# Patient Record
Sex: FEMALE | Race: BLACK OR AFRICAN AMERICAN | NOT HISPANIC OR LATINO | Employment: FULL TIME | URBAN - METROPOLITAN AREA
[De-identification: names, ages, dates, MRNs, and addresses within clinical notes are randomized per-mention and may not be internally consistent; named-entity substitution may affect disease eponyms.]

---

## 2017-01-06 ENCOUNTER — OFFICE VISIT - HEALTHEAST (OUTPATIENT)
Dept: EDUCATION SERVICES | Facility: CLINIC | Age: 32
End: 2017-01-06

## 2017-01-06 DIAGNOSIS — E11.9 TYPE 2 DIABETES MELLITUS WITHOUT COMPLICATION, WITHOUT LONG-TERM CURRENT USE OF INSULIN (H): ICD-10-CM

## 2017-01-20 ENCOUNTER — COMMUNICATION - HEALTHEAST (OUTPATIENT)
Dept: SCHEDULING | Facility: CLINIC | Age: 32
End: 2017-01-20

## 2017-01-23 ENCOUNTER — COMMUNICATION - HEALTHEAST (OUTPATIENT)
Dept: FAMILY MEDICINE | Facility: CLINIC | Age: 32
End: 2017-01-23

## 2017-01-25 ENCOUNTER — OFFICE VISIT - HEALTHEAST (OUTPATIENT)
Dept: FAMILY MEDICINE | Facility: CLINIC | Age: 32
End: 2017-01-25

## 2017-01-25 DIAGNOSIS — Z09 HOSPITAL DISCHARGE FOLLOW-UP: ICD-10-CM

## 2017-01-25 DIAGNOSIS — E11.9 TYPE 2 DIABETES MELLITUS WITHOUT COMPLICATION, WITHOUT LONG-TERM CURRENT USE OF INSULIN (H): ICD-10-CM

## 2017-01-25 DIAGNOSIS — E78.5 HYPERLIPIDEMIA: ICD-10-CM

## 2017-01-25 LAB
HBA1C MFR BLD: 6.4 % (ref 3.5–6)
LDLC SERPL CALC-MCNC: 143 MG/DL

## 2017-01-26 ENCOUNTER — COMMUNICATION - HEALTHEAST (OUTPATIENT)
Dept: UROLOGY | Facility: CLINIC | Age: 32
End: 2017-01-26

## 2017-02-09 ENCOUNTER — COMMUNICATION - HEALTHEAST (OUTPATIENT)
Dept: FAMILY MEDICINE | Facility: CLINIC | Age: 32
End: 2017-02-09

## 2017-04-14 ENCOUNTER — RECORDS - HEALTHEAST (OUTPATIENT)
Dept: ADMINISTRATIVE | Facility: OTHER | Age: 32
End: 2017-04-14

## 2017-05-25 ENCOUNTER — COMMUNICATION - HEALTHEAST (OUTPATIENT)
Dept: FAMILY MEDICINE | Facility: CLINIC | Age: 32
End: 2017-05-25

## 2017-05-25 ENCOUNTER — AMBULATORY - HEALTHEAST (OUTPATIENT)
Dept: FAMILY MEDICINE | Facility: CLINIC | Age: 32
End: 2017-05-25

## 2017-06-27 ENCOUNTER — RECORDS - HEALTHEAST (OUTPATIENT)
Dept: ADMINISTRATIVE | Facility: OTHER | Age: 32
End: 2017-06-27

## 2017-07-10 ENCOUNTER — RECORDS - HEALTHEAST (OUTPATIENT)
Dept: ADMINISTRATIVE | Facility: OTHER | Age: 32
End: 2017-07-10

## 2017-08-12 ENCOUNTER — HEALTH MAINTENANCE LETTER (OUTPATIENT)
Age: 32
End: 2017-08-12

## 2017-08-18 ENCOUNTER — COMMUNICATION - HEALTHEAST (OUTPATIENT)
Dept: FAMILY MEDICINE | Facility: CLINIC | Age: 32
End: 2017-08-18

## 2017-08-22 ENCOUNTER — COMMUNICATION - HEALTHEAST (OUTPATIENT)
Dept: FAMILY MEDICINE | Facility: CLINIC | Age: 32
End: 2017-08-22

## 2017-08-24 ENCOUNTER — OFFICE VISIT - HEALTHEAST (OUTPATIENT)
Dept: FAMILY MEDICINE | Facility: CLINIC | Age: 32
End: 2017-08-24

## 2017-08-24 DIAGNOSIS — E78.5 HYPERLIPIDEMIA: ICD-10-CM

## 2017-08-24 DIAGNOSIS — T14.8XXA ABRASION: ICD-10-CM

## 2017-08-24 DIAGNOSIS — G62.9 NEUROPATHY: ICD-10-CM

## 2017-08-24 DIAGNOSIS — E11.9 TYPE 2 DIABETES MELLITUS WITHOUT COMPLICATION, WITHOUT LONG-TERM CURRENT USE OF INSULIN (H): ICD-10-CM

## 2017-08-24 LAB
HBA1C MFR BLD: 7.8 % (ref 3.5–6)
LDLC SERPL CALC-MCNC: 203 MG/DL

## 2017-08-24 ASSESSMENT — MIFFLIN-ST. JEOR: SCORE: 2185.94

## 2017-12-18 ENCOUNTER — COMMUNICATION - HEALTHEAST (OUTPATIENT)
Dept: FAMILY MEDICINE | Facility: CLINIC | Age: 32
End: 2017-12-18

## 2018-01-02 ENCOUNTER — COMMUNICATION - HEALTHEAST (OUTPATIENT)
Dept: FAMILY MEDICINE | Facility: CLINIC | Age: 33
End: 2018-01-02

## 2018-01-04 ENCOUNTER — COMMUNICATION - HEALTHEAST (OUTPATIENT)
Dept: FAMILY MEDICINE | Facility: CLINIC | Age: 33
End: 2018-01-04

## 2018-02-14 ENCOUNTER — RECORDS - HEALTHEAST (OUTPATIENT)
Dept: ADMINISTRATIVE | Facility: OTHER | Age: 33
End: 2018-02-14

## 2018-02-14 ENCOUNTER — OFFICE VISIT - HEALTHEAST (OUTPATIENT)
Dept: FAMILY MEDICINE | Facility: CLINIC | Age: 33
End: 2018-02-14

## 2018-02-14 DIAGNOSIS — J06.9 URI (UPPER RESPIRATORY INFECTION): ICD-10-CM

## 2018-02-14 DIAGNOSIS — E11.40 DIABETES MELLITUS WITH NEUROPATHY (H): ICD-10-CM

## 2018-02-14 DIAGNOSIS — E66.9 OBESITY: ICD-10-CM

## 2018-02-14 DIAGNOSIS — E78.5 HYPERLIPIDEMIA: ICD-10-CM

## 2018-02-14 DIAGNOSIS — R80.9 MICROALBUMINURIA: ICD-10-CM

## 2018-02-14 LAB
ALBUMIN SERPL-MCNC: 3.1 G/DL (ref 3.5–5)
ALP SERPL-CCNC: 72 U/L (ref 45–120)
ALT SERPL W P-5'-P-CCNC: 16 U/L (ref 0–45)
ANION GAP SERPL CALCULATED.3IONS-SCNC: 11 MMOL/L (ref 5–18)
AST SERPL W P-5'-P-CCNC: 16 U/L (ref 0–40)
BILIRUB SERPL-MCNC: 0.3 MG/DL (ref 0–1)
BUN SERPL-MCNC: 6 MG/DL (ref 8–22)
CALCIUM SERPL-MCNC: 8.9 MG/DL (ref 8.5–10.5)
CHLORIDE BLD-SCNC: 105 MMOL/L (ref 98–107)
CHOLEST SERPL-MCNC: 239 MG/DL
CO2 SERPL-SCNC: 23 MMOL/L (ref 22–31)
CREAT SERPL-MCNC: 0.61 MG/DL (ref 0.6–1.1)
CREAT UR-MCNC: 261 MG/DL
FASTING STATUS PATIENT QL REPORTED: YES
GFR SERPL CREATININE-BSD FRML MDRD: >60 ML/MIN/1.73M2
GLUCOSE BLD-MCNC: 197 MG/DL (ref 70–125)
HBA1C MFR BLD: 8.5 % (ref 3.5–6)
HDLC SERPL-MCNC: 53 MG/DL
LDLC SERPL CALC-MCNC: 164 MG/DL
MICROALBUMIN UR-MCNC: 5.9 MG/DL (ref 0–1.99)
MICROALBUMIN/CREAT UR: 22.6 MG/G
POTASSIUM BLD-SCNC: 4.2 MMOL/L (ref 3.5–5)
PROT SERPL-MCNC: 6.8 G/DL (ref 6–8)
SODIUM SERPL-SCNC: 139 MMOL/L (ref 136–145)
TRIGL SERPL-MCNC: 108 MG/DL

## 2018-02-15 ENCOUNTER — COMMUNICATION - HEALTHEAST (OUTPATIENT)
Dept: FAMILY MEDICINE | Facility: CLINIC | Age: 33
End: 2018-02-15

## 2018-02-15 DIAGNOSIS — E11.9 DIABETES (H): ICD-10-CM

## 2018-02-16 ENCOUNTER — COMMUNICATION - HEALTHEAST (OUTPATIENT)
Dept: FAMILY MEDICINE | Facility: CLINIC | Age: 33
End: 2018-02-16

## 2018-02-19 ENCOUNTER — COMMUNICATION - HEALTHEAST (OUTPATIENT)
Dept: FAMILY MEDICINE | Facility: CLINIC | Age: 33
End: 2018-02-19

## 2018-02-22 ENCOUNTER — COMMUNICATION - HEALTHEAST (OUTPATIENT)
Dept: FAMILY MEDICINE | Facility: CLINIC | Age: 33
End: 2018-02-22

## 2018-02-28 ENCOUNTER — OFFICE VISIT - HEALTHEAST (OUTPATIENT)
Dept: FAMILY MEDICINE | Facility: CLINIC | Age: 33
End: 2018-02-28

## 2018-02-28 DIAGNOSIS — E11.40 DIABETES MELLITUS WITH NEUROPATHY (H): ICD-10-CM

## 2018-02-28 DIAGNOSIS — Z01.818 PRE-OP EXAM: ICD-10-CM

## 2018-02-28 DIAGNOSIS — E66.9 OBESITY: ICD-10-CM

## 2018-02-28 DIAGNOSIS — J01.90 ACUTE SINUSITIS: ICD-10-CM

## 2018-02-28 DIAGNOSIS — R80.9 MICROALBUMINURIA: ICD-10-CM

## 2018-02-28 DIAGNOSIS — E78.5 HYPERLIPIDEMIA: ICD-10-CM

## 2018-02-28 LAB
ANION GAP SERPL CALCULATED.3IONS-SCNC: 8 MMOL/L (ref 5–18)
ATRIAL RATE - MUSE: 89 BPM
BUN SERPL-MCNC: 6 MG/DL (ref 8–22)
CALCIUM SERPL-MCNC: 8.8 MG/DL (ref 8.5–10.5)
CHLORIDE BLD-SCNC: 103 MMOL/L (ref 98–107)
CO2 SERPL-SCNC: 25 MMOL/L (ref 22–31)
CREAT SERPL-MCNC: 0.65 MG/DL (ref 0.6–1.1)
DIASTOLIC BLOOD PRESSURE - MUSE: NORMAL MMHG
ERYTHROCYTE [DISTWIDTH] IN BLOOD BY AUTOMATED COUNT: 12.1 % (ref 11–14.5)
GFR SERPL CREATININE-BSD FRML MDRD: >60 ML/MIN/1.73M2
GLUCOSE BLD-MCNC: 146 MG/DL (ref 70–125)
HCG UR QL: NEGATIVE
HCT VFR BLD AUTO: 34.3 % (ref 35–47)
HGB BLD-MCNC: 11.4 G/DL (ref 12–16)
INTERPRETATION ECG - MUSE: NORMAL
MCH RBC QN AUTO: 27.6 PG (ref 27–34)
MCHC RBC AUTO-ENTMCNC: 33.3 G/DL (ref 32–36)
MCV RBC AUTO: 83 FL (ref 80–100)
P AXIS - MUSE: 38 DEGREES
PLATELET # BLD AUTO: 270 THOU/UL (ref 140–440)
PMV BLD AUTO: 8.3 FL (ref 7–10)
POTASSIUM BLD-SCNC: 4.3 MMOL/L (ref 3.5–5)
PR INTERVAL - MUSE: 162 MS
QRS DURATION - MUSE: 90 MS
QT - MUSE: 360 MS
QTC - MUSE: 438 MS
R AXIS - MUSE: 15 DEGREES
RBC # BLD AUTO: 4.13 MILL/UL (ref 3.8–5.4)
SODIUM SERPL-SCNC: 136 MMOL/L (ref 136–145)
SP GR UR STRIP: >=1.03 (ref 1–1.03)
SYSTOLIC BLOOD PRESSURE - MUSE: NORMAL MMHG
T AXIS - MUSE: -2 DEGREES
VENTRICULAR RATE- MUSE: 89 BPM
WBC: 6.5 THOU/UL (ref 4–11)

## 2018-02-28 ASSESSMENT — MIFFLIN-ST. JEOR: SCORE: 2167.23

## 2018-03-01 ENCOUNTER — ANESTHESIA - HEALTHEAST (OUTPATIENT)
Dept: SURGERY | Facility: HOSPITAL | Age: 33
End: 2018-03-01

## 2018-03-01 ASSESSMENT — MIFFLIN-ST. JEOR: SCORE: 2167.23

## 2018-03-02 ENCOUNTER — SURGERY - HEALTHEAST (OUTPATIENT)
Dept: SURGERY | Facility: HOSPITAL | Age: 33
End: 2018-03-02

## 2018-03-07 ENCOUNTER — RECORDS - HEALTHEAST (OUTPATIENT)
Dept: ADMINISTRATIVE | Facility: OTHER | Age: 33
End: 2018-03-07

## 2018-03-20 ENCOUNTER — COMMUNICATION - HEALTHEAST (OUTPATIENT)
Dept: EDUCATION SERVICES | Facility: CLINIC | Age: 33
End: 2018-03-20

## 2018-03-20 DIAGNOSIS — E11.9 TYPE 2 DIABETES MELLITUS WITHOUT COMPLICATION, WITHOUT LONG-TERM CURRENT USE OF INSULIN (H): ICD-10-CM

## 2018-03-28 ENCOUNTER — COMMUNICATION - HEALTHEAST (OUTPATIENT)
Dept: FAMILY MEDICINE | Facility: CLINIC | Age: 33
End: 2018-03-28

## 2018-04-27 ENCOUNTER — COMMUNICATION - HEALTHEAST (OUTPATIENT)
Dept: FAMILY MEDICINE | Facility: CLINIC | Age: 33
End: 2018-04-27

## 2018-11-14 ENCOUNTER — OFFICE VISIT - HEALTHEAST (OUTPATIENT)
Dept: FAMILY MEDICINE | Facility: CLINIC | Age: 33
End: 2018-11-14

## 2018-11-14 DIAGNOSIS — J02.0 STREP THROAT: ICD-10-CM

## 2018-11-14 LAB
DEPRECATED S PYO AG THROAT QL EIA: ABNORMAL
FLUAV AG SPEC QL IA: NORMAL
FLUBV AG SPEC QL IA: NORMAL

## 2018-12-05 ENCOUNTER — COMMUNICATION - HEALTHEAST (OUTPATIENT)
Dept: FAMILY MEDICINE | Facility: CLINIC | Age: 33
End: 2018-12-05

## 2018-12-18 ENCOUNTER — RECORDS - HEALTHEAST (OUTPATIENT)
Dept: LAB | Facility: HOSPITAL | Age: 33
End: 2018-12-18

## 2018-12-19 LAB
GAMMA INTERFERON BACKGROUND BLD IA-ACNC: 0.02 IU/ML
M TB IFN-G BLD-IMP: NEGATIVE
MITOGEN IGNF BCKGRD COR BLD-ACNC: 0 IU/ML
MITOGEN IGNF BCKGRD COR BLD-ACNC: 0.02 IU/ML
QTF INTERPRETATION: NORMAL
QTF MITOGEN - NIL: 7.7 IU/ML

## 2018-12-26 ENCOUNTER — COMMUNICATION - HEALTHEAST (OUTPATIENT)
Dept: FAMILY MEDICINE | Facility: CLINIC | Age: 33
End: 2018-12-26

## 2019-01-02 ENCOUNTER — COMMUNICATION - HEALTHEAST (OUTPATIENT)
Dept: FAMILY MEDICINE | Facility: CLINIC | Age: 34
End: 2019-01-02

## 2019-01-04 ENCOUNTER — COMMUNICATION - HEALTHEAST (OUTPATIENT)
Dept: FAMILY MEDICINE | Facility: CLINIC | Age: 34
End: 2019-01-04

## 2019-01-07 ENCOUNTER — AMBULATORY - HEALTHEAST (OUTPATIENT)
Dept: NURSING | Facility: CLINIC | Age: 34
End: 2019-01-07

## 2019-01-07 ENCOUNTER — COMMUNICATION - HEALTHEAST (OUTPATIENT)
Dept: FAMILY MEDICINE | Facility: CLINIC | Age: 34
End: 2019-01-07

## 2019-01-07 DIAGNOSIS — J02.0 STREPTOCOCCAL SORE THROAT: ICD-10-CM

## 2019-01-07 LAB — DEPRECATED S PYO AG THROAT QL EIA: NORMAL

## 2019-01-08 LAB — GROUP A STREP BY PCR: NORMAL

## 2019-01-22 ENCOUNTER — RECORDS - HEALTHEAST (OUTPATIENT)
Dept: ADMINISTRATIVE | Facility: OTHER | Age: 34
End: 2019-01-22

## 2019-01-22 LAB
HPV_EXT - HISTORICAL: NORMAL
PAP SMEAR - HIM PATIENT REPORTED: NORMAL

## 2019-02-05 ENCOUNTER — RECORDS - HEALTHEAST (OUTPATIENT)
Dept: ADMINISTRATIVE | Facility: OTHER | Age: 34
End: 2019-02-05

## 2019-02-20 ENCOUNTER — COMMUNICATION - HEALTHEAST (OUTPATIENT)
Dept: FAMILY MEDICINE | Facility: CLINIC | Age: 34
End: 2019-02-20

## 2019-02-20 ENCOUNTER — AMBULATORY - HEALTHEAST (OUTPATIENT)
Dept: FAMILY MEDICINE | Facility: CLINIC | Age: 34
End: 2019-02-20

## 2019-02-20 ENCOUNTER — OFFICE VISIT - HEALTHEAST (OUTPATIENT)
Dept: FAMILY MEDICINE | Facility: CLINIC | Age: 34
End: 2019-02-20

## 2019-02-20 DIAGNOSIS — E11.9 TYPE 2 DIABETES MELLITUS WITHOUT COMPLICATION, WITHOUT LONG-TERM CURRENT USE OF INSULIN (H): ICD-10-CM

## 2019-02-20 DIAGNOSIS — E78.00 PURE HYPERCHOLESTEROLEMIA: ICD-10-CM

## 2019-02-20 DIAGNOSIS — Z01.818 PRE-OP EXAM: ICD-10-CM

## 2019-02-20 DIAGNOSIS — E66.813 CLASS 3 SEVERE OBESITY WITHOUT SERIOUS COMORBIDITY WITH BODY MASS INDEX (BMI) OF 50.0 TO 59.9 IN ADULT, UNSPECIFIED OBESITY TYPE (H): ICD-10-CM

## 2019-02-20 DIAGNOSIS — E66.01 CLASS 3 SEVERE OBESITY WITHOUT SERIOUS COMORBIDITY WITH BODY MASS INDEX (BMI) OF 50.0 TO 59.9 IN ADULT, UNSPECIFIED OBESITY TYPE (H): ICD-10-CM

## 2019-02-20 DIAGNOSIS — E11.40 TYPE 2 DIABETES MELLITUS WITH DIABETIC NEUROPATHY, WITHOUT LONG-TERM CURRENT USE OF INSULIN (H): ICD-10-CM

## 2019-02-20 DIAGNOSIS — N92.4 EXCESSIVE BLEEDING IN PREMENOPAUSAL PERIOD: ICD-10-CM

## 2019-02-20 DIAGNOSIS — R80.9 MICROALBUMINURIA: ICD-10-CM

## 2019-02-20 LAB
ALBUMIN SERPL-MCNC: 3.7 G/DL (ref 3.5–5)
ALP SERPL-CCNC: 77 U/L (ref 45–120)
ALT SERPL W P-5'-P-CCNC: 13 U/L (ref 0–45)
ANION GAP SERPL CALCULATED.3IONS-SCNC: 10 MMOL/L (ref 5–18)
AST SERPL W P-5'-P-CCNC: 11 U/L (ref 0–40)
BILIRUB SERPL-MCNC: 0.3 MG/DL (ref 0–1)
BUN SERPL-MCNC: 9 MG/DL (ref 8–22)
CALCIUM SERPL-MCNC: 9.6 MG/DL (ref 8.5–10.5)
CHLORIDE BLD-SCNC: 103 MMOL/L (ref 98–107)
CO2 SERPL-SCNC: 28 MMOL/L (ref 22–31)
CREAT SERPL-MCNC: 0.77 MG/DL (ref 0.6–1.1)
CREAT UR-MCNC: 245.4 MG/DL
ERYTHROCYTE [DISTWIDTH] IN BLOOD BY AUTOMATED COUNT: 12.5 % (ref 11–14.5)
GFR SERPL CREATININE-BSD FRML MDRD: >60 ML/MIN/1.73M2
GLUCOSE BLD-MCNC: 107 MG/DL (ref 70–125)
HBA1C MFR BLD: 7.5 % (ref 3.5–6)
HCG UR QL: NEGATIVE
HCT VFR BLD AUTO: 37 % (ref 35–47)
HGB BLD-MCNC: 12.2 G/DL (ref 12–16)
MCH RBC QN AUTO: 27.7 PG (ref 27–34)
MCHC RBC AUTO-ENTMCNC: 33 G/DL (ref 32–36)
MCV RBC AUTO: 84 FL (ref 80–100)
MICROALBUMIN UR-MCNC: 1.79 MG/DL (ref 0–1.99)
MICROALBUMIN/CREAT UR: 7.3 MG/G
PLATELET # BLD AUTO: 288 THOU/UL (ref 140–440)
PMV BLD AUTO: 8.2 FL (ref 7–10)
POTASSIUM BLD-SCNC: 3.9 MMOL/L (ref 3.5–5)
PROT SERPL-MCNC: 7.4 G/DL (ref 6–8)
RBC # BLD AUTO: 4.4 MILL/UL (ref 3.8–5.4)
SODIUM SERPL-SCNC: 141 MMOL/L (ref 136–145)
WBC: 10.1 THOU/UL (ref 4–11)

## 2019-02-20 ASSESSMENT — MIFFLIN-ST. JEOR: SCORE: 2172.33

## 2019-02-21 LAB
ATRIAL RATE - MUSE: 97 BPM
DIASTOLIC BLOOD PRESSURE - MUSE: NORMAL MMHG
INTERPRETATION ECG - MUSE: NORMAL
LDLC SERPL CALC-MCNC: 192 MG/DL
P AXIS - MUSE: 31 DEGREES
PR INTERVAL - MUSE: 158 MS
QRS DURATION - MUSE: 92 MS
QT - MUSE: 366 MS
QTC - MUSE: 464 MS
R AXIS - MUSE: 8 DEGREES
SYSTOLIC BLOOD PRESSURE - MUSE: NORMAL MMHG
T AXIS - MUSE: -1 DEGREES
VENTRICULAR RATE- MUSE: 97 BPM

## 2019-02-26 ENCOUNTER — COMMUNICATION - HEALTHEAST (OUTPATIENT)
Dept: FAMILY MEDICINE | Facility: CLINIC | Age: 34
End: 2019-02-26

## 2019-03-01 ENCOUNTER — COMMUNICATION - HEALTHEAST (OUTPATIENT)
Dept: FAMILY MEDICINE | Facility: CLINIC | Age: 34
End: 2019-03-01

## 2019-03-01 DIAGNOSIS — E11.40 DIABETES MELLITUS WITH NEUROPATHY (H): ICD-10-CM

## 2019-03-01 ASSESSMENT — MIFFLIN-ST. JEOR: SCORE: 2172.33

## 2019-03-04 ENCOUNTER — ANESTHESIA - HEALTHEAST (OUTPATIENT)
Dept: SURGERY | Facility: HOSPITAL | Age: 34
End: 2019-03-04

## 2019-03-04 ENCOUNTER — SURGERY - HEALTHEAST (OUTPATIENT)
Dept: SURGERY | Facility: HOSPITAL | Age: 34
End: 2019-03-04

## 2019-10-14 ENCOUNTER — COMMUNICATION - HEALTHEAST (OUTPATIENT)
Dept: FAMILY MEDICINE | Facility: CLINIC | Age: 34
End: 2019-10-14

## 2019-10-16 ENCOUNTER — OFFICE VISIT - HEALTHEAST (OUTPATIENT)
Dept: FAMILY MEDICINE | Facility: CLINIC | Age: 34
End: 2019-10-16

## 2019-10-16 DIAGNOSIS — M72.2 PLANTAR FASCIITIS: ICD-10-CM

## 2019-10-16 DIAGNOSIS — E11.40 TYPE 2 DIABETES MELLITUS WITH DIABETIC NEUROPATHY, WITHOUT LONG-TERM CURRENT USE OF INSULIN (H): ICD-10-CM

## 2019-10-16 DIAGNOSIS — E66.01 MORBID OBESITY (H): ICD-10-CM

## 2019-10-16 DIAGNOSIS — Z23 IMMUNIZATION DUE: ICD-10-CM

## 2019-10-16 LAB
ANION GAP SERPL CALCULATED.3IONS-SCNC: 9 MMOL/L (ref 5–18)
BUN SERPL-MCNC: 6 MG/DL (ref 8–22)
CALCIUM SERPL-MCNC: 9.3 MG/DL (ref 8.5–10.5)
CHLORIDE BLD-SCNC: 101 MMOL/L (ref 98–107)
CHOLEST SERPL-MCNC: 254 MG/DL
CO2 SERPL-SCNC: 27 MMOL/L (ref 22–31)
CREAT SERPL-MCNC: 0.63 MG/DL (ref 0.6–1.1)
FASTING STATUS PATIENT QL REPORTED: YES
GFR SERPL CREATININE-BSD FRML MDRD: >60 ML/MIN/1.73M2
GLUCOSE BLD-MCNC: 240 MG/DL (ref 70–125)
HBA1C MFR BLD: 8.9 % (ref 3.5–6)
HDLC SERPL-MCNC: 60 MG/DL
LDLC SERPL CALC-MCNC: 173 MG/DL
POTASSIUM BLD-SCNC: 4.5 MMOL/L (ref 3.5–5)
SODIUM SERPL-SCNC: 137 MMOL/L (ref 136–145)
TRIGL SERPL-MCNC: 107 MG/DL

## 2019-10-16 ASSESSMENT — MIFFLIN-ST. JEOR: SCORE: 2185.49

## 2019-10-17 ENCOUNTER — COMMUNICATION - HEALTHEAST (OUTPATIENT)
Dept: FAMILY MEDICINE | Facility: CLINIC | Age: 34
End: 2019-10-17

## 2019-10-21 ENCOUNTER — AMBULATORY - HEALTHEAST (OUTPATIENT)
Dept: FAMILY MEDICINE | Facility: CLINIC | Age: 34
End: 2019-10-21

## 2019-10-21 DIAGNOSIS — E78.00 PURE HYPERCHOLESTEROLEMIA: ICD-10-CM

## 2019-10-21 DIAGNOSIS — E11.40 TYPE 2 DIABETES MELLITUS WITH DIABETIC NEUROPATHY, WITHOUT LONG-TERM CURRENT USE OF INSULIN (H): ICD-10-CM

## 2019-10-21 DIAGNOSIS — M72.2 PLANTAR FASCIITIS: ICD-10-CM

## 2019-10-22 ENCOUNTER — COMMUNICATION - HEALTHEAST (OUTPATIENT)
Dept: FAMILY MEDICINE | Facility: CLINIC | Age: 34
End: 2019-10-22

## 2019-10-31 ENCOUNTER — RECORDS - HEALTHEAST (OUTPATIENT)
Dept: HEALTH INFORMATION MANAGEMENT | Facility: CLINIC | Age: 34
End: 2019-10-31

## 2020-02-04 ENCOUNTER — COMMUNICATION - HEALTHEAST (OUTPATIENT)
Dept: SCHEDULING | Facility: CLINIC | Age: 35
End: 2020-02-04

## 2020-02-05 ENCOUNTER — COMMUNICATION - HEALTHEAST (OUTPATIENT)
Dept: PHARMACY | Facility: CLINIC | Age: 35
End: 2020-02-05

## 2020-02-05 DIAGNOSIS — E11.40 TYPE 2 DIABETES MELLITUS WITH DIABETIC NEUROPATHY, WITHOUT LONG-TERM CURRENT USE OF INSULIN (H): ICD-10-CM

## 2020-02-19 ENCOUNTER — COMMUNICATION - HEALTHEAST (OUTPATIENT)
Dept: PHARMACY | Facility: CLINIC | Age: 35
End: 2020-02-19

## 2020-02-19 DIAGNOSIS — E11.40 DIABETES MELLITUS WITH NEUROPATHY (H): ICD-10-CM

## 2020-02-20 ENCOUNTER — COMMUNICATION - HEALTHEAST (OUTPATIENT)
Dept: FAMILY MEDICINE | Facility: CLINIC | Age: 35
End: 2020-02-20

## 2020-02-20 ENCOUNTER — COMMUNICATION - HEALTHEAST (OUTPATIENT)
Dept: SCHEDULING | Facility: CLINIC | Age: 35
End: 2020-02-20

## 2020-02-21 ENCOUNTER — OFFICE VISIT - HEALTHEAST (OUTPATIENT)
Dept: FAMILY MEDICINE | Facility: CLINIC | Age: 35
End: 2020-02-21

## 2020-02-21 DIAGNOSIS — E66.01 MORBID OBESITY (H): ICD-10-CM

## 2020-02-21 DIAGNOSIS — E11.42 DIABETIC POLYNEUROPATHY ASSOCIATED WITH TYPE 2 DIABETES MELLITUS (H): ICD-10-CM

## 2020-02-21 DIAGNOSIS — E78.00 PURE HYPERCHOLESTEROLEMIA: ICD-10-CM

## 2020-02-21 DIAGNOSIS — E11.65 UNCONTROLLED TYPE 2 DIABETES MELLITUS WITH HYPERGLYCEMIA (H): ICD-10-CM

## 2020-02-21 LAB
ALBUMIN SERPL-MCNC: 3.6 G/DL (ref 3.5–5)
ALP SERPL-CCNC: 86 U/L (ref 45–120)
ALT SERPL W P-5'-P-CCNC: 19 U/L (ref 0–45)
ANION GAP SERPL CALCULATED.3IONS-SCNC: 10 MMOL/L (ref 5–18)
AST SERPL W P-5'-P-CCNC: 13 U/L (ref 0–40)
BILIRUB SERPL-MCNC: 0.6 MG/DL (ref 0–1)
BUN SERPL-MCNC: 5 MG/DL (ref 8–22)
CALCIUM SERPL-MCNC: 9 MG/DL (ref 8.5–10.5)
CHLORIDE BLD-SCNC: 100 MMOL/L (ref 98–107)
CHOLEST SERPL-MCNC: 267 MG/DL
CO2 SERPL-SCNC: 28 MMOL/L (ref 22–31)
CREAT SERPL-MCNC: 0.66 MG/DL (ref 0.6–1.1)
CREAT UR-MCNC: 161.3 MG/DL
FASTING STATUS PATIENT QL REPORTED: YES
GFR SERPL CREATININE-BSD FRML MDRD: >60 ML/MIN/1.73M2
GLUCOSE BLD-MCNC: 315 MG/DL (ref 70–125)
HBA1C MFR BLD: 10.6 % (ref 3.5–6)
HDLC SERPL-MCNC: 53 MG/DL
LDLC SERPL CALC-MCNC: 193 MG/DL
MICROALBUMIN UR-MCNC: 4.05 MG/DL (ref 0–1.99)
MICROALBUMIN/CREAT UR: 25.1 MG/G
POTASSIUM BLD-SCNC: 4.2 MMOL/L (ref 3.5–5)
PROT SERPL-MCNC: 6.8 G/DL (ref 6–8)
SODIUM SERPL-SCNC: 138 MMOL/L (ref 136–145)
TRIGL SERPL-MCNC: 103 MG/DL

## 2020-02-25 ENCOUNTER — COMMUNICATION - HEALTHEAST (OUTPATIENT)
Dept: FAMILY MEDICINE | Facility: CLINIC | Age: 35
End: 2020-02-25

## 2020-02-26 ENCOUNTER — COMMUNICATION - HEALTHEAST (OUTPATIENT)
Dept: FAMILY MEDICINE | Facility: CLINIC | Age: 35
End: 2020-02-26

## 2020-02-26 DIAGNOSIS — E11.40 TYPE 2 DIABETES MELLITUS WITH DIABETIC NEUROPATHY, WITHOUT LONG-TERM CURRENT USE OF INSULIN (H): ICD-10-CM

## 2020-03-02 ENCOUNTER — COMMUNICATION - HEALTHEAST (OUTPATIENT)
Dept: FAMILY MEDICINE | Facility: CLINIC | Age: 35
End: 2020-03-02

## 2020-03-23 ENCOUNTER — COMMUNICATION - HEALTHEAST (OUTPATIENT)
Dept: PHARMACY | Facility: CLINIC | Age: 35
End: 2020-03-23

## 2020-03-23 DIAGNOSIS — E11.65 UNCONTROLLED TYPE 2 DIABETES MELLITUS WITH HYPERGLYCEMIA (H): ICD-10-CM

## 2020-04-04 ENCOUNTER — COMMUNICATION - HEALTHEAST (OUTPATIENT)
Dept: FAMILY MEDICINE | Facility: CLINIC | Age: 35
End: 2020-04-04

## 2020-04-14 ENCOUNTER — COMMUNICATION - HEALTHEAST (OUTPATIENT)
Dept: FAMILY MEDICINE | Facility: CLINIC | Age: 35
End: 2020-04-14

## 2020-04-17 ENCOUNTER — OFFICE VISIT - HEALTHEAST (OUTPATIENT)
Dept: FAMILY MEDICINE | Facility: CLINIC | Age: 35
End: 2020-04-17

## 2020-04-17 DIAGNOSIS — E11.40 TYPE 2 DIABETES MELLITUS WITH DIABETIC NEUROPATHY, WITHOUT LONG-TERM CURRENT USE OF INSULIN (H): ICD-10-CM

## 2020-04-21 ENCOUNTER — OFFICE VISIT - HEALTHEAST (OUTPATIENT)
Dept: FAMILY MEDICINE | Facility: CLINIC | Age: 35
End: 2020-04-21

## 2020-04-21 ENCOUNTER — COMMUNICATION - HEALTHEAST (OUTPATIENT)
Dept: TELEHEALTH | Facility: CLINIC | Age: 35
End: 2020-04-21

## 2020-04-21 DIAGNOSIS — E78.00 PURE HYPERCHOLESTEROLEMIA: ICD-10-CM

## 2020-04-21 DIAGNOSIS — R11.0 NAUSEA: ICD-10-CM

## 2020-04-21 DIAGNOSIS — E66.01 MORBID OBESITY (H): ICD-10-CM

## 2020-04-21 DIAGNOSIS — R80.9 MICROALBUMINURIA: ICD-10-CM

## 2020-04-21 LAB
ALBUMIN SERPL-MCNC: 3.4 G/DL (ref 3.5–5)
ALP SERPL-CCNC: 84 U/L (ref 45–120)
ALT SERPL W P-5'-P-CCNC: 16 U/L (ref 0–45)
ANION GAP SERPL CALCULATED.3IONS-SCNC: 10 MMOL/L (ref 5–18)
AST SERPL W P-5'-P-CCNC: 11 U/L (ref 0–40)
BASOPHILS # BLD AUTO: 0.1 THOU/UL (ref 0–0.2)
BASOPHILS NFR BLD AUTO: 1 % (ref 0–2)
BILIRUB SERPL-MCNC: 0.4 MG/DL (ref 0–1)
BUN SERPL-MCNC: 7 MG/DL (ref 8–22)
CALCIUM SERPL-MCNC: 9.1 MG/DL (ref 8.5–10.5)
CHLORIDE BLD-SCNC: 101 MMOL/L (ref 98–107)
CO2 SERPL-SCNC: 27 MMOL/L (ref 22–31)
CREAT SERPL-MCNC: 0.57 MG/DL (ref 0.6–1.1)
EOSINOPHIL # BLD AUTO: 0.2 THOU/UL (ref 0–0.4)
EOSINOPHIL NFR BLD AUTO: 3 % (ref 0–6)
ERYTHROCYTE [DISTWIDTH] IN BLOOD BY AUTOMATED COUNT: 13.2 % (ref 11–14.5)
GFR SERPL CREATININE-BSD FRML MDRD: >60 ML/MIN/1.73M2
GLUCOSE BLD-MCNC: 157 MG/DL (ref 70–125)
HBA1C MFR BLD: 10.5 % (ref 3.5–6)
HCT VFR BLD AUTO: 37.1 % (ref 35–47)
HGB BLD-MCNC: 12.3 G/DL (ref 12–16)
LYMPHOCYTES # BLD AUTO: 3.1 THOU/UL (ref 0.8–4.4)
LYMPHOCYTES NFR BLD AUTO: 36 % (ref 20–40)
MCH RBC QN AUTO: 28.3 PG (ref 27–34)
MCHC RBC AUTO-ENTMCNC: 33.2 G/DL (ref 32–36)
MCV RBC AUTO: 85 FL (ref 80–100)
MONOCYTES # BLD AUTO: 0.5 THOU/UL (ref 0–0.9)
MONOCYTES NFR BLD AUTO: 5 % (ref 2–10)
NEUTROPHILS # BLD AUTO: 4.7 THOU/UL (ref 2–7.7)
NEUTROPHILS NFR BLD AUTO: 55 % (ref 50–70)
PLATELET # BLD AUTO: 252 THOU/UL (ref 140–440)
PMV BLD AUTO: 8.7 FL (ref 7–10)
POTASSIUM BLD-SCNC: 4.2 MMOL/L (ref 3.5–5)
PROT SERPL-MCNC: 6.9 G/DL (ref 6–8)
RBC # BLD AUTO: 4.36 MILL/UL (ref 3.8–5.4)
SODIUM SERPL-SCNC: 138 MMOL/L (ref 136–145)
TSH SERPL DL<=0.005 MIU/L-ACNC: 0.87 UIU/ML (ref 0.3–5)
WBC: 8.5 THOU/UL (ref 4–11)

## 2020-04-22 ENCOUNTER — COMMUNICATION - HEALTHEAST (OUTPATIENT)
Dept: FAMILY MEDICINE | Facility: CLINIC | Age: 35
End: 2020-04-22

## 2020-04-22 DIAGNOSIS — R11.0 NAUSEA: ICD-10-CM

## 2020-04-23 LAB — 25(OH)D3 SERPL-MCNC: 11.2 NG/ML (ref 30–80)

## 2020-04-28 ENCOUNTER — OFFICE VISIT - HEALTHEAST (OUTPATIENT)
Dept: FAMILY MEDICINE | Facility: CLINIC | Age: 35
End: 2020-04-28

## 2020-04-28 DIAGNOSIS — E78.00 PURE HYPERCHOLESTEROLEMIA: ICD-10-CM

## 2020-04-28 DIAGNOSIS — E55.9 VITAMIN D DEFICIENCY: ICD-10-CM

## 2020-04-28 DIAGNOSIS — E66.01 MORBID OBESITY (H): ICD-10-CM

## 2020-04-28 DIAGNOSIS — R80.9 MICROALBUMINURIA: ICD-10-CM

## 2020-04-29 ENCOUNTER — OFFICE VISIT - HEALTHEAST (OUTPATIENT)
Dept: EDUCATION SERVICES | Facility: CLINIC | Age: 35
End: 2020-04-29

## 2020-04-29 DIAGNOSIS — E11.40 TYPE 2 DIABETES MELLITUS WITH DIABETIC NEUROPATHY, WITHOUT LONG-TERM CURRENT USE OF INSULIN (H): ICD-10-CM

## 2020-04-30 ENCOUNTER — COMMUNICATION - HEALTHEAST (OUTPATIENT)
Dept: FAMILY MEDICINE | Facility: CLINIC | Age: 35
End: 2020-04-30

## 2020-05-11 ENCOUNTER — COMMUNICATION - HEALTHEAST (OUTPATIENT)
Dept: FAMILY MEDICINE | Facility: CLINIC | Age: 35
End: 2020-05-11

## 2020-05-13 ENCOUNTER — OFFICE VISIT - HEALTHEAST (OUTPATIENT)
Dept: EDUCATION SERVICES | Facility: CLINIC | Age: 35
End: 2020-05-13

## 2020-05-13 DIAGNOSIS — E11.40 TYPE 2 DIABETES MELLITUS WITH DIABETIC NEUROPATHY, WITHOUT LONG-TERM CURRENT USE OF INSULIN (H): ICD-10-CM

## 2020-05-14 ENCOUNTER — COMMUNICATION - HEALTHEAST (OUTPATIENT)
Dept: FAMILY MEDICINE | Facility: CLINIC | Age: 35
End: 2020-05-14

## 2020-05-14 ENCOUNTER — OFFICE VISIT - HEALTHEAST (OUTPATIENT)
Dept: FAMILY MEDICINE | Facility: CLINIC | Age: 35
End: 2020-05-14

## 2020-05-14 DIAGNOSIS — E78.00 PURE HYPERCHOLESTEROLEMIA: ICD-10-CM

## 2020-05-14 DIAGNOSIS — R80.9 MICROALBUMINURIA: ICD-10-CM

## 2020-05-14 DIAGNOSIS — E66.813 CLASS 3 SEVERE OBESITY WITHOUT SERIOUS COMORBIDITY WITH BODY MASS INDEX (BMI) OF 50.0 TO 59.9 IN ADULT, UNSPECIFIED OBESITY TYPE (H): ICD-10-CM

## 2020-05-14 DIAGNOSIS — E66.01 CLASS 3 SEVERE OBESITY WITHOUT SERIOUS COMORBIDITY WITH BODY MASS INDEX (BMI) OF 50.0 TO 59.9 IN ADULT, UNSPECIFIED OBESITY TYPE (H): ICD-10-CM

## 2020-05-14 DIAGNOSIS — G44.229 CHRONIC TENSION-TYPE HEADACHE, NOT INTRACTABLE: ICD-10-CM

## 2020-05-15 ENCOUNTER — COMMUNICATION - HEALTHEAST (OUTPATIENT)
Dept: FAMILY MEDICINE | Facility: CLINIC | Age: 35
End: 2020-05-15

## 2020-05-15 ENCOUNTER — COMMUNICATION - HEALTHEAST (OUTPATIENT)
Dept: PHARMACY | Facility: CLINIC | Age: 35
End: 2020-05-15

## 2020-05-15 DIAGNOSIS — E11.40 DIABETES MELLITUS WITH NEUROPATHY (H): ICD-10-CM

## 2020-05-20 ENCOUNTER — COMMUNICATION - HEALTHEAST (OUTPATIENT)
Dept: PHARMACY | Facility: CLINIC | Age: 35
End: 2020-05-20

## 2020-05-20 ENCOUNTER — HOSPITAL ENCOUNTER (OUTPATIENT)
Dept: CT IMAGING | Facility: CLINIC | Age: 35
Discharge: HOME | End: 2020-05-20
Attending: FAMILY MEDICINE

## 2020-05-20 ENCOUNTER — AMBULATORY - HEALTHEAST (OUTPATIENT)
Dept: PHARMACY | Facility: CLINIC | Age: 35
End: 2020-05-20

## 2020-05-20 DIAGNOSIS — E78.00 PURE HYPERCHOLESTEROLEMIA: ICD-10-CM

## 2020-05-20 DIAGNOSIS — R80.9 MICROALBUMINURIA: ICD-10-CM

## 2020-05-20 DIAGNOSIS — R11.2 NAUSEA AND VOMITING, INTRACTABILITY OF VOMITING NOT SPECIFIED, UNSPECIFIED VOMITING TYPE: ICD-10-CM

## 2020-05-20 DIAGNOSIS — G43.009 NONINTRACTABLE MIGRAINE, UNSPECIFIED MIGRAINE TYPE: ICD-10-CM

## 2020-05-20 DIAGNOSIS — G44.229 CHRONIC TENSION-TYPE HEADACHE, NOT INTRACTABLE: ICD-10-CM

## 2020-05-27 ENCOUNTER — OFFICE VISIT - HEALTHEAST (OUTPATIENT)
Dept: EDUCATION SERVICES | Facility: CLINIC | Age: 35
End: 2020-05-27

## 2020-05-27 DIAGNOSIS — E11.40 TYPE 2 DIABETES MELLITUS WITH DIABETIC NEUROPATHY, WITHOUT LONG-TERM CURRENT USE OF INSULIN (H): ICD-10-CM

## 2020-06-02 ENCOUNTER — AMBULATORY - HEALTHEAST (OUTPATIENT)
Dept: PHARMACY | Facility: CLINIC | Age: 35
End: 2020-06-02

## 2020-06-04 ENCOUNTER — COMMUNICATION - HEALTHEAST (OUTPATIENT)
Dept: FAMILY MEDICINE | Facility: CLINIC | Age: 35
End: 2020-06-04

## 2020-06-15 ENCOUNTER — COMMUNICATION - HEALTHEAST (OUTPATIENT)
Dept: PHARMACY | Facility: CLINIC | Age: 35
End: 2020-06-15

## 2020-06-15 DIAGNOSIS — E11.40 TYPE 2 DIABETES MELLITUS WITH DIABETIC NEUROPATHY, WITHOUT LONG-TERM CURRENT USE OF INSULIN (H): ICD-10-CM

## 2020-06-17 ENCOUNTER — COMMUNICATION - HEALTHEAST (OUTPATIENT)
Dept: FAMILY MEDICINE | Facility: CLINIC | Age: 35
End: 2020-06-17

## 2020-06-23 ENCOUNTER — OFFICE VISIT - HEALTHEAST (OUTPATIENT)
Dept: PHARMACY | Facility: CLINIC | Age: 35
End: 2020-06-23

## 2020-06-23 DIAGNOSIS — E78.00 PURE HYPERCHOLESTEROLEMIA: ICD-10-CM

## 2020-06-23 DIAGNOSIS — G43.009 NONINTRACTABLE MIGRAINE, UNSPECIFIED MIGRAINE TYPE: ICD-10-CM

## 2020-06-23 DIAGNOSIS — R11.2 NAUSEA AND VOMITING, INTRACTABILITY OF VOMITING NOT SPECIFIED, UNSPECIFIED VOMITING TYPE: ICD-10-CM

## 2020-06-23 DIAGNOSIS — R80.9 MICROALBUMINURIA: ICD-10-CM

## 2020-06-23 PROCEDURE — 99606 MTMS BY PHARM EST 15 MIN: CPT | Performed by: PHARMACIST

## 2020-06-23 PROCEDURE — 99607 MTMS BY PHARM ADDL 15 MIN: CPT | Performed by: PHARMACIST

## 2020-06-29 ENCOUNTER — COMMUNICATION - HEALTHEAST (OUTPATIENT)
Dept: FAMILY MEDICINE | Facility: CLINIC | Age: 35
End: 2020-06-29

## 2020-06-29 DIAGNOSIS — Z20.822 EXPOSURE TO 2019 NOVEL CORONAVIRUS: ICD-10-CM

## 2020-07-01 ENCOUNTER — AMBULATORY - HEALTHEAST (OUTPATIENT)
Dept: FAMILY MEDICINE | Facility: CLINIC | Age: 35
End: 2020-07-01

## 2020-07-01 DIAGNOSIS — Z20.822 EXPOSURE TO 2019 NOVEL CORONAVIRUS: ICD-10-CM

## 2020-07-02 ENCOUNTER — COMMUNICATION - HEALTHEAST (OUTPATIENT)
Dept: FAMILY MEDICINE | Facility: CLINIC | Age: 35
End: 2020-07-02

## 2020-07-16 ENCOUNTER — COMMUNICATION - HEALTHEAST (OUTPATIENT)
Dept: FAMILY MEDICINE | Facility: CLINIC | Age: 35
End: 2020-07-16

## 2020-07-21 ENCOUNTER — AMBULATORY - HEALTHEAST (OUTPATIENT)
Dept: PHARMACY | Facility: CLINIC | Age: 35
End: 2020-07-21

## 2020-07-21 DIAGNOSIS — R80.9 MICROALBUMINURIA: ICD-10-CM

## 2020-07-21 DIAGNOSIS — E78.00 PURE HYPERCHOLESTEROLEMIA: ICD-10-CM

## 2020-07-21 DIAGNOSIS — G43.009 NONINTRACTABLE MIGRAINE, UNSPECIFIED MIGRAINE TYPE: ICD-10-CM

## 2020-07-21 DIAGNOSIS — R11.2 NAUSEA AND VOMITING, INTRACTABILITY OF VOMITING NOT SPECIFIED, UNSPECIFIED VOMITING TYPE: ICD-10-CM

## 2020-07-21 DIAGNOSIS — F41.9 ANXIETY: ICD-10-CM

## 2020-07-21 PROCEDURE — 99607 MTMS BY PHARM ADDL 15 MIN: CPT | Performed by: PHARMACIST

## 2020-07-21 PROCEDURE — 99606 MTMS BY PHARM EST 15 MIN: CPT | Performed by: PHARMACIST

## 2020-07-23 ENCOUNTER — COMMUNICATION - HEALTHEAST (OUTPATIENT)
Dept: FAMILY MEDICINE | Facility: CLINIC | Age: 35
End: 2020-07-23

## 2020-07-23 ENCOUNTER — OFFICE VISIT - HEALTHEAST (OUTPATIENT)
Dept: FAMILY MEDICINE | Facility: CLINIC | Age: 35
End: 2020-07-23

## 2020-07-23 DIAGNOSIS — E66.01 MORBID OBESITY (H): ICD-10-CM

## 2020-07-23 DIAGNOSIS — E78.00 PURE HYPERCHOLESTEROLEMIA: ICD-10-CM

## 2020-07-23 DIAGNOSIS — U07.1 INFECTION DUE TO 2019 NOVEL CORONAVIRUS: ICD-10-CM

## 2020-07-23 DIAGNOSIS — R80.9 MICROALBUMINURIA: ICD-10-CM

## 2020-07-23 DIAGNOSIS — F39 MOOD DISORDER (H): ICD-10-CM

## 2020-07-27 ENCOUNTER — AMBULATORY - HEALTHEAST (OUTPATIENT)
Dept: LAB | Facility: CLINIC | Age: 35
End: 2020-07-27

## 2020-07-27 DIAGNOSIS — E78.00 PURE HYPERCHOLESTEROLEMIA: ICD-10-CM

## 2020-07-27 DIAGNOSIS — E66.01 MORBID OBESITY (H): ICD-10-CM

## 2020-07-27 DIAGNOSIS — R80.9 MICROALBUMINURIA: ICD-10-CM

## 2020-07-27 LAB
ALBUMIN SERPL-MCNC: 3.5 G/DL (ref 3.5–5)
ALP SERPL-CCNC: 80 U/L (ref 45–120)
ALT SERPL W P-5'-P-CCNC: 15 U/L (ref 0–45)
ANION GAP SERPL CALCULATED.3IONS-SCNC: 10 MMOL/L (ref 5–18)
AST SERPL W P-5'-P-CCNC: 11 U/L (ref 0–40)
BILIRUB SERPL-MCNC: 0.3 MG/DL (ref 0–1)
BUN SERPL-MCNC: 7 MG/DL (ref 8–22)
CALCIUM SERPL-MCNC: 8.9 MG/DL (ref 8.5–10.5)
CHLORIDE BLD-SCNC: 104 MMOL/L (ref 98–107)
CO2 SERPL-SCNC: 26 MMOL/L (ref 22–31)
CREAT SERPL-MCNC: 0.66 MG/DL (ref 0.6–1.1)
GFR SERPL CREATININE-BSD FRML MDRD: >60 ML/MIN/1.73M2
GLUCOSE BLD-MCNC: 144 MG/DL (ref 70–125)
HBA1C MFR BLD: 7.9 % (ref 3.5–6)
POTASSIUM BLD-SCNC: 4.5 MMOL/L (ref 3.5–5)
PROT SERPL-MCNC: 6.7 G/DL (ref 6–8)
SODIUM SERPL-SCNC: 140 MMOL/L (ref 136–145)

## 2020-08-17 ENCOUNTER — COMMUNICATION - HEALTHEAST (OUTPATIENT)
Dept: PHARMACY | Facility: CLINIC | Age: 35
End: 2020-08-17

## 2020-08-17 DIAGNOSIS — G43.009 NONINTRACTABLE MIGRAINE, UNSPECIFIED MIGRAINE TYPE: ICD-10-CM

## 2020-09-01 ENCOUNTER — OFFICE VISIT - HEALTHEAST (OUTPATIENT)
Dept: FAMILY MEDICINE | Facility: CLINIC | Age: 35
End: 2020-09-01

## 2020-09-01 DIAGNOSIS — E78.00 PURE HYPERCHOLESTEROLEMIA: ICD-10-CM

## 2020-09-01 DIAGNOSIS — F41.9 ANXIETY: ICD-10-CM

## 2020-09-01 DIAGNOSIS — R80.9 MICROALBUMINURIA: ICD-10-CM

## 2020-09-01 DIAGNOSIS — R11.2 NAUSEA AND VOMITING, INTRACTABILITY OF VOMITING NOT SPECIFIED, UNSPECIFIED VOMITING TYPE: ICD-10-CM

## 2020-09-01 DIAGNOSIS — F43.10 PTSD (POST-TRAUMATIC STRESS DISORDER): ICD-10-CM

## 2020-09-01 ASSESSMENT — PATIENT HEALTH QUESTIONNAIRE - PHQ9: SUM OF ALL RESPONSES TO PHQ QUESTIONS 1-9: 4

## 2020-09-03 ENCOUNTER — COMMUNICATION - HEALTHEAST (OUTPATIENT)
Dept: FAMILY MEDICINE | Facility: CLINIC | Age: 35
End: 2020-09-03

## 2020-09-03 DIAGNOSIS — N89.8 VAGINAL ITCHING: ICD-10-CM

## 2020-09-15 ENCOUNTER — COMMUNICATION - HEALTHEAST (OUTPATIENT)
Dept: FAMILY MEDICINE | Facility: CLINIC | Age: 35
End: 2020-09-15

## 2020-09-15 ENCOUNTER — OFFICE VISIT - HEALTHEAST (OUTPATIENT)
Dept: FAMILY MEDICINE | Facility: CLINIC | Age: 35
End: 2020-09-15

## 2020-09-15 DIAGNOSIS — G47.00 INSOMNIA, UNSPECIFIED TYPE: ICD-10-CM

## 2020-09-15 DIAGNOSIS — F39 MOOD DISORDER (H): ICD-10-CM

## 2020-09-15 DIAGNOSIS — E11.40 TYPE 2 DIABETES MELLITUS WITH DIABETIC NEUROPATHY, WITHOUT LONG-TERM CURRENT USE OF INSULIN (H): ICD-10-CM

## 2020-09-15 DIAGNOSIS — F43.10 PTSD (POST-TRAUMATIC STRESS DISORDER): ICD-10-CM

## 2020-09-15 DIAGNOSIS — F41.9 ANXIETY: ICD-10-CM

## 2020-09-15 DIAGNOSIS — E78.00 PURE HYPERCHOLESTEROLEMIA: ICD-10-CM

## 2020-09-15 DIAGNOSIS — G44.229 CHRONIC TENSION-TYPE HEADACHE, NOT INTRACTABLE: ICD-10-CM

## 2020-09-28 ENCOUNTER — COMMUNICATION - HEALTHEAST (OUTPATIENT)
Dept: FAMILY MEDICINE | Facility: CLINIC | Age: 35
End: 2020-09-28

## 2020-09-28 ENCOUNTER — COMMUNICATION - HEALTHEAST (OUTPATIENT)
Dept: PHARMACY | Facility: CLINIC | Age: 35
End: 2020-09-28

## 2020-09-28 DIAGNOSIS — E11.40 TYPE 2 DIABETES MELLITUS WITH DIABETIC NEUROPATHY, WITHOUT LONG-TERM CURRENT USE OF INSULIN (H): ICD-10-CM

## 2020-09-30 ENCOUNTER — COMMUNICATION - HEALTHEAST (OUTPATIENT)
Dept: FAMILY MEDICINE | Facility: CLINIC | Age: 35
End: 2020-09-30

## 2020-09-30 DIAGNOSIS — N89.8 VAGINAL ITCHING: ICD-10-CM

## 2020-10-07 ENCOUNTER — RECORDS - HEALTHEAST (OUTPATIENT)
Dept: ADMINISTRATIVE | Facility: OTHER | Age: 35
End: 2020-10-07

## 2020-10-07 LAB — RETINOPATHY: NEGATIVE

## 2020-10-09 ENCOUNTER — VIRTUAL VISIT (OUTPATIENT)
Dept: PSYCHOLOGY | Facility: CLINIC | Age: 35
End: 2020-10-09
Payer: COMMERCIAL

## 2020-10-09 DIAGNOSIS — F32.A DEPRESSION: Primary | ICD-10-CM

## 2020-10-09 PROCEDURE — 99207 PR NO BILLABLE SERVICE THIS VISIT: CPT | Performed by: PSYCHOLOGIST

## 2020-10-16 ENCOUNTER — VIRTUAL VISIT (OUTPATIENT)
Dept: PSYCHOLOGY | Facility: CLINIC | Age: 35
End: 2020-10-16
Payer: COMMERCIAL

## 2020-10-16 DIAGNOSIS — F32.A DEPRESSION: Primary | ICD-10-CM

## 2020-10-16 PROCEDURE — 99207 PR NO BILLABLE SERVICE THIS VISIT: CPT | Mod: 95 | Performed by: PSYCHOLOGIST
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
## 2020-10-20 ENCOUNTER — VIRTUAL VISIT (OUTPATIENT)
Dept: PSYCHOLOGY | Facility: CLINIC | Age: 35
End: 2020-10-20
Payer: COMMERCIAL

## 2020-10-20 DIAGNOSIS — F60.3 BORDERLINE PERSONALITY DISORDER (H): Primary | ICD-10-CM

## 2020-10-20 DIAGNOSIS — F43.10 POSTTRAUMATIC STRESS DISORDER: ICD-10-CM

## 2020-10-20 DIAGNOSIS — F32.9 MAJOR DEPRESSIVE DISORDER: ICD-10-CM

## 2020-10-20 PROCEDURE — 90791 PSYCH DIAGNOSTIC EVALUATION: CPT | Mod: 95 | Performed by: PSYCHOLOGIST
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
## 2020-10-21 ENCOUNTER — COMMUNICATION - HEALTHEAST (OUTPATIENT)
Dept: FAMILY MEDICINE | Facility: CLINIC | Age: 35
End: 2020-10-21

## 2020-10-21 DIAGNOSIS — E66.01 MORBID OBESITY (H): ICD-10-CM

## 2020-10-21 DIAGNOSIS — E78.00 PURE HYPERCHOLESTEROLEMIA: ICD-10-CM

## 2020-10-26 ENCOUNTER — COMMUNICATION - HEALTHEAST (OUTPATIENT)
Dept: FAMILY MEDICINE | Facility: CLINIC | Age: 35
End: 2020-10-26

## 2020-10-27 ENCOUNTER — COMMUNICATION - HEALTHEAST (OUTPATIENT)
Dept: CARE COORDINATION | Facility: CLINIC | Age: 35
End: 2020-10-27

## 2020-10-27 ENCOUNTER — AMBULATORY - HEALTHEAST (OUTPATIENT)
Dept: CARE COORDINATION | Facility: CLINIC | Age: 35
End: 2020-10-27

## 2020-10-28 ENCOUNTER — VIRTUAL VISIT (OUTPATIENT)
Dept: PSYCHOLOGY | Facility: CLINIC | Age: 35
End: 2020-10-28
Payer: COMMERCIAL

## 2020-10-28 ENCOUNTER — RECORDS - HEALTHEAST (OUTPATIENT)
Dept: HEALTH INFORMATION MANAGEMENT | Facility: CLINIC | Age: 35
End: 2020-10-28

## 2020-10-28 DIAGNOSIS — F60.3 BORDERLINE PERSONALITY DISORDER (H): Primary | ICD-10-CM

## 2020-10-28 PROCEDURE — 90832 PSYTX W PT 30 MINUTES: CPT | Performed by: PSYCHOLOGIST

## 2020-10-28 NOTE — PROGRESS NOTES
Progress Note    Patient Name: Ruth Vanegas  Date: 10/28/2020         Service Type: Individual      Session Start Time: 0700  Session End Time: 0730     Session Length: 30    Session #: 1    Attendees: Client    Service Modality:  Video Visit:    Telemedicine Visit: The patient's condition can be safely assessed and treated via synchronous audio and visual telemedicine encounter.      Reason for Telemedicine Visit: Services only offered telehealth    Originating Site (Patient Location): Patient's home    Distant Site (Provider Location): Provider Remote Setting    Consent:  The patient/guardian has verbally consented to: the potential risks and benefits of telemedicine (video visit) versus in person care; bill my insurance or make self-payment for services provided; and responsibility for payment of non-covered services.     Patient would like the video invitation sent by: Send to e-mail at: [email or web address]    Mode of Communication:  Video Conference via [employer]    As the provider I attest to compliance with applicable laws and regulations related to telemedicine.     PHQ-9 / PREETHI-7 : 10/06/2020    DATA  Interactive Complexity: No  Crisis: No       Progress Since Last Session (Related to Symptoms / Goals / Homework):   Symptoms: No change chronic    Homework: Did not complete      Episode of Care Goals: Satisfactory progress - PREPARATION (Decided to change - considering how); Intervened by negotiating a change plan and determining options / strategies for behavior change, identifying triggers, exploring social supports, and working towards setting a date to begin behavior change     Current / Ongoing Stressors and Concerns:   Ms. Vanegas stated her week was okay. She expressed some distress regarding work and family issues. She questioned if her diagnoses were accurate which was validated and discussed.     Treatment Objective(s) Addressed in This  Session:   Follow-up on progress towards initiating services for mental health following diagnostic assessment.     Intervention:   Validation and Problem Solving      ASSESSMENT: Current Emotional / Mental Status (status of significant symptoms):   Risk status (Self / Other harm or suicidal ideation)   Patient denies current fears or concerns for personal safety.   Patient reports the following current or recent suicidal ideation or behaviors: passive SI.   Patient denies current or recent homicidal ideation or behaviors.   Patient denies current or recent self injurious behavior or ideation.   Patient denies other safety concerns.   Patient reports there has been no change in risk factors since their last session.     Patient reports there has been no change in protective factors since their last session.     Recommended that patient call 911 or go to the local ED should there be a change in any of these risk factors.     Appearance:   Underdressed   Eye Contact:   Fair    Psychomotor Behavior: Normal  Restless    Attitude:   Cooperative    Orientation:   All   Speech    Rate / Production: Normal     Volume:  Normal    Mood:    Depressed    Affect:    Restricted    Thought Content:  Clear    Thought Form:  Coherent  Logical    Insight:    Good  and Fair      Medication Review:   No changes to current psychiatric medication(s)     Medication Compliance:   Yes-Remeron and medical medications     Changes in Health Issues:   Yes: Diabetes, No Psychological Distress     Chemical Use Review:   Substance Use: Chemical use reviewed, no active concerns identified      Tobacco Use: No current tobacco use.      Diagnosis:  No diagnosis found.    Collateral Reports Completed:   Routed note to Care Team Member(s)    PLAN: (Patient Tasks / Therapist Tasks / Other)  -Follow-up on therapist list provided  -This writer will give additional resources if requested  -Schedule additional session with provider if needed.      Lashae  JOAQUIN Briones, PhD LP  October 28, 2020

## 2020-11-04 ENCOUNTER — OFFICE VISIT - HEALTHEAST (OUTPATIENT)
Dept: FAMILY MEDICINE | Facility: CLINIC | Age: 35
End: 2020-11-04

## 2020-11-04 ENCOUNTER — COMMUNICATION - HEALTHEAST (OUTPATIENT)
Dept: PHARMACY | Facility: CLINIC | Age: 35
End: 2020-11-04

## 2020-11-04 DIAGNOSIS — R80.9 MICROALBUMINURIA: ICD-10-CM

## 2020-11-04 DIAGNOSIS — E66.01 MORBID OBESITY (H): ICD-10-CM

## 2020-11-04 DIAGNOSIS — E78.00 PURE HYPERCHOLESTEROLEMIA: ICD-10-CM

## 2020-11-04 DIAGNOSIS — Z00.00 HEALTHCARE MAINTENANCE: ICD-10-CM

## 2020-11-04 DIAGNOSIS — E11.40 TYPE 2 DIABETES MELLITUS WITH DIABETIC NEUROPATHY, WITHOUT LONG-TERM CURRENT USE OF INSULIN (H): ICD-10-CM

## 2020-11-04 LAB
ALBUMIN SERPL-MCNC: 3.8 G/DL (ref 3.5–5)
ALP SERPL-CCNC: 91 U/L (ref 45–120)
ALT SERPL W P-5'-P-CCNC: 19 U/L (ref 0–45)
ANION GAP SERPL CALCULATED.3IONS-SCNC: 13 MMOL/L (ref 5–18)
AST SERPL W P-5'-P-CCNC: 19 U/L (ref 0–40)
BILIRUB SERPL-MCNC: 0.3 MG/DL (ref 0–1)
BUN SERPL-MCNC: 10 MG/DL (ref 8–22)
CALCIUM SERPL-MCNC: 9.2 MG/DL (ref 8.5–10.5)
CHLORIDE BLD-SCNC: 103 MMOL/L (ref 98–107)
CHOLEST SERPL-MCNC: 221 MG/DL
CO2 SERPL-SCNC: 22 MMOL/L (ref 22–31)
CREAT SERPL-MCNC: 0.7 MG/DL (ref 0.6–1.1)
CREAT UR-MCNC: 48.6 MG/DL
FASTING STATUS PATIENT QL REPORTED: YES
GFR SERPL CREATININE-BSD FRML MDRD: >60 ML/MIN/1.73M2
GLUCOSE BLD-MCNC: 244 MG/DL (ref 70–125)
HBA1C MFR BLD: 9.2 %
HCV AB SERPL QL IA: NEGATIVE
HDLC SERPL-MCNC: 58 MG/DL
HIV 1+2 AB+HIV1 P24 AG SERPL QL IA: NEGATIVE
LDLC SERPL CALC-MCNC: 142 MG/DL
MICROALBUMIN UR-MCNC: <0.5 MG/DL (ref 0–1.99)
MICROALBUMIN/CREAT UR: NORMAL MG/G{CREAT}
POTASSIUM BLD-SCNC: 4.7 MMOL/L (ref 3.5–5)
PROT SERPL-MCNC: 7.3 G/DL (ref 6–8)
SODIUM SERPL-SCNC: 138 MMOL/L (ref 136–145)
TRIGL SERPL-MCNC: 104 MG/DL

## 2020-11-09 ENCOUNTER — COMMUNICATION - HEALTHEAST (OUTPATIENT)
Dept: FAMILY MEDICINE | Facility: CLINIC | Age: 35
End: 2020-11-09

## 2020-11-10 ENCOUNTER — AMBULATORY - HEALTHEAST (OUTPATIENT)
Dept: CARE COORDINATION | Facility: CLINIC | Age: 35
End: 2020-11-10

## 2020-11-10 ENCOUNTER — COMMUNICATION - HEALTHEAST (OUTPATIENT)
Dept: NURSING | Facility: CLINIC | Age: 35
End: 2020-11-10

## 2020-11-10 DIAGNOSIS — Z78.9 HEALTH CARE HOME, ACTIVE CARE COORDINATION: ICD-10-CM

## 2020-11-12 ENCOUNTER — COMMUNICATION - HEALTHEAST (OUTPATIENT)
Dept: NURSING | Facility: CLINIC | Age: 35
End: 2020-11-12

## 2020-11-12 ENCOUNTER — COMMUNICATION - HEALTHEAST (OUTPATIENT)
Dept: CARE COORDINATION | Facility: CLINIC | Age: 35
End: 2020-11-12

## 2020-11-12 ASSESSMENT — ACTIVITIES OF DAILY LIVING (ADL): DEPENDENT_IADLS:: INDEPENDENT

## 2020-11-13 ENCOUNTER — COMMUNICATION - HEALTHEAST (OUTPATIENT)
Dept: NURSING | Facility: CLINIC | Age: 35
End: 2020-11-13

## 2020-11-18 ENCOUNTER — COMMUNICATION - HEALTHEAST (OUTPATIENT)
Dept: FAMILY MEDICINE | Facility: CLINIC | Age: 35
End: 2020-11-18

## 2020-11-22 ENCOUNTER — HEALTH MAINTENANCE LETTER (OUTPATIENT)
Age: 35
End: 2020-11-22

## 2020-11-24 ENCOUNTER — COMMUNICATION - HEALTHEAST (OUTPATIENT)
Dept: NURSING | Facility: CLINIC | Age: 35
End: 2020-11-24

## 2020-12-08 ENCOUNTER — COMMUNICATION - HEALTHEAST (OUTPATIENT)
Dept: PHARMACY | Facility: CLINIC | Age: 35
End: 2020-12-08

## 2020-12-15 ENCOUNTER — COMMUNICATION - HEALTHEAST (OUTPATIENT)
Dept: NURSING | Facility: CLINIC | Age: 35
End: 2020-12-15

## 2020-12-24 ENCOUNTER — COMMUNICATION - HEALTHEAST (OUTPATIENT)
Dept: NURSING | Facility: CLINIC | Age: 35
End: 2020-12-24

## 2020-12-30 ENCOUNTER — RECORDS - HEALTHEAST (OUTPATIENT)
Dept: ADMINISTRATIVE | Facility: OTHER | Age: 35
End: 2020-12-30

## 2021-01-10 ENCOUNTER — COMMUNICATION - HEALTHEAST (OUTPATIENT)
Dept: PHARMACY | Facility: CLINIC | Age: 36
End: 2021-01-10

## 2021-01-10 DIAGNOSIS — F41.9 ANXIETY: ICD-10-CM

## 2021-01-10 DIAGNOSIS — G47.00 INSOMNIA, UNSPECIFIED TYPE: ICD-10-CM

## 2021-01-10 DIAGNOSIS — F43.10 PTSD (POST-TRAUMATIC STRESS DISORDER): ICD-10-CM

## 2021-01-10 DIAGNOSIS — R11.0 NAUSEA: ICD-10-CM

## 2021-01-10 DIAGNOSIS — F39 MOOD DISORDER (H): ICD-10-CM

## 2021-01-11 ENCOUNTER — COMMUNICATION - HEALTHEAST (OUTPATIENT)
Dept: FAMILY MEDICINE | Facility: CLINIC | Age: 36
End: 2021-01-11

## 2021-01-14 ENCOUNTER — COMMUNICATION - HEALTHEAST (OUTPATIENT)
Dept: FAMILY MEDICINE | Facility: CLINIC | Age: 36
End: 2021-01-14

## 2021-01-14 ENCOUNTER — OFFICE VISIT - HEALTHEAST (OUTPATIENT)
Dept: FAMILY MEDICINE | Facility: CLINIC | Age: 36
End: 2021-01-14

## 2021-01-14 DIAGNOSIS — F41.9 ANXIETY: ICD-10-CM

## 2021-01-14 DIAGNOSIS — E11.42 DIABETIC POLYNEUROPATHY ASSOCIATED WITH TYPE 2 DIABETES MELLITUS (H): ICD-10-CM

## 2021-01-20 ENCOUNTER — RECORDS - HEALTHEAST (OUTPATIENT)
Dept: LAB | Facility: HOSPITAL | Age: 36
End: 2021-01-20

## 2021-01-21 ENCOUNTER — OFFICE VISIT - HEALTHEAST (OUTPATIENT)
Dept: FAMILY MEDICINE | Facility: CLINIC | Age: 36
End: 2021-01-21

## 2021-01-21 DIAGNOSIS — E78.00 PURE HYPERCHOLESTEROLEMIA: ICD-10-CM

## 2021-01-21 DIAGNOSIS — R11.2 NAUSEA AND VOMITING, INTRACTABILITY OF VOMITING NOT SPECIFIED, UNSPECIFIED VOMITING TYPE: ICD-10-CM

## 2021-01-22 LAB
GAMMA INTERFERON BACKGROUND BLD IA-ACNC: 0.11 IU/ML
M TB IFN-G BLD-IMP: NEGATIVE
MITOGEN IGNF BCKGRD COR BLD-ACNC: -0.01 IU/ML
MITOGEN IGNF BCKGRD COR BLD-ACNC: 0.02 IU/ML
QTF INTERPRETATION: NORMAL
QTF MITOGEN - NIL: >10 IU/ML

## 2021-01-27 ENCOUNTER — COMMUNICATION - HEALTHEAST (OUTPATIENT)
Dept: NURSING | Facility: CLINIC | Age: 36
End: 2021-01-27

## 2021-01-28 ENCOUNTER — COMMUNICATION - HEALTHEAST (OUTPATIENT)
Dept: CARE COORDINATION | Facility: CLINIC | Age: 36
End: 2021-01-28

## 2021-01-28 ENCOUNTER — OFFICE VISIT - HEALTHEAST (OUTPATIENT)
Dept: FAMILY MEDICINE | Facility: CLINIC | Age: 36
End: 2021-01-28

## 2021-01-28 DIAGNOSIS — E11.42 DIABETIC POLYNEUROPATHY ASSOCIATED WITH TYPE 2 DIABETES MELLITUS (H): ICD-10-CM

## 2021-01-28 DIAGNOSIS — E78.00 PURE HYPERCHOLESTEROLEMIA: ICD-10-CM

## 2021-01-28 DIAGNOSIS — R11.2 NAUSEA AND VOMITING, INTRACTABILITY OF VOMITING NOT SPECIFIED, UNSPECIFIED VOMITING TYPE: ICD-10-CM

## 2021-02-06 ENCOUNTER — COMMUNICATION - HEALTHEAST (OUTPATIENT)
Dept: FAMILY MEDICINE | Facility: CLINIC | Age: 36
End: 2021-02-06

## 2021-02-08 ENCOUNTER — COMMUNICATION - HEALTHEAST (OUTPATIENT)
Dept: FAMILY MEDICINE | Facility: CLINIC | Age: 36
End: 2021-02-08

## 2021-02-09 ENCOUNTER — COMMUNICATION - HEALTHEAST (OUTPATIENT)
Dept: NURSING | Facility: CLINIC | Age: 36
End: 2021-02-09

## 2021-02-16 ENCOUNTER — COMMUNICATION - HEALTHEAST (OUTPATIENT)
Dept: NURSING | Facility: CLINIC | Age: 36
End: 2021-02-16

## 2021-02-17 ENCOUNTER — OFFICE VISIT - HEALTHEAST (OUTPATIENT)
Dept: FAMILY MEDICINE | Facility: CLINIC | Age: 36
End: 2021-02-17
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
## 2021-02-17 DIAGNOSIS — K21.00 GASTROESOPHAGEAL REFLUX DISEASE WITH ESOPHAGITIS WITHOUT HEMORRHAGE: ICD-10-CM

## 2021-02-17 DIAGNOSIS — M25.572 CHRONIC PAIN OF LEFT ANKLE: ICD-10-CM

## 2021-02-17 DIAGNOSIS — E66.01 MORBID OBESITY (H): ICD-10-CM

## 2021-02-17 DIAGNOSIS — F39 MOOD DISORDER (H): ICD-10-CM

## 2021-02-17 DIAGNOSIS — E78.00 PURE HYPERCHOLESTEROLEMIA: ICD-10-CM

## 2021-02-17 DIAGNOSIS — G89.29 CHRONIC PAIN OF LEFT ANKLE: ICD-10-CM

## 2021-02-17 LAB
ANION GAP SERPL CALCULATED.3IONS-SCNC: 11 MMOL/L (ref 5–18)
BUN SERPL-MCNC: 8 MG/DL (ref 8–22)
CALCIUM SERPL-MCNC: 8.5 MG/DL (ref 8.5–10.5)
CHLORIDE BLD-SCNC: 106 MMOL/L (ref 98–107)
CHOLEST SERPL-MCNC: 200 MG/DL
CO2 SERPL-SCNC: 22 MMOL/L (ref 22–31)
CREAT SERPL-MCNC: 0.6 MG/DL (ref 0.6–1.1)
FASTING STATUS PATIENT QL REPORTED: YES
GFR SERPL CREATININE-BSD FRML MDRD: >60 ML/MIN/1.73M2
GLUCOSE BLD-MCNC: 165 MG/DL (ref 70–125)
HBA1C MFR BLD: 8.8 %
HDLC SERPL-MCNC: 50 MG/DL
LDLC SERPL CALC-MCNC: 128 MG/DL
POTASSIUM BLD-SCNC: 4 MMOL/L (ref 3.5–5)
SODIUM SERPL-SCNC: 139 MMOL/L (ref 136–145)
TRIGL SERPL-MCNC: 109 MG/DL

## 2021-02-22 ENCOUNTER — COMMUNICATION - HEALTHEAST (OUTPATIENT)
Dept: NURSING | Facility: CLINIC | Age: 36
End: 2021-02-22

## 2021-02-26 ENCOUNTER — HOSPITAL ENCOUNTER (OUTPATIENT)
Dept: MRI IMAGING | Facility: HOSPITAL | Age: 36
Discharge: HOME | End: 2021-02-26
Attending: FAMILY MEDICINE

## 2021-02-26 ENCOUNTER — COMMUNICATION - HEALTHEAST (OUTPATIENT)
Dept: FAMILY MEDICINE | Facility: CLINIC | Age: 36
End: 2021-02-26

## 2021-02-26 ENCOUNTER — OFFICE VISIT - HEALTHEAST (OUTPATIENT)
Dept: ENDOCRINOLOGY | Facility: CLINIC | Age: 36
End: 2021-02-26

## 2021-02-26 DIAGNOSIS — R11.0 NAUSEA: ICD-10-CM

## 2021-02-26 DIAGNOSIS — H81.10 BENIGN PAROXYSMAL POSITIONAL VERTIGO, UNSPECIFIED LATERALITY: ICD-10-CM
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
## 2021-02-26 DIAGNOSIS — G89.29 CHRONIC PAIN OF LEFT ANKLE: ICD-10-CM

## 2021-02-26 DIAGNOSIS — M25.572 CHRONIC PAIN OF LEFT ANKLE: ICD-10-CM

## 2021-03-08 ENCOUNTER — COMMUNICATION - HEALTHEAST (OUTPATIENT)
Dept: NURSING | Facility: CLINIC | Age: 36
End: 2021-03-08

## 2021-03-10 ENCOUNTER — COMMUNICATION - HEALTHEAST (OUTPATIENT)
Dept: PHARMACY | Facility: CLINIC | Age: 36
End: 2021-03-10

## 2021-03-10 DIAGNOSIS — R11.0 NAUSEA: ICD-10-CM

## 2021-03-11 ENCOUNTER — COMMUNICATION - HEALTHEAST (OUTPATIENT)
Dept: FAMILY MEDICINE | Facility: CLINIC | Age: 36
End: 2021-03-11

## 2021-03-11 DIAGNOSIS — N89.8 VAGINAL ITCHING: ICD-10-CM

## 2021-03-16 ENCOUNTER — OFFICE VISIT - HEALTHEAST (OUTPATIENT)
Dept: OTOLARYNGOLOGY | Facility: CLINIC | Age: 36
End: 2021-03-16

## 2021-03-16 ENCOUNTER — AMBULATORY - HEALTHEAST (OUTPATIENT)
Dept: OTOLARYNGOLOGY | Facility: CLINIC | Age: 36
End: 2021-03-16

## 2021-03-16 ENCOUNTER — OFFICE VISIT - HEALTHEAST (OUTPATIENT)
Dept: AUDIOLOGY | Facility: CLINIC | Age: 36
End: 2021-03-16

## 2021-03-16 ENCOUNTER — COMMUNICATION - HEALTHEAST (OUTPATIENT)
Dept: NURSING | Facility: CLINIC | Age: 36
End: 2021-03-16

## 2021-03-16 DIAGNOSIS — H90.3 SENSORINEURAL HEARING LOSS, BILATERAL: ICD-10-CM

## 2021-03-16 DIAGNOSIS — H90.3 SENSORINEURAL HEARING LOSS (SNHL) OF BOTH EARS: ICD-10-CM

## 2021-03-16 DIAGNOSIS — H81.10 BENIGN PAROXYSMAL POSITIONAL VERTIGO, UNSPECIFIED LATERALITY: ICD-10-CM

## 2021-03-16 DIAGNOSIS — R11.2 NAUSEA WITH VOMITING: ICD-10-CM

## 2021-03-16 DIAGNOSIS — H93.13 TINNITUS, BILATERAL: ICD-10-CM

## 2021-03-16 DIAGNOSIS — R42 DIZZINESS: ICD-10-CM

## 2021-03-17 ENCOUNTER — COMMUNICATION - HEALTHEAST (OUTPATIENT)
Dept: CARE COORDINATION | Facility: CLINIC | Age: 36
End: 2021-03-17

## 2021-03-23 ENCOUNTER — HOSPITAL ENCOUNTER (OUTPATIENT)
Dept: RADIOLOGY | Facility: HOSPITAL | Age: 36
Discharge: HOME | End: 2021-03-23
Attending: OTOLARYNGOLOGY

## 2021-03-23 DIAGNOSIS — R11.2 NAUSEA WITH VOMITING: ICD-10-CM

## 2021-03-29 ENCOUNTER — COMMUNICATION - HEALTHEAST (OUTPATIENT)
Dept: NURSING | Facility: CLINIC | Age: 36
End: 2021-03-29

## 2021-03-31 ENCOUNTER — OFFICE VISIT - HEALTHEAST (OUTPATIENT)
Dept: OCCUPATIONAL THERAPY | Facility: REHABILITATION | Age: 36
End: 2021-03-31

## 2021-03-31 DIAGNOSIS — Z78.9 DECREASED ACTIVITIES OF DAILY LIVING (ADL): ICD-10-CM

## 2021-03-31 DIAGNOSIS — R26.81 UNSTEADINESS ON FEET: ICD-10-CM

## 2021-03-31 DIAGNOSIS — R42 DIZZINESS: ICD-10-CM

## 2021-03-31 DIAGNOSIS — R11.0 NAUSEA: ICD-10-CM

## 2021-04-11 ENCOUNTER — COMMUNICATION - HEALTHEAST (OUTPATIENT)
Dept: PHARMACY | Facility: CLINIC | Age: 36
End: 2021-04-11

## 2021-04-11 DIAGNOSIS — F41.9 ANXIETY: ICD-10-CM

## 2021-04-12 ENCOUNTER — COMMUNICATION - HEALTHEAST (OUTPATIENT)
Dept: FAMILY MEDICINE | Facility: CLINIC | Age: 36
End: 2021-04-12

## 2021-04-12 DIAGNOSIS — B37.31 YEAST INFECTION OF THE VAGINA: ICD-10-CM

## 2021-04-26 ENCOUNTER — COMMUNICATION - HEALTHEAST (OUTPATIENT)
Dept: PHARMACY | Facility: CLINIC | Age: 36
End: 2021-04-26

## 2021-04-26 ENCOUNTER — COMMUNICATION - HEALTHEAST (OUTPATIENT)
Dept: NURSING | Facility: CLINIC | Age: 36
End: 2021-04-26

## 2021-04-26 DIAGNOSIS — F41.9 ANXIETY: ICD-10-CM

## 2021-04-26 DIAGNOSIS — G47.00 INSOMNIA, UNSPECIFIED TYPE: ICD-10-CM

## 2021-04-26 DIAGNOSIS — F39 MOOD DISORDER (H): ICD-10-CM

## 2021-04-26 DIAGNOSIS — F43.10 PTSD (POST-TRAUMATIC STRESS DISORDER): ICD-10-CM

## 2021-04-28 ENCOUNTER — COMMUNICATION - HEALTHEAST (OUTPATIENT)
Dept: CARE COORDINATION | Facility: CLINIC | Age: 36
End: 2021-04-28

## 2021-05-04 ENCOUNTER — COMMUNICATION - HEALTHEAST (OUTPATIENT)
Dept: ENDOCRINOLOGY | Facility: CLINIC | Age: 36
End: 2021-05-04

## 2021-05-05 ENCOUNTER — AMBULATORY - HEALTHEAST (OUTPATIENT)
Dept: LAB | Facility: CLINIC | Age: 36
End: 2021-05-05

## 2021-05-05 LAB
ANION GAP SERPL CALCULATED.3IONS-SCNC: 12 MMOL/L (ref 5–18)
BUN SERPL-MCNC: 8 MG/DL (ref 8–22)
CALCIUM SERPL-MCNC: 8.9 MG/DL (ref 8.5–10.5)
CHLORIDE BLD-SCNC: 103 MMOL/L (ref 98–107)
CO2 SERPL-SCNC: 27 MMOL/L (ref 22–31)
CREAT SERPL-MCNC: 0.66 MG/DL (ref 0.6–1.1)
GFR SERPL CREATININE-BSD FRML MDRD: >60 ML/MIN/1.73M2
GLUCOSE BLD-MCNC: 207 MG/DL (ref 70–125)
HBA1C MFR BLD: 8.5 %
POTASSIUM BLD-SCNC: 4.4 MMOL/L (ref 3.5–5)
SODIUM SERPL-SCNC: 142 MMOL/L (ref 136–145)

## 2021-05-11 ENCOUNTER — OFFICE VISIT - HEALTHEAST (OUTPATIENT)
Dept: ENDOCRINOLOGY | Facility: CLINIC | Age: 36
End: 2021-05-11

## 2021-05-11 DIAGNOSIS — G44.229 CHRONIC TENSION-TYPE HEADACHE, NOT INTRACTABLE: ICD-10-CM

## 2021-05-14 ENCOUNTER — RECORDS - HEALTHEAST (OUTPATIENT)
Dept: FAMILY MEDICINE | Facility: CLINIC | Age: 36
End: 2021-05-14

## 2021-05-18 ENCOUNTER — COMMUNICATION - HEALTHEAST (OUTPATIENT)
Dept: CARE COORDINATION | Facility: CLINIC | Age: 36
End: 2021-05-18

## 2021-05-18 ENCOUNTER — OFFICE VISIT - HEALTHEAST (OUTPATIENT)
Dept: FAMILY MEDICINE | Facility: CLINIC | Age: 36
End: 2021-05-18

## 2021-05-18 ENCOUNTER — COMMUNICATION - HEALTHEAST (OUTPATIENT)
Dept: PHARMACY | Facility: CLINIC | Age: 36
End: 2021-05-18

## 2021-05-18 DIAGNOSIS — R11.2 NAUSEA AND VOMITING, INTRACTABILITY OF VOMITING NOT SPECIFIED, UNSPECIFIED VOMITING TYPE: ICD-10-CM

## 2021-05-18 DIAGNOSIS — F41.9 ANXIETY: ICD-10-CM

## 2021-05-18 DIAGNOSIS — G44.229 CHRONIC TENSION-TYPE HEADACHE, NOT INTRACTABLE: ICD-10-CM

## 2021-05-18 DIAGNOSIS — E11.42 DIABETIC POLYNEUROPATHY ASSOCIATED WITH TYPE 2 DIABETES MELLITUS (H): ICD-10-CM

## 2021-05-18 DIAGNOSIS — F43.10 PTSD (POST-TRAUMATIC STRESS DISORDER): ICD-10-CM

## 2021-05-19 ENCOUNTER — RECORDS - HEALTHEAST (OUTPATIENT)
Dept: FAMILY MEDICINE | Facility: CLINIC | Age: 36
End: 2021-05-19

## 2021-05-19 ENCOUNTER — COMMUNICATION - HEALTHEAST (OUTPATIENT)
Dept: SCHEDULING | Facility: CLINIC | Age: 36
End: 2021-05-19

## 2021-05-20 ENCOUNTER — COMMUNICATION - HEALTHEAST (OUTPATIENT)
Dept: FAMILY MEDICINE | Facility: CLINIC | Age: 36
End: 2021-05-20

## 2021-05-24 ENCOUNTER — RECORDS - HEALTHEAST (OUTPATIENT)
Dept: FAMILY MEDICINE | Facility: CLINIC | Age: 36
End: 2021-05-24

## 2021-05-24 ENCOUNTER — COMMUNICATION - HEALTHEAST (OUTPATIENT)
Dept: ADMINISTRATIVE | Facility: CLINIC | Age: 36
End: 2021-05-24

## 2021-05-24 DIAGNOSIS — E11.9 DIABETES MELLITUS, TYPE 2 (H): ICD-10-CM

## 2021-05-25 ENCOUNTER — COMMUNICATION - HEALTHEAST (OUTPATIENT)
Dept: NURSING | Facility: CLINIC | Age: 36
End: 2021-05-25

## 2021-05-27 VITALS
BODY MASS INDEX: 51.86 KG/M2 | DIASTOLIC BLOOD PRESSURE: 80 MMHG | WEIGHT: 293 LBS | SYSTOLIC BLOOD PRESSURE: 122 MMHG | HEART RATE: 88 BPM

## 2021-05-27 ASSESSMENT — PATIENT HEALTH QUESTIONNAIRE - PHQ9: SUM OF ALL RESPONSES TO PHQ QUESTIONS 1-9: 4

## 2021-05-29 ENCOUNTER — RECORDS - HEALTHEAST (OUTPATIENT)
Dept: ADMINISTRATIVE | Facility: CLINIC | Age: 36
End: 2021-05-29

## 2021-05-30 ENCOUNTER — RECORDS - HEALTHEAST (OUTPATIENT)
Dept: ADMINISTRATIVE | Facility: CLINIC | Age: 36
End: 2021-05-30

## 2021-05-30 ENCOUNTER — HEALTH MAINTENANCE LETTER (OUTPATIENT)
Age: 36
End: 2021-05-30

## 2021-05-30 VITALS — WEIGHT: 293 LBS | BODY MASS INDEX: 52.78 KG/M2

## 2021-05-30 VITALS — WEIGHT: 293 LBS | BODY MASS INDEX: 52.29 KG/M2

## 2021-05-31 VITALS — HEIGHT: 67 IN | WEIGHT: 293 LBS | BODY MASS INDEX: 45.99 KG/M2

## 2021-06-01 ENCOUNTER — RECORDS - HEALTHEAST (OUTPATIENT)
Dept: ADMINISTRATIVE | Facility: CLINIC | Age: 36
End: 2021-06-01

## 2021-06-01 VITALS — BODY MASS INDEX: 47.09 KG/M2 | HEIGHT: 66 IN | WEIGHT: 293 LBS

## 2021-06-01 VITALS — WEIGHT: 293 LBS | BODY MASS INDEX: 51.67 KG/M2

## 2021-06-01 VITALS — WEIGHT: 293 LBS | HEIGHT: 66 IN | BODY MASS INDEX: 47.09 KG/M2

## 2021-06-02 ENCOUNTER — RECORDS - HEALTHEAST (OUTPATIENT)
Dept: ADMINISTRATIVE | Facility: CLINIC | Age: 36
End: 2021-06-02

## 2021-06-02 VITALS — BODY MASS INDEX: 47.09 KG/M2 | WEIGHT: 293 LBS | HEIGHT: 66 IN

## 2021-06-02 VITALS — BODY MASS INDEX: 52.22 KG/M2 | WEIGHT: 293 LBS

## 2021-06-02 VITALS — WEIGHT: 293 LBS | BODY MASS INDEX: 47.09 KG/M2 | HEIGHT: 66 IN

## 2021-06-02 NOTE — TELEPHONE ENCOUNTER
Called [facility] to inform them that a 90 day supply is ok, Pharmacy stated that patient transferred her medications to [facility].

## 2021-06-02 NOTE — TELEPHONE ENCOUNTER
This [facility] patient is scheduled to see me for diabetes check tomorrow, but that's not usually something we cover between clinics.  I recommend that she schedule with a doctor at [facility] instead.  If she is having trouble getting in and needs refills or something, she could probably leave a message with one of their doctors.

## 2021-06-02 NOTE — PROGRESS NOTES
[facility] CLINIC EXAM NOTE      Chief Complaint   Patient presents with     Diabetes     wants meds reviewed      Pain     bilateral foot pain, for past 2 weeks, dificulty walking         ASSESSMENT & PLAN    Ruth was seen today for diabetes and pain.    Diagnoses and all orders for this visit:    Type 2 diabetes mellitus with diabetic neuropathy, without long-term current use of insulin (H):  Discussed her foot pain could be multifactorial with increased neuropathy but also has several components consistent with plantar fascitis. Will increase her gabapentin to 300mg three times a day (may increase from there). Due for diabetes check with labs as below. A1c is increasing as expected to 8.6. Will increase metformin to two times a day. F/u in 3 months with PCP.   -     Glycosylated Hemoglobin A1c  -     Lipid Cascade FASTING  -     Basic Metabolic Panel  -     gabapentin (NEURONTIN) 300 MG capsule; Take 2 capsules (600 mg total) by mouth 3 (three) times a day.    Plantar fasciitis:exam and hx has components consistent with this. TTP at the calcaneal tenderpoint consistent with it as well.  reviewed all options of treatment and expectant management/length of symptoms. She would like to start with home exercise plan and ibuprofen 2-3 times a day for next two weeks. F/u if no improvement or she was to proceed with PT or steroid injection.   -     ibuprofen (ADVIL,MOTRIN) 600 MG tablet; Take 1 tablet (600 mg total) by mouth 3 (three) times a day.    Immunization due  -     Td, Preservative Free (green label)  -     Pneumococcal conjugate vaccine 13-valent 6wks-17yrs; >50yrs    Morbid obesity (H):  Counseled on diet and exercise changes.         HISTORY    Ruth Vanegas is a 33 y.o.  female who presents for the following issues:    1.  Foot pain: Can barely walk or stand.  Pain on the bottom of her feet.  Sharp shooting pain.  Started about 2 weeks ago.  Occurs all day.  Not being on them makes them better.  She  "has had neuropathy pain in the past and her ankles would swell and she is not seeing any swelling at this time.  Also no tingling like she has had in the past.  Just pain.  She also previously had loss of sensation and she is not feeling that either.  She states the pain is constant on the left.  On the right it just comes and goes.    2.  Diabetes check.  She states she been trying to get in for a while had a couple appointments canceled on her.  She is concerned because she feels like when she focuses on her diet really hard eating salad her blood sugars will still be in the 200s.  Or as if she makes no changes she feels like her blood sugars are fine.        MEDICATIONS    Current Outpatient Medications on File Prior to Visit   Medication Sig Dispense Refill     aspirin-acetaminophen-caffeine (EXCEDRIN MIGRAINE) 250-250-65 mg per tablet Take 1 tablet by mouth every 6 (six) hours as needed for pain.       blood glucose test (ACCU-CHEK TARIQ PLUS TEST STRP) strips Use 1 each As Directed as needed. Dispense brand per patient's insurance at pharmacy discretion. 100 strip 5     liraglutide (VICTOZA) 0.6 mg/0.1 mL (18 mg/3 mL) PnIj injection Inject 1.2 mg under the skin daily.       metFORMIN (GLUCOPHAGE-XR) 500 MG 24 hr tablet Take 1,000 mg by mouth daily with supper.       blood-glucose meter (ACCU-CHEK TARIQ CONNECT METER) Misc Use to check glucose 3 times per day 1 each 0     docusate sodium (COLACE) 100 MG capsule Take 1 capsule (100 mg total) by mouth 2 (two) times a day.  0     generic lancets (ACCU-CHEK MULTICLIX LANCET) Use 1 each As Directed as needed. Dispense brand per patient's insurance at pharmacy discretion. 100 each 5     oxyCODONE (ROXICODONE) 5 MG immediate release tablet Take 1-2 tablets (5-10 mg total) by mouth every 4 (four) hours as needed. 13 tablet 0     pen needle, diabetic (BD ULTRA-FINE RAMON PEN NEEDLE) 32 gauge x 5/32\" Ndle USE WITH VICTOZA DAILY 100 each 3     No current " "facility-administered medications on file prior to visit.        Pertinent past medical, surgical, social and family history reviewed and updated in Epic.    Social History     Socioeconomic History     Marital status: Single     Spouse name: Not on file     Number of children: Not on file     Years of education: Not on file     Highest education level: Not on file   Occupational History     Occupation: [employer] [place]     Employer: [employer]   Social Needs     Financial resource strain: Not on file     Food insecurity:     Worry: Not on file     Inability: Not on file     Transportation needs:     Medical: Not on file     Non-medical: Not on file   Tobacco Use     Smoking status: Never Smoker     Smokeless tobacco: Never Used   Substance and Sexual Activity     Alcohol use: No     Drug use: No     Sexual activity: Not on file   Lifestyle     Physical activity:     Days per week: Not on file     Minutes per session: Not on file     Stress: Not on file   Relationships     Social connections:     Talks on phone: Not on file     Gets together: Not on file     Attends Moravian service: Not on file     Active member of club or organization: Not on file     Attends meetings of clubs or organizations: Not on file     Relationship status: Not on file     Intimate partner violence:     Fear of current or ex partner: Not on file     Emotionally abused: Not on file     Physically abused: Not on file     Forced sexual activity: Not on file   Other Topics Concern     Not on file   Social History Narrative     Not on file         REVIEW OF SYSTEMS    ROS: 10 pt review of systems preformed and all negative except noted in HPI.     PHYSICAL EXAM    /84 (Patient Site: Left Arm, Patient Position: Sitting, Cuff Size: Adult Large)   Pulse 78   Ht 5' 6\" (1.676 m)   Wt (!) 324 lb 14.4 oz (147.4 kg)   BMI 52.44 kg/m    GEN:  33 y.o. female sitting comfortably in no apparent distress. Morbidly obese  HEENT: EOMI, no scleral " icterus, buccal mucosa moist  Neck: Supple without lymphadenopathy   CHEST/LUNG: No respiratory distress, good air flow to bases, CTAB   CV: RRR, S1, S2 normal; no murmurs, rubs or gallops. No edema.   SKIN: warm, dry, no rashes or lesions  NEURO: Gait normal, coordination intact  PSYCH:  Mood and affect appropriate  Diabetic foot exam:   Left: Skin intact, no lesions    Proprioception normal    Pulses intact,  warm, well perfused   Filament test absent in a few spots   TTP greatest at base of the heel  Right: Skin intact, no lesions   Proprioception normal   Pulses intact, warm, well perfused   Filament test absent in a few spots   Greatest TTP at the base of the heel      At the end of the encounter, I discussed results, diagnosis, medications. Discussed red flags for immediate return to clinic/ER, as well as indications for follow up if no improvement. Patient and/or caregiver understood and agreed to plan. Patient was stable for discharge.    Magdalena Mendoza

## 2021-06-02 NOTE — TELEPHONE ENCOUNTER
Medication Question or Clarification  Who is calling: Pharmacy: [facility] - Fax received.  What medication are you calling about? (include dose and sig)    Disp Refills Start End    atorvastatin (LIPITOR) 10 MG tablet 30 tablet 11 10/21/2019     Sig - Route: Take 1 tablet (10 mg total) by mouth daily. - Oral    Sent to pharmacy as: atorvastatin 10 mg tablet (LIPITOR)    E-Prescribing Status: Receipt confirmed by pharmacy (10/21/2019  2:42 PM CDT)      Who prescribed the medication?: Magdalena Mendoza  What is your question/concern?: Pharmacy Comments:  Requesting approval to fill for a 90 day supply.  Please send new scripts or contact the pharmacy.  [facility]  Okay to leave a detailed message?: No  Site CMT - Please call the pharmacy to obtain any additional needed information.

## 2021-06-03 ENCOUNTER — RECORDS - HEALTHEAST (OUTPATIENT)
Dept: ADMINISTRATIVE | Facility: CLINIC | Age: 36
End: 2021-06-03

## 2021-06-03 VITALS
HEART RATE: 78 BPM | SYSTOLIC BLOOD PRESSURE: 124 MMHG | BODY MASS INDEX: 47.09 KG/M2 | WEIGHT: 293 LBS | HEIGHT: 66 IN | DIASTOLIC BLOOD PRESSURE: 84 MMHG

## 2021-06-04 VITALS
BODY MASS INDEX: 51.65 KG/M2 | TEMPERATURE: 97.5 F | DIASTOLIC BLOOD PRESSURE: 80 MMHG | RESPIRATION RATE: 18 BRPM | WEIGHT: 293 LBS | HEART RATE: 109 BPM | SYSTOLIC BLOOD PRESSURE: 119 MMHG

## 2021-06-04 VITALS
DIASTOLIC BLOOD PRESSURE: 78 MMHG | HEART RATE: 89 BPM | WEIGHT: 293 LBS | RESPIRATION RATE: 16 BRPM | BODY MASS INDEX: 50.2 KG/M2 | SYSTOLIC BLOOD PRESSURE: 110 MMHG | OXYGEN SATURATION: 96 %

## 2021-06-05 VITALS
BODY MASS INDEX: 50.8 KG/M2 | WEIGHT: 293 LBS | SYSTOLIC BLOOD PRESSURE: 132 MMHG | HEART RATE: 112 BPM | DIASTOLIC BLOOD PRESSURE: 84 MMHG

## 2021-06-05 VITALS
HEART RATE: 90 BPM | WEIGHT: 293 LBS | SYSTOLIC BLOOD PRESSURE: 115 MMHG | BODY MASS INDEX: 51.17 KG/M2 | DIASTOLIC BLOOD PRESSURE: 80 MMHG

## 2021-06-05 NOTE — TELEPHONE ENCOUNTER
"She thinks she may have the flu. She is a diabetic and has only had one small meal 2/3.  She has not checked her blood glucose.  Her mouth is dry.  She feels short of breath but doesn't sound in any great distress at this time.  I did not hear her cough.  She feels her heart is beating faster.  She had one half liter of water yesterday,  She passed some urine about two hours ago.  Yesterday morning she took Theraflu and yesterday evening she tool Nyquil. I really wanted her in the ED so she could be throughway assessed    She did not put up a \"fight\" I think because she feels so bad.  I asked her to cover her mouth with something if she goes via Lyft.  Then when she gets to the ED she should ask for a mask immediately.  Agreeable with plan  Doretha Ray RN, BAN, Nurse Advisor, [place] 11p-7a      Reason for Disposition    [1] Difficulty breathing AND [2] not severe AND [3] not from stuffy nose (e.g., not relieved by cleaning out the nose)    Answer Assessment - Initial Assessment Questions  1. WORST SYMPTOM: \"What is your worst symptom?\" (e.g., cough, runny nose, muscle aches, headache, sore throat, fever)       Cough and chills, chest is painful  2. ONSET: \"When did your flu symptoms start?\"       Saturday 2/1  3. COUGH: \"How bad is the cough?\"        She does have spells where the cough goes on and on  4. RESPIRATORY DISTRESS: \"Describe your breathing.\"       She feels SOB  5. FEVER: \"Do you have a fever?\" If so, ask: \"What is your temperature, how was it measured, and when did it start?\"      Was 103.0 and now is 100.5  6. EXPOSURE: \"Were you exposed to someone with influenza?\"        She does not think so  7. FLU VACCINE: \"Did you get a flu shot this year?\"      Yes  8. HIGH RISK DISEASE: \"Do you any chronic medical problems?\" (e.g., heart or lung disease, asthma, weak immune system, or other HIGH RISK conditions)      Yes diabetes  9. PREGNANCY: \"Is there any chance you are pregnant?\" \"When was your last " "menstrual period?\"      Denies  10. OTHER SYMPTOMS: \"Do you have any other symptoms?\"  (e.g., runny nose, muscle aches, headache, sore throat)        Cough is nonproductive, throat is scratchy, headache, dry mouth, increased heart rate,    Protocols used: INFLUENZA - SEASONAL-A-AH      "

## 2021-06-06 NOTE — TELEPHONE ENCOUNTER
Looks like the patient is on metformin 1000 mg twice a day make sure she is taking that    Also takes Victoza 1.2 mg daily (0.6 mg per 0.1 mL)    So presently she takes 0.2 mL, increase this up to 0.3 mL daily which would be 1.8 mg a day.    Have her contact Dr. Mendoza tomorrow with where the sugars are at and see if Dr. Mendoza wants to see her

## 2021-06-06 NOTE — TELEPHONE ENCOUNTER
Pt sent a atVenu message to her PCP, and scheduled an appointment for tomorrow due to high BG, and now her BG is 368, and she is symptomatic, so is calling in to see if she can get an appointment for today. Pt is experiencing weakness, headache, blurred vision and is feeling very tired, and has not eaten due to poor appetite.   Per protocol pt should go to the ER. Pt would prefer to see if she can get into the clinic. Pt was transferred to scheduling. Pt was advised to go to ER if unable to get into the clinic today, and to call back if symptoms worsen before she is able to be seen in the clinic.    Please call Ruth at [phone] and let her know if she can be squeezed in, or if she should go to the ER.    Provider please advise.     Nehemias Jung RN [facility] Triage/Medication Refill    Reason for Disposition    Patient sounds very sick or weak to the triager    Protocols used: DIABETES - HIGH BLOOD SUGAR-A-OH

## 2021-06-06 NOTE — PROGRESS NOTES
Assessment/Plan:     1. Uncontrolled type 2 diabetes mellitus with hyperglycemia (H)  - Comprehensive Metabolic Panel  - Hemoglobin A1c  - Lipid panel  - Microalbumin, Random Urine  - Diabetes foot exam  - Ambulatory referral to Diabetic Education Program  - Ambulatory referral to Medication Management  - exenatide microspheres (BYDUREON) 2 mg/0.65 mL PnIj extended release injection; Inject 0.65 mL (2 mg total) under the skin every 7 days.  Dispense: 2.6 mL; Refill: 0  2. Diabetic polyneuropathy associated with type 2 diabetes mellitus (H)  3. Pure hypercholesterolemia  4. Morbid obesity (H)  Uncontrolled and worsening HbA1C, lipid panel.    No signs of acidosis.   Needs medication adjustment. Some exacerbation in hyperglycemia recently likely due to illness.   Nonetheless, stop victoza. Start Bydureon. Continue metformin 1000 mg two times a day. Establish care again with diabetes education. Establish care with medication management to review the above. Reviewed timing of glucose monitoring at home.   Follow up in one month for review of home glucose measurements.       Subjective:       Ruth Vanegas is an 34 y.o. female who presents for follow up of diabetes. Current symptoms include: hyperglycemia, paresthesia of the feet and polydipsia. Patient denies foot ulcerations, visual disturbances and vomiting. Evaluation to date has included: fasting blood sugar, fasting lipid panel, hemoglobin A1C and microalbuminuria. Home sugars: BGs have been labile ranging between 250s and 350s all day. Does not check fasting. Current treatments: no recent interventions. Last dilated eye exam none.    Notes elevated blood sugar for several weeks now. Says she doesn't understand why since she has been trying hard to lose weight, is more careful with her diet. Yesterday she felt very  Unwell, lethargic, thirsty. Was advised to present to ED but she did not go.      Ruth Vanegas is here for follow up of elevated  cholesterol. Compliance with treatment has been fair. The patient exercises infrequently. Patient denies muscle pain associated with her medications.    The following portions of the patient's history were reviewed and updated as appropriate: allergies, current medications, past family history, past medical history, past social history, past surgical history and problem list.    Review of Systems  A 12 point comprehensive review of systems was negative except as noted.         Past Medical History:   Diagnosis Date     Anemia     told to take iron pills when pregnant     Depression      Diabetes mellitus (H)      Dysthymic disorder      Endometriosis      Herpes genitalia      Hyperlipidemia      Kidney stone      Menorrhagia      Migraines      PCOS (polycystic ovarian syndrome)      Post traumatic stress disorder (PTSD)      Patient Active Problem List   Diagnosis     Calculus of ureter     Diabetes mellitus with neuropathy (H)     Hyperlipidemia     Microalbuminuria     Obesity     Menorrhagia     S/P laparoscopic hysterectomy     Morbid obesity (H)     Past Surgical History:   Procedure Laterality Date     BLADDER REPAIR W/  SECTION      X2     COMBINED HYSTEROSCOPY DIAGNOSTIC / D&C N/A 2015    Procedure: Dilation and Curettage with Hysteroscopy;  Surgeon: Zia Farmer MD;  Location: [facility];  Service:      COMBINED HYSTEROSCOPY DIAGNOSTIC / D&C N/A 2016    Procedure: DILATION AND CURETTAGE WITH HYSTEROSCOPY INSERT MIRENA;  Surgeon: Suzanne Major MD;  Location: [facility] OR;  Service:      DIAGNOSTIC LAPAROSCOPY N/A 10/19/2015    Procedure:  LAPAROSCOPY,LYSIS OF ADHESIONS;  Surgeon: Zia Farmer MD;  Location: [facility];  Service:      DILATION AND CURETTAGE OF UTERUS  2015     LAPAROSCOPIC TOTAL HYSTERECTOMY N/A 3/4/2019    Procedure: ROBOTIC TOTAL LAPAROSCOPIC HYSTERECTOMY, BILATERAL SALPINGECTOMY, LEFT OVARIAN CYSTOTOMY. CYSTOSCOPY;  Surgeon: Marleny  Zia LUGO MD;  Location: [facility] OR;  Service: Gynecology     DE HYSTEROSCOPY,W/ENDOMETRIAL ABLATION N/A 3/2/2018    Procedure: HYSTEROSCOPY, DILATION AND CURETTAGE, ENDOMETRIAL ABLATION;  Surgeon: Kristy Israel DO;  Location: [facility] OR;  Service: Gynecology     DE LAP,TUBAL CAUTERY Bilateral 3/2/2018    Procedure: LAPAROSCOPIC BILATERAL TUBAL LIGATION;  Surgeon: Kristy Israel DO;  Location: [facility] OR;  Service: Gynecology     TONSILLECTOMY       Family History   Problem Relation Age of Onset     Diabetes Paternal Grandmother      Cancer Father         prostate     Alcohol abuse Sister      Alcohol abuse Brother      Alcohol abuse Daughter      Urolithiasis Neg Hx      Clotting disorder Neg Hx      Gout Neg Hx      Heart disease Neg Hx      Social History     Socioeconomic History     Marital status: Single     Spouse name: Not on file     Number of children: Not on file     Years of education: Not on file     Highest education level: Not on file   Occupational History     Occupation: [employer] [place]     Employer: [employer]   Social Needs     Financial resource strain: Not on file     Food insecurity:     Worry: Not on file     Inability: Not on file     Transportation needs:     Medical: Not on file     Non-medical: Not on file   Tobacco Use     Smoking status: Never Smoker     Smokeless tobacco: Never Used   Substance and Sexual Activity     Alcohol use: No     Drug use: No     Sexual activity: Not on file   Lifestyle     Physical activity:     Days per week: Not on file     Minutes per session: Not on file     Stress: Not on file   Relationships     Social connections:     Talks on phone: Not on file     Gets together: Not on file     Attends Moravian service: Not on file     Active member of club or organization: Not on file     Attends meetings of clubs or organizations: Not on file     Relationship status: Not on file     Intimate partner violence:     Fear of current or ex  "partner: Not on file     Emotionally abused: Not on file     Physically abused: Not on file     Forced sexual activity: Not on file   Other Topics Concern     Not on file   Social History Narrative     Not on file     Current Outpatient Medications on File Prior to Visit   Medication Sig Dispense Refill     aspirin-acetaminophen-caffeine (EXCEDRIN MIGRAINE) 250-250-65 mg per tablet Take 1 tablet by mouth every 6 (six) hours as needed for pain.       atorvastatin (LIPITOR) 10 MG tablet Take 1 tablet (10 mg total) by mouth daily. 30 tablet 11     gabapentin (NEURONTIN) 300 MG capsule Take 2 capsules (600 mg total) by mouth 3 (three) times a day. 180 capsule 1     liraglutide (VICTOZA) 0.6 mg/0.1 mL (18 mg/3 mL) PnIj injection Inject 1.2 mg under the skin daily. 9 mL 2     pen needle, diabetic (BD ULTRA-FINE RAMON PEN NEEDLE) 32 gauge x 5/32\" Ndle USE WITH VICTOZA DAILY 100 each 3     [DISCONTINUED] blood glucose test (ACCU-CHEK TARIQ PLUS TEST STRP) strips Use 1 each As Directed as needed. Dispense brand per patient's insurance at pharmacy discretion. 100 strip 5     [DISCONTINUED] blood-glucose meter (ACCU-CHEK TARIQ CONNECT METER) Misc Use to check glucose 3 times per day 1 each 0     [DISCONTINUED] generic lancets (ACCU-CHEK MULTICLIX LANCET) Use 1 each As Directed as needed. Dispense brand per patient's insurance at pharmacy discretion. 100 each 5     [DISCONTINUED] ibuprofen (ADVIL,MOTRIN) 600 MG tablet Take 1 tablet (600 mg total) by mouth 3 (three) times a day. 60 tablet 2     metFORMIN (GLUCOPHAGE-XR) 500 MG 24 hr tablet Take 2 tablets (1,000 mg total) by mouth 2 (two) times a day. 360 tablet 2     No current facility-administered medications on file prior to visit.        Objective:     Vitals:    02/21/20 0857   BP: 110/78   Pulse: 89   Resp: 16   SpO2: 96%       General Appearance:    Alert, cooperative, no distress, appears stated age   Head:    Normocephalic, without obvious abnormality, atraumatic   Eyes: "    PERRL, conjunctiva/corneas clear, EOM's intact   Nose:   Mucosa moist, normal    Throat:   Lips, mucosa, and tongue normal; teeth and gums normal   Neck:   Supple, symmetrical, trachea midline, no adenopathy;     thyroid:  no enlargement/tenderness/nodules; no carotid    bruit or JVD   Lungs:     Clear to auscultation bilaterally, respirations unlabored    Heart:    Regular rate and rhythm, S1 and S2 normal, no murmur, rub   or gallop   Abdomen:     Soft, non-tender, bowel sounds active all four quadrants,     no masses, no organomegaly   Extremities:   Extremities normal, atraumatic, no cyanosis or edema   Pulses:   2+ and symmetric all extremities   Skin:   Skin color, texture, turgor normal, no rashes or lesions   Neurologic:   No focal deficits     Laboratory:  Lab Results   Component Value Date    HGBA1C 10.6 (H) 02/21/2020     Results for orders placed or performed in visit on 02/21/20   Comprehensive Metabolic Panel   Result Value Ref Range    Sodium 138 136 - 145 mmol/L    Potassium 4.2 3.5 - 5.0 mmol/L    Chloride 100 98 - 107 mmol/L    CO2 28 22 - 31 mmol/L    Anion Gap, Calculation 10 5 - 18 mmol/L    Glucose 315 (H) 70 - 125 mg/dL    BUN 5 (L) 8 - 22 mg/dL    Creatinine 0.66 0.60 - 1.10 mg/dL    GFR MDRD Af Amer >60 >60 mL/min/1.73m2    GFR MDRD Non Af Amer >60 >60 mL/min/1.73m2    Bilirubin, Total 0.6 0.0 - 1.0 mg/dL    Calcium 9.0 8.5 - 10.5 mg/dL    Protein, Total 6.8 6.0 - 8.0 g/dL    Albumin 3.6 3.5 - 5.0 g/dL    Alkaline Phosphatase 86 45 - 120 U/L    AST 13 0 - 40 U/L    ALT 19 0 - 45 U/L     Lab Results   Component Value Date    CHOL 267 (H) 02/21/2020    CHOL 254 (H) 10/16/2019    CHOL 239 (H) 02/14/2018     Lab Results   Component Value Date    HDL 53 02/21/2020    HDL 60 10/16/2019    HDL 53 02/14/2018     Lab Results   Component Value Date    LDLCALC 193 (H) 02/21/2020    LDLCALC 173 (H) 10/16/2019    LDLCALC 164 (H) 02/14/2018     Lab Results   Component Value Date    TRIG 103 02/21/2020     TRIG 107 10/16/2019    TRIG 108 02/14/2018     No components found for: KATHARINE Banks MD

## 2021-06-06 NOTE — TELEPHONE ENCOUNTER
Received MTM referral from Primary Care Provider     Patient was not reachable after several attempts, will route to MTM Pharmacist/Provider as an FYI. Left MTM scheduling information on patients voicemail.    Thank you for the referral,  See [facility] Pharmacy Coordinator

## 2021-06-06 NOTE — TELEPHONE ENCOUNTER
This was already addressed as the outreach nurse instant messaged me with the same concerns. Advised to be seen in ED since I am no longer available today.

## 2021-06-07 NOTE — PROGRESS NOTES
Assessment/Plan:     1. Type 2 diabetes, uncontrolled, with neuropathy (H)  - Glycosylated Hemoglobin A1c  - Comprehensive Metabolic Panel  - Thyroid Stimulating Hormone (TSH)  - Vitamin D, Total (25-Hydroxy)  - HM1(CBC and Differential)  - HM1 (CBC with Diff)  - Ambulatory referral to Diabetes Education Program (CDE)  2. Morbid obesity (H)  3. Pure hypercholesterolemia  4. Microalbuminuria  5. Nausea  Uncontrolled.   Poor medication tolerance to bydurean, which has already been replaced by lantus.   Increase lantus to 12 units.   Continue metformin.   Strongly urged diabetic diet, eating regularly, good oral hydration, continued monitoring of glucose.   Unclear etiology of her nausea, anorexia, medication use, hypoglycemia, improving glucose control that patient is not used to, viral?  We will check blood work as above.   Again referral to diabetes education.   Follow up in one week by telephone visit, will convert to office if needed.   Referral to ophthalmology.   Foot check done.   Urine microalbumin up to date, elevated.   Nonsmoker.   On statin.   On ASA.   Continue gapentin at current dose.       Subjective:       Ruth Vanegas is an 34 y.o. female who presents for follow up of diabetes. Current symptoms include: nausea and poor appetite, headache, worsening with medication use though she is not sure if this worsened with new medication. . Patient denies hypoglycemia , paresthesia of the feet, polydipsia, polyuria, visual disturbances and weight loss. Evaluation to date has included: fasting blood sugar, fasting lipid panel, hemoglobin A1C and microalbuminuria. Home sugars: is consistently high, 200s in the morning, almost 300 post pranidial. Did not check today because she did not eat yet. Is still nauseated. . Current treatments: change to lantus, bydurean stopped by Dr. Braun due to possible side affect. had taken two months. . Last dilated eye exam none.     Patient here for follow-up of elevated  blood pressure.  She is not exercising and is adherent to a low-salt diet.  Blood pressure is well controlled at home. Cardiac symptoms: none. Patient denies: chest pressure/discomfort, exertional chest pressure/discomfort, irregular heart beat, lower extremity edema, orthopnea and paroxysmal nocturnal dyspnea. Cardiovascular risk factors: diabetes mellitus, microalbuminuria and sedentary lifestyle. Use of agents associated with hypertension: none. History of target organ damage: chronic kidney disease.     Ruth Vanegas is here for follow up of elevated cholesterol. Compliance with treatment has been excellent. The patient exercises infrequently. Patient denies muscle pain associated with her medications.    Was seen for telephone visit last week. Concerning for no change in her blood glucose plus worsening nausea, anorexia, since or before starting newly prescribed Bydurean. She thought it was improving her glucose at first, went from 300s to 200s. She has ongoing low appetite but is gaining weight, she does not understand why. No fever. No cough. No shortness of breath. No upper respiratory symptoms. Has not seen diabetes education lately. Has not see an eye doctor  education lately. Does not think it will be done due to current pandemic of covid 19.     The following portions of the patient's history were reviewed and updated as appropriate: allergies, current medications, past family history, past medical history, past social history, past surgical history and problem list.    Review of Systems  A 12 point comprehensive review of systems was negative except as noted.       Objective:     Vitals:    04/21/20 1345   BP: 119/80   Pulse: (!) 109   Resp: 18   Temp: 97.5  F (36.4  C)     Wt Readings from Last 3 Encounters:   04/21/20 (!) 320 lb (145.2 kg)   02/21/20 (!) 311 lb 0.4 oz (141.1 kg)   02/04/20 (!) 314 lb (142.4 kg)       General Appearance:    Alert, cooperative, no distress, appears stated age    Head:    Normocephalic, without obvious abnormality, atraumatic   Eyes:    PERRL, conjunctiva/corneas clear, EOM's intact   Nose:   Mucosa moist, normal    Throat:   Lips, mucosa, and tongue normal; teeth and gums normal   Neck:   Supple, symmetrical, trachea midline, no adenopathy;     thyroid:  no enlargement/tenderness/nodules; no carotid    bruit or JVD   Lungs:     Clear to auscultation bilaterally, respirations unlabored    Heart:    Regular rate and rhythm, S1 and S2 normal, no murmur, rub   or gallop   Abdomen:     Soft, non-tender, bowel sounds active all four quadrants,     no masses, no organomegaly   Extremities:   Extremities normal, atraumatic, no cyanosis or edema   Pulses:   2+ and symmetric all extremities   Skin:   Skin color, texture, turgor normal, no rashes or lesions   Neurologic:   No focal deficits     Laboratory:  Recent Results (from the past 240 hour(s))   Glycosylated Hemoglobin A1c   Result Value Ref Range    Hemoglobin A1c 10.5 (H) 3.5 - 6.0 %   Comprehensive Metabolic Panel   Result Value Ref Range    Sodium 138 136 - 145 mmol/L    Potassium 4.2 3.5 - 5.0 mmol/L    Chloride 101 98 - 107 mmol/L    CO2 27 22 - 31 mmol/L    Anion Gap, Calculation 10 5 - 18 mmol/L    Glucose 157 (H) 70 - 125 mg/dL    BUN 7 (L) 8 - 22 mg/dL    Creatinine 0.57 (L) 0.60 - 1.10 mg/dL    GFR MDRD Af Amer >60 >60 mL/min/1.73m2    GFR MDRD Non Af Amer >60 >60 mL/min/1.73m2    Bilirubin, Total 0.4 0.0 - 1.0 mg/dL    Calcium 9.1 8.5 - 10.5 mg/dL    Protein, Total 6.9 6.0 - 8.0 g/dL    Albumin 3.4 (L) 3.5 - 5.0 g/dL    Alkaline Phosphatase 84 45 - 120 U/L    AST 11 0 - 40 U/L    ALT 16 0 - 45 U/L   Thyroid Stimulating Hormone (TSH)   Result Value Ref Range    TSH 0.87 0.30 - 5.00 uIU/mL   HM1 (CBC with Diff)   Result Value Ref Range    WBC 8.5 4.0 - 11.0 thou/uL    RBC 4.36 3.80 - 5.40 mill/uL    Hemoglobin 12.3 12.0 - 16.0 g/dL    Hematocrit 37.1 35.0 - 47.0 %    MCV 85 80 - 100 fL    MCH 28.3 27.0 - 34.0 pg     MCHC 33.2 32.0 - 36.0 g/dL    RDW 13.2 11.0 - 14.5 %    Platelets 252 140 - 440 thou/uL    MPV 8.7 7.0 - 10.0 fL    Neutrophils % 55 50 - 70 %    Lymphocytes % 36 20 - 40 %    Monocytes % 5 2 - 10 %    Eosinophils % 3 0 - 6 %    Basophils % 1 0 - 2 %    Neutrophils Absolute 4.7 2.0 - 7.7 thou/uL    Lymphocytes Absolute 3.1 0.8 - 4.4 thou/uL    Monocytes Absolute 0.5 0.0 - 0.9 thou/uL    Eosinophils Absolute 0.2 0.0 - 0.4 thou/uL    Basophils Absolute 0.1 0.0 - 0.2 thou/uL          Milton Banks MD

## 2021-06-07 NOTE — PROGRESS NOTES
"Ruth Vanegas is a 34 y.o. female who is being evaluated via a billable telephone visit.      The patient has been notified of following:     \"This telephone visit will be conducted via a call between you and your physician/provider. We have found that certain health care needs can be provided without the need for a physical exam.  This service lets us provide the care you need with a short phone conversation.  If a prescription is necessary we can send it directly to your pharmacy.  If lab work is needed we can place an order for that and you can then stop by our lab to have the test done at a later time.    Telephone visits are billed at different rates depending on your insurance coverage. During this emergency period, for some insurers they may be billed the same as an in-person visit.  Please reach out to your insurance provider with any questions.    If during the course of the call the physician/provider feels a telephone visit is not appropriate, you will not be charged for this service.\"    Patient has given verbal consent to a Telephone visit? Yes    Patient would like to receive their AVS by     Additional provider notes:     Assessment/Plan:     1. Type 2 diabetes, uncontrolled, with neuropathy (H)  2. Morbid obesity (H)  3. Microalbuminurias  4. Pure hypercholesterolemia  5. Vitamin D deficiency  EHR reviewed.   Past medical history, problem list, past surgical history, family history, social history, medications reviewed, updated, reconciled.   Improving nausea and symptoms.   Somewhat improving glucose though still highly uncontrolled diabetes, insulin dependent.   Labs from last visit reviewed with patient.   Continue lantus at 12 units qHS, metformin two times a day. Encouraged regular activity, continued attention to diabetic diet.   Has follow up planned with diabetes education, will follow recommendations.   Discussed likely need to increase dosing of insulin but will approach this slowly. " Needs annual eye exam though will have to await pandemic.   Will follow up on this in three to four weeks. Continue to monitor fasting and post prandial glucose. Consider medication adjustments at that time unless needed before then.       Subjective:       Ruth Vanegas is an 34 y.o. female who presents for follow up of diabetes. Current symptoms include: hyperglycemia and nausea. Patient denies foot ulcerations, hypoglycemia , polydipsia, polyuria, visual disturbances, vomiting and weight loss. Evaluation to date has included: fasting lipid panel, hemoglobin A1C and microalbuminuria. Home sugars: fasting is 170s, 180s, sometimes 150s, after dinner can be 220s. Is much improved since last visit, no longer in 300s continuously. . Current treatments: bydurean was discontinued, started on lantus, she was increased to 12 units at the last visit.. Last dilated eye exam none.   Was seen and examined at the last visit due to worsening, nausea, lack of appetite, low energy. She was placed on lantus by Dr. Braun after what seemed to be intolerance to bydurean. She feels better now. Her nausea has improved. Is eating better. Feels less fatigued and sick for the last week. Is taking zofran as prescribed. It is helping.      Patient here for follow-up of elevated blood pressure.  She is not exercising and is adherent to a low-salt diet.  Blood pressure is well controlled at home. Cardiac symptoms: none. Patient denies: chest pressure/discomfort, exertional chest pressure/discomfort, irregular heart beat, lower extremity edema, orthopnea and paroxysmal nocturnal dyspnea. Cardiovascular risk factors: diabetes mellitus, dyslipidemia, microalbuminuria and obesity (BMI >= 30 kg/m2). Use of agents associated with hypertension: none. History of target organ damage: chronic kidney disease.     Ruth Vanegas is here for follow up of elevated cholesterol. Compliance with treatment has been good. The patient exercises  infrequently. Patient denies muscle pain associated with her medications.    The following portions of the patient's history were reviewed and updated as appropriate: allergies, current medications, past family history, past medical history, past social history, past surgical history and problem list.    Review of Systems  A 12 point comprehensive review of systems was negative except as noted.       Past Medical History:   Diagnosis Date     Anemia     told to take iron pills when pregnant     Depression      Diabetes mellitus (H)      Dysthymic disorder      Endometriosis      Herpes genitalia      Hyperlipidemia      Kidney stone      Menorrhagia      Migraines      Other abnormal Papanicolaou smear of cervix and cervical HPV(795.09) 2013     PCOS (polycystic ovarian syndrome)      Post traumatic stress disorder (PTSD)      Patient Active Problem List   Diagnosis     Calculus of ureter     Type 2 diabetes, uncontrolled, with neuropathy (H)     Hyperlipidemia     Microalbuminuria     S/P laparoscopic hysterectomy     Morbid obesity (H)     Past Surgical History:   Procedure Laterality Date     BLADDER REPAIR W/  SECTION      X2     COMBINED HYSTEROSCOPY DIAGNOSTIC / D&C N/A 2015    Procedure: Dilation and Curettage with Hysteroscopy;  Surgeon: Zia Farmer MD;  Location: [facility];  Service:      COMBINED HYSTEROSCOPY DIAGNOSTIC / D&C N/A 2016    Procedure: DILATION AND CURETTAGE WITH HYSTEROSCOPY INSERT MIRENA;  Surgeon: Suzanne Major MD;  Location: [facility] OR;  Service:      DIAGNOSTIC LAPAROSCOPY N/A 10/19/2015    Procedure:  LAPAROSCOPY,LYSIS OF ADHESIONS;  Surgeon: Zia Farmer MD;  Location: [facility] OR;  Service:      DILATION AND CURETTAGE OF UTERUS  2015     LAPAROSCOPIC TOTAL HYSTERECTOMY N/A 3/4/2019    Procedure: ROBOTIC TOTAL LAPAROSCOPIC HYSTERECTOMY, BILATERAL SALPINGECTOMY, LEFT OVARIAN CYSTOTOMY. CYSTOSCOPY;  Surgeon: Zia Farmer MD;   Location: [facility] Main OR;  Service: Gynecology     [place] HYSTEROSCOPY,W/ENDOMETRIAL ABLATION N/A 3/2/2018    Procedure: HYSTEROSCOPY, DILATION AND CURETTAGE, ENDOMETRIAL ABLATION;  Surgeon: Kristy Israel DO;  Location: [facility] Main OR;  Service: Gynecology     [place] LAP,TUBAL CAUTERY Bilateral 3/2/2018    Procedure: LAPAROSCOPIC BILATERAL TUBAL LIGATION;  Surgeon: Kristy Israel DO;  Location: [facility] OR;  Service: Gynecology     TONSILLECTOMY       Family History   Problem Relation Age of Onset     Diabetes Paternal Grandmother      Cancer Father         prostate     Alcohol abuse Sister      Alcohol abuse Brother      Alcohol abuse Daughter      Urolithiasis Neg Hx      Clotting disorder Neg Hx      Gout Neg Hx      Heart disease Neg Hx      Social History     Socioeconomic History     Marital status: Single     Spouse name: Not on file     Number of children: Not on file     Years of education: Not on file     Highest education level: Not on file   Occupational History     Occupation: [employer]     Employer: [employer]   Social Needs     Financial resource strain: Not on file     Food insecurity     Worry: Not on file     Inability: Not on file     Transportation needs     Medical: Not on file     Non-medical: Not on file   Tobacco Use     Smoking status: Never Smoker     Smokeless tobacco: Never Used   Substance and Sexual Activity     Alcohol use: No     Drug use: No     Sexual activity: Yes     Birth control/protection: Surgical   Lifestyle     Physical activity     Days per week: Not on file     Minutes per session: Not on file     Stress: Not on file   Relationships     Social connections     Talks on phone: Not on file     Gets together: Not on file     Attends [employer] service: Not on file     Active member of club or organization: Not on file     Attends meetings of clubs or organizations: Not on file     Relationship status: Not on file     Intimate partner violence     Fear of  "current or ex partner: Not on file     Emotionally abused: Not on file     Physically abused: Not on file     Forced sexual activity: Not on file   Other Topics Concern     Not on file   Social History Narrative     Not on file     Current Outpatient Medications on File Prior to Visit   Medication Sig Dispense Refill     aspirin-acetaminophen-caffeine (EXCEDRIN MIGRAINE) 250-250-65 mg per tablet Take 1 tablet by mouth every 6 (six) hours as needed for pain.       atorvastatin (LIPITOR) 10 MG tablet Take 1 tablet (10 mg total) by mouth daily. 30 tablet 11     blood glucose test (ACCU-CHEK TARIQ PLUS TEST STRP) strips Check blood sugar three times a day 100 strip 5     blood-glucose meter (ACCU-CHEK TARIQ CONNECT METER) Misc Check blood sugar three times a day 1 each 0     gabapentin (NEURONTIN) 300 MG capsule Take 2 capsules (600 mg total) by mouth 3 (three) times a day. 180 capsule 1     generic lancets (ACCU-CHEK MULTICLIX LANCET) Check blood sugar three times a day 100 each 5     ondansetron (ZOFRAN) 4 MG tablet Take 1 tablet (4 mg total) by mouth every 8 (eight) hours as needed for nausea. 30 tablet 1     pen needle, diabetic (BD ULTRA-FINE RAMON PEN NEEDLE) 32 gauge x 5/32\" Ndle USE WITH VICTOZA DAILY 100 each 3     [DISCONTINUED] insulin glargine (LANTUS SOLOSTAR U-100 INSULIN) 100 unit/mL (3 mL) pen Inject 10 Units under the skin daily. Do not mix Lantus with any other insulin 5 adj dose pen 1     metFORMIN (GLUCOPHAGE-XR) 500 MG 24 hr tablet Take 2 tablets (1,000 mg total) by mouth 2 (two) times a day. 360 tablet 2     No current facility-administered medications on file prior to visit.        Objective:     There were no vitals filed for this visit.    Laboratory:  Recent Results (from the past 240 hour(s))   Glycosylated Hemoglobin A1c   Result Value Ref Range    Hemoglobin A1c 10.5 (H) 3.5 - 6.0 %   Comprehensive Metabolic Panel   Result Value Ref Range    Sodium 138 136 - 145 mmol/L    Potassium 4.2 3.5 - " 5.0 mmol/L    Chloride 101 98 - 107 mmol/L    CO2 27 22 - 31 mmol/L    Anion Gap, Calculation 10 5 - 18 mmol/L    Glucose 157 (H) 70 - 125 mg/dL    BUN 7 (L) 8 - 22 mg/dL    Creatinine 0.57 (L) 0.60 - 1.10 mg/dL    GFR MDRD Af Amer >60 >60 mL/min/1.73m2    GFR MDRD Non Af Amer >60 >60 mL/min/1.73m2    Bilirubin, Total 0.4 0.0 - 1.0 mg/dL    Calcium 9.1 8.5 - 10.5 mg/dL    Protein, Total 6.9 6.0 - 8.0 g/dL    Albumin 3.4 (L) 3.5 - 5.0 g/dL    Alkaline Phosphatase 84 45 - 120 U/L    AST 11 0 - 40 U/L    ALT 16 0 - 45 U/L   Thyroid Stimulating Hormone (TSH)   Result Value Ref Range    TSH 0.87 0.30 - 5.00 uIU/mL   Vitamin D, Total (25-Hydroxy)   Result Value Ref Range    Vitamin D, Total (25-Hydroxy) 11.2 (L) 30.0 - 80.0 ng/mL   HM1 (CBC with Diff)   Result Value Ref Range    WBC 8.5 4.0 - 11.0 thou/uL    RBC 4.36 3.80 - 5.40 mill/uL    Hemoglobin 12.3 12.0 - 16.0 g/dL    Hematocrit 37.1 35.0 - 47.0 %    MCV 85 80 - 100 fL    MCH 28.3 27.0 - 34.0 pg    MCHC 33.2 32.0 - 36.0 g/dL    RDW 13.2 11.0 - 14.5 %    Platelets 252 140 - 440 thou/uL    MPV 8.7 7.0 - 10.0 fL    Neutrophils % 55 50 - 70 %    Lymphocytes % 36 20 - 40 %    Monocytes % 5 2 - 10 %    Eosinophils % 3 0 - 6 %    Basophils % 1 0 - 2 %    Neutrophils Absolute 4.7 2.0 - 7.7 thou/uL    Lymphocytes Absolute 3.1 0.8 - 4.4 thou/uL    Monocytes Absolute 0.5 0.0 - 0.9 thou/uL    Eosinophils Absolute 0.2 0.0 - 0.4 thou/uL    Basophils Absolute 0.1 0.0 - 0.2 thou/uL          Shumona Jocelyn, MD          Phone call duration:  16 minutes

## 2021-06-07 NOTE — PATIENT INSTRUCTIONS - HE
1. Check blood sugar twice per day (before breakfast and bedtime).  2. Increase Lantus to 14 units tonight.  3. Continue to increase Lantus by 2 units every 3 days until the morning blood sugar (fasting) is 100-130 consistently. Do not exceed 30 units Lantus per day.  4. Try adding protein to the bedtime snack. Portion out snacks.  5. Aim for 45-60 grams of carbohydrate at meals and 15-30 gm carbohydrate per snack.   6. When reading labels aim for 3 grams of fiber or more per serving and 20 grams or less added sugar per day.  7. Stay active every day. Walking 20-30 minutes 5 days per week or more.  8. Buy glucose tablets at any pharmacy. Carry a form of glucose in your purse and in your car to treat lows (less 70).  9. Continue to drink a lot of water to lower blood sugar and protect your kidneys.  10. Next telephone visit 5/13/20 at 12:30 pm.    Blood sugar goals:  Before meals   1-2 hours after meals: less 180  Bedtime:     A1c: less than 7.0% (estimated average blood sugar of 154 mg/dl)

## 2021-06-07 NOTE — TELEPHONE ENCOUNTER
SUBJECTIVE:   Robert Richard is a 81 year old male who presents to clinic today for the following health issues:    Hypertension Follow-up      Outpatient blood pressures are being checked at home.  Results are 130s-160s/80s-90s.    Low Salt Diet: no added salt      Amount of exercise or physical activity: golf    Problems taking medications regularly: No    Medication side effects: none    Diet: regular (no restrictions)                        Chief Complaint           The patient is a pleasant and well-known 81-year-old gentleman who presents today with a series of concerns.  Notably, he notes that while recently playing golf, he bent over to  the golf ball off the ground and upon standing up was temporarily lightheaded.  This happened a couple times.  He notes he did not heavy syncope but had brief symptoms.  Techno chest pain with this.  Does have a history of hypertension and is on multiple medications including doxazosin.  We discussed standing up more slowly.  We've also discussed his abdominal contents putting pressure on the inferior vena cava causing a bagel type response.  He will work on this.  With respect to his lower extremity edema, he has one plus edema which is present in the evening and results by morning.  He is currently Taking spironolactone and tolerating this well.  He denies any muscle cramps her symptoms of hypokalemia.I recommend that he continues her current medication.  Does have a history factor five light mutation and is On chronic anticoagulation.  I suspect that part of the demand his lower legs is a result the venous insufficiency secondary to previous DVTs.  No acute symptoms are related.Finally, he notes that he does have ongoing restless leg syndrome has discontinued all of his medication in favor of acupuncture therapy.  He states that he has gotten more benefit from the Acupuncture sessions than he has had from the medicine in the past.                       PAST,  Yes can be a telephone visit.    FAMILY,SOCIAL HISTORY:     Medical  History:   has a past medical history of Basal cell carcinoma; Cancer (H); DVT (deep venous thrombosis) (H) (11/2003); DVT (deep venous thrombosis) (H) (12/08); DVT (deep venous thrombosis) (H) (10/2010); Gout; Other and unspecified hyperlipidemia; Restless leg syndrome; and Unspecified essential hypertension.     Surgical History:   has a past surgical history that includes Laminectomy lumbar one level (12/2008); joint replacement (4/2011); and REVISE TOTAL HIP REPLACEMENT (1/18/13).     Social History:   reports that he has never smoked. He has never used smokeless tobacco. He reports that he drinks alcohol. He reports that he does not use illicit drugs.     Family History:  family history includes Prostate Cancer in his paternal grandfather.            MEDICATIONS  Current Outpatient Prescriptions   Medication Sig Dispense Refill     atenolol (TENORMIN) 50 MG tablet Take 1 tablet (50 mg) by mouth 2 times daily 120 tablet 2     doxazosin (CARDURA) 8 MG tablet TAKE ONE TABLET BY MOUTH ONCE DAILY 90 tablet 1     losartan (COZAAR) 50 MG tablet TAKE ONE TABLET BY MOUTH ONCE DAILY 90 tablet 1     pramipexole (MIRAPEX) 1 MG tablet Take 1 tablet by mouth       spironolactone (ALDACTONE) 25 MG tablet TAKE ONE TABLET BY MOUTH ONCE DAILY 90 tablet 1     warfarin (COUMADIN) 5 MG tablet TAKE 1/2 TABLET BY MOUTH ON FRIDAY AND 1 TABLET ON ALL OTHER DAYS, OR AS DIRECTED BY THE COUMADIN CLINIC 80 tablet 1     aspirin 81 MG tablet Take 81 mg by mouth daily       ORDER FOR DME Wear mask at night 1 Units 0         --------------------------------------------------------------------------------------------------------------------                  Review of Systems     LUNGS: Pt denies: cough,excess sputum, hemoptysis, or shortness of breath.   HEART: Pt denies: chest pain, arrythmia, syncope, tachy or bradyarrhythmia. Mild lower extremity edema is noted.   GI: Pt denies: nausea, vomitting,  "diarrhea, constipation, melena, or hematochezia.   NEURO: Pt denies: seizures, strokes, diplopia, weakness, paraesthesias, or paralysis.   SKIN: Pt denies: itching, rashes, discoloration, or specific lesions of concern. Denies recent hair loss.   PSYCH: The patient denies significant depression, anxiety, mood imbalance. Specifically denies any suicidal ideation.                        Examination      BP (!) 162/92  Pulse 65  Temp 97.4  F (36.3  C) (Temporal)  Resp 20  Ht 5' 10\" (1.778 m)  Wt 251 lb 1.6 oz (113.9 kg)  SpO2 98%  BMI 36.03 kg/m2   Constitutional: The patient appears to be in no acute distress. The patient appears to be adequately hydrated. No acute respiratory or hemodynamic distress is noted at this time.   LUNGS: clear bilaterally, airflow is brisk, no intercostal retraction or stridor is noted. No coughing is noted during visit.   HEART:  regular without rubs, clicks, gallops, or murmurs. PMI is nondisplaced. Upstrokes are brisk. S1,S2 are heard.   GI: Abdomen is soft, without rebound, guarding or tenderness. Bowel sounds are appropriate. No renal bruits are heard.  Abdomen is obese. A small easily reducible umbilical hernia is noted.  It is nontender.   NEURO: Pt is alert and appropriate. No neurologic lateralization is noted. Cranial nerves 2-12 are intact. Peripheral sensory and motor function are grossly normal.    ENT: Pharynx is non-erythemous, minimal PND, no significant nasal obstruction, TM's not red or retracted, hearing intact bilaterally. No carotid bruits are heard. No JVD seen. Thyroid is not nodular or enlarged.                       Decision-Making          1. Orthostatic hypotension  Stand up more slowly.  Stay adequately hydrated    2. Personal history of venous thrombosis and embolism  Continue anticoagulation    3. Essential hypertension with goal blood pressure less than 140/90  Currently slightly elevated.  Check renal function  - Basic metabolic panel    4. Localized " edema  Secondary to venous insufficiency from prior DVT  Recommend elevate legs with possible    5. Restless leg syndrome  Continue acupuncture    6. Factor V Leiden mutation (H)  Follow INR  - Hepatic panel    7. Umbilical hernia without obstruction and without gangrene  Observation.  It becomes difficult to reduce, surgical consult requested    8. Elevated glucose  Previous elevated blood glucose with mildly elevated A1c.  Check A1c to determine progression two diabetes  - Hemoglobin A1c    9. Personal history of prostate cancer  Check PSA tumor marker  - PSA, tumor marker                           FOLLOW UP   I have asked the patient to make an appointment for followup with me  In four months        I have carefully explained the diagnosis and treatment options with the patient. The patient has displayed an understanding of the above, and all subsequent questions were answered.               DO ANANDA Guadarrama    Portions of this note were produced using [employer]  Although every attempt at real-time proof reading has been made, occasional grammar/syntax errors may have been missed.

## 2021-06-07 NOTE — PROGRESS NOTES
Ruth Vanegas is a 34 y.o. female who is being evaluated via a billable video visit.      [facility] Video Office Visit    Chief Complaint:  Chief Complaint   Patient presents with     Diabetes     Medication Reaction     bydureon-nausea, headaches, dizziness, no appetite       Assessment/Plan:  1. Type 2 diabetes mellitus with diabetic neuropathy, without long-term current use of insulin (H)  un Controlled.  Addressed smoking status and aspirin therapy.  Recommended annual eye exam and dental cares. Reviewed foot cares and foot exam.  Blood pressure and lipid management reviewed today.  Vaccines reviewed and updated.  Plan for glucose management includes ongoing focus on healthy diabetic diet and increased activity, significant side effects from Bydureon- okay to stop.  Switch to lantus 10u daily.  Continue metformin xr 1000mg bid  Labs ordered as below including:     Lab Results   Component Value Date    HGBA1C 10.6 (H) 02/21/2020   , No results found for: LDL,   Lab Results   Component Value Date    CREATININE 0.66 02/21/2020         Return in about 1 week (around 4/24/2020) for Recheck.  The following high BMI interventions were performed this visit: encouragement to exercise and lifestyle education regarding diet    Patient Education/AVS:  There are no Patient Instructions on file for this visit.    HPI: Video Start Time: 1:49 PM  Ruth Vanegas is a 34 y.o. female c/o not feeling well and ongoing high sugars with diabetes.  Pt wondering if the bydureon may be causing problems?  More dizzy, nauseated, no appetite, tired.  Gagging and almost vomiting in her sleep.  Started to get bad headaches 1-2 weeks ago- light sensitive and hurts to move.  Sugars are really high 290-300s.  Also getting a lot of yeast infections- work with [facility] and treating with diflucan frequently. Giving her injections in her belly and getting lumps in this area.  Feet and ankles are swollen but this stable and  good days and bad days.  Eating once a day.      Checking fasting sugars and again either before lunch or after lunch and bedtime and continue to be high since December 2019 300-400.     Fasting 290s  postmeal 330-340  premeal 280  Bedtime 310-360    Taking lipitor daily  Metformin 2 pills two times a day causes mild diarrhea but no nausea  Gabapentin three times a day without problems.         ROS:  Constitutional, ENT, CV, Resp, GI, , MSK, skin, neuro, psych all negative except as outlined in the HPI above and patient denies any other symptoms.      History summarized from1-2:2/21/20 seen by pcp uncontrolled DM- started on bydureon weekly. Advised to have diabetes education and mtm but this hasn't happened yet.  Hyperglycemia related to acute viral influenza.  Stopped victoza and switched to bydureon.  Metformin 1000mg two times a day.  F/u in 1 month.    Old Records-1: Outside allergies, meds, problems and immunizations were reconciled as needed  Lab tests reviewed-1: 2/2020    Health Maintenance reviewed and ordered as appropriate as part of shared decision making with patient.     Social History     Tobacco Use   Smoking Status Never Smoker   Smokeless Tobacco Never Used     Social History     Substance and Sexual Activity   Sexual Activity Yes     Birth control/protection: Surgical     Social History     Social History Narrative     Not on file       Physical Exam:  Vitals from last visit reviewed.   Per pt report at home:  There were no vitals taken for this visit. No LMP recorded. Patient has had an ablation.  Wt Readings from Last 3 Encounters:   02/21/20 (!) 311 lb 0.4 oz (141.1 kg)   02/04/20 (!) 314 lb (142.4 kg)   10/16/19 (!) 324 lb 14.4 oz (147.4 kg)     No data recorded  No data recorded  PHQ-2 Total Score: 0 (10/16/2019  9:02 AM)    No data recorded    All normal as below except abnormalities include: pt sitting in her bathroom for privacy with kids occasionally interrupting  visit.  Appears well  but tired.  Able to participate in history and planning without difficulty.    General is a  34 y.o. female sitting comfortably in no apparent distress.   HEENT:  Eye, nasal, oral exams within normal   Neuro/MSK: Able to ambulate around the exam room with equal movement, strength and normal coordination of the upper and lower extremeties symmetrically    Results for orders placed or performed in visit on 02/21/20   Comprehensive Metabolic Panel   Result Value Ref Range    Sodium 138 136 - 145 mmol/L    Potassium 4.2 3.5 - 5.0 mmol/L    Chloride 100 98 - 107 mmol/L    CO2 28 22 - 31 mmol/L    Anion Gap, Calculation 10 5 - 18 mmol/L    Glucose 315 (H) 70 - 125 mg/dL    BUN 5 (L) 8 - 22 mg/dL    Creatinine 0.66 0.60 - 1.10 mg/dL    GFR MDRD Af Amer >60 >60 mL/min/1.73m2    GFR MDRD Non Af Amer >60 >60 mL/min/1.73m2    Bilirubin, Total 0.6 0.0 - 1.0 mg/dL    Calcium 9.0 8.5 - 10.5 mg/dL    Protein, Total 6.8 6.0 - 8.0 g/dL    Albumin 3.6 3.5 - 5.0 g/dL    Alkaline Phosphatase 86 45 - 120 U/L    AST 13 0 - 40 U/L    ALT 19 0 - 45 U/L   Hemoglobin A1c   Result Value Ref Range    Hemoglobin A1c 10.6 (H) 3.5 - 6.0 %   Lipid panel   Result Value Ref Range    Triglycerides 103 <=149 mg/dL    Cholesterol 267 (H) <=199 mg/dL    LDL Calculated 193 (H) <=129 mg/dL    HDL Cholesterol 53 >=50 mg/dL    Patient Fasting > 8hrs? Yes    Microalbumin, Random Urine   Result Value Ref Range    Microalbumin, Random Urine 4.05 (H) 0.00 - 1.99 mg/dL    Creatinine, Urine 161.3 mg/dL    Microalbumin/Creatinine Ratio Random Urine 25.1 (H) <=19.9 mg/g       Med list and active problem list reviewed and updated as part of this encounter    Current Outpatient Medications on File Prior to Visit   Medication Sig Dispense Refill     aspirin-acetaminophen-caffeine (EXCEDRIN MIGRAINE) 250-250-65 mg per tablet Take 1 tablet by mouth every 6 (six) hours as needed for pain.       atorvastatin (LIPITOR) 10 MG tablet Take 1 tablet (10 mg total) by mouth  "daily. 30 tablet 11     blood glucose test (ACCU-CHEK TARIQ PLUS TEST STRP) strips Check blood sugar three times a day 100 strip 5     blood-glucose meter (ACCU-CHEK TARIQ CONNECT METER) Misc Check blood sugar three times a day 1 each 0     gabapentin (NEURONTIN) 300 MG capsule Take 2 capsules (600 mg total) by mouth 3 (three) times a day. 180 capsule 1     generic lancets (ACCU-CHEK MULTICLIX LANCET) Check blood sugar three times a day 100 each 5     pen needle, diabetic (BD ULTRA-FINE RAMON PEN NEEDLE) 32 gauge x 5/32\" Ndle USE WITH VICTOZA DAILY 100 each 3     [DISCONTINUED] exenatide microspheres (BYDUREON) 2 mg/0.65 mL PnIj extended release injection Inject 0.65 mL (2 mg total) under the skin every 7 days. 2.6 mL 3     [DISCONTINUED] liraglutide (VICTOZA) 0.6 mg/0.1 mL (18 mg/3 mL) PnIj injection Inject 1.2 mg under the skin daily. 9 mL 2     metFORMIN (GLUCOPHAGE-XR) 500 MG 24 hr tablet Take 2 tablets (1,000 mg total) by mouth 2 (two) times a day. 360 tablet 2     No current facility-administered medications on file prior to visit.        Video-Visit Details    Type of service:  Video Visit    Video End Time (time video stopped): 14:09    Originating Location (pt. Location): Home    Distant Location (provider location):  [facility]     Mode of Communication:  Video Conference via Doximity    Silvia Braun MD      The patient has been notified of following:     \"This video visit will be conducted via a call between you and your physician/provider. We have found that certain health care needs can be provided without the need for an in-person physical exam.  This service lets us provide the care you need with a video conversation.  If a prescription is necessary we can send it directly to your pharmacy.  If lab work is needed we can place an order for that and you can then stop by our lab to have the test done at a later time.    Video visits are billed at different rates depending on " "your insurance coverage. Please reach out to your insurance provider with any questions.    If during the course of the call the physician/provider feels a video visit is not appropriate, you will not be charged for this service.\"    Patient has given verbal consent to a Video visit? Yes    Patient would like to receive their AVS by AVS Preference: Shelly.    Patient would like the video invitation sent by: Text to cell phone: [phone]    "

## 2021-06-08 PROCEDURE — 99607 MTMS BY PHARM ADDL 15 MIN: CPT | Performed by: PHARMACIST

## 2021-06-08 PROCEDURE — 99606 MTMS BY PHARM EST 15 MIN: CPT | Performed by: PHARMACIST

## 2021-06-08 NOTE — PROGRESS NOTES
"Ruth Vanegas is a 34 y.o. female who is being evaluated via a billable video visit.      The patient has been notified of following:     \"This video visit will be conducted via a call between you and your physician/provider. We have found that certain health care needs can be provided without the need for an in-person physical exam.  This service lets us provide the care you need with a video conversation.  If a prescription is necessary we can send it directly to your pharmacy.  If lab work is needed we can place an order for that and you can then stop by our lab to have the test done at a later time.    Video visits are billed at different rates depending on your insurance coverage. Please reach out to your insurance provider with any questions.    If during the course of the call the physician/provider feels a video visit is not appropriate, you will not be charged for this service.\"    Patient has given verbal consent to a Video visit? Yes    Patient would like to receive their AVS by AVS Preference: Mail a copy.    Patient would like the video invitation sent by: Text to cell phone: [phone]    Will anyone else be joining your video visit? No        Video Start Time: 11:55 AM    Additional provider notes:  Subjective  [age] old female with history of uncontrolled insulin dependent diabetes, hyperlipidemia, morbid obesity, menorrhagia.   She calls with concerns of feeling unwell for several weeks now and inability to work. She is working two jobs. She has been calling in sick a lot. Sometimes at work she is not doing well and told to leave. She has been at both jobs less than a year and will not qualify for [place], but she does not feel well enough to work either.   She is very nervous about everything, especially the inability to control her diabetes. Was well controlled until about one year ago. Has been on metformin, victoza, now on insulin after trying bydurean and stopping due to side " affects. She feels like there are few medications that make her feel well. Was evaluated a few weeks ago for ongoing nausea, headache, inability to eat, which seem to be medication related, but even after changes it is the same.   Is working with diabetes education. Has been increasing her lantus every couple nights as directed. She still notes glucose in the 200s and 300s though she reports not eating well. Her headache continues and was better for a while but now worsening sometimes. Sometimes the headache is waking her out of her sleep. She has never had headaches like this before.       ROS: 12 systems reviewed, all negative except for what is mentioned in HPI.     Past Medical History:   Diagnosis Date     Anemia     told to take iron pills when pregnant     Depression      Diabetes mellitus (H)      Dysthymic disorder      Endometriosis      Herpes genitalia      Hyperlipidemia      Kidney stone      Menorrhagia      Migraines      Other abnormal Papanicolaou smear of cervix and cervical HPV(795.09) 2013     PCOS (polycystic ovarian syndrome)      Post traumatic stress disorder (PTSD)      Patient Active Problem List   Diagnosis     Calculus of ureter     Type 2 diabetes, uncontrolled, with neuropathy (H)     Hyperlipidemia     Microalbuminuria     S/P laparoscopic hysterectomy     Morbid obesity (H)     Past Surgical History:   Procedure Laterality Date     BLADDER REPAIR W/  SECTION      X2     COMBINED HYSTEROSCOPY DIAGNOSTIC / D&C N/A 2015    Procedure: Dilation and Curettage with Hysteroscopy;  Surgeon: Zia Farmer MD;  Location: [facility];  Service:      COMBINED HYSTEROSCOPY DIAGNOSTIC / D&C N/A 2016    Procedure: DILATION AND CURETTAGE WITH HYSTEROSCOPY INSERT MIRENA;  Surgeon: Suzanne Major MD;  Location: [facility];  Service:      DIAGNOSTIC LAPAROSCOPY N/A 10/19/2015    Procedure:  LAPAROSCOPY,LYSIS OF ADHESIONS;  Surgeon: Zia Farmer MD;  Location:  [facility] Main OR;  Service:      DILATION AND CURETTAGE OF UTERUS  05/06/2015     LAPAROSCOPIC TOTAL HYSTERECTOMY N/A 3/4/2019    Procedure: ROBOTIC TOTAL LAPAROSCOPIC HYSTERECTOMY, BILATERAL SALPINGECTOMY, LEFT OVARIAN CYSTOTOMY. CYSTOSCOPY;  Surgeon: Zia Farmer MD;  Location: [facility] OR;  Service: Gynecology     NE HYSTEROSCOPY,W/ENDOMETRIAL ABLATION N/A 3/2/2018    Procedure: HYSTEROSCOPY, DILATION AND CURETTAGE, ENDOMETRIAL ABLATION;  Surgeon: Kristy Israel DO;  Location: [facility] OR;  Service: Gynecology     NE LAP,TUBAL CAUTERY Bilateral 3/2/2018    Procedure: LAPAROSCOPIC BILATERAL TUBAL LIGATION;  Surgeon: Kristy Israel DO;  Location: [facility] OR;  Service: Gynecology     TONSILLECTOMY       Family History   Problem Relation Age of Onset     Diabetes Paternal Grandmother      Cancer Father         prostate     Alcohol abuse Sister      Alcohol abuse Brother      Alcohol abuse Daughter      Urolithiasis Neg Hx      Clotting disorder Neg Hx      Gout Neg Hx      Heart disease Neg Hx      Social History     Socioeconomic History     Marital status: Single     Spouse name: Not on file     Number of children: Not on file     Years of education: Not on file     Highest education level: Not on file   Occupational History     [place]up[place] [employer] [place]     Employer: [employer]   Social Needs     Financial resource strain: Not on file     Food insecurity     Worry: Not on file     Inability: Not on file     Transportation needs     Medical: Not on file     Non-medical: Not on file   Tobacco Use     Smoking status: Never Smoker     Smokeless tobacco: Never Used   Substance and Sexual Activity     Alcohol use: No     Drug use: No     Sexual activity: Yes     Birth control/protection: Surgical   Lifestyle     Physical activity     Days per week: Not on file     Minutes per session: Not on file     Stress: Not on file   Relationships     Social connections     Talks on phone: Not on  "file     Gets together: Not on file     Attends [employer] service: Not on file     Active member of club or organization: Not on file     Attends meetings of clubs or organizations: Not on file     Relationship status: Not on file     Intimate partner violence     Fear of current or ex partner: Not on file     Emotionally abused: Not on file     Physically abused: Not on file     Forced sexual activity: Not on file   Other Topics Concern     Not on file   Social History Narrative     Not on file     Current Outpatient Medications on File Prior to Visit   Medication Sig Dispense Refill     aspirin-acetaminophen-caffeine (EXCEDRIN MIGRAINE) 250-250-65 mg per tablet Take 1 tablet by mouth every 6 (six) hours as needed for pain.       atorvastatin (LIPITOR) 10 MG tablet Take 1 tablet (10 mg total) by mouth daily. 30 tablet 11     blood glucose test (ACCU-CHEK TARIQ PLUS TEST STRP) strips Check blood sugar three times a day 100 strip 5     blood-glucose meter (ACCU-CHEK TARIQ CONNECT METER) Misc Check blood sugar three times a day 1 each 0     gabapentin (NEURONTIN) 300 MG capsule Take 2 capsules (600 mg total) by mouth 3 (three) times a day. 180 capsule 1     generic lancets (ACCU-CHEK MULTICLIX LANCET) Check blood sugar three times a day 100 each 5     insulin glargine (LANTUS SOLOSTAR U-100 INSULIN) 100 unit/mL (3 mL) pen Take 16 units daily + use 2 unit prime with each dose for a total of 16 units/day. Will be gradually increasing. 5 adj dose pen 1     ondansetron (ZOFRAN) 4 MG tablet Take 1 tablet (4 mg total) by mouth every 8 (eight) hours as needed for nausea. 30 tablet 1     pen needle, diabetic (BD ULTRA-FINE RAMON PEN NEEDLE) 32 gauge x 5/32\" Ndle USE WITH VICTOZA DAILY 100 each 3     metFORMIN (GLUCOPHAGE-XR) 500 MG 24 hr tablet Take 2 tablets (1,000 mg total) by mouth 2 (two) times a day. 360 tablet 2     No current facility-administered medications on file prior to visit.      Objective  There were no vitals " filed for this visit.    General Appearance:  Alert, cooperative, no distress, appears stated age   Head:  Normocephalic, without obvious abnormality, atraumatic   Eyes:  PERRL, conjunctiva/corneas clear, EOM's intact   Throat: Lips, mucosa, and tongue normal   Neck: Supple   Lungs:   Respirations unlabored   Extremities: Extremities normal, atraumatic, no cyanosis or edema   Skin: Skin color, texture, turgor normal, no rashes or lesions   Neurologic: No neurological deficits       Assessment/plan  1. Type 2 diabetes, uncontrolled, with neuropathy (H)  2. Pure hypercholesterolemia  3. Microalbuminuria  4. Class 3 severe obesity without serious comorbidity with body mass index (BMI) of 50.0 to 59.9 in adult, unspecified obesity type (H)  - Ambulatory referral to Medication Management  - glipiZIDE (GLUCOTROL) 5 MG tablet; Take 1 tablet (5 mg total) by mouth 2 (two) times a day before meals. 1/2 Hour BEFORE meals  Dispense: 60 tablet; Refill: 0  5. Chronic tension-type headache, not intractable  - CT Head Without Contrast; Future  - Ambulatory referral to Medication Management  EHR reviewed.   Past medical history, problem list, past surgical history, family history, social history, medications reviewed, updated, reconciled.   Diabetes remains uncontrolled.   Encouraged to continue diabetic diet, regular activity as tolerated.   Start glipizide. Cautioned over possible adverse affects, will take with meals.   Continue to monitor glucose closely. Currently at lantus 16 units.  Follow up with diabetes education as directed.   Regarding work, current pandemic of covid 19, patient is at high risk of illness. 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 Letter written see scanned document. Will re-evaluate in four weeks.   Additionally, some concern with headache waking her from sleep, continue symptoms. Scheduled imaging.  Will follow results.   Patient agrees with the above plan. Follow up in three to four weeks.           Video-Visit Details    Type of service:  Video Visit    Video End Time (time video stopped): 12:20  Originating Location (pt. Location): Home    Distant Location (provider location):  [facility]/OB     Platform used for Video Visit: Vivienne Banks MD

## 2021-06-08 NOTE — PATIENT INSTRUCTIONS - HE
1. Continue to check blood sugar twice per day until the FreeStyle sensor is placed.  2. Continue to eat smaller amounts more often.  3. Continue to stay active every day.  4. Continue to drink a lot of water.  5. Future visits as needed.     Blood sugar goals:  Before meals   1-2 hours after meals: less 180  Bedtime:     A1c: less than 7.0% (estimated average blood sugar of 154 mg/dl)

## 2021-06-08 NOTE — PROGRESS NOTES
MTM Initial Encounter  Assessment & Plan                                                       Medication Adherence/Access: No concerns noted with adherence. Pt does have a lot of concerns about med side effects. I don't believe she's having current med related side effects (see below).     Diabetes:  Needs improvement -A1C above goal <8%. Fasting sugars above goal . Post prandial sugars above goal <180, despite addition of glipizide. Given that pt has discontinued GLP1 nearly a month ago, nausea/vomiting likely not related to that. As pt is overweight and working on weight loss, would prefer to minimize medications that can cause weight gain (e.g. higher doses of ARNOLD, more insulin). As BG are elevated today, would likely benefit from addition of SGLT2. Will start low dose as pt is worried about symptomatic lows since she has been running high for quite some time now. May be able to get CGM covered - will send Rx.     Last BP at goal <140/90. Indicated for ACEi for microalbuminuria. Will defer. May get some benefit from addition of SGLT2 for renal protection. Appropriately not on aspirin given age. LDL above goal <100. Likely needs higher intensity statin, but as pt is worried about side effects, will defer this so as not to make too many changes at once.   -Start Invokana 100 mg daily  -Sent CGM reader and sensors   -Long-term goal: Minimize insulin   -Future consideration: Increase atorvastatin to 40 mg daily, ACEi       Neuropathy/Migraines: Needs improvement - Having daily migraines. Was taking Excedrin migraine daily for a long period of time. Concerned that this may have caused medication overuse headaches. Encouraged pt to avoid abortive therapies. Washout periods can take up to several months. In the mean time, may benefit from starting a preventative therapy. Discussed two options - given history of elevated HR, could start beta-blocker which is typically first line. Second option would be agent like  topiramate which could help with migraine prevention and weight loss. Will start with Beta-blocker and consider topiramate as an add on if needed. Beta-blockers can mask symptoms of hypoglyecmia, but I don't think pt is having true hypoglycemia at this time, and she'll be getting a CGM device to monitor BG.   -Pt to avoid abortive migraine medicines. Discontinue Excedrin.   -Start Propranolol ER 60 mg daily  -Future consideration: Topiramate       Nausea: Unimproved - as nausea has persisted nearly a month after stopping Bydureon, do not think this is the cause. Wonder if daily headaches are worsening nausea. Will focus on headache prevention and re-assess nausea.   -Continue current therapy       Follow Up  Return in about 2 weeks (around 6/3/2020) for Medication Management Pharmacist, Via Phone.      Subjective & Objective                                                     Ruth Vanegas is a 34 y.o. female called for an initial visit for Medication Therapy Management. She was referred to me from Milton Banks MD    Patient consented to a telehealth visit: Yes    Chief Complaint: Medication Management - I'm feeling really ill and I wonder if this is because of the medications I take.     Medication Adherence/Access: Medications reconciled.     Diabetes:  Pt currently taking Glipizide 5 mg two times a day, metformin 500 mg ER 2 tabs two times a day, Lantus 16 units daily. patient is not currently experiencing side effects. Has tried Bydureon in the past - lots of nausea and injection site bumps. Victoza was ineffective.      Just started Glipizide on Friday.   Just increased Lantus to 18 units last night.     SMBG: twice daily    Ranges (patient reported) :   Fastin this morning, 198 yesterday. Typically 140-210.   2 hours after dinner: 200-300  Wondering if a CGM device would be helpful.     Patient feeling jittery and heart racing. Lightheaded. Started after the glipizide. Hasn't had meter with her  "during these times.    Recent symptoms of high blood sugar? Polyuria, polydipsia, blurry vision. \"all the time. CONSTANTLY\"   ACEi/ARB:  Not currently prescribed   Statin: Atorvastatin 10 mg daily   Aspirin: Not taking due to age    Diet: engaged with diabetes ed.     Lab Results   Component Value Date    HGBA1C 10.5 (H) 04/21/2020    HGBA1C 10.6 (H) 02/21/2020    HGBA1C 8.9 (H) 10/16/2019     Lab Results   Component Value Date    MICROALBUR 4.05 (H) 02/21/2020    LDLCALC 193 (H) 02/21/2020    CREATININE 0.57 (L) 04/21/2020        Results for orders placed or performed in visit on 04/21/20   Comprehensive Metabolic Panel   Result Value Ref Range    Sodium 138 136 - 145 mmol/L    Potassium 4.2 3.5 - 5.0 mmol/L    Chloride 101 98 - 107 mmol/L    CO2 27 22 - 31 mmol/L    Anion Gap, Calculation 10 5 - 18 mmol/L    Glucose 157 (H) 70 - 125 mg/dL    BUN 7 (L) 8 - 22 mg/dL    Creatinine 0.57 (L) 0.60 - 1.10 mg/dL    GFR MDRD Af Amer >60 >60 mL/min/1.73m2    GFR MDRD Non Af Amer >60 >60 mL/min/1.73m2    Bilirubin, Total 0.4 0.0 - 1.0 mg/dL    Calcium 9.1 8.5 - 10.5 mg/dL    Protein, Total 6.9 6.0 - 8.0 g/dL    Albumin 3.4 (L) 3.5 - 5.0 g/dL    Alkaline Phosphatase 84 45 - 120 U/L    AST 11 0 - 40 U/L    ALT 16 0 - 45 U/L      Lab Results   Component Value Date    ALT 16 04/21/2020    AST 11 04/21/2020    ALKPHOS 84 04/21/2020    BILITOT 0.4 04/21/2020     Lab Results   Component Value Date    CHOL 267 (H) 02/21/2020    CHOL 254 (H) 10/16/2019    CHOL 239 (H) 02/14/2018     Lab Results   Component Value Date    HDL 53 02/21/2020    HDL 60 10/16/2019    HDL 53 02/14/2018     Lab Results   Component Value Date    LDLCALC 193 (H) 02/21/2020    LDLCALC 173 (H) 10/16/2019    LDLCALC 164 (H) 02/14/2018     Lab Results   Component Value Date    TRIG 103 02/21/2020    TRIG 107 10/16/2019    TRIG 108 02/14/2018     No components found for: CHOLHDL       Neuropathy/Migraines: Prescribed aspirin-Acetaminophen-caffeine 250-250-65 mg Q6H " "as needed, gabapentin 300 mg 2 caps three times a day. Having daily \"pounding headaches\" waking up from sleep with headaches.     Stopped using Excedrin on a daily basis because headaches are so constant because it's not really helping. Had been using for years almost daily. Stopped using a few weeks. Used to be on a prescription medication.     Pt using Gabapentin 3 caps three times a day - states PCP increased in February.     Just had a CT scan done earlier today.     Had a hysterectomy last year. No menstrual cycle.     Pulse Readings from Last 3 Encounters:   04/21/20 (!) 109   02/21/20 89   02/04/20 (!) 106     Has a history of depression/anxiety. Not currently using treatment. Currently feels like she's doing okay. Anxiety a lot less now. Depression comes and goes - tends to be more seasonal.       Nausea: Prescribed Ondansetron 4 mg Q8H as needed. Unclear if nausea is from the Bydureon or if something else going on. Stopped Bydureon in A[place], but still having the nausea.     Nausea is pretty constant. Really bad in the morning. Really bad after eating. Will eat something, have a couple bites, then stop eating. Wakes up in the middle of the night feeling like she will throw up.     The other night was up all night throwing up like 5 times. Also having diarrhea. Gets a lot of sour taste in her mouth from reflux throughout the day.     These symptoms started with the Bydureon. Started having pain in the lower stomach last week, but this is very mild.       PMH: reviewed in EPIC   Allergies/ADRs: reviewed in EPIC   Alcohol:   Social History     Substance and Sexual Activity   Alcohol Use No        Tobacco:   Social History     Tobacco Use   Smoking Status Never Smoker   Smokeless Tobacco Never Used     Today's Vitals: There were no vitals filed for this visit.  ----------------    The patient was given a summary of these recommendations via [employer]    I spent 60 minutes with this patient today.   All changes were " "made via collaborative practice agreement with Milton Banks MD. A copy of the visit note was provided to the patient's provider.     Randall Allison PharmD  Medication Therapy Management (MTM) Pharmacist  [facility]      Telemedicine Visit Details    Type of service:  Telephone     Start Time: 3:00 PM   End Time: 3:57 PM    Originating Location (pt. Location): Home    Distant Location (provider location):  [facility] MEDICATION THERAPY MANAGEMENT [facility]     Mode of Communication: Telephone    Current Outpatient Medications   Medication Sig Dispense Refill     atorvastatin (LIPITOR) 10 MG tablet Take 1 tablet (10 mg total) by mouth daily. 30 tablet 11     blood glucose test (ACCU-CHEK TARIQ PLUS TEST STRP) strips Check blood sugar three times a day 100 strip 5     blood-glucose meter (ACCU-CHEK TARIQ CONNECT METER) Misc Check blood sugar three times a day 1 each 0     glipiZIDE (GLUCOTROL) 5 MG tablet Take 1 tablet (5 mg total) by mouth 2 (two) times a day before meals. 1/2 Hour BEFORE meals 60 tablet 0     insulin glargine (LANTUS SOLOSTAR U-100 INSULIN) 100 unit/mL (3 mL) pen Take 16 units daily + use 2 unit prime with each dose for a total of 16 units/day. Will be gradually increasing. 5 adj dose pen 1     ondansetron (ZOFRAN) 4 MG tablet Take 1 tablet (4 mg total) by mouth every 8 (eight) hours as needed for nausea. 30 tablet 1     pen needle, diabetic (BD ULTRA-FINE RAMON PEN NEEDLE) 32 gauge x 5/32\" Ndle USE WITH VICTOZA DAILY 100 each 3     pen needle, diabetic (INSUPEN) 32 gauge x 1/4\" Ndle USE TO INJECT LANTUS DAILY. 100 each 3     gabapentin (NEURONTIN) 300 MG capsule Take 2 capsules (600 mg total) by mouth 3 (three) times a day. 180 capsule 1     generic lancets (ACCU-CHEK MULTICLIX LANCET) Check blood sugar three times a day 100 each 5     metFORMIN (GLUCOPHAGE-XR) 500 MG 24 hr tablet Take 2 tablets (1,000 mg total) by mouth 2 (two) times a day. 360 tablet 2     No current " facility-administered medications for this visit.

## 2021-06-08 NOTE — PROGRESS NOTES
Assessment/plan  1. Hospital discharge follow-up  - Culture, Urine  - Glycosylated Hemoglobin A1c  - LDL Cholesterol, Direct  - Basic Metabolic Panel  2. Type 2 diabetes mellitus without complication, without long-term current use of insulin  3. Hyperlipidemia  Reviewed hospital blood work, evaluation, treatment on 1/20/17.   Suspect patient felt some abnormal symptoms due to lower than usual blood sugar not hypoglycemia.   Reviewed latest HbA1C and LDL.   Advised on continue diabetic diet, regular exercise, well balanced diet and oral hydration.   Will follow urine culture.   Will continue to hold victoza.   Will continue oral medications metformin.   Will continue to monitor her fasting and meal time glucose.   After one to two weeks, asked to follow up with me or diabetes educator.   Will continue to adjust medication as appropriate, but due some adverse affect, would consider restarting victoza at 0.6 if needed.         Subjective  [age] old female here for follow up.   Was seen in ED on 1/20/17.  Recent onset and treatment of diabetes.   Has history of hyperlipidemia.   Had some adverse affects with metformin.   Is a little better with XR metformin though blood glucose was not improved.   Was started victoza after trulicity was not covered.   She started with victoza 1.2 mg. She noticed she wasn't feeling well a few days before. On the day of admission to ED, she felt progressively worse and more dizzy.   Several blood glucose noted, lowest was 77. At home she notes it was 72 also. On presentation at first, was 92.   She was not given food yet. Had not eaten yet that day and was not fed at the ED either. She thinks IV fluids with glucose was started right away. She says it did not help her symptoms.   Is wondering what to do about her medications now. Is taking metformin as directed.   Has noted significant weight loss, congratulated on that.   She notes feeling well otherwise though her menstrual  cycle is still irregular.   Urinalysis was reviewed, culture sent today since that was not done earlier. She denies dysuria, frequency, urgency, though just generally unwell that day.   Is working with home care at [place].   Is working hard on her diabetic diet, doing well with that. Does not crave pop or sweets as much.       Past Medical History   Diagnosis Date     Anemia      told to take iron pills when pregnant     Depression      Diabetes mellitus      Dysthymic disorder      Endometriosis      Herpes genitalia      Kidney stone      Menorrhagia      Migraines      Obesity      PCOS (polycystic ovarian syndrome)      Post traumatic stress disorder (PTSD)      Patient Active Problem List   Diagnosis     Calculus of ureter     Past Surgical History   Procedure Laterality Date     Tonsillectomy       Dilation and curettage of uterus  2015     Combined hysteroscopy diagnostic / d&c N/A 2015     Procedure: Dilation and Curettage with Hysteroscopy;  Surgeon: Zia Farmer MD;  Location: [facility];  Service:      Diagnostic laparoscopy N/A 10/19/2015     Procedure:  LAPAROSCOPY,LYSIS OF ADHESIONS;  Surgeon: Zia Farmer MD;  Location: [facility];  Service:      Bladder repair w/  section       X2     Combined hysteroscopy diagnostic / d&c N/A 2016     Procedure: DILATION AND CURETTAGE WITH HYSTEROSCOPY INSERT MIRENA;  Surgeon: Suzanne Major MD;  Location: [facility];  Service:      Family History   Problem Relation Age of Onset     Diabetes Paternal Grandmother      Cancer Father      prostate     Alcohol abuse Sister      Alcohol abuse Brother      Alcohol abuse Daughter      Urolithiasis Neg Hx      Clotting disorder Neg Hx      Gout Neg Hx      Heart disease Neg Hx      Social History     Social History     Marital status: Single     Spouse name: N/A     Number of children: N/A     Years of education: N/A     Occupational History     Formerly [employer]     Social History Main Topics     Smoking status: Never Smoker     Smokeless tobacco: Not on file     Alcohol use No     Drug use: No     Sexual activity: Not on file     Other Topics Concern     Not on file     Social History Narrative     Current Outpatient Prescriptions on File Prior to Visit   Medication Sig Dispense Refill     aspirin-acetaminophen-caffeine (EXCEDRIN MIGRAINE) 250-250-65 mg per tablet Take 1 tablet by mouth every 8 (eight) hours as needed for pain.        blood glucose test (CONTOUR NEXT STRIPS) strips Use 1 each As Directed as needed. Dispense brand per patient's insurance at pharmacy discretion. 100 each 11     blood-glucose meter (CONTOUR NEXT METER) Misc Check blood sugar one time daily. Dispense glucometer brand per patient's insurance at pharmacy discretion. 1 each 0     generic lancets (MICROLET LANCET) Dispense brand per patient's insurance at pharmacy discretion. 100 each 11     liraglutide (VICTOZA) 0.6 mg/0.1 mL (18 mg/3 mL) PnIj injection Take 1.2mg once daily. 15 mL 3     metFORMIN (GLUCOPHAGE XR) 500 MG 24 hr tablet Take two 500 mg tablets with breakfast and dinner. 120 tablet 6     pen needle, diabetic (BD ULTRA-FINE RAMON PEN NEEDLES) 32 gauge x 5/32\" Ndle Use to inject Vicotza once daily. 100 each 3     dulaglutide (TRULICITY) 0.75 mg/0.5 mL PnIj Inject 0.75 mg under the skin every 7 days. 4 Syringe 6     No current facility-administered medications on file prior to visit.      Objective  Vitals:    01/25/17 1551   BP: 112/72   Pulse:    Resp:        General Appearance:  Alert, cooperative, no distress, appears stated age   Head:  Normocephalic, without obvious abnormality, atraumatic   Eyes:  PERRL, conjunctiva/corneas clear, EOM's intact   Throat: Lips, mucosa, and tongue normal   Neck: Supple, symmetrical, trachea midline, no adenopathy;  thyroid: not enlarged, symmetric, no tenderness/mass/nodules; no carotid bruit or JVD   Lungs:   Clear to auscultation bilaterally, " respirations unlabored   Heart:  Regular rate and rhythm, S1 and S2 normal, no murmur   Extremities: Extremities normal, atraumatic, no cyanosis or edema   Skin: Skin color, texture, turgor normal, no rashes or lesions   Neurologic: Normal, CN 2-12 intact

## 2021-06-08 NOTE — PATIENT INSTRUCTIONS - HE
1. Check blood sugar twice per day (before breakfast and bedtime).  2. Increase Lantus to 16 units tonight.  3. Continue to increase Lantus by 2 units every 3 days until the morning blood sugar (fasting) is 100-130 consistently. Do not exceed 30 units Lantus per day.  4. When blood sugars are above 200 drink a lot of water.  5. Next diabetes education telephone visit in 2 weeks on 5/27/2020 at 12:30 pm.    Blood sugar goals:  Before meals   1-2 hours after meals: less 180  Bedtime:     A1c: less than 7.0% (estimated average blood sugar of 154 mg/dl)

## 2021-06-08 NOTE — PROGRESS NOTES
Assessment: Follow up with Ruth today, forgot meter at home, bg are self reported.  Bg much better with the start of trulicity.    Fasting range:  110-120 mg/dl  HS range:  145-170 mg/dl    Higher HS reading generally due to high fat food or pasta.  PA was denied for Trulicity, needs to trial victoza first.  Has one more trulcity pen left, will take that next Friday 1.13.17 and start Victoza once daily injection the following Friday 1.20.17, will start at 1.2 vs 0.6 as she has been on a GLP-1 for the last month. Instructed Ruth on victoza pen, priming pen with first injection, drawing dose and storage.  Will call with any nausea or intolerance.     Wt down another 5# in last month, please see wt loss below:  11.1   335#  11.11 332#  12.9   329#  trulicity started   1.6     324#    Ruth has done great job with label reading for cho, fat and serving size, watches portions and making more whole food choices vs chips, sweets and processed food.  Applauded Ruth on her accomplishments and success, encouraged to continue with above behaviors.      Walking with cousin as weather permits, interested in joining the [facility] [employer], would like to get her two daughters more active, especially her 13 year old who is carrying a little extra weight.  Increase in activity will assist in more wt loss.    To see MD 2.1.17 where A1c and wt will be checked, Ruth will follow up with CDE via my chart/phone with bg and wt updates. Started new job, will make follow up appt after training to determine best appt time.    Plan: As above    Subjective and Objective:      Ruth GYPSY Vanegas is referred by  for Diabetes Education.     Lab Results   Component Value Date    HGBA1C 7.0 (H) 11/01/2016         Current diabetes medications:    Victoza 1.2 daily ( one last dose of trulcity, starting victoza 1.21.17)  Metformin XR two 500 mg tablets with breakfast and dinner    Goals       monitoring (pt-stated)            1.check fasting bg  and/or 2 PC.  11.2016 MET        monitoring            1. 2-4# wt loss by next visit. 12.2016  MET        nutrition            1. Read label for serving size, cho and fat content. 11.2016  MET            Follow up:   CDE (certified diabetic educator)      Education:     Monitoring   Meter (per above goals): Competent  Monitoring: Discussed and Competent  BG goals: Discussed and Competent    Nutrition Management  Nutrition Management: Assessed and Discussed  Weight: Assessed and Discussed  Portions/Balance: Assessed, Discussed and Competent  Carb ID/Count: Discussed and Competent  Label Reading: Discussed and Competent  Heart Healthy Fats: Discussed  Menu Planning: Assessed and Discussed  Dining Out: Discussed  Physical Activity: Assessed and Discussed  Medications: Assessed, Discussed and Competent  Injected Medications: Discussed and Competent   Storage/Exp:Competent   Site Rotation: Competent     Diabetes Disease Process: Competent    Acute Complications: Prevent, Detect, Treat:  Hypoglycemia: Assessed and Discussed  Hyperglycemia: Assessed and Discussed    Chronic Complications  ABC: Assessed and Discussed  Goal Setting and Problem Solving: Discussed  Barriers: Assessed  Psychosocial Adjustments: Assessed      Time spent with the patient: 60 minutes for diabetes education and counseling.   Previous Education: yes  Visit Type:MNT  Hours Remaining: DSMT 805 and MNT 2      Haley Scott  1/6/2017

## 2021-06-08 NOTE — TELEPHONE ENCOUNTER
Received a refill request for the Novofine 32G X 6 MM MISC. Dr. Mendoza, Medication refill requested. Please authorize medication if appropriate. Thank you.

## 2021-06-08 NOTE — PROGRESS NOTES
MTM Consult Encounter    Ruth Vanegas is a 34 y.o. female referred for a clinical pharmacist consult for CGM device teaching.       Discussion: Reviewed CGM sensor placement and activation. Pt not have her reader with her at the time, so was unable to activate sensor, but instructions on how to do so were provided.      Sugar this morning was 147. But notes that sometimes she's waking up in a jittery, cold sweat. Has been happening for about 1-2 weeks. Did just recently start Invokana and Propranolol ER. Wasn't waking up to check BG.     Still having constant headaches. Eating a little more, but not much.       Plan:  1. Pt to place and activate sensor. Check BG four times a day (at a minimum Q8H).   2. Pt to check BG when feeling jittery, cold, sweaty. Educated pt on how to treat low BG. If pt having frequent lows, pt to call or mychart PharmD or PCP.       Follow up:   3 weeks with PharmD     Randall Allison, SariahD  Medication Therapy Management (MTM) Pharmacist  [facility]          Current Outpatient Medications   Medication Sig Dispense Refill     atorvastatin (LIPITOR) 10 MG tablet Take 1 tablet (10 mg total) by mouth daily. 30 tablet 11     blood glucose test (ACCU-CHEK TARIQ PLUS TEST STRP) strips Check blood sugar three times a day 100 strip 5     blood-glucose meter (ACCU-CHEK TARIQ CONNECT METER) Misc Check blood sugar three times a day 1 each 0     canagliflozin (INVOKANA) 100 mg Tab Take 1 tablet (100 mg total) by mouth daily. 30 tablet 3     flash glucose scanning reader (FREESTYLE CINDY 14 DAY READER) Misc Use 1 application As Directed every 8 (eight) hours. 1 each 0     flash glucose sensor (FREESTYLE CINDY 14 DAY SENSOR) Kit Use 1 application As Directed every 14 (fourteen) days. 2 kit 11     gabapentin (NEURONTIN) 300 MG capsule Take 2 capsules (600 mg total) by mouth 3 (three) times a day. 180 capsule 1     generic lancets (ACCU-CHEK MULTICLIX LANCET) Check blood sugar  "three times a day 100 each 5     glipiZIDE (GLUCOTROL) 5 MG tablet Take 1 tablet (5 mg total) by mouth 2 (two) times a day before meals. 1/2 Hour BEFORE meals 60 tablet 0     insulin glargine (LANTUS SOLOSTAR U-100 INSULIN) 100 unit/mL (3 mL) pen Take 16 units daily + use 2 unit prime with each dose for a total of 16 units/day. Will be gradually increasing. 5 adj dose pen 1     metFORMIN (GLUCOPHAGE-XR) 500 MG 24 hr tablet Take 2 tablets (1,000 mg total) by mouth 2 (two) times a day. 360 tablet 2     ondansetron (ZOFRAN) 4 MG tablet Take 1 tablet (4 mg total) by mouth every 8 (eight) hours as needed for nausea. 30 tablet 1     pen needle, diabetic (BD ULTRA-FINE RAMON PEN NEEDLE) 32 gauge x 5/32\" Ndle USE WITH VICTOZA DAILY 100 each 3     pen needle, diabetic (INSUPEN) 32 gauge x 1/4\" Ndle USE TO INJECT LANTUS DAILY. 100 each 3     propranoloL (INNOPRAN XL) 80 MG 24 hr capsule Take 1 capsule (80 mg total) by mouth at bedtime. 60 capsule 3     No current facility-administered medications for this visit.                         "

## 2021-06-08 NOTE — PROGRESS NOTES
Diabetes Educator has received verbal consent for a telephone visit for the patient.    DIABETES EDUCATION CARE PLAN    Assessment/Plan:     Follow-up visit for diabetes education and insulin adjustment. Since our last telephone visit Ruth started working with Randall Allison PharmD. Diabetes medications changed with goal to minimize dose of insulin for weight management.  Ruth Vanegas is checking blood sugar 2 times per day with the TARIQ meter. She will be learning how to apply a FreeStyle Flash 14 day sensor on 6/2/2020 with Traci Pereira. Ruth continues to have headaches, nausea and a poor appetite. She continues to eat small amounts. If she cannot eat a meal she drinks a protein shake. For activity she is walking 30-40 minutes per day.     Instructed on sick day management and tests and exams to prevent diabetes complications. Education topics completed.    Current diabetes medications:   Metformin ER [id number]  Invokana 100-0-0-0  Glipizide 5-0-5-0  Lantus Solostar 0-0-0-18    Plan:  1. Continue to check blood sugar twice per day until the FreeStyle sensor is placed.  2. Continue to eat smaller amounts more often.  3. Continue to stay active every day.  4. Continue to drink a lot of water.  5. Future visits as needed.     Subjective/Objective:      Ruth Vanegas is a 34 y.o. female referred by Milton Banks MD. On MELISSA due to medical issues (headache, nausea, poor appetite).    Just bought a scale on [employer]. Waiting for the delivery.  Wt Readings from Last 3 Encounters:   04/21/20 (!) 320 lb (145.2 kg)   02/21/20 (!) 311 lb 0.4 oz (141.1 kg)   02/04/20 (!) 314 lb (142.4 kg)       Lab Results   Component Value Date    HGBA1C 10.5 (H) 04/21/2020       Diet/Eating Habits: 2-3 meals per day and occasional snacks. Drinking a lot of water and some juice  Breakfast: eggs, camacho, 1 slice toast  Lunch: skips or drinks a protein shake  Dinner: 6 inch Laurita cheese steak (1/2) and a little french  fries.  Snacks: cheese or fruit snack    Physical Activity: walking for 30-40 minutes most days    SMBG pattern/BG ranges: Uses Accu-Chek TARIQ meter   FBG  HS  192 237  147     Hypoglycemia: none    EDUCATION RECORD     Monitoring   Meter (per above goals): Competent  Monitoring: Competent  BG goals: Assessed, Discussed and Competent    Nutrition Management  Nutrition Management: Discussed  Weight: Discussed  Portions/Balance: Assessed, Discussed and Competent  Carb ID/Count: Assessed, Discussed and Competent  Label Reading: Competent     Physical Activity: Assessed, Discussed and Competent    Medication  Orals: Assessed, Discussed and Competent  Injected Medications: Assessed, Discussed and Competent   Storage/Exp:Competent   Site Rotation: Competent   Disposal of Sharps: Competent    Diabetes Disease Process Competent    Acute Complications: Prevent, Detect, Treat:  Hypoglycemia: Assessed, Discussed and Competent  Hyperglycemia: Assessed, Discussed and Competent  Sick Days: Discussed and Literature provided    Chronic Complications  Foot Care:Assessed, Discussed and Competent  Skin Care: Assessed, Discussed and Competent  Eye: Assessed, Discussed, Competent and due for an eye exam  ABC: Competent  Teeth:Assessed, Discussed, Competent and goes to the dentist twice per year for cleaning.  Goal Setting and Problem Solving: Assessed and Discussed  Barriers: Assessed  Psychosocial Adjustments: Assessed      Time spent with the patient: 30 minutes   Previous Education: yes  Visit Type:Diabetes Self-Management Training ()    Diagnosis per referral:Type 2 diabetes uncontrolled with neuropathy, with long-term use of insulin (E11.40, E11.65, Z79.4)      Rosmery Aleman RD, LD, [place]  Certified Diabetes Care and   5/27/2020    Any diabetes medication dose changes were made via the CDE Protocol and Collaborative Practice Agreement with the patient's provider. A copy of this encounter was shared with  the provider for co-signing.

## 2021-06-08 NOTE — TELEPHONE ENCOUNTER
Not sure I understand what she needs. Can we call to clarify or can she do a visit with me so I can write how she likes.

## 2021-06-09 NOTE — TELEPHONE ENCOUNTER
Patient called to discuss positive result of daughter.  Verified name and . Patient inquired about her test. Informed she tested positive.  Patient states this is her second positive test.  Pt instructed to talk with MD who ordered test for follow up.  No letter sent

## 2021-06-09 NOTE — PROGRESS NOTES
Coronavirus (COVID-19) Notification     Patient has tested Positive test for COVID-19 virus    See telephone encounter    Pt states this is her second positive test-referred to MD who ordered test. No letter sent      [facility]  Covid19 Results Team RN  Ph# [phone]

## 2021-06-09 NOTE — TELEPHONE ENCOUNTER
Please advise.     Patient has an   Next Appt  With Medication Therapy Management  07/21/2020 at 12:30 PM

## 2021-06-09 NOTE — PROGRESS NOTES
"Ruth Vanegas is a 34 y.o. female who is being evaluated via a billable video visit.      The patient has been notified of following:     \"This video visit will be conducted via a call between you and your physician/provider. We have found that certain health care needs can be provided without the need for an in-person physical exam.  This service lets us provide the care you need with a video conversation.  If a prescription is necessary we can send it directly to your pharmacy.  If lab work is needed we can place an order for that and you can then stop by our lab to have the test done at a later time.    Video visits are billed at different rates depending on your insurance coverage. Please reach out to your insurance provider with any questions.    If during the course of the call the physician/provider feels a video visit is not appropriate, you will not be charged for this service.\"    Patient has given verbal consent to a Video visit? Yes  How would you like to obtain your AVS? AVS Preference: MyChart.  If dropped by the video visit, the video invitation should be sent to: Text to cell phone: [phone]  Will anyone else be joining your video visit? No        Telephone Start Time: 3:55    Additional provider notes:   Subjective  [age] year old female calls for follow up. Declines video visit at this time.   She has history of uncontrolled diabetes, morbid obesity, microalbuminuria, PTSD, depression and anxiety. Telephone visit completed due to current pandemic of covid 19.   She recently had covid 19 infection. She thinks it was contracted from her boyfriend. She thinks he contracted this from the gym. She was found to be positive three weeks ago. In that time has traveled to [place] for a . Her daughters have been home with her, one found to be positive, the other is well. She had fever, body aches, cough, sore throat, loss of taste and smell. These symptoms have improved. Her boyfriend and " daughter are better. They are monitoring the daughter who was negative.   She notes ongoing anxiety, concern, worry, about everything. She notes long history of depression and anxiety. She is worried and depressed about everything. She finds herself crying. Is trying to cope with the changes in the pandemic and her health. Of note, she has a new job, working in [employer]. She is walking every day thirty minutes at her break. She does like the idea of therapy or counseling to help her through this. She still notes ongoing nausea, headache, feeling unwell. After evaluation by me and medication management, it does not seem to be medication side affects. She has not taken the medication she was prescribed for gastritis. Recent evaluation notes much improvement in her glucose. She is taking her medications as prescribed, reviewed recent evaluation by medication management.     ROS: 12 systems reviewed, all negative except for what is mentioned in HPI.     Past Medical History:   Diagnosis Date     Anemia     told to take iron pills when pregnant     Depression      Diabetes mellitus (H)      Dysthymic disorder      Endometriosis      Herpes genitalia      Hyperlipidemia      Kidney stone      Menorrhagia      Migraines      Other abnormal Papanicolaou smear of cervix and cervical HPV(795.09) 2013     PCOS (polycystic ovarian syndrome)      Post traumatic stress disorder (PTSD)      Patient Active Problem List   Diagnosis     Calculus of ureter     Type 2 diabetes, uncontrolled, with neuropathy (H)     Hyperlipidemia     Microalbuminuria     S/P laparoscopic hysterectomy     Morbid obesity (H)     Past Surgical History:   Procedure Laterality Date     BLADDER REPAIR W/  SECTION      X2     COMBINED HYSTEROSCOPY DIAGNOSTIC / D&C N/A 2015    Procedure: Dilation and Curettage with Hysteroscopy;  Surgeon: Zia Farmer MD;  Location: [facility];  Service:      COMBINED HYSTEROSCOPY  DIAGNOSTIC / D&C N/A 9/29/2016    Procedure: DILATION AND CURETTAGE WITH HYSTEROSCOPY INSERT MIRENA;  Surgeon: Suzanne Major MD;  Location: [facility];  Service:      DIAGNOSTIC LAPAROSCOPY N/A 10/19/2015    Procedure:  LAPAROSCOPY,LYSIS OF ADHESIONS;  Surgeon: Zia Farmer MD;  Location: [facility];  Service:      DILATION AND CURETTAGE OF UTERUS  05/06/2015     LAPAROSCOPIC TOTAL HYSTERECTOMY N/A 3/4/2019    Procedure: ROBOTIC TOTAL LAPAROSCOPIC HYSTERECTOMY, BILATERAL SALPINGECTOMY, LEFT OVARIAN CYSTOTOMY. CYSTOSCOPY;  Surgeon: Zia Farmer MD;  Location: [facility] OR;  Service: Gynecology     NJ HYSTEROSCOPY,W/ENDOMETRIAL ABLATION N/A 3/2/2018    Procedure: HYSTEROSCOPY, DILATION AND CURETTAGE, ENDOMETRIAL ABLATION;  Surgeon: Kristy Israel DO;  Location: [facility];  Service: Gynecology     NJ LAP,TUBAL CAUTERY Bilateral 3/2/2018    Procedure: LAPAROSCOPIC BILATERAL TUBAL LIGATION;  Surgeon: Kristy Israel DO;  Location: [facility];  Service: Gynecology     TONSILLECTOMY       Family History   Problem Relation Age of Onset     Diabetes Paternal Grandmother      Cancer Father         prostate     Alcohol abuse Sister      Alcohol abuse Brother      Alcohol abuse Daughter      Urolithiasis Neg Hx      Clotting disorder Neg Hx      Gout Neg Hx      Heart disease Neg Hx      Social History     Socioeconomic History     Marital status: Single     Spouse name: Not on file     Number of children: Not on file     Years of education: Not on file     Highest education level: Not on file   Occupational History     Occupation: [employer]     Employer: [employer]   Social Needs     Financial resource strain: Not on file     Food insecurity     Worry: Not on file     Inability: Not on file     Transportation needs     Medical: Not on file     Non-medical: Not on file   Tobacco Use     Smoking status: Never Smoker     Smokeless tobacco: Never Used   Substance and  Sexual Activity     Alcohol use: No     Drug use: No     Sexual activity: Yes     Birth control/protection: Surgical   Lifestyle     Physical activity     Days per week: Not on file     Minutes per session: Not on file     Stress: Not on file   Relationships     Social connections     Talks on phone: Not on file     Gets together: Not on file     Attends Zoroastrianism service: Not on file     Active member of club or organization: Not on file     Attends meetings of clubs or organizations: Not on file     Relationship status: Not on file     Intimate partner violence     Fear of current or ex partner: Not on file     Emotionally abused: Not on file     Physically abused: Not on file     Forced sexual activity: Not on file   Other Topics Concern     Not on file   Social History Narrative     Not on file     Current Outpatient Medications on File Prior to Visit   Medication Sig Dispense Refill     atorvastatin (LIPITOR) 10 MG tablet Take 1 tablet (10 mg total) by mouth daily. 30 tablet 11     blood glucose test (ACCU-CHEK TARIQ PLUS TEST STRP) strips Check blood sugar three times a day 100 strip 5     canagliflozin (INVOKANA) 300 mg Tab Take 1 tablet (300 mg total) by mouth daily before breakfast. 90 tablet 3     flash glucose scanning reader (FREESTYLE CINDY 14 DAY READER) Misc Use 1 application As Directed every 8 (eight) hours. 1 each 0     flash glucose sensor (FREESTYLE CINDY 14 DAY SENSOR) Kit Use 1 application As Directed every 14 (fourteen) days. 2 kit 11     gabapentin (NEURONTIN) 300 MG capsule Take 2 capsules (600 mg total) by mouth 3 (three) times a day. 180 capsule 1     generic lancets (ACCU-CHEK MULTICLIX LANCET) Check blood sugar three times a day 100 each 5     glipiZIDE (GLUCOTROL) 5 MG tablet Take 1 tablet (5 mg total) by mouth 2 (two) times a day before meals. 60 tablet 3     nortriptyline (PAMELOR) 25 MG capsule Take 1 capsule (25 mg total) by mouth at bedtime. 30 capsule 3     omeprazole (PRILOSEC)  "20 MG capsule Take 1 capsule (20 mg total) by mouth daily before breakfast. 30 capsule 1     ondansetron (ZOFRAN) 4 MG tablet Take 1 tablet (4 mg total) by mouth every 8 (eight) hours as needed for nausea. 30 tablet 1     pen needle, diabetic (INSUPEN) 32 gauge x 1/4\" Ndle USE TO INJECT LANTUS DAILY. 100 each 3     propranoloL (INDERAL LA) 120 mg 24 hr capsule        propranoloL (INNOPRAN XL) 80 MG 24 hr capsule Take 1 capsule (80 mg total) by mouth at bedtime. X 2 weeks. Then every other day x 2 weeks, then stop. 30 capsule 0     metFORMIN (GLUCOPHAGE-XR) 500 MG 24 hr tablet Take 2 tablets (1,000 mg total) by mouth 2 (two) times a day. 360 tablet 2     No current facility-administered medications on file prior to visit.      Assessment/plan  1. Infection due to 2019 novel coronavirus  2. Type 2 diabetes, uncontrolled, with neuropathy (H)  - Glycosylated Hemoglobin A1c; Future  - Comprehensive Metabolic Panel; Future  - Microalbumin, Random Urine; Future  3. Microalbuminuria  - Glycosylated Hemoglobin A1c; Future  - Comprehensive Metabolic Panel; Future  - Microalbumin, Random Urine; Future  4. Morbid obesity (H)  - Glycosylated Hemoglobin A1c; Future  - Comprehensive Metabolic Panel; Future  - Microalbumin, Random Urine; Future  5. Pure hypercholesterolemia  - Glycosylated Hemoglobin A1c; Future  - Comprehensive Metabolic Panel; Future  - Microalbumin, Random Urine; Future  6. Mood disorder (H)  - AMB REFERRAL TO MENTAL HEALTH AND ADDICTION  - Adult (18+); Assessment and Testing; General Psychological Testing; [facility]- Personality Testing ONLY [phone]; We will contact you to schedule the appointment o...  Covid 19 improved. All questions concerned.   Needs further evaluation and treatment of her mood disorder. Referral placed for cognitive therapy, will follow up as directed.   Needs diabetes check. Orders placed for blood work and urine testing, to schedule lab only testing. " Will follow up after that. Continue current medications.   Advised to start PPI. She was instructed on use. Decrease acidic foods. Follow up on this in four to six weeks.       Video-Visit Details    Type of service:  Video Visit    Video End Time (time video stopped): 4:20  Originating Location (pt. Location): Home    Distant Location (provider location):  [facility]     Platform used for Video Visit: Vivienne Banks MD

## 2021-06-09 NOTE — TELEPHONE ENCOUNTER
Please let patient know I can do a follow up visit for covid 19 this week while im in clinic, it can be video visit this week any day, if needs to be DB try the am hours first.

## 2021-06-09 NOTE — TELEPHONE ENCOUNTER
Patient Returning Call  Reason for call:  Patient stated someone called her for her virtual visit today and she couldn't hear anything. Patient stated she would like to be called again because she does not want her appointment canceled.  Information relayed to patient:  n/a  Patient has additional questions:  No  If YES, what are your questions/concerns:  n/a  Okay to leave a detailed message?: No

## 2021-06-09 NOTE — TELEPHONE ENCOUNTER
Discussed symptoms.  She had her children's results.  Discussed when to seek further care.  She has mild symptoms now.

## 2021-06-09 NOTE — PATIENT INSTRUCTIONS - HE
Recommendations from today's MTM visit:                                                    MTM (medication therapy management) is a service provided by a clinical pharmacist designed to help you get the most of out of your medicines. and Today we reviewed what your medicines are for, how to know if they are working, that your medicines are safe and how to make your medicine regimen as easy as possible.     1. Restart Glipizide 5 mg two times a day.     2. Okay to try SkinTac before you place your Armida sensor. Otherwise, you can try placing the sensor on your abdomen. This is not approved by the  but from experience it's gone okay for others. If you try this, I would do some spot checks with finger tests to make sure the readings are close. They won't be the same because the sensor is testing interstitial fluid while the finger pokes test your blood.     3. Increase Propranolol ER to 120 mg daily at bedtime for migraine prevention.     4. Start Omeprazole 20 mg daily before breakfast for nausea/vomiting.       It was great to speak with you today.  I value your experience and would be very thankful for your time with providing feedback on our clinic survey. You may receive a survey via email or text message in the next few days.     Next MTM visit: July 21st at 12:30 PM     To schedule another MTM appointment, please call the clinic directly or you may call the MTM scheduling line at [phone] or toll-free at [phone].     My Clinical Pharmacist's contact information:                                                      It was a pleasure seeing you today!  Please feel free to contact me with any questions or concerns you have.      Randall Allison, PharmD  Medication Therapy Management (MTM) Pharmacist  [facility]

## 2021-06-09 NOTE — PATIENT INSTRUCTIONS - HE
Recommendations from today's MTM visit:                                                    MTM (medication therapy management) is a service provided by a clinical pharmacist designed to help you get the most of out of your medicines. and Today we reviewed what your medicines are for, how to know if they are working, that your medicines are safe and how to make your medicine regimen as easy as possible.     1. Please double check to make sure you are using Omeprazole 20 mg daily in the morning before breakfast.     2. Okay to take your other meds after breakfast due to morning nausea.     3. Reduce Propranolol to 80 mg ER. Take 1 capsule by mouth at bedtime for 2 weeks, then decrease to 1 cap every other day for 2 weeks, then stop.     4. Start Nortriptyline 25 mg - Take 1 capsule by mouth at bedtime.       It was great to speak with you today.  I value your experience and would be very thankful for your time with providing feedback on our clinic survey. You may receive a survey via email or text message in the next few days.     Next MTM visit: August, 18 at 12:30 PM     To schedule another MTM appointment, please call the clinic directly or you may call the MTM scheduling line at [phone] or toll-free at [phone].     My Clinical Pharmacist's contact information:                                                      It was a pleasure seeing you today!  Please feel free to contact me with any questions or concerns you have.      Randall Allison, PharmD  Medication Therapy Management (MTM) Pharmacist  [facility]

## 2021-06-11 ENCOUNTER — COMMUNICATION - HEALTHEAST (OUTPATIENT)
Dept: CARE COORDINATION | Facility: CLINIC | Age: 36
End: 2021-06-11

## 2021-06-11 ENCOUNTER — COMMUNICATION - HEALTHEAST (OUTPATIENT)
Dept: NURSING | Facility: CLINIC | Age: 36
End: 2021-06-11

## 2021-06-11 NOTE — PROGRESS NOTES
"Ruth Vanegas is a 34 y.o. female who is being evaluated via a billable video visit.      The patient has been notified of following:     \"This video visit will be conducted via a call between you and your physician/provider. We have found that certain health care needs can be provided without the need for an in-person physical exam.  This service lets us provide the care you need with a video conversation.  If a prescription is necessary we can send it directly to your pharmacy.  If lab work is needed we can place an order for that and you can then stop by our lab to have the test done at a later time.    Video visits are billed at different rates depending on your insurance coverage. Please reach out to your insurance provider with any questions.    If during the course of the call the physician/provider feels a video visit is not appropriate, you will not be charged for this service.\"    Patient has given verbal consent to a Video visit? Yes  How would you like to obtain your AVS? AVS Preference: Mail a copy.  If dropped by the video visit, the video invitation should be sent to: Text to cell phone: [phone]  Will anyone else be joining your video visit? No        Video Start Time: 12:09    Additional provider notes:      Subjective  [age] year old female with history of diabetes, hyperlipidemia, anxiety, PTSD, insomnia seen for follow up.   Video visit completed due to current pandemic of covid 19.   She was seen two weeks ago. Nortriptyline was increased to 50 mg. She has been taking this dose as directed.   She notes continued issues falling asleep. She notes she has taking some sort of sleep aid for many years, whether over the counter or prescription. She has not been treated with medications for anxiety or depression in several years. EHR was reviewed again, noting evaluation and treatment last in 2013 by Dr. Cuellar from psychiatry. This was reviewed. She has history of major depression, PTSD, " has tried fluoxetine, prazosin, zolpidem with little affect, mirtazapine also.   Patient notes her anxiety, ongoing stress is only worsening. She can not identify the cause. She thinks it is affecting her daily life, the care of her girls. Is still working two jobs. Her diet is as usual, thinks it is normal, though says she does not eat at night. She eats breakfast and lunch only. Feels like she doesn't want to back on so many medications, but agrees her mood is getting severely abnormal. Denies suicidal ideations. Denies homicidal ideation.       ROS: 12 systems reviewed, all negative except for what is mentioned in HPI.     Past Medical History:   Diagnosis Date     Anemia     told to take iron pills when pregnant     Depression      Diabetes mellitus (H)      Dysthymic disorder      Endometriosis      Herpes genitalia      Hyperlipidemia      Kidney stone      Menorrhagia      Migraines      Other abnormal Papanicolaou smear of cervix and cervical HPV(795.09) 2013     PCOS (polycystic ovarian syndrome)      Post traumatic stress disorder (PTSD)      Patient Active Problem List   Diagnosis     Calculus of ureter     Type 2 diabetes, uncontrolled, with neuropathy (H)     Hyperlipidemia     Microalbuminuria     S/P laparoscopic hysterectomy     Morbid obesity (H)     Past Surgical History:   Procedure Laterality Date     BLADDER REPAIR W/  SECTION      X2     COMBINED HYSTEROSCOPY DIAGNOSTIC / D&C N/A 2015    Procedure: Dilation and Curettage with Hysteroscopy;  Surgeon: Zia Farmer MD;  Location: [facility];  Service:      COMBINED HYSTEROSCOPY DIAGNOSTIC / D&C N/A 2016    Procedure: DILATION AND CURETTAGE WITH HYSTEROSCOPY INSERT MIRENA;  Surgeon: Suzanne Major MD;  Location: [facility];  Service:      DIAGNOSTIC LAPAROSCOPY N/A 10/19/2015    Procedure:  LAPAROSCOPY,LYSIS OF ADHESIONS;  Surgeon: Zia Farmer MD;  Location: [facility];  Service:      DILATION  AND CURETTAGE OF UTERUS  05/06/2015     LAPAROSCOPIC TOTAL HYSTERECTOMY N/A 3/4/2019    Procedure: ROBOTIC TOTAL LAPAROSCOPIC HYSTERECTOMY, BILATERAL SALPINGECTOMY, LEFT OVARIAN CYSTOTOMY. CYSTOSCOPY;  Surgeon: Zia Farmer MD;  Location: [facility];  Service: Gynecology     FL HYSTEROSCOPY,W/ENDOMETRIAL ABLATION N/A 3/2/2018    Procedure: HYSTEROSCOPY, DILATION AND CURETTAGE, ENDOMETRIAL ABLATION;  Surgeon: Kristy Israel DO;  Location: [facility] OR;  Service: Gynecology     FL LAP,TUBAL CAUTERY Bilateral 3/2/2018    Procedure: LAPAROSCOPIC BILATERAL TUBAL LIGATION;  Surgeon: Kristy Israel DO;  Location: [facility];  Service: Gynecology     TONSILLECTOMY       Family History   Problem Relation Age of Onset     Diabetes Paternal Grandmother      Cancer Father         prostate     Alcohol abuse Sister      Alcohol abuse Brother      Alcohol abuse Daughter      Urolithiasis Neg Hx      Clotting disorder Neg Hx      Gout Neg Hx      Heart disease Neg Hx      Social History     Socioeconomic History     Marital status: Single     Spouse name: Not on file     Number of children: Not on file     Years of education: Not on file     Highest education level: Not on file   Occupational History     Occupation: [employer]     Employer: [employer]   Social Needs     Financial resource strain: Not on file     Food insecurity     Worry: Not on file     Inability: Not on file     Transportation needs     Medical: Not on file     Non-medical: Not on file   Tobacco Use     Smoking status: Never Smoker     Smokeless tobacco: Never Used   Substance and Sexual Activity     Alcohol use: No     Drug use: No     Sexual activity: Yes     Birth control/protection: Surgical   Lifestyle     Physical activity     Days per week: Not on file     Minutes per session: Not on file     Stress: Not on file   Relationships     Social connections     Talks on phone: Not on file     Gets together: Not on file      Attends Caodaism service: Not on file     Active member of club or organization: Not on file     Attends meetings of clubs or organizations: Not on file     Relationship status: Not on file     Intimate partner violence     Fear of current or ex partner: Not on file     Emotionally abused: Not on file     Physically abused: Not on file     Forced sexual activity: Not on file   Other Topics Concern     Not on file   Social History Narrative     Not on file     Current Outpatient Medications on File Prior to Visit   Medication Sig Dispense Refill     canagliflozin (INVOKANA) 300 mg Tab Take 1 tablet (300 mg total) by mouth daily before breakfast. 90 tablet 3     flash glucose scanning reader (MobiplexSTYLE CINDY 14 DAY READER) Misc Use 1 application As Directed every 8 (eight) hours. 1 each 0     flash glucose sensor (FREESTYLE CINDY 14 DAY SENSOR) Kit Use 1 application As Directed every 14 (fourteen) days. 2 kit 11     gabapentin (NEURONTIN) 300 MG capsule Take 2 capsules (600 mg total) by mouth 3 (three) times a day. 180 capsule 1     glipiZIDE (GLUCOTROL) 5 MG tablet Take 1 tablet (5 mg total) by mouth 2 (two) times a day before meals. 60 tablet 3     nortriptyline (PAMELOR) 50 MG capsule Take 1 capsule (50 mg total) by mouth at bedtime. 30 capsule 2     omeprazole (PRILOSEC) 20 MG capsule Take 1 capsule (20 mg total) by mouth daily before breakfast. 30 capsule 2     ondansetron (ZOFRAN) 4 MG tablet Take 1 tablet (4 mg total) by mouth every 8 (eight) hours as needed for nausea. 30 tablet 1     metFORMIN (GLUCOPHAGE-XR) 500 MG 24 hr tablet Take 2 tablets (1,000 mg total) by mouth 2 (two) times a day. 360 tablet 2     No current facility-administered medications on file prior to visit.      Objective  GENERAL: Healthy, alert and no distress  EYES: Eyes grossly normal to inspection. No discharge or erythema, or obvious scleral/conjunctival abnormalities.  RESP: No audible wheeze, cough, or visible cyanosis.  No visible  retractions or increased work of breathing.    NEURO: Cranial nerves grossly intact. Mentation and speech appropriate for age.  PSYCH: Mentation appears normal, affect is flat, judgement and insight intact, normal speech and appearance well-groomed    Assessment/plan  1. PTSD (post-traumatic stress disorder)  2. Anxiety  3. Mood disorder (H)  4. Insomnia, unspecified type  - mirtazapine (REMERON) 15 MG tablet; Take 1 tablet (15 mg total) by mouth at bedtime.  Dispense: 30 tablet; Refill: 2  5. Chronic tension-type headache, not intractable  6. Pure hypercholesterolemia  - atorvastatin (LIPITOR) 10 MG tablet; Take 1 tablet (10 mg total) by mouth daily.  Dispense: 90 tablet; Refill: 3  7. Type 2 diabetes mellitus with diabetic neuropathy, without long-term current use of insulin (H)  We discussed the advantages and disadvantages of medication therapy.   It seems patient's mood and anxiety, PTSD and insomnia are affecting her daily activities,  duties, overall well being. Again we tried to move her upcoming mental health appointment with no success.   Will continue nortriptyline at the current dose. Case was discussed with medication management. Old records reviewed.   Discussed options with patient. Start mirtazapine. We discussed possible adverse affects. Will monitor her diet. Will be compliant with her exercise program. Follow up on this at the next visit which will be for diabetes check as well.           Video-Visit Details    Type of service:  Video Visit    Video End Time (time video stopped): 12:24 PM  Originating Location (pt. Location): Home    Distant Location (provider location):  [facility] FAMILY MEDICINE/OB     Platform used for Video Visit: April Banks MD

## 2021-06-11 NOTE — TELEPHONE ENCOUNTER
Dick Banks, please advise if you would like this patient to get an OFV with you or another provider. Thank you

## 2021-06-11 NOTE — TELEPHONE ENCOUNTER
Refill Approved    Rx renewed per Medication Renewal Policy. Medication was last renewed on 6/16/20.    Carolina Britton, [facility] Triage/Med Refill 9/29/2020     Requested Prescriptions   Pending Prescriptions Disp Refills     gabapentin (NEURONTIN) 300 MG capsule 180 capsule 1     Sig: Take 2 capsules (600 mg total) by mouth 3 (three) times a day.       Gabapentin/Levetiracetam/Tiagabine Refill Protocol  Passed - 9/28/2020  9:33 AM        Passed - PCP or prescribing provider visit in past 12 months or next 3 months     Last office visit with prescriber/PCP: 4/21/2020 Milton Banks MD OR same dept: 4/21/2020 Milton Banks MD OR same specialty: 4/21/2020 Milton Banks MD  Last physical: 2/20/2019 Last MTM visit: Visit date not found   Next visit within 3 mo: Visit date not found  Next physical within 3 mo: Visit date not found  Prescriber OR PCP: Milton Banks MD  Last diagnosis associated with med order: 1. Type 2 diabetes mellitus with diabetic neuropathy, without long-term current use of insulin (H)  - gabapentin (NEURONTIN) 300 MG capsule; Take 2 capsules (600 mg total) by mouth 3 (three) times a day.  Dispense: 180 capsule; Refill: 1    If protocol passes may refill for 12 months if within 3 months of last provider visit (or a total of 15 months).

## 2021-06-11 NOTE — TELEPHONE ENCOUNTER
This patient is not having much improvement in her known anxiety, depression, PTSD symptoms. Has significant insomnia. Had been taken OTC Nyquil, Advil PM which I think has contributed to her chronic tension headaches, chronic nausea though the underlying cause is her mood disorder.   Is taking nortriptyline already. I was hoping to add something until her mental health appt in October. I see a bupropion/nortriptyline combo has a risk category of C...is there a better plan you could think of? Is this too complicated and would you like to see the patient (Randall is on furlough)

## 2021-06-11 NOTE — TELEPHONE ENCOUNTER
Called and spoke with patient. She wants yeast medication to go to the pharmacy we have on file. She thinks her bleeding is not to much or painful she will wait until Tuesday. Patient understood if pain and bleeding gets worst she will need to be evaluate at the ER over the weekend.     Appointment was schedule for 1:00pm 9/8/2020 with Dr. Banks as a DB.

## 2021-06-11 NOTE — TELEPHONE ENCOUNTER
I would avoid bupropion at this time as it can commonly cause insomnia since it is a more activating anti-depressant and can also cause headaches. Perhaps one option to try now would be changing nortriptyline to amitriptyline as amitriptyline tends to be more sedating and should help more with sleep.     I don't see that she has a follow-up with Randall scheduled yet - last seen in July, so would also recommend a follow up MTM visit in the near future as well and he can provide any additional recommendations.       Kate Mcgee, PharmD, BCACP  Medication Management (MTM) Pharmacist  [facility]
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
## 2021-06-12 NOTE — PROGRESS NOTES
"TCM DISCHARGE FOLLOW UP CALL    Discharge Date:  10/26/2020  Reason for hospital stay (discharge diagnosis)::  Hyperglycemia  Are you feeling better, the same or worse since your discharge?:  Patient is feeling the same  Do you feel like you have a plan in the event of a health emergency?: Yes    As part of your discharge plan, were  home care services ordered for you?: No    Did you receive any new medications, or was there a change to your medications?: No    Do you have any follow up visits scheduled with your PCP or Specialist?:  Yes, with PCP  (RN) Is PCP appt scheduled soon enough (within 14 days of discharge date)?: Yes    RN NOTES::  Spoke with patient to f/u on ED visit from 10/26.  She stated she is still experiencing sypmtoms of \"extreme thirst\".  Patient stating she ate a no carb breakfast of eggs and camacho.  AM blood sugar was 273.  Patient checked blood sugar from [facility] while on the phone for a reading of 339.       Instructed patient to increase H20 intake.  Patient stated she also has Gatorade Zero with zero carbs.  Okay for her to drink this.  Denies nausea and vomiting.  Instructed for her to continue to monitor her BS every 3-5 hours with [facility].  Notify Clinic if sugars remain in the 300.  Decrease carb intake if able to today.  Writer will notify PCP regarding above.       Patient has a f/u PCP appointment Friday 10/30 @ 1:25.         Reviewed medications.  Patient has all medications and is taking them appropriately.    "

## 2021-06-12 NOTE — TELEPHONE ENCOUNTER
Prior Authorization Request  Who s requesting:  Pharmacy  Pharmacy Name and Location: [facility]   Medication Name: semaglutide (OZEMPIC) 0.25 mg or 0.5 mg(2 mg/1.5 mL) PnIj  Insurance Plan: [employer] [place]   Insurance Member ID Number:  [id number]  CoverMyMeds Key: N/A  Informed patient that prior authorizations can take up to 10 business days for response:   NA  Okay to leave a detailed message: No

## 2021-06-12 NOTE — TELEPHONE ENCOUNTER
"Spoke with patient to f/u on ED visit from 10/26.  She stated she is still experiencing sypmtoms of \"extreme thirst\".  Patient stating she ate a no carb breakfast of eggs and camacho.  AM blood sugar was 273.  Patient checked blood sugar from [facility] while on the phone for a reading of 339.      Instructed patient to increase H20 intake.  Patient stated she also has Gatorade Zero with zero carbs.  Okay for her to drink this.  Denies nausea and vomiting.  Instructed for her to continue to monitor her BS every 3-5 hours with [facility].  Notify Clinic if sugars remain in the 300.  Decrease carb intake if able to today.  Writer will notify PCP regarding above.      Patient has a f/u PCP appointment Friday 10/30 @ 1:25.    Patient asking for a call back from PCP's team for additional guidance today if possible.  "

## 2021-06-12 NOTE — PROGRESS NOTES
Assessment/Plan:     1. Type 2 diabetes, uncontrolled, with neuropathy (H)  - Glycosylated Hemoglobin A1c  - Comprehensive Metabolic Panel  - HIV Antigen/Antibody Screening Cascade  - Lipid Cascade  - Microalbumin, Random Urine  - semaglutide (OZEMPIC) 0.25 mg or 0.5 mg(2 mg/1.5 mL) PnIj; Inject 0.25 mg under the skin every 7 days.  Dispense: 4 Syringe; Refill: 3  2. Morbid obesity (H)  3. Pure hypercholesterolemia  4. Microalbuminuria  5. Healthcare maintenance  - Hepatitis C Antibody (Anti-HCV)  EHR reviewed.   Past medical history, problem list, past surgical history, family history, social history, medications reviewed, updated, reconciled.   Diabetes is uncontrolled again. Patient is frustrated by lack of control, but we discussed need for compliance, diabetic diet, regular exercise. Her continuous monitoring is in place. Report reviewed and medication refill history reviewed as per medication management report.  We discussed options of further control. Additional medications in past have led to some side affects per patient, usually nausea, headache. She has tried Victoza, bydurean with adverse side affects. Trial of ezempic at this point. Continue metformin, glipizide, invokana. Call in two weeks with glucose monitoring results.   Lipids are not at goal. Reminded about annual eye exam.     Total time > 25 minutes, more than half spent coordination and counseling regarding the above.     Subjective:       Ruth Vanegas is an 34 y.o. female who presents for follow up of diabetes. Current symptoms include: hyperglycemia, nausea and polydipsia. Patient denies hypoglycemia , increased appetite, vomiting and weight loss. Evaluation to date has included: fasting lipid panel, hemoglobin A1C and microalbuminuria. Home sugars: BGs are running  consistent with Hgb A1C. Last dilated eye exam 10/2020.    She is frustrated today. She realizes her anxiety and depression is not helping. She has elevated glucose again.  She says they are never less than 200 anymore. Sometimes reaching 350s. She is very thirsty all the time. She has headaches again, is nauseated all the time, is not able to eat well. She has had a yeast vaginal infection lately. The numbness and tingling in her hands and feet seem worse lately.  She felt so poorly last week she was seen in the ED. Was evaluated for DKA. Hyperglycemia was noted and was discharged home. She does not know why her glucose is elevated again, this was well controlled three months ago. She says she is taking her medications as directed. No recent changes were made because the lats check was well controlled.     The following portions of the patient's history were reviewed and updated as appropriate: allergies, current medications, past family history, past medical history, past social history, past surgical history and problem list.    Review of Systems  A 12 point comprehensive review of systems was negative except as noted.       Objective:     Vitals:    11/04/20 0810   BP: 132/84   Pulse: (!) 112       General Appearance:    Alert, cooperative, no distress, appears stated age   Head:    Normocephalic, without obvious abnormality, atraumatic   Eyes:    PERRL, conjunctiva/corneas clear, EOM's intact   Nose:   Mucosa moist, normal    Throat:   Lips, mucosa, and tongue normal; teeth and gums normal   Neck:   Supple, symmetrical, trachea midline, no adenopathy;     thyroid:  no enlargement/tenderness/nodules; no carotid    bruit or JVD   Lungs:     Clear to auscultation bilaterally, respirations unlabored    Heart:    Regular rate and rhythm, S1 and S2 normal, no murmur, rub   or gallop   Abdomen:     Soft, non-tender, bowel sounds active all four quadrants,     no masses, no organomegaly   Extremities:   Extremities normal, atraumatic, no cyanosis or edema   Pulses:   2+ and symmetric all extremities   Skin:   Skin color, texture, turgor normal, no rashes or lesions   Neurologic:   No  focal deficits     Laboratory:  Recent Results (from the past 240 hour(s))   Hepatitis C Antibody (Anti-HCV)   Result Value Ref Range    Hepatitis C Ab Negative Negative   Glycosylated Hemoglobin A1c   Result Value Ref Range    Hemoglobin A1c 9.2 (H) <=5.6 %   Comprehensive Metabolic Panel   Result Value Ref Range    Sodium 138 136 - 145 mmol/L    Potassium 4.7 3.5 - 5.0 mmol/L    Chloride 103 98 - 107 mmol/L    CO2 22 22 - 31 mmol/L    Anion Gap, Calculation 13 5 - 18 mmol/L    Glucose 244 (H) 70 - 125 mg/dL    BUN 10 8 - 22 mg/dL    Creatinine 0.70 0.60 - 1.10 mg/dL    GFR MDRD Af Amer >60 >60 mL/min/1.73m2    GFR MDRD Non Af Amer >60 >60 mL/min/1.73m2    Bilirubin, Total 0.3 0.0 - 1.0 mg/dL    Calcium 9.2 8.5 - 10.5 mg/dL    Protein, Total 7.3 6.0 - 8.0 g/dL    Albumin 3.8 3.5 - 5.0 g/dL    Alkaline Phosphatase 91 45 - 120 U/L    AST 19 0 - 40 U/L    ALT 19 0 - 45 U/L   HIV Antigen/Antibody Screening Cascade   Result Value Ref Range    HIV Antigen / Antibody Negative Negative   Lipid Cascade   Result Value Ref Range    Cholesterol 221 (H) <=199 mg/dL    Triglycerides 104 <=149 mg/dL    HDL Cholesterol 58 >=50 mg/dL    LDL Calculated 142 (H) <=129 mg/dL    Patient Fasting > 8hrs? Yes    Microalbumin, Random Urine   Result Value Ref Range    Microalbumin, Random Urine <0.50 0.00 - 1.99 mg/dL    Creatinine, Urine 48.6 mg/dL    Microalbumin/Creatinine Ratio Random Urine           Milton Banks MD

## 2021-06-12 NOTE — PROGRESS NOTES
Assessment/Plan:     Assessment/plan  1. Type 2 diabetes mellitus without complication, without long-term current use of insulin  - Glycosylated Hemoglobin A1c  - LDL Cholesterol, Direct  - Comprehensive Metabolic Panel  2. Hyperlipidemia  3. Neuropathy  4. Abrasion  Diabetes with worsening control.   Hyperlipidemia with worsening control.   Discussed likely etiology of lower extremity symptoms.   Discussed wound care of open lesion. Routine skin care. Apply silvadene twice daily. Monitor the area closely.  Consider bud hose stockings or similar for lower extremity edema.   No signs of venous thrombosis.  Congratulated on some weight loss since last November. Strongly encouraged continue low fat, low glycemic index foods, regular exercise, compliance with medication.   We discussed possible further evaluation and treatment of neuropathy. She agrees to begin gabapentin.   Started on daily dose at bedtime, increase to twice daily and then three times per day weekly.   Will monitor her symptoms. Advised to follow up on three months.     Subjective:       Ruth Vanegas is an 31 y.o. female who presents for follow up of diabetes. Current symptoms include: foot ulcerations and paresthesia of the feet. Patient denies hypoglycemia , nausea, polydipsia, polyuria, visual disturbances and vomiting. Evaluation to date has included: fasting lipid panel, hemoglobin A1C and microalbuminuria. Home sugars: BGs consistently in an acceptable range. Current treatments: no recent interventions. Last dilated eye exam none.     Patient here for follow-up of elevated blood pressure.  She is not exercising and is adherent to a low-salt diet.  Blood pressure is well controlled at home. Cardiac symptoms: none. Patient denies: chest pain, exertional chest pressure/discomfort, lower extremity edema, orthopnea and paroxysmal nocturnal dyspnea. Cardiovascular risk factors: diabetes mellitus, dyslipidemia and obesity (BMI >= 30 kg/m2). Use  of agents associated with hypertension: none. History of target organ damage: none.    Her main concern is lower extremity pain, tingling, edema to her shin. This started several months ago. Is worsening. The bottom of her feet having burning pain from the time she wakes up. There is swelling by the end of the day, her socks leave a elizabeth. Sometimes it is hard to walk at the end of the day. Pain located over the whole foot. She had a bug bit on her left foot, top. It was itchy after scratching it opened up. It is not healing and seems to be ulcerated.     The following portions of the patient's history were reviewed and updated as appropriate: allergies, current medications, past family history, past medical history, past social history, past surgical history and problem list.    Review of Systems  A 12 point comprehensive review of systems was negative except as noted.      Objective:     Vitals:    08/24/17 1548   BP: 112/78   Pulse: (!) 108   Resp: 20   Temp: 98  F (36.7  C)   SpO2: 93%       General Appearance:    Alert, cooperative, no distress, appears stated age   Head:    Normocephalic, without obvious abnormality, atraumatic   Eyes:    PERRL, conjunctiva/corneas clear, EOM's intact   Nose:   Mucosa moist, normal    Throat:   Lips, mucosa, and tongue normal; teeth and gums normal   Neck:   Supple, symmetrical, trachea midline, no adenopathy;     thyroid:  no enlargement/tenderness/nodules; no carotid    bruit or JVD   Lungs:     Clear to auscultation bilaterally, respirations unlabored    Heart:    Regular rate and rhythm, S1 and S2 normal, no murmur, rub   or gallop   Abdomen:     Soft, non-tender, bowel sounds active all four quadrants,     no masses, no organomegaly   Extremities:   Extremities normal, atraumatic, no cyanosis or edema   Pulses:   2+ and symmetric all extremities   Skin:  left top foot, 0.5 cm open abrasion, no drainage, non tender   Neurologic:   No focal deficits     Laboratory:  Recent  Results (from the past 240 hour(s))   Glycosylated Hemoglobin A1c   Result Value Ref Range    Hemoglobin A1c 7.8 (H) 3.5 - 6.0 %   LDL Cholesterol, Direct   Result Value Ref Range    Direct  (H) <=129 mg/dl   Comprehensive Metabolic Panel   Result Value Ref Range    Sodium 141 136 - 145 mmol/L    Potassium 3.9 3.5 - 5.0 mmol/L    Chloride 107 98 - 107 mmol/L    CO2 22 22 - 31 mmol/L    Anion Gap, Calculation 12 5 - 18 mmol/L    Glucose 138 (H) 70 - 125 mg/dL    BUN 8 8 - 22 mg/dL    Creatinine 0.68 0.60 - 1.10 mg/dL    GFR MDRD Af Amer >60 >60 mL/min/1.73m2    GFR MDRD Non Af Amer >60 >60 mL/min/1.73m2    Bilirubin, Total 0.3 0.0 - 1.0 mg/dL    Calcium 9.4 8.5 - 10.5 mg/dL    Protein, Total 7.2 6.0 - 8.0 g/dL    Albumin 3.5 3.5 - 5.0 g/dL    Alkaline Phosphatase 82 45 - 120 U/L    AST 15 0 - 40 U/L    ALT 20 0 - 45 U/L         Milton Banks MD

## 2021-06-12 NOTE — TELEPHONE ENCOUNTER
Prior Authorization Request  Who s requesting:  Pharmacy  Ph[facility] Name and Location: [facility]  Medication Name: omeprazole (PRILOSEC) 20 MG capsule  Insurance Plan: [employer] [place]   Insurance Member ID Number:  [id number]  CoverMyMeds Key: N/A  Informed patient that prior authorizations can take up to 10 business days for response:   NA  Okay to leave a detailed message: No

## 2021-06-13 ENCOUNTER — COMMUNICATION - HEALTHEAST (OUTPATIENT)
Dept: SCHEDULING | Facility: CLINIC | Age: 36
End: 2021-06-13

## 2021-06-13 NOTE — PROGRESS NOTES
12/15/2020  Clinic Care Coordination Contact  [facility]/Voicemail       Clinical Data: Care Coordinator Outreach  Outreach attempted x 1.  Left message on patient's voicemail with call back information and requested return call.  Plan: Care Coordinator will try to reach patient again in 10 business days.      CHW Follow up:: 12-24-20  CHW plan: Follow up on goal

## 2021-06-13 NOTE — TELEPHONE ENCOUNTER
Central PA team  [phone]  Pool: HE PA MED ([id number])          PA has been initiated.       PA form completed and faxed insurance via Cover My Meds     Key:  HORTENSIA ARNOLD (Key: [id number])     Medication:  Ozempic (0.25 or 0.5 MG/DOSE) 2MG/1.5ML pen-injectors    Insurance:  [facility]        Response will be received via fax and may take up to 5-10 business days depending on plan

## 2021-06-13 NOTE — TELEPHONE ENCOUNTER
Central PA team  [phone]  Pool: HE PA MED ([id number])          PA has been initiated.       PA form completed and faxed insurance via Cover My Meds     Key:  [id number]     Medication:  Omeprazole 20MG dr capsules    Insurance:  [employer]        Response will be received via fax and may take up to 5-10 business days depending on plan

## 2021-06-13 NOTE — PROGRESS NOTES
Clinic Care Coordination Contact  Program:   County:  [facility] Case #:  [facility] Worker:   Erik #:   PMI #:   Date Applied:   Renewal Month:       Outreach:   11/13/20: FRW spoke with patient regarding referral. She stated that she is in between jobs but hasn't started her new one yet. Patient stated that she doesn't need help reapplying as she already has a case open. FRW explained that she may need to reapply if she hasn't received benefits since earlier this year. She  has her case workers information and is going to reach out to them first. FRW is going to e-mail her the link [facility] just in case. She has FRW contact information if any questions come up and FRW will check in with pt in 2 weeks.         Referral/Screening:   Patient is needing assistance to review eligibility for SNAP, CASH, and EA. She has Medical Assistance and reported she had SNAP last in April. Doesn't understand why she isn't getting it now. She had unemployment, but his has stopped. No income currently and has two minor children.   Rent is $1,200.     Goals Addressed:

## 2021-06-13 NOTE — PROGRESS NOTES
Clinic Care Coordination Contact  Program:   County:  [facility] Case #:  [facility] Worker:   [place] #:   PMI #:   Date Applied:   Renewal Month:       Outreach:   11/24/20: Message sent to patient to check in. Patient was starting a new position and may be over income for benefits.  11/13/20: FRW spoke with patient regarding referral. She stated that she is in between jobs but hasn't started her new one yet. Patient stated that she doesn't need help reapplying as she already has a case open. FRW explained that she may need to reapply if she hasn't received benefits since earlier this year. She  has her case workers information and is going to reach out to them first. FRW is going to e-mail her the link to apply [facility] benefits just in case. She has FRW contact information if any questions come up and FRW will check in with pt in 2 weeks.         Referral/Screening:   Patient is needing assistance to review eligibility for SNAP, CASH, and EA. She has Medical Assistance and reported she had SNAP last in April. Doesn't understand why she isn't getting it now. She had unemployment, but his has stopped. No income currently and has two minor children.   Rent is $1,200.     Goals Addressed:

## 2021-06-13 NOTE — PROGRESS NOTES
11/10/2020  Clinic Care Coordination Contact  Community Health Worker Initial Outreach    CHW Initial Information Gathering:  Referral Source: PCP  Preferred Hospital: [facility]  [phone]  Preferred Urgent Care: [facility], [phone]  Current living arrangement:: Other(live with her 2 children)  Type of residence:: Apartment  Community Resources: None  Supplies Currently Used at Home: None  Equipment Currently Used at Home: none  Informal Support system:: Children  No PCP office visit in Past Year: No  Transportation means:: Regular car  CHW Additional Questions  If ED/Hospital discharge, follow-up appointment scheduled as recommended?: N/A  MyChart active?: Yes  Patient sent Social Determinants of Health questionnaire?: No    Patient accepts CC: Yes. Patient scheduled for assessment with CCC MI[facility]LE  on 11-12-20 at 1pm. Patient noted desire to discuss income, cash, snap, EA and resources and support.     CHW Follow up: 11-12-20 review assessment, goals and consult with CCC SW/RN if needed.

## 2021-06-13 NOTE — PROGRESS NOTES
11/13/2020  Patient enrolled in [facility] as of 11-12-20  Reviewed assessment, goals, and any delegations from [facility] RN/CCC SW    CHW follow up: 12-15-20  CHW Plan:

## 2021-06-14 NOTE — PROGRESS NOTES
12/24/2020  Called and spoke to patient and follow up on goal.  Patient reported:  -she was denied for SNAP and cash due to overincome   -she tarted a new job she is financially okay for now.  If income or job changes she will let CCC Team know.    CHW Follow up: 1-27-21  CHW Plan: consult with CC SW transition to maintenance next month
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
## 2021-06-14 NOTE — TELEPHONE ENCOUNTER
Patient has a future Telephone Visit appointment on 01/21/2021 with Dr. Mendoza. Completing task.

## 2021-06-14 NOTE — TELEPHONE ENCOUNTER
Requested Prescriptions     Pending Prescriptions Disp Refills     ondansetron (ZOFRAN) 4 MG tablet 30 tablet 1     Sig: Take 1 tablet (4 mg total) by mouth every 8 (eight) hours as needed for nausea.

## 2021-06-14 NOTE — TELEPHONE ENCOUNTER
----- Message from Milton Banks MD sent at 1/14/2021  1:03 PM CST -----  Please schedule follow up diabetes next week, virtual ok on Thursday.

## 2021-06-14 NOTE — PROGRESS NOTES
Ruth Vanegas is a 35 y.o. female who is being evaluated via a billable video visit.      How would you like to obtain your AVS? MyChart.  If dropped from the video visit, the video invitation should be resent by: Text to cell phone: [phone]   Will anyone else be joining your video visit? No      Video Start Time: 12:17 PM  Assessment & Plan   Type 2 diabetes, uncontrolled, with neuropathy (H)  Nausea and vomiting, intractability of vomiting not specified, unspecified vomiting type  Pure hypercholesterolemia  Diabetic polyneuropathy associated with type 2 diabetes mellitus (H)  Still uncontrolled with hyperglycemia as per meter.   We reviewed recent glucose pattern even after initiation of Lantus.  High fasting and post prandial glucose noted.   Increase Lantus to 18 units. New prescription sent.   Continue metformin, glipizide, invokana.   Encouraged diabetic diet, regular exercise, compliance with medication.   Follow up in two weeks, is due for blood work for her diabetes check.   Will call to determine how omeprazole prescription can be filled.           Milton Banks MD  [facility]     Ruth Vanegas is 35 y.o. female with history of diabetes.   Seen for follow up.   Seen last week. lantus was started two weeks ago. Has stopped ozempic.   Restarted on her PPI, it was thought to improve her symptoms before.   Is monitoring her glucose well.   Fasting glucose has been 120s -238.  Postprandial 217 to 240s. Yesterday at bed time was 128.   She did not get her omeprazole. There was an issue at the pharmacy. She has similar abdominal pain, nausea.     Current Outpatient Medications on File Prior to Visit   Medication Sig Dispense Refill     atorvastatin (LIPITOR) 10 MG tablet Take 1 tablet (10 mg total) by mouth daily. 90 tablet 3     canagliflozin (INVOKANA) 300 mg Tab Take 1 tablet (300 mg total) by mouth daily before breakfast. 90 tablet 3     flash glucose  scanning reader (FREESTYLE CINDY 14 DAY READER) Misc Use 1 application As Directed every 8 (eight) hours. 1 each 0     flash glucose sensor (FREESTYLE CINDY 14 DAY SENSOR) Kit Use 1 application As Directed every 14 (fourteen) days. 2 kit 11     gabapentin (NEURONTIN) 300 MG capsule Take 2 capsules (600 mg total) by mouth 3 (three) times a day. 540 capsule 3     glipiZIDE (GLUCOTROL) 5 MG tablet Take 1 tablet (5 mg total) by mouth 2 (two) times a day before meals. 60 tablet 3     metFORMIN (GLUCOPHAGE-XR) 500 MG 24 hr tablet Take 2 tablets (1,000 mg total) by mouth 2 (two) times a day. 360 tablet 2     mirtazapine (REMERON) 15 MG tablet Take 1 tablet (15 mg total) by mouth at bedtime. 30 tablet 2     nortriptyline (PAMELOR) 50 MG capsule Take 1 capsule (50 mg total) by mouth at bedtime. 30 capsule 2     omeprazole (PRILOSEC) 20 MG capsule Take 1 capsule (20 mg total) by mouth daily before breakfast. 30 capsule 2     ondansetron (ZOFRAN) 4 MG tablet Take 1 tablet (4 mg total) by mouth every 8 (eight) hours as needed for nausea. 30 tablet 1     semaglutide (OZEMPIC) 0.25 mg or 0.5 mg(2 mg/1.5 mL) PnIj Inject 0.25 mg under the skin every 7 days. 4 Syringe 3     [DISCONTINUED] insulin glargine (LANTUS SOLOSTAR U-100 INSULIN) 100 unit/mL (3 mL) pen Inject 15 Units under the skin daily. Do not mix Lantus with any other insulin 15 mL 3     No current facility-administered medications on file prior to visit.                  Objective       Vitals:  No vitals were obtained today due to virtual visit.    Physical Exam  GENERAL: Healthy, alert and no distress  EYES: Eyes grossly normal to inspection. No discharge or erythema, or obvious scleral/conjunctival abnormalities.  RESP: No audible wheeze, cough, or visible cyanosis.  No visible retractions or increased work of breathing.    NEURO: Cranial nerves grossly intact. Mentation and speech appropriate for age.  PSYCH: Mentation appears normal, affect normal/bright, judgement  and insight intact, normal speech and appearance well-groomed          Video-Visit Details    Type of service:  Video Visit    Video End Time (time video stopped): 12:24 PM  Originating Location (pt. Location): Home    Distant Location (provider location):  [facility]     Platform used for Video Visit: Vivienne

## 2021-06-14 NOTE — PROGRESS NOTES
Clinic Care Coordination Contact    Situation: Patient chart reviewed by care coordinator.    Background: SW completed chart review    Assessment: CHW was unable to reach yesterday, will be scheduling an updated assessment     Plan/Recommendations: Standard Outreach

## 2021-06-14 NOTE — TELEPHONE ENCOUNTER
Called pt to inform endocrine will be calling her to schedule appt I offered her the number to call and she declined

## 2021-06-14 NOTE — PROGRESS NOTES
Ruth Vanegas is a 35 y.o. female who is being evaluated via a billable video visit.      How would you like to obtain your AVS? MyChart.  If dropped from the video visit, the video invitation should be resent by:   Will anyone else be joining your video visit? No      Video Start Time: 11:15  Assessment & Plan     Type 2 diabetes, uncontrolled, with neuropathy (H)  Nausea and vomiting, intractability of vomiting not specified, unspecified vomiting type  - omeprazole (PRILOSEC) 20 MG capsule  Dispense: 30 capsule; Refill: 2  Pure hypercholesterolemia  Uncontrolled diabetes with hyperglycemia.   Some improvement from 300s to 400s now in 200s.   Continue Lantus at 15 units for now.   Encouraged diabetic diet.   Encouraged regular exercise.   Continue metformin, glipizide, invokana.   Restart PPI. She is not taking omeprazole. New prescription was sent.   Follow up in one week to review glucose meter readings, how she is feeling with new medication.     Return in about 1 week (around 1/28/2021) for Recheck.    Milton Banks MD  [facility]     Ruth Vanegas is 35 y.o.  Seen for follow up.   Video visit completed due to the current pandemic of covid 19.   Was started on Lantus, 15 units at the last visit.   Glucometer readings reviewed with patient.   She feels well though her abdomen is sometimes still sore, some epigastric burning, especially at night time. She notes she is not taking omeprazole since several months when it ran out.              Objective    Vitals:  No vitals were obtained today due to virtual visit.    Physical Exam  GENERAL: Healthy, alert and no distress  EYES: Eyes grossly normal to inspection. No discharge or erythema, or obvious scleral/conjunctival abnormalities.  RESP: No audible wheeze, cough, or visible cyanosis.  No visible retractions or increased work of breathing.    NEURO: Cranial nerves grossly intact. Mentation and speech appropriate for  age.  PSYCH: Mentation appears normal, affect normal/bright, judgement and insight intact, normal speech and appearance well-groomed      Video-Visit Details    Type of service:  Video Visit    Video End Time (time video stopped): 11:20 AM  Originating Location (pt. Location): Home    Distant Location (provider location):  [facility]     Platform used for Video Visit: Vivienne

## 2021-06-15 ENCOUNTER — COMMUNICATION - HEALTHEAST (OUTPATIENT)
Dept: NURSING | Facility: CLINIC | Age: 36
End: 2021-06-15

## 2021-06-15 NOTE — PROGRESS NOTES
2/16/2021  Called and spoke to patient and to reschedule appt with [facility] RN for re assessment and support to get speciality appts.  Stated they have not call to schedule.  PCP's noted to follow up with endocrinologist and mental health providers.     Patient stated best day to call is on Monday. Don't work on Monday.  She is off from work at 3pm.     Scheduled appt with [facility] RN on 2-22-21 at 2pm for re assessment and follow up with speciality appts      CHW Follow up: Monthly    CHW Plan: Follow up on goals    CHW Next Follow Up: 3-22-21
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
## 2021-06-15 NOTE — PROGRESS NOTES
"Ruth Vanegas is a 35 y.o. female who is being evaluated via a billable telephone visit.      What phone number would you like to be contacted at? [phone]  How would you like to obtain your AVS? AVS Preference: Shelly.         Reason for visit      1. Type 2 diabetes, uncontrolled, with neuropathy (H)    2. Nausea    3. Benign paroxysmal positional vertigo, unspecified laterality        HPI     Ruth Vanegas is a very pleasant 35 y.o. old female who presents for follow up.  SUMMARY:    Ruth is contacted today to establish care for DM 2. She reports that she has been a diabetic for 2 years. Her current A1c is 8.8 and down from her last at 9.2. She has provided BG today and she is experiencing a lot of variability. She has very blunted lows and sometimes does not feel them until she is in the 50s. She is sensitive to highs however, and states that in the upper 200s and low 300s she will feel dizzy and lethargic. She is currently taking Lantus 18 units daily, Glipizide, Invokana and Metformin.  She does have a hx of using Victoza, but noted that \"it had stopped working\" so she stopped taking it. She reports that her BG were running in the 300s at that time, which lead her to stop taking it. She is currently using a Armida, but we do not have a download today. She also reports that she has been experiencing episodes of nausea and Vertigo for some time and hasn't been able to figure out what the cause is. She was recently given Famotidine for this and states that it has just given her headaches and made her feel worse.       Blood glucose data:  Labs 21  A1C: 8.8  LDL: 128  CREATININE: 0.60  Microalbumin: 20    Eye Exam: [facility]: 10/07/20     Blood Glucose Lo/15  F 268  After meals  B 151  D 272     216  F 228  After meals  L 96  D 150    2/17  F 123  After meals  L 92  D 201    2/18  F 152  After meals  L 71  D 66    2/19  F 94  After meals  L 109  D 73    2/20  F 103  After " meals  L 168  D 122    2/21  F 111  After meals  L 226  D 110    2/22  F 41  After meals   L 172  Armida Sensor - faulty - ran out.  Will be getting another sensor soon.    Past Medical History     Patient Active Problem List   Diagnosis     Calculus of ureter     Type 2 diabetes, uncontrolled, with neuropathy (H)     Hyperlipidemia     Microalbuminuria     S/P laparoscopic hysterectomy     Morbid obesity (H)     Mood disorder (H)     Dysfunctional uterine bleeding     Atypical glandular cells on cervical Pap smear     Chlamydial infection of lower genitourinary tract     Benign paroxysmal positional vertigo, unspecified laterality     Nausea        Family History       family history includes Alcohol abuse in her brother, daughter, and sister; Cancer in her father; Diabetes in her paternal grandmother.    Social History      reports that she has never smoked. She has never used smokeless tobacco. She reports that she does not drink alcohol or use drugs.      Review of Systems     Patient has no polyuria or polydipsia, no chest pain, dyspnea or TIA's, no numbness, tingling or pain in extremities  Remainder negative except as noted in HPI.    Vital Signs     LMP 12/15/2017   Wt Readings from Last 3 Encounters:   02/17/21 (!) 317 lb (143.8 kg)   11/04/20 (!) 314 lb 12 oz (142.8 kg)   10/26/20 (!) 315 lb (142.9 kg)       Physical Exam           Assessment     1. Type 2 diabetes, uncontrolled, with neuropathy (H)    2. Nausea    3. Benign paroxysmal positional vertigo, unspecified laterality        Plan     Pt has been instructed to have a protein snack at HS consistently. We will consider splitting her Lantus dose, but will wait and see how her numbers look in 3 months. Goal for her will be 7.5-8.0 for an A1c.  She will stop the Famotidine. I am referring to ENT to see if her nausea and Vertigo could possibly be BPPV.         Rosangela BEARDEN Endocrinology  3/2/2021  5:51 AM           Lab Results     Hemoglobin A1c    Date Value Ref Range Status   02/17/2021 8.8 (H) <=5.6 % Final   11/04/2020 9.2 (H) <=5.6 % Final     Comment:     Normal <5.7% Prediabete 5.7-6.4% Diabletes 6.5% or higher - adopted from ADA consensus guidelines   07/27/2020 7.9 (H) 3.5 - 6.0 % Final   04/21/2020 10.5 (H) 3.5 - 6.0 % Final   02/21/2020 10.6 (H) 3.5 - 6.0 % Final     Creatinine   Date Value Ref Range Status   02/17/2021 0.60 0.60 - 1.10 mg/dL Final   11/04/2020 0.70 0.60 - 1.10 mg/dL Final   10/26/2020 0.66 0.60 - 1.10 mg/dL Final     Microalbumin, Random Urine   Date Value Ref Range Status   11/04/2020 <0.50 0.00 - 1.99 mg/dL Final       Cholesterol   Date Value Ref Range Status   02/17/2021 200 (H) <=199 mg/dL Final     HDL Cholesterol   Date Value Ref Range Status   02/17/2021 50 >=50 mg/dL Final     LDL Calculated   Date Value Ref Range Status   02/17/2021 128 <=129 mg/dL Final     Triglycerides   Date Value Ref Range Status   02/17/2021 109 <=149 mg/dL Final       Lab Results   Component Value Date    ALT 19 11/04/2020    AST 19 11/04/2020    ALKPHOS 91 11/04/2020    BILITOT 0.3 11/04/2020         Current Medications     Outpatient Medications Prior to Visit   Medication Sig Dispense Refill     atorvastatin (LIPITOR) 10 MG tablet Take 1 tablet (10 mg total) by mouth daily. 90 tablet 3     canagliflozin (INVOKANA) 300 mg Tab Take 1 tablet (300 mg total) by mouth daily before breakfast. 90 tablet 3     famotidine (PEPCID) 40 MG tablet Take 1 tablet (40 mg total) by mouth every evening. 30 tablet 2     flash glucose scanning reader (FREESTYLE CINDY 14 DAY READER) Misc Use 1 application As Directed every 8 (eight) hours. 1 each 0     flash glucose sensor (FREESTYLE CINDY 14 DAY SENSOR) Kit Use 1 application As Directed every 14 (fourteen) days. 2 kit 11     gabapentin (NEURONTIN) 300 MG capsule Take 2 capsules (600 mg total) by mouth 3 (three) times a day. 540 capsule 3     glipiZIDE (GLUCOTROL) 5 MG tablet Take 1 tablet (5 mg total) by mouth 2  "(two) times a day before meals. 60 tablet 3     ibuprofen (ADVIL,MOTRIN) 600 MG tablet        insulin glargine (LANTUS SOLOSTAR U-100 INSULIN) 100 unit/mL (3 mL) pen Inject 18 Units under the skin daily. Do not mix Lantus with any other insulin 15 mL 3     mirtazapine (REMERON) 15 MG tablet Take 1 tablet (15 mg total) by mouth at bedtime. 30 tablet 2     nortriptyline (PAMELOR) 50 MG capsule Take 1 capsule (50 mg total) by mouth at bedtime. 30 capsule 2     ondansetron (ZOFRAN) 4 MG tablet Take 1 tablet (4 mg total) by mouth every 8 (eight) hours as needed for nausea. 30 tablet 1     pen needle, diabetic (INSUPEN) 32 gauge x 1/4\" Ndle Novofine 32 32 gauge x 1/4\" needle       tolterodine (DETROL LA) 4 MG ER capsule        flash glucose scanning reader (FREESTYLE CINDY 14 DAY READER MISC) FreeStyle Cindy 14 Day [place]       flash glucose sensor (FREESTYLE CINDY 14 DAY SENSOR) Kit FreeStyle Cindy 14 Day Sensor kit       metFORMIN (GLUCOPHAGE-XR) 500 MG 24 hr tablet Take 2 tablets (1,000 mg total) by mouth 2 (two) times a day. 360 tablet 2     No facility-administered medications prior to visit.            Phone call duration: 30 minutes              "

## 2021-06-15 NOTE — PROGRESS NOTES
Clinic Care Coordination Contact    Follow Up Progress Note      Assessment: RN CC outreach and spoke with patient   Patient notes her frustration from lack of engagement from [facility] team to support establishing a therapist    RN CC offered to outreach on behalf of patient, patient verblized appreciation    RN CC reviewed patient  Epic chart and outreach to [facility] team - who stated they will outreach to patient and follow up     RN CC will outreach to review status and offer support if needed    Goals addressed this encounter:   Goals Addressed                 This Visit's Progress       Patient Stated      Medical (pt-stated)        Goal Statement: I will have  plan of care with endocrine team within 1-2 months   Date Goal set: 2.22.21  Barriers: access  Strengths: pt engagement  Date to Achieve By: April   Patient expressed understanding of goal: yes  Action steps to achieve this goal:  1. I will meet with endocrine and review recommendations and verbalized understanding to RN CC   2. I will report to my Community Health Worker if any additional resources or support needed.             Mental Health Management (pt-stated)        Goal Statement: I will establish care with a P within 1-2 months   Date Goal set: 02/22/21   Barriers: access   Strengths: pt engagement   Date to Achieve By: April   Patient expressed understanding of goal: yes  Action steps to achieve this goal:  1. RN CC outreach to P at  scheduling to support set up and outreach to assist patient with establishing care with provider   2. I will report to my Community Health Worker if any additional resources or support needed.                      Plan:   Patient will attend visit and establish care with [facility]   Community Health Worker to outreach per standard work and updated on goal progression      RN CC will outreach in 4-6 weeks to support ongoing recommendations and plan of care will be available sooner if needed.

## 2021-06-15 NOTE — PATIENT INSTRUCTIONS - HE
Please start having a high protein snack at bedtime consistently    We will leave all of your current medications for DM the same    Referral to ENT for possible BPPV    Stop the Famotidine

## 2021-06-15 NOTE — PROGRESS NOTES
Clinic Care Coordination Contact  [facility]/Voicemail       Clinical Data: Care Coordinator Outreach  Outreach attempted x 1.  Left message on patient's voicemail with call back information and requested return call.  Plan: Community Health Worker to outreach per standard work and updated on goal progression    Delegation: No Show for RN Appointment. Please Follow unsuccessful outreach, no show standard work.

## 2021-06-15 NOTE — PROGRESS NOTES
3/16/2021   Clinic Care Coordination Contact    Community Health Worker Follow Up    Goals:   Goals Addressed                 This Visit's Progress       Patient Stated      Medical (pt-stated)   70%     Goal Statement: I will have  plan of care with endocrine team within 1-2 months   Date Goal set: 2.22.21  Barriers: access  Strengths: pt engagement  Date to Achieve By: April   Patient expressed understanding of goal: yes  Action steps to achieve this goal:  1. I will attend appt today 3-16-21 to get tested for vertigo.  2. I will update CCC RN on 3-29-21 at 10am of the plan of care and if any additional resources or support needed.     Updated: 3-16-21         Mental Health Management (pt-stated)   50%     Goal Statement: I will establish care with a BHP within 1-2 months   Date Goal set: 02/22/21   Barriers: access   Strengths: pt engagement   Date to Achieve By: April   Patient expressed understanding of goal: yes  Action steps to achieve this goal:  1. I will attend appt on 3-29-21 for intake at [facility]  2. I will report to my Community Health Worker if any additional resources or support needed.     Updated: 3-16-21 AL          Called and spoke to patient and follow up on goals.  Patient reported:  - she called the phone number that was given to her  to schedule for therapy appt but the person was very rude to her and that he can't get her in until May.  She decided not to schedule with Irma and that her sister works at [facility] and was able to get an intake for 3-29-21 vs waiting until May.  Apologized that she had bad experience with scheduling.  CHW will send message to customer advocacy of her experience.  -has appt today at 3:20pm to go test for vertigo     Best day to call is Monday     Schedule appt with CCC RN on 3-29-21 at 10am.to follow up on plan of care and support       CHW Follow up: Monthly    CHW Plan: Follow up on goals    CHW Next Follow Up: 4-26-21

## 2021-06-15 NOTE — PROGRESS NOTES
2/9/2021   Called and spoke to patient. Stated she is not available to talk.She is in training at work.  She will call CHW back when available.  Unable to reschedule appt with [facility] for re assessment and discuss new goals.    Wait for patient to migdalia back this week     CHW Follow up: 2-16-21 reschedule with [facility] for re assessment

## 2021-06-16 NOTE — PROGRESS NOTES
Clinic Care Coordination Contact    Situation: Patient chart reviewed by care coordinator.    Background: SW completed chart review    Assessment: Patient has been in contact with CHW and CCC RN. Has an appointment with CCC RN on 3/29. Patient has also been attending appointments with care team. Was able to go to endocrinology. Was able to get appointment scheduled with [facility] for therapy.     Plan/Recommendations: Standard Outreach

## 2021-06-16 NOTE — ANESTHESIA PREPROCEDURE EVALUATION
Anesthesia Evaluation      Patient summary reviewed   No history of anesthetic complications     Airway   Mallampati: I  Neck ROM: full   Pulmonary - normal exam                          Cardiovascular - normal exam  Exercise tolerance: > or = 4 METS  (-) murmur  Rhythm: regular  Rate: normal,    no murmur      Neuro/Psych      Endo/Other    (+) diabetes mellitus type 2, obesity,      GI/Hepatic/Renal       Other findings: PTSD  Peripheral neuropathy      Dental - normal exam                        Anesthesia Plan  Planned anesthetic: general endotracheal    ASA 3   Induction: intravenous   Anesthetic plan and risks discussed with: patient  Anesthesia plan special considerations: antiemetics,   Post-op plan: routine recovery        Anesthesia Evaluation        Airway   Mallampati: II  Neck ROM: full   Pulmonary                           Cardiovascular    Neuro/Psych      Endo/Other    (+) diabetes mellitus, obesity,      GI/Hepatic/Renal       Other findings: Morbid obesity  PTSD      Dental                         Anesthesia Plan  Planned anesthetic: MAC  General endo prn  ASA 3     Anesthetic plan and risks discussed with: patient    Post-op plan: routine recovery

## 2021-06-16 NOTE — TELEPHONE ENCOUNTER
Telephone Encounter by Elise Beltran at 1/7/2019  9:07 AM     Author: Elise Beltran Service: -- Author Type: --    Filed: 1/7/2019  9:10 AM Encounter Date: 1/4/2019 Status: Signed    : Elise Beltran       No PA needed, this was too soon to fill when pharmacy.    Pharmacy was able to get a paid claim today.

## 2021-06-16 NOTE — PROGRESS NOTES
Preoperative Exam    Scheduled Procedure: Laparoscopic bilateral tubal Ligation, Hysteroscopy dilation and curettage, Endometrial ablation  Surgery Date:  2018  Surgery Location: [facility], fax [phone]    Surgeon:  Dr. Kristy Israel     Assessment/Plan:     1. Pre-op exam  2. Diabetes mellitus with neuropathy  3. Hyperlipidemia  4. Microalbuminuria  5. Obesity  6. Acute sinusitis  - HM2(CBC w/o Differential)  - Basic Metabolic Panel  - Electrocardiogram Perform and Read  Diabetes uncontrolled. Working with diabetes educator. Restarted medications.   Blood pressure controlled. LDL at goal.   Blood work pending today. EKG reviewed and report reviewed.   Due to ongoing symptoms, treated with amoxicillin. Complete entire course. Appears well hydrated, afebrile.   Cleared for surgery with appropriate anesthesia.   Surgical Procedure Risk: Intermediate (reported cardiac risk generally 1-5%)  Have you had prior anesthesia?: Yes, two , 4 DNC liposopic, and tonsil remove   Have you or any family members had a previous anesthesia reaction:  No  Do you or any family members have a history of a clotting or bleeding disorder?: No  Cardiac Risk Assessment: no increased risk for major cardiac complications    Patient approved for surgery with general or local anesthesia.  Functional Status: Independent  Patient plans to recover at home with family.     Subjective:      Ruth Vanegas is a 32 y.o. female who presents for a preoperative consultation.  Scheduled for D and C, endometrial ablation, tubal ligation.   History of irregular menses, PCOS, diabetes, diabetic neuropathy, obesity, microalbuminuria.   No recent hospitalizations.   Multiple previous surgeries. No prior complications.   She has ongoing nasal congestion, sinus pressure, headache. This started two weeks ago. She took sudafed on  and Monday.  No fever. No cough. No shortness of breath. Is concerned her sinus infection is not  going away.     All other systems reviewed and are negative, other than those listed in the HPI.    Pertinent History  Do you have difficulty breathing or chest pain after walking up a flight of stairs: No  History of obstructive sleep apnea: No  Steroid use in the last 6 months: No  Frequent Aspirin/NSAID use: No  Prior Blood Transfusion: No  Prior Blood Transfusion Reaction: No  If for some reason prior to, during or after the procedure, if it is medically indicated, would you be willing to have a blood transfusion?:  There is no transfusion refusal.    Current Outpatient Prescriptions   Medication Sig Dispense Refill     aspirin-acetaminophen-caffeine (EXCEDRIN MIGRAINE) 250-250-65 mg per tablet Take 1 tablet by mouth every 8 (eight) hours as needed for pain.        blood glucose test (ACCU-CHEK TARIQ PLUS TEST STRP) strips Use 1 each As Directed as needed. Dispense brand per patient's insurance at pharmacy discretion. 100 strip 5     blood glucose test (CONTOUR NEXT STRIPS) strips Use 1 each As Directed as needed. Dispense brand per patient's insurance at pharmacy discretion. 100 strip 0     blood-glucose meter (ACCU-CHEK TARIQ CONNECT METER) Misc Use to check glucose 3 times per day 1 each 0     blood-glucose meter (CONTOUR NEXT EZ METER) Misc Check blood sugar one time daily. Dispense glucometer brand per patient's insurance at pharmacy discretion. 1 each 0     gabapentin (NEURONTIN) 300 MG capsule Take 1 capsule (300 mg total) by mouth 3 (three) times a day. 90 capsule 11     generic lancets (ACCU-CHEK MULTICLIX LANCET) Use 1 each As Directed as needed. Dispense brand per patient's insurance at pharmacy discretion. 100 each 5     generic lancets (MICROLET LANCET) Dispense brand per patient's insurance at pharmacy discretion. 100 each 11     liraglutide (VICTOZA) 0.6 mg/0.1 mL (18 mg/3 mL) PnIj injection Take 1.2mg once daily. 15 mL 3     metFORMIN (GLUCOPHAGE XR) 500 MG 24 hr tablet Take two 500 mg tablets  "with breakfast and dinner. 120 tablet 6     metFORMIN (GLUCOPHAGE-XR) 500 MG 24 hr tablet TAKE 2 TABLETS BY MOUTH DAILY WITH DINNER. 60 tablet 11     pen needle, diabetic (BD ULTRA-FINE RAMON PEN NEEDLES) 32 gauge x \" Ndle Use to inject Vicotza once daily. 100 each 3     amoxicillin (AMOXIL) 875 MG tablet Take 1 tablet (875 mg total) by mouth 2 (two) times a day for 10 days. 20 tablet 0     silver sulfADIAZINE (SILVADENE, SSD) 1 % cream Apply to affected area twice daily 50 g 1     No current facility-administered medications for this visit.         Allergies   Allergen Reactions     Hydrocodone Other (See Comments)     Hallucinates     Reglan [Metoclopramide Hcl] Other (See Comments)     Agitation/anxiety; hives     Tegaderm Ag Mesh [Silver] Itching and Rash       Patient Active Problem List   Diagnosis     Calculus of ureter     Diabetes mellitus with neuropathy     Hyperlipidemia     Microalbuminuria     Obesity       Past Medical History:   Diagnosis Date     Anemia     told to take iron pills when pregnant     Depression      Diabetes mellitus      Dysthymic disorder      Endometriosis      Herpes genitalia      Kidney stone      Menorrhagia      Migraines      Obesity      PCOS (polycystic ovarian syndrome)      Post traumatic stress disorder (PTSD)        Past Surgical History:   Procedure Laterality Date     BLADDER REPAIR W/  SECTION      X2     COMBINED HYSTEROSCOPY DIAGNOSTIC / D&C N/A 2015    Procedure: Dilation and Curettage with Hysteroscopy;  Surgeon: Zia Farmer MD;  Location: [facility];  Service:      COMBINED HYSTEROSCOPY DIAGNOSTIC / D&C N/A 2016    Procedure: DILATION AND CURETTAGE WITH HYSTEROSCOPY INSERT MIRENA;  Surgeon: Suzanne Major MD;  Location: [facility] OR;  Service:      DIAGNOSTIC LAPAROSCOPY N/A 10/19/2015    Procedure:  LAPAROSCOPY,LYSIS OF ADHESIONS;  Surgeon: Zia Farmer MD;  Location: [facility];  Service:      DILATION AND " CURETTAGE OF UTERUS  05/06/2015     TONSILLECTOMY       Family History   Problem Relation Age of Onset     Diabetes Paternal Grandmother      Cancer Father      prostate     Alcohol abuse Sister      Alcohol abuse Brother      Alcohol abuse Daughter      Urolithiasis Neg Hx      Clotting disorder Neg Hx      Gout Neg Hx      Heart disease Neg Hx        Social History     Social History     Marital status: Single     Spouse name: N/A     Number of children: N/A     Years of education: N/A     Occupational History     [employer]     Social History Main Topics     Smoking status: Never Smoker     Smokeless tobacco: Never Used     Alcohol use No     Drug use: No     Sexual activity: Not on file     Other Topics Concern     Not on file     Social History Narrative         Objective:       Physical Exam:  Vitals:    02/28/18 0909   BP: 112/84   Pulse: 98   Resp: 20   Temp: 97  F (36.1  C)       General Appearance:  Alert, cooperative, no distress, appears stated age   Head:  Normocephalic, without obvious abnormality, atraumatic   Eyes:  PERRL, conjunctiva/corneas clear, EOM's intact   Ears:  Normal TM's and external ear canals, both ears   Nose: Nares normal, septum midline,mucosa normal, no drainage    Throat: Lips, mucosa, and tongue normal, posterior pharynx normal, frontal and maxillary sinus tenderness   Neck: Supple, symmetrical, trachea midline, no adenopathy;  thyroid: not enlarged, symmetric, no tenderness/mass/nodules; no carotid bruit or JVD   Lungs:   Clear to auscultation bilaterally, respirations unlabored       Heart:  Regular rate and rhythm, S1 and S2 normal, no murmur, rub, or gallop   Abdomen:   Soft, non-tender, bowel sounds active all four quadrants   Extremities: Extremities normal, atraumatic, no cyanosis or edema   Skin: Skin color, texture, turgor normal, no rashes or lesions   Lymph nodes: Cervical, supraclavicular nodes normal   Neurologic: Normal     EKG:  Normal sinus rhythm      Labs:  Labs pending at this time.  Results will be reviewed when available.      Electronically signed by Milton Banks MD 02/28/18 9:05 AM

## 2021-06-16 NOTE — ANESTHESIA POSTPROCEDURE EVALUATION
Patient: Ruth Vanegas  LAPAROSCOPIC BILATERAL TUBAL LIGATION, HYSTEROSCOPY, DILATION AND CURETTAGE, ENDOMETRIAL ABLATION  Anesthesia type: general    Patient location: PACU  Last vitals:   Vitals:    03/02/18 1010   BP:    Pulse: (!) 101   Resp: 18   Temp:    SpO2: 96%     Post vital signs: stable  Level of consciousness: awake and responds to simple questions  Post-anesthesia pain: pain controlled  Post-anesthesia nausea and vomiting: no  Pulmonary: unassisted, return to baseline  Cardiovascular: stable and blood pressure at baseline  Hydration: adequate  Anesthetic events: no    QCDR Measures:  ASA# 11 - Arianne-op Cardiac Arrest: ASA11B - Patient did NOT experience unanticipated cardiac arrest  ASA# 12 - Arianne-op Mortality Rate: ASA12B - Patient did NOT die  ASA# 13 - PACU Re-Intubation Rate: ASA13B - Patient did NOT require a new airway mgmt  ASA# 10 - Composite Anes Safety: ASA10A - No serious adverse event    Additional Notes:

## 2021-06-16 NOTE — PATIENT INSTRUCTIONS - HE
Referral placed for balance therapy  Referral for hearing aid consultation   Swallow study  Return 6 weeks for recheck

## 2021-06-16 NOTE — PROGRESS NOTES
CHIEF COMPLAINT:  Dizziness        HISTORY OF PRESENT ILLNESS    Ruth Vanegas   was seen at the behest of [facility], Rosangela KELLEY NP  for dizziness.  Patient is suffered from nausea with occasional vomiting for the past year or so.  Originally she thought it was due to some her diabetes medications but these have been altered or limited and she continues to have the problem.  She also feels like she is internally dizzy or sort of woozy headed.  She also has problems with difficulty with her vision in traffic and with bright lights.  She has she has had longstanding tinnitus and has a little bit of difficulty hearing in background noise which she was not to aware of until people started wearing masks due to the Covid pandemic.  She denies true vertigo but she feels like she is internally dizzy or like her head is going in somewhat of cervical work out of the car which she describes a sort of woozy feeling.  She does not denies any fluctuation in hearing.  She does denies a history of otologic disease or otologic surgery.  She denies dysphagia or odynophagia.  She is been treated for reflux extensively in the past without an benefit.  She is not had any a start of the swallow study or GI evaluation.             REVIEW OF SYSTEMS    Review of Systems: a 10-system review is reviewed at this encounter.  See scanned document.         PHYSICAL EXAM:        HEAD: Normal appearance and symmetry:  No cutaneous lesions.      EARS:    Normal TM's bilaterally. Normal auditory canals and external ears. Non-tender.         NOSE:    Dorsum:   straight  Septum: normal  Mucosa:  moist  Inferior turbinates:  normal       ORAL CAVITY/OROPHARYNX:    Lips:  Normal.  Tongue: normal, midline  Mucosa:   no lesions  Tonsils:  1+      NECK:  Trachea:  midline.   Thyroid:  normal   Adenopathy:  none       NEURO:   Alert and Oriented       RESPIRATORY:   Symmetry and Respiratory effort    PSYCH:   normal mood and affect    SKIN:  warm and dry      Audiogram:  Mild SNHL with preserved WR    Romberg:  No sway    Gait/Station:  lilliam Robledo to left    IMPRESSION:    Encounter Diagnoses   Name Primary?     Benign paroxysmal positional vertigo, unspecified laterality Yes     Nausea with vomiting      Dizziness      Sensorineural hearing loss (SNHL) of both ears      Tinnitus, bilateral             RECOMMENDATIONS:    Referral placed for balance therapy  Referral for hearing aid consultation   Swallow study  Return 6 weeks for recheck    Patient with persistent nausea of unclear etiology.  There may be a migrainous etiology to her dizziness.  This also could represent some vestibular ocular mismatch.  I work recommend see Clemencia Grider for case exercises.  Also would recommend a swallow study to see if there is any evidence of hiatal hernia or other GI abnormalities.  She may benefit from a formal GI consultation.  She return to see me in 6 weeks for recheck.  I also would recommend a hearing aid consultation given her mild hearing loss noted on audiometry today.

## 2021-06-16 NOTE — PROGRESS NOTES
"  Assessment/Plan:     1. Diabetes mellitus with neuropathy  - Glycosylated Hemoglobin A1C  - Comprehensive Metabolic Panel  - Lipid Cascade  - Microalbumin, Random Urine  - blood glucose test (CONTOUR NEXT STRIPS) strips; Use 1 each As Directed as needed. Dispense brand per patient's insurance at pharmacy discretion.  Dispense: 100 strip; Refill: 0  - generic lancets (MICROLET LANCET); Dispense brand per patient's insurance at pharmacy discretion.  Dispense: 100 each; Refill: 11  - liraglutide (VICTOZA) 0.6 mg/0.1 mL (18 mg/3 mL) PnIj injection; Take 1.2mg once daily.  Dispense: 15 mL; Refill: 3  - pen needle, diabetic (BD ULTRA-FINE RAMON PEN NEEDLES) 32 gauge x 5/32\" Ndle; Use to inject Vicotza once daily.  Dispense: 100 each; Refill: 3  2. Hyperlipidemia  3. Microalbuminuria  4. Obesity  Worsening uncontrolled diabetes.  Lipids not at goal.   Blood pressure at goal.   Elevated microalbumin.   No weight loss.   Has not been compliant with medication.   Refills provided for metformin and victoza.   Encouraged diabetic diet, regular exercise.   Needs to follow up in three months. Advised diabetes education follow up.     5. URI (upper respiratory infection)  Afebrile. Lung exam normal. Discussed this is likely viral.   Supportive care. Follow up as needed.       Subjective:       Ruth Vanegas is an 32 y.o. female who presents for follow up of diabetes. Current symptoms include: paresthesia of the feet. Patient denies polydipsia, polyuria and visual disturbances. Evaluation to date has included: hemoglobin A1C. Home sugars: not checking as often. . Current treatments: no recent interventions. Last dilated eye exam none.     Patient here for follow-up of elevated blood pressure.  She is not exercising and is adherent to a low-salt diet.  Blood pressure is well controlled at home. Cardiac symptoms: none. Patient denies: chest pressure/discomfort and exertional chest pressure/discomfort. Cardiovascular risk " factors: diabetes mellitus, dyslipidemia, obesity (BMI >= 30 kg/m2) and sedentary lifestyle. Use of agents associated with hypertension: none. History of target organ damage: none.     Ruth Vanegas is here for follow up of elevated cholesterol. Compliance with treatment has been poor. The patient exercises infrequently.     Last seen in August. Last seen by Diabetes education before that. Unclear compliance with medication as refills do not support continuous use of metformin and victoza.   Patient admits to poor control of her diet and exercise, is working all the time. Also preoccupied with her other concerns of menorrhagia. Sprintec two times per day has not helped over the month. Is contemplating ablation or hysterectomy, has an appointment today.     She also has nasal congestion, cough, runny nose, for the last week. Is somewhat better. No fever. No trouble breathing. Tolerating diet. Normal urine and stool.     The following portions of the patient's history were reviewed and updated as appropriate: allergies, current medications, past family history, past medical history, past social history, past surgical history and problem list.    Review of Systems  A 12 point comprehensive review of systems was negative except as noted.      Objective:     Vitals:    02/14/18 0836   BP: 120/72   Pulse: 78   Resp: 18   Temp: 97.8  F (36.6  C)       General Appearance:    Alert, cooperative, no distress, appears stated age   Head:    Normocephalic, without obvious abnormality, atraumatic   Eyes:    PERRL, conjunctiva/corneas clear, EOM's intact   Nose:   Mucosa moist, normal    Throat:   Lips, mucosa, and tongue normal; teeth and gums normal   Neck:   Supple, symmetrical, trachea midline, no adenopathy;     thyroid:  no enlargement/tenderness/nodules; no carotid    bruit or JVD   Lungs:     Clear to auscultation bilaterally, respirations unlabored    Heart:    Regular rate and rhythm, S1 and S2 normal, no murmur, rub    or gallop   Abdomen:     Soft, non-tender, bowel sounds active all four quadrants,     no masses, no organomegaly   Extremities:   Extremities normal, atraumatic, no cyanosis or edema   Pulses:   2+ and symmetric all extremities   Skin:   Skin color, texture, turgor normal, no rashes or lesions   Neurologic:   No focal deficits     Laboratory:  Recent Results (from the past 240 hour(s))   Glycosylated Hemoglobin A1C   Result Value Ref Range    Hemoglobin A1c 8.5 (H) 3.5 - 6.0 %   Comprehensive Metabolic Panel   Result Value Ref Range    Sodium 139 136 - 145 mmol/L    Potassium 4.2 3.5 - 5.0 mmol/L    Chloride 105 98 - 107 mmol/L    CO2 23 22 - 31 mmol/L    Anion Gap, Calculation 11 5 - 18 mmol/L    Glucose 197 (H) 70 - 125 mg/dL    BUN 6 (L) 8 - 22 mg/dL    Creatinine 0.61 0.60 - 1.10 mg/dL    GFR MDRD Af Amer >60 >60 mL/min/1.73m2    GFR MDRD Non Af Amer >60 >60 mL/min/1.73m2    Bilirubin, Total 0.3 0.0 - 1.0 mg/dL    Calcium 8.9 8.5 - 10.5 mg/dL    Protein, Total 6.8 6.0 - 8.0 g/dL    Albumin 3.1 (L) 3.5 - 5.0 g/dL    Alkaline Phosphatase 72 45 - 120 U/L    AST 16 0 - 40 U/L    ALT 16 0 - 45 U/L   Lipid Cascade   Result Value Ref Range    Cholesterol 239 (H) <=199 mg/dL    Triglycerides 108 <=149 mg/dL    HDL Cholesterol 53 >=50 mg/dL    LDL Calculated 164 (H) <=129 mg/dL    Patient Fasting > 8hrs? Yes    Microalbumin, Random Urine   Result Value Ref Range    Microalbumin, Random Urine 5.90 (H) 0.00 - 1.99 mg/dL    Creatinine, Urine 261.0 mg/dL    Microalbumin/Creatinine Ratio Random Urine 22.6 (H) <=19.9 mg/g         Milton Banks MD

## 2021-06-16 NOTE — PROGRESS NOTES
Clinic Care Coordination Contact    Follow Up Progress Note      Assessment: RN CC outreach and spoke with Ruth  Patient states that she attended swallow study and will need to follow up with OT for plan of care - schedule for this week  Patient has intake with [facility] today  RN CC will outreach next week to review assessment findings and support any additional resource needs      Goals addressed this encounter:   Goals Addressed                 This Visit's Progress       Patient Stated      Medical (pt-stated)   80%     Goal Statement: I will have  plan of care with endocrine team within 1-2 months   Date Goal set: 2.22.21 update 03/31/21    Barriers: access  Strengths: pt engagement  Date to Achieve By: April   Patient expressed understanding of goal: yes  Action steps to achieve this goal:  1. I will attend appt t  3-16-21 to get tested for vertigo.  I will attend OT assessment   2. I will update CCC RN at next outreach  he plan of care and if any additional resources or support needed.              Mental Health Management (pt-stated)   60%     Goal Statement: I will establish care with a [facility] within 1-2 months   Date Goal set: 02/22/21   Barriers: access   Strengths: pt engagement   Date to Achieve By: April   Patient expressed understanding of goal: yes  Action steps to achieve this goal:  1. I will attend appt on 3-29-21 for intake at [facility]  2. I will report to my Community Health Worker if any additional resources or support needed.                  Plan: Patient will attend assessment visit with OT and [facility] and review recommendations with RN at next outreach to support review and goal progression

## 2021-06-16 NOTE — ANESTHESIA CARE TRANSFER NOTE
Last vitals:   Vitals:    03/02/18 0908   BP: 129/69   Pulse: 100   Resp: (!) 36   Temp: 36.2  C (97.2  F)   SpO2: 98%     Patient's level of consciousness is drowsy  Spontaneous respirations: yes  Maintains airway independently: yes  Dentition unchanged: yes  Oropharynx: oropharynx clear of all foreign objects    QCDR Measures:  ASA# 20 - Surgical Safety Checklist: WHO surgical safety checklist completed prior to induction  PQRS# 430 - Adult PONV Prevention: 4558F - Pt received => 2 anti-emetic agents (different classes) preop & intraop  ASA# 8 - Peds PONV Prevention: NA - Not pediatric patient, not GA or 2 or more risk factors NOT present  PQRS# 424 - Arianne-op Temp Management: 4559F - At least one body temp DOCUMENTED => 35.5C or 95.9F within required timeframe  PQRS# 426 - PACU Transfer Protocol: - Transfer of care checklist used  ASA# 14 - Acute Post-op Pain: ASA14B - Patient did NOT experience pain >= 7 out of 10

## 2021-06-17 NOTE — TELEPHONE ENCOUNTER
Patient called. BG readings over 400 and can't get it to come down. Please advise on what to do.     Ruth @ [phone]

## 2021-06-17 NOTE — TELEPHONE ENCOUNTER
Pt states BG started getting higher last week.   Wednesday she left work early to go to ED because she was high and dehydrated. She states they got her BG down but they went right back up when she left.   Pt reports yesterday 400's.   Today 200-300's. Headaches.     Today woke up at 6:30 a.m. BG at 290 fasting   Pt denies recent lows, reports the last low BG was 4/14/21 and was during the day.     She is taking lantus 18 units and glipizide 5mg two times a day.     Advised pt to increase lantus to 22 units/day and glipize to 7.5 mg two times a day (to take 1.5 tabs).     Will give this a couple days. If BG and/or symptoms not improving pt will call back. Otherwise I will review martine report later this week to see how it is going and will call pt to discuss.

## 2021-06-17 NOTE — PROGRESS NOTES
Ruth Vanegas is a 35 y.o. female who is being evaluated via a billable telephone visit.      How would you like to obtain your AVS? MyChart.  If dropped from the video visit, the video invitation should be resent by: Text to cell phone: [phone]   Will anyone else be joining your video visit? No          Assessment & Plan   Type 2 diabetes, uncontrolled, with neuropathy (H)  - Ambulatory referral to Medication Management  Diabetic polyneuropathy associated with type 2 diabetes mellitus (H)  - Ambulatory referral to Medication Management  Nausea and vomiting, intractability of vomiting not specified, unspecified vomiting type  - Ambulatory referral to Medication Management  Chronic tension-type headache, not intractable  - topiramate (TOPAMAX) 25 MG tablet  Dispense: 30 tablet; Refill: 2  - Ambulatory referral to Medication Management  Anxiety  - Ambulatory referral to Medication Management  PTSD (post-traumatic stress disorder)  - Ambulatory referral to Medication Management  EHR reviewed.   Past medical history, problem list, past surgical history, family history, social history, medications reviewed, updated, reconciled.   Reviewed her recent evaluation and treatment by endocrinology. Advised to change dosing time of glipizide. Needs follow up in three months. Congratulated on improvement in HbA1C. Will seek alternate provider, she will try the [facility], will call if she needs a new referral.   Due to ongoing symptoms, encouraged to keep appointment with neurology. Brain imaging completed last year, was normal. We discussed stopping medication that are not helping. Will stop mirtazapine, nortriptyline, start topamax. Keep appointment in June for further evaluation and treatment of headaches and worsening neuropathy. Has follow up planned with mental health provider at the end of the month, encouraged to keep this appointment. Will schedule with medication management to stop the above and start new  medications.   Follow up as needed.       Milton Banks MD  [facility]    Subjective   Ruth Vanegas is 35 y.o. female with history of diabetes, neuropathy, chronic headaches, PTSD, anxiety, insomnia, who is seen for follow up. Video visit was started but unable to proceed. Telephone visit completed.   She was just seen by endocrinology for uncontrolled diabetes. She notes some improvement in her control, though no changes were made to her medications. She has planned follow up in three months, but does not feel like her relationship with the provider was therapeutic. She is still having frequent yeast infection, recently needing fluconazole. She feels like the tingling in her hands are worsening, her fingers are sometimes numb.   She still has ongoing nausea and headaches. The provider referred her to neurology, this is upcoming in June. She says the nausea is waking her now, sometimes throws up, mostly can keep her food down. She goes to sleep with a headache, wakes up with the headache. Is taking mirtazapine and nortriptyline daily. She doesn't feel like they are helping anymore.   Has established care with a new therapist, is seeing a provider at [facility] next visit at the end of the month.            Objective       Vitals:  No vitals were obtained today due to virtual visit.        Telephone visit duration : 29 minutes

## 2021-06-17 NOTE — TELEPHONE ENCOUNTER
Second level triage needed within 30 minutes.     Patient is expecting a call from the clinic.     My protocol recommends ER    Has diabetes type 2  Dry mouth  Dizzy   Lght headed  Blurred vision.  Excessive thirst  Frequent use of bathroom.  Blood glucose 183 fasting before breakfast and meds.  Now 318. Meds taken before 6 a.m.  Sent home from work.  New medication started yesterday. First dose was last night.    COVID 19 Nurse Triage Plan/Patient Instructions    Please be aware that novel coronavirus (COVID-19) may be circulating in the community. If you develop symptoms such as fever, cough, or SOB or if you have concerns about the presence of another infection including coronavirus (COVID-19), please contact your health care provider or visit  [email or web address].    Disposition/Instructions    ED Visit recommended. Follow protocol based instructions.      Bring Your Own Device:  Please also bring your smart device(s) (smart phones, tablets, laptops) and their charging cables for your personal use and to communicate with your care team during your visit.      Thank you for taking steps to prevent the spread of this virus.  o Limit your contact with others.  o Wear a simple mask to cover your cough.  o Wash your hands well and often.    Resources    [facility]: About COVID-19: [email or web address]    CDC: What to Do If You're Sick: www.cdc.gov/coronavirus/2019-ncov/about/steps-when-sick.html    CDC: Ending Home Isolation: www.cdc.gov/coronavirus/2019-ncov/hcp/disposition-in-home-patients.html     CDC: Caring for Someone: www.cdc.gov/coronavirus/2019-ncov/if-you-are-sick/care-for-someone.html     [facility]: Interim Guidance for Hospital Discharge to Home: www.health.[facility].[place]/diseases/coronavirus/hcp/hospdischarge.pdf    [facility] clinical trials (COVID-19 research studies): [email or web address].[email or web address]/umn-clinical-trials     Below are the COVID-19 hotlines at the [place]  Department of Health ([facility]). Interpreters are available.   o For health questions: Call 855-372-5805 or 1-247.248.3353 (7 a.m. to 7 p.m.)  o For questions about schools and childcare: Call 029-436-8558 or 1-486.494.2229 (7 a.m. to 7 p.m.)     Reason for Disposition    Patient sounds very sick or weak to the triager    Additional Information    Negative: Unconscious or difficult to awaken    Negative: Acting confused (e.g., disoriented, slurred speech)    Negative: Very weak (can't stand)    Negative: Sounds like a life-threatening emergency to the triager    Negative: Vomiting and signs of dehydration (e.g., very dry mouth, lightheaded, dark urine)    Negative: Blood glucose > 240 mg/dL (13.3 mmol/L) and rapid breathing    Negative: Blood glucose > 500 mg/dL (27.8 mmol/L)    Negative: Blood glucose > 240 mg/dL (13.3 mmol/L) AND urine ketones moderate-large (or more than 1+)    Negative: Blood glucose > 240 mg/dL (13.3 mmol/L) and blood ketones > 1.4 mmol/L    Negative: Blood glucose > 240 mg/dL (13.3 mmol/L) AND vomiting AND unable to check for ketones (in blood or urine)    Negative: Vomiting lasting > 4 hours    Protocols used: DIABETES - HIGH BLOOD SUGAR-A-OH    Mely BLACKBURN RN FNA

## 2021-06-17 NOTE — PROGRESS NOTES
Clinic Care Coordination Contact    Situation: Patient chart reviewed by care coordinator.    Background: RN CC review for status update    Assessment: Patient has been engaged with care team and update CHW with outreach     Plan/Recommendations: Community Health Worker to outreach per standard work and updated on goal progression    If needed please set up RN CC follow up visit to support medical goal progression

## 2021-06-17 NOTE — TELEPHONE ENCOUNTER
Received MTM referral from [facility]     Patient was not reachable after several attempts, will route to MTM Pharmacist/Provider as an FYI. Left MTM scheduling information on patients voicemail.    Thank you for the referral,    Terence Samayoa, MTM coordinator

## 2021-06-17 NOTE — TELEPHONE ENCOUNTER
Telephone Encounter by Randall Allison PharmD at 6/4/2020  3:02 PM     Author: Randall Allison PharmD Service: -- Author Type: Pharmacist    Filed: 6/4/2020  3:17 PM Encounter Date: 5/20/2020 Status: Signed    : Randall Allison PharmD (Pharmacist)       Called pt in regards to my chart message.     Pt has been running in the 80s and 90s.   This was the first episode of reading below 80.   Notes she has been feeling hypoglycemic even in the 90s.     Currently Invokana 100 mg, Metformin  mg 2 tabs two times a day, Lantus 18 units. And Glipizide 5 mg two times a day (before breakfast and dinner, not before lunch).     Just started CGM 2 days ago.          BG have dropped significantly since initial MTM visit, likely with addition of Invokana. During that visit, goal was set to reduce insulin requirements.     As pt feeling unwell with lower bG today, will have her reduce to 16 units. [facility]     Over the next several months will work towards continued reduction of insulin, increase invokana dose. Next step to continue insulin reductions could be consideration of GLP1 (hesitant as pt has had persistent nausea)        Randall Allison, Traci  Medication Therapy Management (MTM) Pharmacist  [facility]

## 2021-06-17 NOTE — PROGRESS NOTES
Clinic Care Coordination Contact    Situation: Patient chart reviewed by care coordinator.    Background: SW completed chart review    Assessment: Patient has been in contact with CCC RN and CHW. Progressing on goals     Plan/Recommendations: Standard Outreach

## 2021-06-17 NOTE — TELEPHONE ENCOUNTER
Date: 5/5/2021 Status: [facility]   Time: 7:20 AM Length: 20   Visit Type: LAB [[id number]] Copay: $0.00   Provider: [facility]

## 2021-06-17 NOTE — TELEPHONE ENCOUNTER
Patient has upcoming  appointment with Brianne Haynes NP in Endocrinology on Tuesday, May 11th.  The patient has not completed ordered labs.      Please call patient to schedule a lab appointment at least 48 hours prior to clinic appointment or have patient reschedule clinic appointment with a lab appointment preferably 1 week prior.  Thank you.

## 2021-06-17 NOTE — TELEPHONE ENCOUNTER
Telephone Encounter by Gary Gu at 11/12/2020  1:07 PM     Author: Gary Gu Service: -- Author Type: Patient Access    Filed: 11/12/2020  1:08 PM Encounter Date: 11/9/2020 Status: Signed    : Gary Gu (Patient Access)       [place] APPROVED:    Approval start date: 08/11/2020  Approval end date:  11/11/2021    Pharmacy has been notified of approval and will contact patient when medication is ready for pickup.

## 2021-06-17 NOTE — TELEPHONE ENCOUNTER
Telephone Encounter by Hanane Edgar at 11/11/2020 10:09 AM     Author: Hanane Edgar Service: -- Author Type: --    Filed: 11/11/2020 10:10 AM Encounter Date: 11/9/2020 Status: Signed    : Hanane Edgar       PRIOR AUTHORIZATION DENIED    Denial Rational: Patient is able to get up to 1 capsule per day for a maximum of 120 days within 365 days.  This is the duration set by patient's plan for PPIs.  This limit is shared across all PPIs.               Appeal Information: If provider would like to appeal please provide a letter of medical necessity and route back to the PA team.

## 2021-06-17 NOTE — PROGRESS NOTES
4/26/2021  Clinic Care Coordination Contact    Community Health Worker Follow Up    Goals:   Goals Addressed                 This Visit's Progress       Patient Stated      COMPLETED: Mental Health Management (pt-stated)   100%     Goal Statement: I have established care with a BHP at [facility]  Date Goal set: 02/22/21   Barriers: access   Strengths: pt engagement   Date to Achieve By: April 26-21  Patient expressed understanding of goal: yes  Personal Plan:  I will continue to attend follow up appt with therapist at [facility] as scheduled.               other (pt-stated)   50%     Goal Statement: I will have  plan of care with endocrine team within 1-2 months   Date Goal set: 2.22.21 update 03/31/21    Barriers: access  Strengths: pt engagement  Date to Achieve By: April 30-21   Patient expressed understanding of goal: yes  Action steps to achieve this goal:  1. I will attend lab appt on 5-3-21 at 11am at [facility]  2. I will attend appt on 5-13-21 at 7:40am with Rosangela Steward NP endocrinologist at [facility].  3 I will follow up with Dr Banks on 5-13-21 at 8:40am   4. I will  update CCC team  at next outreach  he plan of care and if any additional resources or support needed.      Updated: 4-26-21 AL           Called and spoke to patient and follow up on goals.  Patient reported:  -had appt with therapist today  -attended OT./PT for the vertigo  -not happy how the  kept on insisting to schedule telephone/virtual appt with endocrinologist.  She preferred in person visit vs telephone or virtual.  Offered to talk to customer advocacy regarding her bad experience.  Patient stated she has the number.    Reminded of up coming appts  5-3-21 at 11am for lab at [facility]  5-11-21 at 7:40am with Endocrinologist   5-13-21 aT 8:40 am with Dr Banks for follow up.  Noted in chart patient preferred in office visit only   no telephone or virtual appt    CHW  Follow up: Monthly    CHW Plan: Follow up on goal-21     CHW Next Follow Up: 5-24-21

## 2021-06-18 ENCOUNTER — COMMUNICATION - HEALTHEAST (OUTPATIENT)
Dept: PHARMACY | Facility: CLINIC | Age: 36
End: 2021-06-18

## 2021-06-20 NOTE — LETTER
Letter by Juan Carlos Olivas MD at      Author: Juan Carlos Olivas MD Service: -- Author Type: --    Filed:  Encounter Date: 6/29/2020 Status: (Other)       7/3/2020        Ruth Vanegas  [address] [place] [address]    This letter provides a written record that you were tested for COVID-19 on 7/1/2020.     Your result was positive. This means you have COVID-19 (coronavirus).    How can I protect others?If you have symptoms, stay home and away from others (self-isolate) until:Youve had no fever--and no medicine that reduces fever--for 3 full days (72 hours). And?     Your other symptoms have gotten better. For example, your cough or breathing has improved. And?  At least 10 days have passed since your symptoms started.    If you dont have symptoms: Stay home and away from others (self-isolate) until at least 10 days have passed since your first positive COVID-19 test.    During this time:    Stay in your own room, including for meals. Use your own bathroom if you can.    Stay away from others in your home. No hugging, kissing or shaking hands. No visitors.     Dont go to work, school or anywhere else.     Clean high touch surfaces often (doorknobs, counters, handles, etc.). Use a household cleaning spray or wipes. Youll find a full list on the EPA website at www.epa.gov/pesticide-registration/list-n-disinfectants-use-against-sars-cov-2.     Cover your mouth and nose with a mask, tissue or washcloth to avoid spreading germs.    Wash your hands and face often with soap and water.    Caregivers in these groups are at risk for severe illness due to COVID-19:  o People 65 years and older  o People who live in a nursing home or long-term care facility  o People with chronic disease (lung, heart, cancer, diabetes, kidney, liver, immunologic)  o People who have a weakened immune system, including those who:    Are in cancer treatment    Take medicine that weakens the immune system, such as  corticosteroids    Had a bone marrow or organ transplant    Have an immune deficiency    Have poorly controlled HIV or AIDS    Are obese (body mass index of 40 or higher)    Smoke regularly    Caregivers should wear gloves while washing dishes, handling laundry and cleaning bedrooms and bathrooms.    Wash and dry laundry with special caution. Dont shake dirty laundry, and use the warmest water setting you can.    If you have a weakened immune system, ask your doctor about other actions you should take.    For more tips, go to www.cdc.gov/coronavirus/2019-ncov/downloads/10Things.pdf.    You should not go back to work until you meet the guidelines above for ending your home isolation. You should meet these along with any other guidelines that your employer has.    Employers: This document serves as formal notice of your employees medical guidelines for going back to work. They must meet the above guidelines before going back to work in person.    How can I take care of myself?    1. Get lots of rest. Drink extra fluids (unless a doctor has told you not to).    2. Take Tylenol (acetaminophen) for fever or pain. If you have liver or kidney problems, ask your family doctor if its okay to take Tylenol.     Take either:     650 mg (two 325 mg pills) every 4 to 6 hours, or?    1,000 mg (two 500 mg pills) every 8 hours as needed.     Note: Dont take more than 3,000 mg in one day. Acetaminophen is found in many medicines (both prescribed and over-the-counter medicines). Read all labels to be sure you dont take too much.    For children, check the Tylenol bottle for the right dose (based on their age or weight).    3. If you have other health problems (like cancer, heart failure, an organ transplant or severe kidney disease): Call your specialty clinic if you dont feel better in the next 2 days.    4. Know when to call 911: Emergency warning signs include:    Trouble breathing or shortness of breath    Pain or pressure in the  chest that doesnt go away    Feeling confused like you havent felt before, or not being able to wake up    Bluish-colored lips or face    5. Sign up for [facility]. We know its scary to hear that you have COVID-19. We want to track your symptoms to make sure youre okay over the next 2 weeks. Please look for an email from [employer]--this is a free, online program that well use to keep in touch. To sign up, follow the link in the email. Learn more at [email or web address].    Where can I get more information?    [facility]: [email or web address]    Coronavirus Basics: www.health.[facility].[place].us/diseases/coronavirus/basics.html    What to Do If Youre Sick: www.cdc.gov/coronavirus/2019-ncov/about/steps-when-sick.html    Ending Home Isolation: www.cdc.gov/coronavirus/2019-ncov/hcp/disposition-in-home-patients.html     Caring for Someone with COVID-19: www.cdc.gov/coronavirus/2019-ncov/if-you-are-sick/care-for-someone.html     [facility] clinical trials (COVID-19 research studies): clinicalaffairs.[facility]-clinical-trials       Juan Carlos Olivas MD   7/3/2020, 8:38 AM

## 2021-06-20 NOTE — LETTER
Letter by Milton Banks MD at      Author: Milton Banks MD Service: -- Author Type: --    Filed:  Encounter Date: 5/14/2020 Status: (Other)       [facility]  [address] [address]  [phone]    May 14, 2020      Ruth Vanegas  [address]  [place] [address]      Dear Ruth,    As your health care provider, I am concerned that your age and/or underlying medical condition puts you at particularly high risk for serious complications should you contract a COVID-19 infection.     Specifically, you have the following conditions/diagnoses, included as high risk conditions per the Centers for Disease Control and Prevention at CDC.gov.     Patient Active Problem List   Diagnosis   ? Type 2 diabetes, uncontrolled, with neuropathy (H)   ? Hyperlipidemia   ? Microalbuminuria   ? Morbid obesity (H)         COVID-19 is a new disease and there is limited information regarding risk factors for severe disease. Based on currently available information and clinical expertise, older adults and people of any age who have serious underlying medical conditions might be at higher risk for severe illness from COVID-19.     It is my medical opinion that you should avoid close contact with others and work from home if possible during this COVID-19 pandemic starting 5/15/20 to 6/13/20. At the end of this leave we will re-evaluate and determine if you should return to work or continue your leave.     Milton Banks MD      [facility]

## 2021-06-21 NOTE — PROGRESS NOTES
Assessment/plan  1. Strep throat  Treated for strep throat.   Prefers IM treatment.   Discussed supportive care.   Continue increased oral hydration. Rest as needed. Consider acetaminophen or ibuprofen.   Follow up if no change or worsening.   - Rapid Strep A Screen- Throat Swab  - Influenza A/B Rapid Test- Nasal Swab  - penicillin G benzathine injection 1.2 Million Units (BICILLIN-LA); Inject 2 mL (1.2 Million Units total) into the shoulder, thigh, or buttocks once.              Subjective  [age] year old female complains of sore throat, fatigue and fever. This started three days ago. Tmax of 101. She feels very ill. Had a headache but this is better. Her children have the same symptoms but they improved. She has been drinking a lot of fluids but her appetite is poor. She has normal urine and stool. Has taken over the counter medications without any relief. Feels like her symptoms are worsening.         ROS: 12 systems reviewed, all negative except for what is mentioned in HPI.     Past Medical History:   Diagnosis Date     Anemia     told to take iron pills when pregnant     Depression      Diabetes mellitus (H)      Dysthymic disorder      Endometriosis      Herpes genitalia      Hyperlipidemia      Kidney stone      Menorrhagia      Migraines      PCOS (polycystic ovarian syndrome)      Post traumatic stress disorder (PTSD)      Patient Active Problem List   Diagnosis     Calculus of ureter     Diabetes mellitus with neuropathy (H)     Hyperlipidemia     Microalbuminuria     Obesity     Menorrhagia     Past Surgical History:   Procedure Laterality Date     BLADDER REPAIR W/  SECTION      X2     COMBINED HYSTEROSCOPY DIAGNOSTIC / D&C N/A 2015    Procedure: Dilation and Curettage with Hysteroscopy;  Surgeon: Zia Farmer MD;  [facility];  Service:      COMBINED HYSTEROSCOPY DIAGNOSTIC / D&C N/A 2016    Procedure: DILATION AND CURETTAGE WITH HYSTEROSCOPY INSERT MIRENA;   Surgeon: Suzanne Major MD;  Location: [facility];  Service:      DIAGNOSTIC LAPAROSCOPY N/A 10/19/2015    Procedure:  LAPAROSCOPY,LYSIS OF ADHESIONS;  Surgeon: Zia Farmer MD;  Location: [facility] OR;  Service:      DILATION AND CURETTAGE OF UTERUS  05/06/2015     NJ HYSTEROSCOPY,W/ENDOMETRIAL ABLATION N/A 3/2/2018    Procedure: HYSTEROSCOPY, DILATION AND CURETTAGE, ENDOMETRIAL ABLATION;  Surgeon: Kristy Israel DO;  Location: [facility] OR;  Service: Gynecology     NJ LAP,TUBAL CAUTERY Bilateral 3/2/2018    Procedure: LAPAROSCOPIC BILATERAL TUBAL LIGATION;  Surgeon: Kristy Israel DO;  Location: [facility];  Service: Gynecology     TONSILLECTOMY       Family History   Problem Relation Age of Onset     Diabetes Paternal Grandmother      Cancer Father         prostate     Alcohol abuse Sister      Alcohol abuse Brother      Alcohol abuse Daughter      Urolithiasis Neg Hx      Clotting disorder Neg Hx      Gout Neg Hx      Heart disease Neg Hx      Social History     Socioeconomic History     Marital status: Single     Spouse name: Not on file     Number of children: Not on file     Years of education: Not on file     Highest education level: Not on file   Social Needs     Financial resource strain: Not on file     Food insecurity - worry: Not on file     Food insecurity - inability: Not on file     Transportation needs - medical: Not on file     Transportation needs - non-medical: Not on file   Occupational History     Occupation: [employer]     Employer: [employer]   Tobacco Use     Smoking status: Never Smoker     Smokeless tobacco: Never Used   Substance and Sexual Activity     Alcohol use: No     Drug use: No     Sexual activity: Not on file   Other Topics Concern     Not on file   Social History Narrative     Not on file     Current Outpatient Medications on File Prior to Visit   Medication Sig Dispense Refill     aspirin-acetaminophen-caffeine (EXCEDRIN MIGRAINE)  "250-250-65 mg per tablet Take 1 tablet by mouth every 8 (eight) hours as needed for pain.        blood glucose test (ACCU-CHEK TARIQ PLUS TEST STRP) strips Use 1 each As Directed as needed. Dispense brand per patient's insurance at pharmacy discretion. 100 strip 5     blood-glucose meter (ACCU-CHEK TARIQ CONNECT METER) Misc Use to check glucose 3 times per day 1 each 0     generic lancets (ACCU-CHEK MULTICLIX LANCET) Use 1 each As Directed as needed. Dispense brand per patient's insurance at pharmacy discretion. 100 each 5     ibuprofen (ADVIL,MOTRIN) 600 MG tablet Take 1 tablet (600 mg total) by mouth every 6 (six) hours as needed for pain. 30 tablet 0     metFORMIN (GLUCOPHAGE-XR) 500 MG 24 hr tablet TAKE 2 TABLETS BY MOUTH DAILY WITH DINNER. (Patient taking differently: Take 1,000 mg by mouth daily with supper. TAKE 2 TABLETS BY MOUTH DAILY WITH DINNER.) 60 tablet 11     pen needle, diabetic (BD ULTRA-FINE RAMON PEN NEEDLES) 32 gauge x 5/32\" Ndle Use to inject Vicotza once daily. 100 each 3     VICTOZA 3-SARAH 0.6 mg/0.1 mL (18 mg/3 mL) PnIj injection ADMINISTER 1.2 MG UNDER THE SKIN EVERY DAY 18 mL 3     ISIBLOOM 0.15-0.03 mg per tablet Take 1 tablet by mouth daily.       liraglutide (VICTOZA) 0.6 mg/0.1 mL (18 mg/3 mL) PnIj injection Take 1.2mg once daily. (Patient taking differently: Inject 1.2 mg under the skin daily. Take 1.2mg once daily.) 15 mL 3     oxyCODONE-acetaminophen (PERCOCET) 5-325 mg per tablet Take 1 tablet by mouth every 6 (six) hours as needed for pain. 15 tablet 0     No current facility-administered medications on file prior to visit.      Objective  Vitals:    11/14/18 1426   BP: 132/86   Pulse: (!) 113   Temp: 98.9  F (37.2  C)   SpO2: 98%       General Appearance:  Alert, cooperative, no distress, appears stated age   Head:  Normocephalic, without obvious abnormality, atraumatic   Eyes:  PERRL, conjunctiva/corneas clear, EOM's intact   Ears:  Normal TM's and external ear canals, both ears "   Nose: Nares normal, septum midline,mucosa normal, no drainage    Throat: Lips, mucosa, and tongue normal; posterior pharynx erythematous, white exudates, uvula midline   Neck: Supple, symmetrical, trachea midline, no adenopathy;  thyroid: not enlarged, symmetric, no tenderness/mass/nodules; no carotid bruit or JVD   Lungs:   Clear to auscultation bilaterally, respirations unlabored   Heart:  Regular rate and rhythm, S1 and S2 normal, no murmur, rub, or gallop   Extremities: Extremities normal, atraumatic, no cyanosis or edema   Skin: Skin color, texture, turgor normal, no rashes or lesions   Lymph nodes: Cervical, supraclavicular nodes normal     Recent Results (from the past 240 hour(s))   Rapid Strep A Screen- Throat Swab   Result Value Ref Range    Rapid Strep A Antigen Group A Strep detected (!) No Group A Strep detected, presumptive negative   Influenza A/B Rapid Test- Nasal Swab   Result Value Ref Range    Influenza  A, Rapid Antigen No Influenza A antigen detected No Influenza A antigen detected    Influenza B, Rapid Antigen No Influenza B antigen detected No Influenza B antigen detected

## 2021-06-21 NOTE — LETTER
Letter by Milton Banks MD at      Author: Milton Banks MD Service: -- Author Type: --    Filed:  Encounter Date: 2/17/2021 Status: (Other)         February 17, 2021     Patient: Ruth Vanegas   YOB: 1985   Date of Visit: 2/17/2021       To Whom It May Concern:     Ruth Vanegas was seen in the clinic today.     If you have any questions or concerns, please don't hesitate to call.    Sincerely,        Electronically signed by Milton Banks MD

## 2021-06-21 NOTE — LETTER
Letter by Brenden Gibson LGSW at      Author: Brenden Gibson LGSW Service: -- Author Type: --    Filed:  Encounter Date: 11/12/2020 Status: (Other)       Care Plan  About Me:    Patient Name:  Ruth Vanegas    YOB: 1985  Age:         34 y.o.   [facility] MRN:    [id number] Telephone Information:  Home Phone [phone]   Mobile [phone]       Address:  [address]  [place] [address] Email address:  [email or web address]      Emergency Contact(s)  Extended Emergency Contact Information      Name: Abimbola Shore  Address:       [address]      [place] [address]  Relation: Sibling      Name[place] [address]  Relation: Sibling          Primary language:  English     needed? No   [employer]:  [phone] op. 1  Other communication barriers:    Preferred Method of Communication:  Shelly  Current living arrangement: Other(live with her 2 children)  Mobility Status/ Medical Equipment: Independent    Health Maintenance  Health Maintenance Reviewed: Not assessed    My Access Plan  Medical Emergency 911   Primary Clinic Line Milton Banks MD - [phone]   24 Hour Appointment Line [phone] or  [phone]) (toll-free)   24 Hour Nurse Line [phone] (toll-free)   Preferred Urgent Care [facility], [phone]   Preferred Hospital [facility]  [phone]   Preferred Pharmacy [facility] - [place] - [address]     Behavioral Health Crisis Line The National Suicide Prevention Lifeline at 1-435.530.1125 or 911             My Care Team Members  Patient Care Team       Relationship Specialty Notifications Start End    Milton Banks MD PCP - General Family Medicine  11/1/16     Phone: [phone] Fax: [phone]         [address] [place] [address]    Milton Banks MD Assigned PCP   3/2/20     Phone:  [phone] Fax: [phone]         [address] [place] [address]    Randall Allison, PharmD Pharmacist Pharmacist  5/20/20     Phone: [phone] Fax: [phone]         [address] [address]    Brenden Gibson LGSW Lead Care Coordinator Primary Care - CC Admissions 11/12/20     Fax: [phone]         Jeremiah Loyola, CHW Community Health Worker Primary Care - CC Admissions 11/12/20     Phone: [phone] Fax: [phone]                My Care Plans  Self Management and Treatment Plan  Goals and (Comments)  Goals        General    Being Active: Stay active every day.     Financial Wellbeing (pt-stated)     Notes - Note created  11/12/2020  1:28 PM by Brenden Gibson LGSW    Goal Statement: I want to be aware of and connected to financial supports for housing and food within 1-2 months  Date Goal set: 11/12/20  Barriers:   Strengths:   Date to Achieve By: 12/30/2020  Patient expressed understanding of goal: Yes  Action steps to achieve this goal:  1. I will work with FRW to review eligibility for SNAP, EA, and CASH  2. I will review resources sent by   3. I will update CCC team at outreaches         Monitoring: Check blood sugar twice per day.     Problem Solving: always have a form of glucose with you to treat low blood sugars.              Advance Care Plans/Directives Type:        My Medical and Care Information  Problem List   Patient Active Problem List   Diagnosis   ? Calculus of ureter   ? Type 2 diabetes, uncontrolled, with neuropathy (H)   ? Hyperlipidemia   ? Microalbuminuria   ? S/P laparoscopic hysterectomy   ? Morbid obesity (H)      Current Medications and Allergies:  See printed Medication Report.    Care Coordination Start Date: 11/12/2020   Frequency of Care Coordination:     Form Last Updated: 11/12/2020

## 2021-06-21 NOTE — LETTER
Letter by Brenden Gibson LGSW at      Author: Brenden Gibson LGSW Service: -- Author Type: --    Filed:  Encounter Date: 11/12/2020 Status: (Other)       CARE COORDINATION    November 12, 2020    Ruth Vanegas  [address] [place] [address]      Dear Ruth,    I am a clinic care coordinator who works with iMlton Banks MD at [facility]. I wanted to thank you for spending the time to talk with me.  Below is a description of clinic care coordination and how I can further assist you.      The clinic care coordination team is made up of a registered nurse,  and community health worker who understand the health care system. The goal of clinic care coordination is to help you manage your health and improve access to the health care system in the most efficient manner. The team can assist you in meeting your health care goals by providing education, coordinating services, strengthening the communication among your providers and supporting you with any resource needs.    Please feel free to contact the Community Health Worker at [phone] with any questions or concerns. We are focused on providing you with the highest-quality healthcare experience possible and that all starts with you.     Sincerely,     Brenden Gibson    Enclosed: I have enclosed a copy of the Care Plan. This has helpful information and goals that we have talked about. Please keep this in an easy to access place to use as needed.

## 2021-06-22 NOTE — PROGRESS NOTES
Patient here for a strep screen. Recent positive strep screen Patient tolerated procedure well.     Hanane Hartman, CMA

## 2021-06-22 NOTE — TELEPHONE ENCOUNTER
Fax received from [facility], they have started the Prior Authorization Process via Cover My Meds    CoverMyMeds Key: [id number]    Medication Name: Victoza    Insurance Plan: ZaneRTIMMY  PBM:   Patient ID: not provided on fax    Please complete the PA process

## 2021-06-22 NOTE — TELEPHONE ENCOUNTER
RN cannot approve Refill Request    RN can NOT refill this medication medication not on med list. Last office visit: 11/14/2018 Milton Banks MD Last Physical: 2/28/2018 Last MTM visit: Visit date not found Last visit same specialty: 11/14/2018 Milton Banks MD.  Next visit within 3 mo: Visit date not found  Next physical within 3 mo: Visit date not found      Kristy Solorio, Care Connection Triage/Med Refill 1/3/2019    Requested Prescriptions   Pending Prescriptions Disp Refills     gabapentin (NEURONTIN) 300 MG capsule [Pharmacy Med Name: GABAPENTIN 300MG CAPSULES] 90 capsule 0     Sig: TAKE 1 CAPSULE(300 MG) BY MOUTH THREE TIMES DAILY    Gabapentin/Levetiracetam/Tiagabine Refill Protocol  Passed - 1/2/2019  6:09 PM       Passed - PCP or prescribing provider visit in past 12 months or next 3 months    Last office visit with prescriber/PCP: 11/14/2018 Milton Banks MD OR same dept: 11/14/2018 Milton Banks MD OR same specialty: 11/14/2018 Milton Banks MD  Last physical: 2/28/2018 Last MTM visit: Visit date not found   Next visit within 3 mo: Visit date not found  Next physical within 3 mo: Visit date not found  Prescriber OR PCP: Milton Banks MD  Last diagnosis associated with med order: There are no diagnoses linked to this encounter.  If protocol passes may refill for 12 months if within 3 months of last provider visit (or a total of 15 months).

## 2021-06-24 NOTE — ANESTHESIA CARE TRANSFER NOTE
Last vitals:   Vitals:    03/04/19 1306   BP: (!) 156/93   Pulse: 89   Resp: 20   Temp: 36.9  C (98.4  F)   SpO2: 100%     Patient's level of consciousness is drowsy  Spontaneous respirations: yes  Maintains airway independently: yes  Dentition unchanged: yes  Oropharynx: oropharynx clear of all foreign objects    QCDR Measures:  ASA# 20 - Surgical Safety Checklist: WHO surgical safety checklist completed prior to induction    PQRS# 430 - Adult PONV Prevention: 4558F - Pt received => 2 anti-emetic agents (different classes) preop & intraop  ASA# 8 - Peds PONV Prevention: NA - Not pediatric patient, not GA or 2 or more risk factors NOT present  PQRS# 424 - Arianne-op Temp Management: 4559F - At least one body temp DOCUMENTED => 35.5C or 95.9F within required timeframe  PQRS# 426 - PACU Transfer Protocol: - Transfer of care checklist used  ASA# 14 - Acute Post-op Pain: ASA14B - Patient did NOT experience pain >= 7 out of 10

## 2021-06-24 NOTE — TELEPHONE ENCOUNTER
Called and spoke with pharmacy, patient has already picked up her meter and insurance paid for it.  Request was sent in error.

## 2021-06-24 NOTE — ANESTHESIA PREPROCEDURE EVALUATION
Anesthesia Evaluation      Patient summary reviewed     Airway   Mallampati: III  Neck ROM: full   Pulmonary - negative ROS and normal exam   Sleep apnea: denies.                         Cardiovascular - negative ROS and normal exam  Exercise tolerance: > or = 4 METS  (-) angina   Neuro/Psych    (+) depression, anxiety/panic attacks,     Comments: Migraines  PTSD    Endo/Other    (+) diabetes mellitus, obesity (BMI 52),      GI/Hepatic/Renal    Chronic renal disease: stones.     Other findings:     NPO > 8 hrs         Results for HORTENSIA ARNOLD (MRN [id number]) as of 3/4/2019    2/20/2019 16:42  Sodium: 141  Potassium: 3.9  Chloride: 103  CO2: 28  Anion Gap, Calculation: 10  BUN: 9  Creatinine: 0.77  GFR MDRD Af Amer: >60  GFR MDRD Non Af Amer: >60  Calcium: 9.6  AST: 11  ALT: 13  ALBUMIN: 3.7  Protein, Total: 7.4  Alkaline Phosphatase: 77  Bilirubin, Total: 0.3  Direct LDL: 192 (H)  Glucose: 107  Hemoglobin A1c: 7.5 (H)  WBC: 10.1  RBC: 4.40  Hemoglobin: 12.2  Hematocrit: 37.0  MCV: 84  MCH: 27.7  MCHC: 33.0  RDW: 12.5  Platelets: 288  MPV: 8.2    3/4/2019 09:02  Pregnancy Test, Urine: Negative    Results for HORTENSIA ARNOLD (MRN [id number]) as of 3/4/2019    3/4/2019 09:44  Hemoglobin: 11.3 (L)  ABORh: A POS  Antibody Screen: Negative          Dental - normal exam                        Anesthesia Plan  Planned anesthetic: general endotracheal    Glidescope  Scop patch  Propofol gtt  Zofran, decadron 4 mg  Ketamine    ASA 3   Induction: intravenous   Anesthetic plan and risks discussed with: patient  Anesthesia plan special considerations: video-assisted, antiemetics,   Post-op plan: routine recovery

## 2021-06-24 NOTE — TELEPHONE ENCOUNTER
[facility]    Medication Name: Accu-Chek Bertha Plus Meter    Insurance Plan: [employer]   PBM:    Patient ID Number: [id number]    Please start PA process

## 2021-06-24 NOTE — ANESTHESIA PROCEDURE NOTES
Peripheral Block    Patient location during procedure: OR  Start time: 3/4/2019 12:55 PM  End time: 3/4/2019 12:57 PM  post-op analgesia per surgeon order as noted in medical record  Staffing:  Performing  Anesthesiologist: Jessica Winn MD  Preanesthetic Checklist  Completed: patient identified, site marked, risks, benefits, and alternatives discussed, timeout performed, consent obtained, airway assessed, oxygen available, suction available, emergency drugs available and hand hygiene performed  Peripheral Block  Block type: other, TAP (Subcostal TAP)  Prep: ChloraPrep  Patient position: supine  Patient monitoring: cardiac monitor, continuous pulse oximetry, heart rate and blood pressure  Anesthesia laterality: B/L.  Injection technique: ultrasound guided    Ultrasound used to visualize needle placement in proximity to nerve being blocked: yes   Permanent ultrasound image captured for medical record  Sterile gel and probe cover used for ultrasound.    Needle  Needle type: echogenic   Needle gauge: 20G  Needle length: 6 in  no peripheral nerve catheter placed  Assessment  Injection assessment: no difficulty with injection, negative aspiration for heme, no paresthesia on injection and incremental injection

## 2021-06-24 NOTE — ANESTHESIA POSTPROCEDURE EVALUATION
Patient: Ruth Vanegas  ROBOTIC TOTAL LAPAROSCOPIC HYSTERECTOMY, BILATERAL SALPINGECTOMY, LEFT OVARIAN CYSTOTOMY. CYSTOSCOPY  Anesthesia type: general    Patient location: PACU  Last vitals:   Vitals:    03/04/19 1330   BP: 159/90   Pulse: 84   Resp: 12   Temp:    SpO2: 95%     Post vital signs: stable  Level of consciousness: awake and responds to simple questions  Post-anesthesia pain: pain controlled  Post-anesthesia nausea and vomiting: no  Pulmonary: unassisted, return to baseline  Cardiovascular: stable and blood pressure at baseline  Hydration: adequate  Anesthetic events: no    QCDR Measures:  ASA# 11 - Arianne-op Cardiac Arrest: ASA11B - Patient did NOT experience unanticipated cardiac arrest  ASA# 12 - Arianne-op Mortality Rate: ASA12B - Patient did NOT die  ASA# 13 - PACU Re-Intubation Rate: ASA13B - Patient did NOT require a new airway mgmt  ASA# 10 - Composite Anes Safety: ASA10A - No serious adverse event    Additional Notes:

## 2021-06-24 NOTE — PROGRESS NOTES
Preoperative Exam    Scheduled Procedure: ROBOTIC TOTAL LAPAROSCOPIC HYSTERECTOMY, BILATERAL SALPINGECTOMY  Surgery Date:  03/04/2019  Surgery Location: [facility], fax [phone]    Surgeon:  Zia Farmer MD    Assessment/Plan:     1. Pre-op exam  2. Excessive bleeding in premenopausal period  3. Type 2 diabetes mellitus with diabetic neuropathy, without long-term current use of insulin (H)  4. Pure hypercholesterolemiat  5. Microalbuminuria  - HM2(CBC w/o Differential)  - Glycosylated Hemoglobin A1c  - Comprehensive Metabolic Panel  - Electrocardiogram Perform and Read  - Microalbumin, Random Urine  - LDL Cholesterol, Direct  6. Class 3 severe obesity without serious comorbidity with body mass index (BMI) of 50.0 to 59.9 in adult, unspecified obesity type (H)  7. Type 2 diabetes mellitus without complication, without long-term current use of insulin (H)  - liraglutide (VICTOZA 3-SARAH) 0.6 mg/0.1 mL (18 mg/3 mL) PnIj injection; ADMINISTER 1.2 MG UNDER THE SKIN EVERY DAY  Dispense: 18 mL; Refill: 3  Past medical history, problem list, past surgical history, family history, social history, medications reviewed, reconciled, updated.   Diabetes controlled. LDL not at goal. Blood pressure normal. Encouraged compliance with medication, refilled as requested. Needs to follow up for diabetes check in three months.   EKG collected and reviewed  Medication instructions discussed.   Hemogram normal.  Declines influenza vaccine.   Cleared for surgery with appropriate anesthesia.   Surgical Procedure Risk: Intermediate (reported cardiac risk generally 1-5%)  Have you had prior anesthesia?: Yes  Have you or any family members had a previous anesthesia reaction:  No  Do you or any family members have a history of a clotting or bleeding disorder?: No  Cardiac Risk Assessment: no increased risk for major cardiac complications  Patient approved for surgery with general or local anesthesia.  Functional Status:  Independent  Patient plans to recover at home with family.     Subjective:      Ruth Vanegas is a 33 y.o. female who presents for a preoperative consultation.  Patient reports she has cervical cancer. Awaiting records. Last records noting menorrhagia that is severe .was working with [facility]. She feels well today. Is scheduled for total hysterectomy. She denies recent illness or hospitalization.   Lost to follow up with diabetes. Reports she is taking her medication as directed. Is not sure how her glucose is running. Has not seen an eye doctor lately.     All other systems reviewed and are negative, other than those listed in the HPI.    Pertinent History  Do you have difficulty breathing or chest pain after walking up a flight of stairs: No  History of obstructive sleep apnea: No  Steroid use in the last 6 months: No  Frequent Aspirin/NSAID use: Yes: as needed.   Prior Blood Transfusion: No  Prior Blood Transfusion Reaction: No  If for some reason prior to, during or after the procedure, if it is medically indicated, would you be willing to have a blood transfusion?:  There is no transfusion refusal.    Current Outpatient Medications   Medication Sig Dispense Refill     aspirin-acetaminophen-caffeine (EXCEDRIN MIGRAINE) 250-250-65 mg per tablet Take 1 tablet by mouth every 8 (eight) hours as needed for pain.        blood glucose test (ACCU-CHEK TARIQ PLUS TEST STRP) strips Use 1 each As Directed as needed. Dispense brand per patient's insurance at pharmacy discretion. 100 strip 5     blood-glucose meter (ACCU-CHEK TARIQ CONNECT METER) Misc Use to check glucose 3 times per day 1 each 0     gabapentin (NEURONTIN) 300 MG capsule TAKE 1 CAPSULE(300 MG) BY MOUTH THREE TIMES DAILY 90 capsule 0     generic lancets (ACCU-CHEK MULTICLIX LANCET) Use 1 each As Directed as needed. Dispense brand per patient's insurance at pharmacy discretion. 100 each 5     ibuprofen (ADVIL,MOTRIN) 600 MG tablet Take 1 tablet (600 mg  "total) by mouth every 6 (six) hours as needed for pain. 30 tablet 0     ISIBLOOM 0.15-0.03 mg per tablet Take 1 tablet by mouth daily.       liraglutide (VICTOZA) 0.6 mg/0.1 mL (18 mg/3 mL) PnIj injection Take 1.2mg once daily. (Patient taking differently: Inject 1.2 mg under the skin daily. Take 1.2mg once daily.) 15 mL 3     metFORMIN (GLUCOPHAGE-XR) 500 MG 24 hr tablet TAKE 2 TABLETS BY MOUTH DAILY WITH DINNER. (Patient taking differently: Take 1,000 mg by mouth daily with supper. TAKE 2 TABLETS BY MOUTH DAILY WITH DINNER.) 60 tablet 11     miconazole (MICATIN) 2 % cream Apply to affected area 2 times daily. 15 g 1     oxyCODONE-acetaminophen (PERCOCET) 5-325 mg per tablet Take 1 tablet by mouth every 6 (six) hours as needed for pain. 15 tablet 0     pen needle, diabetic (BD ULTRA-FINE RAMON PEN NEEDLES) 32 gauge x 5/32\" Ndle Use to inject Vicotza once daily. 100 each 3     VICTOZA 3-SARAH 0.6 mg/0.1 mL (18 mg/3 mL) PnIj injection ADMINISTER 1.2 MG UNDER THE SKIN EVERY DAY 18 mL 3     No current facility-administered medications for this visit.         Allergies   Allergen Reactions     Hydrocodone Other (See Comments)     Headache per pt.     Reglan [Metoclopramide Hcl] Other (See Comments)     Agitation/anxiety; hives     Tegaderm Ag Mesh [Silver] Itching and Rash     Only issues if in for a long time       Patient Active Problem List   Diagnosis     Calculus of ureter     Diabetes mellitus with neuropathy (H)     Hyperlipidemia     Microalbuminuria     Obesity     Menorrhagia       Past Medical History:   Diagnosis Date     Anemia     told to take iron pills when pregnant     Depression      Diabetes mellitus (H)      Dysthymic disorder      Endometriosis      Herpes genitalia      Hyperlipidemia      Kidney stone      Menorrhagia      Migraines      PCOS (polycystic ovarian syndrome)      Post traumatic stress disorder (PTSD)        Past Surgical History:   Procedure Laterality Date     BLADDER REPAIR W/ "  SECTION      X2     COMBINED HYSTEROSCOPY DIAGNOSTIC / D&C N/A 2015    Procedure: Dilation and Curettage with Hysteroscopy;  Surgeon: Zia Farmer MD;  Location: [facility] Main OR;  Service:      COMBINED HYSTEROSCOPY DIAGNOSTIC / D&C N/A 2016    Procedure: DILATION AND CURETTAGE WITH HYSTEROSCOPY INSERT MIRENA;  Surgeon: Suzanne Major MD;  Location: [facility] OR;  Service:      DIAGNOSTIC LAPAROSCOPY N/A 10/19/2015    Procedure:  LAPAROSCOPY,LYSIS OF ADHESIONS;  Surgeon: Zia Farmer MD;  Location: [facility] OR;  Service:      DILATION AND CURETTAGE OF UTERUS  2015     WI HYSTEROSCOPY,W/ENDOMETRIAL ABLATION N/A 3/2/2018    Procedure: HYSTEROSCOPY, DILATION AND CURETTAGE, ENDOMETRIAL ABLATION;  Surgeon: Kristy Israel DO;  Location: [facility] OR;  Service: Gynecology     WI LAP,TUBAL CAUTERY Bilateral 3/2/2018    Procedure: LAPAROSCOPIC BILATERAL TUBAL LIGATION;  Surgeon: Kristy Israel DO;  Location: [facility] OR;  Service: Gynecology     TONSILLECTOMY       Family History   Problem Relation Age of Onset     Diabetes Paternal Grandmother      Cancer Father         prostate     Alcohol abuse Sister      Alcohol abuse Brother      Alcohol abuse Daughter      Urolithiasis Neg Hx      Clotting disorder Neg Hx      Gout Neg Hx      Heart disease Neg Hx        Social History     Socioeconomic History     Marital status: Single     Spouse name: Not on file     Number of children: Not on file     Years of education: Not on file     Highest education level: Not on file   Occupational History     Occupation: [employer]     Employer: [employer]   Social Needs     Financial resource strain: Not on file     Food insecurity:     Worry: Not on file     Inability: Not on file     Transportation needs:     Medical: Not on file     Non-medical: Not on file   Tobacco Use     Smoking status: Never Smoker     Smokeless tobacco: Never Used   Substance and Sexual  Activity     Alcohol use: No     Drug use: No     Sexual activity: Not on file   Lifestyle     Physical activity:     Days per week: Not on file     Minutes per session: Not on file     Stress: Not on file   Relationships     Social connections:     Talks on phone: Not on file     Gets together: Not on file     Attends Restorationist service: Not on file     Active member of club or organization: Not on file     Attends meetings of clubs or organizations: Not on file     Relationship status: Not on file     Intimate partner violence:     Fear of current or ex partner: Not on file     Emotionally abused: Not on file     Physically abused: Not on file     Forced sexual activity: Not on file   Other Topics Concern     Not on file   Social History Narrative     Not on file       Objective:       Physical Exam:  Vitals:    02/20/19 1559   BP: 120/88   Pulse: (!) 102   Resp: 20   Temp: 97.3  F (36.3  C)   SpO2: 95%       General Appearance:  Alert, cooperative, no distress, appears stated age   Head:  Normocephalic, without obvious abnormality, atraumatic   Eyes:  PERRL, conjunctiva/corneas clear, EOM's intact   Ears:  Normal TM's and external ear canals, both ears   Nose: Nares normal, septum midline,mucosa normal, no drainage    Throat: Lips, mucosa, and tongue normal; teeth and gums normal   Neck: Supple, symmetrical, trachea midline, no adenopathy;  thyroid: not enlarged, symmetric, no tenderness/mass/nodules; no carotid bruit or JVD   Lungs:   Clear to auscultation bilaterally, respirations unlabored   Heart:  Regular rate and rhythm, S1 and S2 normal, no murmur, rub, or gallop   Abdomen:   Soft, non-tender, bowel sounds active all four quadrants,  no masses, no organomegaly   Extremities: Extremities normal, atraumatic, no cyanosis or edema   Skin: Skin color, texture, turgor normal, no rashes or lesions   Neurologic: Normal, CN 2-12 intact       Recent Results (from the past 240 hour(s))   Pregnancy, Urine    Collection  Time: 02/20/19  3:50 PM   Result Value Ref Range    Pregnancy Test, Urine Negative Negative   Microalbumin, Random Urine    Collection Time: 02/20/19  4:24 PM   Result Value Ref Range    Microalbumin, Random Urine 1.79 0.00 - 1.99 mg/dL    Creatinine, Urine 245.4 mg/dL    Microalbumin/Creatinine Ratio Random Urine 7.3 <=19.9 mg/g   Electrocardiogram Perform and Read    Collection Time: 02/20/19  4:37 PM   Result Value Ref Range    SYSTOLIC BLOOD PRESSURE  mmHg    DIASTOLIC BLOOD PRESSURE  mmHg    VENTRICULAR RATE 97 BPM    ATRIAL RATE 97 BPM    P-R INTERVAL 158 ms    QRS DURATION 92 ms    Q-T INTERVAL 366 ms    QTC CALCULATION (BEZET) 464 ms    P Axis 31 degrees    R AXIS 8 degrees    T AXIS -1 degrees    MUSE DIAGNOSIS       Normal sinus rhythm  Moderate voltage criteria for LVH, may be normal variant  Borderline ECG  When compared with ECG of 28-FEB-2018 09:33,  No significant change was found  Confirmed by CARLOS STEWART MD LOC: ([id number]) on 2/21/2019 8:27:50 AM     HM2(CBC w/o Differential)    Collection Time: 02/20/19  4:42 PM   Result Value Ref Range    WBC 10.1 4.0 - 11.0 thou/uL    RBC 4.40 3.80 - 5.40 mill/uL    Hemoglobin 12.2 12.0 - 16.0 g/dL    Hematocrit 37.0 35.0 - 47.0 %    MCV 84 80 - 100 fL    MCH 27.7 27.0 - 34.0 pg    MCHC 33.0 32.0 - 36.0 g/dL    RDW 12.5 11.0 - 14.5 %    Platelets 288 140 - 440 thou/uL    MPV 8.2 7.0 - 10.0 fL   Glycosylated Hemoglobin A1c    Collection Time: 02/20/19  4:42 PM   Result Value Ref Range    Hemoglobin A1c 7.5 (H) 3.5 - 6.0 %   Comprehensive Metabolic Panel    Collection Time: 02/20/19  4:42 PM   Result Value Ref Range    Sodium 141 136 - 145 mmol/L    Potassium 3.9 3.5 - 5.0 mmol/L    Chloride 103 98 - 107 mmol/L    CO2 28 22 - 31 mmol/L    Anion Gap, Calculation 10 5 - 18 mmol/L    Glucose 107 70 - 125 mg/dL    BUN 9 8 - 22 mg/dL    Creatinine 0.77 0.60 - 1.10 mg/dL    GFR MDRD Af Amer >60 >60 mL/min/1.73m2    GFR MDRD Non Af Amer >60 >60 mL/min/1.73m2     Bilirubin, Total 0.3 0.0 - 1.0 mg/dL    Calcium 9.6 8.5 - 10.5 mg/dL    Protein, Total 7.4 6.0 - 8.0 g/dL    Albumin 3.7 3.5 - 5.0 g/dL    Alkaline Phosphatase 77 45 - 120 U/L    AST 11 0 - 40 U/L    ALT 13 0 - 45 U/L   LDL Cholesterol, Direct    Collection Time: 02/20/19  4:42 PM   Result Value Ref Range    Direct  (H) <=129 mg/dl         Immunization History   Administered Date(s) Administered     DTP 02/14/1990, 07/16/1990, 08/27/1991, 06/02/1998     HPV Quadrivalent 07/27/2009, 01/12/2010, 07/19/2011     Hep B, Adult 02/06/2006, 03/16/2006     Hep B, historic 06/02/1998, 02/06/2006, 07/19/2011     IPV 02/14/1990, 07/16/1990, 08/27/1991     Influenza, inj, historic,unspecified 01/12/2010     Influenza,seasonal, Inj IIV3 10/21/2003, 11/02/2005, 01/12/2010, 11/01/2011, 09/24/2015     MMR 07/16/1990, 06/02/1998     POLIO, Unspecified 02/14/1990, 07/16/1990, 08/27/1991     Td, Adult, Absorbed 06/17/2004, 02/06/2006     Td, adult adsorbed, PF 06/02/1998     Tdap 07/27/2009     Varicella 05/04/2016, 06/06/2016           Electronically signed by Milton Banks MD 02/20/19 3:48 PM

## 2021-06-25 NOTE — TELEPHONE ENCOUNTER
Triage call:    Caller: Patient     She is in [place] [facility] right now and is asking to have Zofran, as she didn't bring it with.  Patient is on a new injection med for her diabetes.  It is making her very nauseated.  She is struggling to keep food down, she is able to keep most fluids down.  Last time she was able to keep food down was on Thursday.   Glucose is 99 when on phone.       Last visit virtual with PCP on 5/18/21.    Patient has an active prn prescription for Zofran and refill remaining.  Her pharmacy is closed on Sunday's.       [facility] in [facility] is where she would like to have it sent to.   [address] [place] [address].  Called and that location no longer has a pharmacy.  Called patient back and she gave location of  [place] [id number] [facility].  Called and gave telephone order for Zofran 4mg tablets to [facility] pharmacist.    Called patient back and notified her of prescription called in, no further questions.     Pt was advised of protocol recommendation/disposition of homecare    Kristy Lindsey RN 06/13/21 2:54 PM   [facility] Nurse Advisor      COVID 19 Nurse Triage Plan/Patient Instructions    Please be aware that novel coronavirus (COVID-19) may be circulating in the community. If you develop symptoms such as fever, cough, or SOB or if you have concerns about the presence of another infection including coronavirus (COVID-19), please contact your health care provider or visit [email or web address].     Disposition/Instructions    Home care recommended. Follow home care protocol based instructions.    Thank you for taking steps to prevent the spread of this virus.  o Limit your contact with others.  o Wear a simple mask to cover your cough.  o Wash your hands well and often.    Resources    [facility]: About COVID-19: [email or web address]    CDC: What to Do If You're Sick: www.cdc.gov/coronavirus/2019-ncov/about/steps-when-sick.html    CDC: Ending Home Isolation:  www.cdc.gov/coronavirus/2019-ncov/hcp/disposition-in-home-patients.html     CDC: Caring for Someone: www.cdc.gov/coronavirus/2019-ncov/if-you-are-sick/care-for-someone.html     [facility]: Interim Guidance for Hospital Discharge to Home: www.health.[facility].[place]/diseases/coronavirus/hcp/hospdischarge.pdf    [facility] clinical trials (COVID-19 research studies): clinicalaffairs.[facility]/umn-clinical-trials     Below are the COVID-19 hotlines at the [place] Department of [facility] ([facility]). Interpreters are available.   o For health questions: Call 614-961-0571 or 1-704.903.5159 (7 a.m. to 7 p.m.)  o For questions about schools and childcare: Call 026-041-7074 or 1-889.113.1071 (7 a.m. to 7 p.m.)     Additional Information    Negative: Drug overdose and triager unable to answer question    Negative: Caller requesting information unrelated to medicine    Negative: Caller requesting a prescription for Strep throat and has a positive culture result    Negative: Rash while taking a medication or within 3 days of stopping it    Negative: Immunization reaction suspected    Negative: [1] Asthma and [2] having symptoms of asthma (cough, wheezing, etc.)    Negative: [1] Influenza symptoms AND [2] anti-viral med prescription request, such as Tamiflu    Negative: [1] Symptom of illness (e.g., headache, abdominal pain, earache, vomiting) AND [2] more than mild    Protocols used: MEDICATION QUESTION CALL-A-

## 2021-06-25 NOTE — PROGRESS NOTES
5/25/2021   Clinic Care Coordination Contact    Community Health Worker Follow Up    Goals:   Goals Addressed                 This Visit's Progress       Patient Stated      other (pt-stated)   50%     Goal Statement: I will have plan of care with endocrine team within 1-2 months   Date Goal set: 2.22.21 update 03/31/21    Barriers: access  Strengths: pt engagement  Date to Achieve By: April 30-21   Patient expressed understanding of goal: yes  Action steps to achieve this goal:  1. I will wait for the [facility] to call and schedule to see another endocrinologist.   4. I will update CCC RN on 6-11-21 at 3pm for support and plan of care and if any additional resources or support needed.      Updated: 5-25-21 AL         Called and spoke to patient and follow up on goal.  Patient reported:  -attended endocrinology appt but did not feel heard and she did not address her concerns. She did not have good experience.  Stated she was not the right fit for her and  Dr Banks refer her to see different endocrinologist at the [facility].    Offered to discuss with customer advocacy if needed.  She is okay Dr Banks refer her to see different endocrinologist.    Scheduled follow up appt with CCC RN 6-11-21 at 3pm for support and plan of care.    CC RN 6-11-21 at 3pm    CHW Follow up: Monthly    CHW Plan: Follow up on goals    CHW Next Follow Up: 7-12-21

## 2021-06-25 NOTE — TELEPHONE ENCOUNTER
"Spoke w/ pt. She reports she was at 63 this morning, was low yesterday morning as well. Her glipizide is not XR and she is taking it about 5-6pm for the evening dose. She has been taking 10mg two times a day as she was not able to cut it in half. Lantus is at 22 units.   Pt explains she is frustrated because she is chasing highs and lows now. She feels her DM has been uncontrolled for awhile and nothing is really helping. Discussed sending in 2.5 mg tabs of glipizide so she could take 7.5 mg. However, this will not help much with her fluctuations as she still gets high. Discussed GLP-1 medications. She has tried bydureon in the past and it made her sick. She was on victoza for awhile but it \"stopped working,\" that's when she started the insulin she believes. She reports trulicity was not covered. Discussed trying Ozempic to see if it is covered and if she can tolerate it. Pt agrees to this.     Rx for Ozempic sent. Pt to discontinue glipizide when starting ozempic. She will call if she has any issues picking it up or any concerns.   Otherwise, will upload her martine again in about 2 weeks to look at readings.   "

## 2021-06-26 NOTE — PROGRESS NOTES
Clinic Care Coordination Contact    Follow Up Progress Note      Assessment: RN JULIANO outreach and spoke with pt  Pt states that there have been several previous providers involved with care and unclear about who is lead    Discussed CCC and medical home with PCP    Pt has been engaged with MTM and endocrine team for review of medications due to side effects      Pt had previously been working with a BHP at [facility], however, they are no longer with clinic and she is expecting a call from clinic to support establishing with new provider    RN CC offered to help support facilitating appointment  if she has not heard by next outreach, pt agree          Goals addressed this encounter:   Goals Addressed                 This Visit's Progress       Patient Stated      Mental Health Management (pt-stated)        Goal Statement: I need new therapist within 2 months  Date Goal set: 6.11.21  Barriers: previous provider no longer at [facility]  Strengths: pt engagement with care team  Date to Achieve By: August  Patient expressed understanding of goal: yes  Action steps to achieve this goal:  1. I will schedule visit with new provider   2. I will report to my Community Health Worker if any additional resources or support needed.                    Plan:     Pt will continue to follow recommendations for medication management and reach out to prescribing provider or MTM if concern for side effect increases  Pt to attend scheduled visit with specialty provider and PCP as recommended     Care Coordinator will follow up in 2-3 weeks

## 2021-06-26 NOTE — PROGRESS NOTES
Clinic Care Coordination Contact  [facility]/Voicemail     Chart review notes pt was able to schedule MTM visit   Unclear about BHP new provider goal    Clinical Data: Care Coordinator Outreach  Outreach attempted x 1.  Left message on patient's voicemail with call back information and requested return call.  Plan: Community Health Worker to outreach per standard work and updated on goal progression      RN CC will review in 4-6 weeks to support ongoing recommendations and plan of care will be available sooner if needed.

## 2021-06-26 NOTE — PROGRESS NOTES
Discharge Summary  Patient Name: Ruth Vanegas  Date: 6/10/2021  Referral Diagnosis: Dizziness  Referring provider: Norman Quinones MD  Visit Diagnosis:   1. Dizziness     2. Unsteadiness on feet     3. Decreased activities of daily living (ADL)     4. Nausea         Goal status: Unable to assess as patient did not return.    Patient was seen for 1 visit. The patient discontinued therapy, did not return.    The patient will need a new referral to resume.    Thank you for your referral.  Clemencia Grider  6/10/2021  9:12 PM

## 2021-06-26 NOTE — PROGRESS NOTES
Clinic Care Coordination Contact    Situation: Patient chart reviewed by care coordinator.    Background: SW completed chart review    Assessment: Patient continues to work with providers, CHW, and CCC RN    Plan/Recommendations: Standard Outreach

## 2021-06-29 NOTE — PROGRESS NOTES
Progress Notes by Rosmery Aleman RD, CDE at 4/29/2020  1:00 PM     Author: Rosmery Aleman RD, CDE Service: -- Author Type: Diabetes Ed    Filed: 4/29/2020  6:23 PM Encounter Date: 4/29/2020 Status: Attested    : Rosmery Aleman RD, CDE (Registered Dietitian) Cosigner: Milton Banks MD at 4/29/2020 11:15 PM    Attestation signed by Milton Banks MD at 4/29/2020 11:15 PM    Agree with above                Diabetes Educator has received verbal consent for a telephone visit for the patient.    DIABETES EDUCATION CARE PLAN    Assessment/Plan:     Initial visit for diabetes education and insulin adjustment. Patient continues to have frequent headaches, light sensitivity, dizziness, blurry vision and is nauseated. She had to leave work early today. Patient is working hard to manage her weight and diabetes. Discussed food habits and diet guidelines. She stopped drinking sugar soda and juice, decreased her portions and is drinking more water. Ruth has also increased her activity. Ruth is checking her blood sugar 2-3 times per day. Most blood sugars are above target. The 30 day blood sugar average is 194. Discussed factors that increase blood sugars including pain. Reviewed how she is taking the Lantus as she taught herself. Reviewed symptoms and treatment of high and low blood sugars. Per medication protocol increased Lantus by 2 units with instructions on how to titrate.    Current diabetes medications:   Metformin Extended Release 3458-6-3818-0  Lantus Solostar 0-0-0-12    Plan:  1. Check blood sugar twice per day (before breakfast and bedtime).  2. Increase Lantus to 14 units tonight.  3. Continue to increase Lantus by 2 units every 3 days until the morning blood sugar (fasting) is 100-130 consistently. Do not exceed 30 units Lantus per day.  4. Try adding protein to the bedtime snack. Portion out snacks.  5. Buy glucose tablets at any pharmacy. Carry a form of glucose in your purse and in your car to  "treat lows (less 70).  6. Continue to drink a lot of water to lower blood sugar and protect your kidneys.  7. Next telephone visit 20 at 12:30 pm.    Subjective and Objective:     Ruth Vanegas is a 34 y.o. female referred by Milton Banks MD.    Works full-time     Wt Readings from Last 3 Encounters:   20 (!) 320 lb (145.2 kg)   20 (!) 311 lb 0.4 oz (141.1 kg)   20 (!) 314 lb (142.4 kg)       Lab Results   Component Value Date    HGBA1C 10.5 (H) 2020     Physical Activity: Walks outside 4-5 times per week for 45-60 minutes, moving more  Likes to walk around [place] (1.6 miles)    Diet/Eating Habits: Eats 3 meals per day and occasional snacks.  Breakfast: breakfast sandwich. Drinking water with all meals  Lunch: brings from home or picks up [employer] or salad  Dinner: Meat, veggies and starch (small portions)  HS snacks: fruit snacks, string cheese with meat    SMBG pattern/BG ranges: Uses Accu-Chek TARIQ meter  Tests 2 times per day   FB, 174, 190  After dinner: 190-220 range     Hypoglycemia: none    EDUCATION RECORD  Sent information to email. Pedro Understanding Diabetes, American Diabetes Association \"Factors that raise blood sugar\", FV Healthy Snack List, FV How to use a Solostar pen and insulin injection site rotation    Monitoring   Meter: Assessed and Discussed and Competent  Monitoring: Discussed and Literature provided  BG goals: Discussed and Literature provided    Nutrition Management  Nutrition Management: Discussed  Weight: Assessed and Discussed  Portions/Balance: Assessed and Discussed  Carb ID/Count: Discussed and Literature provided  Label Reading: Discussed and Literature provided  Heart Healthy Fats: Discussed and Literature provided  Physical Activity: Assessed and Discussed    Medication  Oral diabetes medications: Assessed  Injected Medications: Discussed and was not doing the 2 unit air shot  Storage/Exp:Assessed, Discussed, Literature provided "   Site Rotation: Discussed and Literature provided   Disposal of Sharps:Discussed and Literature provided    Diabetes Disease Process: Discussed and Literature provided    Acute Complications: Prevent, Detect, Treat:  Hypoglycemia: Assessed and Discussed and Literature provided  Hyperglycemia: Assessed and Discussed and Literature provided  Sick Days: Not addressed    Chronic Complications  Foot Care:Not addressed  Skin Care: Not addressed  Eye: Not addressed  ABC: Discussed and Competent  Teeth:Not addressed  Goal Setting and Problem Solving: Discussed and Literature provided  Barriers: Assessed  Psychosocial Adjustments: Assessed      Time spent with the patient: 60 minutes   Previous Education: yes  Visit Type:Diabetes Self-Management Training ()  Diagnosis per referral:Type 2 diabetes, uncontrolled with neuropathy with use of insulin (E11.40, E11.65, Z79.4)      Rosmery Aleman RD, LD, [facility]  Certified Diabetes Care and   4/29/2020    Any diabetes medication dose changes were made via the CDE Protocol and Collaborative Practice Agreement with the patient's provider. A copy of this encounter was shared with the provider for co-signing.

## 2021-06-29 NOTE — PROGRESS NOTES
Progress Notes by Randall Allison, PharmD at 6/23/2020 12:30 PM     Author: Randall Allison, PharmD Service: -- Author Type: Pharmacist    Filed: 6/23/2020  3:06 PM Encounter Date: 6/23/2020 Status: Signed    : Randall Allison, PharmD (Pharmacist)       MTM Follow Up Encounter  Assessment & Plan                                                       Diabetes: Partially improved -A1C above goal <8%. Fasting sugars still above goal  and post prandial sugars still above goal <180, however slightly lower since increasing Invokana to 300 mg about a week ago. Pt worried that sugars are spiking after meals no matter what she's eating. CGM results do indicate spikes around lunch time. However BG otherwise show normal peaks around meals. May benefit from restarting the glipizide to manage prandial sugars. Next step could be addition of Actos if needed.      Pt having trouble keeping sensor on arm. Allergic to tegaderm. Suggested trial of SkinTac. Mfgr has not studied alternate site placement, but other readers use the abdominal area. Pt could try placing sensor in alternative site and initially double checking readings with finger sticks. As CGM tests interstitial fluids, the readings will not be the same, but a ball park to estimate accuracy of abdominal sensor placement would suffice.     Last BP at goal <140/90. Indicated for ACEi for microalbuminuria. Will defer. Invokana likely offering some renal protection. Appropriately not on aspirin given age. LDL above goal <100. Likely needs higher intensity statin, but as pt is worried about side effects, will defer for now   -Start Glipizide 5 mg two times a day   -Try SkinTac before placing sensors. Or okay to try on abdominal area.   -Long-term goal: Minimize insulin   -Future consideration: Increase atorvastatin to 40 mg daily, ACEi, Actos       Neuropathy/Migraines/Anxiety: Needs improvement - Addition of low dose propranolol has not been helpful for migraines. As pt  "is not having adverse effects, could trial increased dose. Usual effective dose range  mg/day. Some patients may benefit from titrating to max of 240 mg daily.     Could consider additional agents in the future if BB not effective. Topiramate may be helpful for weight loss and migraines. Could also consider Duloxetine to help with mood/stress/anxiety/neuropathy/migraines, however at initial discussion of this option, pt was hesitant given previous history of antidepressants (unclear which agent she used in the past).   -Increase Propranolol ER to 120 mg daily  -Future consideration: Topiramate or Duloxetine       Nausea: Needs improvement - given AM nausea, acidic taste, and lots of gas, potentially having reflux symptoms. May benefit from trial of PPI. If not effective, discontinue after 4 weeks.   -Start Omeprazole 20 mg daily       Follow Up  Return in about 4 weeks (around 7/21/2020) for Medication Management Pharmacist, Via Phone.      Subjective & Objective                                                     Ruth Vanegas is a 34 y.o. female called for a follow up visit for Medication Therapy Management. She was referred to me from Milton Banks MD    Patient consented to a telehealth visit: Yes    Chief Complaint: Medication Management - Diabetes management     Medication Adherence/Access: Medications reconciled.     Diabetes:  Pt currently taking metformin 500 mg ER 2 tabs two times a day, and Invokana 300 mg daily. patient is not currently experiencing side effects. Has tried Bydureon in the past - lots of nausea and injection site bumps. Victoza was ineffective.       Increased Invokana dose on 6/15     SMBG: CGM device               Patient feeling jittery and heart racing. Lightheaded. Started after the glipizide. Hasn't had meter with her during these times.    Recent symptoms of high blood sugar? Polyuria, polydipsia, blurry vision. \"all the time. CONSTANTLY\"   ACEi/ARB:  Not currently " prescribed   Statin: Atorvastatin 10 mg daily   Aspirin: Not taking due to age    Diet: Feels like sugars spike no matter what she eats. Eats eggs in the morning for breakfast. Turkey sandwich with morel for lunch. Had a cup of soup and it was high yesterday.     Lab Results   Component Value Date    HGBA1C 10.5 (H) 04/21/2020    HGBA1C 10.6 (H) 02/21/2020    HGBA1C 8.9 (H) 10/16/2019     Lab Results   Component Value Date    MICROALBUR 4.05 (H) 02/21/2020    LDLCALC 193 (H) 02/21/2020    CREATININE 0.57 (L) 04/21/2020        Results for orders placed or performed in visit on 04/21/20   Comprehensive Metabolic Panel   Result Value Ref Range    Sodium 138 136 - 145 mmol/L    Potassium 4.2 3.5 - 5.0 mmol/L    Chloride 101 98 - 107 mmol/L    CO2 27 22 - 31 mmol/L    Anion Gap, Calculation 10 5 - 18 mmol/L    Glucose 157 (H) 70 - 125 mg/dL    BUN 7 (L) 8 - 22 mg/dL    Creatinine 0.57 (L) 0.60 - 1.10 mg/dL    GFR MDRD Af Amer >60 >60 mL/min/1.73m2    GFR MDRD Non Af Amer >60 >60 mL/min/1.73m2    Bilirubin, Total 0.4 0.0 - 1.0 mg/dL    Calcium 9.1 8.5 - 10.5 mg/dL    Protein, Total 6.9 6.0 - 8.0 g/dL    Albumin 3.4 (L) 3.5 - 5.0 g/dL    Alkaline Phosphatase 84 45 - 120 U/L    AST 11 0 - 40 U/L    ALT 16 0 - 45 U/L      Lab Results   Component Value Date    ALT 16 04/21/2020    AST 11 04/21/2020    ALKPHOS 84 04/21/2020    BILITOT 0.4 04/21/2020     Lab Results   Component Value Date    CHOL 267 (H) 02/21/2020    CHOL 254 (H) 10/16/2019    CHOL 239 (H) 02/14/2018     Lab Results   Component Value Date    HDL 53 02/21/2020    HDL 60 10/16/2019    HDL 53 02/14/2018     Lab Results   Component Value Date    LDLCALC 193 (H) 02/21/2020    LDLCALC 173 (H) 10/16/2019    LDLCALC 164 (H) 02/14/2018     Lab Results   Component Value Date    TRIG 103 02/21/2020    TRIG 107 10/16/2019    TRIG 108 02/14/2018     No components found for: CHOLHDL       Neuropathy/Migraines: Prescribed gabapentin 300 mg 2 caps three times a day, and  "Propranolol ER 80 mg daily. Has been using Gabapentin 3 caps three times a day.     Continuing to have daily headaches. Initially did not recall what propranolol was prescribed for, but has been taking it. Doesn't feel like it has been helpful.     Doesn't have a BP cuff at home to check vitals. Having dizziness/lightheadedness. Didn't get worse when     No longer using Excedrin.     Had a hysterectomy last year. No menstrual cycle.     Pulse Readings from Last 3 Encounters:   04/21/20 (!) 109   02/21/20 89   02/04/20 (!) 106     Has a history of depression/anxiety. Not currently using treatment. Currently feels like she's doing okay. \"Anxiety is to be expected\" Having anxiety all of the time. That's from other stuff. PTSD from a situation that happened a while ago. Doesn't get anxiety attacks anymore but they do come every now and then. Feels stressed frequently           Nausea: Prescribed Ondansetron 4 mg Q8H as needed. Nausea comes and goes. Really bad in the morning most days. Usually subsides after breakfast. Has an acidic taste in the mouth when waking up. Denies heartburn concerns. Was going to dentist regularly before COVID. They did not mention any trouble with acid erosion. Does have gas - flatulence and belching and some indigestion if she eats something that doesn't agree with her. Sometimes forcing herself to eat.         PMH: reviewed in EPIC   Allergies/ADRs: reviewed in EPIC   Alcohol:   Social History     Substance and Sexual Activity   Alcohol Use No        Tobacco:   Social History     Tobacco Use   Smoking Status Never Smoker   Smokeless Tobacco Never Used     Today's Vitals: There were no vitals filed for this visit.  ----------------    The patient was given a summary of these recommendations via IFTTT    I spent 30 minutes with this patient today.   All changes were made via collaborative practice agreement with Milton Banks MD. A copy of the visit note was provided to the patient's " "provider.     Randall Allison, PharmD  Medication Therapy Management (MTM) Pharmacist  [facility]      Telemedicine Visit Details    Type of service:  Telephone     Start Time: 12:30 PM  End Time: 12:57 PM    Originating Location (pt. Location): Home    Distant Location (provider location):  [facility] MEDICATION THERAPY MANAGEMENT [facility]     Mode of Communication: Telephone    Current Outpatient Medications   Medication Sig Dispense Refill   ? atorvastatin (LIPITOR) 10 MG tablet Take 1 tablet (10 mg total) by mouth daily. 30 tablet 11   ? blood glucose test (ACCU-CHEK TARIQ PLUS TEST STRP) strips Check blood sugar three times a day 100 strip 5   ? canagliflozin (INVOKANA) 300 mg Tab Take 1 tablet (300 mg total) by mouth daily before breakfast. 90 tablet 3   ? flash glucose scanning reader (FREESTYLE CINDY 14 DAY READER) Misc Use 1 application As Directed every 8 (eight) hours. 1 each 0   ? flash glucose sensor (FREESTYLE CINDY 14 DAY SENSOR) Kit Use 1 application As Directed every 14 (fourteen) days. 2 kit 11   ? gabapentin (NEURONTIN) 300 MG capsule Take 2 capsules (600 mg total) by mouth 3 (three) times a day. 180 capsule 1   ? generic lancets (ACCU-CHEK MULTICLIX LANCET) Check blood sugar three times a day 100 each 5   ? glipiZIDE (GLUCOTROL) 5 MG tablet Take 1 tablet (5 mg total) by mouth 2 (two) times a day before meals. 60 tablet 3   ? metFORMIN (GLUCOPHAGE-XR) 500 MG 24 hr tablet Take 2 tablets (1,000 mg total) by mouth 2 (two) times a day. 360 tablet 2   ? omeprazole (PRILOSEC) 20 MG capsule Take 1 capsule (20 mg total) by mouth daily before breakfast. 30 capsule 1   ? ondansetron (ZOFRAN) 4 MG tablet Take 1 tablet (4 mg total) by mouth every 8 (eight) hours as needed for nausea. 30 tablet 1   ? pen needle, diabetic (INSUPEN) 32 gauge x 1/4\" Ndle USE TO INJECT LANTUS DAILY. 100 each 3   ? propranoloL (INNOPRAN XL) 120 MG 24 hr capsule Take 1 capsule (120 mg total) by mouth at " bedtime. 30 capsule 3     No current facility-administered medications for this visit.

## 2021-06-29 NOTE — PROGRESS NOTES
Progress Notes by Randall Allison, PharmD at 7/21/2020 12:30 PM     Author: Randall Allison, PharmD Service: -- Author Type: Pharmacist    Filed: 7/21/2020  4:33 PM Encounter Date: 7/21/2020 Status: Signed    : Randall Allison PharmD (Pharmacist)       [facility] Follow Up Encounter  Assessment & Plan                                                     Medication Adherence/Access: Long discussion today regarding effectiveness of her medications. Pt very unsure if any of her medications are effective. Feels very overwhelmed. Doesn't want to be on lots of medications. Some of pt's concerns seem related to untreated anxiety (see below). Will continue to make slow adjustments so patient doesn't get overwhelmed and can also focus on assessing one or two conditions at a time.       Nausea: Needs improvement - Unclear if pt has been using Omeprazole. Pt to double check her medicines at home, and ensure she is using PPI before breakfast. Other medications are okay to take after breakfast if that helps her to keep them down. In depth discussion regarding nausea. Given that this has been going on for several months and unchanged despite several med changes, do not suspect med related cause for nausea. If PPI and treatment of anxiety not effective, consider GI referral.   -Pt to verify PPI use.       Diabetes: Improved -Last A1C above goal <8% but readings from CGM device have significantly improved. If pt continues as she has over hte last two weeks, estimated A1C would be 7.3% (glucose management indicator). Despite not being quite at goal yet, as sugars have greatly improved, will not adjust meds at this time. Will focus on anxiety/headaches and can adjust diabetes meds at follow-up.     Last BP at goal <140/90. Indicated for ACEi for microalbuminuria. Will defer. Invokana likely offering some renal protection. Appropriately not on aspirin given age. LDL above goal <100. Likely needs higher intensity statin, but as pt is worried  "about side effects, will defer for now   -Continue current therapy   -Future consideration: Increase atorvastatin to 40 mg daily, ACEi, Increase Glipizide       Neuropathy/Migraines/Anxiety: Needs improvement - Increase in Propranolol dose has not been beneficial for preventing migraines therefore will taper. Discussion with patient that feeling overwhelmed, nausea, diabetes can all worsen with untreated anxiety and despite pt feeling like she's doing okay, a lot of her concerns, over thinking and worrying about meds and health can be symptoms of anxiety. May benefit from alternatives for anxiety/neuropathy/and migraine prevention. SNRIs have a high rate of nausea which pt is already experiencing. TCAs would likely be more appropriate option.    -Reduce Propranolol to 80 mg ER daily x 2 weeks, then 60 mg every other day x 2 weeks, then stop.   -Start Nortriptyline 25 mg at bedtime           Follow Up  Return in about 4 weeks (around 8/18/2020) for Medication Management Pharmacist, Via Phone.  7/23 with Milton Banks MD     Subjective & Objective                                                     Ruth Vanegas is a 34 y.o. female called for a follow up visit for Medication Therapy Management. She was referred to me from Milton Banks MD    Patient consented to a telehealth visit: Yes    Chief Complaint: Medication Management - \"My morning pills make me throw up if I don't take them with food.\"     Medication Adherence/Access: Medications reconciled. \"I'm taking a lot of medications and I don't know what's what and I don't know if anything is helping. If we can find a way for me to control all of this, without meds, that would be preferred\"       Nausea: Prescribed Omeprazole 20 mg daily. Unclear if patient is using this medication. Still very nauseated in the morning. Unable to drink water.     Taking 2 gabapentin, 2 metformin, glipizide, invokana, MVI and an orange capsule in the morning.     Appetite is a " little better, but not significantly. If not eating before she takes medication, pt will throw up.        COVID-19: Tested positive on 7/1/2020. Lost sense of taste and smell. Had some body aches. Today reports that if something isn't strong or potent, still can't smell taste. Otherwise feeling fine. No longer in quarantine. Completed the 2 weeks. Now back to work since last week.       Diabetes:  Pt currently taking metformin 500 mg ER 2 tabs two times a day, Invokana 300 mg daily, and Glipizide 5 mg two times a day. patient is not currently experiencing side effects. Has tried Bydureon in the past - lots of nausea and injection site bumps. Victoza was ineffective.          SMBG: CGM device               Patient feeling jittery and heart racing. Lightheaded. Started after the glipizide. Hasn't had meter with her during these times.    Recent symptoms of high blood sugar? Vision is still blurry.   ACEi/ARB:  Not currently prescribed   Statin: Atorvastatin 10 mg daily   Aspirin: Not taking due to age    Diet: Feels like sugars spike no matter what she eats. Eats eggs in the morning for breakfast. Turkey sandwich with morel for lunch. Had a cup of soup and it was high yesterday.     Lab Results   Component Value Date    HGBA1C 10.5 (H) 04/21/2020    HGBA1C 10.6 (H) 02/21/2020    HGBA1C 8.9 (H) 10/16/2019     Lab Results   Component Value Date    MICROALBUR 4.05 (H) 02/21/2020    LDLCALC 193 (H) 02/21/2020    CREATININE 0.57 (L) 04/21/2020        Results for orders placed or performed in visit on 04/21/20   Comprehensive Metabolic Panel   Result Value Ref Range    Sodium 138 136 - 145 mmol/L    Potassium 4.2 3.5 - 5.0 mmol/L    Chloride 101 98 - 107 mmol/L    CO2 27 22 - 31 mmol/L    Anion Gap, Calculation 10 5 - 18 mmol/L    Glucose 157 (H) 70 - 125 mg/dL    BUN 7 (L) 8 - 22 mg/dL    Creatinine 0.57 (L) 0.60 - 1.10 mg/dL    GFR MDRD Af Amer >60 >60 mL/min/1.73m2    GFR MDRD Non Af Amer >60 >60 mL/min/1.73m2     "Bilirubin, Total 0.4 0.0 - 1.0 mg/dL    Calcium 9.1 8.5 - 10.5 mg/dL    Protein, Total 6.9 6.0 - 8.0 g/dL    Albumin 3.4 (L) 3.5 - 5.0 g/dL    Alkaline Phosphatase 84 45 - 120 U/L    AST 11 0 - 40 U/L    ALT 16 0 - 45 U/L      Lab Results   Component Value Date    ALT 16 04/21/2020    AST 11 04/21/2020    ALKPHOS 84 04/21/2020    BILITOT 0.4 04/21/2020     Lab Results   Component Value Date    CHOL 267 (H) 02/21/2020    CHOL 254 (H) 10/16/2019    CHOL 239 (H) 02/14/2018     Lab Results   Component Value Date    HDL 53 02/21/2020    HDL 60 10/16/2019    HDL 53 02/14/2018     Lab Results   Component Value Date    LDLCALC 193 (H) 02/21/2020    LDLCALC 173 (H) 10/16/2019    LDLCALC 164 (H) 02/14/2018     Lab Results   Component Value Date    TRIG 103 02/21/2020    TRIG 107 10/16/2019    TRIG 108 02/14/2018     No components found for: CHOLHDL       Neuropathy/Migraines: Prescribed gabapentin 300 mg 2 caps three times a day, and Propranolol  mg daily. Has been using Gabapentin 3 caps three times a day.     Since increasing Propranolol ER has had some dizziness, but also had COVID.. Continues to have daily headaches, from the time she wakes up.     Walks around the skyway during her breaks, gets dizzy and will sit down.     Pulse Readings from Last 3 Encounters:   04/21/20 (!) 109   02/21/20 89   02/04/20 (!) 106     Has a history of depression/anxiety. Not currently using treatment. Currently feels like she's doing okay. \"Anxiety is to be expected\" Feels likes she's doing pretty well.               PMH: reviewed in EPIC   Allergies/ADRs: reviewed in EPIC   Alcohol:   Social History     Substance and Sexual Activity   Alcohol Use No        Tobacco:   Social History     Tobacco Use   Smoking Status Never Smoker   Smokeless Tobacco Never Used     Today's Vitals: There were no vitals filed for this visit.  ----------------    The patient was given a summary of these recommendations via Cegal    I spent 30 minutes " with this patient today.   All changes were made via collaborative practice agreement with Milton Banks MD. A copy of the visit note was provided to the patient's provider.     Sariah WilsonD  Medication Therapy Management (MTM) Pharmacist  [facility]      Telemedicine Visit Details    Type of service:  Telephone     Start Time: 12:30 PM  End Time: 1:14 PM    Originating Location (pt. Location): Home    Distant Location (provider location):  [facility] MEDICATION THERAPY MANAGEMENT [facility]     Mode of Communication: Telephone    Current Outpatient Medications   Medication Sig Dispense Refill   ? atorvastatin (LIPITOR) 10 MG tablet Take 1 tablet (10 mg total) by mouth daily. 30 tablet 11   ? blood glucose test (ACCU-CHEK TARIQ PLUS TEST STRP) strips Check blood sugar three times a day 100 strip 5   ? canagliflozin (INVOKANA) 300 mg Tab Take 1 tablet (300 mg total) by mouth daily before breakfast. 90 tablet 3   ? flash glucose scanning reader (FREESTYLE CINDY 14 DAY READER) Misc Use 1 application As Directed every 8 (eight) hours. 1 each 0   ? flash glucose sensor (FREESTYLE CINDY 14 DAY SENSOR) Kit Use 1 application As Directed every 14 (fourteen) days. 2 kit 11   ? gabapentin (NEURONTIN) 300 MG capsule Take 2 capsules (600 mg total) by mouth 3 (three) times a day. 180 capsule 1   ? generic lancets (ACCU-CHEK MULTICLIX LANCET) Check blood sugar three times a day 100 each 5   ? glipiZIDE (GLUCOTROL) 5 MG tablet Take 1 tablet (5 mg total) by mouth 2 (two) times a day before meals. 60 tablet 3   ? metFORMIN (GLUCOPHAGE-XR) 500 MG 24 hr tablet Take 2 tablets (1,000 mg total) by mouth 2 (two) times a day. 360 tablet 2   ? omeprazole (PRILOSEC) 20 MG capsule Take 1 capsule (20 mg total) by mouth daily before breakfast. 30 capsule 1   ? ondansetron (ZOFRAN) 4 MG tablet Take 1 tablet (4 mg total) by mouth every 8 (eight) hours as needed for nausea. 30 tablet 1   ? pen needle, diabetic  "(INSUPEN) 32 gauge x 1/4\" Ndle USE TO INJECT LANTUS DAILY. 100 each 3   ? propranoloL (INNOPRAN XL) 120 MG 24 hr capsule Take 1 capsule (120 mg total) by mouth at bedtime. 30 capsule 3     No current facility-administered medications for this visit.                 "

## 2021-06-29 NOTE — PROGRESS NOTES
Progress Notes by Rosmery Aleman RD, CDE at 5/13/2020 12:30 PM     Author: Rosmery Aleman RD, CDE Service: -- Author Type: Diabetes Ed    Filed: 5/13/2020  6:20 PM Encounter Date: 5/13/2020 Status: Attested    : Rosmery Aleman RD, CDE (Registered Dietitian) Cosigner: Milton Banks MD at 5/13/2020  9:27 PM    Attestation signed by Milton Banks MD at 5/13/2020  9:27 PM    Agree with above.                Diabetes Educator has received verbal consent for a telephone visit for the patient.    DIABETES EDUCATION CARE PLAN    Assessment/Plan:     Follow-up visit for diabetes education and insulin adjustment. Ruth Vanegas is checking blood sugar twice per day. All blood sugars are above target.  .180, 146, 154, 182, 171, 151, 192, 171, 151, 142, 171, 190, 146, 163  After dinner 215,276, 209, 198, 201, 210, 232, 230, 251, 209, 201, 205, 275, 192    Per medication protocol increased the Lantus by 2 units. The highest blood sugars are after dinner. Continuing to increase Lantus may not lower blood sugars later in the day. Ruth may need an additional medication. Recommend a SGLT-2 inhibitor (Jardiance) since it does not have a side effect of a headache.    Ruth is still not feeling well (headaches) and is nauseated. She is forcing herself to eat and her portions are small. Discussed that pain and stress also increase blood sugar levels. A video visit is scheduled tomorrow with Dr. Banks regarding the illness.    Current diabetes medications:  Metformin Extended Release [id number]  Lantus Solostar 0-0-0-14     Plan:  1. Check blood sugar twice per day (before breakfast and bedtime).  2. Increase Lantus to 16 units tonight.  3. Continue to increase Lantus by 2 units every 3 days until the morning blood sugar (fasting) is 100-130 consistently. Do not exceed 30 units Lantus per day.  4. When blood sugars are above 200 drink a lot of water.  5. Next diabetes education telephone visit in 2 weeks on  5/27/2020 at 12:30 pm.    Subjective/Objective:      Ruth Vanegas is a 34 y.o. female referred by Milton Banks MD.  Works full-time as a  at a pediatric office    Wt Readings from Last 3 Encounters:   04/21/20 (!) 320 lb (145.2 kg)   02/21/20 (!) 311 lb 0.4 oz (141.1 kg)   02/04/20 (!) 314 lb (142.4 kg)     Lab Results   Component Value Date    HGBA1C 10.5 (H) 04/21/2020     Getting full faster. Nauseated all day and night. No vomiting.    Diet/Eating Habits: 3 meals per day and 2 snacks  Wakes up at 7:00 am  Starts work at 8:00 am  10:00 am Breakfast at work: 1/2 breakfast sandwich  12-1:00 pm lunch break: may skip or eats leftovers, burger or chicken sandwich or chicken nuggets or fruit and veggies  Dinner: 1 square slice pizza or salmon, spinach, red potatoes  HS snack: forces herself to drink a Premier Protein shake* or 2 slices of cheese     *Premier protein shake has 30 gm protein and 5 gm carb    SMBG pattern/BG ranges: Uses Accu-Chek TARIQ meter  Tests 2 times per day     Hypoglycemia: none    EDUCATION RECORD     Monitoring   Meter (per above goals): Competent  Monitoring: Competent  BG goals: Assessed, Discussed and Competent    Nutrition Management  Nutrition Management: Discussed  Weight: Discussed  Portions/Balance: Assessed and Discussed  Carb ID/Count: Competent  Label Reading: Competent  Heart Healthy Fats: Competent  Physical Activity: Competent    Medication  Orals: Assessed and Discussed  Injected Medications: Assessed and Discussed   Storage/Exp:Competent   Site Rotation: Competent   Disposal of Sharps: Competent    Diabetes Disease Process Competent    Acute Complications: Prevent, Detect, Treat:  Hypoglycemia: Assessed, Discussed and Competent  Hyperglycemia: Assessed, Discussed and Competent  Sick Days: Discussed and Needs instruction/review at follow-up    Chronic Complications  Foot Care:Not addressed  Skin Care: Not addressed  Eye: Not addressed  ABC: Competent  Teeth:Not  addressed  Goal Setting and Problem Solving: Assessed and Discussed  Barriers: Assessed  Psychosocial Adjustments: Assessed      Time spent with the patient: 30 minutes   Visit Type:Diabetes Self-Management Training ()  Diagnosis per referral:Type 2 diabetes uncontrolled with neuropathy, with long-term use of insulin (E11.40, E11.65, Z79.4)        Rosmery Aleman RD, LD, [facility]  Certified Diabetes Care and   5/13/2020    Any diabetes medication dose changes were made via the CDE Protocol and Collaborative Practice Agreement with the patient's provider. A copy of this encounter was shared with the provider for co-signing.

## 2021-06-29 NOTE — PROGRESS NOTES
Progress Notes by Brenden Gibson LGSW at 11/12/2020  1:00 PM     Author: Brenden Gibson LGSW Service: -- Author Type:     Filed: 11/12/2020  1:54 PM Encounter Date: 11/12/2020 Status: Signed    : Brenden Gibson LGSW ()       Clinic Care Coordination Contact  CCC SW contacted Ruth by phone to complete an assessment for enrollment into [facility].    SW completed FRW referral to support with SNAP, CASH, and Energy Assistance.         Clinic Care Coordination Contact  OUTREACH    Referral Information:  Referral Source: PCP    Primary Diagnosis: Psychosocial    Chief Complaint   Patient presents with   ? Clinic Care Coordination - Initial        Universal Utilization:   Clinic Utilization  Difficulty keeping appointments:: No  Compliance Concerns: No  No-Show Concerns: No  No PCP office visit in Past Year: No  Utilization    Last refreshed: 11/12/2020  1:37 PM: Hospital Admissions 0           Last refreshed: 11/12/2020  1:37 PM: ED Visits 2           Last refreshed: 11/12/2020  1:37 PM: No Show Count (past year) 1              Current as of: 11/12/2020  1:37 PM              Clinical Concerns:  Current Medical Concerns:  No concerns     Current Behavioral Concerns: Depression, started therapy last week    Education Provided to patient: Role of CCC   Pain  Pain (GOAL):: No  Health Maintenance Reviewed: Not assessed  Clinical Pathway: None     Medication Management:  No concerns     Functional Status:  Dependent ADLs:: Independent  Dependent IADLs:: Independent  Bed or wheelchair confined:: No  Mobility Status: Independent  Fallen 2 or more times in the past year?: No  Any fall with injury in the past year?: No    Living Situation:  Current living arrangement:: Other(live with her 2 children)  Type of residence:: Apartment    Lifestyle & Psychosocial Needs:        Diet:: (Diet/Appetite: Good, trying to have as much water as possible, paying attention to food intake)  Inadequate  nutrition (GOAL):: No  Tube Feeding: No  Inadequate activity/exercise (GOAL):: No  Significant changes in sleep pattern (GOAL): No  Transportation means:: Regular car     Confucianism or spiritual beliefs that impact treatment:: No  Mental health DX:: Yes  Mental health DX how managed:: Outpatient Counseling, Medication(Has Therapy at [facility] in [place], just got prescribed, discussed ARM)  Mental health management concern (GOAL):: No  Chemical Dependency Status: Not Applicable  Informal Support system:: Children   Socioeconomic History   ? Marital status: Single     Spouse name: Not on file   ? Number of children: Not on file   ? Years of education: Not on file   ? Highest education level: Not on file   Occupational History   ? Occupation: HUC     Employer: [employer]     Tobacco Use   ? Smoking status: Never Smoker   ? Smokeless tobacco: Never Used   Substance and Sexual Activity   ? Alcohol use: No   ? Drug use: No   ? Sexual activity: Yes     Birth control/protection: Surgical                Resources and Interventions:  Current Resources:      Community Resources: None  Supplies Currently Used at Home: None  Equipment Currently Used at Home: none  Type of Employment: Unemployed       Advance Care Plan/Directive  Advanced Care Plans/Directives on file:: No  Advanced Care Plan/Directive Status: Not Applicable          Goals:   Goals        General    Being Active: Stay active every day.     Financial Wellbeing (pt-stated)     Notes - Note created  11/12/2020  1:28 PM by Brenden Gibson LGSW    Goal Statement: I want to be aware of and connected to financial supports for housing and food within 1-2 months  Date Goal set: 11/12/20  Barriers:   Strengths:   Date to Achieve By: 12/30/2020  Patient expressed understanding of goal: Yes  Action steps to achieve this goal:  1. I will work with FRW to review eligibility for SNAP, EA, and CASH  2. I will review resources sent by   3. I will update  CCC team at outreaches         Monitoring: Check blood sugar twice per day.     Problem Solving: always have a form of glucose with you to treat low blood sugars.           Patient/Caregiver understanding: Reported understanding            Plan: Standard Outreach

## 2021-06-30 NOTE — PROGRESS NOTES
"Progress Notes by Ani Dumont AuD at 3/16/2021  2:40 PM     Author: Ani Dumont AuD Service: -- Author Type: Audiologist    Filed: 3/16/2021  3:40 PM Encounter Date: 3/16/2021 Status: Signed    : Ani Dumont AuD (Audiologist)       Audiology Report:    Referring Provider:  Dr. Quinones    Summary: Audiology visit completed. Please see audiogram below or in \"media\" tab for case history and results.      Transducer: Insert earphones and Circumaural headphones    Reliability was good to fair and there was good  SRT to PTA agreement.       PLAN: Ruth is returned to ENT for follow up. She should return as required for medical management.     Destiny Cox, CARMEN-A  [place] Licensed Audiologist #[id number]            "

## 2021-06-30 NOTE — PROGRESS NOTES
Progress Notes by Rosangela Haynes NP at 5/11/2021  7:40 AM     Author: Rosangela Haynes NP Service: -- Author Type: Nurse Practitioner    Filed: 5/11/2021  8:09 AM Encounter Date: 5/11/2021 Status: Signed    : Rosangela Haynes NP (Nurse Practitioner)                   Reason for visit      1. Type 2 diabetes, uncontrolled, with neuropathy (H)    2. Chronic tension-type headache, not intractable        HPI     Ruth Vanegas is a very pleasant 35 y.o. old female who presents for follow up.  SUMMARY:    Ruth is here today in f/u for DM 2. Her current A1c is 8.5 and continues to trend downward from her most recent elevation of 9.2 in November. She is taking INvokana, Lantus, 18 units, Metformin 1000 mg two times a day and Glipizide 5 mg two times a day. She reports that she has been taking the second dose of Glipizide at bedtime. Her Armida report shows that she is trending higher post dinner and overnight. She was within range 61% of the time over the last two weeks and we are looking toward a goal of 70% of the time. She continues to complain of nausea and chronic headaches and she also reports that her ankles and feet are swelling. This does resolve overnight. L>R.     Blood glucose data:      Past Medical History     Patient Active Problem List   Diagnosis   ? Calculus of ureter   ? Type 2 diabetes, uncontrolled, with neuropathy (H)   ? Hyperlipidemia   ? Microalbuminuria   ? S/P laparoscopic hysterectomy   ? Morbid obesity (H)   ? Mood disorder (H)   ? Dysfunctional uterine bleeding   ? Atypical glandular cells on cervical Pap smear   ? Chlamydial infection of lower genitourinary tract   ? Benign paroxysmal positional vertigo, unspecified laterality   ? Nausea        Family History       family history includes Alcohol abuse in her brother, daughter, and sister; Cancer in her father; Diabetes in her paternal grandmother.    Social History      reports that she has never smoked. She has never used  smokeless tobacco. She reports that she does not drink alcohol or use drugs.      Review of Systems     Patient has no polyuria or polydipsia, no chest pain, dyspnea or TIA's, no numbness, tingling or pain in extremities  Remainder negative except as noted in HPI.    Vital Signs     /80 (Patient Site: Right Arm, Patient Position: Sitting, Cuff Size: Adult Large)   Pulse 88   Wt (!) 321 lb 4.8 oz (145.7 kg)   LMP 12/15/2017   BMI 51.86 kg/m    Wt Readings from Last 3 Encounters:   05/11/21 (!) 321 lb 4.8 oz (145.7 kg)   02/17/21 (!) 317 lb (143.8 kg)   11/04/20 (!) 314 lb 12 oz (142.8 kg)       Physical Exam     Constitutional:  Well developed, Well nourished  HENT:  Normocephalic,   Eyes:  PERRL, Conjunctiva pink  Respiratory:  Normal breath sounds, No respiratory distress  Cardiovascular:  Normal heart rate, Normal rhythm, No murmurs  GI:  Bowel sounds normal, Soft, No tenderness  Musculoskeletal:  No gross deformity or lesions, normal dorsalis pedis pulses  Skin: No acanthosis nigricans, lipoatrophy or lipodystrophy  Neurologic:  Alert & oriented x 3, nonfocal  Psychiatric:  Affect, Mood, Insight appropriate  Diabetic foot exam: no ulcers, charcot's or high risk calluses,        Assessment     1. Type 2 diabetes, uncontrolled, with neuropathy (H)    2. Chronic tension-type headache, not intractable        Plan     Ruth's DM control is improving. She will move her evening dose of Glipizide to before dinner. We will see how this works for her. Referral made to Neurology for the headaches. No other changes to her medication regimen. F/u in 3 months.       Rosangela BEARDEN Endocrinology  5/11/2021  8:00 AM          Lab Results     Hemoglobin A1c   Date Value Ref Range Status   05/05/2021 8.5 (H) <=5.6 % Final   02/17/2021 8.8 (H) <=5.6 % Final   11/04/2020 9.2 (H) <=5.6 % Final     Comment:     Normal <5.7% Prediabete 5.7-6.4% Diabletes 6.5% or higher - adopted from ADA consensus guidelines    07/27/2020 7.9 (H) 3.5 - 6.0 % Final   04/21/2020 10.5 (H) 3.5 - 6.0 % Final     Creatinine   Date Value Ref Range Status   05/05/2021 0.66 0.60 - 1.10 mg/dL Final   02/17/2021 0.60 0.60 - 1.10 mg/dL Final   11/04/2020 0.70 0.60 - 1.10 mg/dL Final     Microalbumin, Random Urine   Date Value Ref Range Status   11/04/2020 <0.50 0.00 - 1.99 mg/dL Final       Cholesterol   Date Value Ref Range Status   02/17/2021 200 (H) <=199 mg/dL Final     HDL Cholesterol   Date Value Ref Range Status   02/17/2021 50 >=50 mg/dL Final     LDL Calculated   Date Value Ref Range Status   02/17/2021 128 <=129 mg/dL Final     Triglycerides   Date Value Ref Range Status   02/17/2021 109 <=149 mg/dL Final       Lab Results   Component Value Date    ALT 19 11/04/2020    AST 19 11/04/2020    ALKPHOS 91 11/04/2020    BILITOT 0.3 11/04/2020         Current Medications     Outpatient Medications Prior to Visit   Medication Sig Dispense Refill   ? atorvastatin (LIPITOR) 10 MG tablet Take 1 tablet (10 mg total) by mouth daily. 90 tablet 3   ? canagliflozin (INVOKANA) 300 mg Tab Take 1 tablet (300 mg total) by mouth daily before breakfast. 90 tablet 3   ? flash glucose scanning reader (FREESTYLE CINDY 14 DAY READER) Misc Use 1 application As Directed every 8 (eight) hours. 1 each 0   ? flash glucose sensor (FREESTYLE CINDY 14 DAY SENSOR) Kit Use 1 application As Directed every 14 (fourteen) days. 2 kit 11   ? gabapentin (NEURONTIN) 300 MG capsule Take 2 capsules (600 mg total) by mouth 3 (three) times a day. 540 capsule 3   ? glipiZIDE (GLUCOTROL) 5 MG tablet Take 1 tablet (5 mg total) by mouth 2 (two) times a day before meals. 60 tablet 3   ? ibuprofen (ADVIL,MOTRIN) 600 MG tablet      ? insulin glargine (LANTUS SOLOSTAR U-100 INSULIN) 100 unit/mL (3 mL) pen Inject 18 Units under the skin daily. Do not mix Lantus with any other insulin 15 mL 3   ? mirtazapine (REMERON) 15 MG tablet Take 1 tablet (15 mg total) by mouth at bedtime. 30 tablet 2   ?  "nortriptyline (PAMELOR) 50 MG capsule Take 1 capsule (50 mg total) by mouth at bedtime. 30 capsule 11   ? ondansetron (ZOFRAN) 4 MG tablet Take 1 tablet (4 mg total) by mouth every 8 (eight) hours as needed for nausea. 30 tablet 3   ? pen needle, diabetic (INSUPEN) 32 gauge x 1/4\" Ndle Novofine 32 32 gauge x 1/4\" needle     ? tolterodine (DETROL LA) 4 MG ER capsule Take 1 capsule (4 mg total) by mouth daily. 30 capsule 11   ? metFORMIN (GLUCOPHAGE-XR) 500 MG 24 hr tablet Take 2 tablets (1,000 mg total) by mouth 2 (two) times a day. 360 tablet 2   ? famotidine (PEPCID) 40 MG tablet Take 1 tablet (40 mg total) by mouth every evening. 30 tablet 2     No facility-administered medications prior to visit.                 "

## 2021-06-30 NOTE — PROGRESS NOTES
Progress Notes by Clemencia Grider OT at 3/31/2021 10:30 AM     Author: Clemencia Grider OT Service: -- Author Type: Occupational Therapist    Filed: 3/31/2021 12:19 PM Encounter Date: 3/31/2021 Status: Attested    : Clemencia Grider OT (Occupational Therapist) Cosigner: Norman Quinones MD at 4/2/2021  3:39 PM    Attestation signed by Norman Quinones MD at 4/2/2021  3:39 PM    Agree with plan of care.                     Rehabilitation Certification Request    March 31, 2021      Patient: Ruth Vanegas  MR Number: [id number]  YOB: 1985  Date of Visit: 3/31/2021      Dear Dr. Norman Quinones:    Thank you for this referral.   We are seeing Ruth Vanegas in Occupational Therapy for dizziness.    Medicare and/or Medicaid requires physician review and approval of the treatment plan. Please review the plan of care and verify that you agree with the therapy plan of care by co-signing this note.      Plan of Care  Authorization / Certification Start Date: 03/31/21  Authorization / Certification End Date: 06/29/21  Authorization / Certification Number of Visits: 12  Communication with: Referral Source  Patient Related Instruction: Nature of Condition;Treatment plan and rationale;Basis of treatment;Expected outcome  Times per Week: 1  Number of Weeks: 12  Number of Visits: 12  Select Plan of Care: Select  Neuromuscular Reeducation: vestibular  Functional Training (ADL's): ADL's;compensatory training  Canolith Repostioning: .      Goals:  Patient will be independent with home exercise program in: 4 weeks  Patient will be able to walk: on uneven/inclined surfaces;without loss of balance;in 12 weeks  Patient will bend: to dress;to clean;without vertigo;without loss of balance;in 12 weeks  Patient will turn head: for conversation;without dizziness;in 12 weeks  Patient will look up / down: for drinking;without vertigo;in 12 weeks        If you have any questions or concerns, please don't hesitate  to call.    Sincerely,      Clemencia Grider, OT        Physician recommendation:                                ___ Follow therapist's recommendation                                                                                                    ___ Modify therapy                                                                                                      Physician Signature:_____________________                                                                                                                                        Date:___________________________    *Physician co-signature indicates they certify the need for these services furnished within this plan and while under their care.         Vestibular Initial Evaluation    Patient Name: Ruth Vanegas  Date of evaluation: 3/31/2021  Referral Diagnosis: Dizziness  Referring provider: Norman Quinones MD  Visit Diagnosis:     ICD-10-CM    1. Dizziness  R42    2. Unsteadiness on feet  R26.81    3. Decreased activities of daily living (ADL)  Z78.9    4. Nausea  R11.0        Assessment:      Symptoms are consistent with weakened vestibular function. It does also seem that some of her symptoms may be related to her migraines and we will see how much symptom improvement we can get through vestibular rehab. Patient had a vestibular pattern balance issue on modified CTSIB today. Symptoms provoked with VOR activities as well as with oculomotor tasks. Patient instructed on initial vestibular exercises today and will return in 2-3 weeks if symptoms improve with these exercises.    Goals:  Patient will be independent with home exercise program in: 4 weeks  Patient will be able to walk: on uneven/inclined surfaces;without loss of balance;in 12 weeks  Patient will bend: to dress;to clean;without vertigo;without loss of balance;in 12 weeks  Patient will turn head: for conversation;without dizziness;in 12 weeks  Patient will look up / down: for  drinking;without vertigo;in 12 weeks      Patient's expectations/goals are realistic.    Barriers to Learning or Achieving Goals:  No Barriers.       Plan / Patient Instructions:        Plan of Care:   Authorization / Certification Start Date: 03/31/21  Authorization / Certification End Date: 06/29/21  Authorization / Certification Number of Visits: 12  Communication with: Referral Source  Patient Related Instruction: Nature of Condition;Treatment plan and rationale;Basis of treatment;Expected outcome  Times per Week: 1  Number of Weeks: 12  Number of Visits: 12  Select Plan of Care: Select  Neuromuscular Reeducation: vestibular  Functional Training (ADL's): ADL's;compensatory training  Canolith Repostioning: .      POC and pathology of condition were reviewed with patient.  Pt. is in agreement with the Plan of Care. Treatment techniques, plan of care, and goals were discussed with the patient.  The patient agrees to the plan as outlined.  The plan of care is dynamic and will be modified on an ongoing basis.    A Home Exercise Program (HEP) was initiated today. Pt. was instructed in exercises by OT and patient was given a handout with detailed instructions.        Subjective:         History of Present Illness:    Ruth is a 35 y.o. female who presents to therapy today with complaints of vertigo, nausea/vomiting, headache and bilateral high pitch tinnitus. Patient had sudden onset of symptoms about a year ago where the nausea and vertigo are daily. Symptoms are not improving. She has a history of similar symptoms but states that that was always related to her diabetes and blood sugars. She has chronic migraines and has them daily with severe headache and light sensitivity. Patient denies ear pain. Patient has bilateral hearing changes on recent audiogram and will be returning to ENT for follow up. She also reports that she has had nausea from her diabetes medication. Functional limitations are described as  occurring with balance, bending, head turns, looking up or down.     Pain Ratin         Objective:      Note: Items left blank indicates the item was not performed or not indicated at the time of the evaluation.    Patient Outcome Measures:   Dizziness Handicap Inventory  Score in %: 34       Vestibular Disorder Examination  1. Dizziness     2. Unsteadiness on feet     3. Decreased activities of daily living (ADL)     4. Nausea         Precautions/Restrictions: None    Posture Observation: General standing posture is normal.    ROM:  Not Tested    Strength: Not Tested    Sensation: patient has numbness and tingling in hands and feet as a result of diabetes    Functional Mobility: good      Modified CTSIB:  Normal Surface Eyes Open-Normal  Normal Surface Eyes Closed-Normal  Perturbed Surface Eyes Open-Normal  Perturbed Surface Eyes Closed-Moderate sway    The remainder of the tests are performed in room light.    Oculomotor Assessment:   Spontaneous Nystagmus with fixation: Negative  Spontaneous Nystagmus without fixation: NT  Gaze-evoked nystagmus: Negative  Smooth pursuit: Normal  Saccades: Normal  VOR to slow movement: NT  Rapid Head Shake Test: patient dizzy   VOR Head Thrust: NT  Static Visual Acuity: NT  Dynamic Visual Acuity: NT    Positional Tests:  Hallpike Right:  Negative  Hallpike Left:  Negative  Head Roll Right: NT  Head Roll Left:  NT    Plan for next visit: progress HEP to include targets(if doesn't set off migraine), consider tracking and saccades for full minute to see if provokes sx. Gait with head motion    Treatment Today: Patient instructed on adaptation and substitution exercises today to facilitate vestibular compensation.  X1 viewing-20 seconds-instructed  VOR Suppression- 20 seconds-instructed  Perturbed surface eyes closed-instructed  TREATMENT MINUTES COMMENTS   Evaluation 30    Self-care/ Home management     Neuromuscular Re-education 25    Canalith repositioning procedure            Total 55    Blank areas are intentional and mean the treatment did not include these items.     GOALS AND PLAN OF CARE WERE ESTABLISHED IN COOPERATION WITH THE PATIENT    OT Evaluation Code: (Please list factors)   Comorbidities:   Patient Active Problem List   Diagnosis   ? Calculus of ureter   ? Type 2 diabetes, uncontrolled, with neuropathy (H)   ? Hyperlipidemia   ? Microalbuminuria   ? S/P laparoscopic hysterectomy   ? Morbid obesity (H)   ? Mood disorder (H)   ? Dysfunctional uterine bleeding   ? Atypical glandular cells on cervical Pap smear   ? Chlamydial infection of lower genitourinary tract   ? Benign paroxysmal positional vertigo, unspecified laterality   ? Nausea      Profile/History Review: Brief    Need for eval modification: No    # Treatment options: Limited    Clinical Decision Making:  Low      Occupational Profile/ Medical and Therapy History and Comorbidities Occupational Performance Clinical Decision Making   (Complexity)   brief history with review of medical/therapy records related to the presenting problem.  No comorbidities 1-3 Performance deficits that result in activity limitations and/or participation restrictions.    No Assessment Modification  Low complexity, which includes  problem-focused assessments, and consideration of a limited number of treatment options.      expanded review of medical/therapy records and additional review of physical, cognitive and psychosocial history.    May have comorbidities 3-5 Performance deficits that result in activity limitations and/or participation restrictions.    Minimal to moderate modification of assessment Moderate complexity, which includes analysis of data from detailed assessments, and consideration of several treatment options.         Review of medical/therapy records and extensive additional review of physical, cognitive and psychosocial history.  Comorbidities affect occupational performance 5 or more Performance deficits that result  in activity limitations and/or participation restrictions.    Significant modification of assessment High complexity, analysis of  Occupational profile and data,  Comprehensive assessments, multiple treatment options.            Clemencia Grider  3/31/2021  10:30 AM

## 2021-07-03 NOTE — ADDENDUM NOTE
Addendum Note by Kate Gleason, PharmD at 6/15/2020 10:07 AM     Author: Kate Gleason, Traci Service: -- Author Type: Pharmacist    Filed: 6/15/2020 10:07 AM Encounter Date: 5/20/2020 Status: Signed    : Kate Gleason, PharmD (Pharmacist)    Addended by: KATE GLEASON on: 6/15/2020 10:07 AM        Modules accepted: Orders

## 2021-07-03 NOTE — ADDENDUM NOTE
Addendum Note by Anne Loya CRNA at 3/2/2018 12:05 PM     Author: Anne Loya CRNA Service: -- Author Type: Nurse Anesthetist    Filed: 3/2/2018 12:05 PM Date of Service: 3/2/2018 12:05 PM Status: Signed    : Anne Loya CRNA (Nurse Anesthetist)       Addendum  created 03/02/18 1205 by Anne Loya CRNA    Anesthesia Intra Flowsheets edited

## 2021-07-04 NOTE — ADDENDUM NOTE
Addendum Note by Heraclio Cisneros CMA at 5/11/2021  7:40 AM     Author: Heraclio Cisneros CMA Service: -- Author Type: Medical Assistant    Filed: 5/11/2021  8:44 AM Encounter Date: 5/11/2021 Status: Signed    : Heraclio Cisneros CMA (Medical Assistant)    Addended by: HERACLIO CISNEROS on: 5/11/2021 08:44 AM        Modules accepted: Orders

## 2021-07-04 NOTE — TELEPHONE ENCOUNTER
Telephone Encounter by Sandra Seth RN at 6/2/2021 10:43 AM     Author: Sandra Seth RN Service: -- Author Type: Registered Nurse    Filed: 6/2/2021 11:04 AM Encounter Date: 5/24/2021 Status: Signed    : Sandra Seth RN (Registered Nurse)

## 2021-07-04 NOTE — TELEPHONE ENCOUNTER
Telephone Encounter by Sandra Seth RN at 6/24/2021 11:50 AM     Author: Sandra Seth RN Service: -- Author Type: Registered Nurse    Filed: 6/24/2021 12:03 PM Encounter Date: 5/24/2021 Status: Addendum    : Sandra Seth RN (Registered Nurse)    Related Notes: Original Note by Sandra Seth RN (Registered Nurse) filed at 6/24/2021 12:01 PM                     Spoke w/ pt. She states she had to stop the ozempic because she was so nauseous and could not eat. Pt states she had to call and get zofran. She took the ozempic on 6/3 and 6/10 then stopped. So she has been off for 1 week.   Pt is still taking all other orals and lantus 22 units/day. She is not taking glipizide.   Will increase lantus to 24 units. Pt will f/u w/ MTM on 7/6 as planned and will call here sooner w/ any concerns.

## 2021-07-06 PROCEDURE — 99607 MTMS BY PHARM ADDL 15 MIN: CPT | Performed by: PHARMACIST

## 2021-07-06 PROCEDURE — 99605 MTMS BY PHARM NP 15 MIN: CPT | Performed by: PHARMACIST

## 2021-07-08 ENCOUNTER — COMMUNICATION - HEALTHEAST (OUTPATIENT)
Dept: FAMILY MEDICINE | Facility: CLINIC | Age: 36
End: 2021-07-08

## 2021-07-08 NOTE — TELEPHONE ENCOUNTER
Telephone Encounter by Bonnie Beck MA at 7/8/2021  4:35 PM     Author: Bonnie Beck MA Service: -- Author Type: Medical Assistant    Filed: 7/8/2021  4:36 PM Encounter Date: 7/8/2021 Status: Addendum    : Bonnie Beck MA (Medical Assistant)    Related Notes: Original Note by Bonnie Beck MA (Medical Assistant) filed at 7/8/2021  4:35 PM       Please help schedule appoinment      ----- Message from Milton Banks MD sent at 7/8/2021  3:19 PM CDT -----  Can patient please schedule a visit with me, can be virtual, to discuss her ongoing nausea and vomiting.   ----- Message -----  From: Randall Allison, PharmD  Sent: 7/8/2021   2:38 PM CDT  To: Milton Banks MD    Could probably just refer to GI. I didn't bring it up with her yet - just with her anxiety and everything else, I wanted to run it by you first, so maybe a visit with you to discuss it and then a referral to GI?     ----- Message -----  From: Milton Banks MD  Sent: 7/8/2021   2:10 PM CDT  To: Randall Allison, SariahD    Yes that is a very good thought.   Can discuss this at the next visit, believe it requires imaging or we refer to GI?  ----- Message -----  From: Randall Allison, PharmD  Sent: 7/6/2021   4:43 PM CDT  To: Milton Banks MD    Stopped ozempic. Continues to have nausea. Also feels really full all the time - given her history of diabetes, do you think she might have gastroparesis? I couldn't see if she's already been worked up for this. She has reglan on her allergy list, so maybe?

## 2021-07-09 NOTE — TELEPHONE ENCOUNTER
Telephone Encounter by Kortney Contreras at 7/9/2021  1:42 PM     Author: Kortney Contreras Service: -- Author Type: Patient Access    Filed: 7/9/2021  1:42 PM Encounter Date: 7/8/2021 Status: Signed    : Kortney Contreras (Patient Access)       Patient has a future Video Visit appointment on 07/13/2021 @ 11:00am with Dr. Banks. Completing task.

## 2021-07-13 PROBLEM — R11.0 CHRONIC NAUSEA: Status: ACTIVE | Noted: 2021-07-13

## 2021-07-13 PROBLEM — R11.10 CHRONIC VOMITING: Status: ACTIVE | Noted: 2021-07-13

## 2021-07-15 PROBLEM — E11.9 DIABETES MELLITUS, TYPE 2 (H): Status: ACTIVE | Noted: 2021-07-15

## 2021-07-20 ENCOUNTER — VIRTUAL VISIT (OUTPATIENT)
Dept: FAMILY MEDICINE | Facility: CLINIC | Age: 36
End: 2021-07-20
Payer: COMMERCIAL

## 2021-07-20 DIAGNOSIS — R11.2 NAUSEA AND VOMITING, INTRACTABILITY OF VOMITING NOT SPECIFIED, UNSPECIFIED VOMITING TYPE: ICD-10-CM

## 2021-07-20 DIAGNOSIS — F41.9 ANXIETY: ICD-10-CM

## 2021-07-20 DIAGNOSIS — G47.00 INSOMNIA, UNSPECIFIED TYPE: ICD-10-CM

## 2021-07-20 DIAGNOSIS — F43.10 PTSD (POST-TRAUMATIC STRESS DISORDER): ICD-10-CM

## 2021-07-20 PROCEDURE — 99214 OFFICE O/P EST MOD 30 MIN: CPT | Mod: 95 | Performed by: FAMILY MEDICINE

## 2021-07-20 RX ORDER — ATORVASTATIN CALCIUM 10 MG/1
TABLET, FILM COATED ORAL
COMMUNITY
Start: 2020-09-15 | End: 2021-11-08

## 2021-07-20 RX ORDER — FAMOTIDINE 40 MG/1
TABLET, FILM COATED ORAL
COMMUNITY
Start: 2021-02-17 | End: 2021-12-28

## 2021-07-20 RX ORDER — IBUPROFEN 600 MG/1
TABLET, FILM COATED ORAL EVERY 4 HOURS PRN
COMMUNITY
Start: 2020-12-30

## 2021-07-20 RX ORDER — FLASH GLUCOSE SENSOR
1 KIT MISCELLANEOUS
COMMUNITY
Start: 2021-04-12 | End: 2022-04-11

## 2021-07-20 RX ORDER — DIAZEPAM 5 MG
TABLET ORAL
COMMUNITY
End: 2024-03-07

## 2021-07-20 RX ORDER — ONDANSETRON 4 MG/1
TABLET, FILM COATED ORAL EVERY 6 HOURS PRN
COMMUNITY
Start: 2021-03-10 | End: 2023-04-24

## 2021-07-20 RX ORDER — CEPHALEXIN 500 MG/1
CAPSULE ORAL
COMMUNITY
End: 2021-09-10

## 2021-07-20 RX ORDER — MIRTAZAPINE 15 MG/1
TABLET, FILM COATED ORAL
COMMUNITY
End: 2021-12-28

## 2021-07-20 RX ORDER — NORTRIPTYLINE HYDROCHLORIDE 50 MG/1
50 CAPSULE ORAL AT BEDTIME
COMMUNITY
End: 2022-10-25

## 2021-07-20 RX ORDER — TOPIRAMATE 25 MG/1
TABLET, FILM COATED ORAL
COMMUNITY
Start: 2021-07-06 | End: 2021-10-05

## 2021-07-20 RX ORDER — CYCLOBENZAPRINE HCL 10 MG
TABLET ORAL
COMMUNITY
Start: 2020-12-30 | End: 2021-07-20

## 2021-07-20 RX ORDER — METFORMIN HCL 500 MG
TABLET, EXTENDED RELEASE 24 HR ORAL 2 TIMES DAILY WITH MEALS
COMMUNITY
Start: 2020-11-08 | End: 2021-12-15

## 2021-07-20 RX ORDER — GLIPIZIDE 5 MG/1
TABLET ORAL
COMMUNITY
Start: 2020-06-23 | End: 2021-09-10

## 2021-07-20 RX ORDER — GABAPENTIN 300 MG/1
600 CAPSULE ORAL 3 TIMES DAILY
COMMUNITY
Start: 2020-09-29 | End: 2021-12-15

## 2021-07-20 RX ORDER — FLASH GLUCOSE SCANNING READER
1 EACH MISCELLANEOUS
COMMUNITY
Start: 2020-05-20 | End: 2023-08-11

## 2021-07-20 RX ORDER — AMOXICILLIN 500 MG/1
CAPSULE ORAL
COMMUNITY
End: 2021-07-20

## 2021-07-20 RX ORDER — TOPIRAMATE 50 MG/1
TABLET, FILM COATED ORAL
COMMUNITY
Start: 2021-06-09 | End: 2021-10-05

## 2021-07-20 RX ORDER — ACETAMINOPHEN 500 MG
500-1000 TABLET ORAL EVERY 4 HOURS PRN
COMMUNITY
Start: 2020-12-30

## 2021-07-20 RX ORDER — MELATONIN 10 MG
1 CAPSULE ORAL DAILY
Qty: 30 CAPSULE | Refills: 1 | Status: SHIPPED | OUTPATIENT
Start: 2021-07-20 | End: 2021-08-12

## 2021-07-20 RX ORDER — PROPRANOLOL HYDROCHLORIDE 80 MG/1
CAPSULE, EXTENDED RELEASE ORAL
COMMUNITY
Start: 2020-08-17 | End: 2021-07-20

## 2021-07-20 RX ORDER — OXYCODONE AND ACETAMINOPHEN 5; 325 MG/1; MG/1
TABLET ORAL
COMMUNITY
Start: 2020-10-14 | End: 2021-07-20

## 2021-07-20 RX ORDER — SEMAGLUTIDE 1.34 MG/ML
INJECTION, SOLUTION SUBCUTANEOUS
COMMUNITY
End: 2021-09-10

## 2021-07-20 RX ORDER — TOLTERODINE 4 MG/1
CAPSULE, EXTENDED RELEASE ORAL EVERY EVENING
COMMUNITY
Start: 2021-04-12 | End: 2022-06-01

## 2021-07-20 RX ORDER — FLUCONAZOLE 150 MG/1
TABLET ORAL
COMMUNITY
End: 2023-08-17

## 2021-07-20 RX ORDER — FLASH GLUCOSE SENSOR
KIT MISCELLANEOUS
COMMUNITY
Start: 2021-07-13 | End: 2023-08-11

## 2021-07-20 RX ORDER — CYCLOBENZAPRINE HCL 10 MG
10 TABLET ORAL
COMMUNITY
Start: 2020-12-30 | End: 2021-07-20

## 2021-07-20 ASSESSMENT — PATIENT HEALTH QUESTIONNAIRE - PHQ9: SUM OF ALL RESPONSES TO PHQ QUESTIONS 1-9: 0

## 2021-07-20 NOTE — PROGRESS NOTES
Ruth is a 35 year old who is being evaluated via a billable video visit.      How would you like to obtain your AVS? MyChart  If the video visit is dropped, the invitation should be resent by: Text to cell phone: [phone]   Will anyone else be joining your video visit? No      Video Start Time: 9:56 AM    Assessment & Plan     Type 2 diabetes, uncontrolled, with neuropathy (H)  - GASTROENTEROLOGY ADULT REF CONSULT ONLY  - Adult Endocrinology Referral  Nausea and vomiting, intractability of vomiting not specified, unspecified vomiting type  - GASTROENTEROLOGY ADULT REF CONSULT ONLY  PTSD (post-traumatic stress disorder)  - Melatonin 10 MG CAPS  Dispense: 30 capsule; Refill: 1  Anxiety  - Melatonin 10 MG CAPS  Dispense: 30 capsule; Refill: 1  Insomnia, unspecified type  - Melatonin 10 MG CAPS  Dispense: 30 capsule; Refill: 1  EHR was reviewed.   Her chronic nausea and vomiting continues, last episode yesterday. There have been multiple medication changes, none of these seem to be the etiology.   This is long standing and chronic, infection does not seem to be the etiology.   She is currently referred to neurology and missed her appointment, question if atypical migraines are the cause. Of most concern is gastroparesis due to uncontrolled diabetes. She has had an esophagram in March, this was unremarkable for reflux. Offered referral to [facility] to further work up this. She will follow recommendations.   Regarding her insomnia, has tried multiple medications in the past. Topamax and nortriptyline were just doubled. Recommended something like melatonin at a higher does for now. Will see if there is any change in her sleep in the next two weeks, follow up after this if no relief. Encouraged to keep her appointment with the new mental health provider in October.   A new referral placed again to endocrinology, she would like to try the [facility] since she does not find the current diabetes treatment  "therapeutic.           BMI:   Estimated body mass index is 51.86 kg/m  as calculated from the following:    Height as of 10/26/20: 1.676 m (5' 6\").    Weight as of 5/11/21: 145.7 kg (321 lb 4.8 oz).       No follow-ups on file.    Milton Banks MD  [facility]    Gilmer Miranda is a 35 year old female with history of diabetes, chronic headaches, chronic nausea and vomiting, PTSD, anxiety, insomnia.   She feels very unwell lately.   Her nausea and vomiting have not improved. Last episode was yesterday. Her daughter was driving, they went to [employer] and she threw up on the way home. She does not know why she throws up or feels nauseated so much. There were many medication changes to see if that was the cause. Recently seen by medication management. Is working with endocrinology for treatment of diabetes. There was question of gastroparesis. She has had minimal to no improvement when using omeprazole or famotidine.   She has a had headache all the time. She was referred to neurology. She missed her appointment.   She can not sleep. This has worsened. She takes her Topamax and Nortriptyline at eight pm, is not able to fall asleep until after midnight. She has taken multiple sleep aids, some over the counter like advil PM and nyquil. She knows not to take these anymore on a continuous basis. Has taken melatonin gummies but needs a lot of them to work.   She has not received a call yet from a new endocrinologist. She requested a new provider several weeks ago.     Objective           Vitals:  No vitals were obtained today due to virtual visit.    Physical Exam   GENERAL: Healthy, alert and no distress  EYES: Eyes grossly normal to inspection.  No discharge or erythema, or obvious scleral/conjunctival abnormalities.  RESP: No audible wheeze, cough, or visible cyanosis.  No visible retractions or increased work of breathing.    SKIN: Visible skin clear. No significant rash, abnormal " pigmentation or lesions.  NEURO: Cranial nerves grossly intact.  Mentation and speech appropriate for age.  PSYCH: Mentation appears normal, affect normal/bright, judgement and insight intact, normal speech and appearance well-groomed.    A CT head from 5/21/20 was reviewed, unremarkable.   An XR esophagram from 3/23/21 was reviewed, negative for reflux.           Video-Visit Details    Type of service:  Video Visit    Video End Time:10:32 AM    Originating Location (pt. Location): Home    Distant Location (provider location):  [facility]     Platform used for Video Visit: CHROMAom

## 2021-07-26 ENCOUNTER — MYC MEDICAL ADVICE (OUTPATIENT)
Dept: FAMILY MEDICINE | Facility: CLINIC | Age: 36
End: 2021-07-26

## 2021-07-27 ENCOUNTER — PATIENT OUTREACH (OUTPATIENT)
Dept: CARE COORDINATION | Facility: CLINIC | Age: 36
End: 2021-07-27

## 2021-07-27 NOTE — TELEPHONE ENCOUNTER
Can someone please help the patient get into any [facility] location before September? I'm sure there is openings before two months out.

## 2021-07-27 NOTE — PROGRESS NOTES
Situation: Patient chart reviewed by care coordinator.     Background: RN CC review for status update      Assessment: Patient engaged with CHW and continue to work toward goal progression   RN CC will outreach within 1-2 weeks to review if follow up with speciality provider scheduled     Plan/Recommendations:Community Health Worker to outreach per standard work and updated on goal progression      CC will review  in 4-6 weeks to support ongoing recommendations and plan of care will be available sooner if needed.

## 2021-08-03 ENCOUNTER — TELEPHONE (OUTPATIENT)
Dept: ENDOCRINOLOGY | Facility: CLINIC | Age: 36
End: 2021-08-03

## 2021-08-03 DIAGNOSIS — E11.9 DIABETES MELLITUS, TYPE 2 (H): Primary | ICD-10-CM

## 2021-08-03 NOTE — TELEPHONE ENCOUNTER
Patient has upcoming in clinic appointment with Brianne Haynes NP in Endocrinology on 8/10/21.  The patient has not completed ordered labs.      Please call patient to schedule a lab appointment at least 48 hours prior to clinic appointment or have patient reschedule clinic appointment with a lab appointment preferably 1 week prior.Thank you.

## 2021-08-03 NOTE — TELEPHONE ENCOUNTER
Called and spoke with the pharmacy, they stated the alternative will be Famotidine 40 mg. Pharmacist suggest they provider may want to dose up if agrees with Famotidine, because its not as strong dosing for acid blockers.   Otherwise all class of omeprazole or within the same family insurance is only allowing 120 of therapy per year.   
Can we have a list of covered alternatives?  
Central PA team  [phone]  Pool: HE PA MED ([id number])          PA has been initiated.       PA form completed and faxed insurance via Cover My Meds     Key:  [id number]     Medication:  Omeprazole 20MG dr capsules    Insurance:  [employer] [place]        Response will be received via fax and may take up to 5-10 business days depending on plan      
Pharmacy faxed requesting prior authorization for the omeprazole.   Alma Ramírez    
Please review and advise.   
Telephone Encounter by Hanane Edgar at 2/11/2021  8:19 AM     Author: Hanane Edgar Service: -- Author Type: --    Filed: 2/11/2021  8:20 AM Encounter Date: 2/8/2021 Status: Signed    : Hanane Edgar       PRIOR AUTHORIZATION DENIED    Denial Rational: Patient is able to get up to 1 capsule per day for a maximum of 120 days within 365 days.  This is the duration set by patient's plan for PPIs.  This limit is shared across all PPIs.           Appeal Information:   This will be a state level appeal.  State level appeal are completed between the clinic staff and patient.            
ALCOHOL INTOXICATION

## 2021-08-04 NOTE — TELEPHONE ENCOUNTER
Date: 8/5/2021 Status: Scheduled   Time: 7:00 AM Length: 15   Visit Type: LAB [930] Copay: $0.00   Provider: [facility]

## 2021-08-09 ENCOUNTER — PATIENT OUTREACH (OUTPATIENT)
Dept: CARE COORDINATION | Facility: CLINIC | Age: 36
End: 2021-08-09

## 2021-08-09 NOTE — PROGRESS NOTES
Clinic Care Coordination Contact    Situation: Patient chart reviewed by care coordinator.    Background: SW completed chart review    Assessment: Patient continues to work on goal. Recent PCP note indicates patient has a mental health appointment scheduled in October     Plan/Recommendations: Standard outreach

## 2021-08-10 ENCOUNTER — PATIENT OUTREACH (OUTPATIENT)
Dept: CARE COORDINATION | Facility: CLINIC | Age: 36
End: 2021-08-10

## 2021-08-10 NOTE — PROGRESS NOTES
8-25-21  Clinic Care Coordination Contact  Community Health Worker Follow Up    Care Gaps:     Goals:   Goals Addressed as of 8/10/2021 at 6:03 PM                    Today       Medical (pt-stated)   100%    Added 7/27/21 by Mely Reynoso RN      Goal Statement: I need new therapist within1 month   Date Goal set: 6.11.21 Updated 8-10-21   Barriers: previous provider no longer at [facility]   Strengths: pt engagement with care team   Date to Achieve By. 9-10-21   Patient expressed understanding of goal: yes   Action steps to achieve this goal:   1. I will attend the appt on 8-25-21 with Dr Hurt at [facility].  2. I will report to my Community Health Worker if any additional resources or support needed.    Updated: 8-10-21          Other (pt-stated)   20%    Added 7/27/21 by Mely Reynoso, RN      Goal Statement: I will have plan of care with endocrine team within 1-2 months   Date Goal set: 2.22.21 update 03/31/21     Barriers: access   Strengths: pt engagement   Date to Achieve By: April 30-21   Patient expressed understanding of goal: yes   Action steps to achieve this goal:   1. I amstill waiting to hear from the [facility] to call and schedule to see another endocrinologist.   2 I will update CCC RN on 8-20-21 at 10:30 am for support and plan of care and if any additional resources or support needed.      Updated: 8-10-21 AL            Intervention and Education during outreach:   Patient reported:  Will see Dr Hurt at  [facility] on 8-25-21   Cancel [facility] endocrinologist and waiting for [facility] to call and schedule to see new endocrinologist at [facility].  Reminded of appt with CC RN on 8-20-21 at 10:30 am for support     CC RN 8-20-21   CHW Follow up: Monthly    CHW Plan: Follow up on goals    CHW Next Follow Up: 9-2-21

## 2021-08-18 ENCOUNTER — TELEPHONE (OUTPATIENT)
Dept: ENDOCRINOLOGY | Facility: CLINIC | Age: 36
End: 2021-08-18

## 2021-08-18 NOTE — TELEPHONE ENCOUNTER
[facility] Call Center    Phone Message    May a detailed message be left on voicemail: yes     Reason for Call: Appointment Intake    Referring Provider Name:Milton Banks MD in [facility]  Diagnosis and/or Symptoms: Type 2 with neuropathy    Pt is concerned with  Being scheduled out until October.  Pt prefers in person and stated she is currently experiencing a lot of tingling, numbness, cold hands and feet.  Pt would like some guidance in the meantime.  Please call pt with next steps.       Action Taken: Message routed to:  [facility] endo    Travel Screening: Not Applicable

## 2021-08-20 ENCOUNTER — PATIENT OUTREACH (OUTPATIENT)
Dept: NURSING | Facility: CLINIC | Age: 36
End: 2021-08-20

## 2021-08-20 NOTE — PROGRESS NOTES
Clinic Care Coordination Contact    Follow Up Progress Note      Assessment: Pt report that she was able to get an endocrine visit schedule for October    Visit with [facility] in September     Discussed vaccination status - pt notes concern and will discuss with PCP at upcoming visit        Plan:     Patient will schedule and attend recommended follow up visits with speciality providers and primary care provider.'  Community Health Worker to outreach per standard work and updated on goal progression      RN CC will outreach in 4-6 weeks to support ongoing recommendations and plan of care will be available sooner if needed.

## 2021-08-27 ENCOUNTER — TELEPHONE (OUTPATIENT)
Dept: PHARMACY | Facility: PHYSICIAN GROUP | Age: 36
End: 2021-08-27

## 2021-08-27 NOTE — TELEPHONE ENCOUNTER
----- Message from Mely Reynoso RN sent at 8/20/2021 11:10 AM CDT -----  Regarding: [facility] visit reschedule  Pt no show for visit with MTM last week, thought it was a telephone visit , was supported to be in person.  Please reach out to reschedule   Thanks  KB

## 2021-08-27 NOTE — TELEPHONE ENCOUNTER
.  RECORDS RECEIVED FROM: internal/ce    DATE RECEIVED: 10.28.21    NOTES (FOR ALL VISITS) STATUS DETAILS   OFFICE NOTES from referring provider internal  Milton Banks MD   OFFICE NOTES from other specialist internal  5.11.21 [place]   4.17.20 [place]   10.16.19 [place]    ED NOTES ce [facility]- 5.19.21 [place]   internal - 10.26.20 [place]      OPERATIVE REPORT  (thyroid, pituitary, adrenal, parathyroid) Na     MEDICATION LIST internal     IMAGING      DEXASCAN na    MRI (BRAIN) na    XR (Chest) internal /ce internal 10.26.20  [facility]- 10.26.20,    CT (HEAD/NECK/CHEST/ABDOMEN) internal /ce internal  5.21.20   [facility]- 12.30.20, 9.18.20   NUCLEAR  na    ULTRASOUND (HEAD/NECK) na    LABS     DIABETES: HBGA1C, CREATININE, FASTING LIPIDS, MICROALBUMIN URINE, POTASSIUM, TSH, T4    THYROID: TSH, T4, CBC, THYRODLONULIN, TOTAL T3, FREE T4, CALCITONIN, CEA internal /ce  [facility]- 10.26.20   CREATININE- 3.4.19   Glucose, Whole Blood POCT- 5.19.21  HBGA1C- 5.5.21  Lipid- 2.17.21

## 2021-09-02 ENCOUNTER — TRANSFERRED RECORDS (OUTPATIENT)
Dept: HEALTH INFORMATION MANAGEMENT | Facility: CLINIC | Age: 36
End: 2021-09-02

## 2021-09-09 ENCOUNTER — TRANSFERRED RECORDS (OUTPATIENT)
Dept: HEALTH INFORMATION MANAGEMENT | Facility: CLINIC | Age: 36
End: 2021-09-09

## 2021-09-10 ENCOUNTER — VIRTUAL VISIT (OUTPATIENT)
Dept: GASTROENTEROLOGY | Facility: CLINIC | Age: 36
End: 2021-09-10
Attending: FAMILY MEDICINE
Payer: COMMERCIAL

## 2021-09-10 DIAGNOSIS — R14.1 FLATULENCE, ERUCTATION AND GAS PAIN: ICD-10-CM

## 2021-09-10 DIAGNOSIS — R14.3 FLATULENCE, ERUCTATION AND GAS PAIN: ICD-10-CM

## 2021-09-10 DIAGNOSIS — R14.2 FLATULENCE, ERUCTATION AND GAS PAIN: ICD-10-CM

## 2021-09-10 DIAGNOSIS — R11.2 NAUSEA AND VOMITING, INTRACTABILITY OF VOMITING NOT SPECIFIED, UNSPECIFIED VOMITING TYPE: Primary | ICD-10-CM

## 2021-09-10 DIAGNOSIS — K62.5 RECTAL BLEEDING: ICD-10-CM

## 2021-09-10 DIAGNOSIS — R11.10 REGURGITATION OF FOOD: ICD-10-CM

## 2021-09-10 DIAGNOSIS — R63.4 UNINTENTIONAL WEIGHT LOSS: ICD-10-CM

## 2021-09-10 PROCEDURE — 99204 OFFICE O/P NEW MOD 45 MIN: CPT | Mod: 95 | Performed by: INTERNAL MEDICINE

## 2021-09-10 RX ORDER — METHOCARBAMOL 500 MG/1
TABLET, FILM COATED ORAL EVERY EVENING
COMMUNITY
Start: 2021-08-30 | End: 2023-05-11

## 2021-09-10 RX ORDER — PANTOPRAZOLE SODIUM 40 MG/1
40 TABLET, DELAYED RELEASE ORAL DAILY
Qty: 90 TABLET | Refills: 3 | Status: SHIPPED | OUTPATIENT
Start: 2021-09-10 | End: 2022-09-10

## 2021-09-10 NOTE — PROGRESS NOTES
Ruth is a 35 year old who is being evaluated via a billable video visit.      How would you like to obtain your AVS? MyChart  If the video visit is dropped, the invitation should be resent by: Send to e-mail at: [email or web address]  Will anyone else be joining your video visit? No      Video Start Time: 9:19 AM  Video-Visit Details    Type of service:  Video Visit    Video End Time:9:50 AM    Originating Location (pt. Location): Home    Distant Location (provider location):  [facility]     Platform used for Video Visit: [facility]     Gastroenterology Visit for: Ruth Vanegas 1985   MRN: [id number]     Reason for Visit:  chief complaint    Referred by: Jocelyn  / [address] / [place] [address]  Patient Care Team:  Milton Banks MD as PCP - General  Less, Zia Chaudhry MD (OB/Gyn)  Randall Allison, PharmD as Pharmacist (Pharmacist)  Jeremiah Loyola as Community Health Worker (Primary Care - CC)  Brenden Gibson LICSW as Lead Care Coordinator (Primary Care - CC)  Mely Reynoso, RN as Clinic Care Coordinator (Primary Care - CC)  Milton Banks MD as Assigned PCP  Norman Quinones MD as Assigned Surgical Provider  Puja Prescott MD as Resident (Student in organized health care education/training program)    History of Present Illness:   Ruth Vanegas is a 35 year old female with HLD, uncontrolled DM2, anxiety, frequent yeast infection, migraine who is presenting as a new patient in consultation at the request of Dr. Banks with a chief complaint of nausea and vomiting.  ---------------------------------------------------------------  Ruth Vanegas states began getting sick about one year ago. She felt this was related to her diabetes. She was on an injection (bydureon) that was making her sick. She continued to feel sick: nauseated constantly. She has vomiting that can happen at any point. She is unable to determine what triggers vomiting. She  "always feels nauseated or like she will vomit. About 4-5 months ago she was feeling like she was forcing food down. It was \"hurting\" to eat. She had been on ozempic which also worsened her symptoms of nausea back in June. She had to discontinue the medication because it worsened her nausea and vomiting. Currently taking zofran 3-4 times a week. Doesn't like taking it because she gets constipated with it. After lunch she will get \"sick\". She feels that she has a lot of reflux associated and regurgitation. Occasionally she will regurgitate in order to feel better. Symptoms can be worse after a meal. Her symptoms also occur in the morning before waking. When she vomits in the morning she will not vomit food from the night before. She typically completes her dinner around 6:30pm. Has not yet had an endoscopy or gastric emptying study. When she was taking omeprazole 20mg she did not notice a benefit in her symptoms. She felt that omeprazole worsened her headaches. +belching and regurgitation, no heartburn.     If the patient is constipated she has abdominal pain. When constipated has occasional streaks of blood on the toilet paper. Never mixed with stool. +unintentional weight loss.     Was previously on mirtazapine. She was only on the medication leading up to therapy.  Nortriptyline 50mg at bedtime- taking for depression and insomnia.     Last hemoglobin A1c- She has an appointment with endocrinology on October 28th. Her glucose sensor ranges from 140-290. When she wakes and hasn't eaten her glucose is >200.   ---------------------------------------------------------------     Ruth Vanegas denies dysphagia, odynophagia, early satiety, abdominal pain, abdominal distension/bloating, diarrhea, hematochezia, or melena.     Family history of colon cancer: none  Wt Readings from Last 5 Encounters:   05/11/21 145.7 kg (321 lb 4.8 oz)   02/17/21 143.8 kg (317 lb)   11/04/20 142.8 kg (314 lb 12 oz)   04/21/20 145.2 kg (320 " lb)   20 141.1 kg (311 lb 0.4 oz)        Esophageal Questionnaire(s)    BEDQ Questionnaire  No flowsheet data found.  No flowsheet data found.    Eckardt Questionnaire  No flowsheet data found.    Promis 10 Questionnaire  No flowsheet data found.    STUDIES & PROCEDURES:    EGD:   Date:  Impression:  Pathology Report:    Colonoscopy:  Date:  Impression:  Pathology Report:     EndoFLIP directed at the UES or LES (8cm (EF-325) balloon length or 16cm (EF-322) balloon length):   Date:  8cm balloon  Balloon inflation Balloon pressure CSA (mm^2) DI (mm^2/mmHg) Dmin (mm) Compliance   20 (ladmark ID)        30        40        50           16cm balloon  Balloon inflation Balloon pressure CSA (mm^2) DI (mm^2/mmHg) Dmin (mm) Compliance   30 (ladmark ID)        40        50        60        70           High Resolution Manometry:  Date:  Impression:    PH/Impedance:  Date:  Impression:     Bravo:  48 or 96hr  Date:  Impression:    CT:  Date:  Impression:    Esophagram:  Date: 3/23/21  Impression:      IMPRESSION:  1.  Normal esophagram. No hiatal hernia and no spontaneous gastroesophageal reflux.    Prior medical records were reviewed including, but not limited to, notes from referring providers, lab work, radiographic tests, and other diagnostic tests. Pertinent results were summarized above.     History     Past Medical History:   Diagnosis Date     Anemia     told to take iron pills when pregnant     Depression      Depressive disorder      Diabetes mellitus (H)      Diabetes mellitus, type 2 (H) 7/15/2021     Dysthymic disorder      Endometriosis      Herpes genitalia      Hyperlipidemia      Kidney stone      Menorrhagia      Migraines      Other abnormal Papanicolaou smear of cervix and cervical HPV(795.09) 2013     PCOS (polycystic ovarian syndrome)      Post traumatic stress disorder (PTSD)        Past Surgical History:   Procedure Laterality Date     BLADDER REPAIR W/  SECTION      X2     C/SECTION,  CLASSICAL      x2     DILATION AND CURETTAGE  7/2015     DILATION AND CURETTAGE  05/06/2015     DILATION AND CURETTAGE, OPERATIVE HYSTEROSCOPY, COMBINED N/A 5/7/2015    Procedure: Dilation and Curettage with Hysteroscopy;  Surgeon: Zia Farmer MD;  [place] [facility] [place]:      DILATION AND CURETTAGE, OPERATIVE HYSTEROSCOPY, COMBINED N/A 9/29/2016    Procedure: DILATION AND CURETTAGE WITH HYSTEROSCOPY INSERT MIRENA;  Surgeon: Suzanne Major MD;  [place] [facility] [place]  Service:      ENT SURGERY       GYN SURGERY  10/19/15    laproscopic lysis of adhesions     HYSTEROSCOPY, ABLATE ENDOMETRIUM HYDROTHERMAL, COMBINED N/A 3/2/2018    Procedure: HYSTEROSCOPY, DILATION AND CURETTAGE, ENDOMETRIAL ABLATION;  Surgeon: Kristy Israel DO;  [place] [facility] [place]  Service: Gynecology     LAPAROSCOPIC HYSTERECTOMY TOTAL N/A 3/4/2019    Procedure: ROBOTIC TOTAL LAPAROSCOPIC HYSTERECTOMY, BILATERAL SALPINGECTOMY, LEFT OVARIAN CYSTOTOMY. CYSTOSCOPY;  Surgeon: Zia Farmer MD;  [place] [facility] [place]  Service: Gynecology     LAPAROSCOPY DIAGNOSTIC (GENERAL) N/A 10/19/2015    Procedure:  LAPAROSCOPY,LYSIS OF ADHESIONS;  Surgeon: Zia Farmer MD;  [place] [facility] [place]  Service:      MD LAP,TUBAL CAUTERY Bilateral 3/2/2018    Procedure: LAPAROSCOPIC BILATERAL TUBAL LIGATION;  Surgeon: Kristy Israel DO;  [place] [facility] [place]  Service: Gynecology     TONSILLECTOMY         Social History     Socioeconomic History     Marital status: Single     Spouse name: Not on file     Number of children: Not on file     Years of education: Not on file     Highest education level: Not on file   Occupational History     Not on file   Tobacco Use     Smoking status: Never Smoker     Smokeless tobacco: Never Used   Substance and Sexual Activity     Alcohol use: No     Alcohol/week: 0.0 standard drinks     Drug use: No     Sexual activity: Not on file   Other Topics Concern     Not  on file   Social History Narrative     Not on file     Social Determinants of Health     Financial Resource Strain:      Difficulty of Paying Living Expenses:    Food Insecurity:      Worried About Running Out of Food in the Last Year:      Ran Out of Food in the Last Year:    Transportation Needs:      Lack of Transportation (Medical):      Lack of Transportation (Non-Medical):    Physical Activity:      Days of Exercise per Week:      Minutes of Exercise per Session:    Stress:      Feeling of Stress :    Social Connections:      Frequency of Communication with Friends and Family:      Frequency of Social Gatherings with Friends and Family:      Attends [employer] Services:      Active Member of Clubs or Organizations:      Attends Club or Organization Meetings:      Marital Status:    Intimate Partner Violence:      Fear of Current or Ex-Partner:      Emotionally Abused:      Physically Abused:      Sexually Abused:        Family History   Problem Relation Age of Onset     Diabetes Paternal Grandmother      Other Cancer Paternal Grandmother      Prostate Cancer Father      Alcoholism Sister      Alcoholism Brother      Coronary Artery Disease No family hx of      Cancer Father         prostate     Alcoholism Sister      Alcoholism Brother      Alcoholism Daughter      Urolithiasis No family hx of      Clotting Disorder No family hx of      Gout No family hx of      Heart Disease No family hx of      Family history reviewed and edited as appropriate    Medications and Allergies:     Outpatient Encounter Medications as of 9/10/2021   Medication Sig Dispense Refill     acetaminophen (TYLENOL) 500 MG tablet Take 500-1,000 mg by mouth       atorvastatin (LIPITOR) 10 MG tablet atorvastatin 10 mg tablet       canagliflozin (INVOKANA) 300 MG tablet Invokana 300 mg tablet       diazepam (VALIUM) 5 MG tablet diazepam 5 mg tablet       fluconazole (DIFLUCAN) 150 MG tablet fluconazole 150 mg tablet       gabapentin  (NEURONTIN) 300 MG capsule Take 600 mg by mouth       ibuprofen (ADVIL/MOTRIN) 600 MG tablet        insulin glargine (LANTUS SOLOSTAR) 100 UNIT/ML pen Inject 24 Units Subcutaneous       Melatonin 10 MG CAPS Take 1 capsule by mouth at bedtime as needed, may repeat once (insomnia) 30 capsule 1     metFORMIN (GLUCOPHAGE-XR) 500 MG 24 hr tablet metformin  mg tablet,extended release 24 hr       methocarbamol (ROBAXIN) 500 MG tablet        nortriptyline (PAMELOR) 25 MG capsule nortriptyline 25 mg capsule       ondansetron (ZOFRAN) 4 MG tablet ondansetron HCl 4 mg tablet       pantoprazole (PROTONIX) 40 MG EC tablet Take 1 tablet (40 mg) by mouth daily 90 tablet 3     tolterodine ER (DETROL LA) 4 MG 24 hr capsule tolterodine ER 4 mg capsule,extended release 24 hr       topiramate (TOPAMAX) 25 MG tablet        topiramate (TOPAMAX) 50 MG tablet        vitamin D3 (CHOLECALCIFEROL) 125 MCG (5000 UT) tablet Take 5,000 Units by mouth       Continuous Blood Gluc  (FREESTYLE CINDY 14 DAY READER) JAUN 1 Application       Continuous Blood Gluc Sensor (FREESTYLE CINDY 14 DAY SENSOR) MISC 1 Application       Continuous Blood Gluc Sensor (FREESTYLE CINDY 14 DAY SENSOR) MISC        famotidine (PEPCID) 40 MG tablet  (Patient not taking: Reported on 9/10/2021)       mirtazapine (REMERON) 15 MG tablet mirtazapine 15 mg tablet (Patient not taking: Reported on 9/10/2021)       omeprazole (PRILOSEC) 20 MG DR capsule omeprazole 20 mg capsule,delayed release (Patient not taking: Reported on 9/10/2021)       [DISCONTINUED] cephALEXin (KEFLEX) 500 MG capsule cephalexin 500 mg capsule       [DISCONTINUED] glipiZIDE (GLUCOTROL) 5 MG tablet glipizide 5 mg tablet (Patient not taking: Reported on 9/10/2021)       [DISCONTINUED] Semaglutide,0.25 or 0.5MG/DOS, (OZEMPIC, 0.25 OR 0.5 MG/DOSE,) 2 MG/1.5ML SOPN Ozempic 0.25 mg or 0.5 mg (2 mg/1.5 mL) subcutaneous pen injector       No facility-administered encounter medications on file as of  9/10/2021.        Allergies   Allergen Reactions     Hydrocodone Other (See Comments)     Headache per pt and hallucinations  Headache per pt and hallucinations       Reglan [Metoclopramide]      Anxiety     Vicodin [Hydrocodone-Acetaminophen] Other (See Comments)     Hallucinations     Silver Nitrate Itching and Rash     Only issues if in for a long time  Only issues if in for a long time  Only issues if in for a long time       Tegaderm Transparent Dressing (Informational Only) Rash        Review of systems:  A full 10 point review of systems was obtained and was negative except for the pertinent positives and negatives stated within the HPI.    Objective Findings:   Physical Exam:    Constitutional: There were no vitals taken for this visit.  General: Alert, cooperative, no distress, well-appearing  Head: Atraumatic, normocephalic, no obvious abnormalities   Eyes: EOMI, Sclera anicteric, no obvious conjunctival hemorrhage   Nose: Nares normal, no obvious malformation, no obvious rhinorrhea   Respiratory: normal appearing respiration, no obvious cough  Musculoskeletal: Range of motion intact, no obvious strength deficit  Skin: No jaundice, no obvious rash  Neurologic: AAOx3, no obvious neurologic abnormality  Psychiatric: Normal Affect, appropriate mood  Extremities: No obvious edema, no obvious malformation     Labs, Radiology, Pathology     Lab Results   Component Value Date    WBC 9.3 10/26/2020    WBC 8.5 04/21/2020    WBC 10.1 02/20/2019    HGB 13.0 10/26/2020    HGB 12.3 04/21/2020    HGB 10.5 (L) 03/05/2019     10/26/2020     04/21/2020     03/04/2019    CHOL 200 (H) 02/17/2021    CHOL 221 (H) 11/04/2020    CHOL 267 (H) 02/21/2020    TRIG 109 02/17/2021    TRIG 104 11/04/2020    TRIG 103 02/21/2020    HDL 50 02/17/2021    HDL 58 11/04/2020    HDL 53 02/21/2020    ALT 19 11/04/2020    ALT 20 10/26/2020    ALT 15 07/27/2020    AST 19 11/04/2020    AST 15 10/26/2020    AST 11 07/27/2020      05/05/2021     02/17/2021     11/04/2020    BUN 8 05/05/2021    BUN 8 02/17/2021    BUN 10 11/04/2020    CO2 27 05/05/2021    CO2 22 02/17/2021    CO2 22 11/04/2020    TSH 0.87 04/21/2020        Liver Function Studies -   Recent Labs   Lab Test 11/04/20  0827 08/14/13  1420   PROTTOTAL 7.3 6.8   ALBUMIN 3.8 3.5   BILITOTAL 0.3 0.4   ALKPHOS 91  --    AST 19  --    ALT 19  --    BILIDIRECT  --  0.1          Patient Active Problem List    Diagnosis Date Noted     Unintentional weight loss 09/11/2021     Priority: Medium     Rectal bleeding 09/11/2021     Priority: Medium     Flatulence, eructation and gas pain 09/11/2021     Priority: Medium     Diabetes mellitus, type 2 (H) 07/15/2021     Priority: Medium     Chronic nausea 07/13/2021     Priority: Medium     Chronic vomiting 07/13/2021     Priority: Medium     Obesity 01/02/2013     Priority: Medium     Problem list name updated by automated process. Provider to review       Other abnormal Papanicolaou smear of cervix and cervical HPV(795.09) 01/02/2013     Priority: Medium      Assessment and Plan   Assessment:    Ruth Vanegas is a 35 year old female with HLD, uncontrolled DM2, anxiety, frequent yeast infection, migraine who is presenting as a new patient in consultation at the request of Dr. Banks with a chief complaint of nausea and vomiting.    The patient was seen in video telemedicine consultation regarding her symptoms of nausea and vomiting.  This is in the setting of uncontrolled diabetes with frequent hyperglycemia is noted by the patient and her hemoglobin A1c.  It is possible that her symptoms of nausea and vomiting are related to her hyperglycemia.  The most important thing for her to do is to get better control of her diabetes.  This may resolve her GI symptoms.  In an effort to work this up further I have asked that the patient undergo a gastric emptying study to evaluate for gastroparesis.  Additionally she will require an  upper endoscopy.  I provided resources for her to review a gastroparesis diet.    Her symptoms could also represent dyspepsia and I have asked that she begin taking pantoprazole 40 mg once daily.  In the past she has not tolerated omeprazole due to headache.  It is possible that she will tolerate pantoprazole.  If she develops a headache with the medication she may discontinue it.    Lastly she identified having streaks of blood when she has a bowel movement and unintentional weight loss.  Because of this I have asked that she undergo a colonoscopy at the time of her upper endoscopy.    1. Nausea and vomiting, intractability of vomiting not specified, unspecified vomiting type    2. Type 2 diabetes, uncontrolled, with neuropathy (H)    3. Unintentional weight loss    4. Rectal bleeding    5. Regurgitation of food    6. Flatulence, eructation and gas pain       Orders Placed This Encounter   Procedures     NM Gastric emptying     Adult Gastro Ref - Procedure Only      Plan:  1. Please begin taking pantoprazole 40mg daily. If you develop headaches with the medication you may discontinue it.    2. Please obtain a gastric emptying study to assess your stomach for a condition called gastroparesis.    3. Your symptoms may very well be due to hyperglycemia and uncontrolled diabetes. Please follow up with your endocrinologist.   4. We will plan for upper endoscopy and colonoscopy to evaluate your nausea and vomiting as well as the weight loss and streaks of blood on the toilet paper.    Adherence to a strict gastroparesis diet is essential in the treatment of gastroparesis and gastroparesis-like syndromes. Please review the below link to the [facility] Gastroparesis diet. This is a comprehensive pdf reviewing the gastroparesis diet: [email or web address]      Follow up plan:   Return to clinic 4 months and as needed.    The risks and benefits of  my recommendations, as well as other treatment options were discussed with the patient and any available family today. All questions were answered.     o Follow up: As planned above. Today, I personally spent 31 minutes in direct face to face time with the patient, of which greater than 50% of the time was spent in patient education and counseling as described above. Approximately 20 minutes were spent on indirect care associated with the patient's consultation including but not limited to review of: patient medical records to date, clinic visits, hospital records, lab results, imaging studies, procedural documentation, and coordinating care with other providers. The findings from this review are summarized in the above note.    The patient verbalized understanding of the plan and was appreciative for the time spent and information provided during the office visit.     Author:   Rene Lehman DO   of Medicine  Division of Gastroenterology, Hepatology, and Nutrition  [facility]     Documentation assisted by voice recognition and documentation system.

## 2021-09-10 NOTE — PATIENT INSTRUCTIONS
It was a pleasure taking care of you today.  I've included a brief summary of our discussion and care plan from today's visit below.  Please review this information with your primary care provider.  _______________________________________________________________________    My recommendations are summarized as follows:    1. Please begin taking pantoprazole 40mg daily. If you develop headaches with the medication you may discontinue it.    2. Please obtain a gastric emptying study to assess your stomach for a condition called gastroparesis.  ia and uncontrolled diabetes. Please follow up with your endocrinologist.   4. We will plan for upper endoscopy and colonoscopy to evaluate your nausea and vomiting as well as the weight loss and streaks of blood on the toilet paper.    Adherence to a strict gastroparesis diet is essential in the treatment of gastroparesis and gastroparesis-like syndromes. Please review the below link to the [facility] Gastroparesis diet. This is a comprehensive pdf reviewing the gastroparesis diet: [email or web address]      To schedule endoscopic procedures you may call: [phone]  To schedule radiology tests you may call: [phone]  To schedule an ENT appointment you may call: [phone]    3. Your symptoms may very well be due to hyperglycem  Please call my nurse Irene ([phone])Malik ([phone]) with any questions or concerns.  If you were seen through the [facility] please feel free to reach out to yAsha at [phone]   --    Return to GI Clinic in 4 months to review your progress.    _______________________________________________________________________    Who do I call with any questions after my visit?  Please be in touch if there are any further questions that arise following today's visit.  There are multiple ways to contact your gastroenterology care team.        During business  hours, you may reach a Gastroenterology nurse at [phone] and choose option 3.         To schedule or reschedule an appointment, please call [phone].       You can always send a secure message through [employer].  [employer] messages are answered by your nurse or doctor typically within 24 hours.  Please allow extra time on weekends and holidays.        For urgent/emergent questions after business hours, you may reach the on-call GI Fellow by contacting the [facility]  at [phone].     How will I get the results of any tests ordered?    You will receive all of your results.  If you have signed up for [employer], any tests ordered at your visit will be available to you after your physician reviews them.  Typically this takes 1-2 weeks.  If there are urgent results that require a change in your care plan, your physician or nurse will call you to discuss the next steps.      What is CounterStorm?  CounterStorm is a secure way for you to access all of your healthcare records from the [facility].  It is a web based computer program, so you can sign on to it from any location.  It also allows you to send secure messages to your care team.  I recommend signing up for CounterStorm access if you have not already done so and are comfortable with using a computer.      How to I schedule a follow-up visit?  If you did not schedule a follow-up visit today, please call [phone] to schedule a follow-up office visit.        Sincerely,    Rene Lehman DO   of Medicine  Division of Gastroenterology, Hepatology, and Nutrition  [facility]

## 2021-09-11 PROBLEM — R14.3 FLATULENCE, ERUCTATION AND GAS PAIN: Status: ACTIVE | Noted: 2021-09-11

## 2021-09-11 PROBLEM — R14.1 FLATULENCE, ERUCTATION AND GAS PAIN: Status: ACTIVE | Noted: 2021-09-11

## 2021-09-11 PROBLEM — R14.2 FLATULENCE, ERUCTATION AND GAS PAIN: Status: ACTIVE | Noted: 2021-09-11

## 2021-09-11 PROBLEM — R63.4 UNINTENTIONAL WEIGHT LOSS: Status: ACTIVE | Noted: 2021-09-11

## 2021-09-11 PROBLEM — K62.5 RECTAL BLEEDING: Status: ACTIVE | Noted: 2021-09-11

## 2021-09-19 ENCOUNTER — HEALTH MAINTENANCE LETTER (OUTPATIENT)
Age: 36
End: 2021-09-19

## 2021-09-21 ENCOUNTER — PATIENT OUTREACH (OUTPATIENT)
Dept: NURSING | Facility: CLINIC | Age: 36
End: 2021-09-21

## 2021-09-21 NOTE — PROGRESS NOTES
9/21/2021  Clinic Care Coordination Contact    Community Health Worker Follow Up    Care Gaps:     Health Maintenance Due   Topic Date Due     ADVANCE CARE PLANNING  Never done     COVID-19 Vaccine (1) Never done     PREVENTIVE CARE VISIT  01/22/2020     INFLUENZA VACCINE (1) 09/01/2021     EYE EXAM  10/07/2021     PHQ-9  10/20/2021       Care Gaps Last addressed on 9-21-21 discuss with the doctor at next office visit    Goals:   Goals Addressed as of 9/21/2021 at 4:22 PM                    Today    8/20/21       Other (pt-stated)   40%  40%    Added 7/27/21 by Mely Reynoso, RN      Goal Statement: I would like to have a plan of care and the support with new endocrine team at the [facility] within 2 months   Date Goal set: 2.22.21 update 03/31/21 Updated 9-21-21  Barriers: access   Strengths: pt engagement   Date to Achieve By:   Patient expressed understanding of goal: yes   Action steps to achieve this goal:   1. I will attend appt on 10-28-21 at 7:30am with new Endocrinologist JORGE L Cota at [facility]  2.  I will update CCC RN on 11-1-21 at 11am am for support and plan of care and if any additional resources or support needed.      Updated: 9-21-21 AL            Intervention and Education during outreach:  Called and spoke to patient and follow up on goals  .  Patient reported:   -connected and has support with new therapist at [facility] and [facility].  -attended the GI appt.  Will follow up with Endocrinologist 10-28-21 at the [facility].    Scheduled with CC RN on 11-1-21 for support with plane of care after the appt on 10-28-21.      CC RN follow up: 9-24-21  CC RN follow up 11-2-21 re assessment due 11-12-21  CHW Follow up: Monthly    CHW Plan: Follow up on goal    CHW Next Follow Up: 10-27-21

## 2021-09-24 ENCOUNTER — HOSPITAL ENCOUNTER (OUTPATIENT)
Dept: NUCLEAR MEDICINE | Facility: HOSPITAL | Age: 36
End: 2021-09-24
Attending: INTERNAL MEDICINE
Payer: COMMERCIAL

## 2021-09-24 ENCOUNTER — PATIENT OUTREACH (OUTPATIENT)
Dept: NURSING | Facility: CLINIC | Age: 36
End: 2021-09-24

## 2021-09-24 DIAGNOSIS — R11.2 NAUSEA AND VOMITING, INTRACTABILITY OF VOMITING NOT SPECIFIED, UNSPECIFIED VOMITING TYPE: ICD-10-CM

## 2021-09-24 PROCEDURE — A9541 TC99M SULFUR COLLOID: HCPCS | Performed by: INTERNAL MEDICINE

## 2021-09-24 PROCEDURE — 343N000001 HC RX 343: Performed by: INTERNAL MEDICINE

## 2021-09-24 PROCEDURE — 78264 GASTRIC EMPTYING IMG STUDY: CPT

## 2021-09-24 RX ADMIN — Medication 1 MILLICURIE: at 10:16

## 2021-09-24 NOTE — PROGRESS NOTES
Clinic Care Coordination Contact    Situation: Patient chart reviewed by care coordinator.    Background: RN schedule outreach to support plan of care     Assessment: Patient is currently inpatient for procedure.     Plan/Recommendations: RN CC will outreach in 1-2 weeks

## 2021-09-30 ENCOUNTER — PATIENT OUTREACH (OUTPATIENT)
Dept: CARE COORDINATION | Facility: CLINIC | Age: 36
End: 2021-09-30

## 2021-09-30 NOTE — PROGRESS NOTES
Clinic Care Coordination Contact    Situation: Patient chart reviewed by care coordinator.    Background: SW completed chart review    Assessment: Patient in contact with CCC team, progressing on goals    Plan/Recommendations: Standard outreach

## 2021-10-05 ENCOUNTER — OFFICE VISIT (OUTPATIENT)
Dept: NEUROLOGY | Facility: CLINIC | Age: 36
End: 2021-10-05
Payer: COMMERCIAL

## 2021-10-05 VITALS
HEIGHT: 66 IN | DIASTOLIC BLOOD PRESSURE: 82 MMHG | BODY MASS INDEX: 47.09 KG/M2 | HEART RATE: 90 BPM | WEIGHT: 293 LBS | SYSTOLIC BLOOD PRESSURE: 127 MMHG

## 2021-10-05 DIAGNOSIS — G43.719 INTRACTABLE CHRONIC MIGRAINE WITHOUT AURA AND WITHOUT STATUS MIGRAINOSUS: Primary | ICD-10-CM

## 2021-10-05 DIAGNOSIS — F51.01 PRIMARY INSOMNIA: ICD-10-CM

## 2021-10-05 PROCEDURE — 99205 OFFICE O/P NEW HI 60 MIN: CPT | Performed by: PSYCHIATRY & NEUROLOGY

## 2021-10-05 RX ORDER — TOPIRAMATE 50 MG/1
50 TABLET, FILM COATED ORAL 2 TIMES DAILY
Qty: 180 TABLET | Refills: 0 | Status: SHIPPED | OUTPATIENT
Start: 2021-10-05 | End: 2021-11-03

## 2021-10-05 RX ORDER — RIZATRIPTAN BENZOATE 10 MG/1
10 TABLET ORAL
Qty: 18 TABLET | Refills: 1 | Status: SHIPPED | OUTPATIENT
Start: 2021-10-05 | End: 2022-08-15

## 2021-10-05 ASSESSMENT — MIFFLIN-ST. JEOR: SCORE: 2086.15

## 2021-10-05 NOTE — NURSING NOTE
Chief Complaint   Patient presents with     Headache     duration of 6 months      Jessica Meyers MA on 10/5/2021 at 9:39 AM

## 2021-10-05 NOTE — LETTER
10/5/2021         RE: Ruth Vanegas  [address]  [place] [address]        Dear Colleague,    Thank you for referring your patient, Ruth Vanegas, to the [facility] [place]. Please see a copy of my visit note below.    NEUROLOGY OUTPATIENT CONSULT NOTE   Oct 5, 2021     CHIEF COMPLAINT/REASON FOR VISIT/REASON FOR CONSULT  Patient presents with:  Headache: duration of 6 months     REASON FOR CONSULTATION- Headaches    REFERRAL SOURCE  Dr. Rosangela Haynes   Dr. Rosangela Haynes    HISTORY OF PRESENT ILLNESS  Ruth Vanegas is a 35 year old female seen today for evaluation of headaches.  She reports that she has had headaches for years these would come and go.  Over the last few months these have become more constant and lasting longer.  She reports that she has 15 headache days a month.  Both temples are involved.  Headaches are throbbing in nature with photophobia and phonophobia.  She denies any triggers for her headaches.    She is tried sumatriptan in the past which made things worse.  She has tried Excedrin but she has to catch the headaches soon enough to make it work.  She is currently on nortriptyline 50 mg at night which is not helping.  The nortriptyline was also being used as a sleep aid.  She is also on Topamax which is not helping.  She is taking 50 mg in the evening and 25 mg in the morning.  She further complains of vertigo which is sometimes with the headache.  She does have issues with uncontrolled sugars and feels that the Topamax might be helpful.  Denies any visual auras.    Previous history is reviewed and this is unchanged.    PAST MEDICAL/SURGICAL HISTORY  Past Medical History:   Diagnosis Date     Anemia     told to take iron pills when pregnant     Depression      Depressive disorder      Diabetes mellitus (H)      Diabetes mellitus, type 2 (H) 7/15/2021     Dysthymic disorder      Endometriosis      Herpes genitalia      Hyperlipidemia       Kidney stone      Menorrhagia      Migraines      Other abnormal Papanicolaou smear of cervix and cervical HPV(795.09) 1/2/2013     PCOS (polycystic ovarian syndrome)      Post traumatic stress disorder (PTSD)      Patient Active Problem List   Diagnosis     Obesity     Other abnormal Papanicolaou smear of cervix and cervical HPV(795.09)     Chronic nausea     Chronic vomiting     Diabetes mellitus, type 2 (H)     Unintentional weight loss     Rectal bleeding     Flatulence, eructation and gas pain   Significant for diabetes, migraines, depression, insomnia, difficulty keeping food down, constipation    FAMILY HISTORY  Family History   Problem Relation Age of Onset     Diabetes Paternal Grandmother      Other Cancer Paternal Grandmother      Prostate Cancer Father      Alcoholism Sister      Alcoholism Brother      Coronary Artery Disease No family hx of      Cancer Father         prostate     Alcoholism Sister      Alcoholism Brother      Alcoholism Daughter      Urolithiasis No family hx of      Clotting Disorder No family hx of      Gout No family hx of      Heart Disease No family hx of    Family history positive for migraines in her brother.  Also family history of diabetes.    SOCIAL HISTORY  Social History     Tobacco Use     Smoking status: Never Smoker     Smokeless tobacco: Never Used   Substance Use Topics     Alcohol use: No     Alcohol/week: 0.0 standard drinks     Drug use: No       SYSTEMS REVIEW  Twelve-system ROS was done and other than the HPI this was negative except for neck pain, back pain, arm and leg pain, joint pain, numbness and tingling, weakness paralysis, difficulty walking, falling, balance coordination problems, dizziness, ringing in the ears, sleeping problems, headaches, anxiety, depression, bloating, stomach pain, weight gain, appetite problems    MEDICATIONS  acetaminophen (TYLENOL) 500 MG tablet, Take 500-1,000 mg by mouth  atorvastatin (LIPITOR) 10 MG tablet, atorvastatin 10  "mg tablet  canagliflozin (INVOKANA) 300 MG tablet, Invokana 300 mg tablet  Continuous Blood Gluc  (FREESTYLE CINDY 14 DAY READER) JAUN, 1 Application  Continuous Blood Gluc Sensor (FREESTYLE CINDY 14 DAY SENSOR) MISC, 1 Application  Continuous Blood Gluc Sensor (FREESTYLE CINDY 14 DAY SENSOR) MISC,   diazepam (VALIUM) 5 MG tablet, diazepam 5 mg tablet  fluconazole (DIFLUCAN) 150 MG tablet, fluconazole 150 mg tablet  gabapentin (NEURONTIN) 300 MG capsule, Take 600 mg by mouth  ibuprofen (ADVIL/MOTRIN) 600 MG tablet,   insulin glargine (LANTUS SOLOSTAR) 100 UNIT/ML pen, Inject 24 Units Subcutaneous  Melatonin 10 MG CAPS, Take 1 capsule by mouth at bedtime as needed, may repeat once (insomnia)  metFORMIN (GLUCOPHAGE-XR) 500 MG 24 hr tablet, metformin  mg tablet,extended release 24 hr  methocarbamol (ROBAXIN) 500 MG tablet,   nortriptyline (PAMELOR) 25 MG capsule, nortriptyline 25 mg capsule  ondansetron (ZOFRAN) 4 MG tablet, ondansetron HCl 4 mg tablet  pantoprazole (PROTONIX) 40 MG EC tablet, Take 1 tablet (40 mg) by mouth daily  tolterodine ER (DETROL LA) 4 MG 24 hr capsule, tolterodine ER 4 mg capsule,extended release 24 hr  vitamin D3 (CHOLECALCIFEROL) 125 MCG (5000 UT) tablet, Take 5,000 Units by mouth  famotidine (PEPCID) 40 MG tablet,   mirtazapine (REMERON) 15 MG tablet, mirtazapine 15 mg tablet (Patient not taking: Reported on 9/10/2021)  omeprazole (PRILOSEC) 20 MG DR capsule, omeprazole 20 mg capsule,delayed release (Patient not taking: Reported on 9/10/2021)    No current facility-administered medications on file prior to visit.       PHYSICAL EXAMINATION  VITALS: /82 (BP Location: Left arm, Patient Position: Sitting)   Pulse 90   Ht 1.676 m (5' 6\")   Wt 137.4 kg (303 lb)   BMI 48.91 kg/m    GENERAL: Healthy appearing, alert, no acute distress, normal habitus.  CARDIOVASCULAR: Extremities warm and well perfused. Pulses present.   EYES: Funduscopic exam limited.   NEUROLOGICAL:  " Patient is awake and oriented to self, place and time.  Attention span is normal.  Memory is grossly intact.  Language is fluent and follows commands appropriately.  Appropriate fund of knowledge. Cranial nerves 2-12 are intact. There is no pronator drift.  Motor exam shows 5/5 strength in all extremities.  Tone is symmetric bilaterally in upper and lower extremities.  Reflexes are symmetric and 2+ in upper extremities and lower extremities. Sensory exam is grossly intact to light touch, pin prick and vibration.  Finger to nose and heel to shin is without dysmetria.  Romberg is negative.  Gait is normal and the patient is able to do tandem walk and walk on toes and heels.    DIAGNOSTICS  CT head-images reviewed.  No major structural lesions noted.  IMPRESSION:  1.  No acute intracranial process.    CT 2020  HEAD CT:   1.  No acute intracranial process.     CERVICAL SPINE CT:   1.  No acute cervical spine fracture.    OUTSIDE RECORDS  Outside referral notes and chart notes were reviewed and pertinent information has been summarized (in addition to the HPI):-Patient seen for headaches.  Was seen in endocrinology for diabetes.  Was referred to neurology.  Was also having some ankle and feet swelling.  Also has nausea related to headaches.  Has been seen in ENT for benign positional paroxysmal vertigo.  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  Was recommended to see occupational therapy.    IMPRESSION/REPORT/PLAN  Intractable chronic migraine without aura and without status migrainosus  Primary insomnia  History of diabetes    This is a 35 year old female with chronic headache suggestive of chronic migraines and insomnia.  Her head CT has been negative for structural lesions.  We will hold off on MRI for right now though could consider it if headaches are not responding to medications.  We will check blood work to look for causes of headaches.  Sumatriptan has caused headaches to get worse and will  try Maxalt instead for abortive therapy.  We will further try higher doses of Topamax for prevention of headaches.  She is on a low dose which is not effective.  She can stay on the nortriptyline for right now for the insomnia.  This has not been helpful for headache prevention so far.    I would encourage her to keep a log of her headaches to identify triggers.  I can see her back in 2 months.    -     rizatriptan (MAXALT) 10 MG tablet; Take 1 tablet (10 mg) by mouth at onset of headache for migraine May repeat in 2 hours.  -     topiramate (TOPAMAX) 50 MG tablet; Take 1 tablet (50 mg) by mouth 2 times daily  -     Vitamin B12; Future  -     Ferritin; Future  -     TSH with free T4 reflex; Future    Return in about 2 months (around 12/5/2021) for In-Clinic Visit, After testing.    Over 64 minutes were spent coordinating the care for the patient on the day of the encounter.  This includes previsit, during visit and post visit activities as documented above.  Multiple tests/chart reviewed.  Counseling patient.  Prescription management.  (Activities include but not inclusive of reviewing chart, reviewing outside records, reviewing labs and imaging study results as well as the images, patient visit time including getting history and exam,  use if applicable, review of test results with the patient and coming up with a plan in a shared model, counseling patient and family, education and answering patient questions, EMR , EMR diagnosis entry and problem list management, medication reconciliation and prescription management if applicable, paperwork if applicable, printing documents and documentation of the visit activities.)  Billing:-4 data points, 4 problem points      Roberto Bolanos MD  Neurologist  [facility]  Tel:- [phone]    This note was dictated using voice recognition software.  Any grammatical or context distortions are unintentional and inherent to  the software.        Again, thank you for allowing me to participate in the care of your patient.        Sincerely,        Roberto Bolanos MD

## 2021-10-05 NOTE — PROGRESS NOTES
NEUROLOGY OUTPATIENT CONSULT NOTE   Oct 5, 2021     CHIEF COMPLAINT/REASON FOR VISIT/REASON FOR CONSULT  Patient presents with:  Headache: duration of 6 months     REASON FOR CONSULTATION- Headaches    REFERRAL SOURCE  Dr. Rosangela Haynes   Dr. Rosangela Haynes    HISTORY OF PRESENT ILLNESS  Ruth Vanegas is a 35 year old female seen today for evaluation of headaches.  She reports that she has had headaches for years these would come and go.  Over the last few months these have become more constant and lasting longer.  She reports that she has 15 headache days a month.  Both temples are involved.  Headaches are throbbing in nature with photophobia and phonophobia.  She denies any triggers for her headaches.    She is tried sumatriptan in the past which made things worse.  She has tried Excedrin but she has to catch the headaches soon enough to make it work.  She is currently on nortriptyline 50 mg at night which is not helping.  The nortriptyline was also being used as a sleep aid.  She is also on Topamax which is not helping.  She is taking 50 mg in the evening and 25 mg in the morning.  She further complains of vertigo which is sometimes with the headache.  She does have issues with uncontrolled sugars and feels that the Topamax might be helpful.  Denies any visual auras.    Previous history is reviewed and this is unchanged.    PAST MEDICAL/SURGICAL HISTORY  Past Medical History:   Diagnosis Date     Anemia     told to take iron pills when pregnant     Depression      Depressive disorder      Diabetes mellitus (H)      Diabetes mellitus, type 2 (H) 7/15/2021     Dysthymic disorder      Endometriosis      Herpes genitalia      Hyperlipidemia      Kidney stone      Menorrhagia      Migraines      Other abnormal Papanicolaou smear of cervix and cervical HPV(795.09) 1/2/2013     PCOS (polycystic ovarian syndrome)      Post traumatic stress disorder (PTSD)      Patient Active Problem List   Diagnosis     Obesity      Other abnormal Papanicolaou smear of cervix and cervical HPV(795.09)     Chronic nausea     Chronic vomiting     Diabetes mellitus, type 2 (H)     Unintentional weight loss     Rectal bleeding     Flatulence, eructation and gas pain   Significant for diabetes, migraines, depression, insomnia, difficulty keeping food down, constipation    FAMILY HISTORY  Family History   Problem Relation Age of Onset     Diabetes Paternal Grandmother      Other Cancer Paternal Grandmother      Prostate Cancer Father      Alcoholism Sister      Alcoholism Brother      Coronary Artery Disease No family hx of      Cancer Father         prostate     Alcoholism Sister      Alcoholism Brother      Alcoholism Daughter      Urolithiasis No family hx of      Clotting Disorder No family hx of      Gout No family hx of      Heart Disease No family hx of    Family history positive for migraines in her brother.  Also family history of diabetes.    SOCIAL HISTORY  Social History     Tobacco Use     Smoking status: Never Smoker     Smokeless tobacco: Never Used   Substance Use Topics     Alcohol use: No     Alcohol/week: 0.0 standard drinks     Drug use: No       SYSTEMS REVIEW  Twelve-system ROS was done and other than the HPI this was negative except for neck pain, back pain, arm and leg pain, joint pain, numbness and tingling, weakness paralysis, difficulty walking, falling, balance coordination problems, dizziness, ringing in the ears, sleeping problems, headaches, anxiety, depression, bloating, stomach pain, weight gain, appetite problems    MEDICATIONS  acetaminophen (TYLENOL) 500 MG tablet, Take 500-1,000 mg by mouth  atorvastatin (LIPITOR) 10 MG tablet, atorvastatin 10 mg tablet  canagliflozin (INVOKANA) 300 MG tablet, Invokana 300 mg tablet  Continuous Blood Gluc  (FREESTYLE CINDY 14 DAY READER) JAUN, 1 Application  Continuous Blood Gluc Sensor (FREESTYLE CINDY 14 DAY SENSOR) MISC, 1 Application  Continuous Blood Gluc Sensor  "(FREESTYLE CINDY 14 DAY SENSOR) MISC,   diazepam (VALIUM) 5 MG tablet, diazepam 5 mg tablet  fluconazole (DIFLUCAN) 150 MG tablet, fluconazole 150 mg tablet  gabapentin (NEURONTIN) 300 MG capsule, Take 600 mg by mouth  ibuprofen (ADVIL/MOTRIN) 600 MG tablet,   insulin glargine (LANTUS SOLOSTAR) 100 UNIT/ML pen, Inject 24 Units Subcutaneous  Melatonin 10 MG CAPS, Take 1 capsule by mouth at bedtime as needed, may repeat once (insomnia)  metFORMIN (GLUCOPHAGE-XR) 500 MG 24 hr tablet, metformin  mg tablet,extended release 24 hr  methocarbamol (ROBAXIN) 500 MG tablet,   nortriptyline (PAMELOR) 25 MG capsule, nortriptyline 25 mg capsule  ondansetron (ZOFRAN) 4 MG tablet, ondansetron HCl 4 mg tablet  pantoprazole (PROTONIX) 40 MG EC tablet, Take 1 tablet (40 mg) by mouth daily  tolterodine ER (DETROL LA) 4 MG 24 hr capsule, tolterodine ER 4 mg capsule,extended release 24 hr  vitamin D3 (CHOLECALCIFEROL) 125 MCG (5000 UT) tablet, Take 5,000 Units by mouth  famotidine (PEPCID) 40 MG tablet,   mirtazapine (REMERON) 15 MG tablet, mirtazapine 15 mg tablet (Patient not taking: Reported on 9/10/2021)  omeprazole (PRILOSEC) 20 MG DR capsule, omeprazole 20 mg capsule,delayed release (Patient not taking: Reported on 9/10/2021)    No current facility-administered medications on file prior to visit.       PHYSICAL EXAMINATION  VITALS: /82 (BP Location: Left arm, Patient Position: Sitting)   Pulse 90   Ht 1.676 m (5' 6\")   Wt 137.4 kg (303 lb)   BMI 48.91 kg/m    GENERAL: Healthy appearing, alert, no acute distress, normal habitus.  CARDIOVASCULAR: Extremities warm and well perfused. Pulses present.   EYES: Funduscopic exam limited.   NEUROLOGICAL:  Patient is awake and oriented to self, place and time.  Attention span is normal.  Memory is grossly intact.  Language is fluent and follows commands appropriately.  Appropriate fund of knowledge. Cranial nerves 2-12 are intact. There is no pronator drift.  Motor exam shows " 5/5 strength in all extremities.  Tone is symmetric bilaterally in upper and lower extremities.  Reflexes are symmetric and 2+ in upper extremities and lower extremities. Sensory exam is grossly intact to light touch, pin prick and vibration.  Finger to nose and heel to shin is without dysmetria.  Romberg is negative.  Gait is normal and the patient is able to do tandem walk and walk on toes and heels.    DIAGNOSTICS  CT head-images reviewed.  No major structural lesions noted.  IMPRESSION:  1.  No acute intracranial process.    CT 2020  HEAD CT:   1.  No acute intracranial process.     CERVICAL SPINE CT:   1.  No acute cervical spine fracture.    OUTSIDE RECORDS  Outside referral notes and chart notes were reviewed and pertinent information has been summarized (in addition to the HPI):-Patient seen for headaches.  Was seen in endocrinology for diabetes.  Was referred to neurology.  Was also having some ankle and feet swelling.  Also has nausea related to headaches.  Has been seen in ENT for benign positional paroxysmal vertigo.  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  Was recommended to see occupational therapy.    IMPRESSION/REPORT/PLAN  Intractable chronic migraine without aura and without status migrainosus  Primary insomnia  History of diabetes    This is a 35 year old female with chronic headache suggestive of chronic migraines and insomnia.  Her head CT has been negative for structural lesions.  We will hold off on MRI for right now though could consider it if headaches are not responding to medications.  We will check blood work to look for causes of headaches.  Sumatriptan has caused headaches to get worse and will try Maxalt instead for abortive therapy.  We will further try higher doses of Topamax for prevention of headaches.  She is on a low dose which is not effective.  She can stay on the nortriptyline for right now for the insomnia.  This has not been helpful for headache  prevention so far.    I would encourage her to keep a log of her headaches to identify triggers.  I can see her back in 2 months.    -     rizatriptan (MAXALT) 10 MG tablet; Take 1 tablet (10 mg) by mouth at onset of headache for migraine May repeat in 2 hours.  -     topiramate (TOPAMAX) 50 MG tablet; Take 1 tablet (50 mg) by mouth 2 times daily  -     Vitamin B12; Future  -     Ferritin; Future  -     TSH with free T4 reflex; Future    Return in about 2 months (around 12/5/2021) for In-Clinic Visit, After testing.    Over 64 minutes were spent coordinating the care for the patient on the day of the encounter.  This includes previsit, during visit and post visit activities as documented above.  Multiple tests/chart reviewed.  Counseling patient.  Prescription management.  (Activities include but not inclusive of reviewing chart, reviewing outside records, reviewing labs and imaging study results as well as the images, patient visit time including getting history and exam,  use if applicable, review of test results with the patient and coming up with a plan in a shared model, counseling patient and family, education and answering patient questions, EMR , EMR diagnosis entry and problem list management, medication reconciliation and prescription management if applicable, paperwork if applicable, printing documents and documentation of the visit activities.)  Billing:-4 data points, 4 problem points      Roberto Bolanos MD  Neurologist  [facility]  Tel:- [phone]    This note was dictated using voice recognition software.  Any grammatical or context distortions are unintentional and inherent to the software.

## 2021-10-08 ENCOUNTER — TELEPHONE (OUTPATIENT)
Dept: EDUCATION SERVICES | Facility: CLINIC | Age: 36
End: 2021-10-08

## 2021-10-08 DIAGNOSIS — E11.40 TYPE 2 DIABETES MELLITUS WITH DIABETIC NEUROPATHY, WITHOUT LONG-TERM CURRENT USE OF INSULIN (H): Primary | ICD-10-CM

## 2021-10-12 ENCOUNTER — PATIENT OUTREACH (OUTPATIENT)
Dept: NURSING | Facility: CLINIC | Age: 36
End: 2021-10-12

## 2021-10-12 ENCOUNTER — TELEPHONE (OUTPATIENT)
Dept: GASTROENTEROLOGY | Facility: CLINIC | Age: 36
End: 2021-10-12

## 2021-10-12 NOTE — LETTER
[facility] CARE COORDINATION  [facility]   [address] [place] [address]    October 12, 2021    Ruth Vanegas  [address]   [place] [address]      Dear Ruth,    I am a clinic care coordinator who works with Milton Banks MD at [facility]. I wanted to thank you for spending the time to talk with me.  Below is a description of clinic care coordination and how I can further assist you.      The clinic care coordination team is made up of a registered nurse,  and community health worker who understand the health care system. The goal of clinic care coordination is to help you manage your health and improve access to the health care system in the most efficient manner. The team can assist you in meeting your health care goals by providing education, coordinating services, strengthening the communication among your providers and supporting you with any resource needs.    Please feel free to contact the Community Health Worker  with any questions or concerns. We are focused on providing you with the highest-quality healthcare experience possible and that all starts with you.     Sincerely,     Mely Reynoso RN      Enclosed: I have enclosed a copy of the Patient Centered Plan of Care. This has helpful information and goals that we have talked about. Please keep this in an easy to access place to use as needed.

## 2021-10-12 NOTE — TELEPHONE ENCOUNTER
Screening Questions  1. Are you active on mychart? YES    2. What insurance is in the chart? BL[employer]US    2.  Ordering/Referring Provider: ANSHUL    3. BMI 48.9    4. Do you have any pulmonary issues? Yes: n    5. Have you had a heart, lung, or liver transplant? n    6. Are you currently on dialysis or have chronic kidney disease? n    7. Have you had a stroke or Transient ischemic atttack (TIA) within 6 months? n    8. In the past 6 months, have you had any heart related issues including cardiomyopathy or heart attack? n      If yes, did it require cardiac stenting or other implantable device?n      9. Do you have any implantable devices in your body (pacemaker, defib, LVAD)? n    10. Do you take nitroglycerin? If yes, how often? n    11. Are you currently taking any blood thinners?n    12. Are you a diabetic? y    13. (Females) Are you currently pregnant? n  If yes, how many weeks?      15. Are you taking any prescription pain medications on a routine schedule? n If yes, MAC sedation.    16. Do you have any chemical dependencies such as alcohol, street drugs, or methadone? nIf yes, MAC sedation.    17. Do you have any history of post-traumatic stress syndrome, severe anxiety or history of psychosis? y    18. Do you transfer independently? y    19.  Do you have any issues with constipation? n    20. Preferred Pharmacy for Pre Prescription [facility]    Scheduling Details    Which Colonoscopy Prep was Sent?: [facility]  Procedure Scheduled: EGD/COLON  Provider/Surgeon: ANSHUL  Date of Procedure: 12/14   Location: [facility]  Caller (Please ask for phone numbDARLEer if not scheduled by patient): HORTENSIA      Sedation Type: MAC  Conscious Sedation- Needs  for 6 hours after the procedure  MAC/General-Needs  for 24 hours after procedure    Pre-op Required at [facility], [place], [place] and OR for MAC sedation: TRANSFERRED PATIENT TO PAC SCHEDULING LINE  (if yes advise patient they will need a pre-op  prior to procedure)      Is patient on blood thinners? -N (If yes- inform patient to follow up with PCP or provider for follow up instructions)     Informed patient they will need an adult  Y  Cannot take any type of public or medical transportation alone    [facility] 12/10 8AM    Confirmed Nurse will call to complete assessment Y    Additional comments:

## 2021-10-12 NOTE — PROGRESS NOTES
Clinic Care Coordination Contact    Follow Up Progress Note      Assessment:RN CC follow up with patient    Diabetes management:  Patient notes concern for fluctuation with blood sugars and loosing weight  Patient has visit with Diabetic Educator this week and follow up with endocrine later this month to establish care     Gastro:  Patient completed gastro visit and has not schedule recommended endoscopy yet, outreach with patient to support set up. Schedule upper and lower endoscopy for December 14.  Pre procedure Covid test setup for [employer] -  Dec 10   Pre - op with [facility] schedule line [phone] - Nov 15     Neurology:  Pt attended visit and had change in medication , follow up schedule for November, no CC action at this time    RN CC will follow up within one month to support/ review goal progression.        Care Gaps:    Health Maintenance Due   Topic Date Due     ADVANCE CARE PLANNING  Never done     COVID-19 Vaccine (1) Never done     PREVENTIVE CARE VISIT  01/22/2020     INFLUENZA VACCINE (1) 09/01/2021     EYE EXAM  10/07/2021     PHQ-9  10/20/2021     MICROALBUMIN  11/04/2021         Will address and create goal at reassessment 11/2/21  Goals addressed this encounter:   Goals Addressed                    This Visit's Progress       Patient Stated       Medical (pt-stated)         Goal Statement: I will have a better understanding and plan of care for Gastro within 3-4 months  Date Goal set: 10/12/21    Barriers: access  Strengths: pt engagement  Date to Achieve By:December    Patient expressed understanding of goal: yes  Action steps to achieve this goal:  1. I will schedule and attend visit with Dr Stapleton for upper and lower endoscopy-December 14  Pre -Procedure Covid test - Dec 10  Pre -assessment schedule Nov 15     2. I will update Care coordination team during next outreach  3. I will report to my Community Health Worker if any additional resources or support needed.           Other  (pt-stated)   50%      Goal Statement: I would like to have a plan of care and the support with new endocrine team at the [facility] within 2 months   Date Goal set: 2.22.21 update 03/31/21 Updated 9-21-21, 10/12/21    Barriers: access   Strengths: pt engagement   Date to Achieve By: December  Patient expressed understanding of goal: yes   Action steps to achieve this goal:   1. I will attend appt on 10-28-21 at 7:30am with new Endocrinologist JORGE L Cota at [facility]  2.  I will update CCC RN on 11-2-21 at 11am am for support and plan of care and if any additional resources or support needed.                   Intervention/Education provided during outreach:   Schedule gastro testing and follow up      Outreach Frequency: monthly    Plan:   Patient will schedule and attend recommended follow up visits with speciality providers and primary care provider.    RN CC will outreach in 4-6 weeks to support ongoing recommendations and plan of care will be available sooner if needed.

## 2021-10-12 NOTE — LETTER
[facility]  Patient Centered Plan of Care  About Me:        Patient Name:  Ruth Vanegas    YOB: 1985  Age:         35 year old   Pedro MRN:    [id number] Telephone Information:  Home Phone [phone]   Mobile [phone]       Address:  [address] [place] [address] Email address:  [email or web address]      Emergency Contact(s)    Name Relationship Lgl Grd Work Phone Home Phone Mobile Phone   1. GEM LOWE Friend   [phone]    2. VILMA MILLER Brother   [phone]            Primary language:  English     needed? No   [facility] [employer] [facility]:  [phone] op. 1  Other communication barriers:No data recorded  Preferred Method of Communication:     Current living arrangement: Other (live with her 2 children)    Mobility Status/ Medical Equipment: Independent        Health Maintenance  Health Maintenance Reviewed: Due/Overdue - PCP visit scheduled      My Access Plan  Medical Emergency 911   Primary Clinic Line   -     24 Hour Appointment Line [phone] or  [phone]) (toll-free)   24 Hour Nurse Line [phone] (toll-free)   Preferred Urgent Care [facility], [phone]     [facility]  [phone]     Preferred Pharmacy [facility] - [place], [place] - [address]     Behavioral Health Crisis Line The National Suicide Prevention Lifeline at 1-437.857.4074 or 942             My Care Team Members  Patient Care Team       Relationship Specialty Notifications Start End    Milton Banks MD PCP - General   11/1/16     Phone: [phone] Fax: [phone]         [address] [place] [address]    Zia Farmer MD  OB/Gyn  11/6/15     Phone: [phone] Fax: [phone]         [facility] [place] [address]    Randall Allison, PharmD Pharmacist Pharmacist  5/20/20     Phone: [phone]           [facility] [address] [place] [address]    Jeremiah Loyola Community Health Worker Primary Care - CC Admissions 11/12/20     Phone: [phone] Fax: [phone]         [address] [place] [address]    Brenden Gibson, [facility] Care Coordinator Primary Care - CC  11/12/20     Mely Reynoso, RN Lead Care Coordinator Primary Care - [facility] Admissions 2/22/21     Milton Banks MD Assigned PCP   6/16/21     Phone: [phone] Fax: [phone]         [address] [place] [address]    Norman Quinones MD Assigned Surgical Provider   7/16/21     Phone: [phone] Fax: [phone]         [facility] [facility] [address] [facility] [address]    Puja Prescott MD Resident Student in organized health care education/training program  8/18/21     Phone: [phone] Fax: [phone]         [address] [place] [address]    Rene Lehman DO Assigned Gastroenterology Provider   9/19/21     Phone: [phone] Fax: [phone]         [address] [place] [address]            My Care Plans  Self Management and Treatment Plan  Goals and (Comments)  Goals        General     Medical (pt-stated)      Notes - Note edited  10/12/2021 11:28 AM by Mely Reynoso, RN     Goal Statement: I will have a better understanding and plan of care for Gastro within 3-4 months  Date Goal set: 10/12/21    Barriers: access  Strengths: pt engagement  Date to Achieve By:December    Patient expressed understanding of goal: yes  Action steps to achieve this goal:  1. I will schedule and attend visit with Dr Stapleton for upper and lower endoscopy-December 14  Pre -Procedure Covid test - Dec 10  Pre -assessment schedule Nov 15     2. I will update Care coordination team during next outreach  3. I will report to my Community Health Worker if any additional resources or support needed.         Other (pt-stated)      Notes - Note edited  10/12/2021 11:17 AM by Mely Reynoso, RN     Goal Statement: I would like to  have a plan of care and the support with new endocrine team at the [facility] within 2 months   Date Goal set: 2.22.21 update 03/31/21 Updated 9-21-21, 10/12/21    Barriers: access   Strengths: pt engagement   Date to Achieve By: December  Patient expressed understanding of goal: yes   Action steps to achieve this goal:   1. I will attend appt on 10-28-21 at 7:30am with new Endocrinologist JORGE L Cota at [facility]  2.  I will update CCC RN on 11-2-21 at 11am am for support and plan of care and if any additional resources or support needed.                    Action Plans on File:                       Advance Care Plans/Directives Type:   No data recorded    My Medical and Care Information  Problem List   Patient Active Problem List   Diagnosis     Obesity     Other abnormal Papanicolaou smear of cervix and cervical HPV(795.09)     Chronic nausea     Chronic vomiting     Diabetes mellitus, type 2 (H)     Unintentional weight loss     Rectal bleeding     Flatulence, eructation and gas pain      Current Medications and Allergies:    Current Outpatient Medications   Medication     acetaminophen (TYLENOL) 500 MG tablet     atorvastatin (LIPITOR) 10 MG tablet     canagliflozin (INVOKANA) 300 MG tablet     Continuous Blood Gluc  (FREESTYLE CINDY 14 DAY READER) JAUN     Continuous Blood Gluc Sensor (FREESTYLE CINDY 14 DAY SENSOR) MISC     Continuous Blood Gluc Sensor (FREESTYLE CINDY 14 DAY SENSOR) MISC     diazepam (VALIUM) 5 MG tablet     famotidine (PEPCID) 40 MG tablet     fluconazole (DIFLUCAN) 150 MG tablet     gabapentin (NEURONTIN) 300 MG capsule     ibuprofen (ADVIL/MOTRIN) 600 MG tablet     insulin glargine (LANTUS SOLOSTAR) 100 UNIT/ML pen     Melatonin 10 MG CAPS     metFORMIN (GLUCOPHAGE-XR) 500 MG 24 hr tablet     methocarbamol (ROBAXIN) 500 MG tablet     mirtazapine (REMERON) 15 MG tablet     nortriptyline (PAMELOR) 25 MG capsule     omeprazole (PRILOSEC) 20 MG DR capsule     ondansetron  (ZOFRAN) 4 MG tablet     pantoprazole (PROTONIX) 40 MG EC tablet     rizatriptan (MAXALT) 10 MG tablet     tolterodine ER (DETROL LA) 4 MG 24 hr capsule     topiramate (TOPAMAX) 50 MG tablet     vitamin D3 (CHOLECALCIFEROL) 125 MCG (5000 UT) tablet     No current facility-administered medications for this visit.         Care Coordination Start Date: 11/12/2020   Frequency of Care Coordination: monthly     Form Last Updated: 10/12/2021

## 2021-10-13 NOTE — TELEPHONE ENCOUNTER
FUTURE VISIT INFORMATION      SURGERY INFORMATION:    Date: 12/14/21    Location:  GI    Surgeon:  Rene Lehman DO    Anesthesia Type:  MAC    Procedure: ESOPHAGOGASTRODUODENOSCOPY (EGD)    Consult: Virtual visit 9/10/21    RECORDS REQUESTED FROM:        Primary Care Provider: Milton Banks MD  - [facility]    Most recent EKG+ Tracing: 10/26/20

## 2021-10-27 ENCOUNTER — PATIENT OUTREACH (OUTPATIENT)
Dept: NURSING | Facility: CLINIC | Age: 36
End: 2021-10-27

## 2021-10-27 ASSESSMENT — ENCOUNTER SYMPTOMS
DEPRESSION: 1
HEARTBURN: 0
DIARRHEA: 0
INSOMNIA: 1
CONSTIPATION: 1
BLOOD IN STOOL: 0
BLOATING: 0
PANIC: 1
DECREASED CONCENTRATION: 0
BOWEL INCONTINENCE: 0
NAUSEA: 1
JAUNDICE: 0
ABDOMINAL PAIN: 0
NERVOUS/ANXIOUS: 1
VOMITING: 1
RECTAL PAIN: 0

## 2021-10-27 NOTE — PROGRESS NOTES
Endocrinology Clinic New Consult      Ruth Vanegas MRN:[id number] YOB: 1985  Primary care provider: Milton Banks     Reason for Endocrine consult: Uncontrolled type 2 DM    HPI:  Ruth Vanegas is a 35 year old female with PMHx of type 2 DM, obesity, chronic migraine, intractable nausea and vomitting presents to clinic for management of type 2 DM     She was diagnosed 5 years ago, she was diagnosed when she had a scheduled D&C and they found her to have BG of 270's. She was pre DM before that. She had a hospital visit in 5/2021 for hyperglycemia in 230 range, neg for BHOB, got IVF and was discharged    Current DM medications:  lantus 24 U daily  Metformin 1000 mg BID  invokana 300 mg daily      She is on topiramate 50 mg in AM and 50 mg in PM for her headaches     Has tried bydureon and ozempic in the past and had nausea and vomiting. She was taken off glipizide and had her lantus increased from 18 to 24 units    Has intractable vomiting, following up with GI scheduled for EGD 12/14/2021, had a 9/23/2021- normal gastric emptying study    She has had diabetes education and know how to count carbs and also what entails low carb diet    Diet: Mentions she doesn't eat that much 1-2 meals a day, Dinner: meat loaf, mashed potatoes and broccolli, chilli. Has reg soda, cookies and desserts   She mentions she lost 20 lbs in last 5 months , she feels numbness in her finger tips and numbness and tingling in her feet, takes gabapentin 600 mg TID, she doesn't know if its helping or not, she also complains of nausea and vomiting and she hasn't been eating well since last 1 year    Exercise: No exercise formally, walks at work, but has had a knee injury from an accident so limited by that     Avg glucose:139 mg/dl  SD: 29.6  Very high: 0  High: 17%  Target: 81%  Low: 2%  Very low:0%    BG reviews shows that she has low BG in 63 range and this is during AM and between 4 pm - 6 pm, she mentions she didn't  skip any meal but it was low and she feels tired during that time and checks her BG. She never checks with glucometer when she gets low BG on martine    Diabetes complications include:  Retinopathy: last eye exam- last year, due for this year, [facility]   Nephropathy: creat: 0.66 in 2021, needs a micro alb/creat ratio  Neuropathy: has numbness and tingling in hands and feet - on gabapentin  LDL: 128 in   Smoking:none  BP: well controlled    [place] at [employer] [place] [employer], single, lives with her kids, has 2 kids, 14 year and 17 years, non smoker, no alcohol consumption ,no illicit drug use . Had a hysterectomy in     Family history: grandmother on mothers side with diabetes    ROS:  All 12 systems were reviewed and negative except as mentioned in HPI    Past Medical/Surgical History:  Past Medical History:   Diagnosis Date     Anemia     told to take iron pills when pregnant     Depression      Depressive disorder      Diabetes mellitus (H)      Diabetes mellitus, type 2 (H) 7/15/2021     Dysthymic disorder      Endometriosis      Herpes genitalia      Hyperlipidemia      Kidney stone      Menorrhagia      Migraines      Other abnormal Papanicolaou smear of cervix and cervical HPV(795.09) 2013     PCOS (polycystic ovarian syndrome)      Post traumatic stress disorder (PTSD)      Past Surgical History:   Procedure Laterality Date     BLADDER REPAIR W/  SECTION      X2     C/SECTION, CLASSICAL      x2     DILATION AND CURETTAGE  2015     DILATION AND CURETTAGE  2015     DILATION AND CURETTAGE, OPERATIVE HYSTEROSCOPY, COMBINED N/A 2015    Procedure: Dilation and Curettage with Hysteroscopy;  Surgeon: Zia Farmer MD;  [place] [facility] [place]:      DILATION AND CURETTAGE, OPERATIVE HYSTEROSCOPY, COMBINED N/A 2016    Procedure: DILATION AND CURETTAGE WITH HYSTEROSCOPY INSERT MIRENA;  Surgeon: Suzanne Major MD;  [place] [facility] [place]:       [place] SURGERY       GYN SURGERY  10/19/15    laproscopic lysis of adhesions     HYSTEROSCOPY, ABLATE ENDOMETRIUM HYDROTHERMAL, COMBINED N/A 3/2/2018    Procedure: HYSTEROSCOPY, DILATION AND CURETTAGE, ENDOMETRIAL ABLATION;  Surgeon: Kristy Israel DO;  [place] [facility] [place];  Service: Gynecology     LAPAROSCOPIC HYSTERECTOMY TOTAL N/A 3/4/2019    Procedure: ROBOTIC TOTAL LAPAROSCOPIC HYSTERECTOMY, BILATERAL SALPINGECTOMY, LEFT OVARIAN CYSTOTOMY. CYSTOSCOPY;  Surgeon: Zia Farmer MD;  Location: [facility] OR;  Service: Gynecology     LAPAROSCOPY DIAGNOSTIC (GENERAL) N/A 10/19/2015    Procedure:  LAPAROSCOPY,LYSIS OF ADHESIONS;  Surgeon: Zia Farmer MD;  Location: [facility] OR;  Service:      AK LAP,TUBAL CAUTERY Bilateral 3/2/2018    Procedure: LAPAROSCOPIC BILATERAL TUBAL LIGATION;  Surgeon: Kristy Israel DO;  Location: [facility] [place];  Service: Gynecology     TONSILLECTOMY         Allergies:  Allergies   Allergen Reactions     Hydrocodone Other (See Comments)     Headache per pt and hallucinations  Headache per pt and hallucinations       Reglan [Metoclopramide]      Anxiety     Vicodin [Hydrocodone-Acetaminophen] Other (See Comments)     Hallucinations     Silver Nitrate Itching and Rash     Only issues if in for a long time  Only issues if in for a long time  Only issues if in for a long time       Tegaderm Transparent Dressing (Informational Only) Rash       PTA Meds:  Prior to Admission medications    Medication Sig Last Dose Taking? Auth Provider   acetaminophen (TYLENOL) 500 MG tablet Take 500-1,000 mg by mouth   Reported, Patient   atorvastatin (LIPITOR) 10 MG tablet atorvastatin 10 mg tablet   Reported, Patient   canagliflozin (INVOKANA) 300 MG tablet Invokana 300 mg tablet   Reported, Patient   Continuous Blood Gluc  (FibersparYLE CINDY 14 DAY READER) JAUN 1 Application   Reported, Patient   Continuous Blood Gluc Sensor (FREESTYLE CINDY 14 DAY SENSOR) MISC 1  Application   Reported, Patient   Continuous Blood Gluc Sensor (FREESTYLE CINDY 14 DAY SENSOR) MISC    Reported, Patient   diazepam (VALIUM) 5 MG tablet diazepam 5 mg tablet   Reported, Patient   famotidine (PEPCID) 40 MG tablet    Reported, Patient   fluconazole (DIFLUCAN) 150 MG tablet fluconazole 150 mg tablet   Reported, Patient   gabapentin (NEURONTIN) 300 MG capsule Take 600 mg by mouth   Reported, Patient   ibuprofen (ADVIL/MOTRIN) 600 MG tablet    Reported, Patient   insulin glargine (LANTUS SOLOSTAR) 100 UNIT/ML pen Inject 24 Units Subcutaneous   Reported, Patient   Melatonin 10 MG CAPS Take 1 capsule by mouth at bedtime as needed, may repeat once (insomnia)   Milton Banks MD   metFORMIN (GLUCOPHAGE-XR) 500 MG 24 hr tablet metformin  mg tablet,extended release 24 hr   Reported, Patient   methocarbamol (ROBAXIN) 500 MG tablet    Reported, Patient   mirtazapine (REMERON) 15 MG tablet mirtazapine 15 mg tablet  Patient not taking: Reported on 9/10/2021   Reported, Patient   nortriptyline (PAMELOR) 25 MG capsule nortriptyline 25 mg capsule   Reported, Patient   omeprazole (PRILOSEC) 20 MG DR capsule omeprazole 20 mg capsule,delayed release  Patient not taking: Reported on 9/10/2021   Reported, Patient   ondansetron (ZOFRAN) 4 MG tablet ondansetron HCl 4 mg tablet   Reported, Patient   pantoprazole (PROTONIX) 40 MG EC tablet Take 1 tablet (40 mg) by mouth daily   Rene Lehman DO   rizatriptan (MAXALT) 10 MG tablet Take 1 tablet (10 mg) by mouth at onset of headache for migraine May repeat in 2 hours.   Roberto Bolanos MD   tolterodine ER (DETROL LA) 4 MG 24 hr capsule tolterodine ER 4 mg capsule,extended release 24 hr   Reported, Patient   topiramate (TOPAMAX) 50 MG tablet Take 1 tablet (50 mg) by mouth 2 times daily   Roberto Bolanos MD   vitamin D3 (CHOLECALCIFEROL) 125 MCG (5000 UT) tablet Take 5,000 Units by mouth   Reported, Patient        Current heard:   Current Outpatient Medications    Medication     acetaminophen (TYLENOL) 500 MG tablet     atorvastatin (LIPITOR) 10 MG tablet     canagliflozin (INVOKANA) 300 MG tablet     Continuous Blood Gluc  (FREESTYLE CINDY 14 DAY READER) JAUN     Continuous Blood Gluc Sensor (FREESTYLE CINDY 14 DAY SENSOR) MISC     Continuous Blood Gluc Sensor (FREESTYLE CINDY 14 DAY SENSOR) MISC     diazepam (VALIUM) 5 MG tablet     fluconazole (DIFLUCAN) 150 MG tablet     gabapentin (NEURONTIN) 300 MG capsule     ibuprofen (ADVIL/MOTRIN) 600 MG tablet     insulin glargine (LANTUS SOLOSTAR) 100 UNIT/ML pen     Melatonin 10 MG CAPS     metFORMIN (GLUCOPHAGE-XR) 500 MG 24 hr tablet     methocarbamol (ROBAXIN) 500 MG tablet     nortriptyline (PAMELOR) 25 MG capsule     ondansetron (ZOFRAN) 4 MG tablet     pantoprazole (PROTONIX) 40 MG EC tablet     rizatriptan (MAXALT) 10 MG tablet     tolterodine ER (DETROL LA) 4 MG 24 hr capsule     topiramate (TOPAMAX) 50 MG tablet     vitamin D3 (CHOLECALCIFEROL) 125 MCG (5000 UT) tablet     famotidine (PEPCID) 40 MG tablet     mirtazapine (REMERON) 15 MG tablet     omeprazole (PRILOSEC) 20 MG DR capsule     No current facility-administered medications for this visit.       Family History:  Family History   Problem Relation Age of Onset     Diabetes Paternal Grandmother      Other Cancer Paternal Grandmother      Prostate Cancer Father      Alcoholism Sister      Alcoholism Brother      Coronary Artery Disease No family hx of      Cancer Father         prostate     Alcoholism Sister      Alcoholism Brother      Alcoholism Daughter      Urolithiasis No family hx of      Clotting Disorder No family hx of      Gout No family hx of      Heart Disease No family hx of        Social History:  Social History     Tobacco Use     Smoking status: Never Smoker     Smokeless tobacco: Never Used   Substance Use Topics     Alcohol use: No     Alcohol/week: 0.0 standard drinks         Physical examination:  General appearance: seated comfortably  in the chair during assessment. Not in any acute distress  HEENT: RUBIO. Oral cavity is moist and clear. No thyromegaly, or lymphadenopathy  Lungs: bilateral air entry equal. Clear to auscultation. No rhonchi or crepitations heard  Heart: S1 S2 normal. Pulse: regular rate and rhythm, good volume  Abdomen: soft, nontender, non distended. No purple striae. No skin thickening or induration on the abdomen  Neurological: conscious and oriented. Speech: normal. Moving all four extremities equally. Monofilament test: numbness in the area between the balls of first two toes bilaterally, other areas normal  Extremities: no edema noted. Dorsalis pedis 2+ bilaterally. No ulcers noted. No tremor is noted over her outstretched hands  Psychiatric: normal mood and affect. Normal judgment    Endocrine Labs:         Assessment and Plan:   Ruth Vanegas is a 35 year old female with PMHx of type 2 DM, obesity, chronic migraine, intractable nausea and vomitting presents to clinic for management of type 2 DM . Her Hb A1c today is 7.9, her BG review shows TIR: 81% with 0% very low, does have 2 % lows observed in the morning and 4-6 pm , has also lost 20 lbs weight in last 5 months for poor PO intake due to her nausea and vomiting. She would benefit from slightly reducing the basal dose to prevent the hypoglycemia.        Recommended she check her BG using glucometer when she has a low BG on martine to confirm if that's true lows  Continue with metformin 1000 mg twice daily  Continue with invokana 300 mg daily  Reduce lantus to 22 units daily, if persistently high above> 250 would go back upto 24 units   Will obtain PREETHI Ab, C-peptide, CBC, BMP, micro alb/creat ratio  F/u with ophthalmology   Recommend she discuss with neurology if we can go up on the topamax dose to 100 mg BID if no side effects     Diabetes complications include:  Retinopathy: last eye exam- last year, due for this year, at [facility]   Nephropathy:  creat: 0.66 in 5/2021, needs a micro alb/creat ratio  Neuropathy: has numbness and tingling in hands and feet - on gabapentin  LDL: 128 in 2020  Smoking:none  BP: well controlled    Follow up in 3-4 months    The patient was seen, examined and discussed with MD Puja Lyons MD.  Endocrinology fellow  Answers for HPI/ROS submitted by the patient on 10/27/2021  General Symptoms: No  Skin Symptoms: No  HENT Symptoms: No  EYE SYMPTOMS: No  HEART SYMPTOMS: No  LUNG SYMPTOMS: No  INTESTINAL SYMPTOMS: Yes  URINARY SYMPTOMS: No  GYNECOLOGIC SYMPTOMS: No  BREAST SYMPTOMS: No  SKELETAL SYMPTOMS: No  BLOOD SYMPTOMS: No  NERVOUS SYSTEM SYMPTOMS: No  MENTAL HEALTH SYMPTOMS: Yes  Heart burn or indigestion: No  Nausea: Yes  Vomiting: Yes  Abdominal pain: No  Bloating: No  Constipation: Yes  Diarrhea: No  Blood in stool: No  Black stools: No  Rectal or Anal pain: No  Fecal incontinence: No  Yellowing of skin or eyes: No  Vomit with blood: No  Change in stools: No  Nervous or Anxious: Yes  Depression: Yes  Trouble sleeping: Yes  Trouble thinking or concentrating: No  Mood changes: Yes  Panic attacks: Yes

## 2021-10-27 NOTE — PROGRESS NOTES
10/27/2021  Clinic Care Coordination Contact    Community Health Worker Follow Up    Care Gaps:     Health Maintenance Due   Topic Date Due     ADVANCE CARE PLANNING  Never done     COVID-19 Vaccine (1) Never done     PREVENTIVE CARE VISIT  01/22/2020     INFLUENZA VACCINE (1) 09/01/2021     EYE EXAM  10/07/2021     PHQ-9  10/20/2021     MICROALBUMIN  11/04/2021     A1C  11/05/2021       Care Gaps Last addressed on 10-27-21 did not address carea gaps.    Goals:   Attend appt with endocrinology 10-28-21 at 7:30am    Intervention and Education during outreach:   Called and spoke to patient briefly, Patient busy not able to talk.  Reminded follow up with CC RN on 11-2-21 at 11am for follow up    CHW sent reminder in MyChart of upcoming appt with Endocrinology tomorrow 10-28-21 at 7:30am and CC RN appt     Patient will connect with CC RN next week 11-2-21 for re assessment and update on goals.      CCC RN follow up 11-2-21 re assessment  CHW Follow up: Monthly    CHW Plan: Follow up on goals    CHW Next Follow Up: 12-2-21

## 2021-10-28 ENCOUNTER — OFFICE VISIT (OUTPATIENT)
Dept: ENDOCRINOLOGY | Facility: CLINIC | Age: 36
End: 2021-10-28
Attending: FAMILY MEDICINE
Payer: COMMERCIAL

## 2021-10-28 ENCOUNTER — PRE VISIT (OUTPATIENT)
Dept: ENDOCRINOLOGY | Facility: CLINIC | Age: 36
End: 2021-10-28

## 2021-10-28 VITALS
BODY MASS INDEX: 44.41 KG/M2 | WEIGHT: 293 LBS | SYSTOLIC BLOOD PRESSURE: 116 MMHG | HEART RATE: 92 BPM | HEIGHT: 68 IN | DIASTOLIC BLOOD PRESSURE: 80 MMHG

## 2021-10-28 DIAGNOSIS — E66.813 CLASS 3 SEVERE OBESITY DUE TO EXCESS CALORIES WITH SERIOUS COMORBIDITY AND BODY MASS INDEX (BMI) OF 45.0 TO 49.9 IN ADULT (H): ICD-10-CM

## 2021-10-28 DIAGNOSIS — E66.01 CLASS 3 SEVERE OBESITY DUE TO EXCESS CALORIES WITH SERIOUS COMORBIDITY AND BODY MASS INDEX (BMI) OF 45.0 TO 49.9 IN ADULT (H): ICD-10-CM

## 2021-10-28 LAB — HBA1C MFR BLD: 7.9 % (ref 4.3–?)

## 2021-10-28 PROCEDURE — 83036 HEMOGLOBIN GLYCOSYLATED A1C: CPT | Performed by: PATHOLOGY

## 2021-10-28 PROCEDURE — 99204 OFFICE O/P NEW MOD 45 MIN: CPT | Mod: GC | Performed by: STUDENT IN AN ORGANIZED HEALTH CARE EDUCATION/TRAINING PROGRAM

## 2021-10-28 ASSESSMENT — MIFFLIN-ST. JEOR: SCORE: 2113.36

## 2021-10-28 ASSESSMENT — PAIN SCALES - GENERAL: PAINLEVEL: NO PAIN (0)

## 2021-10-28 NOTE — LETTER
10/28/2021       RE: Ruth Vanegas  [address]  [place] [address]     Dear Colleague,    Thank you for referring your patient, Ruth Vanegas, to the [facility] ENDOCRINOLOGY CLINIC [place] at [facility]. Please see a copy of my visit note below.      Endocrinology Clinic New Consult      Ruth Vanegas MRN:[id number] YOB: 1985  Primary care provider: Milton Banks     Reason for Endocrine consult: Uncontrolled type 2 DM    HPI:  Ruth Vanegas is a 35 year old female with PMHx of type 2 DM, obesity, chronic migraine, intractable nausea and vomitting presents to clinic for management of type 2 DM     She was diagnosed 5 years ago, she was diagnosed when she had a scheduled D&C and they found her to have BG of 270's. She was pre DM before that. She had a hospital visit in 5/2021 for hyperglycemia in 230 range, neg for BHOB, got IVF and was discharged    Current DM medications:  lantus 24 U daily  Metformin 1000 mg BID  invokana 300 mg daily      She is on topiramate 50 mg in AM and 50 mg in PM for her headaches     Has tried bydureon and ozempic in the past and had nausea and vomiting. She was taken off glipizide and had her lantus increased from 18 to 24 units    Has intractable vomiting, following up with GI scheduled for EGD 12/14/2021, had a 9/23/2021- normal gastric emptying study    She has had diabetes education and know how to count carbs and also what entails low carb diet    Diet: Mentions she doesn't eat that much 1-2 meals a day, Dinner: meat loaf, mashed potatoes and broccolli, chilli. Has reg soda, cookies and desserts   She mentions she lost 20 lbs in last 5 months , she feels numbness in her finger tips and numbness and tingling in her feet, takes gabapentin 600 mg TID, she doesn't know if its helping or not, she also complains of nausea and vomiting and she hasn't been eating well since last 1  year    Exercise: No exercise formally, walks at work, but has had a knee injury from an accident so limited by that     Avg glucose:139 mg/dl  SD: 29.6  Very high: 0  High: 17%  Target: 81%  Low: 2%  Very low:0%    BG reviews shows that she has low BG in 63 range and this is during AM and between 4 pm - 6 pm, she mentions she didn't skip any meal but it was low and she feels tired during that time and checks her BG. She never checks with glucometer when she gets low BG on martine    Diabetes complications include:  Retinopathy: last eye exam- last year, due for this year, [facility]   Nephropathy: creat: 0.66 in 2021, needs a micro alb/creat ratio  Neuropathy: has numbness and tingling in hands and feet - on gabapentin  LDL: 128 in   Smoking:none  BP: well controlled    Works at [employer] as a [employer], single, lives with her kids, has 2 kids, 14 year and 17 years, non smoker, no alcohol consumption ,no illicit drug use . Had a hysterectomy in     Family history: grandmother on mothers side with diabetes    ROS:  All 12 systems were reviewed and negative except as mentioned in HPI    Past Medical/Surgical History:  Past Medical History:   Diagnosis Date     Anemia     told to take iron pills when pregnant     Depression      Depressive disorder      Diabetes mellitus (H)      Diabetes mellitus, type 2 (H) 7/15/2021     Dysthymic disorder      Endometriosis      Herpes genitalia      Hyperlipidemia      Kidney stone      Menorrhagia      Migraines      Other abnormal Papanicolaou smear of cervix and cervical HPV(795.09) 2013     PCOS (polycystic ovarian syndrome)      Post traumatic stress disorder (PTSD)      Past Surgical History:   Procedure Laterality Date     BLADDER REPAIR W/  SECTION      X2     C/SECTION, CLASSICAL      x2     DILATION AND CURETTAGE  2015     DILATION AND CURETTAGE  2015     DILATION AND CURETTAGE, OPERATIVE HYSTEROSCOPY, COMBINED N/A 2015     Procedure: Dilation and Curettage with Hysteroscopy;  Surgeon: Zia Farmer MD;  Location: [facility];  Service:      DILATION AND CURETTAGE, OPERATIVE HYSTEROSCOPY, COMBINED N/A 9/29/2016    Procedure: DILATION AND CURETTAGE WITH HYSTEROSCOPY INSERT MIRENA;  Surgeon: Suzanne Major MD;  Location: [facility];  Service:      ENT SURGERY       GYN SURGERY  10/19/15    laproscopic lysis of adhesions     HYSTEROSCOPY, ABLATE ENDOMETRIUM HYDROTHERMAL, COMBINED N/A 3/2/2018    Procedure: HYSTEROSCOPY, DILATION AND CURETTAGE, ENDOMETRIAL ABLATION;  Surgeon: Kristy Israel DO;  Location: [facility] OR;  Service: Gynecology     LAPAROSCOPIC HYSTERECTOMY TOTAL N/A 3/4/2019    Procedure: ROBOTIC TOTAL LAPAROSCOPIC HYSTERECTOMY, BILATERAL SALPINGECTOMY, LEFT OVARIAN CYSTOTOMY. CYSTOSCOPY;  Surgeon: Zia Farmer MD;  Location: [facility] OR;  Service: Gynecology     LAPAROSCOPY DIAGNOSTIC (GENERAL) N/A 10/19/2015    Procedure:  LAPAROSCOPY,LYSIS OF ADHESIONS;  Surgeon: Zia Farmer MD;  Location: [facility] OR;  Service:      WA LAP,TUBAL CAUTERY Bilateral 3/2/2018    Procedure: LAPAROSCOPIC BILATERAL TUBAL LIGATION;  Surgeon: Kristy Israel DO;  Location: [facility];  Service: Gynecology     TONSILLECTOMY         Allergies:  Allergies   Allergen Reactions     Hydrocodone Other (See Comments)     Headache per pt and hallucinations  Headache per pt and hallucinations       Reglan [Metoclopramide]      Anxiety     Vicodin [Hydrocodone-Acetaminophen] Other (See Comments)     Hallucinations     Silver Nitrate Itching and Rash     Only issues if in for a long time  Only issues if in for a long time  Only issues if in for a long time       Tegaderm Transparent Dressing (Informational Only) Rash       PTA Meds:  Prior to Admission medications    Medication Sig Last Dose Taking? Auth Provider   acetaminophen (TYLENOL) 500 MG tablet Take 500-1,000 mg by mouth   Reported, Patient    atorvastatin (LIPITOR) 10 MG tablet atorvastatin 10 mg tablet   Reported, Patient   canagliflozin (INVOKANA) 300 MG tablet Invokana 300 mg tablet   Reported, Patient   Continuous Blood Gluc  (FREESTYLE CINDY 14 DAY READER) JAUN 1 Application   Reported, Patient   Continuous Blood Gluc Sensor (FREESTYLE CINDY 14 DAY SENSOR) MISC 1 Application   Reported, Patient   Continuous Blood Gluc Sensor (FREESTYLE CINDY 14 DAY SENSOR) MISC    Reported, Patient   diazepam (VALIUM) 5 MG tablet diazepam 5 mg tablet   Reported, Patient   famotidine (PEPCID) 40 MG tablet    Reported, Patient   fluconazole (DIFLUCAN) 150 MG tablet fluconazole 150 mg tablet   Reported, Patient   gabapentin (NEURONTIN) 300 MG capsule Take 600 mg by mouth   Reported, Patient   ibuprofen (ADVIL/MOTRIN) 600 MG tablet    Reported, Patient   insulin glargine (LANTUS SOLOSTAR) 100 UNIT/ML pen Inject 24 Units Subcutaneous   Reported, Patient   Melatonin 10 MG CAPS Take 1 capsule by mouth at bedtime as needed, may repeat once (insomnia)   Milton Banks MD   metFORMIN (GLUCOPHAGE-XR) 500 MG 24 hr tablet metformin  mg tablet,extended release 24 hr   Reported, Patient   methocarbamol (ROBAXIN) 500 MG tablet    Reported, Patient   mirtazapine (REMERON) 15 MG tablet mirtazapine 15 mg tablet  Patient not taking: Reported on 9/10/2021   Reported, Patient   nortriptyline (PAMELOR) 25 MG capsule nortriptyline 25 mg capsule   Reported, Patient   omeprazole (PRILOSEC) 20 MG DR capsule omeprazole 20 mg capsule,delayed release  Patient not taking: Reported on 9/10/2021   Reported, Patient   ondansetron (ZOFRAN) 4 MG tablet ondansetron HCl 4 mg tablet   Reported, Patient   pantoprazole (PROTONIX) 40 MG EC tablet Take 1 tablet (40 mg) by mouth daily   Rene Lehman DO   rizatriptan (MAXALT) 10 MG tablet Take 1 tablet (10 mg) by mouth at onset of headache for migraine May repeat in 2 hours.   Roberto Bolanos MD   tolterodine ER (DETROL LA) 4 MG 24 hr  capsule tolterodine ER 4 mg capsule,extended release 24 hr   Reported, Patient   topiramate (TOPAMAX) 50 MG tablet Take 1 tablet (50 mg) by mouth 2 times daily   Roberto Bolanos MD   vitamin D3 (CHOLECALCIFEROL) 125 MCG (5000 UT) tablet Take 5,000 Units by mouth   Reported, Patient        Current heard:   Current Outpatient Medications   Medication     acetaminophen (TYLENOL) 500 MG tablet     atorvastatin (LIPITOR) 10 MG tablet     canagliflozin (INVOKANA) 300 MG tablet     Continuous Blood Gluc  (FREESTYLE CINDY 14 DAY READER) JAUN     Continuous Blood Gluc Sensor (FREESTYLE CINDY 14 DAY SENSOR) MISC     Continuous Blood Gluc Sensor (FREESTYLE CINDY 14 DAY SENSOR) MISC     diazepam (VALIUM) 5 MG tablet     fluconazole (DIFLUCAN) 150 MG tablet     gabapentin (NEURONTIN) 300 MG capsule     ibuprofen (ADVIL/MOTRIN) 600 MG tablet     insulin glargine (LANTUS SOLOSTAR) 100 UNIT/ML pen     Melatonin 10 MG CAPS     metFORMIN (GLUCOPHAGE-XR) 500 MG 24 hr tablet     methocarbamol (ROBAXIN) 500 MG tablet     nortriptyline (PAMELOR) 25 MG capsule     ondansetron (ZOFRAN) 4 MG tablet     pantoprazole (PROTONIX) 40 MG EC tablet     rizatriptan (MAXALT) 10 MG tablet     tolterodine ER (DETROL LA) 4 MG 24 hr capsule     topiramate (TOPAMAX) 50 MG tablet     vitamin D3 (CHOLECALCIFEROL) 125 MCG (5000 UT) tablet     famotidine (PEPCID) 40 MG tablet     mirtazapine (REMERON) 15 MG tablet     omeprazole (PRILOSEC) 20 MG DR capsule     No current facility-administered medications for this visit.       Family History:  Family History   Problem Relation Age of Onset     Diabetes Paternal Grandmother      Other Cancer Paternal Grandmother      Prostate Cancer Father      Alcoholism Sister      Alcoholism Brother      Coronary Artery Disease No family hx of      Cancer Father         prostate     Alcoholism Sister      Alcoholism Brother      Alcoholism Daughter      Urolithiasis No family hx of      Clotting Disorder No family  hx of      Gout No family hx of      Heart Disease No family hx of        Social History:  Social History     Tobacco Use     Smoking status: Never Smoker     Smokeless tobacco: Never Used   Substance Use Topics     Alcohol use: No     Alcohol/week: 0.0 standard drinks         Physical examination:  General appearance: seated comfortably in the chair during assessment. Not in any acute distress  HEENT: PEERLA. Oral cavity is moist and clear. No thyromegaly, or lymphadenopathy  Lungs: bilateral air entry equal. Clear to auscultation. No rhonchi or crepitations heard  Heart: S1 S2 normal. Pulse: regular rate and rhythm, good volume  Abdomen: soft, nontender, non distended. No purple striae. No skin thickening or induration on the abdomen  Neurological: conscious and oriented. Speech: normal. Moving all four extremities equally. Monofilament test: numbness in the area between the balls of first two toes bilaterally, other areas normal  Extremities: no edema noted. Dorsalis pedis 2+ bilaterally. No ulcers noted. No tremor is noted over her outstretched hands  Psychiatric: normal mood and affect. Normal judgment    Endocrine Labs:         Assessment and Plan:   Ruth Vanegas is a 35 year old female with PMHx of type 2 DM, obesity, chronic migraine, intractable nausea and vomitting presents to clinic for management of type 2 DM . Her Hb A1c today is 7.9, her BG review shows TIR: 81% with 0% very low, does have 2 % lows observed in the morning and 4-6 pm , has also lost 20 lbs weight in last 5 months for poor PO intake due to her nausea and vomiting. She would benefit from slightly reducing the basal dose to prevent the hypoglycemia.        Recommended she check her BG using glucometer when she has a low BG on martine to confirm if that's true lows  Continue with metformin 1000 mg twice daily  Continue with invokana 300 mg daily  Reduce lantus to 22 units daily, if persistently high above> 250 would go back upto 24 units    Will obtain PREETHI Ab, C-peptide, CBC, BMP, micro alb/creat ratio  F/u with ophthalmology   Recommend she discuss with neurology if we can go up on the topamax dose to 100 mg BID if no side effects     Diabetes complications include:  Retinopathy: last eye exam- last year, due for this year, at [facility]   Nephropathy: creat: 0.66 in 5/2021, needs a micro alb/creat ratio  Neuropathy: has numbness and tingling in hands and feet - on gabapentin  LDL: 128 in 2020  Smoking:none  BP: well controlled    Follow up in 3-4 months    The patient was seen, examined and discussed with MD Puja Lyons MD.  Endocrinology fellow  Answers for HPI/ROS submitted by the patient on 10/27/2021  General Symptoms: No  Skin Symptoms: No  HENT Symptoms: No  EYE SYMPTOMS: No  HEART SYMPTOMS: No  LUNG SYMPTOMS: No  INTESTINAL SYMPTOMS: Yes  URINARY SYMPTOMS: No  GYNECOLOGIC SYMPTOMS: No  BREAST SYMPTOMS: No  SKELETAL SYMPTOMS: No  BLOOD SYMPTOMS: No  NERVOUS SYSTEM SYMPTOMS: No  MENTAL HEALTH SYMPTOMS: Yes  Heart burn or indigestion: No  Nausea: Yes  Vomiting: Yes  Abdominal pain: No  Bloating: No  Constipation: Yes  Diarrhea: No  Blood in stool: No  Black stools: No  Rectal or Anal pain: No  Fecal incontinence: No  Yellowing of skin or eyes: No  Vomit with blood: No  Change in stools: No  Nervous or Anxious: Yes  Depression: Yes  Trouble sleeping: Yes  Trouble thinking or concentrating: No  Mood changes: Yes  Panic attacks: Yes    Attestation signed by Magdalena Steele MD at 11/3/2021  9:23 PM:  Physician Attestation   I saw this patient with Dr Guallpa and agree with the findings and plan of care as documented in the note.

## 2021-10-28 NOTE — PATIENT INSTRUCTIONS
We appreciate your assistance in coordinating your healthcare.     Please upload your insulin pump, blood sugar meter and/or continuous glucose monitor at home 1-2 days before your next diabetes-related appointment.   This will allow your provider to review your  data before your scheduled virtual visit.    To ask a question to your Endocrine care team, please send them a [employer] message, or reach them by phone at [phone]     To expedite your medication refill(s), please contact your pharmacy and have them   fax a refill request to: [phone].  *Please allow 3 business days for routine medication refills.  *Please allow 5 business days for controlled substance medication refills.    For after-hours urgent Endocrine issues, that do not require 911, please dial [phone], and ask to speak with the Endocrinologist On-Call        Check your BG using glucometer when you have a low BG on martine    Continue with metformin 1000 mg twice daily    Continue with invokana 300 mg daily    Reduce lantus to 22 units daily, if persistently high above> 250 would go back upto 24 units     Get your blood work done today , urine study too    Get your eyes checked     Discuss with neurology if we can go up on the topamax dose to 100 mg BID if no side effects

## 2021-11-02 ENCOUNTER — PATIENT OUTREACH (OUTPATIENT)
Dept: NURSING | Facility: CLINIC | Age: 36
End: 2021-11-02

## 2021-11-03 ENCOUNTER — PATIENT OUTREACH (OUTPATIENT)
Dept: NURSING | Facility: CLINIC | Age: 36
End: 2021-11-03

## 2021-11-03 DIAGNOSIS — E66.813 CLASS 3 SEVERE OBESITY DUE TO EXCESS CALORIES WITH SERIOUS COMORBIDITY AND BODY MASS INDEX (BMI) OF 45.0 TO 49.9 IN ADULT (H): Primary | ICD-10-CM

## 2021-11-03 DIAGNOSIS — G43.719 INTRACTABLE CHRONIC MIGRAINE WITHOUT AURA AND WITHOUT STATUS MIGRAINOSUS: ICD-10-CM

## 2021-11-03 DIAGNOSIS — E66.01 CLASS 3 SEVERE OBESITY DUE TO EXCESS CALORIES WITH SERIOUS COMORBIDITY AND BODY MASS INDEX (BMI) OF 45.0 TO 49.9 IN ADULT (H): Primary | ICD-10-CM

## 2021-11-03 RX ORDER — TOPIRAMATE 100 MG/1
100 TABLET, FILM COATED ORAL 2 TIMES DAILY
Qty: 180 TABLET | Refills: 3 | Status: SHIPPED | OUTPATIENT
Start: 2021-11-03 | End: 2022-09-06

## 2021-11-03 NOTE — PROGRESS NOTES
Clinic Care Coordination Contact    Follow Up Progress Note      Assessment: RN CC outreach for status update    Seen endocrine on 10/28 - A1C decrease, will be changing dsoing of Topamax  Will follow up with Neurology team - new script sent     Still loosing weight, low appetite - has scope schedule for Mid December      Care Gaps:    Health Maintenance Due   Topic Date Due     ADVANCE CARE PLANNING  Never done     COVID-19 Vaccine (1) Never done     PREVENTIVE CARE VISIT  01/22/2020     INFLUENZA VACCINE (1) 09/01/2021     EYE EXAM  10/07/2021     PHQ-9  10/20/2021     MICROALBUMIN  11/04/2021          Goals addressed this encounter:   Goals Addressed                    This Visit's Progress       Patient Stated       Medical (pt-stated)   50%      Goal Statement: I will have a better understanding and plan of care for Gastro within 3-4 months  Date Goal set: 10/12/21    Barriers: access  Strengths: pt engagement  Date to Achieve By:December    Patient expressed understanding of goal: yes  Action steps to achieve this goal:  1. I will schedule and attend visit with Dr Stapleton for upper and lower endoscopy-December 14  Pre -Procedure Covid test - Dec 10  Pre -assessment schedule Nov 15     2. I will update Care coordination team during next outreach  3. I will report to my Community Health Worker if any additional resources or support needed.           COMPLETED: Other (pt-stated)         Goal Statement: I would like to have a plan of care and the support with new endocrine team at the [facility] within 2 months   Date Goal set: 2.22.21 update 03/31/21 Updated 9-21-21, 10/12/21    Barriers: access   Strengths: pt engagement   Date to Achieve By: December  Patient expressed understanding of goal: yes   Action steps to achieve this goal:   1. I will attend appt tomorrow 10-28-21 at 7:30am with new Endocrinologist JORGE L Cota at [facility]  2.  I will update CCC RN on 11-2-21 at 11am am for support and  plan of care and if any additional resources or support needed.          11/03/21  Attended visit - pt was staisfied with visit - met with fellow and MD- compete goal                Outreach Frequency: monthly    Plan:   Patient will schedule and attend recommended follow up visits with speciality providers and primary care provider.  Community Health Worker to outreach per standard work and updated on goal progression    RN CC will outreach in 4-6 weeks to support ongoing recommendations and plan of care will be available sooner if needed.

## 2021-11-04 ENCOUNTER — PATIENT OUTREACH (OUTPATIENT)
Dept: CARE COORDINATION | Facility: CLINIC | Age: 36
End: 2021-11-04

## 2021-11-05 NOTE — PROGRESS NOTES
Clinic Care Coordination Contact    Follow Up Progress Note      Assessment:   Brief conversation - pt request call back     Plan: RN CC will call within 1-2 days to update status and assess needs

## 2021-11-08 NOTE — TELEPHONE ENCOUNTER
Pending Prescriptions:                       Disp   Refills    atorvastatin (LIPITOR) 10 MG tablet                           Sig: atorvastatin 10 mg tablet

## 2021-11-09 RX ORDER — ATORVASTATIN CALCIUM 10 MG/1
TABLET, FILM COATED ORAL
Qty: 30 TABLET | Refills: 11 | Status: SHIPPED | OUTPATIENT
Start: 2021-11-09 | End: 2023-05-11

## 2021-11-15 ENCOUNTER — VIRTUAL VISIT (OUTPATIENT)
Dept: SURGERY | Facility: CLINIC | Age: 36
End: 2021-11-15
Payer: COMMERCIAL

## 2021-11-15 ENCOUNTER — ANESTHESIA EVENT (OUTPATIENT)
Dept: SURGERY | Facility: CLINIC | Age: 36
End: 2021-11-15

## 2021-11-15 ENCOUNTER — PRE VISIT (OUTPATIENT)
Dept: SURGERY | Facility: CLINIC | Age: 36
End: 2021-11-15

## 2021-11-15 DIAGNOSIS — Z01.818 PRE-OP EXAMINATION: Primary | ICD-10-CM

## 2021-11-15 DIAGNOSIS — R11.2 NAUSEA AND VOMITING, INTRACTABILITY OF VOMITING NOT SPECIFIED, UNSPECIFIED VOMITING TYPE: ICD-10-CM

## 2021-11-15 PROCEDURE — 99203 OFFICE O/P NEW LOW 30 MIN: CPT | Mod: 95 | Performed by: PHYSICIAN ASSISTANT

## 2021-11-15 ASSESSMENT — PAIN SCALES - GENERAL: PAINLEVEL: NO PAIN (0)

## 2021-11-15 ASSESSMENT — LIFESTYLE VARIABLES: TOBACCO_USE: 0

## 2021-11-15 ASSESSMENT — ENCOUNTER SYMPTOMS: SEIZURES: 0

## 2021-11-15 NOTE — PROGRESS NOTES
Ruth is a 35 year old who is being evaluated via a billable video visit.      How would you like to obtain your AVS? MyChart  If the video visit is dropped, the invitation should be resent by: Text to cell phone: [phone]    HPI       Review of Systems         Physical Exam
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
## 2021-11-17 DIAGNOSIS — Z11.59 ENCOUNTER FOR SCREENING FOR OTHER VIRAL DISEASES: Primary | ICD-10-CM

## 2021-12-02 ENCOUNTER — PATIENT OUTREACH (OUTPATIENT)
Dept: NURSING | Facility: CLINIC | Age: 36
End: 2021-12-02
Payer: COMMERCIAL

## 2021-12-02 NOTE — PROGRESS NOTES
12/2/2021  Clinic Care Coordination Contact    Community Health Worker Follow Up    Care Gaps:     Health Maintenance Due   Topic Date Due     ADVANCE CARE PLANNING  Never done     COVID-19 Vaccine (1) Never done     PREVENTIVE CARE VISIT  01/22/2020     INFLUENZA VACCINE (1) 09/01/2021     EYE EXAM  10/07/2021     PHQ-9  10/20/2021     MICROALBUMIN  11/04/2021       Care Gaps Last addressed on 12-2-21 did not discuss care gaps today.    Goals:   Goals Addressed as of 12/2/2021 at 3:59 PM                    Today    11/3/21       Medical (pt-stated)   50%  50%    Added 10/12/21 by Mely Reynoso, RN      Goal Statement: I will have a better understanding and plan of care for Gastro within 3-4 months  Date Goal set: 10/12/21    Barriers: access  Strengths: pt engagement  Date to Achieve By:December    Patient expressed understanding of goal: yes  Action steps to achieve this goal:  1. I will attend pre -Procedure Covid test  on 12-10-21 at 8am  2. I will attend upper and lower endoscopy appt on 12-14-21 with Dr Stapleton.  3 I will talk to CC RN on 12-28-21 at 10 am follow up on endoscopy and neuro  4 I will report to my Community Health Worker if any additional resources or support needed.    Updated: 12-2-21 KIM Bravo              Intervention and Education during outreach:   Called and spoke to patient briefly and follow up on goal.  Patient working unable to talk to communicate and message through MyChart.    CHW sent patient upcoming appts and follow up on appt for endoscopy.      CCC RN follow up on 12-28-21  CHW Follow up: Monthly  CHW Plan: Follow up on goals  CHW Next Follow Up: 1-18-22

## 2021-12-07 ENCOUNTER — TELEPHONE (OUTPATIENT)
Dept: GASTROENTEROLOGY | Facility: CLINIC | Age: 36
End: 2021-12-07
Payer: COMMERCIAL

## 2021-12-07 DIAGNOSIS — K59.00 CONSTIPATION, UNSPECIFIED CONSTIPATION TYPE: Primary | ICD-10-CM

## 2021-12-07 DIAGNOSIS — R11.0 NAUSEA: ICD-10-CM

## 2021-12-07 DIAGNOSIS — R11.2 NAUSEA WITH VOMITING: ICD-10-CM

## 2021-12-07 RX ORDER — BISACODYL 5 MG
TABLET, DELAYED RELEASE (ENTERIC COATED) ORAL
Qty: 2 TABLET | Refills: 0 | Status: ON HOLD | OUTPATIENT
Start: 2021-12-07 | End: 2021-12-14

## 2021-12-07 NOTE — TELEPHONE ENCOUNTER
Patient scheduled for colonoscopy/EGD on 12.14.2021.     Covid test scheduled: 12.10.2021    Pre op exam scheduled: 11.15.2021 PAC with Sandra Branch PA-C      Arrival time: 1215    Facility location: [facility]    Sedation type: MAC    Indication for procedure: early satiety, n/v, constipation, rectal bleeding, unintentional weight loss    Bowel prep recommendation: extended prep d/t BMI    Extended prep sent to [facility] - [place], [place] - [address] pharmacy. Prep instructions sent via [employer].    Pre visit planning completed.    Shannon Mcpherson RN

## 2021-12-08 NOTE — TELEPHONE ENCOUNTER
Attempted to contact patient regarding upcoming colonoscopy/EGD procedure on 12.14.2021 for pre assessment questions. No answer.     Unable to leave a message as this number is not accepting calls at this time.    [employer] message sent.    Shannon Mcpherson RN

## 2021-12-11 DIAGNOSIS — F43.10 PTSD (POST-TRAUMATIC STRESS DISORDER): ICD-10-CM

## 2021-12-11 DIAGNOSIS — F41.9 ANXIETY: ICD-10-CM

## 2021-12-11 DIAGNOSIS — G47.00 INSOMNIA, UNSPECIFIED TYPE: ICD-10-CM

## 2021-12-13 ENCOUNTER — LAB (OUTPATIENT)
Dept: LAB | Facility: CLINIC | Age: 36
End: 2021-12-13
Payer: COMMERCIAL

## 2021-12-13 ENCOUNTER — ANESTHESIA EVENT (OUTPATIENT)
Dept: GASTROENTEROLOGY | Facility: CLINIC | Age: 36
End: 2021-12-13
Payer: COMMERCIAL

## 2021-12-13 DIAGNOSIS — Z11.59 ENCOUNTER FOR SCREENING FOR OTHER VIRAL DISEASES: ICD-10-CM

## 2021-12-13 PROCEDURE — U0003 INFECTIOUS AGENT DETECTION BY NUCLEIC ACID (DNA OR RNA); SEVERE ACUTE RESPIRATORY SYNDROME CORONAVIRUS 2 (SARS-COV-2) (CORONAVIRUS DISEASE [COVID-19]), AMPLIFIED PROBE TECHNIQUE, MAKING USE OF HIGH THROUGHPUT TECHNOLOGIES AS DESCRIBED BY CMS-2020-01-R: HCPCS

## 2021-12-13 PROCEDURE — U0005 INFEC AGEN DETEC AMPLI PROBE: HCPCS

## 2021-12-13 RX ORDER — ONDANSETRON 2 MG/ML
4 INJECTION INTRAMUSCULAR; INTRAVENOUS
Status: CANCELLED | OUTPATIENT
Start: 2021-12-13

## 2021-12-13 RX ORDER — LIDOCAINE 40 MG/G
CREAM TOPICAL
Status: CANCELLED | OUTPATIENT
Start: 2021-12-13

## 2021-12-13 RX ORDER — MELATONIN 10 MG
1 CAPSULE ORAL
Qty: 30 CAPSULE | Refills: 1 | Status: SHIPPED | OUTPATIENT
Start: 2021-12-13 | End: 2022-01-18

## 2021-12-13 NOTE — TELEPHONE ENCOUNTER
"Pt called in stating she has vomited after 2 glasses of the Golytely prep.  Pt has been drinking 16oz glasses versus 8oz glasses every 15 minutes with a glass of water in between.    Plan:  1) Pt has zofran at home.  RN instructed pt could use this PRN.  Pt to take a tablet and wait 15-20 minutes before restarting Golytely prep.  2)  Pt should drink one 8oz glass every 15-20 minutes.  3)  Pt is aware that her bowels need to be \"clear yellow\" prior to procedure.  Pt has after hours GI number to call if any questions/concerns this evening.  If bowels are not clear they may need to r/s colonoscopy however pt can still come for EGD as long as she has not eaten and NPO x 4 hours prior to procedure.    Pt has no further questions or concerns.      Shannon Mcpherson RN        "

## 2021-12-13 NOTE — TELEPHONE ENCOUNTER
"Pre assessment questions completed for upcoming colonoscopy/EGD procedure scheduled on 12.14.2021    COVID test scheduled: 12.13.2021     Pre op exam scheduled: 11.15.2021 PAC with Sandra Branch PA-C    Reviewed procedural arrival time 1215 and facility location [facility].    Designated  policy reviewed. Instructed to have someone stay 24 hours post procedure.     Anticoagulation/blood thinners? no    Electronic implanted devices? no    Reviewed extended prep instructions with patient.  Pt is going to  her prep kit today.  Pt did have a little watermelon this morning at 9am.  RN informed pt that she needs to be on a clear liquid diet today.  Pt did not drink magnesium citrate yesterday.  Pt was instructed to take 2 ducolax tablets once prep kit is picked up from the pharmacy.      Pt was also given disclaimer that bowel movements prior to procedure should be a \"clear yellow.\"  If pt is having cloudy, liquid brown, or solid stool to contact [facility] for r/s options.  Pt agreeable to plan.     Patient verbalized understanding and had no questions or concerns at this time.    Shannon Mcpherson RN    "

## 2021-12-14 ENCOUNTER — HOSPITAL ENCOUNTER (OUTPATIENT)
Facility: CLINIC | Age: 36
Discharge: HOME | End: 2021-12-14
Attending: INTERNAL MEDICINE | Admitting: INTERNAL MEDICINE
Payer: COMMERCIAL

## 2021-12-14 ENCOUNTER — ANESTHESIA (OUTPATIENT)
Dept: GASTROENTEROLOGY | Facility: CLINIC | Age: 36
End: 2021-12-14
Payer: COMMERCIAL

## 2021-12-14 VITALS
WEIGHT: 292.33 LBS | DIASTOLIC BLOOD PRESSURE: 78 MMHG | RESPIRATION RATE: 16 BRPM | TEMPERATURE: 98.1 F | OXYGEN SATURATION: 100 % | HEART RATE: 97 BPM | BODY MASS INDEX: 45.88 KG/M2 | SYSTOLIC BLOOD PRESSURE: 111 MMHG | HEIGHT: 67 IN

## 2021-12-14 LAB
COLONOSCOPY: NORMAL
GLUCOSE BLDC GLUCOMTR-MCNC: 160 MG/DL (ref 70–99)
SARS-COV-2 RNA RESP QL NAA+PROBE: NEGATIVE
UPPER GI ENDOSCOPY: NORMAL

## 2021-12-14 PROCEDURE — 88305 TISSUE EXAM BY PATHOLOGIST: CPT | Mod: TC | Performed by: INTERNAL MEDICINE

## 2021-12-14 PROCEDURE — 370N000017 HC ANESTHESIA TECHNICAL FEE, PER MIN: Performed by: INTERNAL MEDICINE

## 2021-12-14 PROCEDURE — 43239 EGD BIOPSY SINGLE/MULTIPLE: CPT | Performed by: INTERNAL MEDICINE

## 2021-12-14 PROCEDURE — 250N000009 HC RX 250: Performed by: NURSE ANESTHETIST, CERTIFIED REGISTERED

## 2021-12-14 PROCEDURE — 258N000003 HC RX IP 258 OP 636: Performed by: NURSE ANESTHETIST, CERTIFIED REGISTERED

## 2021-12-14 PROCEDURE — 82962 GLUCOSE BLOOD TEST: CPT

## 2021-12-14 PROCEDURE — 45378 DIAGNOSTIC COLONOSCOPY: CPT | Performed by: INTERNAL MEDICINE

## 2021-12-14 PROCEDURE — 250N000011 HC RX IP 250 OP 636: Performed by: NURSE ANESTHETIST, CERTIFIED REGISTERED

## 2021-12-14 RX ORDER — ONDANSETRON 4 MG/1
4 TABLET, ORALLY DISINTEGRATING ORAL EVERY 30 MIN PRN
Status: CANCELLED | OUTPATIENT
Start: 2021-12-14

## 2021-12-14 RX ORDER — ONDANSETRON 2 MG/ML
4 INJECTION INTRAMUSCULAR; INTRAVENOUS EVERY 6 HOURS PRN
Status: CANCELLED | OUTPATIENT
Start: 2021-12-14

## 2021-12-14 RX ORDER — ONDANSETRON 2 MG/ML
4 INJECTION INTRAMUSCULAR; INTRAVENOUS EVERY 30 MIN PRN
Status: CANCELLED | OUTPATIENT
Start: 2021-12-14

## 2021-12-14 RX ORDER — SODIUM CHLORIDE, SODIUM LACTATE, POTASSIUM CHLORIDE, CALCIUM CHLORIDE 600; 310; 30; 20 MG/100ML; MG/100ML; MG/100ML; MG/100ML
INJECTION, SOLUTION INTRAVENOUS CONTINUOUS PRN
Status: DISCONTINUED | OUTPATIENT
Start: 2021-12-14 | End: 2021-12-14

## 2021-12-14 RX ORDER — ONDANSETRON 4 MG/1
4 TABLET, ORALLY DISINTEGRATING ORAL EVERY 6 HOURS PRN
Status: CANCELLED | OUTPATIENT
Start: 2021-12-14

## 2021-12-14 RX ORDER — NALOXONE HYDROCHLORIDE 0.4 MG/ML
0.4 INJECTION, SOLUTION INTRAMUSCULAR; INTRAVENOUS; SUBCUTANEOUS
Status: CANCELLED | OUTPATIENT
Start: 2021-12-14

## 2021-12-14 RX ORDER — FENTANYL CITRATE 50 UG/ML
25 INJECTION, SOLUTION INTRAMUSCULAR; INTRAVENOUS EVERY 5 MIN PRN
Status: CANCELLED | OUTPATIENT
Start: 2021-12-14

## 2021-12-14 RX ORDER — SODIUM CHLORIDE, SODIUM LACTATE, POTASSIUM CHLORIDE, CALCIUM CHLORIDE 600; 310; 30; 20 MG/100ML; MG/100ML; MG/100ML; MG/100ML
INJECTION, SOLUTION INTRAVENOUS CONTINUOUS
Status: CANCELLED | OUTPATIENT
Start: 2021-12-14

## 2021-12-14 RX ORDER — NALOXONE HYDROCHLORIDE 0.4 MG/ML
0.2 INJECTION, SOLUTION INTRAMUSCULAR; INTRAVENOUS; SUBCUTANEOUS
Status: CANCELLED | OUTPATIENT
Start: 2021-12-14

## 2021-12-14 RX ORDER — FLUMAZENIL 0.1 MG/ML
0.2 INJECTION, SOLUTION INTRAVENOUS
Status: CANCELLED | OUTPATIENT
Start: 2021-12-14 | End: 2021-12-15

## 2021-12-14 RX ORDER — MEPERIDINE HYDROCHLORIDE 25 MG/ML
12.5 INJECTION INTRAMUSCULAR; INTRAVENOUS; SUBCUTANEOUS
Status: CANCELLED | OUTPATIENT
Start: 2021-12-14

## 2021-12-14 RX ORDER — KETAMINE HYDROCHLORIDE 10 MG/ML
INJECTION INTRAMUSCULAR; INTRAVENOUS PRN
Status: DISCONTINUED | OUTPATIENT
Start: 2021-12-14 | End: 2021-12-14

## 2021-12-14 RX ORDER — PROPOFOL 10 MG/ML
INJECTION, EMULSION INTRAVENOUS PRN
Status: DISCONTINUED | OUTPATIENT
Start: 2021-12-14 | End: 2021-12-14

## 2021-12-14 RX ORDER — FENTANYL CITRATE 50 UG/ML
25 INJECTION, SOLUTION INTRAMUSCULAR; INTRAVENOUS
Status: CANCELLED | OUTPATIENT
Start: 2021-12-14

## 2021-12-14 RX ORDER — PROPOFOL 10 MG/ML
INJECTION, EMULSION INTRAVENOUS CONTINUOUS PRN
Status: DISCONTINUED | OUTPATIENT
Start: 2021-12-14 | End: 2021-12-14

## 2021-12-14 RX ORDER — PROCHLORPERAZINE MALEATE 10 MG
10 TABLET ORAL EVERY 6 HOURS PRN
Status: CANCELLED | OUTPATIENT
Start: 2021-12-14

## 2021-12-14 RX ORDER — LIDOCAINE HYDROCHLORIDE 20 MG/ML
INJECTION, SOLUTION INFILTRATION; PERINEURAL PRN
Status: DISCONTINUED | OUTPATIENT
Start: 2021-12-14 | End: 2021-12-14

## 2021-12-14 RX ADMIN — PROPOFOL 125 MCG/KG/MIN: 10 INJECTION, EMULSION INTRAVENOUS at 15:40

## 2021-12-14 RX ADMIN — PROPOFOL 20 MG: 10 INJECTION, EMULSION INTRAVENOUS at 15:39

## 2021-12-14 RX ADMIN — PROPOFOL 50 MG: 10 INJECTION, EMULSION INTRAVENOUS at 15:29

## 2021-12-14 RX ADMIN — SODIUM CHLORIDE, POTASSIUM CHLORIDE, SODIUM LACTATE AND CALCIUM CHLORIDE: 600; 310; 30; 20 INJECTION, SOLUTION INTRAVENOUS at 15:08

## 2021-12-14 RX ADMIN — PROPOFOL 40 MG: 10 INJECTION, EMULSION INTRAVENOUS at 15:21

## 2021-12-14 RX ADMIN — PROPOFOL 30 MG: 10 INJECTION, EMULSION INTRAVENOUS at 15:36

## 2021-12-14 RX ADMIN — Medication 20 MG: at 15:21

## 2021-12-14 RX ADMIN — PROPOFOL 20 MG: 10 INJECTION, EMULSION INTRAVENOUS at 15:22

## 2021-12-14 RX ADMIN — LIDOCAINE HYDROCHLORIDE 100 MG: 20 INJECTION, SOLUTION INFILTRATION; PERINEURAL at 15:19

## 2021-12-14 RX ADMIN — Medication 10 MG: at 15:29

## 2021-12-14 RX ADMIN — PROPOFOL 20 MG: 10 INJECTION, EMULSION INTRAVENOUS at 15:24

## 2021-12-14 RX ADMIN — PROPOFOL 100 MCG/KG/MIN: 10 INJECTION, EMULSION INTRAVENOUS at 15:19

## 2021-12-14 ASSESSMENT — ENCOUNTER SYMPTOMS: SEIZURES: 0

## 2021-12-14 ASSESSMENT — LIFESTYLE VARIABLES: TOBACCO_USE: 0

## 2021-12-14 ASSESSMENT — MIFFLIN-ST. JEOR: SCORE: 2053.63
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
## 2021-12-14 NOTE — H&P
Ruth BETANCUR [facility]  [id number]  female  35 year old      Reason for procedure/surgery: egd, colon, nausea, vomiting, rectal bleeding weight loss    Patient Active Problem List   Diagnosis     Obesity     Other abnormal Papanicolaou smear of cervix and cervical HPV(795.09)     Chronic nausea     Chronic vomiting     Diabetes mellitus, type 2 (H)     Unintentional weight loss     Rectal bleeding     Flatulence, eructation and gas pain       Past Surgical History:    Past Surgical History:   Procedure Laterality Date     BLADDER REPAIR W/  SECTION      X2     C/SECTION, CLASSICAL      x2     DILATION AND CURETTAGE  2015     DILATION AND CURETTAGE  2015     DILATION AND CURETTAGE, OPERATIVE HYSTEROSCOPY, COMBINED N/A 2015    Procedure: Dilation and Curettage with Hysteroscopy;  Surgeon: Zia Farmer MD;  Location: [facility];  Service:      DILATION AND CURETTAGE, OPERATIVE HYSTEROSCOPY, COMBINED N/A 2016    Procedure: DILATION AND CURETTAGE WITH HYSTEROSCOPY INSERT MIRENA;  Surgeon: Suzanne Major MD;  Location: [facility];  Service:      ENT SURGERY       GYN SURGERY  10/19/15    laproscopic lysis of adhesions     HYSTEROSCOPY, ABLATE ENDOMETRIUM HYDROTHERMAL, COMBINED N/A 3/2/2018    Procedure: HYSTEROSCOPY, DILATION AND CURETTAGE, ENDOMETRIAL ABLATION;  Surgeon: Kristy Israel DO;  Location: [facility];  Service: Gynecology     LAPAROSCOPIC HYSTERECTOMY TOTAL N/A 3/4/2019    Procedure: ROBOTIC TOTAL LAPAROSCOPIC HYSTERECTOMY, BILATERAL SALPINGECTOMY, LEFT OVARIAN CYSTOTOMY. CYSTOSCOPY;  Surgeon: Zia Farmer MD;  Location: [facility];  Service: Gynecology     LAPAROSCOPY DIAGNOSTIC (GENERAL) N/A 10/19/2015    Procedure:  LAPAROSCOPY,LYSIS OF ADHESIONS;  Surgeon: Zia Farmer MD;  Location: [facility];  Service:      [place] LAP,TUBAL CAUTERY Bilateral 3/2/2018    Procedure: LAPAROSCOPIC BILATERAL TUBAL LIGATION;  Surgeon: Kristy Israel DO;   Location: [facility];  Service: Gynecology     TONSILLECTOMY         Past Medical History:   Past Medical History:   Diagnosis Date     Anemia     told to take iron pills when pregnant     Depression      Depressive disorder      Diabetes mellitus (H)      Diabetes mellitus, type 2 (H) 07/15/2021     Dysthymic disorder      Endometriosis      Herpes genitalia      Hyperlipidemia      Kidney stone      Menorrhagia      Migraines      Neuropathy      Other abnormal Papanicolaou smear of cervix and cervical HPV(795.09) 01/02/2013     PCOS (polycystic ovarian syndrome)      Post traumatic stress disorder (PTSD)        Social History:   Social History     Tobacco Use     Smoking status: Never Smoker     Smokeless tobacco: Never Used   Substance Use Topics     Alcohol use: No     Alcohol/week: 0.0 standard drinks       Family History:   Family History   Adopted: Yes   Problem Relation Age of Onset     Prostate Cancer Father      Cancer Father         prostate     Alcoholism Sister      Alcoholism Sister      Alcoholism Brother      Alcoholism Brother      Diabetes Paternal Grandmother      Other Cancer Paternal Grandmother      Alcoholism Daughter      Coronary Artery Disease No family hx of      Urolithiasis No family hx of      Clotting Disorder No family hx of      Gout No family hx of      Heart Disease No family hx of      Anesthesia Reaction No family hx of        Allergies:   Allergies   Allergen Reactions     Hydrocodone Other (See Comments)     Headache per pt and hallucinations  Headache per pt and hallucinations       Reglan [Metoclopramide]      Anxiety     Vicodin [Hydrocodone-Acetaminophen] Other (See Comments)     Hallucinations     Silver Nitrate Itching and Rash     Only issues if in for a long time  Only issues if in for a long time  Only issues if in for a long time       Tegaderm Transparent Dressing (Informational Only) Rash       Active Medications:   No current outpatient medications on  "file.       Systemic Review:   CONSTITUTIONAL: NEGATIVE for fever, chills, change in weight  ENT/MOUTH: NEGATIVE for ear, mouth and throat problems  RESP: NEGATIVE for significant cough or SOB  CV: NEGATIVE for chest pain, palpitations or peripheral edema    Physical Examination:   Vital Signs: BP (!) 126/94   Temp 98.7  F (37.1  C) (Oral)   Resp 12   Ht 1.702 m (5' 7\")   Wt 132.6 kg (292 lb 5.3 oz)   LMP 05/23/2016   SpO2 99%   BMI 45.79 kg/m    GENERAL: healthy, alert and no distress  NECK: no adenopathy, no asymmetry, masses, or scars  RESP: lungs clear to auscultation - no rales, rhonchi or wheezes  CV: regular rate and rhythm, normal S1 S2, no S3 or S4, no murmur, click or rub, no peripheral edema and peripheral pulses strong  ABDOMEN: soft, nontender, no hepatosplenomegaly, no masses and bowel sounds normal  MS: no gross musculoskeletal defects noted, no edema    Plan: Appropriate to proceed as scheduled.      Rene Lehman DO  12/14/2021    PCP:  Milton Banks    "

## 2021-12-14 NOTE — OR NURSING
Pt had EGD with biopsy, colonoscopy without interventions; under MAC. Pt tolerated procedure well. Pt stable at time of transfer to 3C recovery. Report given to 3C recovery RN.

## 2021-12-15 ENCOUNTER — MYC MEDICAL ADVICE (OUTPATIENT)
Dept: FAMILY MEDICINE | Facility: CLINIC | Age: 36
End: 2021-12-15
Payer: COMMERCIAL

## 2021-12-15 DIAGNOSIS — E11.9 TYPE 2 DIABETES MELLITUS WITHOUT COMPLICATION, UNSPECIFIED WHETHER LONG TERM INSULIN USE (H): Primary | ICD-10-CM

## 2021-12-15 PROCEDURE — 88305 TISSUE EXAM BY PATHOLOGIST: CPT | Mod: 26 | Performed by: PATHOLOGY

## 2021-12-15 PROCEDURE — 88342 IMHCHEM/IMCYTCHM 1ST ANTB: CPT | Mod: 26 | Performed by: PATHOLOGY

## 2021-12-15 RX ORDER — GABAPENTIN 300 MG/1
600 CAPSULE ORAL 3 TIMES DAILY
Qty: 540 CAPSULE | Refills: 1 | Status: SHIPPED | OUTPATIENT
Start: 2021-12-15 | End: 2023-05-11

## 2021-12-15 RX ORDER — METFORMIN HCL 500 MG
500 TABLET, EXTENDED RELEASE 24 HR ORAL 2 TIMES DAILY WITH MEALS
Qty: 180 TABLET | Refills: 0 | Status: SHIPPED | OUTPATIENT
Start: 2021-12-15 | End: 2022-01-05

## 2021-12-16 LAB
PATH REPORT.ADDENDUM SPEC: NORMAL
PATH REPORT.COMMENTS IMP SPEC: NORMAL
PATH REPORT.COMMENTS IMP SPEC: NORMAL
PATH REPORT.FINAL DX SPEC: NORMAL
PATH REPORT.GROSS SPEC: NORMAL
PATH REPORT.MICROSCOPIC SPEC OTHER STN: NORMAL
PATH REPORT.RELEVANT HX SPEC: NORMAL
PHOTO IMAGE: NORMAL

## 2021-12-17 DIAGNOSIS — A04.8 H. PYLORI INFECTION: Primary | ICD-10-CM

## 2021-12-17 DIAGNOSIS — F41.9 ANXIETY: ICD-10-CM

## 2021-12-17 DIAGNOSIS — F43.10 PTSD (POST-TRAUMATIC STRESS DISORDER): ICD-10-CM

## 2021-12-17 DIAGNOSIS — G47.00 INSOMNIA, UNSPECIFIED TYPE: ICD-10-CM

## 2021-12-20 ENCOUNTER — PATIENT OUTREACH (OUTPATIENT)
Dept: CARE COORDINATION | Facility: CLINIC | Age: 36
End: 2021-12-20
Payer: COMMERCIAL

## 2021-12-20 RX ORDER — MELATONIN 10 MG
1 CAPSULE ORAL
Qty: 30 CAPSULE | Refills: 1 | OUTPATIENT
Start: 2021-12-20

## 2021-12-20 NOTE — PROGRESS NOTES
Clinic Care Coordination Contact    Situation: Patient chart reviewed by care coordinator.    Background: SW completed chart review    Assessment: Patient in contact with CHW and care team. Has upcoming appointment with CCC RN    Plan/Recommendations: SW removed from care team due to medical focused goal with RN lead.

## 2021-12-21 ENCOUNTER — OFFICE VISIT (OUTPATIENT)
Dept: NEUROLOGY | Facility: CLINIC | Age: 36
End: 2021-12-21
Payer: COMMERCIAL

## 2021-12-21 VITALS
SYSTOLIC BLOOD PRESSURE: 133 MMHG | WEIGHT: 293 LBS | BODY MASS INDEX: 45.99 KG/M2 | HEART RATE: 91 BPM | HEIGHT: 67 IN | DIASTOLIC BLOOD PRESSURE: 91 MMHG

## 2021-12-21 DIAGNOSIS — F51.01 PRIMARY INSOMNIA: ICD-10-CM

## 2021-12-21 DIAGNOSIS — E66.01 MORBID OBESITY (H): ICD-10-CM

## 2021-12-21 DIAGNOSIS — G43.719 INTRACTABLE CHRONIC MIGRAINE WITHOUT AURA AND WITHOUT STATUS MIGRAINOSUS: Primary | ICD-10-CM

## 2021-12-21 PROCEDURE — 99214 OFFICE O/P EST MOD 30 MIN: CPT | Performed by: PSYCHIATRY & NEUROLOGY

## 2021-12-21 RX ORDER — VERAPAMIL HYDROCHLORIDE 120 MG/1
120 CAPSULE, EXTENDED RELEASE ORAL AT BEDTIME
Qty: 90 CAPSULE | Refills: 0 | Status: SHIPPED | OUTPATIENT
Start: 2021-12-21 | End: 2022-04-08

## 2021-12-21 ASSESSMENT — MIFFLIN-ST. JEOR: SCORE: 2056.67

## 2021-12-21 NOTE — LETTER
12/21/2021         RE: Ruth Vanegas  [address]  [place] [address]        Dear Colleague,    Thank you for referring your patient, Ruth Vanegas, to the [facility] [place]. Please see a copy of my visit note below.    NEUROLOGY OUTPATIENT PROGRESS NOTE   Dec 21, 2021     CHIEF COMPLAINT/REASON FOR VISIT/REASON FOR CONSULT  Patient presents with:  Follow Up: migraine     REASON FOR CONSULTATION- Headaches    REFERRAL SOURCE  Dr. Rosangela Haynes   Dr. Rosangela Haynes    HISTORY OF PRESENT ILLNESS  Ruth Vanegas is a 35 year old female seen today for evaluation of headaches.  She reports that she has had headaches for years these would come and go.  Over the last few months these have become more constant and lasting longer.  She reports that she has 15 headache days a month.  Both temples are involved.  Headaches are throbbing in nature with photophobia and phonophobia.  She denies any triggers for her headaches.    She is tried sumatriptan in the past which made things worse.  She has tried Excedrin but she has to catch the headaches soon enough to make it work.  She is currently on nortriptyline 50 mg at night which is not helping.  The nortriptyline was also being used as a sleep aid.  She is also on Topamax which is not helping.  She is taking 50 mg in the evening and 25 mg in the morning.  She further complains of vertigo which is sometimes with the headache.  She does have issues with uncontrolled sugars and feels that the Topamax might be helpful.  Denies any visual auras.    12/21/21  Patient returns today.  She reports that the headaches have become more frequent and she is still having them every day.  Is taking 100 mg twice daily of the Topamax with no side effects.  50 mg of nortriptyline at night and is not sleeping well with this dose.  Is interested in trying a higher medication.  Is taking Maxalt which is working though has to use it almost  every day.  Is working on losing weight.  Reports that her diabetes has been under good control.  Has not done the blood work that had ordered.    Previous history is reviewed and this is unchanged.    PAST MEDICAL/SURGICAL HISTORY  Past Medical History:   Diagnosis Date     Anemia     told to take iron pills when pregnant     Depression      Depressive disorder      Diabetes mellitus (H)      Diabetes mellitus, type 2 (H) 07/15/2021     Dysthymic disorder      Endometriosis      Herpes genitalia      Hyperlipidemia      Kidney stone      Menorrhagia      Migraines      Neuropathy      Other abnormal Papanicolaou smear of cervix and cervical HPV(795.09) 01/02/2013     PCOS (polycystic ovarian syndrome)      Post traumatic stress disorder (PTSD)      Patient Active Problem List   Diagnosis     Obesity     Other abnormal Papanicolaou smear of cervix and cervical HPV(795.09)     Chronic nausea     Chronic vomiting     Diabetes mellitus, type 2 (H)     Unintentional weight loss     Rectal bleeding     Flatulence, eructation and gas pain     Morbid obesity (H)   Significant for diabetes, migraines, depression, insomnia, difficulty keeping food down, constipation    FAMILY HISTORY  Family History   Adopted: Yes   Problem Relation Age of Onset     Prostate Cancer Father      Cancer Father         prostate     Alcoholism Sister      Alcoholism Sister      Alcoholism Brother      Alcoholism Brother      Diabetes Paternal Grandmother      Other Cancer Paternal Grandmother      Alcoholism Daughter      Coronary Artery Disease No family hx of      Urolithiasis No family hx of      Clotting Disorder No family hx of      Gout No family hx of      Heart Disease No family hx of      Anesthesia Reaction No family hx of    Family history positive for migraines in her brother.  Also family history of diabetes.    SOCIAL HISTORY  Social History     Tobacco Use     Smoking status: Never Smoker     Smokeless tobacco: Never Used    Substance Use Topics     Alcohol use: No     Alcohol/week: 0.0 standard drinks     Drug use: No       SYSTEMS REVIEW  Twelve-system ROS was done and other than the HPI this was negative except for neck pain, back pain, arm and leg pain, joint pain, numbness and tingling, weakness paralysis, difficulty walking, falling, balance coordination problems, dizziness, ringing in the ears, sleeping problems, headaches, anxiety, depression, bloating, stomach pain, weight gain, appetite problems  No other new issues reported    MEDICATIONS  acetaminophen (TYLENOL) 500 MG tablet, Take 500-1,000 mg by mouth every 4 hours as needed   atorvastatin (LIPITOR) 10 MG tablet, atorvastatin 10 mg tablet (Patient taking differently: every evening atorvastatin 10 mg tablet)  canagliflozin (INVOKANA) 300 MG tablet, every morning (before breakfast)   Continuous Blood Gluc  (FREESTYLE CINDY 14 DAY READER) JAUN, 1 Application  Continuous Blood Gluc Sensor (FREESTYLE CINDY 14 DAY SENSOR) MISC, 1 Application  Continuous Blood Gluc Sensor (FREESTYLE CINDY 14 DAY SENSOR) MISC,   diazepam (VALIUM) 5 MG tablet, once as needed Only for Dentist  fluconazole (DIFLUCAN) 150 MG tablet, once as needed   gabapentin (NEURONTIN) 300 MG capsule, Take 2 capsules (600 mg) by mouth 3 times daily  ibuprofen (ADVIL/MOTRIN) 600 MG tablet, every 4 hours as needed   insulin glargine (LANTUS SOLOSTAR) 100 UNIT/ML pen, Inject 24 Units Subcutaneous At Bedtime   Melatonin 10 MG CAPS, Take 1 capsule by mouth at bedtime as needed, may repeat once (insomnia)  metFORMIN (GLUCOPHAGE-XR) 500 MG 24 hr tablet, Take 1 tablet (500 mg) by mouth 2 times daily (with meals)  methocarbamol (ROBAXIN) 500 MG tablet, every evening   nortriptyline (PAMELOR) 25 MG capsule, every evening   ondansetron (ZOFRAN) 4 MG tablet, every 6 hours as needed   pantoprazole (PROTONIX) 40 MG EC tablet, Take 1 tablet (40 mg) by mouth daily (Patient taking differently: Take 40 mg by mouth every  "morning )  rizatriptan (MAXALT) 10 MG tablet, Take 1 tablet (10 mg) by mouth at onset of headache for migraine May repeat in 2 hours.  tolterodine ER (DETROL LA) 4 MG 24 hr capsule, every evening   topiramate (TOPAMAX) 100 MG tablet, Take 1 tablet (100 mg) by mouth 2 times daily  vitamin D3 (CHOLECALCIFEROL) 125 MCG (5000 UT) tablet, Take 5,000 Units by mouth every morning   famotidine (PEPCID) 40 MG tablet,   mirtazapine (REMERON) 15 MG tablet, mirtazapine 15 mg tablet  omeprazole (PRILOSEC) 20 MG DR capsule, omeprazole 20 mg capsule,delayed release (Patient not taking: Reported on 9/10/2021)    No current facility-administered medications on file prior to visit.       PHYSICAL EXAMINATION  VITALS: BP (!) 133/91 (BP Location: Left arm, Patient Position: Sitting)   Pulse 91   Ht 1.702 m (5' 7\")   Wt 132.9 kg (293 lb)   LMP 05/23/2016   BMI 45.89 kg/m    GENERAL: Healthy appearing, alert, no acute distress, normal habitus.  CARDIOVASCULAR: Extremities warm and well perfused. Pulses present.   NEUROLOGICAL:  Patient is awake and oriented to self, place and time.  Attention span is normal.  Memory is grossly intact.  Language is fluent and follows commands appropriately.  Appropriate fund of knowledge. Cranial nerves 2-12 are intact. There is no pronator drift.  Motor exam shows 5/5 strength in all extremities.  Tone is symmetric bilaterally in upper and lower extremities.  (Previously reflexes are symmetric and 2+ in upper extremities and lower extremities. Sensory exam is grossly intact to light touch, pin prick and vibration.)  Finger to nose and heel to shin is without dysmetria.  Romberg is negative.  Gait is normal and the patient is able to do tandem walk and walk on toes and heels.  No significant change in exam today.    DIAGNOSTICS  CT head-images reviewed.  No major structural lesions noted.  IMPRESSION:  1.  No acute intracranial process.    CT 2020  HEAD CT:   1.  No acute intracranial process. "     CERVICAL SPINE CT:   1.  No acute cervical spine fracture.    OUTSIDE RECORDS  Outside referral notes and chart notes were reviewed and pertinent information has been summarized (in addition to the HPI):-Patient seen for headaches.  Was seen in endocrinology for diabetes.  Was referred to neurology.  Was also having some ankle and feet swelling.  Also has nausea related to headaches.  Has been seen in ENT for benign positional paroxysmal vertigo.  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  Was recommended to see occupational therapy.    IMPRESSION/REPORT/PLAN  Intractable chronic migraine without aura and without status migrainosus  Primary insomnia  History of diabetes    This is a 35 year old female with chronic headache suggestive of chronic migraines and insomnia.  Her head CT has been negative for structural lesions.  We will hold off on MRI for right now though could consider it if headaches are not responding to medications.  We will check blood work to look for causes of headaches.  Patient has not done the blood work from last time.  If her headaches remain refractory would need MRI during the next visit.    Sumatriptan has caused headaches to get worse.  Maxalt is more helpful as abortive therapy and will continue that.  Higher doses of Topamax have not helped with the headaches.  Propanolol cannot be used because of history of diabetes.  We will try verapamil instead and see how she does.    If she does not respond to the verapamil could consider CGRP medications versus Botox.    She remains on nortriptyline for insomnia which is helping but not completely.  It did not help with the headaches.  We will continue this for right now.    Lower dose of Topamax had provided some benefit.  Possibly was helping lower her sugars as well.  She has lost some weight with this.    I would encourage her to keep a log of her headaches to identify triggers.  I can see her back in 6 weeks    -      rizatriptan (MAXALT) 10 MG tablet; Take 1 tablet (10 mg) by mouth at onset of headache for migraine May repeat in 2 hours.  -     topiramate (TOPAMAX) 50 MG tablet; Take 1 tablet (50 mg) by mouth 2 times daily  -     verapamil ER (VERELAN) 120 MG 24 hr capsule; Take 1 capsule (120 mg) by mouth At Bedtime  -     Vitamin B12; Future  -     Ferritin; Future  -     TSH with free T4 reflex; Future    Return in about 6 weeks (around 2/1/2022) for In-Clinic Visit, After testing.    Over 35 minutes were spent coordinating the care for the patient on the day of the encounter.  This includes previsit, during visit and post visit activities as documented above.  Refractory medical problem with prescription management.  Multiple problems are present  (Activities include but not inclusive of reviewing chart, reviewing outside records, reviewing labs and imaging study results as well as the images, patient visit time including getting history and exam,  use if applicable, review of test results with the patient and coming up with a plan in a shared model, counseling patient and family, education and answering patient questions, EMR , EMR diagnosis entry and problem list management, medication reconciliation and prescription management if applicable, paperwork if applicable, printing documents and documentation of the visit activities.)      Roberto Bolanos MD  Neurologist  [facility]  Tel:- [phone]    This note was dictated using voice recognition software.  Any grammatical or context distortions are unintentional and inherent to the software.        Again, thank you for allowing me to participate in the care of your patient.        Sincerely,        Roberto Bolanos MD

## 2021-12-21 NOTE — PROGRESS NOTES
NEUROLOGY OUTPATIENT PROGRESS NOTE   Dec 21, 2021     CHIEF COMPLAINT/REASON FOR VISIT/REASON FOR CONSULT  Patient presents with:  Follow Up: migraine     REASON FOR CONSULTATION- Headaches    REFERRAL SOURCE  Dr. Rosangela Haynes   Dr. Rosangela Haynes    HISTORY OF PRESENT ILLNESS  Ruth Vanegas is a 35 year old female seen today for evaluation of headaches.  She reports that she has had headaches for years these would come and go.  Over the last few months these have become more constant and lasting longer.  She reports that she has 15 headache days a month.  Both temples are involved.  Headaches are throbbing in nature with photophobia and phonophobia.  She denies any triggers for her headaches.    She is tried sumatriptan in the past which made things worse.  She has tried Excedrin but she has to catch the headaches soon enough to make it work.  She is currently on nortriptyline 50 mg at night which is not helping.  The nortriptyline was also being used as a sleep aid.  She is also on Topamax which is not helping.  She is taking 50 mg in the evening and 25 mg in the morning.  She further complains of vertigo which is sometimes with the headache.  She does have issues with uncontrolled sugars and feels that the Topamax might be helpful.  Denies any visual auras.    12/21/21  Patient returns today.  She reports that the headaches have become more frequent and she is still having them every day.  Is taking 100 mg twice daily of the Topamax with no side effects.  50 mg of nortriptyline at night and is not sleeping well with this dose.  Is interested in trying a higher medication.  Is taking Maxalt which is working though has to use it almost every day.  Is working on losing weight.  Reports that her diabetes has been under good control.  Has not done the blood work that had ordered.    Previous history is reviewed and this is unchanged.    PAST MEDICAL/SURGICAL HISTORY  Past Medical History:   Diagnosis Date      Anemia     told to take iron pills when pregnant     Depression      Depressive disorder      Diabetes mellitus (H)      Diabetes mellitus, type 2 (H) 07/15/2021     Dysthymic disorder      Endometriosis      Herpes genitalia      Hyperlipidemia      Kidney stone      Menorrhagia      Migraines      Neuropathy      Other abnormal Papanicolaou smear of cervix and cervical HPV(795.09) 01/02/2013     PCOS (polycystic ovarian syndrome)      Post traumatic stress disorder (PTSD)      Patient Active Problem List   Diagnosis     Obesity     Other abnormal Papanicolaou smear of cervix and cervical HPV(795.09)     Chronic nausea     Chronic vomiting     Diabetes mellitus, type 2 (H)     Unintentional weight loss     Rectal bleeding     Flatulence, eructation and gas pain     Morbid obesity (H)   Significant for diabetes, migraines, depression, insomnia, difficulty keeping food down, constipation    FAMILY HISTORY  Family History   Adopted: Yes   Problem Relation Age of Onset     Prostate Cancer Father      Cancer Father         prostate     Alcoholism Sister      Alcoholism Sister      Alcoholism Brother      Alcoholism Brother      Diabetes Paternal Grandmother      Other Cancer Paternal Grandmother      Alcoholism Daughter      Coronary Artery Disease No family hx of      Urolithiasis No family hx of      Clotting Disorder No family hx of      Gout No family hx of      Heart Disease No family hx of      Anesthesia Reaction No family hx of    Family history positive for migraines in her brother.  Also family history of diabetes.    SOCIAL HISTORY  Social History     Tobacco Use     Smoking status: Never Smoker     Smokeless tobacco: Never Used   Substance Use Topics     Alcohol use: No     Alcohol/week: 0.0 standard drinks     Drug use: No       SYSTEMS REVIEW  Twelve-system ROS was done and other than the HPI this was negative except for neck pain, back pain, arm and leg pain, joint pain, numbness and tingling, weakness  paralysis, difficulty walking, falling, balance coordination problems, dizziness, ringing in the ears, sleeping problems, headaches, anxiety, depression, bloating, stomach pain, weight gain, appetite problems  No other new issues reported    MEDICATIONS  acetaminophen (TYLENOL) 500 MG tablet, Take 500-1,000 mg by mouth every 4 hours as needed   atorvastatin (LIPITOR) 10 MG tablet, atorvastatin 10 mg tablet (Patient taking differently: every evening atorvastatin 10 mg tablet)  canagliflozin (INVOKANA) 300 MG tablet, every morning (before breakfast)   Continuous Blood Gluc  (FREESTYLE CINDY 14 DAY READER) JAUN, 1 Application  Continuous Blood Gluc Sensor (FREESTYLE CINDY 14 DAY SENSOR) MISC, 1 Application  Continuous Blood Gluc Sensor (FREESTYLE CINDY 14 DAY SENSOR) MISC,   diazepam (VALIUM) 5 MG tablet, once as needed Only for Dentist  fluconazole (DIFLUCAN) 150 MG tablet, once as needed   gabapentin (NEURONTIN) 300 MG capsule, Take 2 capsules (600 mg) by mouth 3 times daily  ibuprofen (ADVIL/MOTRIN) 600 MG tablet, every 4 hours as needed   insulin glargine (LANTUS SOLOSTAR) 100 UNIT/ML pen, Inject 24 Units Subcutaneous At Bedtime   Melatonin 10 MG CAPS, Take 1 capsule by mouth at bedtime as needed, may repeat once (insomnia)  metFORMIN (GLUCOPHAGE-XR) 500 MG 24 hr tablet, Take 1 tablet (500 mg) by mouth 2 times daily (with meals)  methocarbamol (ROBAXIN) 500 MG tablet, every evening   nortriptyline (PAMELOR) 25 MG capsule, every evening   ondansetron (ZOFRAN) 4 MG tablet, every 6 hours as needed   pantoprazole (PROTONIX) 40 MG EC tablet, Take 1 tablet (40 mg) by mouth daily (Patient taking differently: Take 40 mg by mouth every morning )  rizatriptan (MAXALT) 10 MG tablet, Take 1 tablet (10 mg) by mouth at onset of headache for migraine May repeat in 2 hours.  tolterodine ER (DETROL LA) 4 MG 24 hr capsule, every evening   topiramate (TOPAMAX) 100 MG tablet, Take 1 tablet (100 mg) by mouth 2 times  "daily  vitamin D3 (CHOLECALCIFEROL) 125 MCG (5000 UT) tablet, Take 5,000 Units by mouth every morning   famotidine (PEPCID) 40 MG tablet,   mirtazapine (REMERON) 15 MG tablet, mirtazapine 15 mg tablet  omeprazole (PRILOSEC) 20 MG DR capsule, omeprazole 20 mg capsule,delayed release (Patient not taking: Reported on 9/10/2021)    No current facility-administered medications on file prior to visit.       PHYSICAL EXAMINATION  VITALS: BP (!) 133/91 (BP Location: Left arm, Patient Position: Sitting)   Pulse 91   Ht 1.702 m (5' 7\")   Wt 132.9 kg (293 lb)   LMP 05/23/2016   BMI 45.89 kg/m    GENERAL: Healthy appearing, alert, no acute distress, normal habitus.  CARDIOVASCULAR: Extremities warm and well perfused. Pulses present.   NEUROLOGICAL:  Patient is awake and oriented to self, place and time.  Attention span is normal.  Memory is grossly intact.  Language is fluent and follows commands appropriately.  Appropriate fund of knowledge. Cranial nerves 2-12 are intact. There is no pronator drift.  Motor exam shows 5/5 strength in all extremities.  Tone is symmetric bilaterally in upper and lower extremities.  (Previously reflexes are symmetric and 2+ in upper extremities and lower extremities. Sensory exam is grossly intact to light touch, pin prick and vibration.)  Finger to nose and heel to shin is without dysmetria.  Romberg is negative.  Gait is normal and the patient is able to do tandem walk and walk on toes and heels.  No significant change in exam today.    DIAGNOSTICS  CT head-images reviewed.  No major structural lesions noted.  IMPRESSION:  1.  No acute intracranial process.    CT 2020  HEAD CT:   1.  No acute intracranial process.     CERVICAL SPINE CT:   1.  No acute cervical spine fracture.    OUTSIDE RECORDS  Outside referral notes and chart notes were reviewed and pertinent information has been summarized (in addition to the HPI):-Patient seen for headaches.  Was seen in endocrinology for diabetes.  " Was referred to neurology.  Was also having some ankle and feet swelling.  Also has nausea related to headaches.  Has been seen in ENT for benign positional paroxysmal vertigo.  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  Was recommended to see occupational therapy.    IMPRESSION/REPORT/PLAN  Intractable chronic migraine without aura and without status migrainosus  Primary insomnia  History of diabetes    This is a 35 year old female with chronic headache suggestive of chronic migraines and insomnia.  Her head CT has been negative for structural lesions.  We will hold off on MRI for right now though could consider it if headaches are not responding to medications.  We will check blood work to look for causes of headaches.  Patient has not done the blood work from last time.  If her headaches remain refractory would need MRI during the next visit.    Sumatriptan has caused headaches to get worse.  Maxalt is more helpful as abortive therapy and will continue that.  Higher doses of Topamax have not helped with the headaches.  Propanolol cannot be used because of history of diabetes.  We will try verapamil instead and see how she does.    If she does not respond to the verapamil could consider CGRP medications versus Botox.    She remains on nortriptyline for insomnia which is helping but not completely.  It did not help with the headaches.  We will continue this for right now.    Lower dose of Topamax had provided some benefit.  Possibly was helping lower her sugars as well.  She has lost some weight with this.    I would encourage her to keep a log of her headaches to identify triggers.  I can see her back in 6 weeks    -     rizatriptan (MAXALT) 10 MG tablet; Take 1 tablet (10 mg) by mouth at onset of headache for migraine May repeat in 2 hours.  -     topiramate (TOPAMAX) 50 MG tablet; Take 1 tablet (50 mg) by mouth 2 times daily  -     verapamil ER (VERELAN) 120 MG 24 hr capsule; Take 1 capsule (120  mg) by mouth At Bedtime  -     Vitamin B12; Future  -     Ferritin; Future  -     TSH with free T4 reflex; Future    Return in about 6 weeks (around 2/1/2022) for In-Clinic Visit, After testing.    Over 35 minutes were spent coordinating the care for the patient on the day of the encounter.  This includes previsit, during visit and post visit activities as documented above.  Refractory medical problem with prescription management.  Multiple problems are present  (Activities include but not inclusive of reviewing chart, reviewing outside records, reviewing labs and imaging study results as well as the images, patient visit time including getting history and exam,  use if applicable, review of test results with the patient and coming up with a plan in a shared model, counseling patient and family, education and answering patient questions, EMR , EMR diagnosis entry and problem list management, medication reconciliation and prescription management if applicable, paperwork if applicable, printing documents and documentation of the visit activities.)      Roberto Bolanos MD  Neurologist  [facility]  Tel:- [phone]    This note was dictated using voice recognition software.  Any grammatical or context distortions are unintentional and inherent to the software.

## 2021-12-21 NOTE — NURSING NOTE
Chief Complaint   Patient presents with     Follow Up     migraine      Namita Faye MA on 12/21/2021 at 8:24 AM

## 2021-12-28 ENCOUNTER — VIRTUAL VISIT (OUTPATIENT)
Dept: PHARMACY | Facility: CLINIC | Age: 36
End: 2021-12-28
Attending: INTERNAL MEDICINE
Payer: COMMERCIAL

## 2021-12-28 ENCOUNTER — PATIENT OUTREACH (OUTPATIENT)
Dept: NURSING | Facility: CLINIC | Age: 36
End: 2021-12-28
Payer: COMMERCIAL

## 2021-12-28 DIAGNOSIS — A04.8 H. PYLORI INFECTION: Primary | ICD-10-CM

## 2021-12-28 DIAGNOSIS — N32.81 OVERACTIVE BLADDER: ICD-10-CM

## 2021-12-28 DIAGNOSIS — G43.009 NONINTRACTABLE MIGRAINE, UNSPECIFIED MIGRAINE TYPE: ICD-10-CM

## 2021-12-28 PROCEDURE — 99607 MTMS BY PHARM ADDL 15 MIN: CPT | Performed by: PHARMACIST

## 2021-12-28 PROCEDURE — 99606 MTMS BY PHARM EST 15 MIN: CPT | Performed by: PHARMACIST

## 2021-12-28 NOTE — PROGRESS NOTES
Medication Therapy Management (MTM) Encounter    ASSESSMENT:                            Medication Adherence/Access: No issues identified    H pylori: Ruth would benefit from treatment of H pylori as indicated by Dr. Lehman. We discussed bismuth quadruple therapy in depth as outlined below including precaution to avoid alcohol while on treatment, and for at least three days after. We would recommend confirmatory testing at least four weeks after treatment completion via stool antigen testing, which I will help facilitate after confirming she was able to complete the regimen. She is on daily PPI. She would benefit from increased dose of this during H pylori treatment, and can reduce back to daily frequency once she has completed this. We will need to hold PPI for stool eradication testing. Her surgery is scheduled for later in January, and she should be able to complete treatment before this as to not interfere with anything.    Migraines: Unchanged. We discussed the potential of triptan overuse headaches. She has not yet started this, but hopefully it will decrease her triptan demand and reduce headache frequency. Plan in place with neurology.    Type 2 Diabetes: At goal of A1c < 8% per last endocrinology dose. It looks like her most recent metformin prescription was listed as 500 mg twice daily, which is different than the last endocrinology note. Will clarify at check-in to make sure she does not run out of this early.    Urinary: Unchanged.    PLAN:                            1. Recommend bismuth quadruple therapy x 14 days:   - pantoprazole 40 mg by mouth twice daily   - bismuth subsalicylate 524 mg by mouth four times daily   - metronidazole 250 mg four times daily   - doxycycline 100 mg by mouth twice daily      Recommendations sent to Dr. Lehman via staff message. Will addend note with response. Update:Dr. Lehman is agreeable to orders, which were placed. Will update Ruth via [employer].     2. Ruth to  minimize ibuprofen use. We also discussed that using rizatriptan daily/multiple times per day can cause medication overuse headaches. Hopefully the verapamil will be helpful in allowing you do reduce the use of rizatriptan.    Follow-up: -- 7-10 days after treatment start for check-in (also confirm metformin dosing)   -- at least four weeks after therapy completion for H pylori stool antigen testing (will need to be off PPI for at least two weeks)    SUBJECTIVE/OBJECTIVE:                          Ruth Vanegas is a 36 year old female contacted via secure video for a follow-up visit. She was referred to me from Dr. Lehman. She was last seen by TAHIRA Allison PharmD 7/6/21.     Reason for visit: H pylori treatment     Allergies/ADRs: Reviewed in chart  Tobacco: She reports that she has never smoked. She has never used smokeless tobacco.  Alcohol: not currently using - very rare     Medication Adherence/Access: no issues reported    H pylori:   Pantoprazole 40 mg daily   Ondansetron 4 mg every 6 hours as needed     Notes she is having knee surgery on the 1/24. Notes she has not been treated for H pylori. Notes she did have a bladder infection -- used nitrofurantoin. She has completed this and is feeling better. She mentions that she does have knee surgery scheduled for the end of January.     We reviewed bismuth quadruple therapy including medications, mechanisms of action, general dosing and counseling information as well as precautions. Discussed avoidance of alcohol while on metronidazole therapy and for at least three days following therapy. She does not drink often and does not see this being a problem. We also discussed tendency of pepto bismol to cause dark stools/tongue. She has used this before and does have the liquid on hand. We reviewed that pepto bismol may not be covered by insurance, in which case she can get it over-the-counter. S/p tubal ligation 3/2018, hysterectomy noted 2019.    --Prefers if sent  to [facility] on [address].    Migraines:   Rizatriptan 10 mg as needed  Verapamil 120 mg at bedtime   Topiramate 100 mg twice daily   Nortriptyline 50 mg nightly  Methocarbamol 500 mg nightly     Just picked up the verapamil. Using rizatriptan once daily at least. She is followed by neurology and recently had a visit with Dr. Bolanos. Follow-up in place for February.     Type 2 Diabetes:  invokana 300 mg daily   Insulin glargine 24 units daily  Metformin twice daily    Gabapentin 600 mg three times daily     Notes she does have enough testing supplies at home. No blood sugars available for review during our discussion today. She is currently on statin therapy with atorvastatin. Followed by endocrinology. Last office visit 10/28/21. Per office note, she should be on metformin 1000 mg twice daily. She did try GLP-1s previously, but had N/V. Per visit note from 10/28/21, her A1c was 7.9.    Lab Results   Component Value Date    A1C 8.5 05/05/2021    A1C 8.8 02/17/2021    A1C 9.2 11/04/2020     Urinary:   Tolterodine ER 4 mg daily    No reported concerns.    AM  invokana  Metformin  Gabapentin  Pantoprazole   Topiramate 100 mg   Vitamin D    Midday  Gabapentin (2)    Evening  Gabapentin  Metformin  Melatonin  Nortriptyline  Methocarbamol   tolerterodine  Atorvastatin   Verapamil     Today's Vitals: LMP 05/23/2016   ----------------    I spent 36 minutes with this patient today. All changes were made via verbal approval with Dr. Lehman. A copy of the visit note was provided to the patient's referring provider.    The patient was sent via [employer] a summary of these recommendations.     Yari Locke PharmD, BCACP  MTM Pharmacist   [facility]  Phone: [phone]    Telemedicine Visit Details  Type of service:  Video Conference via [employer]  Start Time: 3:03 PM  End Time: 3:39 PM  Originating Location (patient location): Home  Distant Location (provider location):  [facility]  [place] [facility] TAHIRA Miranda was at the pharmacy at the beginning of our visit and asked that I hold on until she is back at her car, and eventually back at home.      Medication Therapy Recommendations  H. pylori infection    Rationale: Untreated condition - Needs additional medication therapy - Indication   Recommendation: Start Medication   Status: Accepted per Provider   Note: recommend bismuth qudruple therapy x 14 days

## 2021-12-28 NOTE — PROGRESS NOTES
Clinic Care Coordination Contact    Follow Up Progress Note      Assessment: RN CC outreach and spoke with patient.  Patient is engaged with speciality provider and aware of treatment plan and action items  Discussed transition to maintenance, pt agree    Care Gaps:   Goals addressed this encounter:   Goals Addressed                    This Visit's Progress       Patient Stated       COMPLETED: Medical (pt-stated)         Goal Statement: I will have a better understanding and plan of care for Gastro within 3-4 months  Date Goal set: 10/12/21    Barriers: access  Strengths: pt engagement  Date to Achieve By:December    Patient expressed understanding of goal: yes  Action steps to achieve this goal:  1. I will attend pre -Procedure Covid test  on 12-10-21 at 8am  2. I will attend upper and lower endoscopy appt on 12-14-21 with Dr Stapleton.  3 I will talk to CC RN on 12-28-21 at 10 am follow up on endoscopy and neuro  4 I will report to my Community Health Worker if any additional resources or support needed.    Updated: 12-2-21 KIM Bravo                    Outreach Frequency: 2 months    Plan:    Patient will schedule and attend recommended follow up visits with speciality providers and primary care provider.    CCC will outreach in 2 month per standard work, available sooner if needed

## 2021-12-28 NOTE — PROGRESS NOTES
12/28/2021  Clinic Care Coordination Contact  Care Team Conversations     Received message from CC SW patient transition to Maintenance as of 12-28-21  CHW follow up in 2 months      CHW Follow up: 2 Months  CHW Plan: discuss graduating from CC if no other goals or needs  CHW Next Follow Up: 2-28-22

## 2021-12-29 RX ORDER — METRONIDAZOLE 250 MG/1
250 TABLET ORAL 4 TIMES DAILY
Qty: 56 TABLET | Refills: 0 | Status: SHIPPED | OUTPATIENT
Start: 2021-12-29 | End: 2022-02-02

## 2021-12-29 RX ORDER — BISMUTH SUBSALICYLATE 262 MG/1
2 TABLET, CHEWABLE ORAL
Qty: 112 TABLET | Refills: 0 | Status: SHIPPED | OUTPATIENT
Start: 2021-12-29 | End: 2022-02-02

## 2021-12-29 RX ORDER — PANTOPRAZOLE SODIUM 40 MG/1
40 TABLET, DELAYED RELEASE ORAL 2 TIMES DAILY
Qty: 28 TABLET | Refills: 0 | Status: SHIPPED | OUTPATIENT
Start: 2021-12-29 | End: 2022-02-02

## 2021-12-29 RX ORDER — DOXYCYCLINE HYCLATE 100 MG
100 TABLET ORAL 2 TIMES DAILY
Qty: 28 TABLET | Refills: 0 | Status: SHIPPED | OUTPATIENT
Start: 2021-12-29 | End: 2022-02-02

## 2022-01-03 NOTE — PATIENT INSTRUCTIONS
Recommendations from today's MTM visit:                                                      1. Recommend bismuth quadruple therapy x 14 days:   - pantoprazole 40 mg by mouth twice daily   - bismuth subsalicylate 524 mg by mouth four times daily   - metronidazole 250 mg four times daily   - doxycycline 100 mg by mouth twice daily      2. Try to minimize ibuprofen use. We also discussed that using rizatriptan daily/multiple times per day can cause medication overuse headaches. Hopefully the verapamil will be helpful in allowing you do reduce the use of rizatriptan.    Follow-up: -- 7-10 days after treatment start for check-in (also confirm metformin dosing)   -- at least four weeks after therapy completion for H pylori stool antigen testing (will need to be off PPI for at least two weeks)    It was great to speak with you today.  I value your experience and would be very thankful for your time with providing feedback on our clinic survey. You may receive a survey via email or text message in the next few days.     To schedule another MTM appointment, please call the clinic directly or you may call the MTM scheduling line at [phone] or toll-free at [phone].     My Clinical Pharmacist's contact information:                                                      Please feel free to contact me with any questions or concerns you have.      Yari RolleD, BCACP  MTM Pharmacist   [facility] Rhe[facility]  Phone: [phone]

## 2022-01-09 ENCOUNTER — HEALTH MAINTENANCE LETTER (OUTPATIENT)
Age: 37
End: 2022-01-09

## 2022-01-11 DIAGNOSIS — F43.10 PTSD (POST-TRAUMATIC STRESS DISORDER): ICD-10-CM

## 2022-01-11 DIAGNOSIS — F41.9 ANXIETY: ICD-10-CM

## 2022-01-11 DIAGNOSIS — G47.00 INSOMNIA, UNSPECIFIED TYPE: ICD-10-CM

## 2022-01-12 ENCOUNTER — TELEPHONE (OUTPATIENT)
Dept: FAMILY MEDICINE | Facility: CLINIC | Age: 37
End: 2022-01-12
Payer: COMMERCIAL

## 2022-01-12 ENCOUNTER — MYC MEDICAL ADVICE (OUTPATIENT)
Dept: FAMILY MEDICINE | Facility: CLINIC | Age: 37
End: 2022-01-12
Payer: COMMERCIAL

## 2022-01-12 DIAGNOSIS — B37.31 YEAST INFECTION OF THE VAGINA: ICD-10-CM

## 2022-01-12 DIAGNOSIS — N89.8 VAGINAL ITCHING: Primary | ICD-10-CM

## 2022-01-12 RX ORDER — FLUCONAZOLE 150 MG/1
150 TABLET ORAL ONCE
Qty: 1 TABLET | Refills: 1 | Status: SHIPPED | OUTPATIENT
Start: 2022-01-12 | End: 2022-01-12

## 2022-01-12 NOTE — TELEPHONE ENCOUNTER
RN spoke with patient regarding her Mychart message:  Can you please send a script for a yeast infection to the pharmacy please thank you!      Patient stated she is currently on treatment for H.pylori infection. Due to that treatment, she tends to have yeast infection often.     Patient noticed her symptoms of discharge and itching starting 4 days ago.       Patient would like Dr. Banks to prescribe the medication below and send to her preferred pharmacy.    metroNIDAZOLE (FLAGYL) 250 MG tablet 56 tablet 0 12/29/2021  --   Sig - Route: Take 1 tablet (250 mg) by mouth 4 times daily - Oral   Sent to pharmacy as: metroNIDAZOLE 250 MG Oral Tablet (FLAGYL)   Class: E-Prescribe   Order: [id number]   E-Prescribing Status: Receipt confirmed by pharmacy (12/29/2021  1:38 PM CST)     [id number] - [place], [place] - [address] AT [facility] & [facility]        RN will route this encounter to Dr. Banks to review and advise.       Per patient, Ok to leave detailed message at [phone].        Betty Be RN  [facility]

## 2022-01-14 ENCOUNTER — VIRTUAL VISIT (OUTPATIENT)
Dept: GASTROENTEROLOGY | Facility: CLINIC | Age: 37
End: 2022-01-14
Payer: COMMERCIAL

## 2022-01-14 DIAGNOSIS — R11.10 CHRONIC VOMITING: ICD-10-CM

## 2022-01-14 DIAGNOSIS — R11.0 CHRONIC NAUSEA: Primary | ICD-10-CM

## 2022-01-14 DIAGNOSIS — R73.9 HYPERGLYCEMIA: ICD-10-CM

## 2022-01-14 DIAGNOSIS — E66.01 MORBID OBESITY (H): ICD-10-CM

## 2022-01-14 PROCEDURE — 99214 OFFICE O/P EST MOD 30 MIN: CPT | Mod: 95 | Performed by: INTERNAL MEDICINE

## 2022-01-14 RX ORDER — MELATONIN 10 MG
1 CAPSULE ORAL
Qty: 30 CAPSULE | Refills: 1 | OUTPATIENT
Start: 2022-01-14

## 2022-01-14 NOTE — PROGRESS NOTES
"Ruth is a 36 year old who is being evaluated via a billable video visit.      How would you like to obtain your AVS? MyChart  If the video visit is dropped, the invitation should be resent by: Text to cell phone: [phone]  Will anyone else be joining your video visit? 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    Distant Location (provider location):  [facility]     Platform used for Video Visit: AmWell converted to doximity due to audio issues        Gastroenterology Visit for: Ruth Vanegas 1985   MRN: [id number]     Reason for Visit:  chief complaint    Referred by: Jocelyn  / Big[address] LUJAN  / [place] [address]  Patient Care Team:  Milton Banks MD as PCP - General  Marleny, Zia Chaudhry MD (OB/Gyn)  Randall Allison, PharmD as Pharmacist (Pharmacist)  Jeremiah Loyola as Community Health Worker (Primary Care - CC)  Mely Reynoso RN as Lead Care Coordinator (Primary Care - CC)  Milton Banks MD as Assigned PCP  Puja Prescott MD as Resident (Student in organized health care education/training program)  Rene Lehman DO as Assigned Gastroenterology Provider  Roberto Bolanos MD as Assigned Neuroscience Provider  Lashae Briones, PhD LP as Assigned Behavioral Health Provider  Sandra Branch PA-C as Assigned Surgical Provider  Yari Locke [facility] as Pharmacist (Pharmacist Ambulatory Care)    History of Present Illness:   Ruth Vanegas is a 36 year old female with HLD, uncontrolled DM2, anxiety, frequent yeast infection, migraine who is presenting as a follow up patient in consultation at the request of Dr. Banks with a chief complaint of nausea and vomiting.    1/14/22  Just finished treatment for h pylori on Wednesday. She was \"ok\" throughout the treatment. Had increased nausea " "while undergoing treatment. Kids got COVID end of December/beginning of January. With COVID the patient was asymptomatic.     Dark stools while on treatment with burning. She had blood when wiping but not in the stool or in toilet bowl. She denies straining. Denies medication such as preparation H.    Continues to have some nausea but it is improved since completing treatment for h pylor. Feels that she is able to eat more. Nausea is worse in the morning and at night. Before breakfast and at night after dinner.     Unsure if pantoprazole has made any change to symptoms.      Hyperglycemia in the AM- high 200s. Normal midday. Occasionally low in the evening. Sees endocrinologist soon.   ---------------------------------------------------------------  9/10/21  Ruth GYPSY Dwyerramon states began getting sick about one year ago. She felt this was related to her diabetes. She was on an injection (bydureon) that was making her sick. She continued to feel sick: nauseated constantly. She has vomiting that can happen at any point. She is unable to determine what triggers vomiting. She always feels nauseated or like she will vomit. About 4-5 months ago she was feeling like she was forcing food down. It was \"hurting\" to eat. She had been on ozempic which also worsened her symptoms of nausea back in June. She had to discontinue the medication because it worsened her nausea and vomiting. Currently taking zofran 3-4 times a week. Doesn't like taking it because she gets constipated with it. After lunch she will get \"sick\". She feels that she has a lot of reflux associated and regurgitation. Occasionally she will regurgitate in order to feel better. Symptoms can be worse after a meal. Her symptoms also occur in the morning before waking. When she vomits in the morning she will not vomit food from the night before. She typically completes her dinner around 6:30pm. Has not yet had an endoscopy or gastric emptying study. When she was " taking omeprazole 20mg she did not notice a benefit in her symptoms. She felt that omeprazole worsened her headaches. +belching and regurgitation, no heartburn.     If the patient is constipated she has abdominal pain. When constipated has occasional streaks of blood on the toilet paper. Never mixed with stool. +unintentional weight loss.     Was previously on mirtazapine. She was only on the medication leading up to therapy.  Nortriptyline 50mg at bedtime- taking for depression and insomnia.     Last hemoglobin A1c- She has an appointment with endocrinology on October 28th. Her glucose sensor ranges from 140-290. When she wakes and hasn't eaten her glucose is >200.   ---------------------------------------------------------------     Ruth Vanegas denies dysphagia, odynophagia, early satiety, abdominal pain, abdominal distension/bloating, diarrhea, hematochezia, or melena.     Family history of colon cancer: none  Wt Readings from Last 5 Encounters:   12/21/21 132.9 kg (293 lb)   12/14/21 132.6 kg (292 lb 5.3 oz)   10/28/21 137 kg (302 lb)   10/05/21 137.4 kg (303 lb)   05/11/21 145.7 kg (321 lb 4.8 oz)        Esophageal Questionnaire(s)    BEDQ Questionnaire  No flowsheet data found.  No flowsheet data found.    Eckardt Questionnaire  No flowsheet data found.    Promis 10 Questionnaire  No flowsheet data found.    STUDIES & PROCEDURES:    EGD:   Date: 12/14/21  Impression:  Findings:        The examined esophagus was normal.        The gastroesophageal flap valve was visualized endoscopically and        classified as Hill Grade I (prominent fold, tight to endoscope).        The entire examined stomach was normal. Biopsies were taken with a cold        forceps for histology. Verification of patient identification for the        specimen was done. Estimated blood loss: none.        The duodenal bulb, first portion of the duodenum, second portion of the        duodenum and examined duodenum were normal. Biopsies  were taken with a        cold forceps for histology. Verification of patient identification for        the specimen was done. Estimated blood loss: none.                                                                                     Impression:            - Normal esophagus.                          - Gastroesophageal flap valve classified as Hill Grade                          I (prominent fold, tight to endoscope).                          - Normal stomach. Biopsied.                          - Normal duodenal bulb, first portion of the duodenum,                          second portion of the duodenum and examined duodenum.                          Biopsied.   Pathology Report:  B. STOMACH, BIOPSY:   Immunostain, with appropriate controls, on block B1 is POSITIVE for H. pylori, confirming this as likely etiology for gastritis.       Addendum electronically signed by Yari Lopez MD on 12/16/2021 at 12:49 PM   Final Diagnosis   A. DUODENUM, BIOPSY:   Duodenal mucosa with focal foveolar metaplasia, otherwise no significant abnormality; negative for features of celiac sprue      B. STOMACH, BIOPSY:   Gastric  mucosa with focal active chronic gastritis; no intestinal metaplasia or dysplasia; report of H. pylori immunohistochemistry to follow            Colonoscopy:  Date: 12/14/21  Impression:   Findings:        The perianal and digital rectal examinations were normal.        The colon (entire examined portion) appeared normal.        Non-bleeding internal hemorrhoids were found during retroflexion. The        hemorrhoids were small.                                                                                     Impression:            - The entire examined colon is normal.                          - Non-bleeding internal hemorrhoids.                          - No specimens collected.   Pathology Report:     EndoFLIP directed at the UES or LES (8cm (EF-325) balloon length or 16cm (EF-322) balloon length):    Date:  8cm balloon  Balloon inflation Balloon pressure CSA (mm^2) DI (mm^2/mmHg) Dmin (mm) Compliance   20 (ladmark ID)        30        40        50           16cm balloon  Balloon inflation Balloon pressure CSA (mm^2) DI (mm^2/mmHg) Dmin (mm) Compliance   30 (ladmark ID)        40        50        60        70           High Resolution Manometry:  Date:  Impression:    PH/Impedance:  Date:  Impression:     Bravo:  48 or 96hr  Date:  Impression:    CT:  Date:  Impression:    Esophagram:  Date: 3/23/21  Impression:      IMPRESSION:  1.  Normal esophagram. No hiatal hernia and no spontaneous gastroesophageal reflux.    Gastric emptying study  21  Normal gastric emptying    Prior medical records were reviewed including, but not limited to, notes from referring providers, lab work, radiographic tests, and other diagnostic tests. Pertinent results were summarized above.     History     Past Medical History:   Diagnosis Date     Anemia     told to take iron pills when pregnant     Depression      Depressive disorder      Diabetes mellitus (H)      Diabetes mellitus, type 2 (H) 07/15/2021     Dysthymic disorder      Endometriosis      Herpes genitalia      Hyperlipidemia      Kidney stone      Menorrhagia      Migraines      Neuropathy      Other abnormal Papanicolaou smear of cervix and cervical HPV(795.09) 2013     PCOS (polycystic ovarian syndrome)      Post traumatic stress disorder (PTSD)        Past Surgical History:   Procedure Laterality Date     BLADDER REPAIR W/  SECTION      X2     C/SECTION, CLASSICAL      x2     COLONOSCOPY N/A 2021    Procedure: COLONOSCOPY;  Surgeon: Rene Lehman DO;  Location:  GI     DILATION AND CURETTAGE  2015     DILATION AND CURETTAGE  2015     DILATION AND CURETTAGE, OPERATIVE HYSTEROSCOPY, COMBINED N/A 2015    Procedure: Dilation and Curettage with Hysteroscopy;  Surgeon: Zia Farmer MD;  Location: [facility];  Service:       DILATION AND CURETTAGE, OPERATIVE HYSTEROSCOPY, COMBINED N/A 9/29/2016    Procedure: DILATION AND CURETTAGE WITH HYSTEROSCOPY INSERT MIRENA;  Surgeon: Suzanne Major MD;  Location: [facility] [place];  Service:      ENT SURGERY       ESOPHAGOSCOPY, GASTROSCOPY, DUODENOSCOPY (EGD), COMBINED N/A 12/14/2021    Procedure: ESOPHAGOGASTRODUODENOSCOPY, WITH BIOPSY;  Surgeon: Rene Lehman DO;  Location:  GI     GYN SURGERY  10/19/15    laproscopic lysis of adhesions     HYSTEROSCOPY, ABLATE ENDOMETRIUM HYDROTHERMAL, COMBINED N/A 3/2/2018    Procedure: HYSTEROSCOPY, DILATION AND CURETTAGE, ENDOMETRIAL ABLATION;  Surgeon: Kristy Israel DO;  Location: [facility] [place];  Service: Gynecology     LAPAROSCOPIC HYSTERECTOMY TOTAL N/A 3/4/2019    Procedure: ROBOTIC TOTAL LAPAROSCOPIC HYSTERECTOMY, BILATERAL SALPINGECTOMY, LEFT OVARIAN CYSTOTOMY. CYSTOSCOPY;  Surgeon: Zia Farmer MD;  Location: [facility] OR;  Service: Gynecology     LAPAROSCOPY DIAGNOSTIC (GENERAL) N/A 10/19/2015    Procedure:  LAPAROSCOPY,LYSIS OF ADHESIONS;  Surgeon: Zia Farmer MD;  Location: [facility] OR;  Service:      IA LAP,TUBAL CAUTERY Bilateral 3/2/2018    Procedure: LAPAROSCOPIC BILATERAL TUBAL LIGATION;  Surgeon: Kristy Israel DO;  Location: [facility] [place];  Service: Gynecology     TONSILLECTOMY         Social History     Socioeconomic History     Marital status: Single     Spouse name: Not on file     Number of children: Not on file     Years of education: Not on file     Highest education level: Not on file   Occupational History     Not on file   Tobacco Use     Smoking status: Never Smoker     Smokeless tobacco: Never Used   Substance and Sexual Activity     Alcohol use: No     Alcohol/week: 0.0 standard drinks     Drug use: No     Sexual activity: Not on file   Other Topics Concern     Not on file   Social History Narrative     Not on file     Social Determinants of Health     Financial Resource Strain: Not  on file   Food Insecurity: Not on file   Transportation Needs: Not on file   Physical Activity: Not on file   Stress: Not on file   Social Connections: Not on file   Intimate Partner Violence: Not on file   Housing Stability: Not on file       Family History   Adopted: Yes   Problem Relation Age of Onset     Prostate Cancer Father      Cancer Father         prostate     Alcoholism Sister      Alcoholism Sister      Alcoholism Brother      Alcoholism Brother      Diabetes Paternal Grandmother      Other Cancer Paternal Grandmother      Alcoholism Daughter      Coronary Artery Disease No family hx of      Urolithiasis No family hx of      Clotting Disorder No family hx of      Gout No family hx of      Heart Disease No family hx of      Anesthesia Reaction No family hx of      Family history reviewed and edited as appropriate    Medications and Allergies:     Outpatient Encounter Medications as of 1/14/2022   Medication Sig Dispense Refill     acetaminophen (TYLENOL) 500 MG tablet Take 500-1,000 mg by mouth every 4 hours as needed        atorvastatin (LIPITOR) 10 MG tablet atorvastatin 10 mg tablet (Patient taking differently: every evening atorvastatin 10 mg tablet) 30 tablet 11     canagliflozin (INVOKANA) 300 MG tablet every morning (before breakfast)        Continuous Blood Gluc  (FREESTYLE CINDY 14 DAY READER) JAUN 1 Application       Continuous Blood Gluc Sensor (FREESTYLE CINDY 14 DAY SENSOR) MISC 1 Application       Continuous Blood Gluc Sensor (FREESTYLE CINDY 14 DAY SENSOR) MISC        diazepam (VALIUM) 5 MG tablet once as needed Only for Dentist       fluconazole (DIFLUCAN) 150 MG tablet once as needed        gabapentin (NEURONTIN) 300 MG capsule Take 2 capsules (600 mg) by mouth 3 times daily 540 capsule 1     ibuprofen (ADVIL/MOTRIN) 600 MG tablet every 4 hours as needed        insulin glargine (LANTUS SOLOSTAR) 100 UNIT/ML pen Inject 24 Units Subcutaneous At Bedtime        Melatonin 10 MG CAPS  Take 1 capsule by mouth at bedtime as needed, may repeat once (insomnia) 30 capsule 1     metFORMIN (GLUCOPHAGE-XR) 500 MG 24 hr tablet Take 2 tablets (1,000 mg) by mouth 2 times daily (with meals) 360 tablet 0     methocarbamol (ROBAXIN) 500 MG tablet every evening        nortriptyline (PAMELOR) 50 MG capsule Take 50 mg by mouth At Bedtime        ondansetron (ZOFRAN) 4 MG tablet every 6 hours as needed        pantoprazole (PROTONIX) 40 MG EC tablet Take 1 tablet (40 mg) by mouth daily (Patient taking differently: Take 40 mg by mouth every morning ) 90 tablet 3     rizatriptan (MAXALT) 10 MG tablet Take 1 tablet (10 mg) by mouth at onset of headache for migraine May repeat in 2 hours. 18 tablet 1     tolterodine ER (DETROL LA) 4 MG 24 hr capsule every evening        topiramate (TOPAMAX) 100 MG tablet Take 1 tablet (100 mg) by mouth 2 times daily 180 tablet 3     verapamil ER (VERELAN) 120 MG 24 hr capsule Take 1 capsule (120 mg) by mouth At Bedtime 90 capsule 0     vitamin D3 (CHOLECALCIFEROL) 125 MCG (5000 UT) tablet Take 5,000 Units by mouth every morning        bismuth subsalicylate (PEPTO BISMOL) 262 MG chewable tablet Take 2 tablets (524 mg) by mouth 4 times daily (before meals and nightly) (Patient not taking: Reported on 1/14/2022) 112 tablet 0     doxycycline hyclate (VIBRA-TABS) 100 MG tablet Take 1 tablet (100 mg) by mouth 2 times daily (Patient not taking: Reported on 1/14/2022) 28 tablet 0     metroNIDAZOLE (FLAGYL) 250 MG tablet Take 1 tablet (250 mg) by mouth 4 times daily (Patient not taking: Reported on 1/14/2022) 56 tablet 0     pantoprazole (PROTONIX) 40 MG EC tablet Take 1 tablet (40 mg) by mouth 2 times daily (Patient not taking: Reported on 1/14/2022) 28 tablet 0     No facility-administered encounter medications on file as of 1/14/2022.        Allergies   Allergen Reactions     Hydrocodone Other (See Comments)     Headache per pt and hallucinations  Headache per pt and hallucinations        Reglan [Metoclopramide]      Anxiety     Vicodin [Hydrocodone-Acetaminophen] Other (See Comments)     Hallucinations     Silver Nitrate Itching and Rash     Only issues if in for a long time  Only issues if in for a long time  Only issues if in for a long time       Tegaderm Transparent Dressing (Informational Only) Rash        Review of systems:  A full 10 point review of systems was obtained and was negative except for the pertinent positives and negatives stated within the HPI.    Objective Findings:   Physical Exam:    Constitutional: LMP 05/23/2016   General: Alert, cooperative, no distress, well-appearing  Head: Atraumatic, normocephalic, no obvious abnormalities   Eyes: EOMI, Sclera anicteric, no obvious conjunctival hemorrhage   Nose: Nares normal, no obvious malformation, no obvious rhinorrhea   Respiratory: normal appearing respiration, no obvious cough  Musculoskeletal: Range of motion intact, no obvious strength deficit  Skin: No jaundice, no obvious rash  Neurologic: AAOx3, no obvious neurologic abnormality  Psychiatric: Normal Affect, appropriate mood  Extremities: No obvious edema, no obvious malformation     Labs, Radiology, Pathology     Lab Results   Component Value Date    WBC 9.3 10/26/2020    WBC 8.5 04/21/2020    WBC 10.1 02/20/2019    HGB 13.0 10/26/2020    HGB 12.3 04/21/2020    HGB 10.5 (L) 03/05/2019     10/26/2020     04/21/2020     03/04/2019    CHOL 200 (H) 02/17/2021    CHOL 221 (H) 11/04/2020    CHOL 267 (H) 02/21/2020    TRIG 109 02/17/2021    TRIG 104 11/04/2020    TRIG 103 02/21/2020    HDL 50 02/17/2021    HDL 58 11/04/2020    HDL 53 02/21/2020    ALT 19 11/04/2020    ALT 20 10/26/2020    ALT 15 07/27/2020    AST 19 11/04/2020    AST 15 10/26/2020    AST 11 07/27/2020     05/05/2021     02/17/2021     11/04/2020    BUN 8 05/05/2021    BUN 8 02/17/2021    BUN 10 11/04/2020    CO2 27 05/05/2021    CO2 22 02/17/2021    CO2 22 11/04/2020    TSH  0.87 04/21/2020        Liver Function Studies -   Recent Labs   Lab Test 11/04/20  0827 08/14/13  1420   PROTTOTAL 7.3 6.8   ALBUMIN 3.8 3.5   BILITOTAL 0.3 0.4   ALKPHOS 91  --    AST 19  --    ALT 19  --    BILIDIRECT  --  0.1          Patient Active Problem List    Diagnosis Date Noted     Hyperglycemia 01/15/2022     Priority: Medium     Morbid obesity (H) 12/21/2021     Priority: Medium     Unintentional weight loss 09/11/2021     Priority: Medium     Rectal bleeding 09/11/2021     Priority: Medium     Flatulence, eructation and gas pain 09/11/2021     Priority: Medium     Diabetes mellitus, type 2 (H) 07/15/2021     Priority: Medium     Chronic nausea 07/13/2021     Priority: Medium     Chronic vomiting 07/13/2021     Priority: Medium     Obesity 01/02/2013     Priority: Medium     Problem list name updated by automated process. Provider to review       Other abnormal Papanicolaou smear of cervix and cervical HPV(795.09) 01/02/2013     Priority: Medium      Assessment and Plan   Assessment:    Ruth Vanegas is a 36 year old female with HLD, uncontrolled DM2, anxiety, frequent yeast infection, migraine who is presenting as a follow up patient in consultation at the request of Dr. Banks with a chief complaint of nausea and vomiting.    The patient was seen in video telemedicine consultation regarding her symptoms of nausea and vomiting.     She has completed her treatment for H pylori and will continue to monitor her symptoms of nausea as they have improved somewhat to date. She will need to complete stool antigen testing no sooner than 4 weeks after completion of therapy. She will need to stop her PPI 2 weeks prior to stool antigen testing. I have encouraged that she try a gastroparesis as this may improve her symptoms.     Additionally, she will need to continue to work on her hyperglycemia which is likely contributing to her symptoms.       1. Chronic nausea    2. Chronic vomiting    3. Morbid obesity (H)     4. Hyperglycemia       No orders of the defined types were placed in this encounter.     Plan:  1. Please schedule your stool antigen testing to ensure eradication of your H pylori. This should be no sooner than 4 weeks after completion of your treatment. You must discontinue your PPI 2 weeks prior to the stool antigen testing. You may wish to time this so it is happening several weeks after undergoing your upcoming surgical procedure.  2. Please consider a gastroparesis diet. You do not have gastroparesis based on the gastric emptying study however the diet may be beneficial.   [email or web address]  3. Monitor your symptoms    Follow up plan:   Return to clinic 3 months and as needed.    The risks and benefits of my recommendations, as well as other treatment options were discussed with the patient and any available family today. All questions were answered.     o Follow up: As planned above. Today, I personally spent 24 minutes in direct face to face time with the patient, of which greater than 50% of the time was spent in patient education and counseling as described above. Approximately 11 minutes were spent on indirect care associated with the patient's consultation including but not limited to review of: patient medical records to date, clinic visits, hospital records, lab results, imaging studies, procedural documentation, and coordinating care with other providers. The findings from this review are summarized in the above note. A total of 35 minutes was spent on the day of the encounter.     The patient verbalized understanding of the plan and was appreciative for the time spent and information provided during the office visit.     Author:     Rene Lehman DO   of Medicine  Program Head, Esophageal Disorders Program  Division of Gastroenterology, Hepatology, and Nutrition  [facility]  [place]    Documentation assisted by voice recognition and documentation system.

## 2022-01-14 NOTE — PATIENT INSTRUCTIONS
It was a pleasure taking care of you today.  I've included a brief summary of our discussion and care plan from today's visit below.  Please review this information with your primary care provider.  _______________________________________________________________________    My recommendations are summarized as follows:    1. Please schedule your stool antigen testing to ensure eradication of your H pylori. This should be no sooner than 4 weeks after completion of your treatment. You must discontinue your PPI 2 weeks prior to the stool antigen testing. You may wish to time this so it is happening several weeks after undergoing your upcoming surgical procedure.  2. Please consider a gastroparesis diet. You do not have gastroparesis based on the gastric emptying study however the diet may be beneficial.   [email or web address]  3. Monitor your symptoms      To schedule endoscopic procedures you may call: [phone]  To schedule radiology tests you may call: [phone]  To schedule an ENT appointment you may call: [phone]    Please call my nurse Irene ([phone]), Malik ([phone]) with any questions or concerns.  If you were seen through the [facility] please feel free to reach out to Aysha at [phone]   --    Return to GI Clinic in 3 months to review your progress.    _______________________________________________________________________    Who do I call with any questions after my visit?  Please be in touch if there are any further questions that arise following today's visit.  There are multiple ways to contact your gastroenterology care team.        During business hours, you may reach a Gastroenterology nurse at [phone] and choose option 3.         To schedule or reschedule an appointment, please call [phone].       You can always send a secure message through [employer].  [employer] messages are  answered by your nurse or doctor typically within 24 hours.  Please allow extra time on weekends and holidays.        For urgent/emergent questions after business hours, you may reach the on-call GI Fellow by contacting the [facility]  at [phone].     How will I get the results of any tests ordered?    You will receive all of your results.  If you have signed up for [employer], any tests ordered at your visit will be available to you after your physician reviews them.  Typically this takes 1-2 weeks.  If there are urgent results that require a change in your care plan, your physician or nurse will call you to discuss the next steps.      What is [employer]?  [employer] is a secure way for you to access all of your healthcare records from the [facility].  It is a web based computer program, so you can sign on to it from any location.  It also allows you to send secure messages to your care team.  I recommend signing up for [employer] access if you have not already done so and are comfortable with using a computer.      How to I schedule a follow-up visit?  If you did not schedule a follow-up visit today, please call [phone] to schedule a follow-up office visit.        Sincerely,    Rene Lehman DO   of Medicine  Program Head, Esophageal Disorders Program  Division of Gastroenterology, Hepatology, and Nutrition  [facility]       Yes

## 2022-01-15 ENCOUNTER — NURSE TRIAGE (OUTPATIENT)
Dept: NURSING | Facility: CLINIC | Age: 37
End: 2022-01-15
Payer: COMMERCIAL

## 2022-01-15 PROBLEM — R73.9 HYPERGLYCEMIA: Status: ACTIVE | Noted: 2022-01-15

## 2022-01-16 NOTE — TELEPHONE ENCOUNTER
S-(situation): Chest pain on left side for aprox. 30 minutes ago. The pain while inhaling is piercing and then when she exhales feels radiating to the back . Pt was resting when this started,    B-(background): Chest pain while resting.    A-(assessment): Chest pain, no shortness of breath, hx of diabetes.    R-(recommendations): Per protocol, 911 now. Pt asked if her daughter could  her, to which the RN recommended that pt call 911.      COVID 19 Nurse Triage Plan/Patient Instructions    Please be aware that novel coronavirus (COVID-19) may be circulating in the community. If you develop symptoms such as fever, cough, or SOB or if you have concerns about the presence of another infection including coronavirus (COVID-19), please contact your health care provider or visit [email or web address].     Disposition/Instructions    Call to EMS/911 recommended. Follow protocol based instructions.     Bring Your Own Device:  Please also bring your smart device(s) (smart phones, tablets, laptops) and their charging cables for your personal use and to communicate with your care team during your visit.    Thank you for taking steps to prevent the spread of this virus.  o Limit your contact with others.  o Wear a simple mask to cover your cough.  o Wash your hands well and often.    Resources    [facility]: About COVID-19: [email or web address]    CDC: What to Do If You're Sick: www.cdc.gov/coronavirus/2019-ncov/about/steps-when-sick.html    CDC: Ending Home Isolation: www.cdc.gov/coronavirus/2019-ncov/hcp/disposition-in-home-patients.html     CDC: Caring for Someone: www.cdc.gov/coronavirus/2019-ncov/if-you-are-sick/care-for-someone.html     [facility]: Interim Guidance for Hospital Discharge to Home: www.health.[facility].[place]/diseases/coronavirus/hcp/hospdischarge.pdf    [facility] clinical trials (COVID-19 research studies): clinicalaffairs.[facility]/n-clinical-trials     Below are the  COVID-19 hotlines at the [place] Department of Health ([facility]). Interpreters are available.   o For health questions: Call 764-006-6384 or 1-858.313.5474 (7 a.m. to 7 p.m.)  o For questions about schools and childcare: Call 953-588-1744 or 1-278.613.2288 (7 a.m. to 7 p.m.)                     Reason for Disposition    [1] Chest pain lasts > 5 minutes AND [2] age > 30 AND [3] at least one cardiac risk factor (i.e., hypertension, diabetes, obesity, smoker or strong family history of heart disease)    Additional Information    Negative: Severe difficulty breathing (e.g., struggling for each breath, speaks in single words)    Negative: Difficult to awaken or acting confused (e.g., disoriented, slurred speech)    Negative: Shock suspected (e.g., cold/pale/clammy skin, too weak to stand, low BP, rapid pulse)    Negative: [1] Chest pain lasts > 5 minutes AND [2] history of heart disease  (i.e., heart attack, bypass surgery, angina, angioplasty, CHF; not just a heart murmur)    Negative: [1] Chest pain lasts > 5 minutes AND [2] described as crushing, pressure-like, or heavy    Negative: [1] Chest pain lasts > 5 minutes AND [2] age > 50    Protocols used: CHEST PAIN-A-    JIMMY TAVARES RN on 1/15/2022 at 10:16 PM

## 2022-01-18 DIAGNOSIS — Z76.0 ENCOUNTER FOR MEDICATION REFILL: Primary | ICD-10-CM

## 2022-01-18 DIAGNOSIS — F43.10 PTSD (POST-TRAUMATIC STRESS DISORDER): ICD-10-CM

## 2022-01-18 DIAGNOSIS — F41.9 ANXIETY: ICD-10-CM

## 2022-01-18 DIAGNOSIS — G47.00 INSOMNIA, UNSPECIFIED TYPE: ICD-10-CM

## 2022-01-18 RX ORDER — MELATONIN 10 MG
1 CAPSULE ORAL
Qty: 30 CAPSULE | Refills: 1 | Status: SHIPPED | OUTPATIENT
Start: 2022-01-18 | End: 2022-04-12

## 2022-02-10 ENCOUNTER — TRANSFERRED RECORDS (OUTPATIENT)
Dept: HEALTH INFORMATION MANAGEMENT | Facility: CLINIC | Age: 37
End: 2022-02-10
Payer: COMMERCIAL

## 2022-02-22 DIAGNOSIS — Z76.0 ENCOUNTER FOR MEDICATION REFILL: Primary | ICD-10-CM

## 2022-02-28 ENCOUNTER — PATIENT OUTREACH (OUTPATIENT)
Dept: NURSING | Facility: CLINIC | Age: 37
End: 2022-02-28
Payer: COMMERCIAL

## 2022-02-28 ENCOUNTER — LAB (OUTPATIENT)
Dept: LAB | Facility: CLINIC | Age: 37
End: 2022-02-28
Payer: COMMERCIAL

## 2022-02-28 DIAGNOSIS — E11.9 DIABETES MELLITUS, TYPE 2 (H): ICD-10-CM

## 2022-02-28 DIAGNOSIS — A04.8 H. PYLORI INFECTION: ICD-10-CM

## 2022-02-28 DIAGNOSIS — E66.01 CLASS 3 SEVERE OBESITY DUE TO EXCESS CALORIES WITH SERIOUS COMORBIDITY AND BODY MASS INDEX (BMI) OF 45.0 TO 49.9 IN ADULT (H): ICD-10-CM

## 2022-02-28 DIAGNOSIS — G43.719 INTRACTABLE CHRONIC MIGRAINE WITHOUT AURA AND WITHOUT STATUS MIGRAINOSUS: ICD-10-CM

## 2022-02-28 DIAGNOSIS — F51.01 PRIMARY INSOMNIA: ICD-10-CM

## 2022-02-28 DIAGNOSIS — E66.813 CLASS 3 SEVERE OBESITY DUE TO EXCESS CALORIES WITH SERIOUS COMORBIDITY AND BODY MASS INDEX (BMI) OF 45.0 TO 49.9 IN ADULT (H): ICD-10-CM

## 2022-02-28 LAB
ANION GAP SERPL CALCULATED.3IONS-SCNC: 9 MMOL/L (ref 3–14)
BUN SERPL-MCNC: 9 MG/DL (ref 7–30)
CALCIUM SERPL-MCNC: 9.5 MG/DL (ref 8.5–10.1)
CHLORIDE BLD-SCNC: 106 MMOL/L (ref 94–109)
CHOLEST SERPL-MCNC: 210 MG/DL
CO2 SERPL-SCNC: 26 MMOL/L (ref 20–32)
CREAT SERPL-MCNC: 0.59 MG/DL (ref 0.52–1.04)
CREAT UR-MCNC: 176 MG/DL
ERYTHROCYTE [DISTWIDTH] IN BLOOD BY AUTOMATED COUNT: 13.8 % (ref 10–15)
FASTING STATUS PATIENT QL REPORTED: NO
FERRITIN SERPL-MCNC: 93 NG/ML (ref 12–150)
GFR SERPL CREATININE-BSD FRML MDRD: >90 ML/MIN/1.73M2
GLUCOSE BLD-MCNC: 175 MG/DL (ref 70–99)
HBA1C MFR BLD: 7.2 % (ref 0–5.6)
HCT VFR BLD AUTO: 43.4 % (ref 35–47)
HDLC SERPL-MCNC: 58 MG/DL
HGB BLD-MCNC: 13.7 G/DL (ref 11.7–15.7)
LDLC SERPL CALC-MCNC: 135 MG/DL
MCH RBC QN AUTO: 27.7 PG (ref 26.5–33)
MCHC RBC AUTO-ENTMCNC: 31.6 G/DL (ref 31.5–36.5)
MCV RBC AUTO: 88 FL (ref 78–100)
MICROALBUMIN UR-MCNC: 15 MG/L
MICROALBUMIN/CREAT UR: 8.52 MG/G CR (ref 0–25)
NONHDLC SERPL-MCNC: 152 MG/DL
PLATELET # BLD AUTO: 296 10E3/UL (ref 150–450)
POTASSIUM BLD-SCNC: 3.9 MMOL/L (ref 3.4–5.3)
RBC # BLD AUTO: 4.94 10E6/UL (ref 3.8–5.2)
SODIUM SERPL-SCNC: 141 MMOL/L (ref 133–144)
TRIGL SERPL-MCNC: 84 MG/DL
TSH SERPL DL<=0.005 MIU/L-ACNC: 1.71 MU/L (ref 0.4–4)
VIT B12 SERPL-MCNC: 437 PG/ML (ref 193–986)
WBC # BLD AUTO: 9.4 10E3/UL (ref 4–11)

## 2022-02-28 PROCEDURE — 80048 BASIC METABOLIC PNL TOTAL CA: CPT | Performed by: PATHOLOGY

## 2022-02-28 PROCEDURE — 86341 ISLET CELL ANTIBODY: CPT | Mod: 90 | Performed by: PATHOLOGY

## 2022-02-28 PROCEDURE — 84443 ASSAY THYROID STIM HORMONE: CPT | Performed by: PATHOLOGY

## 2022-02-28 PROCEDURE — 84681 ASSAY OF C-PEPTIDE: CPT | Mod: 90 | Performed by: PATHOLOGY

## 2022-02-28 PROCEDURE — 80061 LIPID PANEL: CPT | Performed by: PATHOLOGY

## 2022-02-28 PROCEDURE — 36415 COLL VENOUS BLD VENIPUNCTURE: CPT | Performed by: PATHOLOGY

## 2022-02-28 PROCEDURE — 82728 ASSAY OF FERRITIN: CPT | Performed by: PATHOLOGY

## 2022-02-28 PROCEDURE — 83036 HEMOGLOBIN GLYCOSYLATED A1C: CPT | Performed by: PATHOLOGY

## 2022-02-28 PROCEDURE — 99000 SPECIMEN HANDLING OFFICE-LAB: CPT | Performed by: PATHOLOGY

## 2022-02-28 PROCEDURE — 82043 UR ALBUMIN QUANTITATIVE: CPT | Performed by: PATHOLOGY

## 2022-02-28 PROCEDURE — 82607 VITAMIN B-12: CPT | Performed by: PATHOLOGY

## 2022-02-28 PROCEDURE — 85027 COMPLETE CBC AUTOMATED: CPT | Performed by: PATHOLOGY

## 2022-02-28 NOTE — PROGRESS NOTES
2/28/2022  Clinic Care Coordination Contact    Community Health Worker Follow Up    Care Gaps:     Health Maintenance Due   Topic Date Due     ADVANCE CARE PLANNING  Never done     PREVENTIVE CARE VISIT  01/22/2020     INFLUENZA VACCINE (1) 09/01/2021     EYE EXAM  10/07/2021     PHQ-9  10/20/2021     MICROALBUMIN  11/04/2021     LIPID  02/17/2022       Care Gaps Last addressed on russell discuss with PCP at next office visit    Intervention and Education during outreach:     Maintenance 2 months follow up call.    Called and spoke to patient.  Discussed with patient if she has any new goals or other needs before graduating from [employer].    Patient stated she does have new other goals or needs at this time.  She is following with Endocrinologist and Gastroenterologist.     Patient okay to graduate from [employer].  Patient has [facility] contact information.   She call [facility] if she needs help in the future.    Patient has completed all goals with Clinic Care Coordination.     Patient has completed all goals with Clinic Care Coordination.    Routed to CC RN to review the chart and confirm if graduation is approved

## 2022-02-28 NOTE — PROGRESS NOTES
2/28/2022  Clinic Care Coordination Contact  Patient has completed all goals with Clinic Care Coordination.  Please review the chart and confirm if graduation is approved.

## 2022-03-01 ENCOUNTER — PATIENT OUTREACH (OUTPATIENT)
Dept: CARE COORDINATION | Facility: CLINIC | Age: 37
End: 2022-03-01
Payer: COMMERCIAL

## 2022-03-01 LAB — C PEPTIDE SERPL-MCNC: 5.1 NG/ML (ref 0.9–6.9)

## 2022-03-01 NOTE — LETTER
[facility] CARE COORDINATION     March 1, 2022    Ruth Vanegas  [address]  [place] [address]    Dear Ruth,  Your Care Team congratulates you on your journey to maintain wellness. This document will help guide you on your journey to maintain a healthy lifestyle.  You can use this to help you overcome any barriers you may encounter.  If you should have any questions or concerns, you can contact the members of your Care Team or contact your Primary Care Clinic for assistance.     Health Maintenance  Health Maintenance Reviewed:      My Access Plan  Medical Emergency 911   Primary Clinic Line   -     24 Hour Appointment Line [phone] or  [phone]) (toll-free)   24 Hour Nurse Line [phone] (toll-free)   Preferred Urgent Care     Preferred Hospital     Preferred Pharmacy [id number] - [place] [[place]], [address]     Behavioral Health Crisis Line The National Suicide Prevention Lifeline at 1-803.246.8514 or 911     My Care Team Members  Patient Care Team       Relationship Specialty Notifications Start End    Milton Banks MD PCP - General   11/1/16     Phone: [phone] Fax: [phone]         [address] [place] [address]    Zia Farmer MD  OB/Gyn  11/6/15     Phone: [phone] Fax: [phone]         [facility] [address] [place] [address]    Randall Allison, PharmD Pharmacist Pharmacist  5/20/20     Phone: [phone]          [facility] [address] [place] [address]    Milton Banks MD Assigned PCP   6/16/21     Phone: [phone] Fax: [phone]         [address] [place] [address]    Puja Prescott MD Resident Student in organized health care education/training program  8/18/21     Phone: [phone] Fax: [phone]         [address] [place] [address]    Rene Lehman DO Assigned Gastroenterology Provider   9/19/21     Phone:  [phone] Fax: [phone]         [address] [facility] [address]    Roberto Bolanos MD Assigned Neuroscience Provider   10/31/21     Phone: [phone] Fax: [phone]         [address] [address] [facility] [address]    Lashae Briones, PhD LP Assigned Behavioral Health Provider   11/21/21     Phone: [phone] Fax: [phone]         [address] [facility] [address]    Sandra Branch PA-C Assigned Surgical Provider   11/21/21     Phone: [phone] Fax: [phone]         [address] [facility] 4TH Floor [facility] [address]    Yari Locke [facility] Pharmacist Pharmacist Ambulatory Care  12/17/21     Phone: [phone]          [facility] [address] [facility] [address]              Goals        Patient Stated       COMPLETED: Medical (pt-stated)       Goal Statement: I have connected and established care and support from new therapist.   Date Goal set: 6.11.21 Updated 8-10-21   Barriers: previous provider no longer at [facility]   Strengths: pt engagement with care team   Date to Achieve By. Completed 9-21-21   Patient expressed understanding of goal: yes   Personal Plan:    I will continue to follow up with Dr. Hurt at [facility] as scheduled.            COMPLETED: Medical (pt-stated)       Goal Statement: I will have a better understanding and plan of care for Gastro within 3-4 months  Date Goal set: 10/12/21    Barriers: access  Strengths: pt engagement  Date to Achieve By:December    Patient expressed understanding of goal: yes  Action steps to achieve this goal:  1. I will attend pre -Procedure Covid test  on 12-10-21 at 8am  2. I will attend upper and lower endoscopy appt on 12-14-21 with Dr Stapleton.  3 I will talk to CC RN on 12-28-21 at 10 am follow up on endoscopy and neuro  4 I will report to my Community Health Worker if any additional resources or support needed.    Updated: 12-2-21 KIM Bravo           COMPLETED: Other  (pt-stated)       Goal Statement: I would like to have a plan of care and the support with new endocrine team at the [facility] within 2 months   Date Goal set: 2.22.21 update 03/31/21 Updated 9-21-21, 10/12/21    Barriers: access   Strengths: pt engagement   Date to Achieve By: December  Patient expressed understanding of goal: yes   Action steps to achieve this goal:   1. I will attend appt tomorrow 10-28-21 at 7:30am with new Endocrinologist JORGE L Cota at [facility]  2.  I will update CCC RN on 11-2-21 at 11am am for support and plan of care and if any additional resources or support needed.          11/03/21  Attended visit - pt was staisfied with visit - met with fellow and MD- compete goal               It has been your Clinic Care Team's pleasure to work with you on your goals.    Regards,  Your Clinic Care Team

## 2022-03-01 NOTE — PROGRESS NOTES
Clinic Care Coordination Contact    Assessment: Care Coordinator contacted patient for 2 month follow up.  Patient has continued to follow the plan of care and assessment is negative for any new needs or concerns.    Enrollment status: Graduated.      Plan: No further outreaches at this time.  Patient will continue to follow the plan of care.  If new needs arise a new Care Coordination referral may be placed.  FYI to PCP

## 2022-03-04 LAB — GAD65 AB SER IA-ACNC: <5 IU/ML

## 2022-03-04 NOTE — PROGRESS NOTES
Outcome for 03/04/22 11:10 AM: Armida emailed to provider Beverly Andrews   Virtual Visit Facilitator      Diabetes Clinic Follow Up     Ruth Vanegas MRN:[id number] YOB: 1985  Primary care provider: Milton Banks  March 8, 2022      Reason for Endocrine consult: Uncontrolled type 2 DM    HPI:  Ruth Vanegas is a 35 year old female with PMHx of type 2 DM, obesity, chronic migraine, intractable nausea and vomitting presents to virtual clinic for follow-up of type 2 DM.  She established care in our clinic with  in November 2021.    At the time she reported:   She was diagnosed in 2016  Has tried bydureon and ozempic in the past and had nausea and vomiting.   She was taken off glipizide and had her lantus increased.  Taking lantus 24 U daily, Metformin 1000 mg BID, invokana 300 mg daily.  On topiramate 50 mg in AM and 50 mg in PM for her headaches   Intractable vomiting, following up with GI scheduled for EGD 12/14/2021, had a 9/23/2021- normal gastric emptying study.    Ruth also reported a 20 pound weight loss in the last 5 months as well as numbness in fingertips and tingling in her feet, as well as poor appetite for the prior year due to nausea and vomiting.    Ruth was having some early morning hypoglycemia, and advised she slightly reduced her Lantus to just 22 units daily and check morning blood sugars with a fingerstick to see if these lows were real or artifact.       advised f/u with neurology, lab work and that she continue her meds and check low BG with fingerstick to see if real.      Interim:  He presented to [place] emergency room on January 15 with chest pain and had a negative chest CT. negative MI evaluation.  Was sent home on Tylenol and ibuprofen.  The day prior she had seen gastroenterology for follow-up of her treatment of H. Pylori.  They assured her that she does not have gastroparesis based on gastric emptying  study, but advised that the diet may be helpful to her.  They recommended stool testing to ensure eradication of H. pylori in late February.  She also saw neurology in December and reported increasing frequency of headaches on 100 mg of Topamax twice daily and lack of sleep even with 50 mg of nortriptyline at night.  Reported taking Maxalt most days, working to lose weight and having her diabetes under good control.  She was advised to continue Topamax for preventive therapy in addition to wrap verapamil at bedtime to get lab work and keep a headache diary, and have follow-up in February of this year.      Today:  Feels diabetes management going well.  Having any low blood sugars and feels that numbers overall are good.  She is using a timer to remind her to scan her blood sugar every 5 hours.  Not seeing any real lows at all.  Notes does need to reschedule follow-up with neurology.  [facility] [place].  He saw some early signd of DMR but not requiring intervention.  She did request that they send us a copy of the exam notes and findings.  She continues to have difficulty with headaches not getting worse, but is noticing that the room is spinning at times if she closed her eyes she feels like she is in motion and this worries her.  Her appetite continues to be limited she has nausea and she is not very confident that she is getting a nutritious diet.  She notes that her blood sugar at times rises even without eating more coffee in the mornings.  She was previously taking a multivitamin but ran out and has not been taking them for some time.    Diet: Variable due to nausea and lack of appetite.    Exercise: No exercise formally, walks at work, but has had a knee injury from an accident so limited by that.    Diabetes complications include:  Retinopathy: No DMR noted by her optometrist who she saw last month at [facility] [place] [facility].  Nephropathy: creat: 0.66 in 5/2021, needs a micro  alb/creat ratio  Neuropathy: has numbness and tingling in hands and feet - on gabapentin  LDL: 128 in 2020  Smoking:none  BP: well controlled    Works at [employer] as a [employer], single, lives with her kids, has 2 kids, 14 year and 17 years, non smoker, no alcohol consumption ,no illicit drug use . Had a hysterectomy in 2019    Family history: grandmother on mothers side with diabetes    Diabetes medications:  Metformin 1000 mg twice daily  Invokana 300 mg each morning  Lantus 24 units daily.    She also is taking Topamax 100 mg twice daily for headache prevention.      CGM S:        ROS:  All 12 systems were reviewed and negative except as mentioned in HPI    Past Medical/Surgical History:  Past Medical History:   Diagnosis Date     Anemia     told to take iron pills when pregnant     Depression      Depressive disorder      Diabetes mellitus (H)      Diabetes mellitus, type 2 (H) 07/15/2021     Dysthymic disorder      Endometriosis      Herpes genitalia      Hyperlipidemia      Kidney stone      Menorrhagia      Migraines      Neuropathy      Other abnormal Papanicolaou smear of cervix and cervical HPV(795.09) 2013     PCOS (polycystic ovarian syndrome)      Post traumatic stress disorder (PTSD)      Past Surgical History:   Procedure Laterality Date     BLADDER REPAIR W/  SECTION      X2     C/SECTION, CLASSICAL      x2     COLONOSCOPY N/A 2021    Procedure: COLONOSCOPY;  Surgeon: Rene Lehman DO;  Location:  GI     DILATION AND CURETTAGE  2015     DILATION AND CURETTAGE  2015     DILATION AND CURETTAGE, OPERATIVE HYSTEROSCOPY, COMBINED N/A 2015    Procedure: Dilation and Curettage with Hysteroscopy;  Surgeon: Zia Farmer MD;  Location: [facility];  Service:      DILATION AND CURETTAGE, OPERATIVE HYSTEROSCOPY, COMBINED N/A 2016    Procedure: DILATION AND CURETTAGE WITH HYSTEROSCOPY INSERT MIRENA;  Surgeon: Suzanne Major MD;  Location: [facility];  Service:       ENT SURGERY       ESOPHAGOSCOPY, GASTROSCOPY, DUODENOSCOPY (EGD), COMBINED N/A 12/14/2021    Procedure: ESOPHAGOGASTRODUODENOSCOPY, WITH BIOPSY;  Surgeon: Rene Lehman DO;  Location:  GI     GYN SURGERY  10/19/15    laproscopic lysis of adhesions     HYSTEROSCOPY, ABLATE ENDOMETRIUM HYDROTHERMAL, COMBINED N/A 3/2/2018    Procedure: HYSTEROSCOPY, DILATION AND CURETTAGE, ENDOMETRIAL ABLATION;  Surgeon: Kristy Israel DO;  Location: [facility];  Service: Gynecology     LAPAROSCOPIC HYSTERECTOMY TOTAL N/A 3/4/2019    Procedure: ROBOTIC TOTAL LAPAROSCOPIC HYSTERECTOMY, BILATERAL SALPINGECTOMY, LEFT OVARIAN CYSTOTOMY. CYSTOSCOPY;  Surgeon: Zia Farmer MD;  Location: [facility];  Service: Gynecology     LAPAROSCOPY DIAGNOSTIC (GENERAL) N/A 10/19/2015    Procedure:  LAPAROSCOPY,LYSIS OF ADHESIONS;  Surgeon: Zia Farmer MD;  Location: [facility];  Service:      PA LAP,TUBAL CAUTERY Bilateral 3/2/2018    Procedure: LAPAROSCOPIC BILATERAL TUBAL LIGATION;  Surgeon: Kristy Israel DO;  Location: [facility];  Service: Gynecology     TONSILLECTOMY         Allergies:  Allergies   Allergen Reactions     Hydrocodone Other (See Comments)     Headache per pt and hallucinations  Headache per pt and hallucinations       Reglan [Metoclopramide]      Anxiety     Vicodin [Hydrocodone-Acetaminophen] Other (See Comments)     Hallucinations     Silver Nitrate Itching and Rash     Only issues if in for a long time  Only issues if in for a long time  Only issues if in for a long time       Tegaderm Transparent Dressing (Informational Only) Rash       Current Outpatient Medications   Medication     acetaminophen (TYLENOL) 500 MG tablet     atorvastatin (LIPITOR) 10 MG tablet     canagliflozin (INVOKANA) 300 MG tablet     Continuous Blood Gluc  (FREESTYLE CINDY 14 DAY READER) JAUN     Continuous Blood Gluc Sensor (FREESTYLE CINDY 14 DAY SENSOR) MISC     Continuous Blood Gluc Sensor  (FREESTYLE CINDY 14 DAY SENSOR) MISC     diazepam (VALIUM) 5 MG tablet     fluconazole (DIFLUCAN) 150 MG tablet     gabapentin (NEURONTIN) 300 MG capsule     ibuprofen (ADVIL/MOTRIN) 600 MG tablet     insulin glargine (LANTUS SOLOSTAR) 100 UNIT/ML pen     Melatonin 10 MG CAPS     metFORMIN (GLUCOPHAGE-XR) 500 MG 24 hr tablet     methocarbamol (ROBAXIN) 500 MG tablet     nortriptyline (PAMELOR) 50 MG capsule     ondansetron (ZOFRAN) 4 MG tablet     pantoprazole (PROTONIX) 40 MG EC tablet     rizatriptan (MAXALT) 10 MG tablet     tolterodine ER (DETROL LA) 4 MG 24 hr capsule     topiramate (TOPAMAX) 100 MG tablet     verapamil ER (VERELAN) 120 MG 24 hr capsule     vitamin D3 (CHOLECALCIFEROL) 125 MCG (5000 UT) tablet     No current facility-administered medications for this visit.       Family History:  Family History   Adopted: Yes   Problem Relation Age of Onset     Prostate Cancer Father      Cancer Father         prostate     Alcoholism Sister      Alcoholism Sister      Alcoholism Brother      Alcoholism Brother      Diabetes Paternal Grandmother      Other Cancer Paternal Grandmother      Alcoholism Daughter      Coronary Artery Disease No family hx of      Urolithiasis No family hx of      Clotting Disorder No family hx of      Gout No family hx of      Heart Disease No family hx of      Anesthesia Reaction No family hx of        Social History:  Social History     Tobacco Use     Smoking status: Never Smoker     Smokeless tobacco: Never Used   Substance Use Topics     Alcohol use: No     Alcohol/week: 0.0 standard drinks         Physical examination:    Wt Readings from Last 10 Encounters:   12/21/21 132.9 kg (293 lb)   12/14/21 132.6 kg (292 lb 5.3 oz)   10/28/21 137 kg (302 lb)   10/05/21 137.4 kg (303 lb)   05/11/21 145.7 kg (321 lb 4.8 oz)   02/17/21 143.8 kg (317 lb)   11/04/20 142.8 kg (314 lb 12 oz)   04/21/20 145.2 kg (320 lb)   02/21/20 141.1 kg (311 lb 0.4 oz)   10/16/19 147.4 kg (324 lb 14.4 oz)  "      General appearance: Pleasant articulate well-appearing female, who moves easily carrying her laptop from one room to the other once are video connected.  HEENT: PEERLA.  Cranial nerves II through XII grossly intact.  Voice and articulation are clear.  Lungs: Easy unlabored respirations.  No cough or dyspnea appreciated.  Heart: No jugular venous distention appreciated  Neurological: conscious and oriented. Speech: normal.    Psychiatric: Mood is \"good\" affect is appropriate.  Thought form and content are fluid and coherent.  Skin appears moist without any lesions noted.  Normal hair distribution.    Endocrine Labs:      Recent Labs   Lab Test 02/28/22  1729 02/28/22  1720 10/28/21  0900 05/05/21  0745 02/17/21  0855 07/27/20  0855 04/21/20  1427   A1C  --  7.2*  --  8.5* 8.8*   < > 10.5*   HEMOGLOBINA1  --   --  7.9  --   --   --   --    TSH  --  1.71  --   --   --   --  0.87   LDL  --  135*  --   --  128   < >  --    HDL  --  58  --   --  50   < >  --    TRIG  --  84  --   --  109   < >  --    CR  --  0.59  --  0.66 0.60   < > 0.57*   MICROL 15  --   --   --   --   --   --     < > = values in this interval not displayed.     PREETHI Ab, C-peptide, CBC, BMP, micro alb/creat ratio I reviewed with Ruth-all are within normal limits.     Assessment and Plan:   Ruth Vanegas is a 36 year old female with PMHx of type 2 DM, obesity, chronic migraine, intractable nausea and vomitting presents to clinic for management of type 2 DM .    Her type 2 diabetes is well controlled with a recent A1c of 7.2 and CGM S indicating an average blood sugar of 148.  She is however having ongoing headaches nausea poor appetite and has had a 27 pound unintentional weight loss in the last year and a half though of course we must note she is on Topamax for her headaches which could could be contributing to decreased appetite and weight loss.       Plan:  Continue with metformin 1000 mg twice daily  Continue with invokana 300 mg " daily  Continue Lantus 24 units     Her recent lab work does confirm her type II diagnosis and raises no red flags.  She will continue to work with neurology to address her headaches and I urged him to discuss her more recent onset of vertigo with them.    She will see nutrition to assure she is getting a balanced diet.  In the meantime and having her resume multivitamin.    Diabetes complications include:  Retinopathy: None noted on recent eye exam.  She is requesting that the report be sent to us.  Nephropathy: creat: 0.66 in 5/2021, no significant microalbuminuria as of last month.  Neuropathy: has numbness and tingling in hands and feet - on gabapentin  LDL: 128 in 2020  Smoking:none  BP: well controlled when last in clinic 3 months ago.    Follow up in 3-4 months    It is my privilege to be involved in the care of the above patient.     Maris Leary PA-C, MPAS  [facility]  Diabetes, Endocrinology, and Metabolism  [phone] Appointments/Nurse  [phone] Fax  [phone] pager  [phone] nurse line        45 minutes spent on the date of the encounter doing chart review, history and exam, documentation, education and counseling, as well as communication and coordination of care, and further activities as noted above.  This time excludes time spent reviewing CGM.

## 2022-03-08 ENCOUNTER — TRANSFERRED RECORDS (OUTPATIENT)
Dept: HEALTH INFORMATION MANAGEMENT | Facility: CLINIC | Age: 37
End: 2022-03-08

## 2022-03-08 ENCOUNTER — VIRTUAL VISIT (OUTPATIENT)
Dept: ENDOCRINOLOGY | Facility: CLINIC | Age: 37
End: 2022-03-08
Payer: COMMERCIAL

## 2022-03-08 DIAGNOSIS — E66.813 CLASS 3 SEVERE OBESITY DUE TO EXCESS CALORIES WITH SERIOUS COMORBIDITY AND BODY MASS INDEX (BMI) OF 45.0 TO 49.9 IN ADULT (H): ICD-10-CM

## 2022-03-08 DIAGNOSIS — E66.01 CLASS 3 SEVERE OBESITY DUE TO EXCESS CALORIES WITH SERIOUS COMORBIDITY AND BODY MASS INDEX (BMI) OF 45.0 TO 49.9 IN ADULT (H): ICD-10-CM

## 2022-03-08 DIAGNOSIS — R11.0 NAUSEA: ICD-10-CM

## 2022-03-08 DIAGNOSIS — F32.2 MAJOR DEPRESSIVE DISORDER, SINGLE EPISODE, SEVERE WITHOUT PSYCHOTIC FEATURES (H): ICD-10-CM

## 2022-03-08 LAB — RETINOPATHY: NORMAL

## 2022-03-08 PROCEDURE — 99204 OFFICE O/P NEW MOD 45 MIN: CPT | Mod: 95 | Performed by: PHYSICIAN ASSISTANT

## 2022-03-08 PROCEDURE — 95251 CONT GLUC MNTR ANALYSIS I&R: CPT | Performed by: PHYSICIAN ASSISTANT

## 2022-03-08 RX ORDER — MULTIVITAMIN
1 TABLET ORAL DAILY
Qty: 100 TABLET | Refills: 3 | Status: SHIPPED | OUTPATIENT
Start: 2022-03-08

## 2022-03-08 NOTE — LETTER
3/8/2022       RE: Ruth Vanegas  [address]  [place] [address]     Dear Colleague,    Thank you for referring your patient, Ruth Vanegas, to the [facility] ENDOCRINOLOGY [facility]INIC [place] at [facility]. Please see a copy of my visit note below.    Outcome for 03/04/22 11:10 AM: Armida emailed to provider Beverly Andrews   Virtual Visit Facilitator      Diabetes Clinic Follow Up     Ruth Vanegas MRN:[id number] YOB: 1985  Primary care provider: Milton Banks  March 8, 2022      Reason for Endocrine consult: Uncontrolled type 2 DM    HPI:  Ruth Vanegas is a 35 year old female with PMHx of type 2 DM, obesity, chronic migraine, intractable nausea and vomitting presents to virtual clinic for follow-up of type 2 DM.  She established care in our clinic with  in November 2021.    At the time she reported:   She was diagnosed in 2016  Has tried bydureon and ozempic in the past and had nausea and vomiting.   She was taken off glipizide and had her lantus increased.  Taking lantus 24 U daily, Metformin 1000 mg BID, invokana 300 mg daily.  On topiramate 50 mg in AM and 50 mg in PM for her headaches   Intractable vomiting, following up with GI scheduled for EGD 12/14/2021, had a 9/23/2021- normal gastric emptying study.    Ruth also reported a 20 pound weight loss in the last 5 months as well as numbness in fingertips and tingling in her feet, as well as poor appetite for the prior year due to nausea and vomiting.    Ruth was having some early morning hypoglycemia, and advised she slightly reduced her Lantus to just 22 units daily and check morning blood sugars with a fingerstick to see if these lows were real or artifact.       advised f/u with neurology, lab work and that she continue her meds and check low BG with fingerstick to see if real.      Interim:  He  presented to [place] emergency room on January 15 with chest pain and had a negative chest CT. negative MI evaluation.  Was sent home on Tylenol and ibuprofen.  The day prior she had seen gastroenterology for follow-up of her treatment of H. Pylori.  They assured her that she does not have gastroparesis based on gastric emptying study, but advised that the diet may be helpful to her.  They recommended stool testing to ensure eradication of H. pylori in late February.  She also saw neurology in December and reported increasing frequency of headaches on 100 mg of Topamax twice daily and lack of sleep even with 50 mg of nortriptyline at night.  Reported taking Maxalt most days, working to lose weight and having her diabetes under good control.  She was advised to continue Topamax for preventive therapy in addition to wrap verapamil at bedtime to get lab work and keep a headache diary, and have follow-up in February of this year.      Today:  Feels diabetes management going well.  Having any low blood sugars and feels that numbers overall are good.  She is using a timer to remind her to scan her blood sugar every 5 hours.  Not seeing any real lows at all.  Notes does need to reschedule follow-up with neurology.  [facility] [place].  He saw some early signd of DMR but not requiring intervention.  She did request that they send us a copy of the exam notes and findings.  She continues to have difficulty with headaches not getting worse, but is noticing that the room is spinning at times if she closed her eyes she feels like she is in motion and this worries her.  Her appetite continues to be limited she has nausea and she is not very confident that she is getting a nutritious diet.  She notes that her blood sugar at times rises even without eating more coffee in the mornings.  She was previously taking a multivitamin but ran out and has not been taking them for some time.    Diet: Variable  due to nausea and lack of appetite.    Exercise: No exercise formally, walks at work, but has had a knee injury from an accident so limited by that.    Diabetes complications include:  Retinopathy: No DMR noted by her optometrist who she saw last month [facility].  Nephropathy: creat: 0.66 in 2021, needs a micro alb/creat ratio  Neuropathy: has numbness and tingling in hands and feet - on gabapentin  LDL: 128 in   Smoking:none  BP: well controlled    Works at [employer] as a [employer], single, lives with her kids, has 2 kids, 14 year and 17 years, non smoker, no alcohol consumption ,no illicit drug use . Had a hysterectomy in 2019    Family history: grandmother on mothers side with diabetes    Diabetes medications:  Metformin 1000 mg twice daily  Invokana 300 mg each morning  Lantus 24 units daily.    She also is taking Topamax 100 mg twice daily for headache prevention.      CGM S:        ROS:  All 12 systems were reviewed and negative except as mentioned in HPI    Past Medical/Surgical History:  Past Medical History:   Diagnosis Date     Anemia     told to take iron pills when pregnant     Depression      Depressive disorder      Diabetes mellitus (H)      Diabetes mellitus, type 2 (H) 07/15/2021     Dysthymic disorder      Endometriosis      Herpes genitalia      Hyperlipidemia      Kidney stone      Menorrhagia      Migraines      Neuropathy      Other abnormal Papanicolaou smear of cervix and cervical HPV(795.09) 2013     PCOS (polycystic ovarian syndrome)      Post traumatic stress disorder (PTSD)      Past Surgical History:   Procedure Laterality Date     BLADDER REPAIR W/  SECTION      X2     C/SECTION, CLASSICAL      x2     COLONOSCOPY N/A 2021    Procedure: COLONOSCOPY;  Surgeon: Rene Lehman DO;  Location: [facility]     DILATION AND CURETTAGE  2015     DILATION AND CURETTAGE  2015     DILATION AND CURETTAGE, OPERATIVE HYSTEROSCOPY, COMBINED N/A 2015    Procedure: Dilation  and Curettage with Hysteroscopy;  Surgeon: Zia Farmer MD;  Location: [facility];  Service:      DILATION AND CURETTAGE, OPERATIVE HYSTEROSCOPY, COMBINED N/A 9/29/2016    Procedure: DILATION AND CURETTAGE WITH HYSTEROSCOPY INSERT MIRENA;  Surgeon: Suzanne Major MD;  Location: [facility];  Service:      ENT SURGERY       ESOPHAGOSCOPY, GASTROSCOPY, DUODENOSCOPY (EGD), COMBINED N/A 12/14/2021    Procedure: ESOPHAGOGASTRODUODENOSCOPY, WITH BIOPSY;  Surgeon: Rene Lehman DO;  Location:  GI     GYN SURGERY  10/19/15    laproscopic lysis of adhesions     HYSTEROSCOPY, ABLATE ENDOMETRIUM HYDROTHERMAL, COMBINED N/A 3/2/2018    Procedure: HYSTEROSCOPY, DILATION AND CURETTAGE, ENDOMETRIAL ABLATION;  Surgeon: Kristy Israel DO;  Location: [facility];  Service: Gynecology     LAPAROSCOPIC HYSTERECTOMY TOTAL N/A 3/4/2019    Procedure: ROBOTIC TOTAL LAPAROSCOPIC HYSTERECTOMY, BILATERAL SALPINGECTOMY, LEFT OVARIAN CYSTOTOMY. CYSTOSCOPY;  Surgeon: Zia Farmer MD;  Location: [facility] OR;  Service: Gynecology     LAPAROSCOPY DIAGNOSTIC (GENERAL) N/A 10/19/2015    Procedure:  LAPAROSCOPY,LYSIS OF ADHESIONS;  Surgeon: Zia Farmer MD;  Location: [facility];  Service:      AK LAP,TUBAL CAUTERY Bilateral 3/2/2018    Procedure: LAPAROSCOPIC BILATERAL TUBAL LIGATION;  Surgeon: Kristy Israel DO;  Location: [facility];  Service: Gynecology     TONSILLECTOMY         Allergies:  Allergies   Allergen Reactions     Hydrocodone Other (See Comments)     Headache per pt and hallucinations  Headache per pt and hallucinations       Reglan [Metoclopramide]      Anxiety     Vicodin [Hydrocodone-Acetaminophen] Other (See Comments)     Hallucinations     Silver Nitrate Itching and Rash     Only issues if in for a long time  Only issues if in for a long time  Only issues if in for a long time       Tegaderm Transparent Dressing (Informational Only) Rash       Current Outpatient  Medications   Medication     acetaminophen (TYLENOL) 500 MG tablet     atorvastatin (LIPITOR) 10 MG tablet     canagliflozin (INVOKANA) 300 MG tablet     Continuous Blood Gluc  (FREESTYLE CINDY 14 DAY READER) JAUN     Continuous Blood Gluc Sensor (FREESTYLE CINDY 14 DAY SENSOR) MISC     Continuous Blood Gluc Sensor (FREESTYLE CINDY 14 DAY SENSOR) MISC     diazepam (VALIUM) 5 MG tablet     fluconazole (DIFLUCAN) 150 MG tablet     gabapentin (NEURONTIN) 300 MG capsule     ibuprofen (ADVIL/MOTRIN) 600 MG tablet     insulin glargine (LANTUS SOLOSTAR) 100 UNIT/ML pen     Melatonin 10 MG CAPS     metFORMIN (GLUCOPHAGE-XR) 500 MG 24 hr tablet     methocarbamol (ROBAXIN) 500 MG tablet     nortriptyline (PAMELOR) 50 MG capsule     ondansetron (ZOFRAN) 4 MG tablet     pantoprazole (PROTONIX) 40 MG EC tablet     rizatriptan (MAXALT) 10 MG tablet     tolterodine ER (DETROL LA) 4 MG 24 hr capsule     topiramate (TOPAMAX) 100 MG tablet     verapamil ER (VERELAN) 120 MG 24 hr capsule     vitamin D3 (CHOLECALCIFEROL) 125 MCG (5000 UT) tablet     No current facility-administered medications for this visit.       Family History:  Family History   Adopted: Yes   Problem Relation Age of Onset     Prostate Cancer Father      Cancer Father         prostate     Alcoholism Sister      Alcoholism Sister      Alcoholism Brother      Alcoholism Brother      Diabetes Paternal Grandmother      Other Cancer Paternal Grandmother      Alcoholism Daughter      Coronary Artery Disease No family hx of      Urolithiasis No family hx of      Clotting Disorder No family hx of      Gout No family hx of      Heart Disease No family hx of      Anesthesia Reaction No family hx of        Social History:  Social History     Tobacco Use     Smoking status: Never Smoker     Smokeless tobacco: Never Used   Substance Use Topics     Alcohol use: No     Alcohol/week: 0.0 standard drinks         Physical examination:    Wt Readings from Last 10 Encounters:  "  12/21/21 132.9 kg (293 lb)   12/14/21 132.6 kg (292 lb 5.3 oz)   10/28/21 137 kg (302 lb)   10/05/21 137.4 kg (303 lb)   05/11/21 145.7 kg (321 lb 4.8 oz)   02/17/21 143.8 kg (317 lb)   11/04/20 142.8 kg (314 lb 12 oz)   04/21/20 145.2 kg (320 lb)   02/21/20 141.1 kg (311 lb 0.4 oz)   10/16/19 147.4 kg (324 lb 14.4 oz)       General appearance: Pleasant articulate well-appearing female, who moves easily carrying her laptop from one room to the other once are video connected.  HEENT: PEERLA.  Cranial nerves II through XII grossly intact.  Voice and articulation are clear.  Lungs: Easy unlabored respirations.  No cough or dyspnea appreciated.  Heart: No jugular venous distention appreciated  Neurological: conscious and oriented. Speech: normal.    Psychiatric: Mood is \"good\" affect is appropriate.  Thought form and content are fluid and coherent.  Skin appears moist without any lesions noted.  Normal hair distribution.    Endocrine Labs:      Recent Labs   Lab Test 02/28/22  1729 02/28/22  1720 10/28/21  0900 05/05/21  0745 02/17/21  0855 07/27/20  0855 04/21/20  1427   A1C  --  7.2*  --  8.5* 8.8*   < > 10.5*   HEMOGLOBINA1  --   --  7.9  --   --   --   --    TSH  --  1.71  --   --   --   --  0.87   LDL  --  135*  --   --  128   < >  --    HDL  --  58  --   --  50   < >  --    TRIG  --  84  --   --  109   < >  --    CR  --  0.59  --  0.66 0.60   < > 0.57*   MICROL 15  --   --   --   --   --   --     < > = values in this interval not displayed.     PREETHI Ab, C-peptide, CBC, BMP, micro alb/creat ratio I reviewed with Ruth-all are within normal limits.     Assessment and Plan:   Ruth Vanegas is a 36 year old female with PMHx of type 2 DM, obesity, chronic migraine, intractable nausea and vomitting presents to clinic for management of type 2 DM .    Her type 2 diabetes is well controlled with a recent A1c of 7.2 and CGM S indicating an average blood sugar of 148.  She is however having ongoing headaches nausea " poor appetite and has had a 27 pound unintentional weight loss in the last year and a half though of course we must note she is on Topamax for her headaches which could could be contributing to decreased appetite and weight loss.       Plan:  Continue with metformin 1000 mg twice daily  Continue with invokana 300 mg daily  Continue Lantus 24 units     Her recent lab work does confirm her type II diagnosis and raises no red flags.  She will continue to work with neurology to address her headaches and I urged him to discuss her more recent onset of vertigo with them.    She will see nutrition to assure she is getting a balanced diet.  In the meantime and having her resume multivitamin.    Diabetes complications include:  Retinopathy: None noted on recent eye exam.  She is requesting that the report be sent to us.  Nephropathy: creat: 0.66 in 5/2021, no significant microalbuminuria as of last month.  Neuropathy: has numbness and tingling in hands and feet - on gabapentin  LDL: 128 in 2020  Smoking:none  BP: well controlled when last in clinic 3 months ago.    Follow up in 3-4 months    It is my privilege to be involved in the care of the above patient.     Maris Leary PA-C, MPAS  [facility]  Diabetes, Endocrinology, and Metabolism  [phone] Appointments/Nurse  [phone] Fax  [phone] pager  [phone] nurse line        45 minutes spent on the date of the encounter doing chart review, history and exam, documentation, education and counseling, as well as communication and coordination of care, and further activities as noted above.  This time excludes time spent reviewing CGM.                  Patient denies any changes since echeck-in regarding medication and allergies.Patient also states all information entered during echeck-in remains accurate.    Ruth Vanegas  is being evaluated via a billable video visit.      How would you like to obtain your AVS? MyChart  For the video visit,  send the invitation by: Text to cell phone: [phone]  Will anyone else be joining your video visit? No

## 2022-03-08 NOTE — PROGRESS NOTES
Patient denies any changes since echeck-in regarding medication and allergies.Patient also states all information entered during echeck-in remains accurate.    Ruth GYPSY Pangesteban  is being evaluated via a billable video visit.      How would you like to obtain your AVS? ODEC  For the video visit, send the invitation by: Text to cell phone: [phone]  Will anyone else be joining your video visit? No

## 2022-03-08 NOTE — PATIENT INSTRUCTIONS
Dear Ruth,  I am glad to see that your blood sugars are at goal, but I am concerned about your recurrent headaches nausea and weight loss and want to be certain that you are getting appropriate nutrition, with a heart healthy diet that is well-balanced.  Please start taking a multivitamin once daily and see nutrition.  Please have your eye exam results forward to us if you are able.  We look forward to seeing you again in 3 months.  Keep up your great work!    My best wishes,    Maris Leary PA-C, MPAS  [facility]  Diabetes, Endocrinology, and Metabolism  [phone] Appointments/Nurse  [phone] Fax  [phone] URGENTafter hours/weekend Endocrinologist on call

## 2022-03-24 ENCOUNTER — TRANSFERRED RECORDS (OUTPATIENT)
Dept: HEALTH INFORMATION MANAGEMENT | Facility: CLINIC | Age: 37
End: 2022-03-24
Payer: COMMERCIAL

## 2022-04-07 ENCOUNTER — TRANSFERRED RECORDS (OUTPATIENT)
Dept: HEALTH INFORMATION MANAGEMENT | Facility: CLINIC | Age: 37
End: 2022-04-07
Payer: COMMERCIAL

## 2022-04-08 ENCOUNTER — MYC REFILL (OUTPATIENT)
Dept: NEUROLOGY | Facility: CLINIC | Age: 37
End: 2022-04-08
Payer: COMMERCIAL

## 2022-04-08 DIAGNOSIS — G43.719 INTRACTABLE CHRONIC MIGRAINE WITHOUT AURA AND WITHOUT STATUS MIGRAINOSUS: ICD-10-CM

## 2022-04-08 DIAGNOSIS — E11.9 TYPE 2 DIABETES MELLITUS WITHOUT COMPLICATION, UNSPECIFIED WHETHER LONG TERM INSULIN USE (H): ICD-10-CM

## 2022-04-08 RX ORDER — METFORMIN HCL 500 MG
TABLET, EXTENDED RELEASE 24 HR ORAL
Qty: 360 TABLET | Refills: 0 | Status: SHIPPED | OUTPATIENT
Start: 2022-04-08 | End: 2022-07-06

## 2022-04-08 RX ORDER — VERAPAMIL HYDROCHLORIDE 120 MG/1
120 CAPSULE, EXTENDED RELEASE ORAL AT BEDTIME
Qty: 30 CAPSULE | Refills: 0 | Status: SHIPPED | OUTPATIENT
Start: 2022-04-08 | End: 2022-05-31

## 2022-04-08 NOTE — TELEPHONE ENCOUNTER
Refill request for Verapamil ER. Pt last seen 12/21/21 and is currently overdue for follow up. Will send [place] message alerting pt to this need. Will also send in 30 day supply with zero refills.     Ara Degroot RN on 4/8/2022 at 11:28 AM

## 2022-04-11 DIAGNOSIS — Z76.0 ENCOUNTER FOR MEDICATION REFILL: Primary | ICD-10-CM

## 2022-04-11 DIAGNOSIS — G47.00 INSOMNIA, UNSPECIFIED TYPE: ICD-10-CM

## 2022-04-11 DIAGNOSIS — F43.10 PTSD (POST-TRAUMATIC STRESS DISORDER): ICD-10-CM

## 2022-04-11 DIAGNOSIS — F41.9 ANXIETY: ICD-10-CM

## 2022-04-11 DIAGNOSIS — E11.9 DIABETES MELLITUS, TYPE 2 (H): ICD-10-CM

## 2022-04-12 RX ORDER — FLASH GLUCOSE SENSOR
1 KIT MISCELLANEOUS
Qty: 2 EACH | Refills: 3 | Status: SHIPPED | OUTPATIENT
Start: 2022-04-12 | End: 2022-09-20

## 2022-04-12 RX ORDER — MELATONIN 10 MG
1 CAPSULE ORAL
Qty: 30 CAPSULE | Refills: 1 | Status: SHIPPED | OUTPATIENT
Start: 2022-04-12 | End: 2022-06-08

## 2022-04-29 ENCOUNTER — VIRTUAL VISIT (OUTPATIENT)
Dept: GASTROENTEROLOGY | Facility: CLINIC | Age: 37
End: 2022-04-29
Payer: COMMERCIAL

## 2022-04-29 DIAGNOSIS — R13.19 ESOPHAGEAL DYSPHAGIA: ICD-10-CM

## 2022-04-29 DIAGNOSIS — R11.0 CHRONIC NAUSEA: Primary | ICD-10-CM

## 2022-04-29 DIAGNOSIS — R11.10 CHRONIC VOMITING: ICD-10-CM

## 2022-04-29 PROCEDURE — 99214 OFFICE O/P EST MOD 30 MIN: CPT | Mod: 95 | Performed by: INTERNAL MEDICINE

## 2022-04-29 NOTE — PROGRESS NOTES
Ruth is a 36 year old who is being evaluated via a billable video visit.      How would you like to obtain your AVS? MyChart  If the video visit is dropped, the invitation should be resent by: Text to cell phone: [phone]  Will anyone else be joining your video visit? No      Start 1042  End 11:08        Gastroenterology Visit for: Ruth Vanegas 1985   MRN: [id number]     Reason for Visit:  chief complaint    Referred by: Jocelyn  / [address] [place] [address] / [place] [address]  Patient Care Team:  Milton Banks MD as PCP - General  Less, Zia Chaudhry MD (OB/Gyn)  Randall Allison, PharmD as Pharmacist (Pharmacist)  Milton Banks MD as Assigned PCP  Puja Prescott MD as Resident (Student in organized health care education/training program)  Rene Lehman DO as Assigned Gastroenterology Provider  Roberto Bolanos MD as Assigned Neuroscience Provider  Lashae Briones, PhD LP as Assigned Behavioral Health Provider  Sandra Branch PA-C as Assigned Surgical Provider  Yari Locke [facility] as Pharmacist (Pharmacist Ambulatory Care)  Maris Leary PA-C as Assigned Endocrinology Provider    History of Present Illness:   Ruth Vanegas is a 36 year old female with HLD, uncontrolled DM2, anxiety, frequent yeast infection, migraine who is presenting as a follow up patient in consultation at the request of Dr. Banks with a chief complaint of nausea and vomiting.    4/29/22  Patient ended up having knee procedure and unable to get stool antigen testing for H pylori. She continues to have issues with nausea and vomiting and reports continued acid reflux and regurgitation and feels as if she needs to vomit following meals. She just started physical therapy for her knee. She is going to submit the stool antigen testing. Stopped taking pantoprazole for almost 2 months. At first she wasn't taking it because she was going to leave a stool sample. After treating her  H pylori she feels that  "her symptoms are improved but still present. Patient unable to state if the reflux and regurgitation is tied to nausea/vomiting. Denies heartburn. Whenever she eats she feels as if she has to force it down. And when she eats she feels it is going to come back up.  Feels food is stuck in her esophagus. She feels as if food is potentially building up (normal barium). Currently feels like she is eating once a day because she doesn't like the feeling that she is forcing food down.  When her glucose was extremely uncontrolled in the past she had more symptoms.      A1C is currently 7.2 (2/28/22)  -----------------------------------------------------------  1/14/22  Just finished treatment for h pylori on Wednesday. She was \"ok\" throughout the treatment. Had increased nausea while undergoing treatment. Kids got COVID end of December/beginning of January. With COVID the patient was asymptomatic.     Dark stools while on treatment with burning. She had blood when wiping but not in the stool or in toilet bowl. She denies straining. Denies medication such as preparation H.    Continues to have some nausea but it is improved since completing treatment for h pylor. Feels that she is able to eat more. Nausea is worse in the morning and at night. Before breakfast and at night after dinner.     Unsure if pantoprazole has made any change to symptoms.      Hyperglycemia in the AM- high 200s. Normal midday. Occasionally low in the evening. Sees endocrinologist soon.   ---------------------------------------------------------------  9/10/21  Ruth Vanegas states began getting sick about one year ago. She felt this was related to her diabetes. She was on an injection (bydureon) that was making her sick. She continued to feel sick: nauseated constantly. She has vomiting that can happen at any point. She is unable to determine what triggers vomiting. She always feels nauseated or like she will vomit. About 4-5 months ago she was " "feeling like she was forcing food down. It was \"hurting\" to eat. She had been on ozempic which also worsened her symptoms of nausea back in June. She had to discontinue the medication because it worsened her nausea and vomiting. Currently taking zofran 3-4 times a week. Doesn't like taking it because she gets constipated with it. After lunch she will get \"sick\". She feels that she has a lot of reflux associated and regurgitation. Occasionally she will regurgitate in order to feel better. Symptoms can be worse after a meal. Her symptoms also occur in the morning before waking. When she vomits in the morning she will not vomit food from the night before. She typically completes her dinner around 6:30pm. Has not yet had an endoscopy or gastric emptying study. When she was taking omeprazole 20mg she did not notice a benefit in her symptoms. She felt that omeprazole worsened her headaches. +belching and regurgitation, no heartburn.     If the patient is constipated she has abdominal pain. When constipated has occasional streaks of blood on the toilet paper. Never mixed with stool. +unintentional weight loss.     Was previously on mirtazapine. She was only on the medication leading up to therapy.  Nortriptyline 50mg at bedtime- taking for depression and insomnia.     Last hemoglobin A1c- She has an appointment with endocrinology on October 28th. Her glucose sensor ranges from 140-290. When she wakes and hasn't eaten her glucose is >200.   ---------------------------------------------------------------     Ruth Vanegas denies dysphagia, odynophagia, early satiety, abdominal pain, abdominal distension/bloating, diarrhea, hematochezia, or melena.     Family history of colon cancer: none  Wt Readings from Last 5 Encounters:   12/21/21 132.9 kg (293 lb)   12/14/21 132.6 kg (292 lb 5.3 oz)   10/28/21 137 kg (302 lb)   10/05/21 137.4 kg (303 lb)   05/11/21 145.7 kg (321 lb 4.8 oz)        Esophageal Questionnaire(s)    BEDQ " Questionnaire  No flowsheet data found.  No flowsheet data found.    Eckardt Questionnaire  No flowsheet data found.    Promis 10 Questionnaire  No flowsheet data found.    STUDIES & PROCEDURES:    EGD:   Date: 12/14/21  Impression:  Findings:        The examined esophagus was normal.        The gastroesophageal flap valve was visualized endoscopically and        classified as Hill Grade I (prominent fold, tight to endoscope).        The entire examined stomach was normal. Biopsies were taken with a cold        forceps for histology. Verification of patient identification for the        specimen was done. Estimated blood loss: none.        The duodenal bulb, first portion of the duodenum, second portion of the        duodenum and examined duodenum were normal. Biopsies were taken with a        cold forceps for histology. Verification of patient identification for        the specimen was done. Estimated blood loss: none.                                                                                     Impression:            - Normal esophagus.                          - Gastroesophageal flap valve classified as Hill Grade                          I (prominent fold, tight to endoscope).                          - Normal stomach. Biopsied.                          - Normal duodenal bulb, first portion of the duodenum,                          second portion of the duodenum and examined duodenum.                          Biopsied.   Pathology Report:  B. STOMACH, BIOPSY:   Immunostain, with appropriate controls, on block B1 is POSITIVE for H. pylori, confirming this as likely etiology for gastritis.       Addendum electronically signed by Yari Lopez MD on 12/16/2021 at 12:49 PM   Final Diagnosis   A. DUODENUM, BIOPSY:   Duodenal mucosa with focal foveolar metaplasia, otherwise no significant abnormality; negative for features of celiac sprue      B. STOMACH, BIOPSY:   Gastric  mucosa with focal active chronic  gastritis; no intestinal metaplasia or dysplasia; report of H. pylori immunohistochemistry to follow            Colonoscopy:  Date: 12/14/21  Impression:   Findings:        The perianal and digital rectal examinations were normal.        The colon (entire examined portion) appeared normal.        Non-bleeding internal hemorrhoids were found during retroflexion. The        hemorrhoids were small.                                                                                     Impression:            - The entire examined colon is normal.                          - Non-bleeding internal hemorrhoids.                          - No specimens collected.   Pathology Report:     EndoFLIP directed at the UES or LES (8cm (EF-325) balloon length or 16cm (EF-322) balloon length):   Date:  8cm balloon  Balloon inflation Balloon pressure CSA (mm^2) DI (mm^2/mmHg) Dmin (mm) Compliance   20 (ladmark ID)        30        40        50           16cm balloon  Balloon inflation Balloon pressure CSA (mm^2) DI (mm^2/mmHg) Dmin (mm) Compliance   30 (ladmark ID)        40        50        60        70           High Resolution Manometry:  Date:  Impression:    PH/Impedance:  Date:  Impression:     Bravo:  48 or 96hr  Date:  Impression:    CT:  Date:  Impression:    Esophagram:  Date: 3/23/21  Impression:      IMPRESSION:  1.  Normal esophagram. No hiatal hernia and no spontaneous gastroesophageal reflux.    Gastric emptying study  9/24/21  Normal gastric emptying    Prior medical records were reviewed including, but not limited to, notes from referring providers, lab work, radiographic tests, and other diagnostic tests. Pertinent results were summarized above.     History     Past Medical History:   Diagnosis Date     Anemia     told to take iron pills when pregnant     Depression      Depressive disorder      Diabetes mellitus (H)      Diabetes mellitus, type 2 (H) 07/15/2021     Dysthymic disorder      Endometriosis      Herpes  genitalia      Hyperlipidemia      Kidney stone      Menorrhagia      Migraines      Neuropathy      Other abnormal Papanicolaou smear of cervix and cervical HPV(795.09) 2013     PCOS (polycystic ovarian syndrome)      Post traumatic stress disorder (PTSD)        Past Surgical History:   Procedure Laterality Date     BLADDER REPAIR W/  SECTION      X2     C/SECTION, CLASSICAL      x2     COLONOSCOPY N/A 2021    Procedure: COLONOSCOPY;  Surgeon: Rene Lehman DO;  Location:  GI     DILATION AND CURETTAGE  2015     DILATION AND CURETTAGE  2015     DILATION AND CURETTAGE, OPERATIVE HYSTEROSCOPY, COMBINED N/A 2015    Procedure: Dilation and Curettage with Hysteroscopy;  Surgeon: Zia Farmer MD;  Location: [facility] [place];  Service:      DILATION AND CURETTAGE, OPERATIVE HYSTEROSCOPY, COMBINED N/A 2016    Procedure: DILATION AND CURETTAGE WITH HYSTEROSCOPY INSERT MIRENA;  Surgeon: Suzanne Major MD;  Location: [facility] [place];  Service:      ENT SURGERY       ESOPHAGOSCOPY, GASTROSCOPY, DUODENOSCOPY (EGD), COMBINED N/A 2021    Procedure: ESOPHAGOGASTRODUODENOSCOPY, WITH BIOPSY;  Surgeon: Rene Lehman DO;  Location:  GI     GYN SURGERY  10/19/15    laproscopic lysis of adhesions     HYSTEROSCOPY, ABLATE ENDOMETRIUM HYDROTHERMAL, COMBINED N/A 3/2/2018    Procedure: HYSTEROSCOPY, DILATION AND CURETTAGE, ENDOMETRIAL ABLATION;  Surgeon: Kristy Israel DO;  Location: [facility] [place];  Service: Gynecology     LAPAROSCOPIC HYSTERECTOMY TOTAL N/A 3/4/2019    Procedure: ROBOTIC TOTAL LAPAROSCOPIC HYSTERECTOMY, BILATERAL SALPINGECTOMY, LEFT OVARIAN CYSTOTOMY. CYSTOSCOPY;  Surgeon: Zia Farmer MD;  Location: [facility] [place];  Service: Gynecology     LAPAROSCOPY DIAGNOSTIC (GENERAL) N/A 10/19/2015    Procedure:  LAPAROSCOPY,LYSIS OF ADHESIONS;  Surgeon: Zia Farmer MD;  Location: [facility] [place];  Service:      [place] LAP,TUBAL CAUTERY Bilateral 3/2/2018     Procedure: LAPAROSCOPIC BILATERAL TUBAL LIGATION;  Surgeon: Kristy Israel DO;  Location: [facility] [place];  Service: Gynecology     TONSILLECTOMY         Social History     Socioeconomic History     Marital status: Single     Spouse name: Not on file     Number of children: Not on file     Years of education: Not on file     Highest education level: Not on file   Occupational History     Not on file   Tobacco Use     Smoking status: Never Smoker     Smokeless tobacco: Never Used   Substance and Sexual Activity     Alcohol use: No     Alcohol/week: 0.0 standard drinks     Drug use: No     Sexual activity: Not on file   Other Topics Concern     Not on file   Social History Narrative     Not on file     Social Determinants of Health     Financial Resource Strain: Not on file   Food Insecurity: Not on file   Transportation Needs: Not on file   Physical Activity: Not on file   Stress: Not on file   Social Connections: Not on file   Intimate Partner Violence: Not on file   Housing Stability: Not on file       Family History   Adopted: Yes   Problem Relation Age of Onset     Prostate Cancer Father      Cancer Father         prostate     Alcoholism Sister      Alcoholism Sister      Alcoholism Brother      Alcoholism Brother      Diabetes Paternal Grandmother      Other Cancer Paternal Grandmother      Alcoholism Daughter      Coronary Artery Disease No family hx of      Urolithiasis No family hx of      Clotting Disorder No family hx of      Gout No family hx of      Heart Disease No family hx of      Anesthesia Reaction No family hx of      Family history reviewed and edited as appropriate    Medications and Allergies:     Outpatient Encounter Medications as of 4/29/2022   Medication Sig Dispense Refill     acetaminophen (TYLENOL) 500 MG tablet Take 500-1,000 mg by mouth every 4 hours as needed        atorvastatin (LIPITOR) 10 MG tablet atorvastatin 10 mg tablet (Patient taking differently: every evening  atorvastatin 10 mg tablet) 30 tablet 11     canagliflozin (INVOKANA) 300 MG tablet every morning (before breakfast)        Continuous Blood Gluc  (FREESTYLE CINDY 14 DAY READER) JAUN 1 Application       Continuous Blood Gluc  (FREESTYLE CINDY 2 READER) JAUN 1 each See Admin Instructions Use per 's instructions. 1 each 0     Continuous Blood Gluc Sensor (FREESTYLE CINDY 14 DAY SENSOR) MISC 1 Application every 14 days 2 each 3     Continuous Blood Gluc Sensor (FREESTYLE CINDY 14 DAY SENSOR) MISC        Continuous Blood Gluc Sensor (FREESTYLE CINDY 2 SENSOR) MISC 1 each See Admin Instructions Change every 14 days. 6 each 1     diazepam (VALIUM) 5 MG tablet once as needed Only for Dentist       fluconazole (DIFLUCAN) 150 MG tablet once as needed        ibuprofen (ADVIL/MOTRIN) 600 MG tablet every 4 hours as needed        insulin glargine (LANTUS PEN) 100 UNIT/ML pen Inject 24 Units Subcutaneous At Bedtime        Melatonin 10 MG CAPS Take 1 capsule by mouth at bedtime as needed, may repeat once (insomnia) 30 capsule 1     metFORMIN (GLUCOPHAGE-XR) 500 MG 24 hr tablet TAKE 2 TABLETS(1000 MG) BY MOUTH TWICE DAILY WITH MEALS 360 tablet 0     methocarbamol (ROBAXIN) 500 MG tablet every evening        multivitamin (ONE-DAILY) tablet Take 1 tablet by mouth daily 100 tablet 3     nortriptyline (PAMELOR) 50 MG capsule Take 50 mg by mouth At Bedtime        ondansetron (ZOFRAN) 4 MG tablet every 6 hours as needed        rizatriptan (MAXALT) 10 MG tablet Take 1 tablet (10 mg) by mouth at onset of headache for migraine May repeat in 2 hours. 18 tablet 1     tolterodine ER (DETROL LA) 4 MG 24 hr capsule every evening        topiramate (TOPAMAX) 100 MG tablet Take 1 tablet (100 mg) by mouth 2 times daily 180 tablet 3     verapamil ER (VERELAN) 120 MG 24 hr capsule Take 1 capsule (120 mg) by mouth At Bedtime **FOLLOW UP NEEDED FOR REFILLS** 30 capsule 0     vitamin D3 (CHOLECALCIFEROL) 125 MCG (5000 UT)  tablet Take 1 tablet (125 mcg) by mouth every morning 90 tablet 3     gabapentin (NEURONTIN) 300 MG capsule Take 2 capsules (600 mg) by mouth 3 times daily 540 capsule 1     pantoprazole (PROTONIX) 40 MG EC tablet Take 1 tablet (40 mg) by mouth daily (Patient not taking: Reported on 4/29/2022) 90 tablet 3     No facility-administered encounter medications on file as of 4/29/2022.        Allergies   Allergen Reactions     Hydrocodone Other (See Comments)     Headache per pt and hallucinations  Headache per pt and hallucinations       Reglan [Metoclopramide]      Anxiety     Vicodin [Hydrocodone-Acetaminophen] Other (See Comments)     Hallucinations     Silver Nitrate Itching and Rash     Only issues if in for a long time  Only issues if in for a long time  Only issues if in for a long time       Tegaderm Transparent Dressing (Informational Only) Rash        Review of systems:  A full 10 point review of systems was obtained and was negative except for the pertinent positives and negatives stated within the HPI.    Objective Findings:   Physical Exam:    Constitutional: LMP 05/23/2016   General: Alert, cooperative, no distress, well-appearing  Head: Atraumatic, normocephalic, no obvious abnormalities   Eyes: EOMI, Sclera anicteric, no obvious conjunctival hemorrhage   Nose: Nares normal, no obvious malformation, no obvious rhinorrhea   Respiratory: normal appearing respiration, no obvious cough  Musculoskeletal: Range of motion intact for visualized extremities, no obvious strength deficit  Skin: No jaundice, no obvious rash  Neurologic: AAOx3, no obvious neurologic abnormality  Psychiatric: Normal Affect, appropriate mood  Extremities: No obvious edema, no obvious malformation     Labs, Radiology, Pathology     Lab Results   Component Value Date    WBC 9.4 02/28/2022    WBC 9.3 10/26/2020    WBC 8.5 04/21/2020    HGB 13.7 02/28/2022    HGB 13.0 10/26/2020    HGB 12.3 04/21/2020     02/28/2022      10/26/2020     04/21/2020    CHOL 210 (H) 02/28/2022    CHOL 200 (H) 02/17/2021    CHOL 221 (H) 11/04/2020    TRIG 84 02/28/2022    TRIG 109 02/17/2021    TRIG 104 11/04/2020    HDL 58 02/28/2022    HDL 50 02/17/2021    HDL 58 11/04/2020    ALT 19 11/04/2020    ALT 20 10/26/2020    ALT 15 07/27/2020    AST 19 11/04/2020    AST 15 10/26/2020    AST 11 07/27/2020     02/28/2022     05/05/2021     02/17/2021    BUN 9 02/28/2022    BUN 8 05/05/2021    BUN 8 02/17/2021    CO2 26 02/28/2022    CO2 27 05/05/2021    CO2 22 02/17/2021    TSH 1.71 02/28/2022    TSH 0.87 04/21/2020        Liver Function Studies -   Recent Labs   Lab Test 11/04/20  0827 08/14/13  1420   PROTTOTAL 7.3 6.8   ALBUMIN 3.8 3.5   BILITOTAL 0.3 0.4   ALKPHOS 91  --    AST 19  --    ALT 19  --    BILIDIRECT  --  0.1          Patient Active Problem List    Diagnosis Date Noted     Esophageal dysphagia 04/29/2022     Priority: Medium     Major depressive disorder, single episode, severe without psychotic features (H) 03/08/2022     Priority: Medium     Hyperglycemia 01/15/2022     Priority: Medium     Morbid obesity (H) 12/21/2021     Priority: Medium     Unintentional weight loss 09/11/2021     Priority: Medium     Rectal bleeding 09/11/2021     Priority: Medium     Flatulence, eructation and gas pain 09/11/2021     Priority: Medium     Diabetes mellitus, type 2 (H) 07/15/2021     Priority: Medium     Chronic nausea 07/13/2021     Priority: Medium     Chronic vomiting 07/13/2021     Priority: Medium     Obesity 01/02/2013     Priority: Medium     Problem list name updated by automated process. Provider to review       Other abnormal Papanicolaou smear of cervix and cervical HPV(795.09) 01/02/2013     Priority: Medium      Assessment and Plan   Assessment:    Ruth Vanegas is a 36 year old female with HLD, uncontrolled DM2, anxiety, frequent yeast infection, migraine who is presenting as a follow up patient in consultation at  "the request of Dr. Banks with a chief complaint of nausea and vomiting.    The patient was seen in video telemedicine consultation regarding her symptoms of nausea and vomiting.     She will undergo stool antigen testing for H pylori and restart her PPI BID following completion of the test. I have asked that she undergo high resolution manometry to determine if there is some esophageal dysmotility contributing to her symptoms especially given the symptom of \"esophageal fullness\" that she described today. Depending on the above testing the patient may require an EGD with OFF therapy bravo.     1. Chronic nausea    2. Esophageal dysphagia    3. Chronic vomiting       Orders Placed This Encounter   Procedures     Adult Gastro Ref - Procedure Only      Plan:    1. Please complete your stool antigen testing. We will retreat your H pylori if this remains positive  2. Following stool antigen testing, restart PPI twice daily and monitor symptoms  3. Please schedule manometry to evaluate your esophageal function  4. Depending on the above you may need to undergo an upper endoscopy with Bravo OFF PPI therapy    Follow up plan:   Return to clinic 4 months and as needed.    The risks and benefits of my recommendations, as well as other treatment options were discussed with the patient and any available family today. All questions were answered.     o Follow up: As planned above. Today, I personally spent 26 minutes in direct face to face time with the patient, of which greater than 50% of the time was spent in patient education and counseling as described above. Approximately 13 minutes were spent on indirect care associated with the patient's consultation including but not limited to review of: patient medical records to date, clinic visits, hospital records, lab results, imaging studies, procedural documentation, and coordinating care with other providers. The findings from this review are summarized in the above note. A total " of 39 minutes was spent on the day of the encounter.     The patient verbalized understanding of the plan and was appreciative for the time spent and information provided during the office visit.     Author:     Rene Lehman DO   of Medicine  Director, Esophageal Disorders Program  Division of Gastroenterology, Hepatology, and Nutrition  [facility]    Documentation assisted by voice recognition and documentation system.

## 2022-04-29 NOTE — PATIENT INSTRUCTIONS
It was a pleasure taking care of you today.  I've included a brief summary of our discussion and care plan from today's visit below.  Please review this information with your primary care provider.  _______________________________________________________________________    My recommendations are summarized as follows:    Please complete your stool antigen testing. We will retreat your H pylori if this remains positive  Following stool antigen testing, restart PPI twice daily and monitor symptoms  Please schedule manometry to evaluate your esophageal function  Depending on the above you may need to undergo an upper endoscopy with Bravo OFF PPI therapy      To schedule endoscopic procedures you may call: [phone]  To schedule radiology tests you may call: [phone]  To schedule an ENT appointment you may call: [phone]    Please call my nurse Irene ([phone]), Malik ([phone]) with any questions or concerns.  If you were seen through the [facility] please feel free to reach out to Aysha at [phone]   --    Return to GI Clinic in 4 months to review your progress.    _______________________________________________________________________    Who do I call with any questions after my visit?  Please be in touch if there are any further questions that arise following today's visit.  There are multiple ways to contact your gastroenterology care team.      During business hours, you may reach a Gastroenterology nurse at [phone] and choose option 3.       To schedule or reschedule an appointment, please call [phone].     You can always send a secure message through [employer].  [employer] messages are answered by your nurse or doctor typically within 24 hours.  Please allow extra time on weekends and holidays.      For urgent/emergent questions after business hours, you may reach the on-call GI Fellow by contacting the [facility]  at [phone].     How will I get the  results of any tests ordered?    You will receive all of your results.  If you have signed up for [employer], any tests ordered at your visit will be available to you after your physician reviews them.  Typically this takes 1-2 weeks.  If there are urgent results that require a change in your care plan, your physician or nurse will call you to discuss the next steps.      What is [employer]?  [employer] is a secure way for you to access all of your healthcare records from the [facility].  It is a web based computer program, so you can sign on to it from any location.  It also allows you to send secure messages to your care team.  I recommend signing up for [employer] access if you have not already done so and are comfortable with using a computer.      How to I schedule a follow-up visit?  If you did not schedule a follow-up visit today, please call [phone] to schedule a follow-up office visit.      If you feel you received exceptional care and are interested in supporting the clinical and research goals of Dr. Lehman or the Division of Gastroenterology, Hepatology, and Nutrition please contact [email or web address] from the [facility] to discuss opportunities to donate.    Sincerely,    Rene Lehman DO   of Medicine  Director, Esophageal Disorders Program  Division of Gastroenterology, Hepatology, and Nutrition  [facility]

## 2022-05-02 ENCOUNTER — TELEPHONE (OUTPATIENT)
Dept: GASTROENTEROLOGY | Facility: CLINIC | Age: 37
End: 2022-05-02
Payer: COMMERCIAL

## 2022-05-02 PROCEDURE — 87338 HPYLORI STOOL AG IA: CPT | Mod: 90 | Performed by: PATHOLOGY

## 2022-05-02 PROCEDURE — 99000 SPECIMEN HANDLING OFFICE-LAB: CPT | Performed by: PATHOLOGY

## 2022-05-02 NOTE — TELEPHONE ENCOUNTER
Scheduling Details      Caller :   (Please ask for phone number if not scheduled by patient)    Type of Procedure Scheduled: manometry  Which Colonoscopy Prep was Sent?:   NICOLE CF PATIENTS & GROEN'S PATIENTS NEEDS EXTENDED PREP  Surgeon:   Date of Procedure: 5/31  Location: [facility]        Pre-Procedure Covid test to be completed at [facility] or Externally: 5/28

## 2022-05-03 LAB — H PYLORI AG STL QL IA: NEGATIVE

## 2022-05-09 ENCOUNTER — TRANSFERRED RECORDS (OUTPATIENT)
Dept: HEALTH INFORMATION MANAGEMENT | Facility: CLINIC | Age: 37
End: 2022-05-09
Payer: COMMERCIAL

## 2022-05-19 DIAGNOSIS — Z11.59 ENCOUNTER FOR SCREENING FOR OTHER VIRAL DISEASES: Primary | ICD-10-CM

## 2022-05-26 ENCOUNTER — PATIENT OUTREACH (OUTPATIENT)
Dept: GASTROENTEROLOGY | Facility: CLINIC | Age: 37
End: 2022-05-26
Payer: COMMERCIAL

## 2022-05-28 ENCOUNTER — LAB (OUTPATIENT)
Dept: LAB | Facility: CLINIC | Age: 37
End: 2022-05-28
Payer: COMMERCIAL

## 2022-05-28 DIAGNOSIS — Z11.59 ENCOUNTER FOR SCREENING FOR OTHER VIRAL DISEASES: ICD-10-CM

## 2022-05-28 PROCEDURE — U0005 INFEC AGEN DETEC AMPLI PROBE: HCPCS

## 2022-05-28 PROCEDURE — U0003 INFECTIOUS AGENT DETECTION BY NUCLEIC ACID (DNA OR RNA); SEVERE ACUTE RESPIRATORY SYNDROME CORONAVIRUS 2 (SARS-COV-2) (CORONAVIRUS DISEASE [COVID-19]), AMPLIFIED PROBE TECHNIQUE, MAKING USE OF HIGH THROUGHPUT TECHNOLOGIES AS DESCRIBED BY CMS-2020-01-R: HCPCS

## 2022-05-29 LAB — SARS-COV-2 RNA RESP QL NAA+PROBE: NEGATIVE

## 2022-05-31 ENCOUNTER — OFFICE VISIT (OUTPATIENT)
Dept: GASTROENTEROLOGY | Facility: CLINIC | Age: 37
End: 2022-05-31
Payer: COMMERCIAL

## 2022-05-31 VITALS
DIASTOLIC BLOOD PRESSURE: 79 MMHG | HEART RATE: 89 BPM | OXYGEN SATURATION: 100 % | SYSTOLIC BLOOD PRESSURE: 118 MMHG | TEMPERATURE: 97.1 F | HEIGHT: 67 IN | BODY MASS INDEX: 43.63 KG/M2 | RESPIRATION RATE: 16 BRPM | WEIGHT: 278 LBS

## 2022-05-31 DIAGNOSIS — G43.719 INTRACTABLE CHRONIC MIGRAINE WITHOUT AURA AND WITHOUT STATUS MIGRAINOSUS: ICD-10-CM

## 2022-05-31 DIAGNOSIS — R13.19 ESOPHAGEAL DYSPHAGIA: ICD-10-CM

## 2022-05-31 DIAGNOSIS — R11.0 CHRONIC NAUSEA: ICD-10-CM

## 2022-05-31 PROCEDURE — 91037 ESOPH IMPED FUNCTION TEST: CPT

## 2022-05-31 PROCEDURE — 91010 ESOPHAGUS MOTILITY STUDY: CPT

## 2022-05-31 RX ORDER — VERAPAMIL HYDROCHLORIDE 120 MG/1
120 CAPSULE, EXTENDED RELEASE ORAL AT BEDTIME
Qty: 30 CAPSULE | Refills: 2 | Status: SHIPPED | OUTPATIENT
Start: 2022-05-31 | End: 2022-08-15

## 2022-05-31 ASSESSMENT — PAIN SCALES - GENERAL: PAINLEVEL: NO PAIN (0)

## 2022-05-31 NOTE — TELEPHONE ENCOUNTER
Refill request for Verapamil. Pt has follow up scheduled for 8/15/22. Will send in refills.     Ara Degroot RN on 5/31/2022 at 12:31 PM

## 2022-05-31 NOTE — PROGRESS NOTES
Non-Invasive GI Procedure Visit    Ruth Vanegas is a 36 year old female with history of    Chronic nausea  Esophageal dysphagia.   Patient stated reason for procedure: Dysphagia and Regurgitation  COVID-19 Test Performed within 48-72hrs of the procedure:  Yes. COVID-19 result was NEGATIVE.    COVID-19 PCR Results    COVID-19 PCR Results 12/13/21 5/28/22   SARS CoV2 PCR Negative Negative      Comments are available for some flowsheets but are not being displayed.         COVID-19 Antibody Results, Testing for Immunity    COVID-19 Antibody Results, Testing for Immunity   No data to display.             Pre-Procedure Assessment  Patient presents to clinic today for Esophageal Manometry Study    Referring Provider: Dr Lehman  Patient has undergone previous endoscopy.    Does patient report taking a PPI (omeprazole, pantoprazole, rabeprazole, lansoprazole, esomeprazole, dexlansoprazole)? No  Does patient report taking a H2 blocker (ranitidine, or famotidine)? No  Does patient report taking opioids? No  Patient reported that last food and/or drink was last consumed 6 hours ago.  Esophageal Questionnaire(s) Completed:   Yes - Esophageal Questionnaire(s)    BEDQ Questionnaire  BEDQ Questionnaire: How Often Have You Had the Following? 5/31/2022   Trouble eating solid food (meat, bread, vegetables) 5   Trouble eating soft foods (yogurt, jello, pudding) 0   Trouble swallowing liquids 0   Pain while swallowing 3   Coughing or choking while swallowing foods or liquids 2   Total Score: 10     BEDQ Questionnaire: Discomfort/Pain Ratings 5/31/2022   Eating solid food (meat, bread, vegetables) 3   Eating soft foods (yogurt, jello, pudding) 0   Drinking liquid 0   Total Score: 3       Eckardt Questionnaire  Eckardt Questionnaire 5/31/2022   Dysphagia 3   Regurgitation 2   Retrosternal Pain 0   Weight Loss (kg) 2   Total Score:  7       Promis 10 Questionnaire  PROMIS 10 FLOWSHEET DATA 5/31/2022   In general, would you say your  "health is: 2   In general, would you say your quality of life is: 3   In general, how would you rate your physical health? 3   In general, how would you rate your mental health, including your mood and your ability to think? 3   In general, how would you rate your satisfaction with your social activities and relationships? 3   In general, please rate how well you carry out your usual social activities and roles. (This includes activities at home, at work and in your community, and responsibilities as a parent, child, spouse, employee, friend, etc.) 3   To what extent are you able to carry out your everyday physical activities such as walking, climbing stairs, carrying groceries, or moving a chair? 5   In the past 7 days, how often have you been bothered by emotional problems such as feeling anxious, depressed, or irritable? 3   In the past 7 days, how would you rate your fatigue on average? 3   In the past 7 days, how would you rate your pain on average, where 0 means no pain, and 10 means worst imaginable pain? 5   Mental health question re-calculation - no clinical value 3   Physical health question re-calculation - no clinical value 3   Pain question re-calculation - no clinical value 3   Global Mental Health Score 12   Global Physical Health Score 14   PROMIS TOTAL - SUBSCORES 26       .    Patient Hx  Patient's history, medications and allergies were reviewed.     Height: 5' 7\"   Weight: 278 lbs 0 oz    Patient Active Problem List    Diagnosis Date Noted     Esophageal dysphagia 04/29/2022     Priority: Medium     Major depressive disorder, single episode, severe without psychotic features (H) 03/08/2022     Priority: Medium     Hyperglycemia 01/15/2022     Priority: Medium     Morbid obesity (H) 12/21/2021     Priority: Medium     Unintentional weight loss 09/11/2021     Priority: Medium     Rectal bleeding 09/11/2021     Priority: Medium     Flatulence, eructation and gas pain 09/11/2021     Priority: Medium "     Diabetes mellitus, type 2 (H) 07/15/2021     Priority: Medium     Chronic nausea 07/13/2021     Priority: Medium     Chronic vomiting 07/13/2021     Priority: Medium     Obesity 01/02/2013     Priority: Medium     Problem list name updated by automated process. Provider to review       Other abnormal Papanicolaou smear of cervix and cervical HPV(795.09) 01/02/2013     Priority: Medium      Prior to Admission medications    Medication Sig Start Date End Date Taking? Authorizing Provider   acetaminophen (TYLENOL) 500 MG tablet Take 500-1,000 mg by mouth every 4 hours as needed  12/30/20   Reported, Patient   atorvastatin (LIPITOR) 10 MG tablet atorvastatin 10 mg tablet  Patient taking differently: every evening atorvastatin 10 mg tablet 11/9/21   Milton Banks MD   canagliflozin (INVOKANA) 300 MG tablet every morning (before breakfast)  9/13/20   Reported, Patient   Continuous Blood Gluc  (FREESTYLE CINDY 14 DAY READER) JAUN 1 Application 5/20/20   Reported, Patient   Continuous Blood Gluc  (FREESTYLE CINDY 2 READER) JAUN 1 each See Admin Instructions Use per 's instructions. 4/8/22   Maris Leary PA-C   Continuous Blood Gluc Sensor (FREESTYLE CINDY 14 DAY SENSOR) MISC 1 Application every 14 days 4/12/22   Milton Banks MD   Continuous Blood Gluc Sensor (FREESTYLE CINDY 14 DAY SENSOR) MISC  7/13/21   Reported, Patient   Continuous Blood Gluc Sensor (FREESTYLE CINDY 2 SENSOR) MISC 1 each See Admin Instructions Change every 14 days. 4/8/22   Maris Leary PA-C   diazepam (VALIUM) 5 MG tablet once as needed Only for Dentist    Reported, Patient   fluconazole (DIFLUCAN) 150 MG tablet once as needed     Reported, Patient   gabapentin (NEURONTIN) 300 MG capsule Take 2 capsules (600 mg) by mouth 3 times daily 12/15/21 3/15/22  Magdalena Mendoza DO   ibuprofen (ADVIL/MOTRIN) 600 MG tablet every 4 hours as needed  12/30/20   Reported, Patient   insulin glargine (LANTUS PEN) 100 UNIT/ML  pen Inject 24 Units Subcutaneous At Bedtime  7/6/21   Reported, Patient   Melatonin 10 MG CAPS Take 1 capsule by mouth at bedtime as needed, may repeat once (insomnia) 4/12/22   Milton Banks MD   metFORMIN (GLUCOPHAGE-XR) 500 MG 24 hr tablet TAKE 2 TABLETS(1000 MG) BY MOUTH TWICE DAILY WITH MEALS 4/8/22   Maris Leary PA-C   methocarbamol (ROBAXIN) 500 MG tablet every evening  8/30/21   Reported, Patient   multivitamin (ONE-DAILY) tablet Take 1 tablet by mouth daily 3/8/22   Maris Leary PA-C   nortriptyline (PAMELOR) 50 MG capsule Take 50 mg by mouth At Bedtime     Reported, Patient   ondansetron (ZOFRAN) 4 MG tablet every 6 hours as needed  3/10/21   Reported, Patient   pantoprazole (PROTONIX) 40 MG EC tablet Take 1 tablet (40 mg) by mouth daily  Patient not taking: Reported on 4/29/2022 9/10/21 9/10/22  Rene Lehman DO   rizatriptan (MAXALT) 10 MG tablet Take 1 tablet (10 mg) by mouth at onset of headache for migraine May repeat in 2 hours. 10/5/21   Roberto Bolanos MD   tolterodine ER (DETROL LA) 4 MG 24 hr capsule every evening  4/12/21   Reported, Patient   topiramate (TOPAMAX) 100 MG tablet Take 1 tablet (100 mg) by mouth 2 times daily 11/3/21   Magdalena Steele MD   verapamil ER (VERELAN) 120 MG 24 hr capsule Take 1 capsule (120 mg) by mouth At Bedtime 5/31/22   Roberto Bolanos MD   vitamin D3 (CHOLECALCIFEROL) 125 MCG (5000 UT) tablet Take 1 tablet (125 mcg) by mouth every morning 2/22/22   Milton Banks MD     Allergies   Allergen Reactions     Hydrocodone Other (See Comments)     Headache per pt and hallucinations  Headache per pt and hallucinations       Reglan [Metoclopramide]      Anxiety     Vicodin [Hydrocodone-Acetaminophen] Other (See Comments)     Hallucinations     Silver Nitrate Itching and Rash     Only issues if in for a long time  Only issues if in for a long time  Only issues if in for a long time       Tegaderm Transparent Dressing (Informational Only) Rash     Past  Medical History:   Diagnosis Date     Anemia     told to take iron pills when pregnant     Depression      Depressive disorder      Diabetes mellitus (H)      Diabetes mellitus, type 2 (H) 07/15/2021     Dysthymic disorder      Endometriosis      Herpes genitalia      Hyperlipidemia      Kidney stone      Menorrhagia      Migraines      Neuropathy      Other abnormal Papanicolaou smear of cervix and cervical HPV(795.09) 2013     PCOS (polycystic ovarian syndrome)      Post traumatic stress disorder (PTSD)      Past Surgical History:   Procedure Laterality Date     BLADDER REPAIR W/  SECTION      X2     C/SECTION, CLASSICAL      x2     COLONOSCOPY N/A 2021    Procedure: COLONOSCOPY;  Surgeon: Rene Lehman DO;  Location:  GI     DILATION AND CURETTAGE  2015     DILATION AND CURETTAGE  2015     DILATION AND CURETTAGE, OPERATIVE HYSTEROSCOPY, COMBINED N/A 2015    Procedure: Dilation and Curettage with Hysteroscopy;  Surgeon: Zia Farmer MD;  Location: [facility];  Service:      DILATION AND CURETTAGE, OPERATIVE HYSTEROSCOPY, COMBINED N/A 2016    Procedure: DILATION AND CURETTAGE WITH HYSTEROSCOPY INSERT MIRENA;  Surgeon: Suzanne Maojr MD;  Location: [facility];  Service:      ENT SURGERY       ESOPHAGOSCOPY, GASTROSCOPY, DUODENOSCOPY (EGD), COMBINED N/A 2021    Procedure: ESOPHAGOGASTRODUODENOSCOPY, WITH BIOPSY;  Surgeon: Rene Lehman DO;  Location:  GI     GYN SURGERY  10/19/15    laproscopic lysis of adhesions     HYSTEROSCOPY, ABLATE ENDOMETRIUM HYDROTHERMAL, COMBINED N/A 3/2/2018    Procedure: HYSTEROSCOPY, DILATION AND CURETTAGE, ENDOMETRIAL ABLATION;  Surgeon: Kristy Israel DO;  Location: [facility];  Service: Gynecology     LAPAROSCOPIC HYSTERECTOMY TOTAL N/A 3/4/2019    Procedure: ROBOTIC TOTAL LAPAROSCOPIC HYSTERECTOMY, BILATERAL SALPINGECTOMY, LEFT OVARIAN CYSTOTOMY. CYSTOSCOPY;  Surgeon: Zia Farmer MD;  [facility]: [facility] Main OR;  Service: Gynecology     LAPAROSCOPY DIAGNOSTIC (GENERAL) N/A 10/19/2015    Procedure:  LAPAROSCOPY,LYSIS OF ADHESIONS;  Surgeon: Zia Farmer MD;  Location: [facility] OR;  Service:      [place] LAP,TUBAL CAUTERY Bilateral 3/2/2018    Procedure: LAPAROSCOPIC BILATERAL TUBAL LIGATION;  Surgeon: Kristy Israel DO;  Location: [facility] Main OR;  Service: Gynecology     TONSILLECTOMY       Family History   Adopted: Yes   Problem Relation Age of Onset     Prostate Cancer Father      Cancer Father         prostate     Alcoholism Sister      Alcoholism Sister      Alcoholism Brother      Alcoholism Brother      Diabetes Paternal Grandmother      Other Cancer Paternal Grandmother      Alcoholism Daughter      Coronary Artery Disease No family hx of      Urolithiasis No family hx of      Clotting Disorder No family hx of      Gout No family hx of      Heart Disease No family hx of      Anesthesia Reaction No family hx of      Social History     Tobacco Use     Smoking status: Never Smoker     Smokeless tobacco: Never Used   Substance Use Topics     Alcohol use: No     Alcohol/week: 0.0 standard drinks        Pre-Procedure Education & Consent  Procedure education was provided to: Patient  Teaching method: Explanation  Barriers to learning: No Barrier    Patient indicated understanding of pre-procedure instruction and appropriate consent was obtained and documented.    ____________________________________________________________________    Post-Procedure Documentation: Esophageal Manometry    Manometry catheter was placed via left nare to 50 cm and normal saline swallows given per protocol. Manometry catheter was removed at the end of test.    Discharge instructions given to patient.    Notification of pending test results sent to provider for interpretation. Please reference scanned document for final interpretation of results. Patient will follow up with referring provider for test results.    Irene  Giulia Allison RN on 5/31/2022 at 2:18 PM

## 2022-05-31 NOTE — PATIENT INSTRUCTIONS
Esophogeal Manometry Study  1. Resume regular diet.  2. You may have a bloody nose or sore throat after the procedure.  3. If you have questions call [phone] from 7:00am-5:00pm.  For afterhours questions call GI doctor on call at [phone].

## 2022-06-01 DIAGNOSIS — E66.01 MORBID OBESITY (H): Primary | ICD-10-CM

## 2022-06-06 RX ORDER — TOLTERODINE 4 MG/1
4 CAPSULE, EXTENDED RELEASE ORAL DAILY
Qty: 30 CAPSULE | Refills: 3 | Status: SHIPPED | OUTPATIENT
Start: 2022-06-06 | End: 2024-03-07

## 2022-06-07 NOTE — TELEPHONE ENCOUNTER
Called and spoke with patient. She stated she is taking her medication monthly and had no problems picking up her prescriptions.   
Please call patient.   Is she taking the metformin. We got a notification of her lack of filling metformin since January to September. Is there a problem picking up her meds?   
Telephone Encounter by Randall Allison PharmD at 12/2/2020  3:48 PM     Author: Randall Allison PharmD Service: -- Author Type: Pharmacist    Filed: 12/2/2020  3:56 PM Encounter Date: 11/4/2020 Status: Addendum    : Randall Allison PharmD (Pharmacist)    Related Notes: Original Note by Randall Allison PharmD (Pharmacist) filed at 12/2/2020  3:52 PM       Called [facility] pharmacy to verify information listed in outside dispense reporting.     Per the pharmacist -     Metformin: Last filled 9/28 for 90 days   Prior to that 3 months filled on 1/30     Invokana:   Being filled as 30 day supplies. Fill dates:     11/8    10/11   9/13   8/13   7/17   6/16     Ozempic:     First and Last fill 11/11 for 56 days - picked up that day.          Perhaps pt now more adherent than she was earlier in the year. CGM data shows that she has had a drop in average BG since starting Ozempic. Could consider continued titration.                    
Telephone Encounter by Randall Allison, Traci at 11/4/2020 12:32 PM     Author: Randall Allison, Traci Service: -- Author Type: Pharmacist    Filed: 11/4/2020 12:41 PM Encounter Date: 11/4/2020 Status: Signed    : Randall Allison, PharmTIMOTEO (Pharmacist)       Downloaded data from pt's CGM device:                   Dispense history:                    
No

## 2022-06-08 ENCOUNTER — TELEPHONE (OUTPATIENT)
Dept: FAMILY MEDICINE | Facility: CLINIC | Age: 37
End: 2022-06-08
Payer: COMMERCIAL

## 2022-06-08 DIAGNOSIS — G47.00 INSOMNIA, UNSPECIFIED TYPE: ICD-10-CM

## 2022-06-08 DIAGNOSIS — Z76.0 ENCOUNTER FOR MEDICATION REFILL: ICD-10-CM

## 2022-06-08 DIAGNOSIS — F43.10 PTSD (POST-TRAUMATIC STRESS DISORDER): ICD-10-CM

## 2022-06-08 DIAGNOSIS — F41.9 ANXIETY: ICD-10-CM

## 2022-06-08 RX ORDER — MELATONIN 10 MG
1 CAPSULE ORAL
Qty: 30 CAPSULE | Refills: 1 | Status: SHIPPED | OUTPATIENT
Start: 2022-06-08 | End: 2024-01-22

## 2022-06-20 ENCOUNTER — TRANSFERRED RECORDS (OUTPATIENT)
Dept: HEALTH INFORMATION MANAGEMENT | Facility: CLINIC | Age: 37
End: 2022-06-20

## 2022-07-06 DIAGNOSIS — E11.9 TYPE 2 DIABETES MELLITUS WITHOUT COMPLICATION, UNSPECIFIED WHETHER LONG TERM INSULIN USE (H): ICD-10-CM

## 2022-07-06 RX ORDER — METFORMIN HCL 500 MG
TABLET, EXTENDED RELEASE 24 HR ORAL
Qty: 360 TABLET | Refills: 3 | Status: SHIPPED | OUTPATIENT
Start: 2022-07-06 | End: 2023-05-11

## 2022-08-15 ENCOUNTER — OFFICE VISIT (OUTPATIENT)
Dept: NEUROLOGY | Facility: CLINIC | Age: 37
End: 2022-08-15
Payer: COMMERCIAL

## 2022-08-15 VITALS
SYSTOLIC BLOOD PRESSURE: 127 MMHG | RESPIRATION RATE: 16 BRPM | DIASTOLIC BLOOD PRESSURE: 88 MMHG | WEIGHT: 278 LBS | HEART RATE: 92 BPM | BODY MASS INDEX: 43.54 KG/M2

## 2022-08-15 DIAGNOSIS — F51.01 PRIMARY INSOMNIA: ICD-10-CM

## 2022-08-15 DIAGNOSIS — G43.719 INTRACTABLE CHRONIC MIGRAINE WITHOUT AURA AND WITHOUT STATUS MIGRAINOSUS: Primary | ICD-10-CM

## 2022-08-15 PROCEDURE — 99214 OFFICE O/P EST MOD 30 MIN: CPT | Performed by: PSYCHIATRY & NEUROLOGY

## 2022-08-15 RX ORDER — RIZATRIPTAN BENZOATE 10 MG/1
10 TABLET ORAL
Qty: 18 TABLET | Refills: 3 | Status: SHIPPED | OUTPATIENT
Start: 2022-08-15 | End: 2023-10-04

## 2022-08-15 RX ORDER — FREMANEZUMAB-VFRM 225 MG/1.5ML
1.5 INJECTION SUBCUTANEOUS
Qty: 1.5 ML | Refills: 3 | Status: SHIPPED | OUTPATIENT
Start: 2022-08-15 | End: 2023-05-14

## 2022-08-15 RX ORDER — VERAPAMIL HYDROCHLORIDE 120 MG/1
120 CAPSULE, EXTENDED RELEASE ORAL AT BEDTIME
Qty: 90 CAPSULE | Refills: 1 | Status: SHIPPED | OUTPATIENT
Start: 2022-08-15 | End: 2022-08-31

## 2022-08-15 NOTE — LETTER
8/15/2022         RE: Ruth Vanegas  [address]  [place] [address]        Dear Colleague,    Thank you for referring your patient, Ruth Vanegas, to the [facility] [place]. Please see a copy of my visit note below.    NEUROLOGY OUTPATIENT PROGRESS NOTE   Aug 15, 2022     CHIEF COMPLAINT/REASON FOR VISIT/REASON FOR CONSULT  Patient presents with:  Follow Up    REASON FOR CONSULTATION- Headaches    REFERRAL SOURCE  Dr. Rosangela Haynes  CC Dr. Rosangela Haynes    HISTORY OF PRESENT ILLNESS  Ruth Vanegas is a 36 year old female seen today for evaluation of headaches.  She reports that she has had headaches for years these would come and go.  Over the last few months these have become more constant and lasting longer.  She reports that she has 15 headache days a month.  Both temples are involved.  Headaches are throbbing in nature with photophobia and phonophobia.  She denies any triggers for her headaches.    She is tried sumatriptan in the past which made things worse.  She has tried Excedrin but she has to catch the headaches soon enough to make it work.  She is currently on nortriptyline 50 mg at night which is not helping.  The nortriptyline was also being used as a sleep aid.  She is also on Topamax which is not helping.  She is taking 50 mg in the evening and 25 mg in the morning.  She further complains of vertigo which is sometimes with the headache.  She does have issues with uncontrolled sugars and feels that the Topamax might be helpful.  Denies any visual auras.    12/21/21  Patient returns today.  She reports that the headaches have become more frequent and she is still having them every day.  Is taking 100 mg twice daily of the Topamax with no side effects.  50 mg of nortriptyline at night and is not sleeping well with this dose.  Is interested in trying a higher medication.  Is taking Maxalt which is working though has to use it almost every day.  Is  working on losing weight.  Reports that her diabetes has been under good control.  Has not done the blood work that had ordered.    8/15/22  Patient returns today.  Headaches have become much more frequent and she is still having them every day.  Her Topamax was increased to 100 mg twice a day endocrinology and she feels no side effects with no benefit as well.  Remains on 50 mg of nortriptyline at night for sleep.  She is also taking melatonin for sleep.  Verapamil was added previously which did not really help.  Diabetes has been under good control.  Maxalt does work occasionally but not all the time.  Blood work has been done which was negative.  Reports no other new symptoms.    Previous history is reviewed and this is unchanged.    PAST MEDICAL/SURGICAL HISTORY  Past Medical History:   Diagnosis Date     Anemia     told to take iron pills when pregnant     Depression      Depressive disorder      Diabetes mellitus (H)      Diabetes mellitus, type 2 (H) 07/15/2021     Dysthymic disorder      Endometriosis      Herpes genitalia      Hyperlipidemia      Kidney stone      Menorrhagia      Migraines      Neuropathy      Other abnormal Papanicolaou smear of cervix and cervical HPV(795.09) 01/02/2013     PCOS (polycystic ovarian syndrome)      Post traumatic stress disorder (PTSD)      Patient Active Problem List   Diagnosis     Obesity     Other abnormal Papanicolaou smear of cervix and cervical HPV(795.09)     Chronic nausea     Chronic vomiting     Diabetes mellitus, type 2 (H)     Unintentional weight loss     Rectal bleeding     Flatulence, eructation and gas pain     Morbid obesity (H)     Hyperglycemia     Major depressive disorder, single episode, severe without psychotic features (H)     Esophageal dysphagia   Significant for diabetes, migraines, depression, insomnia, difficulty keeping food down, constipation    FAMILY HISTORY  Family History   Adopted: Yes   Problem Relation Age of Onset     Prostate Cancer  Father      Cancer Father         prostate     Alcoholism Sister      Alcoholism Sister      Alcoholism Brother      Alcoholism Brother      Diabetes Paternal Grandmother      Other Cancer Paternal Grandmother      Alcoholism Daughter      Coronary Artery Disease No family hx of      Urolithiasis No family hx of      Clotting Disorder No family hx of      Gout No family hx of      Heart Disease No family hx of      Anesthesia Reaction No family hx of    Family history positive for migraines in her brother.  Also family history of diabetes.    SOCIAL HISTORY  Social History     Tobacco Use     Smoking status: Never Smoker     Smokeless tobacco: Never Used   Substance Use Topics     Alcohol use: No     Alcohol/week: 0.0 standard drinks     Drug use: No       SYSTEMS REVIEW  Twelve-system ROS was done and other than the HPI this was negative except for neck pain, back pain, arm and leg pain, joint pain, numbness and tingling, weakness paralysis, difficulty walking, falling, balance coordination problems, dizziness, ringing in the ears, sleeping problems, headaches, anxiety, depression, bloating, stomach pain, weight gain, appetite problems  No new issues reported today.    MEDICATIONS  acetaminophen (TYLENOL) 500 MG tablet, Take 500-1,000 mg by mouth every 4 hours as needed   atorvastatin (LIPITOR) 10 MG tablet, atorvastatin 10 mg tablet (Patient taking differently: every evening atorvastatin 10 mg tablet)  canagliflozin (INVOKANA) 300 MG tablet, every morning (before breakfast)   Continuous Blood Gluc  (FREESTYLE CINDY 14 DAY READER) JAUN, 1 Application  Continuous Blood Gluc  (FREESTYLE CINDY 2 READER) JAUN 1 each See Admin Instructions Use per 's instructions.  Continuous Blood Gluc Sensor (FREESTYLE CINDY 14 DAY SENSOR) MISC, 1 Application every 14 days  Continuous Blood Gluc Sensor (FREESTYLE CINDY 14 DAY SENSOR) MISC,   Continuous Blood Gluc Sensor (FREESTYLE CINDY 2 SENSOR) MISC, 1  each See Admin Instructions Change every 14 days.  diazepam (VALIUM) 5 MG tablet, once as needed Only for Dentist  fluconazole (DIFLUCAN) 150 MG tablet, once as needed   ibuprofen (ADVIL/MOTRIN) 600 MG tablet, every 4 hours as needed   insulin glargine (LANTUS PEN) 100 UNIT/ML pen, Inject 24 Units Subcutaneous At Bedtime   Melatonin 10 MG CAPS, Take 1 capsule by mouth at bedtime as needed, may repeat once (insomnia)  metFORMIN (GLUCOPHAGE XR) 500 MG 24 hr tablet, TAKE 2 TABLETS(1000 MG) BY MOUTH TWICE DAILY WITH MEALS  methocarbamol (ROBAXIN) 500 MG tablet, every evening   multivitamin (ONE-DAILY) tablet, Take 1 tablet by mouth daily  nortriptyline (PAMELOR) 50 MG capsule, Take 50 mg by mouth At Bedtime   ondansetron (ZOFRAN) 4 MG tablet, every 6 hours as needed   pantoprazole (PROTONIX) 40 MG EC tablet, Take 1 tablet (40 mg) by mouth daily  tolterodine ER (DETROL LA) 4 MG 24 hr capsule, Take 1 capsule (4 mg) by mouth daily  topiramate (TOPAMAX) 100 MG tablet, Take 1 tablet (100 mg) by mouth 2 times daily  vitamin D3 (CHOLECALCIFEROL) 125 MCG (5000 UT) tablet, Take 1 tablet (125 mcg) by mouth every morning  gabapentin (NEURONTIN) 300 MG capsule, Take 2 capsules (600 mg) by mouth 3 times daily    No current facility-administered medications on file prior to visit.       PHYSICAL EXAMINATION  VITALS: /88   Pulse 92   Resp 16   Wt 126.1 kg (278 lb)   LMP 05/23/2016   BMI 43.54 kg/m    GENERAL: Healthy appearing, alert, no acute distress, normal habitus.  CARDIOVASCULAR: Extremities warm and well perfused. Pulses present.   NEUROLOGICAL:  Patient is awake and oriented to self, place and time.  Attention span is normal.  Memory is grossly intact.  Language is fluent and follows commands appropriately.  Appropriate fund of knowledge. Cranial nerves 2-12 are intact. There is no pronator drift.  Motor exam shows 5/5 strength in all extremities.  Tone is symmetric bilaterally in upper and lower extremities.   (Previously reflexes are symmetric and 2+ in upper extremities and lower extremities. Sensory exam is grossly intact to light touch, pin prick and vibration.)  Finger to nose and heel to shin is without dysmetria.  Romberg is negative.  Gait is normal and the patient is able to do tandem walk and walk on toes and heels.  Exam similar to before.    DIAGNOSTICS  CT head-images reviewed.  No major structural lesions noted.  IMPRESSION:  1.  No acute intracranial process.    CT 2020  HEAD CT:   1.  No acute intracranial process.     CERVICAL SPINE CT:   1.  No acute cervical spine fracture.    OUTSIDE RECORDS  Outside referral notes and chart notes were reviewed and pertinent information has been summarized (in addition to the HPI):-Patient seen for headaches.  Was seen in endocrinology for diabetes.  Was referred to neurology.  Was also having some ankle and feet swelling.  Also has nausea related to headaches.  Has been seen in ENT for benign positional paroxysmal vertigo.  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  Was recommended to see occupational therapy.    LABS  Component      Latest Ref Rng & Units 2/28/2022   Vitamin B12      193 - 986 pg/mL 437   Ferritin      12 - 150 ng/mL 93   TSH      0.40 - 4.00 mU/L 1.71       IMPRESSION/REPORT/PLAN  Intractable chronic migraine without aura and without status migrainosus  Primary insomnia  History of diabetes    This is a 36 year old female with chronic headache suggestive of chronic migraines and insomnia.  Her head CT has been negative for structural lesions.  We will hold off on MRI for right now though could consider it if headaches are not responding to medications.  Blood work has been noncontributory.  If her headaches remain refractory would need MRI during the next visit.    Sumatriptan has caused headaches to get worse.  Maxalt is more helpful as abortive therapy and will continue that.  She can use the Maxalt with over-the-counter  medications to see if it becomes more effective.     Higher doses of Topamax have not helped with the headaches.  Propanolol cannot be used because of history of diabetes.  Verapamil has not helped with the headaches as well.  We will try Ajovy to see if that helps prevent the headaches.  We will continue the verapamil and Topamax in the meantime.  Could consider Botox the patient prefer CGRP medications before Botox.    She remains on nortriptyline for insomnia which is helping with the insomnia but not completely.  It did not help with the headaches.  We will continue this for right now.  She is also on melatonin.    Topamax might be helping with the diabetes as well.    I would encourage her to keep a log of her headaches to identify triggers.  Can see her back in 3 months.    -     rizatriptan (MAXALT) 10 MG tablet; Take 1 tablet (10 mg) by mouth at onset of headache for migraine May repeat in 2 hours.  -     topiramate (TOPAMAX) 50 MG tablet; Take 1 tablet (50 mg) by mouth 2 times daily  -     verapamil ER (VERELAN) 120 MG 24 hr capsule; Take 1 capsule (120 mg) by mouth At Bedtime  -     Fremanezumab-vfrm (AJOVY) 225 MG/1.5ML SOAJ; Inject 225 mg Subcutaneous every 30 days      Return in about 3 months (around 11/15/2022) for In-Clinic Visit (must).    Over 30 minutes were spent coordinating the care for the patient on the day of the encounter.  This includes previsit, during visit and post visit activities as documented above.  Counseling patient.  Refractory medical problem with prescription management.  Multiple prescriptions managed.  Multiple problems addressed.  (Activities include but not inclusive of reviewing chart, reviewing outside records, reviewing labs and imaging study results as well as the images, patient visit time including getting history and exam,  use if applicable, review of test results with the patient and coming up with a plan in a shared model, counseling patient and family,  education and answering patient questions, EMR , EMR diagnosis entry and problem list management, medication reconciliation and prescription management if applicable, paperwork if applicable, printing documents and documentation of the visit activities.)      Roberto Bolanos MD  Neurologist  [facility]  Tel:- [phone]    This note was dictated using voice recognition software.  Any grammatical or context distortions are unintentional and inherent to the software.        Again, thank you for allowing me to participate in the care of your patient.        Sincerely,        Roberto Bolanos MD

## 2022-08-15 NOTE — PROGRESS NOTES
NEUROLOGY OUTPATIENT PROGRESS NOTE   Aug 15, 2022     CHIEF COMPLAINT/REASON FOR VISIT/REASON FOR CONSULT  Patient presents with:  Follow Up    REASON FOR CONSULTATION- Headaches    REFERRAL SOURCE  Dr. Rosangela Haynes  CC Dr. Rosangela Haynes    HISTORY OF PRESENT ILLNESS  Ruth Vanegas is a 36 year old female seen today for evaluation of headaches.  She reports that she has had headaches for years these would come and go.  Over the last few months these have become more constant and lasting longer.  She reports that she has 15 headache days a month.  Both temples are involved.  Headaches are throbbing in nature with photophobia and phonophobia.  She denies any triggers for her headaches.    She is tried sumatriptan in the past which made things worse.  She has tried Excedrin but she has to catch the headaches soon enough to make it work.  She is currently on nortriptyline 50 mg at night which is not helping.  The nortriptyline was also being used as a sleep aid.  She is also on Topamax which is not helping.  She is taking 50 mg in the evening and 25 mg in the morning.  She further complains of vertigo which is sometimes with the headache.  She does have issues with uncontrolled sugars and feels that the Topamax might be helpful.  Denies any visual auras.    12/21/21  Patient returns today.  She reports that the headaches have become more frequent and she is still having them every day.  Is taking 100 mg twice daily of the Topamax with no side effects.  50 mg of nortriptyline at night and is not sleeping well with this dose.  Is interested in trying a higher medication.  Is taking Maxalt which is working though has to use it almost every day.  Is working on losing weight.  Reports that her diabetes has been under good control.  Has not done the blood work that had ordered.    8/15/22  Patient returns today.  Headaches have become much more frequent and she is still having them every day.  Her Topamax was increased  to 100 mg twice a day endocrinology and she feels no side effects with no benefit as well.  Remains on 50 mg of nortriptyline at night for sleep.  She is also taking melatonin for sleep.  Verapamil was added previously which did not really help.  Diabetes has been under good control.  Maxalt does work occasionally but not all the time.  Blood work has been done which was negative.  Reports no other new symptoms.    Previous history is reviewed and this is unchanged.    PAST MEDICAL/SURGICAL HISTORY  Past Medical History:   Diagnosis Date     Anemia     told to take iron pills when pregnant     Depression      Depressive disorder      Diabetes mellitus (H)      Diabetes mellitus, type 2 (H) 07/15/2021     Dysthymic disorder      Endometriosis      Herpes genitalia      Hyperlipidemia      Kidney stone      Menorrhagia      Migraines      Neuropathy      Other abnormal Papanicolaou smear of cervix and cervical HPV(795.09) 01/02/2013     PCOS (polycystic ovarian syndrome)      Post traumatic stress disorder (PTSD)      Patient Active Problem List   Diagnosis     Obesity     Other abnormal Papanicolaou smear of cervix and cervical HPV(795.09)     Chronic nausea     Chronic vomiting     Diabetes mellitus, type 2 (H)     Unintentional weight loss     Rectal bleeding     Flatulence, eructation and gas pain     Morbid obesity (H)     Hyperglycemia     Major depressive disorder, single episode, severe without psychotic features (H)     Esophageal dysphagia   Significant for diabetes, migraines, depression, insomnia, difficulty keeping food down, constipation    FAMILY HISTORY  Family History   Adopted: Yes   Problem Relation Age of Onset     Prostate Cancer Father      Cancer Father         prostate     Alcoholism Sister      Alcoholism Sister      Alcoholism Brother      Alcoholism Brother      Diabetes Paternal Grandmother      Other Cancer Paternal Grandmother      Alcoholism Daughter      Coronary Artery Disease No  family hx of      Urolithiasis No family hx of      Clotting Disorder No family hx of      Gout No family hx of      Heart Disease No family hx of      Anesthesia Reaction No family hx of    Family history positive for migraines in her brother.  Also family history of diabetes.    SOCIAL HISTORY  Social History     Tobacco Use     Smoking status: Never Smoker     Smokeless tobacco: Never Used   Substance Use Topics     Alcohol use: No     Alcohol/week: 0.0 standard drinks     Drug use: No       SYSTEMS REVIEW  Twelve-system ROS was done and other than the HPI this was negative except for neck pain, back pain, arm and leg pain, joint pain, numbness and tingling, weakness paralysis, difficulty walking, falling, balance coordination problems, dizziness, ringing in the ears, sleeping problems, headaches, anxiety, depression, bloating, stomach pain, weight gain, appetite problems  No new issues reported today.    MEDICATIONS  acetaminophen (TYLENOL) 500 MG tablet, Take 500-1,000 mg by mouth every 4 hours as needed   atorvastatin (LIPITOR) 10 MG tablet, atorvastatin 10 mg tablet (Patient taking differently: every evening atorvastatin 10 mg tablet)  canagliflozin (INVOKANA) 300 MG tablet, every morning (before breakfast)   Continuous Blood Gluc  (FREESTYLE CINDY 14 DAY READER) JAUN, 1 Application  Continuous Blood Gluc  (FREESTYLE CINDY 2 READER) JAUN, 1 each See Admin Instructions Use per 's instructions.  Continuous Blood Gluc Sensor (FREESTYLE CINDY 14 DAY SENSOR) MISC, 1 Application every 14 days  Continuous Blood Gluc Sensor (FREESTYLE CINDY 14 DAY SENSOR) MISC,   Continuous Blood Gluc Sensor (FREESTYLE CINDY 2 SENSOR) MISC, 1 each See Admin Instructions Change every 14 days.  diazepam (VALIUM) 5 MG tablet, once as needed Only for Dentist  fluconazole (DIFLUCAN) 150 MG tablet, once as needed   ibuprofen (ADVIL/MOTRIN) 600 MG tablet, every 4 hours as needed   insulin glargine (LANTUS PEN)  100 UNIT/ML pen, Inject 24 Units Subcutaneous At Bedtime   Melatonin 10 MG CAPS, Take 1 capsule by mouth at bedtime as needed, may repeat once (insomnia)  metFORMIN (GLUCOPHAGE XR) 500 MG 24 hr tablet, TAKE 2 TABLETS(1000 MG) BY MOUTH TWICE DAILY WITH MEALS  methocarbamol (ROBAXIN) 500 MG tablet, every evening   multivitamin (ONE-DAILY) tablet, Take 1 tablet by mouth daily  nortriptyline (PAMELOR) 50 MG capsule, Take 50 mg by mouth At Bedtime   ondansetron (ZOFRAN) 4 MG tablet, every 6 hours as needed   pantoprazole (PROTONIX) 40 MG EC tablet, Take 1 tablet (40 mg) by mouth daily  tolterodine ER (DETROL LA) 4 MG 24 hr capsule, Take 1 capsule (4 mg) by mouth daily  topiramate (TOPAMAX) 100 MG tablet, Take 1 tablet (100 mg) by mouth 2 times daily  vitamin D3 (CHOLECALCIFEROL) 125 MCG (5000 UT) tablet, Take 1 tablet (125 mcg) by mouth every morning  gabapentin (NEURONTIN) 300 MG capsule, Take 2 capsules (600 mg) by mouth 3 times daily    No current facility-administered medications on file prior to visit.       PHYSICAL EXAMINATION  VITALS: /88   Pulse 92   Resp 16   Wt 126.1 kg (278 lb)   LMP 05/23/2016   BMI 43.54 kg/m    GENERAL: Healthy appearing, alert, no acute distress, normal habitus.  CARDIOVASCULAR: Extremities warm and well perfused. Pulses present.   NEUROLOGICAL:  Patient is awake and oriented to self, place and time.  Attention span is normal.  Memory is grossly intact.  Language is fluent and follows commands appropriately.  Appropriate fund of knowledge. Cranial nerves 2-12 are intact. There is no pronator drift.  Motor exam shows 5/5 strength in all extremities.  Tone is symmetric bilaterally in upper and lower extremities.  (Previously reflexes are symmetric and 2+ in upper extremities and lower extremities. Sensory exam is grossly intact to light touch, pin prick and vibration.)  Finger to nose and heel to shin is without dysmetria.  Romberg is negative.  Gait is normal and the patient is  able to do tandem walk and walk on toes and heels.  Exam similar to before.    DIAGNOSTICS  CT head-images reviewed.  No major structural lesions noted.  IMPRESSION:  1.  No acute intracranial process.    CT 2020  HEAD CT:   1.  No acute intracranial process.     CERVICAL SPINE CT:   1.  No acute cervical spine fracture.    OUTSIDE RECORDS  Outside referral notes and chart notes were reviewed and pertinent information has been summarized (in addition to the HPI):-Patient seen for headaches.  Was seen in endocrinology for diabetes.  Was referred to neurology.  Was also having some ankle and feet swelling.  Also has nausea related to headaches.  Has been seen in ENT for benign positional paroxysmal vertigo.  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  Was recommended to see occupational therapy.    LABS  Component      Latest Ref Rng & Units 2/28/2022   Vitamin B12      193 - 986 pg/mL 437   Ferritin      12 - 150 ng/mL 93   TSH      0.40 - 4.00 mU/L 1.71       IMPRESSION/REPORT/PLAN  Intractable chronic migraine without aura and without status migrainosus  Primary insomnia  History of diabetes    This is a 36 year old female with chronic headache suggestive of chronic migraines and insomnia.  Her head CT has been negative for structural lesions.  We will hold off on MRI for right now though could consider it if headaches are not responding to medications.  Blood work has been noncontributory.  If her headaches remain refractory would need MRI during the next visit.    Sumatriptan has caused headaches to get worse.  Maxalt is more helpful as abortive therapy and will continue that.  She can use the Maxalt with over-the-counter medications to see if it becomes more effective.     Higher doses of Topamax have not helped with the headaches.  Propanolol cannot be used because of history of diabetes.  Verapamil has not helped with the headaches as well.  We will try Ajovy to see if that helps prevent the  headaches.  We will continue the verapamil and Topamax in the meantime.  Could consider Botox the patient prefer CGRP medications before Botox.    She remains on nortriptyline for insomnia which is helping with the insomnia but not completely.  It did not help with the headaches.  We will continue this for right now.  She is also on melatonin.    Topamax might be helping with the diabetes as well.    I would encourage her to keep a log of her headaches to identify triggers.  Can see her back in 3 months.    -     rizatriptan (MAXALT) 10 MG tablet; Take 1 tablet (10 mg) by mouth at onset of headache for migraine May repeat in 2 hours.  -     topiramate (TOPAMAX) 50 MG tablet; Take 1 tablet (50 mg) by mouth 2 times daily  -     verapamil ER (VERELAN) 120 MG 24 hr capsule; Take 1 capsule (120 mg) by mouth At Bedtime  -     Fremanezumab-vfrm (AJOVY) 225 MG/1.5ML SOAJ; Inject 225 mg Subcutaneous every 30 days      Return in about 3 months (around 11/15/2022) for In-Clinic Visit (must).    Over 30 minutes were spent coordinating the care for the patient on the day of the encounter.  This includes previsit, during visit and post visit activities as documented above.  Counseling patient.  Refractory medical problem with prescription management.  Multiple prescriptions managed.  Multiple problems addressed.  (Activities include but not inclusive of reviewing chart, reviewing outside records, reviewing labs and imaging study results as well as the images, patient visit time including getting history and exam,  use if applicable, review of test results with the patient and coming up with a plan in a shared model, counseling patient and family, education and answering patient questions, EMR , EMR diagnosis entry and problem list management, medication reconciliation and prescription management if applicable, paperwork if applicable, printing documents and documentation of the visit activities.)      Harsh  MD Cici  Neurologist  [facility]  Tel:- [phone]    This note was dictated using voice recognition software.  Any grammatical or context distortions are unintentional and inherent to the software.

## 2022-08-30 ENCOUNTER — MYC MEDICAL ADVICE (OUTPATIENT)
Dept: NEUROLOGY | Facility: CLINIC | Age: 37
End: 2022-08-30

## 2022-08-30 DIAGNOSIS — E66.813 CLASS 3 SEVERE OBESITY DUE TO EXCESS CALORIES WITH SERIOUS COMORBIDITY AND BODY MASS INDEX (BMI) OF 45.0 TO 49.9 IN ADULT (H): ICD-10-CM

## 2022-08-30 DIAGNOSIS — E66.01 CLASS 3 SEVERE OBESITY DUE TO EXCESS CALORIES WITH SERIOUS COMORBIDITY AND BODY MASS INDEX (BMI) OF 45.0 TO 49.9 IN ADULT (H): ICD-10-CM

## 2022-08-30 DIAGNOSIS — G43.719 INTRACTABLE CHRONIC MIGRAINE WITHOUT AURA AND WITHOUT STATUS MIGRAINOSUS: ICD-10-CM

## 2022-08-30 DIAGNOSIS — G43.719 INTRACTABLE CHRONIC MIGRAINE WITHOUT AURA AND WITHOUT STATUS MIGRAINOSUS: Primary | ICD-10-CM

## 2022-08-31 DIAGNOSIS — G43.719 INTRACTABLE CHRONIC MIGRAINE WITHOUT AURA AND WITHOUT STATUS MIGRAINOSUS: ICD-10-CM

## 2022-08-31 RX ORDER — PREDNISONE 20 MG/1
60 TABLET ORAL DAILY
Qty: 18 TABLET | Refills: 0 | Status: SHIPPED | OUTPATIENT
Start: 2022-08-31 | End: 2022-09-06

## 2022-08-31 RX ORDER — VERAPAMIL HYDROCHLORIDE 120 MG/1
CAPSULE, EXTENDED RELEASE ORAL
Qty: 30 CAPSULE | Refills: 1 | Status: SHIPPED | OUTPATIENT
Start: 2022-08-31 | End: 2023-10-04

## 2022-08-31 NOTE — TELEPHONE ENCOUNTER
Please deny Rx request for verapamil ER (VERELAN) 120 MG 24 hr capsule. Refill for med was refilled on 8/15/22 for a 3 month supply with 1 refill.     Disp Refills Start End MONIQUE   verapamil ER (VERELAN) 120 MG 24 hr capsule 90 capsule 1 8/15/2022  No   Sig - Route: Take 1 capsule (120 mg) by mouth At Bedtime - Oral   Sent to pharmacy as: Verapamil HCl  MG Oral Capsule Extended Release 24 Hour (VERELAN)     Thank you.      AMI Harris on 8/31/2022 at 8:01 AM

## 2022-09-06 ENCOUNTER — VIRTUAL VISIT (OUTPATIENT)
Dept: GASTROENTEROLOGY | Facility: CLINIC | Age: 37
End: 2022-09-06
Payer: COMMERCIAL

## 2022-09-06 ENCOUNTER — TELEPHONE (OUTPATIENT)
Dept: ENDOCRINOLOGY | Facility: CLINIC | Age: 37
End: 2022-09-06

## 2022-09-06 DIAGNOSIS — R11.0 CHRONIC NAUSEA: ICD-10-CM

## 2022-09-06 DIAGNOSIS — Z76.89 ENCOUNTER PRIOR TO INITIATION OF MEDICATION: Primary | ICD-10-CM

## 2022-09-06 DIAGNOSIS — R11.10 CHRONIC VOMITING: ICD-10-CM

## 2022-09-06 PROCEDURE — 99214 OFFICE O/P EST MOD 30 MIN: CPT | Mod: 95 | Performed by: INTERNAL MEDICINE

## 2022-09-06 RX ORDER — TOPIRAMATE 100 MG/1
100 TABLET, FILM COATED ORAL 2 TIMES DAILY
Qty: 180 TABLET | Refills: 0 | Status: SHIPPED | OUTPATIENT
Start: 2022-09-06 | End: 2022-12-08

## 2022-09-06 NOTE — TELEPHONE ENCOUNTER
"----- Message from Yasmin Blood V sent at 9/6/2022  2:39 PM CDT -----  Hello,     I spoke to this pt over the phone in attempt to schedule a f/up to see Maris. Pt reported that her blood sugars have been out of control and \"spiking.\" Pt reported that she has been experiencing headaches related to the high blood sugars, and stated that she hasn't been able to work because they are so bad. She requested I send a message to her care team and requested someone reach out to her about her high blood sugars.     First available appt isn't until October but pt reported that she only wanted an in-person appt and wanted to know if there is a change to get a sooner appt. Please advise.     Thank you,   Yasmin     "

## 2022-09-06 NOTE — PATIENT INSTRUCTIONS
It was a pleasure taking care of you today.  I've included a brief summary of our discussion and care plan from today's visit below.  Please review this information with your primary care provider.  _______________________________________________________________________    My recommendations are summarized as follows:    Please obtain an EKG to assess for QT prolongation prior to initiation of therapy with mirtazapine. If normal we will initiate mirtazapine at 15mg at night for two weeks.  After 2 weeks please send a [employer] message with an update on your symptoms better/worse/same.   If you are tolerating the medication and symptoms persist, we will increase to 30mg at night   At that dose we will obtain a repeat EKG to ensure you continue to have a normal QT interval.    If needed, we can increase the dose to 45mg  Stop marijuana as this could be making you worse or preventing you from getting better.     To schedule endoscopic procedures you may call: [phone]  To schedule radiology tests you may call: [phone]  To schedule an ENT appointment you may call: [phone]    Please call my nurse Irene ([phone]), Malik ([phone]) with any questions or concerns.  If you were seen through the [facility] please feel free to reach out to Aysha at [phone]   --    Return to GI Clinic in 4 months to review your progress.    _______________________________________________________________________    Who do I call with any questions after my visit?  Please be in touch if there are any further questions that arise following today's visit.  There are multiple ways to contact your gastroenterology care team.      During business hours, you may reach a Gastroenterology nurse at [phone] and choose option 3.       To schedule or reschedule an appointment, please call [phone].     You can always send a secure message through [employer].  [employer] messages are answered by your nurse or doctor  typically within 24 hours.  Please allow extra time on weekends and holidays.      For urgent/emergent questions after business hours, you may reach the on-call GI Fellow by contacting the [facility]  at [phone].     How will I get the results of any tests ordered?    You will receive all of your results.  If you have signed up for [facility], any tests ordered at your visit will be available to you after your physician reviews them.  Typically this takes 1-2 weeks.  If there are urgent results that require a change in your care plan, your physician or nurse will call you to discuss the next steps.      What is [facility]?  [employer] is a secure way for you to access all of your healthcare records from the [facility].  It is a web based computer program, so you can sign on to it from any location.  It also allows you to send secure messages to your care team.  I recommend signing up for [employer] access if you have not already done so and are comfortable with using a computer.      How to I schedule a follow-up visit?  If you did not schedule a follow-up visit today, please call [phone] to schedule a follow-up office visit.      If you feel you received exceptional care and are interested in supporting the clinical and research goals of Dr. Lehman or the Division of Gastroenterology, Hepatology, and Nutrition please contact [email or web address] from the [facility] to discuss opportunities to donate.    Sincerely,    Rene Lehman DO   of Medicine  Director, Esophageal Disorders Program  Division of Gastroenterology, Hepatology, and Nutrition  [facility]

## 2022-09-06 NOTE — LETTER
9/6/2022     RE: Ruth Vanegas  [address]  [place] [address]    Dear Colleague,    Thank you for referring your patient, Ruth Vanegas, to the [facility] [place]. Please see a copy of my visit note below.    Ruth is a 36 year old who is being evaluated via a billable video visit.      How would you like to obtain your AVS? MyChart  If the video visit is dropped, the invitation should be resent by: Text to cell phone: [phone]  Will anyone else be joining your video visit? No        Video-Visit Details    Video Start Time: 3:20 PM    Type of service:  Video Visit    Video End Time:3:51 PM    Originating Location (pt. Location): Home    Distant Location (provider location):  [facility] [place]     Platform used for Video Visit: April Miranda is a 36 year old who is being evaluated via a billable video visit.      How would you like to obtain your AVS? MyChart  If the video visit is dropped, the invitation should be resent by: Text to cell phone: [phone]  Will anyone else be joining your video visit? No          Gastroenterology Visit for: Ruth Vanegas 1985   MRN: [id number]     Reason for Visit:  chief complaint    Referred by: Jocelyn  / [facility] / [place] [address]  Patient Care Team:  Milton Banks MD as PCP - General  Less, Zia Chaudhry MD (OB/Gyn)  Randall Allison, PharmD as Pharmacist (Pharmacist)  Milton Banks MD as Assigned PCP  Puja Prescott MD as Resident (Student in organized health care education/training program)  Rene Lehman DO as Assigned Gastroenterology Provider  Roberto Bolanos MD as Assigned Neuroscience Provider  Sandra Branch PA-C as Assigned Surgical Provider  Yari Locke RPH as Pharmacist (Pharmacist Ambulatory Care)  Maris Leary PA-C as Assigned Endocrinology Provider  Yari Locke RPH as Assigned MTM  Pharmacist    History of Present Illness:   Ruth Vanegas is a 36 year old female with HLD, uncontrolled DM2, anxiety, frequent yeast infection, migraine who is presenting as a follow up patient in consultation at the request of Dr. Banks with a chief complaint of nausea and vomiting.      9/6/22  The patient states she is not getting as sick as she was before. She is eating more than before. Nausea occurs approximately 3-4 times a week instead of every day, vomiting 1-2 times a week. Appetite is low. She states she is eating 1-2 times a day. Taking pantoprazole in the morning. Feels a lot better but still feels mediocre.  Has been off of nortriptyline for over a month. Has not noticed a change in mood or negative change in GI symptoms since stopping the medication. She has tried mirtazapine before but only at the 15mg dose and not for her GI symptoms. Feels better since the manometry. Smokes marijuana at night to help with sleep. Began smoking marijuana for a little over a year. A couple puffs of a blunt. 3-4 days to go through a full blunt.     -----------------------------  4/29/22  Patient ended up having knee procedure and unable to get stool antigen testing for H pylori. She continues to have issues with nausea and vomiting and reports continued acid reflux and regurgitation and feels as if she needs to vomit following meals. She just started physical therapy for her knee. She is going to submit the stool antigen testing. Stopped taking pantoprazole for almost 2 months. At first she wasn't taking it because she was going to leave a stool sample. After treating her  H pylori she feels that her symptoms are improved but still present. Patient unable to state if the reflux and regurgitation is tied to nausea/vomiting. Denies heartburn. Whenever she eats she feels as if she has to force it down. And when she eats she feels it is going to come back up.  Feels food is stuck in her esophagus. She feels as if food  "is potentially building up (normal barium). Currently feels like she is eating once a day because she doesn't like the feeling that she is forcing food down.  When her glucose was extremely uncontrolled in the past she had more symptoms.     A1C is currently 7.2 (2/28/22)  -----------------------------------------------------------  1/14/22  Just finished treatment for h pylori on Wednesday. She was \"ok\" throughout the treatment. Had increased nausea while undergoing treatment. Kids got COVID end of December/beginning of January. With COVID the patient was asymptomatic.     Dark stools while on treatment with burning. She had blood when wiping but not in the stool or in toilet bowl. She denies straining. Denies medication such as preparation H.    Continues to have some nausea but it is improved since completing treatment for h pylor. Feels that she is able to eat more. Nausea is worse in the morning and at night. Before breakfast and at night after dinner.     Unsure if pantoprazole has made any change to symptoms.      Hyperglycemia in the AM- high 200s. Normal midday. Occasionally low in the evening. Sees endocrinologist soon.   ---------------------------------------------------------------  9/10/21  Ruth Vanegas states began getting sick about one year ago. She felt this was related to her diabetes. She was on an injection (bydureon) that was making her sick. She continued to feel sick: nauseated constantly. She has vomiting that can happen at any point. She is unable to determine what triggers vomiting. She always feels nauseated or like she will vomit. About 4-5 months ago she was feeling like she was forcing food down. It was \"hurting\" to eat. She had been on ozempic which also worsened her symptoms of nausea back in June. She had to discontinue the medication because it worsened her nausea and vomiting. Currently taking zofran 3-4 times a week. Doesn't like taking it because she gets constipated with " "it. After lunch she will get \"sick\". She feels that she has a lot of reflux associated and regurgitation. Occasionally she will regurgitate in order to feel better. Symptoms can be worse after a meal. Her symptoms also occur in the morning before waking. When she vomits in the morning she will not vomit food from the night before. She typically completes her dinner around 6:30pm. Has not yet had an endoscopy or gastric emptying study. When she was taking omeprazole 20mg she did not notice a benefit in her symptoms. She felt that omeprazole worsened her headaches. +belching and regurgitation, no heartburn.     If the patient is constipated she has abdominal pain. When constipated has occasional streaks of blood on the toilet paper. Never mixed with stool. +unintentional weight loss.     Was previously on mirtazapine. She was only on the medication leading up to therapy.  Nortriptyline 50mg at bedtime- taking for depression and insomnia.     Last hemoglobin A1c- She has an appointment with endocrinology on October 28th. Her glucose sensor ranges from 140-290. When she wakes and hasn't eaten her glucose is >200.   ---------------------------------------------------------------     Ruth Vanegas denies  odynophagia, early satiety, abdominal pain, abdominal distension/bloating, diarrhea, hematochezia, or melena.     Family history of colon cancer: none  Wt Readings from Last 5 Encounters:   08/15/22 126.1 kg (278 lb)   05/31/22 126.1 kg (278 lb)   12/21/21 132.9 kg (293 lb)   12/14/21 132.6 kg (292 lb 5.3 oz)   10/28/21 137 kg (302 lb)        Esophageal Questionnaire(s)    BEDQ Questionnaire  BEDQ Questionnaire: How Often Have You Had the Following? 5/31/2022   Trouble eating solid food (meat, bread, vegetables) 5   Trouble eating soft foods (yogurt, jello, pudding) 0   Trouble swallowing liquids 0   Pain while swallowing 3   Coughing or choking while swallowing foods or liquids 2   Total Score: 10     BEDQ " Questionnaire: Discomfort/Pain Ratings 5/31/2022   Eating solid food (meat, bread, vegetables) 3   Eating soft foods (yogurt, jello, pudding) 0   Drinking liquid 0   Total Score: 3       Eckardt Questionnaire  Eckardt Questionnaire 5/31/2022   Dysphagia 3   Regurgitation 2   Retrosternal Pain 0   Weight Loss (kg) 2   Total Score:  7       Promis 10 Questionnaire  PROMIS 10 FLOWSHEET DATA 5/31/2022   In general, would you say your health is: 2   In general, would you say your quality of life is: 3   In general, how would you rate your physical health? 3   In general, how would you rate your mental health, including your mood and your ability to think? 3   In general, how would you rate your satisfaction with your social activities and relationships? 3   In general, please rate how well you carry out your usual social activities and roles. (This includes activities at home, at work and in your community, and responsibilities as a parent, child, spouse, employee, friend, etc.) 3   To what extent are you able to carry out your everyday physical activities such as walking, climbing stairs, carrying groceries, or moving a chair? 5   In the past 7 days, how often have you been bothered by emotional problems such as feeling anxious, depressed, or irritable? 3   In the past 7 days, how would you rate your fatigue on average? 3   In the past 7 days, how would you rate your pain on average, where 0 means no pain, and 10 means worst imaginable pain? 5   Mental health question re-calculation - no clinical value 3   Physical health question re-calculation - no clinical value 3   Pain question re-calculation - no clinical value 3   Global Mental Health Score 12   Global Physical Health Score 14   PROMIS TOTAL - SUBSCORES 26       STUDIES & PROCEDURES:    EGD:   Date: 12/14/21  Impression:  Findings:        The examined esophagus was normal.        The gastroesophageal flap valve was visualized endoscopically and        classified  as Hill Grade I (prominent fold, tight to endoscope).        The entire examined stomach was normal. Biopsies were taken with a cold        forceps for histology. Verification of patient identification for the        specimen was done. Estimated blood loss: none.        The duodenal bulb, first portion of the duodenum, second portion of the        duodenum and examined duodenum were normal. Biopsies were taken with a        cold forceps for histology. Verification of patient identification for        the specimen was done. Estimated blood loss: none.                                                                                     Impression:            - Normal esophagus.                          - Gastroesophageal flap valve classified as Hill Grade                          I (prominent fold, tight to endoscope).                          - Normal stomach. Biopsied.                          - Normal duodenal bulb, first portion of the duodenum,                          second portion of the duodenum and examined duodenum.                          Biopsied.   Pathology Report:  B. STOMACH, BIOPSY:   Immunostain, with appropriate controls, on block B1 is POSITIVE for H. pylori, confirming this as likely etiology for gastritis.       Addendum electronically signed by Yari Lopez MD on 12/16/2021 at 12:49 PM   Final Diagnosis   A. DUODENUM, BIOPSY:   Duodenal mucosa with focal foveolar metaplasia, otherwise no significant abnormality; negative for features of celiac sprue      B. STOMACH, BIOPSY:   Gastric  mucosa with focal active chronic gastritis; no intestinal metaplasia or dysplasia; report of H. pylori immunohistochemistry to follow            Colonoscopy:  Date: 12/14/21  Impression:   Findings:        The perianal and digital rectal examinations were normal.        The colon (entire examined portion) appeared normal.        Non-bleeding internal hemorrhoids were found during retroflexion. The         hemorrhoids were small.                                                                                     Impression:            - The entire examined colon is normal.                          - Non-bleeding internal hemorrhoids.                          - No specimens collected.   Pathology Report:     EndoFLIP directed at the UES or LES (8cm (EF-325) balloon length or 16cm (EF-322) balloon length):   Date:  8cm balloon  Balloon inflation Balloon pressure CSA (mm^2) DI (mm^2/mmHg) Dmin (mm) Compliance   20 (ladmark ID)        30        40        50           16cm balloon  Balloon inflation Balloon pressure CSA (mm^2) DI (mm^2/mmHg) Dmin (mm) Compliance   30 (ladmark ID)        40        50        60        70           High Resolution Manometry:  Date: 5/31/22  Impression:  The baseline tone of the lower esophageal sphincter was normotensive. The lower esophageal sphincter was able to relax appropriately as measured by the IRP (10.7). The EGJ morphology was type 2. There was peristalsis visible. The DCI, DL, and contractile pattern were within normal limits. All 10/10 swallows were normal. There was one swallow with a reflux event after clearing the bolus. There were 0/10 swallows with elevated intrabolus pressure and compartmentalized pressurization. Rapid water swallows were performed with normal augmentation. Rapid Drink Challenge does not indicate an elevated IRP. Supine liquid swallows were performed. Upright liquid swallows were performed with no change in manometric pattern and a median upright IRP of 4.0.  Bolus transit as measured by impedance was complete in 9/10 swallows. This is most consistent with normal esophageal motility and a small hiatal hernia.       Wording of procedure description and interpretation was adapted from [facility] [employer] Weekly Motility Conference (2018).       Impressions   Based on the most recent Billings Classification v4.0, the findings are most  consistent with normal esophageal motility and a small hiatal hernia.     PH/Impedance:  Date:  Impression:     Bravo:  48 or 96hr  Date:  Impression:    CT:  Date:  Impression:    Esophagram:  Date: 3/23/21  Impression:      IMPRESSION:  1.  Normal esophagram. No hiatal hernia and no spontaneous gastroesophageal reflux.    Gastric emptying study  21  Normal gastric emptying    Prior medical records were reviewed including, but not limited to, notes from referring providers, lab work, radiographic tests, and other diagnostic tests. Pertinent results were summarized above.     History     Past Medical History:   Diagnosis Date     Anemia     told to take iron pills when pregnant     Depression      Depressive disorder      Diabetes mellitus (H)      Diabetes mellitus, type 2 (H) 07/15/2021     Dysthymic disorder      Endometriosis      Herpes genitalia      Hyperlipidemia      Kidney stone      Menorrhagia      Migraines      Neuropathy      Other abnormal Papanicolaou smear of cervix and cervical HPV(795.09) 2013     PCOS (polycystic ovarian syndrome)      Post traumatic stress disorder (PTSD)        Past Surgical History:   Procedure Laterality Date     BLADDER REPAIR W/  SECTION      X2     C/SECTION, CLASSICAL      x2     COLONOSCOPY N/A 2021    Procedure: COLONOSCOPY;  Surgeon: Rene Lehman DO;  Location:  GI     DILATION AND CURETTAGE  2015     DILATION AND CURETTAGE  2015     DILATION AND CURETTAGE, OPERATIVE HYSTEROSCOPY, COMBINED N/A 2015    Procedure: Dilation and Curettage with Hysteroscopy;  Surgeon: Zia Farmer MD;  Location: [facility] [place];  Service:      DILATION AND CURETTAGE, OPERATIVE HYSTEROSCOPY, COMBINED N/A 2016    Procedure: DILATION AND CURETTAGE WITH HYSTEROSCOPY INSERT MIRENA;  Surgeon: Suzanne Major MD;  Location: [facility] [place];  Service:      ENT SURGERY       ESOPHAGOSCOPY, GASTROSCOPY, DUODENOSCOPY (EGD), COMBINED N/A  12/14/2021    Procedure: ESOPHAGOGASTRODUODENOSCOPY, WITH BIOPSY;  Surgeon: Rene Lehman DO;  Location: [facility]     GYN SURGERY  10/19/15    laproscopic lysis of adhesions     HYSTEROSCOPY, ABLATE ENDOMETRIUM HYDROTHERMAL, COMBINED N/A 3/2/2018    Procedure: HYSTEROSCOPY, DILATION AND CURETTAGE, ENDOMETRIAL ABLATION;  Surgeon: Kristy Israel DO;  Location: [facility] [place];  Service: Gynecology     LAPAROSCOPIC HYSTERECTOMY TOTAL N/A 3/4/2019    Procedure: ROBOTIC TOTAL LAPAROSCOPIC HYSTERECTOMY, BILATERAL SALPINGECTOMY, LEFT OVARIAN CYSTOTOMY. CYSTOSCOPY;  Surgeon: Zia Farmer MD;  Location: [facility] [place];  Service: Gynecology     LAPAROSCOPY DIAGNOSTIC (GENERAL) N/A 10/19/2015    Procedure:  LAPAROSCOPY,LYSIS OF ADHESIONS;  Surgeon: Zia Farmer MD;  Location: [facility] [place];  Service:      [place] LAP,TUBAL CAUTERY Bilateral 3/2/2018    Procedure: LAPAROSCOPIC BILATERAL TUBAL LIGATION;  Surgeon: Kristy Israel DO;  Location: [facility] [place];  Service: Gynecology     TONSILLECTOMY         Social History     Socioeconomic History     Marital status: Single     Spouse name: Not on file     Number of children: Not on file     Years of education: Not on file     Highest education level: Not on file   Occupational History     Not on file   Tobacco Use     Smoking status: Never Smoker     Smokeless tobacco: Never Used   Substance and Sexual Activity     Alcohol use: No     Alcohol/week: 0.0 standard drinks     Drug use: No     Sexual activity: Not on file   Other Topics Concern     Not on file   Social History Narrative     Not on file     Social Determinants of Health     Financial Resource Strain: Not on file   Food Insecurity: Not on file   Transportation Needs: Not on file   Physical Activity: Not on file   Stress: Not on file   Social Connections: Not on file   Intimate Partner Violence: Not At Risk     Fear of Current or Ex-Partner: No     Emotionally Abused: No     Physically Abused: No      Sexually Abused: No   Housing Stability: Not on file       Family History   Adopted: Yes   Problem Relation Age of Onset     Prostate Cancer Father      Cancer Father         prostate     Alcoholism Sister      Alcoholism Sister      Alcoholism Brother      Alcoholism Brother      Diabetes Paternal Grandmother      Other Cancer Paternal Grandmother      Alcoholism Daughter      Coronary Artery Disease No family hx of      Urolithiasis No family hx of      Clotting Disorder No family hx of      Gout No family hx of      Heart Disease No family hx of      Anesthesia Reaction No family hx of      Family history reviewed and edited as appropriate    Medications and Allergies:     Outpatient Encounter Medications as of 9/6/2022   Medication Sig Dispense Refill     acetaminophen (TYLENOL) 500 MG tablet Take 500-1,000 mg by mouth every 4 hours as needed        atorvastatin (LIPITOR) 10 MG tablet atorvastatin 10 mg tablet (Patient taking differently: every evening atorvastatin 10 mg tablet) 30 tablet 11     canagliflozin (INVOKANA) 300 MG tablet every morning (before breakfast)        Continuous Blood Gluc  (FREESTYLE CINDY 14 DAY READER) JAUN 1 Application       Continuous Blood Gluc  (FREESTYLE CINDY 2 READER) JAUN 1 each See Admin Instructions Use per 's instructions. 1 each 0     Continuous Blood Gluc Sensor (FREESTYLE CINDY 14 DAY SENSOR) MISC 1 Application every 14 days 2 each 3     Continuous Blood Gluc Sensor (FREESTYLE CINDY 14 DAY SENSOR) MISC        Continuous Blood Gluc Sensor (FREESTYLE CINDY 2 SENSOR) MISC 1 each See Admin Instructions Change every 14 days. 6 each 1     diazepam (VALIUM) 5 MG tablet once as needed Only for Dentist       fluconazole (DIFLUCAN) 150 MG tablet once as needed        Fremanezumab-vfrm (AJOVY) 225 MG/1.5ML SOAJ Inject 225 mg Subcutaneous every 30 days 1.5 mL 3     gabapentin (NEURONTIN) 300 MG capsule Take 2 capsules (600 mg) by mouth 3 times daily 540  capsule 1     ibuprofen (ADVIL/MOTRIN) 600 MG tablet every 4 hours as needed        insulin glargine (LANTUS PEN) 100 UNIT/ML pen Inject 24 Units Subcutaneous At Bedtime        Melatonin 10 MG CAPS Take 1 capsule by mouth at bedtime as needed, may repeat once (insomnia) 30 capsule 1     metFORMIN (GLUCOPHAGE XR) 500 MG 24 hr tablet TAKE 2 TABLETS(1000 MG) BY MOUTH TWICE DAILY WITH MEALS 360 tablet 3     methocarbamol (ROBAXIN) 500 MG tablet every evening        multivitamin (ONE-DAILY) tablet Take 1 tablet by mouth daily 100 tablet 3     nortriptyline (PAMELOR) 50 MG capsule Take 50 mg by mouth At Bedtime        ondansetron (ZOFRAN) 4 MG tablet every 6 hours as needed        pantoprazole (PROTONIX) 40 MG EC tablet Take 1 tablet (40 mg) by mouth daily 90 tablet 3     [] predniSONE (DELTASONE) 20 MG tablet Take 3 tablets (60 mg) by mouth daily for 6 days Take in AM with food. 18 tablet 0     rizatriptan (MAXALT) 10 MG tablet Take 1 tablet (10 mg) by mouth at onset of headache for migraine May repeat in 2 hours. 18 tablet 3     tolterodine ER (DETROL LA) 4 MG 24 hr capsule Take 1 capsule (4 mg) by mouth daily 30 capsule 3     verapamil ER (VERELAN) 120 MG 24 hr capsule TAKE 1 CAPSULE BY MOUTH EVERY NIGHT AT BEDTIME 30 capsule 1     vitamin D3 (CHOLECALCIFEROL) 125 MCG (5000 UT) tablet Take 1 tablet (125 mcg) by mouth every morning 90 tablet 3     [DISCONTINUED] topiramate (TOPAMAX) 100 MG tablet Take 1 tablet (100 mg) by mouth 2 times daily 180 tablet 3     No facility-administered encounter medications on file as of 2022.        Allergies   Allergen Reactions     Hydrocodone Other (See Comments)     Headache per pt and hallucinations  Headache per pt and hallucinations       Reglan [Metoclopramide]      Anxiety     Vicodin [Hydrocodone-Acetaminophen] Other (See Comments)     Hallucinations     Silver Nitrate Itching and Rash     Only issues if in for a long time  Only issues if in for a long time  Only  issues if in for a long time       Tegaderm Transparent Dressing (Informational Only) Rash        Review of systems:  A full 10 point review of systems was obtained and was negative except for the pertinent positives and negatives stated within the HPI.    Objective Findings:   Physical Exam:    Constitutional: LMP 05/23/2016   General: Alert, cooperative, no distress, well-appearing  Head: Atraumatic, normocephalic, no obvious abnormalities   Eyes: EOMI, Sclera anicteric, no obvious conjunctival hemorrhage   Nose: Nares normal, no obvious malformation, no obvious rhinorrhea   Respiratory: normal appearing respiration, no obvious cough  Musculoskeletal: Range of motion intact for visualized extremities, no obvious strength deficit  Skin: No jaundice, no obvious rash  Neurologic: AAOx3, no obvious neurologic abnormality  Psychiatric: Normal Affect, appropriate mood  Extremities: No obvious edema, no obvious malformation     Labs, Radiology, Pathology     Lab Results   Component Value Date    WBC 9.4 02/28/2022    WBC 9.3 10/26/2020    WBC 8.5 04/21/2020    HGB 13.7 02/28/2022    HGB 13.0 10/26/2020    HGB 12.3 04/21/2020     02/28/2022     10/26/2020     04/21/2020    CHOL 210 (H) 02/28/2022    CHOL 200 (H) 02/17/2021    CHOL 221 (H) 11/04/2020    TRIG 84 02/28/2022    TRIG 109 02/17/2021    TRIG 104 11/04/2020    HDL 58 02/28/2022    HDL 50 02/17/2021    HDL 58 11/04/2020    ALT 19 11/04/2020    ALT 20 10/26/2020    ALT 15 07/27/2020    AST 19 11/04/2020    AST 15 10/26/2020    AST 11 07/27/2020     02/28/2022     05/05/2021     02/17/2021    BUN 9 02/28/2022    BUN 8 05/05/2021    BUN 8 02/17/2021    CO2 26 02/28/2022    CO2 27 05/05/2021    CO2 22 02/17/2021    TSH 1.71 02/28/2022    TSH 0.87 04/21/2020        Liver Function Studies -   Recent Labs   Lab Test 11/04/20  0827 08/14/13  1420   PROTTOTAL 7.3 6.8   ALBUMIN 3.8 3.5   BILITOTAL 0.3 0.4   ALKPHOS 91  --    AST 19  --     ALT 19  --    BILIDIRECT  --  0.1          Patient Active Problem List    Diagnosis Date Noted     Encounter prior to initiation of medication 09/07/2022     Priority: Medium     Esophageal dysphagia 04/29/2022     Priority: Medium     Major depressive disorder, single episode, severe without psychotic features (H) 03/08/2022     Priority: Medium     Hyperglycemia 01/15/2022     Priority: Medium     Morbid obesity (H) 12/21/2021     Priority: Medium     Unintentional weight loss 09/11/2021     Priority: Medium     Rectal bleeding 09/11/2021     Priority: Medium     Flatulence, eructation and gas pain 09/11/2021     Priority: Medium     Diabetes mellitus, type 2 (H) 07/15/2021     Priority: Medium     Chronic nausea 07/13/2021     Priority: Medium     Chronic vomiting 07/13/2021     Priority: Medium     Obesity 01/02/2013     Priority: Medium     Problem list name updated by automated process. Provider to review       Other abnormal Papanicolaou smear of cervix and cervical HPV(795.09) 01/02/2013     Priority: Medium      Assessment and Plan   Assessment:    Ruth Vanegas is a 36 year old female with HLD, uncontrolled DM2, anxiety, frequent yeast infection, migraine who is presenting as a follow up patient in consultation at the request of Dr. Banks with a chief complaint of nausea and vomiting.    The patient was seen in video telemedicine consultation regarding her symptoms of nausea and vomiting.     Currently she is improved but still symptomatic. She was counseled to discontinue her marijuana use as this can cause a delay in gastric emptying and increased levels of THC can result in increased nausea and vomiting in some patients.     She is not currently on nortriptyline and it has been discontinued for >1 month. We discussed initiation of mirtazapine with a slow taper to a max dose of 45mg. Instructions listed in the plan below. EKG will be ordered prior to initiation of mirtazapine and again either at 15  or 30mg depending on the dose required for her symptoms.     The black box warning of increased suicide risk was discussed with the patient and Ruth Vanegas was encouraged to call 911 and/or a suicide prevention hotline if they were to develop thoughts of hurting herself or others. Additionally, Ruth Vanegas should contact the office so that her medications can be adjusted.      1. Encounter prior to initiation of medication    2. Chronic nausea    3. Chronic vomiting       Orders Placed This Encounter   Procedures     EKG 12-lead complete w/read - Clinics      Plan:    1. Please obtain an EKG to assess for QT prolongation prior to initiation of therapy with mirtazapine. If normal we will initiate mirtazapine at 15mg at night for two weeks.  2. After 2 weeks please send a [employer] message with an update on your symptoms better/worse/same.   3. If you are tolerating the medication and symptoms persist, we will increase to 30mg at night   4. At that dose we will obtain a repeat EKG to ensure you continue to have a normal QT interval.    5. If needed, we can increase the dose to 45mg  6. Stop marijuana as this could be making you worse or preventing you from getting better.     Follow up plan:   Return to clinic 4 months and as needed.    The risks and benefits of my recommendations, as well as other treatment options were discussed with the patient and any available family today. All questions were answered.     o Follow up: As planned above. Today, I personally spent 31 minutes in direct face to face time with the patient, of which greater than 50% of the time was spent in patient education and counseling as described above. Approximately 10 minutes were spent on indirect care associated with the patient's consultation including but not limited to review of: patient medical records to date, clinic visits, hospital records, lab results, imaging studies, procedural documentation, and coordinating care with  other providers. The findings from this review are summarized in the above note. A total of 31 minutes was spent on the day of the encounter.     The patient verbalized understanding of the plan and was appreciative for the time spent and information provided during the office visit.     Author:     Rene Lehman DO   of Medicine  Director, Esophageal Disorders Program  Division of Gastroenterology, Hepatology, and Nutrition  [facility]    Documentation assisted by voice recognition and documentation system.

## 2022-09-06 NOTE — PROGRESS NOTES
Ruth is a 36 year old who is being evaluated via a billable video visit.      How would you like to obtain your AVS? MyChart  If the video visit is dropped, the invitation should be resent by: Text to cell phone: [phone]  Will anyone else be joining your video visit? No        Video-Visit Details    Video Start Time: 3:20 PM    Type of service:  Video Visit    Video End Time:3:51 PM    Originating Location (pt. Location): Home    Distant Location (provider location):  [facility] [place]     Platform used for Video Visit: April Miranda is a 36 year old who is being evaluated via a billable video visit.      How would you like to obtain your AVS? MyChart  If the video visit is dropped, the invitation should be resent by: Text to cell phone: [phone]  Will anyone else be joining your video visit? No          Gastroenterology Visit for: Ruth Vanegas 1985   MRN: [id number]     Reason for Visit:  chief complaint    Referred by: Jocelyn  / [address] / [place] [address]  Patient Care Team:  Milton Banks MD as PCP - General  [facility], Zia Chaudhry MD (OB/Gyn)  Randall Allison, PharmD as Pharmacist (Pharmacist)  Milton Banks MD as Assigned PCP  Puja Prescott MD as Resident (Student in organized health care education/training program)  Rene Lehman DO as Assigned Gastroenterology Provider  Roberto Bolanos MD as Assigned Neuroscience Provider  Sandra Branch PA-C as Assigned Surgical Provider  Yari Locke RPH as Pharmacist (Pharmacist Ambulatory Care)  Maris Leary PA-C as Assigned Endocrinology Provider  Yari Locke RPH as Assigned MTM Pharmacist    History of Present Illness:   Ruth Vanegas is a 36 year old female with HLD, uncontrolled DM2, anxiety, frequent yeast infection, migraine who is presenting as a follow up patient in consultation at the request of Dr. Banks with a chief complaint of nausea  and vomiting.      9/6/22  The patient states she is not getting as sick as she was before. She is eating more than before. Nausea occurs approximately 3-4 times a week instead of every day, vomiting 1-2 times a week. Appetite is low. She states she is eating 1-2 times a day. Taking pantoprazole in the morning. Feels a lot better but still feels mediocre.  Has been off of nortriptyline for over a month. Has not noticed a change in mood or negative change in GI symptoms since stopping the medication. She has tried mirtazapine before but only at the 15mg dose and not for her GI symptoms. Feels better since the manometry. Smokes marijuana at night to help with sleep. Began smoking marijuana for a little over a year. A couple puffs of a blunt. 3-4 days to go through a full blunt.     -----------------------------  4/29/22  Patient ended up having knee procedure and unable to get stool antigen testing for H pylori. She continues to have issues with nausea and vomiting and reports continued acid reflux and regurgitation and feels as if she needs to vomit following meals. She just started physical therapy for her knee. She is going to submit the stool antigen testing. Stopped taking pantoprazole for almost 2 months. At first she wasn't taking it because she was going to leave a stool sample. After treating her  H pylori she feels that her symptoms are improved but still present. Patient unable to state if the reflux and regurgitation is tied to nausea/vomiting. Denies heartburn. Whenever she eats she feels as if she has to force it down. And when she eats she feels it is going to come back up.  Feels food is stuck in her esophagus. She feels as if food is potentially building up (normal barium). Currently feels like she is eating once a day because she doesn't like the feeling that she is forcing food down.  When her glucose was extremely uncontrolled in the past she had more symptoms.     A1C is currently 7.2  "(2/28/22)  -----------------------------------------------------------  1/14/22  Just finished treatment for h pylori on Wednesday. She was \"ok\" throughout the treatment. Had increased nausea while undergoing treatment. Kids got COVID end of December/beginning of January. With COVID the patient was asymptomatic.     Dark stools while on treatment with burning. She had blood when wiping but not in the stool or in toilet bowl. She denies straining. Denies medication such as preparation H.    Continues to have some nausea but it is improved since completing treatment for h pylor. Feels that she is able to eat more. Nausea is worse in the morning and at night. Before breakfast and at night after dinner.     Unsure if pantoprazole has made any change to symptoms.      Hyperglycemia in the AM- high 200s. Normal midday. Occasionally low in the evening. Sees endocrinologist soon.   ---------------------------------------------------------------  9/10/21  Ruth Vanegas states began getting sick about one year ago. She felt this was related to her diabetes. She was on an injection (bydureon) that was making her sick. She continued to feel sick: nauseated constantly. She has vomiting that can happen at any point. She is unable to determine what triggers vomiting. She always feels nauseated or like she will vomit. About 4-5 months ago she was feeling like she was forcing food down. It was \"hurting\" to eat. She had been on ozempic which also worsened her symptoms of nausea back in June. She had to discontinue the medication because it worsened her nausea and vomiting. Currently taking zofran 3-4 times a week. Doesn't like taking it because she gets constipated with it. After lunch she will get \"sick\". She feels that she has a lot of reflux associated and regurgitation. Occasionally she will regurgitate in order to feel better. Symptoms can be worse after a meal. Her symptoms also occur in the morning before waking. When she " vomits in the morning she will not vomit food from the night before. She typically completes her dinner around 6:30pm. Has not yet had an endoscopy or gastric emptying study. When she was taking omeprazole 20mg she did not notice a benefit in her symptoms. She felt that omeprazole worsened her headaches. +belching and regurgitation, no heartburn.     If the patient is constipated she has abdominal pain. When constipated has occasional streaks of blood on the toilet paper. Never mixed with stool. +unintentional weight loss.     Was previously on mirtazapine. She was only on the medication leading up to therapy.  Nortriptyline 50mg at bedtime- taking for depression and insomnia.     Last hemoglobin A1c- She has an appointment with endocrinology on October 28th. Her glucose sensor ranges from 140-290. When she wakes and hasn't eaten her glucose is >200.   ---------------------------------------------------------------     Ruth Vanegas denies  odynophagia, early satiety, abdominal pain, abdominal distension/bloating, diarrhea, hematochezia, or melena.     Family history of colon cancer: none  Wt Readings from Last 5 Encounters:   08/15/22 126.1 kg (278 lb)   05/31/22 126.1 kg (278 lb)   12/21/21 132.9 kg (293 lb)   12/14/21 132.6 kg (292 lb 5.3 oz)   10/28/21 137 kg (302 lb)        Esophageal Questionnaire(s)    BEDQ Questionnaire  BEDQ Questionnaire: How Often Have You Had the Following? 5/31/2022   Trouble eating solid food (meat, bread, vegetables) 5   Trouble eating soft foods (yogurt, jello, pudding) 0   Trouble swallowing liquids 0   Pain while swallowing 3   Coughing or choking while swallowing foods or liquids 2   Total Score: 10     BEDQ Questionnaire: Discomfort/Pain Ratings 5/31/2022   Eating solid food (meat, bread, vegetables) 3   Eating soft foods (yogurt, jello, pudding) 0   Drinking liquid 0   Total Score: 3       Eckardt Questionnaire  Eckardt Questionnaire 5/31/2022   Dysphagia 3   Regurgitation 2    Retrosternal Pain 0   Weight Loss (kg) 2   Total Score:  7       Promis 10 Questionnaire  PROMIS 10 FLOWSHEET DATA 5/31/2022   In general, would you say your health is: 2   In general, would you say your quality of life is: 3   In general, how would you rate your physical health? 3   In general, how would you rate your mental health, including your mood and your ability to think? 3   In general, how would you rate your satisfaction with your social activities and relationships? 3   In general, please rate how well you carry out your usual social activities and roles. (This includes activities at home, at work and in your community, and responsibilities as a parent, child, spouse, employee, friend, etc.) 3   To what extent are you able to carry out your everyday physical activities such as walking, climbing stairs, carrying groceries, or moving a chair? 5   In the past 7 days, how often have you been bothered by emotional problems such as feeling anxious, depressed, or irritable? 3   In the past 7 days, how would you rate your fatigue on average? 3   In the past 7 days, how would you rate your pain on average, where 0 means no pain, and 10 means worst imaginable pain? 5   Mental health question re-calculation - no clinical value 3   Physical health question re-calculation - no clinical value 3   Pain question re-calculation - no clinical value 3   Global Mental Health Score 12   Global Physical Health Score 14   PROMIS TOTAL - SUBSCORES 26       STUDIES & PROCEDURES:    EGD:   Date: 12/14/21  Impression:  Findings:        The examined esophagus was normal.        The gastroesophageal flap valve was visualized endoscopically and        classified as Hill Grade I (prominent fold, tight to endoscope).        The entire examined stomach was normal. Biopsies were taken with a cold        forceps for histology. Verification of patient identification for the        specimen was done. Estimated blood loss: none.        The  duodenal bulb, first portion of the duodenum, second portion of the        duodenum and examined duodenum were normal. Biopsies were taken with a        cold forceps for histology. Verification of patient identification for        the specimen was done. Estimated blood loss: none.                                                                                     Impression:            - Normal esophagus.                          - Gastroesophageal flap valve classified as Hill Grade                          I (prominent fold, tight to endoscope).                          - Normal stomach. Biopsied.                          - Normal duodenal bulb, first portion of the duodenum,                          second portion of the duodenum and examined duodenum.                          Biopsied.   Pathology Report:  B. STOMACH, BIOPSY:   Immunostain, with appropriate controls, on block B1 is POSITIVE for H. pylori, confirming this as likely etiology for gastritis.       Addendum electronically signed by Yari Lopez MD on 12/16/2021 at 12:49 PM   Final Diagnosis   A. DUODENUM, BIOPSY:   Duodenal mucosa with focal foveolar metaplasia, otherwise no significant abnormality; negative for features of celiac sprue      B. STOMACH, BIOPSY:   Gastric  mucosa with focal active chronic gastritis; no intestinal metaplasia or dysplasia; report of H. pylori immunohistochemistry to follow            Colonoscopy:  Date: 12/14/21  Impression:   Findings:        The perianal and digital rectal examinations were normal.        The colon (entire examined portion) appeared normal.        Non-bleeding internal hemorrhoids were found during retroflexion. The        hemorrhoids were small.                                                                                     Impression:            - The entire examined colon is normal.                          - Non-bleeding internal hemorrhoids.                          - No specimens  collected.   Pathology Report:     EndoFLIP directed at the UES or LES (8cm (EF-325) balloon length or 16cm (EF-322) balloon length):   Date:  8cm balloon  Balloon inflation Balloon pressure CSA (mm^2) DI (mm^2/mmHg) Dmin (mm) Compliance   20 (ladmark ID)        30        40        50           16cm balloon  Balloon inflation Balloon pressure CSA (mm^2) DI (mm^2/mmHg) Dmin (mm) Compliance   30 (ladmark ID)        40        50        60        70           High Resolution Manometry:  Date: 5/31/22  Impression:  The baseline tone of the lower esophageal sphincter was normotensive. The lower esophageal sphincter was able to relax appropriately as measured by the IRP (10.7). The EGJ morphology was type 2. There was peristalsis visible. The DCI, DL, and contractile pattern were within normal limits. All 10/10 swallows were normal. There was one swallow with a reflux event after clearing the bolus. There were 0/10 swallows with elevated intrabolus pressure and compartmentalized pressurization. Rapid water swallows were performed with normal augmentation. Rapid Drink Challenge does not indicate an elevated IRP. Supine liquid swallows were performed. Upright liquid swallows were performed with no change in manometric pattern and a median upright IRP of 4.0.  Bolus transit as measured by impedance was complete in 9/10 swallows. This is most consistent with normal esophageal motility and a small hiatal hernia.       Wording of procedure description and interpretation was adapted from [facility] [employer] Weekly Motility Conference (2018).       Impressions   Based on the most recent [place] Classification v4.0, the findings are most consistent with normal esophageal motility and a small hiatal hernia.     PH/Impedance:  Date:  Impression:     Bravo:  48 or 96hr  Date:  Impression:    CT:  Date:  Impression:    Esophagram:  Date: 3/23/21  Impression:      IMPRESSION:  1.  Normal esophagram. No hiatal  hernia and no spontaneous gastroesophageal reflux.    Gastric emptying study  21  Normal gastric emptying    Prior medical records were reviewed including, but not limited to, notes from referring providers, lab work, radiographic tests, and other diagnostic tests. Pertinent results were summarized above.     History     Past Medical History:   Diagnosis Date     Anemia     told to take iron pills when pregnant     Depression      Depressive disorder      Diabetes mellitus (H)      Diabetes mellitus, type 2 (H) 07/15/2021     Dysthymic disorder      Endometriosis      Herpes genitalia      Hyperlipidemia      Kidney stone      Menorrhagia      Migraines      Neuropathy      Other abnormal Papanicolaou smear of cervix and cervical HPV(795.09) 2013     PCOS (polycystic ovarian syndrome)      Post traumatic stress disorder (PTSD)        Past Surgical History:   Procedure Laterality Date     BLADDER REPAIR W/  SECTION      X2     C/SECTION, CLASSICAL      x2     COLONOSCOPY N/A 2021    Procedure: COLONOSCOPY;  Surgeon: Rene Lehman DO;  Location:  GI     DILATION AND CURETTAGE  2015     DILATION AND CURETTAGE  2015     DILATION AND CURETTAGE, OPERATIVE HYSTEROSCOPY, COMBINED N/A 2015    Procedure: Dilation and Curettage with Hysteroscopy;  Surgeon: Zia Farmer MD;  Location: [facility];  Service:      DILATION AND CURETTAGE, OPERATIVE HYSTEROSCOPY, COMBINED N/A 2016    Procedure: DILATION AND CURETTAGE WITH HYSTEROSCOPY INSERT MIRENA;  Surgeon: Suzanne Major MD;  Location: [facility];  Service:      ENT SURGERY       ESOPHAGOSCOPY, GASTROSCOPY, DUODENOSCOPY (EGD), COMBINED N/A 2021    Procedure: ESOPHAGOGASTRODUODENOSCOPY, WITH BIOPSY;  Surgeon: Rene Lehman DO;  Location:  GI     GYN SURGERY  10/19/15    laproscopic lysis of adhesions     HYSTEROSCOPY, ABLATE ENDOMETRIUM HYDROTHERMAL, COMBINED N/A 3/2/2018    Procedure: HYSTEROSCOPY,  DILATION AND CURETTAGE, ENDOMETRIAL ABLATION;  Surgeon: Kristy Israel DO;  Location: [facility] OR;  Service: Gynecology     LAPAROSCOPIC HYSTERECTOMY TOTAL N/A 3/4/2019    Procedure: ROBOTIC TOTAL LAPAROSCOPIC HYSTERECTOMY, BILATERAL SALPINGECTOMY, LEFT OVARIAN CYSTOTOMY. CYSTOSCOPY;  Surgeon: Zia Farmer MD;  Location: [facility] OR;  Service: Gynecology     LAPAROSCOPY DIAGNOSTIC (GENERAL) N/A 10/19/2015    Procedure:  LAPAROSCOPY,LYSIS OF ADHESIONS;  Surgeon: Zia Farmer MD;  Location: [facility] OR;  Service:      LA LAP,TUBAL CAUTERY Bilateral 3/2/2018    Procedure: LAPAROSCOPIC BILATERAL TUBAL LIGATION;  Surgeon: Kristy Israel DO;  Location: [facility];  Service: Gynecology     TONSILLECTOMY         Social History     Socioeconomic History     Marital status: Single     Spouse name: Not on file     Number of children: Not on file     Years of education: Not on file     Highest education level: Not on file   Occupational History     Not on file   Tobacco Use     Smoking status: Never Smoker     Smokeless tobacco: Never Used   Substance and Sexual Activity     Alcohol use: No     Alcohol/week: 0.0 standard drinks     Drug use: No     Sexual activity: Not on file   Other Topics Concern     Not on file   Social History Narrative     Not on file     Social Determinants of Health     Financial Resource Strain: Not on file   Food Insecurity: Not on file   Transportation Needs: Not on file   Physical Activity: Not on file   Stress: Not on file   Social Connections: Not on file   Intimate Partner Violence: Not At Risk     Fear of Current or Ex-Partner: No     Emotionally Abused: No     Physically Abused: No     Sexually Abused: No   Housing Stability: Not on file       Family History   Adopted: Yes   Problem Relation Age of Onset     Prostate Cancer Father      Cancer Father         prostate     Alcoholism Sister      Alcoholism Sister      Alcoholism Brother      Alcoholism  Brother      Diabetes Paternal Grandmother      Other Cancer Paternal Grandmother      Alcoholism Daughter      Coronary Artery Disease No family hx of      Urolithiasis No family hx of      Clotting Disorder No family hx of      Gout No family hx of      Heart Disease No family hx of      Anesthesia Reaction No family hx of      Family history reviewed and edited as appropriate    Medications and Allergies:     Outpatient Encounter Medications as of 9/6/2022   Medication Sig Dispense Refill     acetaminophen (TYLENOL) 500 MG tablet Take 500-1,000 mg by mouth every 4 hours as needed        atorvastatin (LIPITOR) 10 MG tablet atorvastatin 10 mg tablet (Patient taking differently: every evening atorvastatin 10 mg tablet) 30 tablet 11     canagliflozin (INVOKANA) 300 MG tablet every morning (before breakfast)        Continuous Blood Gluc  (FREESTYLE CINDY 14 DAY READER) JAUN 1 Application       Continuous Blood Gluc  (FREESTYLE CINDY 2 READER) JAUN 1 each See Admin Instructions Use per 's instructions. 1 each 0     Continuous Blood Gluc Sensor (FREESTYLE CINDY 14 DAY SENSOR) MISC 1 Application every 14 days 2 each 3     Continuous Blood Gluc Sensor (FREESTYLE CINDY 14 DAY SENSOR) MISC        Continuous Blood Gluc Sensor (FREESTYLE CINDY 2 SENSOR) MISC 1 each See Admin Instructions Change every 14 days. 6 each 1     diazepam (VALIUM) 5 MG tablet once as needed Only for Dentist       fluconazole (DIFLUCAN) 150 MG tablet once as needed        Fremanezumab-vfrm (AJOVY) 225 MG/1.5ML SOAJ Inject 225 mg Subcutaneous every 30 days 1.5 mL 3     gabapentin (NEURONTIN) 300 MG capsule Take 2 capsules (600 mg) by mouth 3 times daily 540 capsule 1     ibuprofen (ADVIL/MOTRIN) 600 MG tablet every 4 hours as needed        insulin glargine (LANTUS PEN) 100 UNIT/ML pen Inject 24 Units Subcutaneous At Bedtime        Melatonin 10 MG CAPS Take 1 capsule by mouth at bedtime as needed, may repeat once (insomnia)  30 capsule 1     metFORMIN (GLUCOPHAGE XR) 500 MG 24 hr tablet TAKE 2 TABLETS(1000 MG) BY MOUTH TWICE DAILY WITH MEALS 360 tablet 3     methocarbamol (ROBAXIN) 500 MG tablet every evening        multivitamin (ONE-DAILY) tablet Take 1 tablet by mouth daily 100 tablet 3     nortriptyline (PAMELOR) 50 MG capsule Take 50 mg by mouth At Bedtime        ondansetron (ZOFRAN) 4 MG tablet every 6 hours as needed        pantoprazole (PROTONIX) 40 MG EC tablet Take 1 tablet (40 mg) by mouth daily 90 tablet 3     [] predniSONE (DELTASONE) 20 MG tablet Take 3 tablets (60 mg) by mouth daily for 6 days Take in AM with food. 18 tablet 0     rizatriptan (MAXALT) 10 MG tablet Take 1 tablet (10 mg) by mouth at onset of headache for migraine May repeat in 2 hours. 18 tablet 3     tolterodine ER (DETROL LA) 4 MG 24 hr capsule Take 1 capsule (4 mg) by mouth daily 30 capsule 3     verapamil ER (VERELAN) 120 MG 24 hr capsule TAKE 1 CAPSULE BY MOUTH EVERY NIGHT AT BEDTIME 30 capsule 1     vitamin D3 (CHOLECALCIFEROL) 125 MCG (5000 UT) tablet Take 1 tablet (125 mcg) by mouth every morning 90 tablet 3     [DISCONTINUED] topiramate (TOPAMAX) 100 MG tablet Take 1 tablet (100 mg) by mouth 2 times daily 180 tablet 3     No facility-administered encounter medications on file as of 2022.        Allergies   Allergen Reactions     Hydrocodone Other (See Comments)     Headache per pt and hallucinations  Headache per pt and hallucinations       Reglan [Metoclopramide]      Anxiety     Vicodin [Hydrocodone-Acetaminophen] Other (See Comments)     Hallucinations     Silver Nitrate Itching and Rash     Only issues if in for a long time  Only issues if in for a long time  Only issues if in for a long time       Tegaderm Transparent Dressing (Informational Only) Rash        Review of systems:  A full 10 point review of systems was obtained and was negative except for the pertinent positives and negatives stated within the HPI.    Objective  Findings:   Physical Exam:    Constitutional: LMP 05/23/2016   General: Alert, cooperative, no distress, well-appearing  Head: Atraumatic, normocephalic, no obvious abnormalities   Eyes: EOMI, Sclera anicteric, no obvious conjunctival hemorrhage   Nose: Nares normal, no obvious malformation, no obvious rhinorrhea   Respiratory: normal appearing respiration, no obvious cough  Musculoskeletal: Range of motion intact for visualized extremities, no obvious strength deficit  Skin: No jaundice, no obvious rash  Neurologic: AAOx3, no obvious neurologic abnormality  Psychiatric: Normal Affect, appropriate mood  Extremities: No obvious edema, no obvious malformation     Labs, Radiology, Pathology     Lab Results   Component Value Date    WBC 9.4 02/28/2022    WBC 9.3 10/26/2020    WBC 8.5 04/21/2020    HGB 13.7 02/28/2022    HGB 13.0 10/26/2020    HGB 12.3 04/21/2020     02/28/2022     10/26/2020     04/21/2020    CHOL 210 (H) 02/28/2022    CHOL 200 (H) 02/17/2021    CHOL 221 (H) 11/04/2020    TRIG 84 02/28/2022    TRIG 109 02/17/2021    TRIG 104 11/04/2020    HDL 58 02/28/2022    HDL 50 02/17/2021    HDL 58 11/04/2020    ALT 19 11/04/2020    ALT 20 10/26/2020    ALT 15 07/27/2020    AST 19 11/04/2020    AST 15 10/26/2020    AST 11 07/27/2020     02/28/2022     05/05/2021     02/17/2021    BUN 9 02/28/2022    BUN 8 05/05/2021    BUN 8 02/17/2021    CO2 26 02/28/2022    CO2 27 05/05/2021    CO2 22 02/17/2021    TSH 1.71 02/28/2022    TSH 0.87 04/21/2020        Liver Function Studies -   Recent Labs   Lab Test 11/04/20  0827 08/14/13  1420   PROTTOTAL 7.3 6.8   ALBUMIN 3.8 3.5   BILITOTAL 0.3 0.4   ALKPHOS 91  --    AST 19  --    ALT 19  --    BILIDIRECT  --  0.1          Patient Active Problem List    Diagnosis Date Noted     Encounter prior to initiation of medication 09/07/2022     Priority: Medium     Esophageal dysphagia 04/29/2022     Priority: Medium     Major depressive disorder,  single episode, severe without psychotic features (H) 03/08/2022     Priority: Medium     Hyperglycemia 01/15/2022     Priority: Medium     Morbid obesity (H) 12/21/2021     Priority: Medium     Unintentional weight loss 09/11/2021     Priority: Medium     Rectal bleeding 09/11/2021     Priority: Medium     Flatulence, eructation and gas pain 09/11/2021     Priority: Medium     Diabetes mellitus, type 2 (H) 07/15/2021     Priority: Medium     Chronic nausea 07/13/2021     Priority: Medium     Chronic vomiting 07/13/2021     Priority: Medium     Obesity 01/02/2013     Priority: Medium     Problem list name updated by automated process. Provider to review       Other abnormal Papanicolaou smear of cervix and cervical HPV(795.09) 01/02/2013     Priority: Medium      Assessment and Plan   Assessment:    Ruth Vanegas is a 36 year old female with HLD, uncontrolled DM2, anxiety, frequent yeast infection, migraine who is presenting as a follow up patient in consultation at the request of Dr. Banks with a chief complaint of nausea and vomiting.    The patient was seen in video telemedicine consultation regarding her symptoms of nausea and vomiting.     Currently she is improved but still symptomatic. She was counseled to discontinue her marijuana use as this can cause a delay in gastric emptying and increased levels of THC can result in increased nausea and vomiting in some patients.     She is not currently on nortriptyline and it has been discontinued for >1 month. We discussed initiation of mirtazapine with a slow taper to a max dose of 45mg. Instructions listed in the plan below. EKG will be ordered prior to initiation of mirtazapine and again either at 15 or 30mg depending on the dose required for her symptoms.     The black box warning of increased suicide risk was discussed with the patient and Ruth Vanegas was encouraged to call 911 and/or a suicide prevention hotline if they were to develop thoughts of  hurting herself or others. Additionally, Ruth Vanegas should contact the office so that her medications can be adjusted.      1. Encounter prior to initiation of medication    2. Chronic nausea    3. Chronic vomiting       Orders Placed This Encounter   Procedures     EKG 12-lead complete w/read - Clinics      Plan:    1. Please obtain an EKG to assess for QT prolongation prior to initiation of therapy with mirtazapine. If normal we will initiate mirtazapine at 15mg at night for two weeks.  2. After 2 weeks please send a Directly message with an update on your symptoms better/worse/same.   3. If you are tolerating the medication and symptoms persist, we will increase to 30mg at night   4. At that dose we will obtain a repeat EKG to ensure you continue to have a normal QT interval.    5. If needed, we can increase the dose to 45mg  6. Stop marijuana as this could be making you worse or preventing you from getting better.     Follow up plan:   Return to clinic 4 months and as needed.    The risks and benefits of my recommendations, as well as other treatment options were discussed with the patient and any available family today. All questions were answered.     o Follow up: As planned above. Today, I personally spent 31 minutes in direct face to face time with the patient, of which greater than 50% of the time was spent in patient education and counseling as described above. Approximately 10 minutes were spent on indirect care associated with the patient's consultation including but not limited to review of: patient medical records to date, clinic visits, hospital records, lab results, imaging studies, procedural documentation, and coordinating care with other providers. The findings from this review are summarized in the above note. A total of 31 minutes was spent on the day of the encounter.     The patient verbalized understanding of the plan and was appreciative for the time spent and information provided  during the office visit.     Author:     Rene Lehman DO   of Medicine  Director, Esophageal Disorders Program  Division of Gastroenterology, Hepatology, and Nutrition  [facility]    Documentation assisted by voice recognition and documentation system.

## 2022-09-06 NOTE — TELEPHONE ENCOUNTER
topiramate (TOPAMAX) 100 MG tablet      Last Written Prescription Date:  11/3/21 to [facility]  Last Fill Quantity: 180,   # refills: 3  Last Office Visit : 3/8/22 recommended 3-4 month follow up  Future Office visit:  None scheduled    Routing refill request to provider for review/approval because:  Drug not on endocrinology refill protocol

## 2022-09-07 PROBLEM — Z76.89 ENCOUNTER PRIOR TO INITIATION OF MEDICATION: Status: ACTIVE | Noted: 2022-09-07

## 2022-09-07 RX ORDER — PEN NEEDLE, DIABETIC 32GX 5/32"
NEEDLE, DISPOSABLE MISCELLANEOUS
Qty: 100 EACH | Refills: 1 | Status: SHIPPED | OUTPATIENT
Start: 2022-09-07 | End: 2023-12-11

## 2022-09-07 NOTE — TELEPHONE ENCOUNTER
Called patient on [phone], went to   Today X 2      She will benefit from reducing her lantus dose to prevent low BG overnight.     Looks like she was started on Steroids prednisone 60 mg daily  From yesterday for her migraine headaches for next 6 days by neurology    she will need NPH 24 units (0.2U/kg) along with her steroid dose for those days when she is taking prednisone)    Assessment:  T2Dm , uncontrolled , last hba1c: 7.2 in 2/2022, BMI: 43.  Started on prednisone 60 mg daily for 6 days for her headaches     BG reviewed from her CGM, has hypoglycemia overnight and has post prandial hyperglycemia after her meals     Recommendations:    - Unsure if she is taking lantus 24 units or 22 units daily , would recommend reducing  It to 20 units from her current dose to prevent low BG and she can further reduce it by 2 units every 3 days if her fasting BG< 90 mg/dl  - Continue metformin 1000 mg bid  - Continue .invokana 300 mg daily  - She should reduce the amount of carbohydrate or starches, sugar she is eating while on steroids , this will make her BG go high   - Recommend starting her on NPH 24 units with her morning steroid dose , she should be asked to hold the NPH if she is not taking her steroid. This is only when she is on steroids.  - Ask her to let us know if her steroids are changed    Ill place a order for CDE for interim follow up in 1 week with them for BG check    Please let her know and if she is agreeable to the plan, she doesnt have a follow up with anyone, please have her scheduled first available with me or Maris     Thanks,  Puja Prescott MD.  Endocrinology fellow

## 2022-09-08 ENCOUNTER — MYC MEDICAL ADVICE (OUTPATIENT)
Dept: ENDOCRINOLOGY | Facility: CLINIC | Age: 37
End: 2022-09-08

## 2022-09-08 ENCOUNTER — TELEPHONE (OUTPATIENT)
Dept: ENDOCRINOLOGY | Facility: CLINIC | Age: 37
End: 2022-09-08

## 2022-09-08 DIAGNOSIS — R73.9 HYPERGLYCEMIA: ICD-10-CM

## 2022-09-08 DIAGNOSIS — E11.9 DIABETES MELLITUS, TYPE 2 (H): Primary | ICD-10-CM

## 2022-09-08 NOTE — TELEPHONE ENCOUNTER
----- Message from Maris Leary PA-C sent at 9/8/2022  4:11 PM CDT -----  Regarding: RE: high BG and headaches  Thank You!  That is good.  If patients have concerns that they need to be seen urgently, triage nurses at our clinic or the PCP clinic should be able to triage this appropriately decide where to go and/ or I believe we have the e-visit option.  Madeline, can you please advise how schedulers should best route these requests for more prompt appointments when we do not have them available?  Thank you!  Maris   ----- Message -----  From: Yasmin Blood  Sent: 9/8/2022   3:11 PM CDT  To: Maris Leary PA-C, Danika Andre RN, #  Subject: RE: high BG and headaches                        Hello!     She agreed to do a virtual visit to be seen sooner. Right now she is scheduled for 10/25/2022 with Maris.     Thank you,   Yasmin       ----- Message -----  From: Danika Andre RN  Sent: 9/8/2022   2:57 PM CDT  To: Maris Leary PA-C, Yasmin Blood V, #  Subject: RE: high BG and headaches                        We did just send a detailed message to Dr Prescott   ----- Message -----  From: Maris Leary PA-C  Sent: 9/8/2022   2:54 PM CDT  To: Yasmin Blood V, #  Subject: high BG and headaches                            I just added in appts next Thursday afternoon, but the priority is for folks who had to be rescheduled due to my being ill.  Triage can you please contact to see if she needs urgent visit or should see primary care or dentist for any possible infection causing her higher BG?   It looks like I saw her in March and advised RTC June or July.  It is unfortunate that she did not schedule her follow-up at that time.  We can try and get her in next available, but I know in person is always a long way out.  Again perhaps her PCP can support her in the meantime.  Best,  Maris       ----- Message -----  From: Yasmin Blood  Sent: 9/6/2022   2:41 PM CDT  To: Maris Leary,  "MELISSA, #    Hello,     I spoke to this pt over the phone in attempt to schedule a f/up to see Maris. Pt reported that her blood sugars have been out of control and \"spiking.\" Pt reported that she has been experiencing headaches related to the high blood sugars, and stated that she hasn't been able to work because they are so bad. She requested I send a message to her care team and requested someone reach out to her about her high blood sugars.     First available appt isn't until October but pt reported that she only wanted an in-person appt and wanted to know if there is a change to get a sooner appt. Please advise.     Thank you,   Yasmin"

## 2022-09-08 NOTE — TELEPHONE ENCOUNTER
Spoke with her, now she is off steroids, she had steroid induced hyperglycemia for 6 days. I gave her recommendations and documented it, thanks

## 2022-09-08 NOTE — TELEPHONE ENCOUNTER
MD did attempt to reach her by phone X 2 yesterday  with no answer. Her last visit was 3/8/22 with Metformin  1000 mg  BID , Lantus 24 units  and Invokana 300 mg. Her medication list wasn't updated by provider at that visit. She had requested Invokana refills but pharmacy told her she was taken off it. I don't see that we received any refill requests for Invokana . The  last update of invokana was 2020 reported  by patient. As I was talking to her  Dr Luo did call her back and she will ask her about a refill on invokana . Jerrica Alcaraz RN on 9/8/2022 at 5:07 PM

## 2022-09-08 NOTE — TELEPHONE ENCOUNTER
Spoke w/ Pt: Migraines: Fremanezumab-vfrm (AJOVY) 225 MG/1.5ML SOAJ  Widely fluctuating glucose results.   No longer taking prednisone.   Invokana order pended to Provider  Insulin questions: does not understand what she should be taking or doing.   Was taking 22 units at night, if glucose is high why is she reducing the Lantus dose?  Next clinic visit: 10/25/2022  What if the headaches are from high glucose?   Armida results to be uploaded for review  Provider notified.   Danika Andre, RN on 9/8/2022 at 2:46 PM       RE    message from your endocrine provider    Ruth Vanegas  You 14 minutes ago (2:19 PM)     DF      Hello,  So I have so many questions. First I am no longer taking the prednisone that was only for 6 days I stoped that yesterday! So what is this other insulin I am supposed to take? Next am I supposed to be on the invokana? I have not had it for almost 3 months because I could not get a refill on it! If so can I get a refill sent to the pharmacy?      Also can I get a call from the nurse? I need help understanding this message.         You  Ruth Vanegas 5 hours ago (8:53 AM)     SARA Miranda,      Dr Prescott has asked us to reach out with the following message:         Recommendations:     - Unsure if she is taking lantus 24 units or 22 units daily , would recommend reducing  It to 20 units from her current dose to prevent low BG and she can further reduce it by 2 units every 3 days if her fasting BG< 90 mg/dl  - Continue metformin 1000 mg bid  - Continue .invokana 300 mg daily  - She should reduce the amount of carbohydrate or starches, sugar she is eating while on steroids , this will make her BG go high   - Recommend starting her on NPH 24 units with her morning steroid dose , she should be asked to hold the NPH if she is not taking her steroid. This is only when she is on steroids.  - Ask her to let us know if her steroids are changed     Ill place a order for CDE for  interim follow up in 1 week with them for BG check     Please let her know and if she is agreeable to the plan, she doesnt have a follow up with anyone, please have her scheduled first available with me or Maris      Thanks,  Puja Prescott MD.  Endocrinology fellow     Please let us know if you have any questions.      Thank you.      JASWANT Garnica

## 2022-09-08 NOTE — TELEPHONE ENCOUNTER
Spoke with patient today - 9/8 ar 5 PM    Lost weight from 132 kg to 126 kg since 10/2021    Current DM meds:  lantus 22 units daily  Not on invokana for past few months   Metformin 1000 mg bid    Gabapentin for neuropathy    She was on prednisone 60 mg daily for 66 days , last day being 9/6    Not on steroids anymore  She still has persistent headaches, will  Follow up with neurology    BG trends: When not on steroids had overnight hypoglycemia, when on steroids had post prandial hyperglycemia    Recommendations:  Reduce lantus to 20 units daily given her overnight hypoglycemia (observed when she wasn't eating much on 8/25 before she started steroids)    Continue metformin 1000 mg bid    Renewed invokana 300 mg daily, I advised her to reduce lantus further to 18 units once she starts invokana    She has a follow up with King JOHNSON and Maris Leary in 10/2022    I advised her to let us know if will be placed back on steroids, she will need additional insulin at that time    Puja Prescott MD.  Endocrinology fellow

## 2022-09-12 ENCOUNTER — MYC MEDICAL ADVICE (OUTPATIENT)
Dept: NEUROLOGY | Facility: CLINIC | Age: 37
End: 2022-09-12

## 2022-09-12 DIAGNOSIS — G43.719 INTRACTABLE CHRONIC MIGRAINE WITHOUT AURA AND WITHOUT STATUS MIGRAINOSUS: Primary | ICD-10-CM

## 2022-09-20 ENCOUNTER — MYC MEDICAL ADVICE (OUTPATIENT)
Dept: FAMILY MEDICINE | Facility: CLINIC | Age: 37
End: 2022-09-20

## 2022-09-20 ENCOUNTER — OFFICE VISIT (OUTPATIENT)
Dept: NEUROLOGY | Facility: CLINIC | Age: 37
End: 2022-09-20
Payer: COMMERCIAL

## 2022-09-20 VITALS — BODY MASS INDEX: 43.85 KG/M2 | DIASTOLIC BLOOD PRESSURE: 83 MMHG | WEIGHT: 280 LBS | SYSTOLIC BLOOD PRESSURE: 121 MMHG

## 2022-09-20 DIAGNOSIS — G43.719 INTRACTABLE CHRONIC MIGRAINE WITHOUT AURA AND WITHOUT STATUS MIGRAINOSUS: Primary | ICD-10-CM

## 2022-09-20 DIAGNOSIS — E11.9 DIABETES MELLITUS, TYPE 2 (H): ICD-10-CM

## 2022-09-20 DIAGNOSIS — Z76.0 ENCOUNTER FOR MEDICATION REFILL: ICD-10-CM

## 2022-09-20 DIAGNOSIS — F51.01 PRIMARY INSOMNIA: ICD-10-CM

## 2022-09-20 DIAGNOSIS — B37.31 YEAST INFECTION OF THE VAGINA: Primary | ICD-10-CM

## 2022-09-20 PROCEDURE — 99214 OFFICE O/P EST MOD 30 MIN: CPT | Mod: 25 | Performed by: PSYCHIATRY & NEUROLOGY

## 2022-09-20 PROCEDURE — 64615 CHEMODENERV MUSC MIGRAINE: CPT | Performed by: PSYCHIATRY & NEUROLOGY

## 2022-09-20 RX ORDER — FLUCONAZOLE 150 MG/1
150 TABLET ORAL ONCE
Qty: 1 TABLET | Refills: 0 | Status: SHIPPED | OUTPATIENT
Start: 2022-09-20 | End: 2022-09-20

## 2022-09-20 RX ORDER — FLASH GLUCOSE SENSOR
1 KIT MISCELLANEOUS
Qty: 2 EACH | Refills: 3 | Status: SHIPPED | OUTPATIENT
Start: 2022-09-20 | End: 2022-09-20

## 2022-09-20 RX ORDER — FLASH GLUCOSE SENSOR
1 KIT MISCELLANEOUS
Qty: 2 EACH | Refills: 3 | Status: SHIPPED | OUTPATIENT
Start: 2022-09-20 | End: 2023-10-04

## 2022-09-20 NOTE — PROCEDURES
NEUROLOGY PROCEDURE NOTE  Sep 20, 2022      Chronic migraine Botox injection    CONSENT: The procedure was explained to the patient. The risks and benefits of the procedure and the alternatives were discussed with the patient. The patient was given an opportunity to ask any questions she had about the procedure. A verbal consent was obtained from the patient. A written consent is available in the chart.    PRE-PROCEDURE  Diagnosis: Chronic migraine  Headache frequency before the use of Botox:- 30 headache days per month  Headache frequency with Botox use:-NA (first Botox)    EXAM  GENERAL: Healthy appearing, alert, no acute distress, normal habitus.  NEUROLOGICAL:  Patient is alert with fluent language.  Extraocular movements are intact.  Facial movements are present and symmetric.  No arm drift.    PROCEDURE SUMMARY:   The following muscles were identified after palpating for landmarks and the overlying skin was cleansed with alcohol. These muscles were then injected using a 30 guage- half  inch needle with the following doses of onabotulinumtoxin A. A 5 unit/ 0.1 ml dilution was used for the injections. A 2 x 100 unit vial was used.    1. Corrugators 5 units each side.  2. Procerus 5 units.  3. Frontalis 10 units each side.  4. Temporalis 20 units each side.  5. Occipitalis 15 units each side.  6. Paraspinals 10 units each side.  7. Trapezius 15 units each side.    Units Injected: 155 Units  Units Discarded: 45 Units  Lot Number and Expiration: [id number]; 4/2024    The patient tolerated the procedure without any immediate complaints and will call the Neurology clinic if she has any problems or side effects from the medication. The patient will follow up in the Neurology clinic as previously decided.      Roberto Bolanos MD  Neurologist  [facility]  [phone]

## 2022-09-20 NOTE — TELEPHONE ENCOUNTER
S-(situation):   Patient declined triage  Patient sent My Chart message for yeast treatment.  Dr Banks usually just treats discharge without an appointment per patient  See telephone encounters dated 1/12/22  Patient uses [facility] pharmacy and was last treated with Flagyl    B-(background):   Patient gets frequent yeast infections due to diabetes. Patient is now seeing Endocrine for diabetes  Patient had a virtual appointment with Dr Banks on 7/20/21    A-(assessment):   Brown  - whitish vaginal discharge with itching and mild - moderate discomfort for 3 -4 days  No abdominal pain  No fever and normal urination    R-(recommendations):     Dr Banks out of clinic and message sent to IVETH, Dr Avila for review / plan    Danisha Feliz RN  [facility]      To: [facility] SUPPORT POOL      From: Ruth Vanegas      Created: 9/20/2022 7:19 AM        Hello can you send something for a yeast infection to the pharmacy for me please?

## 2022-09-20 NOTE — TELEPHONE ENCOUNTER
Rx sent.  Please schedule physical/diabetes check with Dr. Banks.    Future Appointments   Date Time Provider Department Center   10/18/2022  8:00 AM Helen Malik RN [facility]   10/25/2022 10:00 AM Maris Leary PA-C [facility]   11/22/2022  9:00 AM Roberto Bolanos MD [facility]   12/20/2022 10:20 AM Roberto Bolanos MD [facility]   1/12/2023  8:45 AM Krystle Vogt PA-C [facility]      Health Maintenance Due   Topic Date Due     ANNUAL REVIEW OF HM ORDERS  Never done     ADVANCE CARE PLANNING  Never done     PREVENTIVE CARE VISIT  01/22/2020     PHQ-9  10/20/2021     COVID-19 Vaccine (3 - Booster for Pfizer series) 02/21/2022     DIABETIC FOOT EXAM  05/20/2022     A1C  08/28/2022     INFLUENZA VACCINE (1) 09/01/2022     BP Readings from Last 3 Encounters:   09/20/22 121/83   08/15/22 127/88   05/31/22 118/79     Ruth was seen today for vaginal problem.    Diagnoses and all orders for this visit:    Yeast infection of the vagina  -     fluconazole (DIFLUCAN) 150 MG tablet; Take 1 tablet (150 mg) by mouth once for 1 dose

## 2022-09-20 NOTE — LETTER
9/20/2022         RE: Ruth Vanegas  [address]  [place] [address]        Dear Colleague,    Thank you for referring your patient, Ruth Vanegas, to the [facility] [place]. Please see a copy of my visit note below.    NEUROLOGY OUTPATIENT PROGRESS NOTE   Sep 20, 2022     CHIEF COMPLAINT/REASON FOR VISIT/REASON FOR CONSULT  No chief complaint on file.    REASON FOR CONSULTATION- Headaches    REFERRAL SOURCE  Dr. Rosangela Haynes  [facility] Dr. Rosangela Haynes    HISTORY OF PRESENT ILLNESS  Ruth Vanegas is a 36 year old female seen today for evaluation of headaches.  She reports that she has had headaches for years these would come and go.  Over the last few months these have become more constant and lasting longer.  She reports that she has 15 headache days a month.  Both temples are involved.  Headaches are throbbing in nature with photophobia and phonophobia.  She denies any triggers for her headaches.    She is tried sumatriptan in the past which made things worse.  She has tried Excedrin but she has to catch the headaches soon enough to make it work.  She is currently on nortriptyline 50 mg at night which is not helping.  The nortriptyline was also being used as a sleep aid.  She is also on Topamax which is not helping.  She is taking 50 mg in the evening and 25 mg in the morning.  She further complains of vertigo which is sometimes with the headache.  She does have issues with uncontrolled sugars and feels that the Topamax might be helpful.  Denies any visual auras.    12/21/21  Patient returns today.  She reports that the headaches have become more frequent and she is still having them every day.  Is taking 100 mg twice daily of the Topamax with no side effects.  50 mg of nortriptyline at night and is not sleeping well with this dose.  Is interested in trying a higher medication.  Is taking Maxalt which is working though has to use it almost every day.  Is  working on losing weight.  Reports that her diabetes has been under good control.  Has not done the blood work that had ordered.    8/15/22  Patient returns today.  Headaches have become much more frequent and she is still having them every day.  Her Topamax was increased to 100 mg twice a day endocrinology and she feels no side effects with no benefit as well.  Remains on 50 mg of nortriptyline at night for sleep.  She is also taking melatonin for sleep.  Verapamil was added previously which did not really help.  Diabetes has been under good control.  Maxalt does work occasionally but not all the time.  Blood work has been done which was negative.  Reports no other new symptoms.    9/20/22  Patient returns today.  She reports that the headaches have gotten worse since she was last seen.  She is having more severe headaches almost every day.  Remains on Topamax and verapamil.  She was on nortriptyline and this has been stopped.  She reports that this was stopped several months ago when she was not on it in August.  Maxalt does work but the benefit does not last the full time.  She did start the Ajovy which might have made the headaches worse.  Was discussed through phone messages about starting her on Botox and she wants to do that today.  For the insomnia she is seeing her GI doctor who is prescribing some medication that would help with her GI symptoms as well as with the insomnia.  She has not really started this medication at this point.  No other new issues/concerns.  No other new triggers    Previous history is reviewed and this is unchanged.    PAST MEDICAL/SURGICAL HISTORY  Past Medical History:   Diagnosis Date     Anemia     told to take iron pills when pregnant     Depression      Depressive disorder      Diabetes mellitus (H)      Diabetes mellitus, type 2 (H) 07/15/2021     Dysthymic disorder      Endometriosis      Herpes genitalia      Hyperlipidemia      Kidney stone      Menorrhagia      Migraines       Neuropathy      Other abnormal Papanicolaou smear of cervix and cervical HPV(795.09) 01/02/2013     PCOS (polycystic ovarian syndrome)      Post traumatic stress disorder (PTSD)      Patient Active Problem List   Diagnosis     Obesity     Other abnormal Papanicolaou smear of cervix and cervical HPV(795.09)     Chronic nausea     Chronic vomiting     Diabetes mellitus, type 2 (H)     Unintentional weight loss     Rectal bleeding     Flatulence, eructation and gas pain     Morbid obesity (H)     Hyperglycemia     Major depressive disorder, single episode, severe without psychotic features (H)     Esophageal dysphagia     Encounter prior to initiation of medication   Significant for diabetes, migraines, depression, insomnia, difficulty keeping food down, constipation    FAMILY HISTORY  Family History   Adopted: Yes   Problem Relation Age of Onset     Prostate Cancer Father      Cancer Father         prostate     Alcoholism Sister      Alcoholism Sister      Alcoholism Brother      Alcoholism Brother      Diabetes Paternal Grandmother      Other Cancer Paternal Grandmother      Alcoholism Daughter      Coronary Artery Disease No family hx of      Urolithiasis No family hx of      Clotting Disorder No family hx of      Gout No family hx of      Heart Disease No family hx of      Anesthesia Reaction No family hx of    Family history positive for migraines in her brother.  Also family history of diabetes.    SOCIAL HISTORY  Social History     Tobacco Use     Smoking status: Never Smoker     Smokeless tobacco: Never Used   Substance Use Topics     Alcohol use: No     Alcohol/week: 0.0 standard drinks     Drug use: No       SYSTEMS REVIEW  Twelve-system ROS was done and other than the HPI this was negative except for neck pain, back pain, arm and leg pain, joint pain, numbness and tingling, weakness paralysis, difficulty walking, falling, balance coordination problems, dizziness, ringing in the ears, sleeping problems,  headaches, anxiety, depression, bloating, stomach pain, weight gain, appetite problems  No new concerns reported today.    MEDICATIONS  acetaminophen (TYLENOL) 500 MG tablet, Take 500-1,000 mg by mouth every 4 hours as needed   atorvastatin (LIPITOR) 10 MG tablet, atorvastatin 10 mg tablet (Patient taking differently: every evening atorvastatin 10 mg tablet)  canagliflozin (INVOKANA) 300 MG tablet, Take 1 tablet (300 mg) by mouth every morning (before breakfast)  Continuous Blood Gluc  (FREESTYLE CINDY 14 DAY READER) JAUN, 1 Application  Continuous Blood Gluc Sensor (FREESTYLE CINDY 14 DAY SENSOR) MISC, 1 Application every 14 days  Continuous Blood Gluc Sensor (FREESTYLE CINDY 14 DAY SENSOR) MISC,   Continuous Blood Gluc Sensor (FREESTYLE CINDY 2 SENSOR) MISC, 1 each See Admin Instructions Change every 14 days.  diazepam (VALIUM) 5 MG tablet, once as needed Only for Dentist  fluconazole (DIFLUCAN) 150 MG tablet, once as needed   Fremanezumab-vfrm (AJOVY) 225 MG/1.5ML SOAJ, Inject 225 mg Subcutaneous every 30 days  ibuprofen (ADVIL/MOTRIN) 600 MG tablet, every 4 hours as needed   insulin glargine (LANTUS PEN) 100 UNIT/ML pen, Inject 24 Units Subcutaneous At Bedtime   insulin  UNIT/ML injection, 24 units  To be taken with prednisone 60 mg, hold it if not taking steroids  insulin pen needle (ULTICARE MICRO) 32G X 4 MM miscellaneous, Use 1 pen needles daily or as directed.  Melatonin 10 MG CAPS, Take 1 capsule by mouth at bedtime as needed, may repeat once (insomnia)  metFORMIN (GLUCOPHAGE XR) 500 MG 24 hr tablet, TAKE 2 TABLETS(1000 MG) BY MOUTH TWICE DAILY WITH MEALS  methocarbamol (ROBAXIN) 500 MG tablet, every evening   multivitamin (ONE-DAILY) tablet, Take 1 tablet by mouth daily  nortriptyline (PAMELOR) 50 MG capsule, Take 50 mg by mouth At Bedtime   ondansetron (ZOFRAN) 4 MG tablet, every 6 hours as needed   rizatriptan (MAXALT) 10 MG tablet, Take 1 tablet (10 mg) by mouth at onset of headache for  migraine May repeat in 2 hours.  tolterodine ER (DETROL LA) 4 MG 24 hr capsule, Take 1 capsule (4 mg) by mouth daily  topiramate (TOPAMAX) 100 MG tablet, Take 1 tablet (100 mg) by mouth 2 times daily For additional refills, please schedule a follow-up appointment at [phone]  verapamil ER (VERELAN) 120 MG 24 hr capsule, TAKE 1 CAPSULE BY MOUTH EVERY NIGHT AT BEDTIME  vitamin D3 (CHOLECALCIFEROL) 125 MCG (5000 UT) tablet, Take 1 tablet (125 mcg) by mouth every morning  Continuous Blood Gluc  (FREESTYLE CINDY 2 READER) JAUN, 1 each See Admin Instructions Use per 's instructions.  gabapentin (NEURONTIN) 300 MG capsule, Take 2 capsules (600 mg) by mouth 3 times daily    [START ON 9/29/2022] Botulinum Toxin Type A (BOTOX) 200 units injection 155 Units         PHYSICAL EXAMINATION  VITALS: /83   Wt 127 kg (280 lb)   LMP 05/23/2016   BMI 43.85 kg/m    GENERAL: Healthy appearing, alert, no acute distress, normal habitus.  CARDIOVASCULAR: Extremities warm and well perfused. Pulses present.   NEUROLOGICAL:  Patient is awake and oriented to self, place and time.  Attention span is normal.  Memory is grossly intact.  Language is fluent and follows commands appropriately.  Appropriate fund of knowledge. Cranial nerves 2-12 are intact. There is no pronator drift.  Motor exam shows 5/5 strength in all extremities.  Tone is symmetric bilaterally in upper and lower extremities.  (Previously reflexes are symmetric and 2+ in upper extremities and lower extremities. Sensory exam is grossly intact to light touch, pin prick and vibration.)  Finger to nose and heel to shin is without dysmetria.  Romberg is negative.  Gait is normal and the patient is able to do tandem walk and walk on toes and heels.  Exam similar to before.    DIAGNOSTICS  CT head-images reviewed.  No major structural lesions noted.  IMPRESSION:  1.  No acute intracranial process.    CT 2020  HEAD CT:   1.  No acute intracranial process.      CERVICAL SPINE CT:   1.  No acute cervical spine fracture.    OUTSIDE RECORDS  Outside referral notes and chart notes were reviewed and pertinent information has been summarized (in addition to the HPI):-Patient seen for headaches.  Was seen in endocrinology for diabetes.  Was referred to neurology.  Was also having some ankle and feet swelling.  Also has nausea related to headaches.  Has been seen in ENT for benign positional paroxysmal vertigo.  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  Was recommended to see occupational therapy.    LABS  Component      Latest Ref Rng & Units 2/28/2022   Vitamin B12      193 - 986 pg/mL 437   Ferritin      12 - 150 ng/mL 93   TSH      0.40 - 4.00 mU/L 1.71       IMPRESSION/REPORT/PLAN  Intractable chronic migraine without aura and without status migrainosus  Primary insomnia  History of diabetes    This is a 36 year old female with chronic headache suggestive of chronic migraines and insomnia.  Her head CT has been negative for structural lesions.  We will hold off on MRI for right now though could consider it if headaches are not responding to medications.  Blood work has been noncontributory.  If her headaches remain refractory would need MRI during the next visit.    Sumatriptan has caused headaches to get worse.  Maxalt is more helpful as abortive therapy and will continue that.  She can use the Maxalt with over-the-counter medications to see if it becomes more effective.  Continue Maxalt.    Higher doses of Topamax have not helped with the headaches.  Propanolol cannot be used because of history of diabetes.  Verapamil has not helped with the headaches as well.  Ajovy was prescribed which actually made the headaches worse.  We will have her stop the Ajovy and try Botox instead.  She should keep a log of her headaches.  Continue Topamax and verapamil.    She was on nortriptyline for insomnia which might have been helping though did not help the  headaches.  She is off the nortriptyline now and her GI doctor wants to try the medication.  She is also on melatonin.      Topamax might be helping with the diabetes as well.    I would encourage her to keep a log of her headaches to identify triggers.  I will see her back in 2 months.  Start with Botox to the    -     rizatriptan (MAXALT) 10 MG tablet; Take 1 tablet (10 mg) by mouth at onset of headache for migraine May repeat in 2 hours.  -     topiramate (TOPAMAX) 50 MG tablet; Take 1 tablet (50 mg) by mouth 2 times daily  -     verapamil ER (VERELAN) 120 MG 24 hr capsule; Take 1 capsule (120 mg) by mouth At Bedtime  -     Botox injections  - Stop Ajovy    Return for 2 months meds and 3 months Botox.    Over 30 minutes were spent coordinating the care for the patient on the day of the encounter.  This includes previsit, during visit and post visit activities as documented above.  Counseling patient.  Worsening headaches with prescription management.  Multiple problems addressed.  This is in addition to the procedure time.  (Activities include but not inclusive of reviewing chart, reviewing outside records, reviewing labs and imaging study results as well as the images, patient visit time including getting history and exam,  use if applicable, review of test results with the patient and coming up with a plan in a shared model, counseling patient and family, education and answering patient questions, EMR , EMR diagnosis entry and problem list management, medication reconciliation and prescription management if applicable, paperwork if applicable, printing documents and documentation of the visit activities.)      Roberto Bolanos MD  Neurologist  [facility]  Tel:- [phone]    This note was dictated using voice recognition software.  Any grammatical or context distortions are unintentional and inherent to the software.        Again, thank you for allowing  me to participate in the care of your patient.        Sincerely,        Roberto Bolanos MD

## 2022-09-20 NOTE — PROGRESS NOTES
NEUROLOGY OUTPATIENT PROGRESS NOTE   Sep 20, 2022     CHIEF COMPLAINT/REASON FOR VISIT/REASON FOR CONSULT  No chief complaint on file.    REASON FOR CONSULTATION- Headaches    REFERRAL SOURCE  Dr. Rosangela Haynes  CC Dr. Rosangela Haynes    HISTORY OF PRESENT ILLNESS  Ruth Vanegas is a 36 year old female seen today for evaluation of headaches.  She reports that she has had headaches for years these would come and go.  Over the last few months these have become more constant and lasting longer.  She reports that she has 15 headache days a month.  Both temples are involved.  Headaches are throbbing in nature with photophobia and phonophobia.  She denies any triggers for her headaches.    She is tried sumatriptan in the past which made things worse.  She has tried Excedrin but she has to catch the headaches soon enough to make it work.  She is currently on nortriptyline 50 mg at night which is not helping.  The nortriptyline was also being used as a sleep aid.  She is also on Topamax which is not helping.  She is taking 50 mg in the evening and 25 mg in the morning.  She further complains of vertigo which is sometimes with the headache.  She does have issues with uncontrolled sugars and feels that the Topamax might be helpful.  Denies any visual auras.    12/21/21  Patient returns today.  She reports that the headaches have become more frequent and she is still having them every day.  Is taking 100 mg twice daily of the Topamax with no side effects.  50 mg of nortriptyline at night and is not sleeping well with this dose.  Is interested in trying a higher medication.  Is taking Maxalt which is working though has to use it almost every day.  Is working on losing weight.  Reports that her diabetes has been under good control.  Has not done the blood work that had ordered.    8/15/22  Patient returns today.  Headaches have become much more frequent and she is still having them every day.  Her Topamax was increased to 100  mg twice a day endocrinology and she feels no side effects with no benefit as well.  Remains on 50 mg of nortriptyline at night for sleep.  She is also taking melatonin for sleep.  Verapamil was added previously which did not really help.  Diabetes has been under good control.  Maxalt does work occasionally but not all the time.  Blood work has been done which was negative.  Reports no other new symptoms.    9/20/22  Patient returns today.  She reports that the headaches have gotten worse since she was last seen.  She is having more severe headaches almost every day.  Remains on Topamax and verapamil.  She was on nortriptyline and this has been stopped.  She reports that this was stopped several months ago when she was not on it in August.  Maxalt does work but the benefit does not last the full time.  She did start the Ajovy which might have made the headaches worse.  Was discussed through phone messages about starting her on Botox and she wants to do that today.  For the insomnia she is seeing her GI doctor who is prescribing some medication that would help with her GI symptoms as well as with the insomnia.  She has not really started this medication at this point.  No other new issues/concerns.  No other new triggers    Previous history is reviewed and this is unchanged.    PAST MEDICAL/SURGICAL HISTORY  Past Medical History:   Diagnosis Date     Anemia     told to take iron pills when pregnant     Depression      Depressive disorder      Diabetes mellitus (H)      Diabetes mellitus, type 2 (H) 07/15/2021     Dysthymic disorder      Endometriosis      Herpes genitalia      Hyperlipidemia      Kidney stone      Menorrhagia      Migraines      Neuropathy      Other abnormal Papanicolaou smear of cervix and cervical HPV(795.09) 01/02/2013     PCOS (polycystic ovarian syndrome)      Post traumatic stress disorder (PTSD)      Patient Active Problem List   Diagnosis     Obesity     Other abnormal Papanicolaou smear of  cervix and cervical HPV(795.09)     Chronic nausea     Chronic vomiting     Diabetes mellitus, type 2 (H)     Unintentional weight loss     Rectal bleeding     Flatulence, eructation and gas pain     Morbid obesity (H)     Hyperglycemia     Major depressive disorder, single episode, severe without psychotic features (H)     Esophageal dysphagia     Encounter prior to initiation of medication   Significant for diabetes, migraines, depression, insomnia, difficulty keeping food down, constipation    FAMILY HISTORY  Family History   Adopted: Yes   Problem Relation Age of Onset     Prostate Cancer Father      Cancer Father         prostate     Alcoholism Sister      Alcoholism Sister      Alcoholism Brother      Alcoholism Brother      Diabetes Paternal Grandmother      Other Cancer Paternal Grandmother      Alcoholism Daughter      Coronary Artery Disease No family hx of      Urolithiasis No family hx of      Clotting Disorder No family hx of      Gout No family hx of      Heart Disease No family hx of      Anesthesia Reaction No family hx of    Family history positive for migraines in her brother.  Also family history of diabetes.    SOCIAL HISTORY  Social History     Tobacco Use     Smoking status: Never Smoker     Smokeless tobacco: Never Used   Substance Use Topics     Alcohol use: No     Alcohol/week: 0.0 standard drinks     Drug use: No       SYSTEMS REVIEW  Twelve-system ROS was done and other than the HPI this was negative except for neck pain, back pain, arm and leg pain, joint pain, numbness and tingling, weakness paralysis, difficulty walking, falling, balance coordination problems, dizziness, ringing in the ears, sleeping problems, headaches, anxiety, depression, bloating, stomach pain, weight gain, appetite problems  No new concerns reported today.    MEDICATIONS  acetaminophen (TYLENOL) 500 MG tablet, Take 500-1,000 mg by mouth every 4 hours as needed   atorvastatin (LIPITOR) 10 MG tablet, atorvastatin 10  mg tablet (Patient taking differently: every evening atorvastatin 10 mg tablet)  canagliflozin (INVOKANA) 300 MG tablet, Take 1 tablet (300 mg) by mouth every morning (before breakfast)  Continuous Blood Gluc  (FREESTYLE CINDY 14 DAY READER) JAUN, 1 Application  Continuous Blood Gluc Sensor (FREESTYLE CINDY 14 DAY SENSOR) MISC, 1 Application every 14 days  Continuous Blood Gluc Sensor (FREESTYLE CINDY 14 DAY SENSOR) MISC,   Continuous Blood Gluc Sensor (FREESTYLE CINDY 2 SENSOR) MISC, 1 each See Admin Instructions Change every 14 days.  diazepam (VALIUM) 5 MG tablet, once as needed Only for Dentist  fluconazole (DIFLUCAN) 150 MG tablet, once as needed   Fremanezumab-vfrm (AJOVY) 225 MG/1.5ML SOAJ, Inject 225 mg Subcutaneous every 30 days  ibuprofen (ADVIL/MOTRIN) 600 MG tablet, every 4 hours as needed   insulin glargine (LANTUS PEN) 100 UNIT/ML pen, Inject 24 Units Subcutaneous At Bedtime   insulin  UNIT/ML injection, 24 units  To be taken with prednisone 60 mg, hold it if not taking steroids  insulin pen needle (ULTICARE MICRO) 32G X 4 MM miscellaneous, Use 1 pen needles daily or as directed.  Melatonin 10 MG CAPS, Take 1 capsule by mouth at bedtime as needed, may repeat once (insomnia)  metFORMIN (GLUCOPHAGE XR) 500 MG 24 hr tablet, TAKE 2 TABLETS(1000 MG) BY MOUTH TWICE DAILY WITH MEALS  methocarbamol (ROBAXIN) 500 MG tablet, every evening   multivitamin (ONE-DAILY) tablet, Take 1 tablet by mouth daily  nortriptyline (PAMELOR) 50 MG capsule, Take 50 mg by mouth At Bedtime   ondansetron (ZOFRAN) 4 MG tablet, every 6 hours as needed   rizatriptan (MAXALT) 10 MG tablet, Take 1 tablet (10 mg) by mouth at onset of headache for migraine May repeat in 2 hours.  tolterodine ER (DETROL LA) 4 MG 24 hr capsule, Take 1 capsule (4 mg) by mouth daily  topiramate (TOPAMAX) 100 MG tablet, Take 1 tablet (100 mg) by mouth 2 times daily For additional refills, please schedule a follow-up appointment at  [phone]  verapamil ER (VERELAN) 120 MG 24 hr capsule, TAKE 1 CAPSULE BY MOUTH EVERY NIGHT AT BEDTIME  vitamin D3 (CHOLECALCIFEROL) 125 MCG (5000 UT) tablet, Take 1 tablet (125 mcg) by mouth every morning  Continuous Blood Gluc  (FREESTYLE CINDY 2 READER) JAUN, 1 each See Admin Instructions Use per 's instructions.  gabapentin (NEURONTIN) 300 MG capsule, Take 2 capsules (600 mg) by mouth 3 times daily    [START ON 9/29/2022] Botulinum Toxin Type A (BOTOX) 200 units injection 155 Units         PHYSICAL EXAMINATION  VITALS: /83   Wt 127 kg (280 lb)   LMP 05/23/2016   BMI 43.85 kg/m    GENERAL: Healthy appearing, alert, no acute distress, normal habitus.  CARDIOVASCULAR: Extremities warm and well perfused. Pulses present.   NEUROLOGICAL:  Patient is awake and oriented to self, place and time.  Attention span is normal.  Memory is grossly intact.  Language is fluent and follows commands appropriately.  Appropriate fund of knowledge. Cranial nerves 2-12 are intact. There is no pronator drift.  Motor exam shows 5/5 strength in all extremities.  Tone is symmetric bilaterally in upper and lower extremities.  (Previously reflexes are symmetric and 2+ in upper extremities and lower extremities. Sensory exam is grossly intact to light touch, pin prick and vibration.)  Finger to nose and heel to shin is without dysmetria.  Romberg is negative.  Gait is normal and the patient is able to do tandem walk and walk on toes and heels.  Exam similar to before.    DIAGNOSTICS  CT head-images reviewed.  No major structural lesions noted.  IMPRESSION:  1.  No acute intracranial process.    CT 2020  HEAD CT:   1.  No acute intracranial process.     CERVICAL SPINE CT:   1.  No acute cervical spine fracture.    OUTSIDE RECORDS  Outside referral notes and chart notes were reviewed and pertinent information has been summarized (in addition to the HPI):-Patient seen for headaches.  Was seen in endocrinology for  diabetes.  Was referred to neurology.  Was also having some ankle and feet swelling.  Also has nausea related to headaches.  Has been seen in ENT for benign positional paroxysmal vertigo.  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  Was recommended to see occupational therapy.    LABS  Component      Latest Ref Rng & Units 2/28/2022   Vitamin B12      193 - 986 pg/mL 437   Ferritin      12 - 150 ng/mL 93   TSH      0.40 - 4.00 mU/L 1.71       IMPRESSION/REPORT/PLAN  Intractable chronic migraine without aura and without status migrainosus  Primary insomnia  History of diabetes    This is a 36 year old female with chronic headache suggestive of chronic migraines and insomnia.  Her head CT has been negative for structural lesions.  We will hold off on MRI for right now though could consider it if headaches are not responding to medications.  Blood work has been noncontributory.  If her headaches remain refractory would need MRI during the next visit.    Sumatriptan has caused headaches to get worse.  Maxalt is more helpful as abortive therapy and will continue that.  She can use the Maxalt with over-the-counter medications to see if it becomes more effective.  Continue Maxalt.    Higher doses of Topamax have not helped with the headaches.  Propanolol cannot be used because of history of diabetes.  Verapamil has not helped with the headaches as well.  Ajovy was prescribed which actually made the headaches worse.  We will have her stop the Ajovy and try Botox instead.  She should keep a log of her headaches.  Continue Topamax and verapamil.    She was on nortriptyline for insomnia which might have been helping though did not help the headaches.  She is off the nortriptyline now and her GI doctor wants to try the medication.  She is also on melatonin.      Topamax might be helping with the diabetes as well.    I would encourage her to keep a log of her headaches to identify triggers.  I will see her back  in 2 months.  Start with Botox to the    -     rizatriptan (MAXALT) 10 MG tablet; Take 1 tablet (10 mg) by mouth at onset of headache for migraine May repeat in 2 hours.  -     topiramate (TOPAMAX) 50 MG tablet; Take 1 tablet (50 mg) by mouth 2 times daily  -     verapamil ER (VERELAN) 120 MG 24 hr capsule; Take 1 capsule (120 mg) by mouth At Bedtime  -     Botox injections  - Stop Ajovy    Return for 2 months meds and 3 months Botox.    Over 30 minutes were spent coordinating the care for the patient on the day of the encounter.  This includes previsit, during visit and post visit activities as documented above.  Counseling patient.  Worsening headaches with prescription management.  Multiple problems addressed.  This is in addition to the procedure time.  (Activities include but not inclusive of reviewing chart, reviewing outside records, reviewing labs and imaging study results as well as the images, patient visit time including getting history and exam,  use if applicable, review of test results with the patient and coming up with a plan in a shared model, counseling patient and family, education and answering patient questions, EMR , EMR diagnosis entry and problem list management, medication reconciliation and prescription management if applicable, paperwork if applicable, printing documents and documentation of the visit activities.)      Roberto Bolanos MD  Neurologist  [facility]  Tel:- [phone]    This note was dictated using voice recognition software.  Any grammatical or context distortions are unintentional and inherent to the software.

## 2022-09-20 NOTE — TELEPHONE ENCOUNTER
RN attempted to contact patient, but no answer. Left message on patient's voice mail to call clinic back.    If patient calls back, please relat message below that Rx was sent and please assist in scheduling an appointment with Dr Banks per Dr Austin Feliz RN  [facility]    Dr Avila  Rx sent.  Please schedule physical/diabetes check with Dr. Banks.

## 2022-09-21 NOTE — TELEPHONE ENCOUNTER
RN spoke to patient regarding Dr. Avila' message below. Provider message relay to patient.    RN also assisted in scheduling patient for:  Next 5 appointments (look out 90 days)    Oct 20, 2022 11:00 AM  (Arrive by 10:40 AM)  Adult Preventative Visit with Milton Banks MD  [facility] ([facility] ) [address] [place] [address]  [phone]     Pt verbalize understanding, agrees with plan and has no further questions.    Closing encounter.    EV GilesN, RN   [facility]

## 2022-10-19 ENCOUNTER — LAB REQUISITION (OUTPATIENT)
Dept: LAB | Facility: CLINIC | Age: 37
End: 2022-10-19
Payer: COMMERCIAL

## 2022-10-19 DIAGNOSIS — N90.5 ATROPHY OF VULVA: ICD-10-CM

## 2022-10-19 PROCEDURE — 87529 HSV DNA AMP PROBE: CPT | Mod: ORL | Performed by: OBSTETRICS & GYNECOLOGY

## 2022-10-19 PROCEDURE — 87591 N.GONORRHOEAE DNA AMP PROB: CPT | Mod: ORL | Performed by: OBSTETRICS & GYNECOLOGY

## 2022-10-21 LAB
C TRACH DNA SPEC QL PROBE+SIG AMP: NEGATIVE
HSV1 DNA SPEC QL NAA+PROBE: NOT DETECTED
HSV2 DNA SPEC QL NAA+PROBE: NOT DETECTED
N GONORRHOEA DNA SPEC QL NAA+PROBE: NEGATIVE

## 2022-10-24 NOTE — PROGRESS NOTES
Ruth is a 36 year old who is being evaluated via a billable telephone visit.      What phone number would you like to be contacted at? [phone]  How would you like to obtain your AVS? Shelly  Phone call duration: 16 minutes  10:14am - 10:29               Diabetes Consult Note  October 25, 2022        Ruth Vanegas  is a 36 year old female who has a past medical history of Anemia, Depression, Depressive disorder, Diabetes mellitus (H), Diabetes mellitus, type 2 (H), Dysthymic disorder, Endometriosis, Herpes genitalia, Hyperlipidemia, Kidney stone, Menorrhagia, Migraines, Neuropathy, Other abnormal Papanicolaou smear of cervix and cervical HPV(795.09), PCOS (polycystic ovarian syndrome), and Post traumatic stress disorder (PTSD). She who is here for follow-up of diabetes.      HPI:  Diabetes diagnosed in 2016.  She established care in our clinic with  in November 2021.      Interim:  She is continue to see neurology and gastroenterology.  When she most recently saw gastroenterology and she was advised to discontinue her use of marijuana as it can cause a delay in gastric emptying and also can result increased nausea and vomiting for some people.  She discontinued nortriptyline and they discussed initiation of mirtazapine.  Also advised to get an EKG to monitor this.  She recently had Botox injection for chronic migraine with neurology.    Please see table below for symptoms since Ruth was last seen.    Patient Supplied Answers To Diabetic Questionnaire    including 10/25/2022   1. Since your last visit to our clinic (or if this your first visit, since you last saw your primary care provider), have you experienced any of the following symptoms that may be related to low blood sugars? No, I have not experienced any of these symptoms   2. Since your last visit to our clinic (or if this your first visit, since you last saw your primary care provider), have you experienced any of the  following symptoms that may be related to prolonged high blood sugars? No, I have not experienced any of these symptoms   4. Do you have any female family members who have had heart attacks or strokes before age 60 or male relatives who have had heart attacks or strokes before age 50? No   5. Do you have any family members who have had complications from diabetes such as kidney disease, heart disease or strokes, retinopathy (a vision problem), or amputations? No   6. Who do you live with?  My kids   Little interest or pleasure in doing things? Not at all   Feeling down, depressed, or hopeless? Not at all   10.  Are you considering a pregnancy or interested in discussing pregnancy prevention today? Yes        Today:  Feeling good about her diabetes control.  Thinks the meds are just right. No dysuria.  Her headaches are improved.  She tries not to eat too much carb at any meal, but feels she is getting a balanced diet with vegetables and with some protein every day.   Doing some yard and house work for activity.  She denies any symptoms of low or high blood sugars.  No lows since last visit.    Is taking Verapamil    Current Diabetes Medications:  Metformin 1000 mg twice daily.  Glargine, Lantus insulin 24 units each evening  Invokana 300 mg qam    We reviewed glucometer/CGMS data together.  It revealed:  Her blood sugar continues to be well controlled with an average blood sugar of 157, a GMI of 7.1% and no hypoglycemia.  Postprandial hyperglycemia is brief and quickly returns to baseline.          History of Diabetes monitoring and complications/ prevention:  Retinopathy: No DMR noted by her optometrist who she saw last month at the [place] [facility].  Nephropathy: Microalbuminuria just 15 in February 2022.  Neuropathy: has numbness and tingling in hands and feet - on gabapentin  LDL: 128 in 2020  Smoking:none  BP: well controlled    Ruth's PMH, PSH and allergies were reviewed today and pertinent information is  summarized above.    Ruth's pertinent social and family history are also reviewed today and pertinent information is summarized above.  Also noted is:Lives with her children    Current Outpatient Medications   Medication     acetaminophen (TYLENOL) 500 MG tablet     atorvastatin (LIPITOR) 10 MG tablet     canagliflozin (INVOKANA) 300 MG tablet     Continuous Blood Gluc  (FREESTYLE CINDY 14 DAY READER) JAUN     Continuous Blood Gluc Sensor (FREESTYLE CINDY 14 DAY SENSOR) MISC     Continuous Blood Gluc Sensor (FREESTYLE CNIDY 14 DAY SENSOR) MISC     Continuous Blood Gluc Sensor (FREESTYLE CINDY 2 SENSOR) MISC     diazepam (VALIUM) 5 MG tablet     fluconazole (DIFLUCAN) 150 MG tablet     ibuprofen (ADVIL/MOTRIN) 600 MG tablet     insulin glargine (LANTUS PEN) 100 UNIT/ML pen     insulin  UNIT/ML injection     insulin pen needle (ULTICARE MICRO) 32G X 4 MM miscellaneous     Melatonin 10 MG CAPS     metFORMIN (GLUCOPHAGE XR) 500 MG 24 hr tablet     methocarbamol (ROBAXIN) 500 MG tablet     multivitamin (ONE-DAILY) tablet     ondansetron (ZOFRAN) 4 MG tablet     rizatriptan (MAXALT) 10 MG tablet     tolterodine ER (DETROL LA) 4 MG 24 hr capsule     topiramate (TOPAMAX) 100 MG tablet     vitamin D3 (CHOLECALCIFEROL) 125 MCG (5000 UT) tablet     Fremanezumab-vfrm (AJOVY) 225 MG/1.5ML SOAJ     gabapentin (NEURONTIN) 300 MG capsule     nortriptyline (PAMELOR) 50 MG capsule     verapamil ER (VERELAN) 120 MG 24 hr capsule     Current Facility-Administered Medications   Medication     Botulinum Toxin Type A (BOTOX) 200 units injection 155 Units         ROS:   Patient denies any fevers, chills or sweats as well as any changes in vision, problems with floaters or field cuts in vision, pain or problems with dentition, new or different headaches.  Patients denies marked fatigue, cough, shortness of breath, chest pain or pressure.  Patient denies any noted swelling in feet, ankles or otherwise, loss of sensation or  "pain  in feet or other areas.      Exam:    LMP 05/23/2016     General: Pleasant, well nourished and hydrated female in NAD.   Psych:  Mood is \"good,\" affect is appropriate.  Thought form and content are fluid and coherent.    HEENT: Eyes and sclera are clear. Extraocular movements are grossly intact without proptosis.  Nares are patent, mucous membranes moist.  Neck: No masses or JVD are noted.    Resp: Easy and unlabored breathing.   Neuro: Alert and oriented, communicating clearly.   Ext: no swelling or edema.  Good distal pulses and capillary refill in digits.  Skin in good condition.  Sensation is intact to monofilament testing bilaterally and throughout.    Data:      Recent Labs   Lab Test 02/28/22  1729 02/28/22  1720 10/28/21  0900 05/05/21  0745 02/17/21  0855 07/27/20  0855 04/21/20  1427   A1C  --  7.2*  --  8.5* 8.8*   < > 10.5*   HEMOGLOBINA1  --   --  7.9  --   --   --   --    TSH  --  1.71  --   --   --   --  0.87   LDL  --  135*  --   --  128   < >  --    HDL  --  58  --   --  50   < >  --    TRIG  --  84  --   --  109   < >  --    CR  --  0.59  --  0.66 0.60   < > 0.57*   MICROL 15  --   --   --   --   --   --     < > = values in this interval not displayed.       Most recent GFRs:    GFR Estimate   Date Value Ref Range Status   02/28/2022 >90 >60 mL/min/1.73m2 Final     Comment:     Effective December 21, 2021 eGFRcr in adults is calculated using the 2021 CKD-EPI creatinine equation which includes age and gender (Beatriz orellana al., NEJ, DOI: 10.1056/QFGNbg6732029)   05/05/2021 >60 >60 mL/min/1.73m2 Final   02/17/2021 >60 >60 mL/min/1.73m2 Final   11/04/2020 >60 >60 mL/min/1.73m2 Final   09/28/2016 154.0 mL/min/1.7 m2 Final   08/14/2013 > 60 >60 ml/min/1.73m2 Final     GFR Estimate If Black   Date Value Ref Range Status   05/05/2021 >60 >60 mL/min/1.73m2 Final   02/17/2021 >60 >60 mL/min/1.73m2 Final   11/04/2020 >60 >60 mL/min/1.73m2 Final   09/28/2016 186.3 mL/min/1.7 m2 Final   08/14/2013 > 60 >60 " ml/min/1.73m2 Final           Lab Results   Component Value Date    CPEPT 5.1 02/28/2022    GADAB <5.0 02/28/2022       Assessment/Plan:    Ruth Vanegas is a 36 year old female with well controlled type 2 diabetes.    1. Diabetes, type 2:  She is well controlled on stable regimen and eligible for graduation.  We discussed this and she would like to proceed.  We also reviewed that she should have higher dose statin due to higher risk CVD and LDL above goal and that she should resume NPH insulin if she again needs to be on steroids and she can resume care with us if needed at that point or if A1C >7.5 at any point.        2. DM Complications-     Reviewed ADA diet and activity recommendations.       Retinopathy:  No. She will continue annual eye exam.  Nephropathy:   No, Microalbuminuria due in February 2023.  Neuropathy: No.    Feet: OK, denies ulcers or lesions.   Lipids: She is on a very low-dose statin and LDL not at goal she agrees to increase her Crestor to 20 mg daily, this of course cumulative effect and side effects with the change.  She agrees to follow-up with her primary care provider.    32 minutes spent on the date of the encounter doing chart review, history and exam, documentation, education and counseling, as well as communication and coordination of care, and further activities as noted above.  This time excludes time spent reviewing CGM.      It is my privilege to be involved in the care of the above patient.     Maris Leary PA-C, MPAS  [facility]  Diabetes, Endocrinology, and Metabolism  [phone] Appointments/Nurse  [phone] pager  [phone] nurse line    This note was completed in part using Dragon voice recognition, and may contain word and grammatical errors.

## 2022-10-25 ENCOUNTER — VIRTUAL VISIT (OUTPATIENT)
Dept: ENDOCRINOLOGY | Facility: CLINIC | Age: 37
End: 2022-10-25
Payer: COMMERCIAL

## 2022-10-25 DIAGNOSIS — E11.9 TYPE 2 DIABETES MELLITUS WITHOUT COMPLICATION, UNSPECIFIED WHETHER LONG TERM INSULIN USE (H): Primary | ICD-10-CM

## 2022-10-25 PROCEDURE — 99214 OFFICE O/P EST MOD 30 MIN: CPT | Mod: 95 | Performed by: PHYSICIAN ASSISTANT

## 2022-10-25 PROCEDURE — 95251 CONT GLUC MNTR ANALYSIS I&R: CPT | Performed by: PHYSICIAN ASSISTANT

## 2022-10-25 NOTE — LETTER
10/25/2022       RE: Ruth Vanegas  [address]  [place] [address]     Dear Colleague,    Thank you for referring your patient, Ruth Vanegas, to the [facility] [place] [facility]. Please see a copy of my visit note below.    Ruth is a 36 year old who is being evaluated via a billable telephone visit.      What phone number would you like to be contacted at? [phone]  How would you like to obtain your AVS? [email or web address]  Phone call duration: 16 minutes  10:14am - 10:29               Diabetes Consult Note  October 25, 2022        Ruth Vanegas  is a 36 year old female who has a past medical history of Anemia, Depression, Depressive disorder, Diabetes mellitus (H), Diabetes mellitus, type 2 (H), Dysthymic disorder, Endometriosis, Herpes genitalia, Hyperlipidemia, Kidney stone, Menorrhagia, Migraines, Neuropathy, Other abnormal Papanicolaou smear of cervix and cervical HPV(795.09), PCOS (polycystic ovarian syndrome), and Post traumatic stress disorder (PTSD). She who is here for follow-up of diabetes.      HPI:  Diabetes diagnosed in 2016.  She established care in our clinic with  in November 2021.      Interim:  She is continue to see neurology and gastroenterology.  When she most recently saw gastroenterology and she was advised to discontinue her use of marijuana as it can cause a delay in gastric emptying and also can result increased nausea and vomiting for some people.  She discontinued nortriptyline and they discussed initiation of mirtazapine.  Also advised to get an EKG to monitor this.  She recently had Botox injection for chronic migraine with neurology.    Please see table below for symptoms since Ruth was last seen.    Patient Supplied Answers To Diabetic Questionnaire    including 10/25/2022   1. Since your last visit to our clinic (or if this your first visit, since you last  saw your primary care provider), have you experienced any of the following symptoms that may be related to low blood sugars? No, I have not experienced any of these symptoms   2. Since your last visit to our clinic (or if this your first visit, since you last saw your primary care provider), have you experienced any of the following symptoms that may be related to prolonged high blood sugars? No, I have not experienced any of these symptoms   4. Do you have any female family members who have had heart attacks or strokes before age 60 or male relatives who have had heart attacks or strokes before age 50? No   5. Do you have any family members who have had complications from diabetes such as kidney disease, heart disease or strokes, retinopathy (a vision problem), or amputations? No   6. Who do you live with?  My kids   Little interest or pleasure in doing things? Not at all   Feeling down, depressed, or hopeless? Not at all   10.  Are you considering a pregnancy or interested in discussing pregnancy prevention today? Yes        Today:  Feeling good about her diabetes control.  Thinks the meds are just right. No dysuria.  Her headaches are improved.  She tries not to eat too much carb at any meal, but feels she is getting a balanced diet with vegetables and with some protein every day.   Doing some yard and house work for activity.  She denies any symptoms of low or high blood sugars.  No lows since last visit.    Is taking Verapamil    Current Diabetes Medications:  Metformin 1000 mg twice daily.  Glargine, Lantus insulin 24 units each evening  Invokana 300 mg qam    We reviewed glucometer/CGMS data together.  It revealed:  Her blood sugar continues to be well controlled with an average blood sugar of 157, a GMI of 7.1% and no hypoglycemia.  Postprandial hyperglycemia is brief and quickly returns to baseline.          History of Diabetes monitoring and complications/ prevention:  Retinopathy: No DMR noted by her  optometrist who she saw last month at the [facility].  Nephropathy: Microalbuminuria just 15 in February 2022.  Neuropathy: has numbness and tingling in hands and feet - on gabapentin  LDL: 128 in 2020  Smoking:none  BP: well controlled    Nancys PMH, PSH and allergies were reviewed today and pertinent information is summarized above.    Rachel pertinent social and family history are also reviewed today and pertinent information is summarized above.  Also noted is:Lives with her children    Current Outpatient Medications   Medication     acetaminophen (TYLENOL) 500 MG tablet     atorvastatin (LIPITOR) 10 MG tablet     canagliflozin (INVOKANA) 300 MG tablet     Continuous Blood Gluc  (FREESTYLE CINDY 14 DAY READER) JAUN     Continuous Blood Gluc Sensor (FREESTYLE CINDY 14 DAY SENSOR) MISC     Continuous Blood Gluc Sensor (FREESTYLE CINDY 14 DAY SENSOR) MISC     Continuous Blood Gluc Sensor (FREESTYLE CINDY 2 SENSOR) MISC     diazepam (VALIUM) 5 MG tablet     fluconazole (DIFLUCAN) 150 MG tablet     ibuprofen (ADVIL/MOTRIN) 600 MG tablet     insulin glargine (LANTUS PEN) 100 UNIT/ML pen     insulin  UNIT/ML injection     insulin pen needle (ULTICARE MICRO) 32G X 4 MM miscellaneous     Melatonin 10 MG CAPS     metFORMIN (GLUCOPHAGE XR) 500 MG 24 hr tablet     methocarbamol (ROBAXIN) 500 MG tablet     multivitamin (ONE-DAILY) tablet     ondansetron (ZOFRAN) 4 MG tablet     rizatriptan (MAXALT) 10 MG tablet     tolterodine ER (DETROL LA) 4 MG 24 hr capsule     topiramate (TOPAMAX) 100 MG tablet     vitamin D3 (CHOLECALCIFEROL) 125 MCG (5000 UT) tablet     Fremanezumab-vfrm (AJOVY) 225 MG/1.5ML SOAJ     gabapentin (NEURONTIN) 300 MG capsule     nortriptyline (PAMELOR) 50 MG capsule     verapamil ER (VERELAN) 120 MG 24 hr capsule     Current Facility-Administered Medications   Medication     Botulinum Toxin Type A (BOTOX) 200 units injection 155 Units         ROS:   Patient denies any fevers, chills  "or sweats as well as any changes in vision, problems with floaters or field cuts in vision, pain or problems with dentition, new or different headaches.  Patients denies marked fatigue, cough, shortness of breath, chest pain or pressure.  Patient denies any noted swelling in feet, ankles or otherwise, loss of sensation or pain  in feet or other areas.      Exam:    [facility] 05/23/2016     General: Pleasant, well nourished and hydrated female in NAD.   Psych:  Mood is \"good,\" affect is appropriate.  Thought form and content are fluid and coherent.    HEENT: Eyes and sclera are clear. Extraocular movements are grossly intact without proptosis.  Nares are patent, mucous membranes moist.  Neck: No masses or JVD are noted.    Resp: Easy and unlabored breathing.   Neuro: Alert and oriented, communicating clearly.   Ext: no swelling or edema.  Good distal pulses and capillary refill in digits.  Skin in good condition.  Sensation is intact to monofilament testing bilaterally and throughout.    Data:      Recent Labs   Lab Test 02/28/22  1729 02/28/22  1720 10/28/21  0900 05/05/21  0745 02/17/21  0855 07/27/20  0855 04/21/20  1427   A1C  --  7.2*  --  8.5* 8.8*   < > 10.5*   HEMOGLOBINA1  --   --  7.9  --   --   --   --    TSH  --  1.71  --   --   --   --  0.87   LDL  --  135*  --   --  128   < >  --    HDL  --  58  --   --  50   < >  --    TRIG  --  84  --   --  109   < >  --    CR  --  0.59  --  0.66 0.60   < > 0.57*   MICROL 15  --   --   --   --   --   --     < > = values in this interval not displayed.       Most recent GFRs:    GFR Estimate   Date Value Ref Range Status   02/28/2022 >90 >60 mL/min/1.73m2 Final     Comment:     Effective December 21, 2021 eGFRcr in adults is calculated using the 2021 CKD-EPI creatinine equation which includes age and gender (Beatriz orellana al., NEJ, DOI: 10.1056/NTDKdy0737887)   05/05/2021 >60 >60 mL/min/1.73m2 Final   02/17/2021 >60 >60 mL/min/1.73m2 Final   11/04/2020 >60 >60 mL/min/1.73m2 " Final   09/28/2016 154.0 mL/min/1.7 m2 Final   08/14/2013 > 60 >60 ml/min/1.73m2 Final     GFR Estimate If Black   Date Value Ref Range Status   05/05/2021 >60 >60 mL/min/1.73m2 Final   02/17/2021 >60 >60 mL/min/1.73m2 Final   11/04/2020 >60 >60 mL/min/1.73m2 Final   09/28/2016 186.3 mL/min/1.7 m2 Final   08/14/2013 > 60 >60 ml/min/1.73m2 Final           Lab Results   Component Value Date    CPEPT 5.1 02/28/2022    GADAB <5.0 02/28/2022       Assessment/Plan:    Ruth Vanegas is a 36 year old female with well controlled type 2 diabetes.    1. Diabetes, type 2:  She is well controlled on stable regimen and eligible for graduation.  We discussed this and she would like to proceed.  We also reviewed that she should have higher dose statin due to higher risk CVD and LDL above goal and that she should resume NPH insulin if she again needs to be on steroids and she can resume care with us if needed at that point or if A1C >7.5 at any point.        2. DM Complications-     Reviewed ADA diet and activity recommendations.       Retinopathy:  No. She will continue annual eye exam.  Nephropathy:   No, Microalbuminuria due in February 2023.  Neuropathy: No.    Feet: OK, denies ulcers or lesions.   Lipids: She is on a very low-dose statin and LDL not at goal she agrees to increase her Crestor to 20 mg daily, this of course cumulative effect and side effects with the change.  She agrees to follow-up with her primary care provider.    32 minutes spent on the date of the encounter doing chart review, history and exam, documentation, education and counseling, as well as communication and coordination of care, and further activities as noted above.  This time excludes time spent reviewing CGM.    It is my privilege to be involved in the care of the above patient.     Maris Leary PA-C, MPAS  [facility]  Diabetes, Endocrinology, and Metabolism  [phone] Appointments/Nurse  [phone]  pager  [phone] nurse line    This note was completed in part using Dragon voice recognition, and may contain word and grammatical errors.

## 2022-10-25 NOTE — PATIENT INSTRUCTIONS
Dear Ruth,  Your blood sugar goal and your other cares are all up-to-date.  As your LDL or bad cholesterol is a little high I do suggest that you increase your Lipitor from 10 to 20 mg daily, but otherwise I see no changes as everything else looks great.  As discussed, this makes you eligible for graduation from our clinic.  When you next see your primary care provider they can begin prescribing your diabetes medications.  We would recommend that you go on NPH insulin if you take steroids again, but apart from that is simply advised to keep up your good work and continue to check in with your primary care provider regarding your diabetes every 6 months as long as things are in good control, though this should increase to every 3 months if not, and if together your A1c again rises above 8 or there are other concerns, we would be happy to see you again.  For now though I would like to congratulate you on your good work.  It has been a privilege to work together with you to bring your diabetes under good control!    My best wishes,    Maris Leary PA-C, MPAS  [facility]  Diabetes, Endocrinology, and Metabolism  [phone] Appointments/Nurse  [phone] Fax  [phone] URGENTafter hours/weekend Endocrinologist on call              You have earned this graduation by achieving diabetes related goals and stability.    It has been a pleasure serving you.  Please use the lessons learned in the diabetes clinic as a base on which to build a lifetime of better health and wellness.  You should continue to regularly follow up with your primary care provider for diabetes management.    Please schedule appointment with your primary care provider within the next 3 months.   We have refilled all of your diabetes-related medication for the next year.    Future changes and refills should come from your primary care provider.     The diabetes care team remains available to you for future consultation should  you and your doctor have new questions or concerns.

## 2022-11-21 ENCOUNTER — HEALTH MAINTENANCE LETTER (OUTPATIENT)
Age: 37
End: 2022-11-21

## 2022-12-06 DIAGNOSIS — E66.01 CLASS 3 SEVERE OBESITY DUE TO EXCESS CALORIES WITH SERIOUS COMORBIDITY AND BODY MASS INDEX (BMI) OF 45.0 TO 49.9 IN ADULT (H): ICD-10-CM

## 2022-12-06 DIAGNOSIS — G43.719 INTRACTABLE CHRONIC MIGRAINE WITHOUT AURA AND WITHOUT STATUS MIGRAINOSUS: ICD-10-CM

## 2022-12-06 DIAGNOSIS — E66.813 CLASS 3 SEVERE OBESITY DUE TO EXCESS CALORIES WITH SERIOUS COMORBIDITY AND BODY MASS INDEX (BMI) OF 45.0 TO 49.9 IN ADULT (H): ICD-10-CM

## 2022-12-08 RX ORDER — TOPIRAMATE 100 MG/1
100 TABLET, FILM COATED ORAL 2 TIMES DAILY
Qty: 180 TABLET | Refills: 1 | Status: SHIPPED | OUTPATIENT
Start: 2022-12-08 | End: 2023-10-04

## 2022-12-08 NOTE — TELEPHONE ENCOUNTER
topiramate (TOPAMAX) 100 MG tablet   Last Written Prescription Date:  9/6/22  Last Fill Quantity: 180,   # refills: 0  Last Office Visit : 10/25/22  NV:none

## 2022-12-20 ENCOUNTER — OFFICE VISIT (OUTPATIENT)
Dept: NEUROLOGY | Facility: CLINIC | Age: 37
End: 2022-12-20
Payer: COMMERCIAL

## 2022-12-20 VITALS — SYSTOLIC BLOOD PRESSURE: 113 MMHG | DIASTOLIC BLOOD PRESSURE: 86 MMHG | HEART RATE: 88 BPM

## 2022-12-20 DIAGNOSIS — G43.719 INTRACTABLE CHRONIC MIGRAINE WITHOUT AURA AND WITHOUT STATUS MIGRAINOSUS: Primary | ICD-10-CM

## 2022-12-20 DIAGNOSIS — F51.01 PRIMARY INSOMNIA: ICD-10-CM

## 2022-12-20 PROCEDURE — 64615 CHEMODENERV MUSC MIGRAINE: CPT | Performed by: PSYCHIATRY & NEUROLOGY

## 2022-12-20 PROCEDURE — 99214 OFFICE O/P EST MOD 30 MIN: CPT | Mod: 25 | Performed by: PSYCHIATRY & NEUROLOGY

## 2022-12-20 NOTE — PROGRESS NOTES
NEUROLOGY OUTPATIENT PROGRESS NOTE   Dec 20, 2022     CHIEF COMPLAINT/REASON FOR VISIT/REASON FOR CONSULT  Patient presents with:  Botox Migraine    REASON FOR CONSULTATION- Headaches    REFERRAL SOURCE  Dr. Rosangela Haynes  CC Dr. Rosangela Haynes    HISTORY OF PRESENT ILLNESS  Ruth Vanegas is a 36 year old female seen today for evaluation of headaches.  She reports that she has had headaches for years these would come and go.  Over the last few months these have become more constant and lasting longer.  She reports that she has 15 headache days a month.  Both temples are involved.  Headaches are throbbing in nature with photophobia and phonophobia.  She denies any triggers for her headaches.    She is tried sumatriptan in the past which made things worse.  She has tried Excedrin but she has to catch the headaches soon enough to make it work.  She is currently on nortriptyline 50 mg at night which is not helping.  The nortriptyline was also being used as a sleep aid.  She is also on Topamax which is not helping.  She is taking 50 mg in the evening and 25 mg in the morning.  She further complains of vertigo which is sometimes with the headache.  She does have issues with uncontrolled sugars and feels that the Topamax might be helpful.  Denies any visual auras.    12/21/21  Patient returns today.  She reports that the headaches have become more frequent and she is still having them every day.  Is taking 100 mg twice daily of the Topamax with no side effects.  50 mg of nortriptyline at night and is not sleeping well with this dose.  Is interested in trying a higher medication.  Is taking Maxalt which is working though has to use it almost every day.  Is working on losing weight.  Reports that her diabetes has been under good control.  Has not done the blood work that had ordered.    8/15/22  Patient returns today.  Headaches have become much more frequent and she is still having them every day.  Her Topamax was  increased to 100 mg twice a day endocrinology and she feels no side effects with no benefit as well.  Remains on 50 mg of nortriptyline at night for sleep.  She is also taking melatonin for sleep.  Verapamil was added previously which did not really help.  Diabetes has been under good control.  Maxalt does work occasionally but not all the time.  Blood work has been done which was negative.  Reports no other new symptoms.    9/20/22  Patient returns today.  She reports that the headaches have gotten worse since she was last seen.  She is having more severe headaches almost every day.  Remains on Topamax and verapamil.  She was on nortriptyline and this has been stopped.  She reports that this was stopped several months ago when she was not on it in August.  Maxalt does work but the benefit does not last the full time.  She did start the Ajovy which might have made the headaches worse.  Was discussed through phone messages about starting her on Botox and she wants to do that today.  For the insomnia she is seeing her GI doctor who is prescribing some medication that would help with her GI symptoms as well as with the insomnia.  She has not really started this medication at this point.  No other new issues/concerns.  No other new triggers    Previous history is reviewed and this is unchanged.    PAST MEDICAL/SURGICAL HISTORY  Past Medical History:   Diagnosis Date     Anemia     told to take iron pills when pregnant     Depression      Depressive disorder      Diabetes mellitus (H)      Diabetes mellitus, type 2 (H) 07/15/2021     Dysthymic disorder      Endometriosis      Herpes genitalia      Hyperlipidemia      Kidney stone      Menorrhagia      Migraines      Neuropathy      Other abnormal Papanicolaou smear of cervix and cervical HPV(795.09) 01/02/2013     PCOS (polycystic ovarian syndrome)      Post traumatic stress disorder (PTSD)      Patient Active Problem List   Diagnosis     Obesity     Other abnormal  Papanicolaou smear of cervix and cervical HPV(795.09)     Chronic nausea     Chronic vomiting     Diabetes mellitus, type 2 (H)     Unintentional weight loss     Rectal bleeding     Flatulence, eructation and gas pain     Morbid obesity (H)     Hyperglycemia     Major depressive disorder, single episode, severe without psychotic features (H)     Esophageal dysphagia     Encounter prior to initiation of medication   Significant for diabetes, migraines, depression, insomnia, difficulty keeping food down, constipation    FAMILY HISTORY  Family History   Adopted: Yes   Problem Relation Age of Onset     Prostate Cancer Father      Cancer Father         prostate     Alcoholism Sister      Alcoholism Sister      Alcoholism Brother      Alcoholism Brother      Diabetes Paternal Grandmother      Other Cancer Paternal Grandmother      Alcoholism Daughter      Coronary Artery Disease No family hx of      Urolithiasis No family hx of      Clotting Disorder No family hx of      Gout No family hx of      Heart Disease No family hx of      Anesthesia Reaction No family hx of    Family history positive for migraines in her brother.  Also family history of diabetes.    SOCIAL HISTORY  Social History     Tobacco Use     Smoking status: Never     Smokeless tobacco: Never   Vaping Use     Vaping Use: Never used   Substance Use Topics     Alcohol use: No     Alcohol/week: 0.0 standard drinks     Drug use: No       SYSTEMS REVIEW  Twelve-system ROS was done and other than the HPI this was negative except for neck pain, back pain, arm and leg pain, joint pain, numbness and tingling, weakness paralysis, difficulty walking, falling, balance coordination problems, dizziness, ringing in the ears, sleeping problems, headaches, anxiety, depression, bloating, stomach pain, weight gain, appetite problems  No new concerns reported today.    MEDICATIONS  acetaminophen (TYLENOL) 500 MG tablet, Take 500-1,000 mg by mouth every 4 hours as needed    atorvastatin (LIPITOR) 10 MG tablet, atorvastatin 10 mg tablet (Patient taking differently: every evening atorvastatin 10 mg tablet)  canagliflozin (INVOKANA) 300 MG tablet, Take 1 tablet (300 mg) by mouth every morning (before breakfast)  Continuous Blood Gluc  (FREESTYLE CINDY 14 DAY READER) JAUN, 1 Application  Continuous Blood Gluc Sensor (FREESTYLE CINDY 14 DAY SENSOR) MISC, 1 Application every 14 days  Continuous Blood Gluc Sensor (FREESTYLE CINDY 14 DAY SENSOR) MISC,   Continuous Blood Gluc Sensor (FREESTYLE CINDY 2 SENSOR) MISC, 1 each See Admin Instructions Change every 14 days.  diazepam (VALIUM) 5 MG tablet, once as needed Only for Dentist  fluconazole (DIFLUCAN) 150 MG tablet, once as needed   Fremanezumab-vfrm (AJOVY) 225 MG/1.5ML SOAJ, Inject 225 mg Subcutaneous every 30 days  ibuprofen (ADVIL/MOTRIN) 600 MG tablet, every 4 hours as needed   insulin glargine (LANTUS PEN) 100 UNIT/ML pen, Inject 24 Units Subcutaneous At Bedtime   insulin  UNIT/ML injection, 24 units  To be taken with prednisone 60 mg, hold it if not taking steroids  insulin pen needle (ULTICARE MICRO) 32G X 4 MM miscellaneous, Use 1 pen needles daily or as directed.  Melatonin 10 MG CAPS, Take 1 capsule by mouth at bedtime as needed, may repeat once (insomnia)  metFORMIN (GLUCOPHAGE XR) 500 MG 24 hr tablet, TAKE 2 TABLETS(1000 MG) BY MOUTH TWICE DAILY WITH MEALS  methocarbamol (ROBAXIN) 500 MG tablet, every evening   multivitamin (ONE-DAILY) tablet, Take 1 tablet by mouth daily  ondansetron (ZOFRAN) 4 MG tablet, every 6 hours as needed   rizatriptan (MAXALT) 10 MG tablet, Take 1 tablet (10 mg) by mouth at onset of headache for migraine May repeat in 2 hours.  tolterodine ER (DETROL LA) 4 MG 24 hr capsule, Take 1 capsule (4 mg) by mouth daily  topiramate (TOPAMAX) 100 MG tablet, Take 1 tablet (100 mg) by mouth 2 times daily  verapamil ER (VERELAN) 120 MG 24 hr capsule, TAKE 1 CAPSULE BY MOUTH EVERY NIGHT AT  BEDTIME  vitamin D3 (CHOLECALCIFEROL) 125 MCG (5000 UT) tablet, Take 1 tablet (125 mcg) by mouth every morning  gabapentin (NEURONTIN) 300 MG capsule, Take 2 capsules (600 mg) by mouth 3 times daily    Botulinum Toxin Type A (BOTOX) 200 units injection 155 Units         PHYSICAL EXAMINATION  VITALS: /86   Pulse 88   LMP 05/23/2016   GENERAL: Healthy appearing, alert, no acute distress, normal habitus.  CARDIOVASCULAR: Extremities warm and well perfused. Pulses present.   NEUROLOGICAL:  Patient is awake and oriented to self, place and time.  Attention span is normal.  Memory is grossly intact.  Language is fluent and follows commands appropriately.  Appropriate fund of knowledge. Cranial nerves 2-12 are intact. There is no pronator drift.  Motor exam shows 5/5 strength in all extremities.  Tone is symmetric bilaterally in upper and lower extremities.  (Previously reflexes are symmetric and 2+ in upper extremities and lower extremities. Sensory exam is grossly intact to light touch, pin prick and vibration.)  Finger to nose and heel to shin is without dysmetria.  Romberg is negative.  Gait is normal and the patient is able to do tandem walk and walk on toes and heels.  Exam similar to before.    DIAGNOSTICS  CT head-images reviewed.  No major structural lesions noted.  IMPRESSION:  1.  No acute intracranial process.    CT 2020  HEAD CT:   1.  No acute intracranial process.     CERVICAL SPINE CT:   1.  No acute cervical spine fracture.    OUTSIDE RECORDS  Outside referral notes and chart notes were reviewed and pertinent information has been summarized (in addition to the HPI):-Patient seen for headaches.  Was seen in endocrinology for diabetes.  Was referred to neurology.  Was also having some ankle and feet swelling.  Also has nausea related to headaches.  Has been seen in ENT for benign positional paroxysmal vertigo.  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  Was recommended to see  occupational therapy.    LABS  Component      Latest Ref Rng & Units 2/28/2022   Vitamin B12      193 - 986 pg/mL 437   Ferritin      12 - 150 ng/mL 93   TSH      0.40 - 4.00 mU/L 1.71       IMPRESSION/REPORT/PLAN  Intractable chronic migraine without aura and without status migrainosus  Primary insomnia  History of diabetes    This is a 36 year old female with chronic headache suggestive of chronic migraines and insomnia.  Her head CT has been negative for structural lesions.  We will hold off on MRI for right now though could consider it if headaches are not responding to medications.  Blood work has been noncontributory.  If her headaches remain refractory would need MRI during the next visit.    Sumatriptan has caused headaches to get worse.  Maxalt is more helpful as abortive therapy and will continue that.  She can use the Maxalt with over-the-counter medications to see if it becomes more effective.  Continue Maxalt.    Higher doses of Topamax have not helped with the headaches.  Propanolol cannot be used because of history of diabetes.  Verapamil has not helped with the headaches as well.  Ajovy was prescribed which actually made the headaches worse.  We will have her stop the Ajovy and try Botox instead.  She should keep a log of her headaches.  Continue Topamax and verapamil.    She was on nortriptyline for insomnia which might have been helping though did not help the headaches.  She is off the nortriptyline now and her GI doctor wants to try the medication.  She is also on melatonin.      Topamax might be helping with the diabetes as well.    I would encourage her to keep a log of her headaches to identify triggers.  I will see her back in 2 months.  Start with Botox to the    -     rizatriptan (MAXALT) 10 MG tablet; Take 1 tablet (10 mg) by mouth at onset of headache for migraine May repeat in 2 hours.  -     topiramate (TOPAMAX) 50 MG tablet; Take 1 tablet (50 mg) by mouth 2 times daily  -     verapamil  ER (VERELAN) 120 MG 24 hr capsule; Take 1 capsule (120 mg) by mouth At Bedtime  -     Botox injections  - Stop Ajovy    No follow-ups on file.    Over 30 minutes were spent coordinating the care for the patient on the day of the encounter.  This includes previsit, during visit and post visit activities as documented above.  Counseling patient.  Worsening headaches with prescription management.  Multiple problems addressed.  This is in addition to the procedure time.  (Activities include but not inclusive of reviewing chart, reviewing outside records, reviewing labs and imaging study results as well as the images, patient visit time including getting history and exam,  use if applicable, review of test results with the patient and coming up with a plan in a shared model, counseling patient and family, education and answering patient questions, EMR , EMR diagnosis entry and problem list management, medication reconciliation and prescription management if applicable, paperwork if applicable, printing documents and documentation of the visit activities.)      Roberto Bolanos MD  Neurologist  [facility]  Tel:- [phone]    This note was dictated using voice recognition software.  Any grammatical or context distortions are unintentional and inherent to the software.

## 2022-12-20 NOTE — PROCEDURES
NEUROLOGY PROCEDURE NOTE  Dec 20, 2022      Chronic migraine Botox injection    CONSENT: The procedure was explained to the patient. The risks and benefits of the procedure and the alternatives were discussed with the patient. The patient was given an opportunity to ask any questions she had about the procedure. A verbal consent was obtained from the patient. A written consent is available in the chart.    PRE-PROCEDURE  Diagnosis: Chronic migraine  Headache frequency before the use of Botox:- 30 headache days per month  Headache frequency with Botox use:-0 Headaches/month except more headaches in last 2 weeks    EXAM  GENERAL: Healthy appearing, alert, no acute distress, normal habitus.  NEUROLOGICAL:  Patient is alert with fluent language.  Extraocular movements are intact.  Facial movements are present and symmetric.  No arm drift.    PROCEDURE SUMMARY:   The following muscles were identified after palpating for landmarks and the overlying skin was cleansed with alcohol. These muscles were then injected using a 30 guage- half  inch needle with the following doses of onabotulinumtoxin A. A 5 unit/ 0.1 ml dilution was used for the injections. A 2 x 100 unit vial was used.    1. Corrugators 5 units each side.  2. Procerus 5 units.  3. Frontalis 10 units each side.  4. Temporalis 20 units each side.  5. Occipitalis 15 units each side.  6. Paraspinals 10 units each side.  7. Trapezius 15 units each side.    Units Injected: 155 Units  Units Discarded: 45 Units  Lot Number and Expiration: [id number]; 11/2024    The patient tolerated the procedure without any immediate complaints and will call the Neurology clinic if she has any problems or side effects from the medication. The patient will follow up in the Neurology clinic as previously decided.      Roberto Bolanos MD  Neurologist  [facility]  [phone]

## 2022-12-20 NOTE — PROGRESS NOTES
NEUROLOGY OUTPATIENT PROGRESS NOTE   Dec 20, 2022     CHIEF COMPLAINT/REASON FOR VISIT/REASON FOR CONSULT  Patient presents with:  Botox Migraine    REASON FOR CONSULTATION- Headaches    REFERRAL SOURCE  Dr. Rosangela Haynes  CC Dr. Rosangela Haynes    HISTORY OF PRESENT ILLNESS  Ruth Vanegas is a 36 year old female seen today for evaluation of headaches.  She reports that she has had headaches for years these would come and go.  Over the last few months these have become more constant and lasting longer.  She reports that she has 15 headache days a month.  Both temples are involved.  Headaches are throbbing in nature with photophobia and phonophobia.  She denies any triggers for her headaches.    She is tried sumatriptan in the past which made things worse.  She has tried Excedrin but she has to catch the headaches soon enough to make it work.  She is currently on nortriptyline 50 mg at night which is not helping.  The nortriptyline was also being used as a sleep aid.  She is also on Topamax which is not helping.  She is taking 50 mg in the evening and 25 mg in the morning.  She further complains of vertigo which is sometimes with the headache.  She does have issues with uncontrolled sugars and feels that the Topamax might be helpful.  Denies any visual auras.    12/21/21  Patient returns today.  She reports that the headaches have become more frequent and she is still having them every day.  Is taking 100 mg twice daily of the Topamax with no side effects.  50 mg of nortriptyline at night and is not sleeping well with this dose.  Is interested in trying a higher medication.  Is taking Maxalt which is working though has to use it almost every day.  Is working on losing weight.  Reports that her diabetes has been under good control.  Has not done the blood work that had ordered.    8/15/22  Patient returns today.  Headaches have become much more frequent and she is still having them every day.  Her Topamax was  increased to 100 mg twice a day endocrinology and she feels no side effects with no benefit as well.  Remains on 50 mg of nortriptyline at night for sleep.  She is also taking melatonin for sleep.  Verapamil was added previously which did not really help.  Diabetes has been under good control.  Maxalt does work occasionally but not all the time.  Blood work has been done which was negative.  Reports no other new symptoms.    9/20/22  Patient returns today.  She reports that the headaches have gotten worse since she was last seen.  She is having more severe headaches almost every day.  Remains on Topamax and verapamil.  She was on nortriptyline and this has been stopped.  She reports that this was stopped several months ago when she was not on it in August.  Maxalt does work but the benefit does not last the full time.  She did start the Ajovy which might have made the headaches worse.  Was discussed through phone messages about starting her on Botox and she wants to do that today.  For the insomnia she is seeing her GI doctor who is prescribing some medication that would help with her GI symptoms as well as with the insomnia.  She has not really started this medication at this point.  No other new issues/concerns.  No other new triggers    12/20/22  Patient returns today.  With the Botox she did have worsening headaches the first 2 weeks.  Headaches then improved for 2 months in the last 2 weeks she is been having headaches again.  Headaches unchanged in nature.  Remains on Topamax and verapamil.  Maxalt does help with abortive therapy.  Remains on melatonin for insomnia.  No other new issues.  Does complain of some eyebrow side effects with the Botox.  No other new concerns.    Previous history is reviewed and this is unchanged.    PAST MEDICAL/SURGICAL HISTORY  Past Medical History:   Diagnosis Date     Anemia     told to take iron pills when pregnant     Depression      Depressive disorder      Diabetes mellitus  (H)      Diabetes mellitus, type 2 (H) 07/15/2021     Dysthymic disorder      Endometriosis      Herpes genitalia      Hyperlipidemia      Kidney stone      Menorrhagia      Migraines      Neuropathy      Other abnormal Papanicolaou smear of cervix and cervical HPV(795.09) 01/02/2013     PCOS (polycystic ovarian syndrome)      Post traumatic stress disorder (PTSD)      Patient Active Problem List   Diagnosis     Obesity     Other abnormal Papanicolaou smear of cervix and cervical HPV(795.09)     Chronic nausea     Chronic vomiting     Diabetes mellitus, type 2 (H)     Unintentional weight loss     Rectal bleeding     Flatulence, eructation and gas pain     Morbid obesity (H)     Hyperglycemia     Major depressive disorder, single episode, severe without psychotic features (H)     Esophageal dysphagia     Encounter prior to initiation of medication   Significant for diabetes, migraines, depression, insomnia, difficulty keeping food down, constipation    FAMILY HISTORY  Family History   Adopted: Yes   Problem Relation Age of Onset     Prostate Cancer Father      Cancer Father         prostate     Alcoholism Sister      Alcoholism Sister      Alcoholism Brother      Alcoholism Brother      Diabetes Paternal Grandmother      Other Cancer Paternal Grandmother      Alcoholism Daughter      Coronary Artery Disease No family hx of      Urolithiasis No family hx of      Clotting Disorder No family hx of      Gout No family hx of      Heart Disease No family hx of      Anesthesia Reaction No family hx of    Family history positive for migraines in her brother.  Also family history of diabetes.    SOCIAL HISTORY  Social History     Tobacco Use     Smoking status: Never     Smokeless tobacco: Never   Vaping Use     Vaping Use: Never used   Substance Use Topics     Alcohol use: No     Alcohol/week: 0.0 standard drinks     Drug use: No       SYSTEMS REVIEW  Twelve-system ROS was done and other than the HPI this was negative  except for neck pain, back pain, arm and leg pain, joint pain, numbness and tingling, weakness paralysis, difficulty walking, falling, balance coordination problems, dizziness, ringing in the ears, sleeping problems, headaches, anxiety, depression, bloating, stomach pain, weight gain, appetite problems  No new issues reported today.    MEDICATIONS  acetaminophen (TYLENOL) 500 MG tablet, Take 500-1,000 mg by mouth every 4 hours as needed   atorvastatin (LIPITOR) 10 MG tablet, atorvastatin 10 mg tablet (Patient taking differently: every evening atorvastatin 10 mg tablet)  canagliflozin (INVOKANA) 300 MG tablet, Take 1 tablet (300 mg) by mouth every morning (before breakfast)  Continuous Blood Gluc  (FREESTYLE CINDY 14 DAY READER) JAUN, 1 Application  Continuous Blood Gluc Sensor (FREESTYLE CINDY 14 DAY SENSOR) MISC, 1 Application every 14 days  Continuous Blood Gluc Sensor (FREESTYLE CINDY 14 DAY SENSOR) MISC,   Continuous Blood Gluc Sensor (FREESTYLE CINDY 2 SENSOR) MISC, 1 each See Admin Instructions Change every 14 days.  diazepam (VALIUM) 5 MG tablet, once as needed Only for Dentist  fluconazole (DIFLUCAN) 150 MG tablet, once as needed   Fremanezumab-vfrm (AJOVY) 225 MG/1.5ML SOAJ, Inject 225 mg Subcutaneous every 30 days  ibuprofen (ADVIL/MOTRIN) 600 MG tablet, every 4 hours as needed   insulin glargine (LANTUS PEN) 100 UNIT/ML pen, Inject 24 Units Subcutaneous At Bedtime   insulin  UNIT/ML injection, 24 units  To be taken with prednisone 60 mg, hold it if not taking steroids  insulin pen needle (ULTICARE MICRO) 32G X 4 MM miscellaneous, Use 1 pen needles daily or as directed.  Melatonin 10 MG CAPS, Take 1 capsule by mouth at bedtime as needed, may repeat once (insomnia)  metFORMIN (GLUCOPHAGE XR) 500 MG 24 hr tablet, TAKE 2 TABLETS(1000 MG) BY MOUTH TWICE DAILY WITH MEALS  methocarbamol (ROBAXIN) 500 MG tablet, every evening   multivitamin (ONE-DAILY) tablet, Take 1 tablet by mouth  daily  ondansetron (ZOFRAN) 4 MG tablet, every 6 hours as needed   rizatriptan (MAXALT) 10 MG tablet, Take 1 tablet (10 mg) by mouth at onset of headache for migraine May repeat in 2 hours.  tolterodine ER (DETROL LA) 4 MG 24 hr capsule, Take 1 capsule (4 mg) by mouth daily  topiramate (TOPAMAX) 100 MG tablet, Take 1 tablet (100 mg) by mouth 2 times daily  verapamil ER (VERELAN) 120 MG 24 hr capsule, TAKE 1 CAPSULE BY MOUTH EVERY NIGHT AT BEDTIME  vitamin D3 (CHOLECALCIFEROL) 125 MCG (5000 UT) tablet, Take 1 tablet (125 mcg) by mouth every morning  gabapentin (NEURONTIN) 300 MG capsule, Take 2 capsules (600 mg) by mouth 3 times daily    Botulinum Toxin Type A (BOTOX) 200 units injection 155 Units       PHYSICAL EXAMINATION  VITALS: /86   Pulse 88   LMP 05/23/2016   GENERAL: Healthy appearing, alert, no acute distress, normal habitus.  CARDIOVASCULAR: Extremities warm and well perfused. Pulses present.   NEUROLOGICAL:  Patient is awake and oriented to self, place and time.  Attention span is normal.  Memory is grossly intact.  Language is fluent and follows commands appropriately.  Appropriate fund of knowledge. Cranial nerves 2-12 are intact. There is no pronator drift.  Motor exam shows 5/5 strength in all extremities.  Tone is symmetric bilaterally in upper and lower extremities.  (Previously reflexes are symmetric and 2+ in upper extremities and lower extremities. Sensory exam is grossly intact to light touch, pin prick and vibration.)  Finger to nose and heel to shin is without dysmetria.  Romberg is negative.  Gait is normal and the patient is able to do tandem walk and walk on toes and heels.  No change in exam today.    DIAGNOSTICS  CT head-images reviewed.  No major structural lesions noted.  IMPRESSION:  1.  No acute intracranial process.    CT 2020  HEAD CT:   1.  No acute intracranial process.     CERVICAL SPINE CT:   1.  No acute cervical spine fracture.    OUTSIDE RECORDS  Outside referral notes  and chart notes were reviewed and pertinent information has been summarized (in addition to the HPI):-Patient seen for headaches.  Was seen in endocrinology for diabetes.  Was referred to neurology.  Was also having some ankle and feet swelling.  Also has nausea related to headaches.  Has been seen in ENT for benign positional paroxysmal vertigo.  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  Was recommended to see occupational therapy.    LABS  Component      Latest Ref Rng & Units 2/28/2022   Vitamin B12      193 - 986 pg/mL 437   Ferritin      12 - 150 ng/mL 93   TSH      0.40 - 4.00 mU/L 1.71       IMPRESSION/REPORT/PLAN  Intractable chronic migraine without aura and without status migrainosus  Primary insomnia  History of diabetes    This is a 36 year old female with chronic headache suggestive of chronic migraines and insomnia.  Her head CT has been negative for structural lesions.  We will hold off on MRI for right now though could consider it if headaches are not responding to medications.  Blood work has been noncontributory.  Headaches have improved with Botox and will continue to hold off on MRI.    Sumatriptan has caused headaches to get worse.  Maxalt is more helpful as abortive therapy and will continue that.  She can use the Maxalt with over-the-counter medications to see if it becomes more effective.  Continue to use Maxalt.    Higher doses of Topamax have not helped with the headaches.  Propanolol cannot be used because of history of diabetes.  Verapamil has not helped with the headaches as well.  Ajovy was prescribed which actually made the headaches worse.  We will have her stop the Ajovy and try Botox instead.  She should keep a log of her headaches.  Continue Topamax and verapamil for now though after 1-2 sessions of Botox if the headaches are significantly better could get her off of these medications.    She was on nortriptyline for insomnia which might have been helping though  did not help the headaches.  She is off the nortriptyline now and her GI doctor wants to try the medication.  She is also on melatonin.  Continue melatonin.    Topamax might be helping with the diabetes as well.  Could consider leaving her on this.    I would encourage her to keep a log of her headaches to identify triggers.  I can see her back after 1-2 sessions of Botox.  Sometime in spring 2023.    She potentially had some eyebrow side effects.  Will monitor closely.    -     rizatriptan (MAXALT) 10 MG tablet; Take 1 tablet (10 mg) by mouth at onset of headache for migraine May repeat in 2 hours.  -     topiramate (TOPAMAX) 50 MG tablet; Take 1 tablet (50 mg) by mouth 2 times daily  -     verapamil ER (VERELAN) 120 MG 24 hr capsule; Take 1 capsule (120 mg) by mouth At Bedtime  -     Botox injections  - Stop Ajovy    Return in about 3 months (around 3/20/2023) for Botox.    Over 30 minutes were spent coordinating the care for the patient on the day of the encounter.  This includes previsit, during visit and post visit activities as documented above.  Counseling patient.  Prescription management.  Reviewing chart.  This is in addition to the procedure time.  (Activities include but not inclusive of reviewing chart, reviewing outside records, reviewing labs and imaging study results as well as the images, patient visit time including getting history and exam,  use if applicable, review of test results with the patient and coming up with a plan in a shared model, counseling patient and family, education and answering patient questions, EMR , EMR diagnosis entry and problem list management, medication reconciliation and prescription management if applicable, paperwork if applicable, printing documents and documentation of the visit activities.)      Roberto Bolanos MD  Neurologist  [facility]  Tel:- [phone]    This note was dictated using voice recognition  software.  Any grammatical or context distortions are unintentional and inherent to the software.

## 2022-12-20 NOTE — LETTER
12/20/2022         RE: Ruth Vanegas  [address]  [place] [address]        Dear Colleague,    Thank you for referring your patient, Ruth Vanegas, to the [facility] [place]. Please see a copy of my visit note below.    NEUROLOGY OUTPATIENT PROGRESS NOTE   Dec 20, 2022     CHIEF COMPLAINT/REASON FOR VISIT/REASON FOR CONSULT  Patient presents with:  Botox Migraine    REASON FOR CONSULTATION- Headaches    REFERRAL SOURCE  Dr. Rosangela Haynes  CC Dr. Rosangela Haynes    HISTORY OF PRESENT ILLNESS  Ruth Vanegas is a 36 year old female seen today for evaluation of headaches.  She reports that she has had headaches for years these would come and go.  Over the last few months these have become more constant and lasting longer.  She reports that she has 15 headache days a month.  Both temples are involved.  Headaches are throbbing in nature with photophobia and phonophobia.  She denies any triggers for her headaches.    She is tried sumatriptan in the past which made things worse.  She has tried Excedrin but she has to catch the headaches soon enough to make it work.  She is currently on nortriptyline 50 mg at night which is not helping.  The nortriptyline was also being used as a sleep aid.  She is also on Topamax which is not helping.  She is taking 50 mg in the evening and 25 mg in the morning.  She further complains of vertigo which is sometimes with the headache.  She does have issues with uncontrolled sugars and feels that the Topamax might be helpful.  Denies any visual auras.    12/21/21  Patient returns today.  She reports that the headaches have become more frequent and she is still having them every day.  Is taking 100 mg twice daily of the Topamax with no side effects.  50 mg of nortriptyline at night and is not sleeping well with this dose.  Is interested in trying a higher medication.  Is taking Maxalt which is working though has to use it almost every  day.  Is working on losing weight.  Reports that her diabetes has been under good control.  Has not done the blood work that had ordered.    8/15/22  Patient returns today.  Headaches have become much more frequent and she is still having them every day.  Her Topamax was increased to 100 mg twice a day endocrinology and she feels no side effects with no benefit as well.  Remains on 50 mg of nortriptyline at night for sleep.  She is also taking melatonin for sleep.  Verapamil was added previously which did not really help.  Diabetes has been under good control.  Maxalt does work occasionally but not all the time.  Blood work has been done which was negative.  Reports no other new symptoms.    9/20/22  Patient returns today.  She reports that the headaches have gotten worse since she was last seen.  She is having more severe headaches almost every day.  Remains on Topamax and verapamil.  She was on nortriptyline and this has been stopped.  She reports that this was stopped several months ago when she was not on it in August.  Maxalt does work but the benefit does not last the full time.  She did start the Ajovy which might have made the headaches worse.  Was discussed through phone messages about starting her on Botox and she wants to do that today.  For the insomnia she is seeing her GI doctor who is prescribing some medication that would help with her GI symptoms as well as with the insomnia.  She has not really started this medication at this point.  No other new issues/concerns.  No other new triggers    12/20/22  Patient returns today.  With the Botox she did have worsening headaches the first 2 weeks.  Headaches then improved for 2 months in the last 2 weeks she is been having headaches again.  Headaches unchanged in nature.  Remains on Topamax and verapamil.  Maxalt does help with abortive therapy.  Remains on melatonin for insomnia.  No other new issues.  Does complain of some eyebrow side effects with the  Botox.  No other new concerns.    Previous history is reviewed and this is unchanged.    PAST MEDICAL/SURGICAL HISTORY  Past Medical History:   Diagnosis Date     Anemia     told to take iron pills when pregnant     Depression      Depressive disorder      Diabetes mellitus (H)      Diabetes mellitus, type 2 (H) 07/15/2021     Dysthymic disorder      Endometriosis      Herpes genitalia      Hyperlipidemia      Kidney stone      Menorrhagia      Migraines      Neuropathy      Other abnormal Papanicolaou smear of cervix and cervical HPV(795.09) 01/02/2013     PCOS (polycystic ovarian syndrome)      Post traumatic stress disorder (PTSD)      Patient Active Problem List   Diagnosis     Obesity     Other abnormal Papanicolaou smear of cervix and cervical HPV(795.09)     Chronic nausea     Chronic vomiting     Diabetes mellitus, type 2 (H)     Unintentional weight loss     Rectal bleeding     Flatulence, eructation and gas pain     Morbid obesity (H)     Hyperglycemia     Major depressive disorder, single episode, severe without psychotic features (H)     Esophageal dysphagia     Encounter prior to initiation of medication   Significant for diabetes, migraines, depression, insomnia, difficulty keeping food down, constipation    FAMILY HISTORY  Family History   Adopted: Yes   Problem Relation Age of Onset     Prostate Cancer Father      Cancer Father         prostate     Alcoholism Sister      Alcoholism Sister      Alcoholism Brother      Alcoholism Brother      Diabetes Paternal Grandmother      Other Cancer Paternal Grandmother      Alcoholism Daughter      Coronary Artery Disease No family hx of      Urolithiasis No family hx of      Clotting Disorder No family hx of      Gout No family hx of      Heart Disease No family hx of      Anesthesia Reaction No family hx of    Family history positive for migraines in her brother.  Also family history of diabetes.    SOCIAL HISTORY  Social History     Tobacco Use     Smoking  status: Never     Smokeless tobacco: Never   Vaping Use     Vaping Use: Never used   Substance Use Topics     Alcohol use: No     Alcohol/week: 0.0 standard drinks     Drug use: No       SYSTEMS REVIEW  Twelve-system ROS was done and other than the HPI this was negative except for neck pain, back pain, arm and leg pain, joint pain, numbness and tingling, weakness paralysis, difficulty walking, falling, balance coordination problems, dizziness, ringing in the ears, sleeping problems, headaches, anxiety, depression, bloating, stomach pain, weight gain, appetite problems  No new issues reported today.    MEDICATIONS  acetaminophen (TYLENOL) 500 MG tablet, Take 500-1,000 mg by mouth every 4 hours as needed   atorvastatin (LIPITOR) 10 MG tablet, atorvastatin 10 mg tablet (Patient taking differently: every evening atorvastatin 10 mg tablet)  canagliflozin (INVOKANA) 300 MG tablet, Take 1 tablet (300 mg) by mouth every morning (before breakfast)  Continuous Blood Gluc  (FREESTYLE CINDY 14 DAY READER) JAUN, 1 Application  Continuous Blood Gluc Sensor (FREESTYLE CINDY 14 DAY SENSOR) MISC, 1 Application every 14 days  Continuous Blood Gluc Sensor (FREESTYLE CINDY 14 DAY SENSOR) MISC,   Continuous Blood Gluc Sensor (FREESTYLE CINDY 2 SENSOR) MISC, 1 each See Admin Instructions Change every 14 days.  diazepam (VALIUM) 5 MG tablet, once as needed Only for Dentist  fluconazole (DIFLUCAN) 150 MG tablet, once as needed   Fremanezumab-vfrm (AJOVY) 225 MG/1.5ML SOAJ, Inject 225 mg Subcutaneous every 30 days  ibuprofen (ADVIL/MOTRIN) 600 MG tablet, every 4 hours as needed   insulin glargine (LANTUS PEN) 100 UNIT/ML pen, Inject 24 Units Subcutaneous At Bedtime   insulin  UNIT/ML injection, 24 units  To be taken with prednisone 60 mg, hold it if not taking steroids  insulin pen needle (ULTICARE MICRO) 32G X 4 MM miscellaneous, Use 1 pen needles daily or as directed.  Melatonin 10 MG CAPS, Take 1 capsule by mouth at  bedtime as needed, may repeat once (insomnia)  metFORMIN (GLUCOPHAGE XR) 500 MG 24 hr tablet, TAKE 2 TABLETS(1000 MG) BY MOUTH TWICE DAILY WITH MEALS  methocarbamol (ROBAXIN) 500 MG tablet, every evening   multivitamin (ONE-DAILY) tablet, Take 1 tablet by mouth daily  ondansetron (ZOFRAN) 4 MG tablet, every 6 hours as needed   rizatriptan (MAXALT) 10 MG tablet, Take 1 tablet (10 mg) by mouth at onset of headache for migraine May repeat in 2 hours.  tolterodine ER (DETROL LA) 4 MG 24 hr capsule, Take 1 capsule (4 mg) by mouth daily  topiramate (TOPAMAX) 100 MG tablet, Take 1 tablet (100 mg) by mouth 2 times daily  verapamil ER (VERELAN) 120 MG 24 hr capsule, TAKE 1 CAPSULE BY MOUTH EVERY NIGHT AT BEDTIME  vitamin D3 (CHOLECALCIFEROL) 125 MCG (5000 UT) tablet, Take 1 tablet (125 mcg) by mouth every morning  gabapentin (NEURONTIN) 300 MG capsule, Take 2 capsules (600 mg) by mouth 3 times daily    Botulinum Toxin Type A (BOTOX) 200 units injection 155 Units       PHYSICAL EXAMINATION  VITALS: /86   Pulse 88   LMP 05/23/2016   GENERAL: Healthy appearing, alert, no acute distress, normal habitus.  CARDIOVASCULAR: Extremities warm and well perfused. Pulses present.   NEUROLOGICAL:  Patient is awake and oriented to self, place and time.  Attention span is normal.  Memory is grossly intact.  Language is fluent and follows commands appropriately.  Appropriate fund of knowledge. Cranial nerves 2-12 are intact. There is no pronator drift.  Motor exam shows 5/5 strength in all extremities.  Tone is symmetric bilaterally in upper and lower extremities.  (Previously reflexes are symmetric and 2+ in upper extremities and lower extremities. Sensory exam is grossly intact to light touch, pin prick and vibration.)  Finger to nose and heel to shin is without dysmetria.  Romberg is negative.  Gait is normal and the patient is able to do tandem walk and walk on toes and heels.  No change in exam today.    DIAGNOSTICS  CT  head-images reviewed.  No major structural lesions noted.  IMPRESSION:  1.  No acute intracranial process.    CT 2020  HEAD CT:   1.  No acute intracranial process.     CERVICAL SPINE CT:   1.  No acute cervical spine fracture.    OUTSIDE RECORDS  Outside referral notes and chart notes were reviewed and pertinent information has been summarized (in addition to the HPI):-Patient seen for headaches.  Was seen in endocrinology for diabetes.  Was referred to neurology.  Was also having some ankle and feet swelling.  Also has nausea related to headaches.  Has been seen in ENT for benign positional paroxysmal vertigo.  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  Was recommended to see occupational therapy.    LABS  Component      Latest Ref Rng & Units 2/28/2022   Vitamin B12      193 - 986 pg/mL 437   Ferritin      12 - 150 ng/mL 93   TSH      0.40 - 4.00 mU/L 1.71       IMPRESSION/REPORT/PLAN  Intractable chronic migraine without aura and without status migrainosus  Primary insomnia  History of diabetes    This is a 36 year old female with chronic headache suggestive of chronic migraines and insomnia.  Her head CT has been negative for structural lesions.  We will hold off on MRI for right now though could consider it if headaches are not responding to medications.  Blood work has been noncontributory.  Headaches have improved with Botox and will continue to hold off on MRI.    Sumatriptan has caused headaches to get worse.  Maxalt is more helpful as abortive therapy and will continue that.  She can use the Maxalt with over-the-counter medications to see if it becomes more effective.  Continue to use Maxalt.    Higher doses of Topamax have not helped with the headaches.  Propanolol cannot be used because of history of diabetes.  Verapamil has not helped with the headaches as well.  Ajovy was prescribed which actually made the headaches worse.  We will have her stop the Ajovy and try Botox instead.  She  should keep a log of her headaches.  Continue Topamax and verapamil for now though after 1-2 sessions of Botox if the headaches are significantly better could get her off of these medications.    She was on nortriptyline for insomnia which might have been helping though did not help the headaches.  She is off the nortriptyline now and her GI doctor wants to try the medication.  She is also on melatonin.  Continue melatonin.    Topamax might be helping with the diabetes as well.  Could consider leaving her on this.    I would encourage her to keep a log of her headaches to identify triggers.  I can see her back after 1-2 sessions of Botox.  Sometime in spring 2023.    She potentially had some eyebrow side effects.  Will monitor closely.    -     rizatriptan (MAXALT) 10 MG tablet; Take 1 tablet (10 mg) by mouth at onset of headache for migraine May repeat in 2 hours.  -     topiramate (TOPAMAX) 50 MG tablet; Take 1 tablet (50 mg) by mouth 2 times daily  -     verapamil ER (VERELAN) 120 MG 24 hr capsule; Take 1 capsule (120 mg) by mouth At Bedtime  -     Botox injections  - Stop Ajovy    Return in about 3 months (around 3/20/2023) for Botox.    Over 30 minutes were spent coordinating the care for the patient on the day of the encounter.  This includes previsit, during visit and post visit activities as documented above.  Counseling patient.  Prescription management.  Reviewing chart.  This is in addition to the procedure time.  (Activities include but not inclusive of reviewing chart, reviewing outside records, reviewing labs and imaging study results as well as the images, patient visit time including getting history and exam,  use if applicable, review of test results with the patient and coming up with a plan in a shared model, counseling patient and family, education and answering patient questions, EMR , EMR diagnosis entry and problem list management, medication reconciliation and  prescription management if applicable, paperwork if applicable, printing documents and documentation of the visit activities.)      Roberto Bolanos MD  Neurologist  [facility]  Tel:- [phone]    This note was dictated using voice recognition software.  Any grammatical or context distortions are unintentional and inherent to the software.      NEUROLOGY OUTPATIENT PROGRESS NOTE   Dec 20, 2022     CHIEF COMPLAINT/REASON FOR VISIT/REASON FOR CONSULT  Patient presents with:  Botox Migraine    REASON FOR CONSULTATION- Headaches    REFERRAL SOURCE  Dr. Rosangela Haynes   Dr. Rosangela Haynes    HISTORY OF PRESENT ILLNESS  Ruth Vanegas is a 36 year old female seen today for evaluation of headaches.  She reports that she has had headaches for years these would come and go.  Over the last few months these have become more constant and lasting longer.  She reports that she has 15 headache days a month.  Both temples are involved.  Headaches are throbbing in nature with photophobia and phonophobia.  She denies any triggers for her headaches.    She is tried sumatriptan in the past which made things worse.  She has tried Excedrin but she has to catch the headaches soon enough to make it work.  She is currently on nortriptyline 50 mg at night which is not helping.  The nortriptyline was also being used as a sleep aid.  She is also on Topamax which is not helping.  She is taking 50 mg in the evening and 25 mg in the morning.  She further complains of vertigo which is sometimes with the headache.  She does have issues with uncontrolled sugars and feels that the Topamax might be helpful.  Denies any visual auras.    12/21/21  Patient returns today.  She reports that the headaches have become more frequent and she is still having them every day.  Is taking 100 mg twice daily of the Topamax with no side effects.  50 mg of nortriptyline at night and is not sleeping well with this dose.  Is  interested in trying a higher medication.  Is taking Maxalt which is working though has to use it almost every day.  Is working on losing weight.  Reports that her diabetes has been under good control.  Has not done the blood work that had ordered.    8/15/22  Patient returns today.  Headaches have become much more frequent and she is still having them every day.  Her Topamax was increased to 100 mg twice a day endocrinology and she feels no side effects with no benefit as well.  Remains on 50 mg of nortriptyline at night for sleep.  She is also taking melatonin for sleep.  Verapamil was added previously which did not really help.  Diabetes has been under good control.  Maxalt does work occasionally but not all the time.  Blood work has been done which was negative.  Reports no other new symptoms.    9/20/22  Patient returns today.  She reports that the headaches have gotten worse since she was last seen.  She is having more severe headaches almost every day.  Remains on Topamax and verapamil.  She was on nortriptyline and this has been stopped.  She reports that this was stopped several months ago when she was not on it in August.  Maxalt does work but the benefit does not last the full time.  She did start the Ajovy which might have made the headaches worse.  Was discussed through phone messages about starting her on Botox and she wants to do that today.  For the insomnia she is seeing her GI doctor who is prescribing some medication that would help with her GI symptoms as well as with the insomnia.  She has not really started this medication at this point.  No other new issues/concerns.  No other new triggers    Previous history is reviewed and this is unchanged.    PAST MEDICAL/SURGICAL HISTORY  Past Medical History:   Diagnosis Date     Anemia     told to take iron pills when pregnant     Depression      Depressive disorder      Diabetes mellitus (H)      Diabetes mellitus, type 2 (H) 07/15/2021     Dysthymic  disorder      Endometriosis      Herpes genitalia      Hyperlipidemia      Kidney stone      Menorrhagia      Migraines      Neuropathy      Other abnormal Papanicolaou smear of cervix and cervical HPV(795.09) 01/02/2013     PCOS (polycystic ovarian syndrome)      Post traumatic stress disorder (PTSD)      Patient Active Problem List   Diagnosis     Obesity     Other abnormal Papanicolaou smear of cervix and cervical HPV(795.09)     Chronic nausea     Chronic vomiting     Diabetes mellitus, type 2 (H)     Unintentional weight loss     Rectal bleeding     Flatulence, eructation and gas pain     Morbid obesity (H)     Hyperglycemia     Major depressive disorder, single episode, severe without psychotic features (H)     Esophageal dysphagia     Encounter prior to initiation of medication   Significant for diabetes, migraines, depression, insomnia, difficulty keeping food down, constipation    FAMILY HISTORY  Family History   Adopted: Yes   Problem Relation Age of Onset     Prostate Cancer Father      Cancer Father         prostate     Alcoholism Sister      Alcoholism Sister      Alcoholism Brother      Alcoholism Brother      Diabetes Paternal Grandmother      Other Cancer Paternal Grandmother      Alcoholism Daughter      Coronary Artery Disease No family hx of      Urolithiasis No family hx of      Clotting Disorder No family hx of      Gout No family hx of      Heart Disease No family hx of      Anesthesia Reaction No family hx of    Family history positive for migraines in her brother.  Also family history of diabetes.    SOCIAL HISTORY  Social History     Tobacco Use     Smoking status: Never     Smokeless tobacco: Never   Vaping Use     Vaping Use: Never used   Substance Use Topics     Alcohol use: No     Alcohol/week: 0.0 standard drinks     Drug use: No       SYSTEMS REVIEW  Twelve-system ROS was done and other than the HPI this was negative except for neck pain, back pain, arm and leg pain, joint pain,  numbness and tingling, weakness paralysis, difficulty walking, falling, balance coordination problems, dizziness, ringing in the ears, sleeping problems, headaches, anxiety, depression, bloating, stomach pain, weight gain, appetite problems  No new concerns reported today.    MEDICATIONS  acetaminophen (TYLENOL) 500 MG tablet, Take 500-1,000 mg by mouth every 4 hours as needed   atorvastatin (LIPITOR) 10 MG tablet, atorvastatin 10 mg tablet (Patient taking differently: every evening atorvastatin 10 mg tablet)  canagliflozin (INVOKANA) 300 MG tablet, Take 1 tablet (300 mg) by mouth every morning (before breakfast)  Continuous Blood Gluc  (FREESTYLE CINDY 14 DAY READER) JAUN, 1 Application  Continuous Blood Gluc Sensor (FREESTYLE CINDY 14 DAY SENSOR) MISC, 1 Application every 14 days  Continuous Blood Gluc Sensor (FREESTYLE CINDY 14 DAY SENSOR) MISC,   Continuous Blood Gluc Sensor (FREESTYLE CINDY 2 SENSOR) MISC, 1 each See Admin Instructions Change every 14 days.  diazepam (VALIUM) 5 MG tablet, once as needed Only for Dentist  fluconazole (DIFLUCAN) 150 MG tablet, once as needed   Fremanezumab-vfrm (AJOVY) 225 MG/1.5ML SOAJ, Inject 225 mg Subcutaneous every 30 days  ibuprofen (ADVIL/MOTRIN) 600 MG tablet, every 4 hours as needed   insulin glargine (LANTUS PEN) 100 UNIT/ML pen, Inject 24 Units Subcutaneous At Bedtime   insulin  UNIT/ML injection, 24 units  To be taken with prednisone 60 mg, hold it if not taking steroids  insulin pen needle (ULTICARE MICRO) 32G X 4 MM miscellaneous, Use 1 pen needles daily or as directed.  Melatonin 10 MG CAPS, Take 1 capsule by mouth at bedtime as needed, may repeat once (insomnia)  metFORMIN (GLUCOPHAGE XR) 500 MG 24 hr tablet, TAKE 2 TABLETS(1000 MG) BY MOUTH TWICE DAILY WITH MEALS  methocarbamol (ROBAXIN) 500 MG tablet, every evening   multivitamin (ONE-DAILY) tablet, Take 1 tablet by mouth daily  ondansetron (ZOFRAN) 4 MG tablet, every 6 hours as needed    rizatriptan (MAXALT) 10 MG tablet, Take 1 tablet (10 mg) by mouth at onset of headache for migraine May repeat in 2 hours.  tolterodine ER (DETROL LA) 4 MG 24 hr capsule, Take 1 capsule (4 mg) by mouth daily  topiramate (TOPAMAX) 100 MG tablet, Take 1 tablet (100 mg) by mouth 2 times daily  verapamil ER (VERELAN) 120 MG 24 hr capsule, TAKE 1 CAPSULE BY MOUTH EVERY NIGHT AT BEDTIME  vitamin D3 (CHOLECALCIFEROL) 125 MCG (5000 UT) tablet, Take 1 tablet (125 mcg) by mouth every morning  gabapentin (NEURONTIN) 300 MG capsule, Take 2 capsules (600 mg) by mouth 3 times daily    Botulinum Toxin Type A (BOTOX) 200 units injection 155 Units         PHYSICAL EXAMINATION  VITALS: /86   Pulse 88   LMP 05/23/2016   GENERAL: Healthy appearing, alert, no acute distress, normal habitus.  CARDIOVASCULAR: Extremities warm and well perfused. Pulses present.   NEUROLOGICAL:  Patient is awake and oriented to self, place and time.  Attention span is normal.  Memory is grossly intact.  Language is fluent and follows commands appropriately.  Appropriate fund of knowledge. Cranial nerves 2-12 are intact. There is no pronator drift.  Motor exam shows 5/5 strength in all extremities.  Tone is symmetric bilaterally in upper and lower extremities.  (Previously reflexes are symmetric and 2+ in upper extremities and lower extremities. Sensory exam is grossly intact to light touch, pin prick and vibration.)  Finger to nose and heel to shin is without dysmetria.  Romberg is negative.  Gait is normal and the patient is able to do tandem walk and walk on toes and heels.  Exam similar to before.    DIAGNOSTICS  CT head-images reviewed.  No major structural lesions noted.  IMPRESSION:  1.  No acute intracranial process.    CT 2020  HEAD CT:   1.  No acute intracranial process.     CERVICAL SPINE CT:   1.  No acute cervical spine fracture.    OUTSIDE RECORDS  Outside referral notes and chart notes were reviewed and pertinent information has  been summarized (in addition to the HPI):-Patient seen for headaches.  Was seen in endocrinology for diabetes.  Was referred to neurology.  Was also having some ankle and feet swelling.  Also has nausea related to headaches.  Has been seen in ENT for benign positional paroxysmal vertigo.  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  Was recommended to see occupational therapy.    LABS  Component      Latest Ref Rng & Units 2/28/2022   Vitamin B12      193 - 986 pg/mL 437   Ferritin      12 - 150 ng/mL 93   TSH      0.40 - 4.00 mU/L 1.71       IMPRESSION/REPORT/PLAN  Intractable chronic migraine without aura and without status migrainosus  Primary insomnia  History of diabetes    This is a 36 year old female with chronic headache suggestive of chronic migraines and insomnia.  Her head CT has been negative for structural lesions.  We will hold off on MRI for right now though could consider it if headaches are not responding to medications.  Blood work has been noncontributory.  If her headaches remain refractory would need MRI during the next visit.    Sumatriptan has caused headaches to get worse.  Maxalt is more helpful as abortive therapy and will continue that.  She can use the Maxalt with over-the-counter medications to see if it becomes more effective.  Continue Maxalt.    Higher doses of Topamax have not helped with the headaches.  Propanolol cannot be used because of history of diabetes.  Verapamil has not helped with the headaches as well.  Ajovy was prescribed which actually made the headaches worse.  We will have her stop the Ajovy and try Botox instead.  She should keep a log of her headaches.  Continue Topamax and verapamil.    She was on nortriptyline for insomnia which might have been helping though did not help the headaches.  She is off the nortriptyline now and her GI doctor wants to try the medication.  She is also on melatonin.      Topamax might be helping with the diabetes as  well.    I would encourage her to keep a log of her headaches to identify triggers.  I will see her back in 2 months.  Start with Botox to the    -     rizatriptan (MAXALT) 10 MG tablet; Take 1 tablet (10 mg) by mouth at onset of headache for migraine May repeat in 2 hours.  -     topiramate (TOPAMAX) 50 MG tablet; Take 1 tablet (50 mg) by mouth 2 times daily  -     verapamil ER (VERELAN) 120 MG 24 hr capsule; Take 1 capsule (120 mg) by mouth At Bedtime  -     Botox injections  - Stop Ajovy    No follow-ups on file.    Over 30 minutes were spent coordinating the care for the patient on the day of the encounter.  This includes previsit, during visit and post visit activities as documented above.  Counseling patient.  Worsening headaches with prescription management.  Multiple problems addressed.  This is in addition to the procedure time.  (Activities include but not inclusive of reviewing chart, reviewing outside records, reviewing labs and imaging study results as well as the images, patient visit time including getting history and exam,  use if applicable, review of test results with the patient and coming up with a plan in a shared model, counseling patient and family, education and answering patient questions, EMR , EMR diagnosis entry and problem list management, medication reconciliation and prescription management if applicable, paperwork if applicable, printing documents and documentation of the visit activities.)      Roberto Bolanos MD  Neurologist  [facility]  Tel:- [phone]    This note was dictated using voice recognition software.  Any grammatical or context distortions are unintentional and inherent to the software.          Again, thank you for allowing me to participate in the care of your patient.        Sincerely,        Roberto Bolanos MD

## 2023-01-06 ENCOUNTER — TRANSFERRED RECORDS (OUTPATIENT)
Dept: HEALTH INFORMATION MANAGEMENT | Facility: CLINIC | Age: 38
End: 2023-01-06

## 2023-01-12 ENCOUNTER — VIRTUAL VISIT (OUTPATIENT)
Dept: GASTROENTEROLOGY | Facility: CLINIC | Age: 38
End: 2023-01-12
Payer: COMMERCIAL

## 2023-01-12 VITALS — BODY MASS INDEX: 43.85 KG/M2 | WEIGHT: 280 LBS

## 2023-01-12 DIAGNOSIS — Z86.19 HISTORY OF HELICOBACTER PYLORI INFECTION: Primary | ICD-10-CM

## 2023-01-12 PROCEDURE — 99215 OFFICE O/P EST HI 40 MIN: CPT | Mod: 95 | Performed by: PHYSICIAN ASSISTANT

## 2023-01-12 ASSESSMENT — PAIN SCALES - GENERAL: PAINLEVEL: SEVERE PAIN (6)

## 2023-01-12 NOTE — PROGRESS NOTES
Ruth is a 37 year old who is being evaluated via a billable video visit.      How would you like to obtain your AVS? MyChart  If the video visit is dropped, the invitation should be resent by: Text to cell phone: [phone]  Will anyone else be joining your video visit? No      Video-Visit Details    Type of service:  Video Visit   Video Start Time: 846  Video End Time:9:18 AM    Originating Location (pt. Location): Home    Distant Location (provider location):  Off-site  Platform used for Video Visit: [facility]       GI CLINIC VISIT    CC/REFERRING PROVIDER: No ref. provider found    HPI: 37 year old female with PMH of class III obesity, HLD, DM2, anxiety, frequent yeast infections, migraine headaches presenting to GI clinic for follow-up of nausea and vomiting.    Briefly, Ruth initially met with Dr. Lehman in consultation for nausea and vomiting in September 2021. At that time, she reported a one year history of nausea and vomiting with constant sensation of nausea, with episodic vomiting without clear trigging. Diabetes medication, including bydureon and Ozempic had worsened the symptoms. At that time, fasting glucose values were >200. EGD was endoscopically unremarkable, however gastric biopsies revealed H pylori. She was given treatment for this, with some improvement with treatment, and with confirmation of eradication May 2022. Colnoscopy was unrevealing. NM GES was normal.     At most recent follow-up in September, she noted improved symptoms, with nausea approximately 3-4 times per week, and vomiting 1-2 times per week. She had stopped nortriptyline without any change in GI symptoms. She was started on mirtazpine with a sow taper to a max dose of 45 mg and was recommended to discontinue marijuana use.    Interval history:  Ruth states that she did well following last visit, however had spontaneous return of nausea in the last few weeks. The nausea is now occurring daily after every meal, lasting 2-3  hours, associated with early satiety and belching. She denies abdominal pain. The dysphagia she had noted on previous visit is improved, not typically occurring daily now. It is unclear by history of her symptoms represent true dysphagia; she notes that if she eats too much, it will just feel like things are not moving well, but it is not clear if there is an obstructed bolus sensation. She notes regurgitation of bland undigested postprandially most days. She denies heartburn. Glucose has been improved, with fasting sugars in the 140s. There were no medication or other changes prior to symptom worsening. She did not start mirtazepine and has not had the EKG yet.    GI ROS notable for intermittent constipation, with hard/dry stools requiring straining. Most of the time, she notes she has soft stool with sense of incomplete evacuation and with intermittent straining, 2-3 BM daily.  S/p caesarian deliveries x 2 with bladder incontinence.    Diet recall  B - oatmeal/toast or eggs/camacho or cereal  L - does not eat lunch  D - sushi bake  Sn - varies; fruit or candy or chips  Azul - apple juice, water      Today's Kathy Swallow Questionnaire (SSQ):    Brief Esophageal Dysphagia Questionnaire (BEDQ):  We would like to ask about your dysphagia symptoms over the past 30 days, 6 months, and 12 months.  Please think about your symptom experiences and choose the best answer.      Over the past 14 days, on average, how often have you had the following?  If your response falls between two categories, please make your best guess.  If you are unable to eat the type of food in the question, please check  Cannot eat this.    Cannot  Eat This 0  Rarely/ Never 1  Once or twice a month 2  1-2 times per week 3  3-5 times per week 4  Daily or almost daily 5  Several times per day   Trouble eating solid food (meat, bread, vegetables)        3     Trouble eating soft foods (yogurt, jello, pudding)     0        Trouble swallowing liquids        0        Pain while swallowing  --- 0        Coughing or choking while swallowing foods or liquids --- 0          SCORE: 3    Over the past 14 days, on average, how would you rate your discomfort or pain during swallowing? If you are unable to eat the type of food in the question, please check  Cannot eat this.    Cannot Eat This 0  None 1  Very Mild 2  Mild 3  Moderate 4  Moderately Severe    5  Severe   Eating solid food   (meat, bread, vegetables)  0        Eating soft foods   (yogurt, jello, pudding)  0        Drinking liquids  0          SCORE: 0    Approximately how many times in the past 12 months have you:   Had food stuck in your throat or esophagus for a period lasting longer than 30 minutes?  Had to visit the emergency room because of food stuck in your throat or esophagus?    Eckardt Score:   Score Dysphagia Regurgitation Retrosternal Pain Weight Loss (kg)   0 None None None None   1 Occasional Occasional Occasional <5   2 Daily Daily Daily 5-10   3 Each meal Each meal Each meal >10     SCORE: 4    ROS: 10pt ROS performed and otherwise negative.    PAST MEDICAL HISTORY:  Past Medical History:   Diagnosis Date     Anemia     told to take iron pills when pregnant     Depression      Depressive disorder      Diabetes mellitus (H)      Diabetes mellitus, type 2 (H) 07/15/2021     Dysthymic disorder      Endometriosis      Herpes genitalia      Hyperlipidemia      Kidney stone      Menorrhagia      Migraines      Neuropathy      Other abnormal Papanicolaou smear of cervix and cervical HPV(795.09) 2013     PCOS (polycystic ovarian syndrome)      Post traumatic stress disorder (PTSD)        PREVIOUS ABDOMINAL/GYNECOLOGIC SURGERIES:  Past Surgical History:   Procedure Laterality Date     BLADDER REPAIR W/  SECTION      X2     C/SECTION, CLASSICAL      x2     COLONOSCOPY N/A 2021    Procedure: COLONOSCOPY;  Surgeon: Rene Lehman DO;  Location: [facility]     DILATION AND CURETTAGE  2015      DILATION AND CURETTAGE  05/06/2015     DILATION AND CURETTAGE, OPERATIVE HYSTEROSCOPY, COMBINED N/A 5/7/2015    Procedure: Dilation and Curettage with Hysteroscopy;  Surgeon: Zia Farmer MD;  Location: [facility];  Service:      DILATION AND CURETTAGE, OPERATIVE HYSTEROSCOPY, COMBINED N/A 9/29/2016    Procedure: DILATION AND CURETTAGE WITH HYSTEROSCOPY INSERT MIRENA;  Surgeon: Suzanne Major MD;  Location: [facility] OR;  Service:      ENT SURGERY       ESOPHAGOSCOPY, GASTROSCOPY, DUODENOSCOPY (EGD), COMBINED N/A 12/14/2021    Procedure: ESOPHAGOGASTRODUODENOSCOPY, WITH BIOPSY;  Surgeon: Rene Lehman DO;  Location:  GI     GYN SURGERY  10/19/15    laproscopic lysis of adhesions     HYSTEROSCOPY, ABLATE ENDOMETRIUM HYDROTHERMAL, COMBINED N/A 3/2/2018    Procedure: HYSTEROSCOPY, DILATION AND CURETTAGE, ENDOMETRIAL ABLATION;  Surgeon: Kristy Israel DO;  Location: [facility];  Service: Gynecology     LAPAROSCOPIC HYSTERECTOMY TOTAL N/A 3/4/2019    Procedure: ROBOTIC TOTAL LAPAROSCOPIC HYSTERECTOMY, BILATERAL SALPINGECTOMY, LEFT OVARIAN CYSTOTOMY. CYSTOSCOPY;  Surgeon: Zia Farmer MD;  Location: [facility];  Service: Gynecology     LAPAROSCOPY DIAGNOSTIC (GENERAL) N/A 10/19/2015    Procedure:  LAPAROSCOPY,LYSIS OF ADHESIONS;  Surgeon: Zia Farmer MD;  Location: [facility];  Service:      MD LAP,TUBAL CAUTERY Bilateral 3/2/2018    Procedure: LAPAROSCOPIC BILATERAL TUBAL LIGATION;  Surgeon: Kristy Israel DO;  Location: [facility];  Service: Gynecology     TONSILLECTOMY           PERTINENT MEDICATIONS:  Current Outpatient Medications   Medication Sig Dispense Refill     acetaminophen (TYLENOL) 500 MG tablet Take 500-1,000 mg by mouth every 4 hours as needed        atorvastatin (LIPITOR) 10 MG tablet atorvastatin 10 mg tablet (Patient taking differently: every evening atorvastatin 10 mg tablet) 30 tablet 11     canagliflozin (INVOKANA) 300 MG tablet Take  1 tablet (300 mg) by mouth every morning (before breakfast) 90 tablet 3     Continuous Blood Gluc  (FREESTYLE CINDY 14 DAY READER) JAUN 1 Application       Continuous Blood Gluc Sensor (FREESTYLE CINDY 14 DAY SENSOR) MISC 1 Application every 14 days 2 each 3     Continuous Blood Gluc Sensor (FREESTYLE CINDY 14 DAY SENSOR) MIS        Continuous Blood Gluc Sensor (FREESTYLE CINDY 2 SENSOR) [facility] 1 each See Admin Instructions Change every 14 days. 6 each 1     diazepam (VALIUM) 5 MG tablet once as needed Only for Dentist       fluconazole (DIFLUCAN) 150 MG tablet once as needed        Fremanezumab-vfrm (AJOVY) 225 MG/1.5ML SOAJ Inject 225 mg Subcutaneous every 30 days 1.5 mL 3     ibuprofen (ADVIL/MOTRIN) 600 MG tablet every 4 hours as needed        insulin glargine (LANTUS PEN) 100 UNIT/ML pen Inject 24 Units Subcutaneous At Bedtime        insulin  UNIT/ML injection 24 units  To be taken with prednisone 60 mg, hold it if not taking steroids 15 mL 1     insulin pen needle (ULTICARE MICRO) 32G X 4 MM miscellaneous Use 1 pen needles daily or as directed. 100 each 1     Melatonin 10 MG CAPS Take 1 capsule by mouth at bedtime as needed, may repeat once (insomnia) 30 capsule 1     metFORMIN (GLUCOPHAGE XR) 500 MG 24 hr tablet TAKE 2 TABLETS(1000 MG) BY MOUTH TWICE DAILY WITH MEALS 360 tablet 3     methocarbamol (ROBAXIN) 500 MG tablet every evening        multivitamin (ONE-DAILY) tablet Take 1 tablet by mouth daily 100 tablet 3     ondansetron (ZOFRAN) 4 MG tablet every 6 hours as needed        rizatriptan (MAXALT) 10 MG tablet Take 1 tablet (10 mg) by mouth at onset of headache for migraine May repeat in 2 hours. 18 tablet 3     tolterodine ER (DETROL LA) 4 MG 24 hr capsule Take 1 capsule (4 mg) by mouth daily 30 capsule 3     topiramate (TOPAMAX) 100 MG tablet Take 1 tablet (100 mg) by mouth 2 times daily 180 tablet 1     verapamil ER (VERELAN) 120 MG 24 hr capsule TAKE 1 CAPSULE BY MOUTH EVERY NIGHT AT  BEDTIME 30 capsule 1     vitamin D3 (CHOLECALCIFEROL) 125 MCG (5000 UT) tablet Take 1 tablet (125 mcg) by mouth every morning 90 tablet 3     gabapentin (NEURONTIN) 300 MG capsule Take 2 capsules (600 mg) by mouth 3 times daily 540 capsule 1       SOCIAL HISTORY:  Social History     Socioeconomic History     Marital status: Single     Spouse name: Not on file     Number of children: Not on file     Years of education: Not on file     Highest education level: Not on file   Occupational History     Not on file   Tobacco Use     Smoking status: Never     Smokeless tobacco: Never   Vaping Use     Vaping Use: Never used   Substance and Sexual Activity     Alcohol use: No     Alcohol/week: 0.0 standard drinks     Drug use: No     Sexual activity: Not on file   Other Topics Concern     Not on file   Social History Narrative     Not on file     Social Determinants of Health     Financial Resource Strain: Not on file   Food Insecurity: Not on file   Transportation Needs: Not on file   Physical Activity: Not on file   Stress: Not on file   Social Connections: Not on file   Intimate Partner Violence: Not At Risk     Fear of Current or Ex-Partner: No     Emotionally Abused: No     Physically Abused: No     Sexually Abused: No   Housing Stability: Not on file       FAMILY HISTORY:    Family History   Adopted: Yes   Problem Relation Age of Onset     Prostate Cancer Father      Cancer Father         prostate     Alcoholism Sister      Alcoholism Sister      Alcoholism Brother      Alcoholism Brother      Diabetes Paternal Grandmother      Other Cancer Paternal Grandmother      Alcoholism Daughter      Coronary Artery Disease No family hx of      Urolithiasis No family hx of      Clotting Disorder No family hx of      Gout No family hx of      Heart Disease No family hx of      Anesthesia Reaction No family hx of        PHYSICAL EXAMINATION:  Vitals reviewed  Wt 127 kg (280 lb)   LMP 05/23/2016   BMI 43.85 kg/m    Video physical  exam  General: Patient appears well in no acute distress.   Skin: No visualized rash or lesions on visualized skin  Eyes: EOMI, no erythema, sclera icterus or discharge noted  Resp: Appears to be breathing comfortably without accessory muscle usage, speaking in full sentences, no cough  MSK: Appears to have normal range of motion based on visualized movements  Neurologic: No apparent tremors, facial movements symmetric  Psych: affect normal, alert and oriented    The rest of a comprehensive physical examination is deferred due to PHE (public health emergency) video restrictions      PERTINENT STUDIES Reviewed in EMR    ASSESSMENT/PLAN:    # Chronic nausea  # Chronic vomiting, currently resolved  # Irregular bowel habits, with sensation of incomplete evacuation and straining  History of chronic nausea and vomiting, with work-up notable for unremarkable EGD with the exception of H pylori on gastric biopsies, unremarkable colonoscopy, and normal gastric emptying study. Symptoms improved following treatment of H pylori and she did will for several months, however now has symptom worsening with postprandial nausea associated with early satiety, lasting 2-3 hours. The vomiting has not returned. She has not yet started mirtazapine.    We discussed a broad differential for nausea. Historically, her symptoms have been most consistent with functional dyspepsia, however given recent spontaneous worsening, will re-test for H pylori via stool antigen. She was instructed to hold her PPI for at leaat 2 weeks prior to submitting a stool sample. In this time period, we discussed monitoring her symptoms off of PPI. If unchanged with PPI discontinuation, we can consider holding this medication, however if any of her GI symptoms worsen off of it, will plan to resume PPI 40 daily once a stool sample is submitted. If the H pylori test is positive, will plan to treat with quadruple therapy; if negative, will plan to start mirtazapine as  previously recommended, with EKG prior.    We did also discuss that she has symptoms that could be consistent with pelvic floor dysfunction, as evidenced by sensation of incomplete evacuation, straining with soft stool consistencies, and bladder incontinence. This can result in a stool burden, which can contribute to nausea and vomiting, as well as delayed gastric emptying. We discussed a trial of a soluble fiber supplementation as well. She does not have any abdominal pain that would be characteristic of a biliary process, though a RUQ US and/or HIDA could be considered in the future pending her symptoms and the above testing.     1. Hold pantoprazole for at least 2 weeks, then bring in a stool sample to any [facility] lab. You can  the stool kit at any time.  2. While holding the pantoprazole, pay attention to your symptoms. If the GI symptoms are unchanged off of the medication, you can try stopping it. If any of the GI symptoms are worsened while off of the medication, please resume this at prior dosing once you have submitted the stool sample.  3. If the test is positive for H pylori, we will plan to repeat the treatment with antibiotics, bismuth, and pantoprazole.  4. If the test is negative for H pylori, plan to obtain the EKG and start mirtazapine as previously discussed. Further details will be provided if we do start the medication.  5. In the meantime, recommend starting a powdeered fiber supplement. When used on a daily basis, this can help regulate the consistency of your stools, and can help with the feeling of incomplete evacuation. Metamucil (psyllium), Ciitrucel, and Benefiber are preferred examples. You can start with 1-2 teaspoons per day, with goal to gradually increase to 1 tablespoon daily. You can increase up to 1 tablespoon three times daily if needed. It is important to stay well-hydrated with use of fiber supplementation and to make sure that the fiber powder is well mixed with  water as directed on the label. You may experience some bloating with initiation of fiber, which will improve over the first few weeks. A good trial to evaluate the effect is 3-6 months. Of note, many of the fiber products contain artificial sweeteners, which can cause bloating, gas, and diarrhea in those who may be sensitive to artificial sweeteners. If this is the case, recommend trying Benefiber, or Metamucil Premium Blend (with Stevia), Metamucil 4-in-1 without Added Sweeteners, or [place]way (sweetened with Monk fruit extract).    RTC 3 months    Thank you for this consultation. It was a pleasure to participate in the care of this patient; please contact us with any further questions.    Krystle Vogt PA-C    57 minutes spent on the date of the encounter doing chart review, review of test results, patient visit and documentation

## 2023-01-12 NOTE — PATIENT INSTRUCTIONS
It was a pleasure taking care of you today.  I've included a brief summary of our discussion and care plan from today's visit below.  Please review this information with your primary care provider.  _______________________________________________________________________    My recommendations are summarized as follows:    1. Hold pantoprazole for at least 2 weeks, then bring in a stool sample to any [facility] lab. You can  the stool kit at any time.  2. While holding the pantoprazole, pay attention to your symptoms. If the GI symptoms are unchanged off of the medication, you can try stopping it. If any of the GI symptoms are worsened while off of the medication, please resume this at prior dosing once you have submitted the stool sample.  3. If the test is positive for H pylori, we will plan to repeat the treatment with antibiotics, bismuth, and pantoprazole.  4. If the test is negative for H pylori, plan to obtain the EKG and start mirtazapine as previously discussed. Further details will be provided if we do start the medication.  5. In the meantime, recommend starting a powdeered fiber supplement. When used on a daily basis, this can help regulate the consistency of your stools, and can help with the feeling of incomplete evacuation. Metamucil (psyllium), Ciitrucel, and Benefiber are preferred examples. You can start with 1-2 teaspoons per day, with goal to gradually increase to 1 tablespoon daily. You can increase up to 1 tablespoon three times daily if needed. It is important to stay well-hydrated with use of fiber supplementation and to make sure that the fiber powder is well mixed with water as directed on the label. You may experience some bloating with initiation of fiber, which will improve over the first few weeks. A good trial to evaluate the effect is 3-6 months. Of note, many of the fiber products contain artificial sweeteners, which can cause bloating, gas, and diarrhea in those who may  be sensitive to artificial sweeteners. If this is the case, recommend trying Benefiber, or Metamucil Premium Blend (with Stevia), Metamucil 4-in-1 without Added Sweeteners, or Bellway (sweetened with Monk fruit extract).    Return to GI Clinic in 3 months to review your progress.    ______________________________________________________________________    How do I schedule labs, imaging studies, or procedures that were ordered in clinic today?     Labs: To schedule lab appointment at the [facility], use my chart or call [phone]. If you have a [facility] closer to home where you are regularly seen you can give them a call.     Procedures: If a colonoscopy, upper endoscopy, breath test, esophageal manometry, or pH impedence was ordered today, our endoscopy team will call you to schedule this. If you have not heard from our endoscopy team within a week, please call [phone] to schedule.     Imaging Studies: If you were scheduled for a CT scan, X-ray, MRI, ultrasound, HIDA scan or other imaging study, please call [phone] to have this scheduled.     Referral: If a referral to another specialty was ordered, expect a phone call or follow instructions above. If you have not heard from anyone regarding your referral in a week, please call our clinic to check the status.     Who do I call with any questions after my visit?  Please be in touch if there are any further questions that arise following today's visit.  There are multiple ways to contact your gastroenterology care team.      During business hours, you may reach a Gastroenterology nurse at [phone]    To schedule or reschedule an appointment, please call [phone].     You can always send a secure message through Pixelated.  Pixelated messages are answered by your nurse or doctor typically within 24 hours.  Please allow extra time on weekends and holidays.      For urgent/emergent questions after business hours, you may reach the  on-call GI Fellow by contacting the [facility]  at [phone].     How will I get the results of any tests ordered?    You will receive all of your results.  If you have signed up for [facility], any tests ordered at your visit will be available to you after your physician reviews them.  Typically this takes 1-2 weeks.  If there are urgent results that require a change in your care plan, your physician or nurse will call you to discuss the next steps.      What is [facility] [employer]?  [facility] [employer] is a secure way for you to access all of your healthcare records from the [facility].  It is a web based computer program, so you can sign on to it from any location.  It also allows you to send secure messages to your care team.  I recommend signing up for [facility] access if you have not already done so and are comfortable with using a computer.      How to I schedule a follow-up visit?  If you did not schedule a follow-up visit today, please call [phone] to schedule a follow-up office visit.      Sincerely,    Krystle Vogt PA-C  Division of Gastroenterology, Hepatology & Nutrition  [facility]

## 2023-01-12 NOTE — LETTER
1/12/2023         RE: Ruth Vanegas  [address]  [place] [address]        Dear Colleague,    Thank you for referring your patient, Ruth Vanegas, to the [facility] [place]. Please see a copy of my visit note below.    Ruth is a 37 year old who is being evaluated via a billable video visit.      How would you like to obtain your AVS? MyChart  If the video visit is dropped, the invitation should be resent by: Text to cell phone: [phone]  Will anyone else be joining your video visit? No      Video-Visit Details    Type of service:  Video Visit   Video Start Time: 846  Video End Time:9:18 AM    Originating Location (pt. Location): Home    Distant Location (provider location):  Off-site  Platform used for Video Visit: [facility]       GI CLINIC VISIT    CC/REFERRING PROVIDER: No ref. provider found    HPI: 37 year old female with PMH of class III obesity, HLD, DM2, anxiety, frequent yeast infections, migraine headaches presenting to GI clinic for follow-up of nausea and vomiting.    Briefly, Ruth initially met with Dr. Lehman in consultation for nausea and vomiting in September 2021. At that time, she reported a one year history of nausea and vomiting with constant sensation of nausea, with episodic vomiting without clear trigging. Diabetes medication, including bydureon and Ozempic had worsened the symptoms. At that time, fasting glucose values were >200. EGD was endoscopically unremarkable, however gastric biopsies revealed H pylori. She was given treatment for this, with some improvement with treatment, and with confirmation of eradication May 2022. Colnoscopy was unrevealing. NM GES was normal.     At most recent follow-up in September, she noted improved symptoms, with nausea approximately 3-4 times per week, and vomiting 1-2 times per week. She had stopped nortriptyline without any change in GI symptoms. She was started on mirtazpine with a sow  taper to a max dose of 45 mg and was recommended to discontinue marijuana use.    Interval history:  Ruth states that she did well following last visit, however had spontaneous return of nausea in the last few weeks. The nausea is now occurring daily after every meal, lasting 2-3 hours, associated with early satiety and belching. She denies abdominal pain. The dysphagia she had noted on previous visit is improved, not typically occurring daily now. It is unclear by history of her symptoms represent true dysphagia; she notes that if she eats too much, it will just feel like things are not moving well, but it is not clear if there is an obstructed bolus sensation. She notes regurgitation of bland undigested postprandially most days. She denies heartburn. Glucose has been improved, with fasting sugars in the 140s. There were no medication or other changes prior to symptom worsening. She did not start mirtazepine and has not had the EKG yet.    GI ROS notable for intermittent constipation, with hard/dry stools requiring straining. Most of the time, she notes she has soft stool with sense of incomplete evacuation and with intermittent straining, 2-3 BM daily.  S/p caesarian deliveries x 2 with bladder incontinence.    Diet recall  B - oatmeal/toast or eggs/camacho or cereal  L - does not eat lunch  D - sushi bake  Sn - varies; fruit or candy or chips  Azul - apple juice, water      Today's Kathy Swallow Questionnaire (SSQ):    Brief Esophageal Dysphagia Questionnaire (BEDQ):  We would like to ask about your dysphagia symptoms over the past 30 days, 6 months, and 12 months.  Please think about your symptom experiences and choose the best answer.      Over the past 14 days, on average, how often have you had the following?  If your response falls between two categories, please make your best guess.  If you are unable to eat the type of food in the question, please check  Cannot eat this.    Cannot  Eat This 0  Rarely/  Never 1  Once or twice a month 2  1-2 times per week 3  3-5 times per week 4  Daily or almost daily 5  Several times per day   Trouble eating solid food (meat, bread, vegetables)        3     Trouble eating soft foods (yogurt, jello, pudding)     0        Trouble swallowing liquids       0        Pain while swallowing  --- 0        Coughing or choking while swallowing foods or liquids --- 0          SCORE: 3    Over the past 14 days, on average, how would you rate your discomfort or pain during swallowing? If you are unable to eat the type of food in the question, please check  Cannot eat this.    Cannot Eat This 0  None 1  Very Mild 2  Mild 3  Moderate 4  Moderately Severe    5  Severe   Eating solid food   (meat, bread, vegetables)  0        Eating soft foods   (yogurt, jello, pudding)  0        Drinking liquids  0          SCORE: 0    Approximately how many times in the past 12 months have you:   Had food stuck in your throat or esophagus for a period lasting longer than 30 minutes?  Had to visit the emergency room because of food stuck in your throat or esophagus?    Eckardt Score:   Score Dysphagia Regurgitation Retrosternal Pain Weight Loss (kg)   0 None None None None   1 Occasional Occasional Occasional <5   2 Daily Daily Daily 5-10   3 Each meal Each meal Each meal >10     SCORE: 4    ROS: 10pt ROS performed and otherwise negative.    PAST MEDICAL HISTORY:  Past Medical History:   Diagnosis Date     Anemia     told to take iron pills when pregnant     Depression      Depressive disorder      Diabetes mellitus (H)      Diabetes mellitus, type 2 (H) 07/15/2021     Dysthymic disorder      Endometriosis      Herpes genitalia      Hyperlipidemia      Kidney stone      Menorrhagia      Migraines      Neuropathy      Other abnormal Papanicolaou smear of cervix and cervical HPV(795.09) 01/02/2013     PCOS (polycystic ovarian syndrome)      Post traumatic stress disorder (PTSD)        PREVIOUS ABDOMINAL/GYNECOLOGIC  SURGERIES:  Past Surgical History:   Procedure Laterality Date     BLADDER REPAIR W/  SECTION      X2     C/SECTION, CLASSICAL      x2     COLONOSCOPY N/A 2021    Procedure: COLONOSCOPY;  Surgeon: Rene Lehman DO;  Location:  GI     DILATION AND CURETTAGE  2015     DILATION AND CURETTAGE  2015     DILATION AND CURETTAGE, OPERATIVE HYSTEROSCOPY, COMBINED N/A 2015    Procedure: Dilation and Curettage with Hysteroscopy;  Surgeon: Zia Farmer MD;  Location: [facility];  Service:      DILATION AND CURETTAGE, OPERATIVE HYSTEROSCOPY, COMBINED N/A 2016    Procedure: DILATION AND CURETTAGE WITH HYSTEROSCOPY INSERT MIRENA;  Surgeon: Suzanne Major MD;  Location: [facility];  Service:      ENT SURGERY       ESOPHAGOSCOPY, GASTROSCOPY, DUODENOSCOPY (EGD), COMBINED N/A 2021    Procedure: ESOPHAGOGASTRODUODENOSCOPY, WITH BIOPSY;  Surgeon: Rene Lehman DO;  Location:  GI     GYN SURGERY  10/19/15    laproscopic lysis of adhesions     HYSTEROSCOPY, ABLATE ENDOMETRIUM HYDROTHERMAL, COMBINED N/A 3/2/2018    Procedure: HYSTEROSCOPY, DILATION AND CURETTAGE, ENDOMETRIAL ABLATION;  Surgeon: Kristy Israel DO;  Location: [facility];  Service: Gynecology     LAPAROSCOPIC HYSTERECTOMY TOTAL N/A 3/4/2019    Procedure: ROBOTIC TOTAL LAPAROSCOPIC HYSTERECTOMY, BILATERAL SALPINGECTOMY, LEFT OVARIAN CYSTOTOMY. CYSTOSCOPY;  Surgeon: Zia Farmer MD;  Location: [facility];  Service: Gynecology     LAPAROSCOPY DIAGNOSTIC (GENERAL) N/A 10/19/2015    Procedure:  LAPAROSCOPY,LYSIS OF ADHESIONS;  Surgeon: Zia Farmer MD;  Location: [facility];  Service:      AZ LAP,TUBAL CAUTERY Bilateral 3/2/2018    Procedure: LAPAROSCOPIC BILATERAL TUBAL LIGATION;  Surgeon: Kristy Israel DO;  Location: [facility];  Service: Gynecology     TONSILLECTOMY           PERTINENT MEDICATIONS:  Current Outpatient Medications   Medication Sig Dispense Refill      acetaminophen (TYLENOL) 500 MG tablet Take 500-1,000 mg by mouth every 4 hours as needed        atorvastatin (LIPITOR) 10 MG tablet atorvastatin 10 mg tablet (Patient taking differently: every evening atorvastatin 10 mg tablet) 30 tablet 11     canagliflozin (INVOKANA) 300 MG tablet Take 1 tablet (300 mg) by mouth every morning (before breakfast) 90 tablet 3     Continuous Blood Gluc  (FREESTYLE CINDY 14 DAY READER) JAUN 1 Application       Continuous Blood Gluc Sensor (FREESTYLE CINDY 14 DAY SENSOR) MISC 1 Application every 14 days 2 each 3     Continuous Blood Gluc Sensor (FREESTYLE CINDY 14 DAY SENSOR) MISC        Continuous Blood Gluc Sensor (FREESTYLE CINDY 2 SENSOR) [facility] 1 each See Admin Instructions Change every 14 days. 6 each 1     diazepam (VALIUM) 5 MG tablet once as needed Only for Dentist       fluconazole (DIFLUCAN) 150 MG tablet once as needed        Fremanezumab-vfrm (AJOVY) 225 MG/1.5ML SOAJ Inject 225 mg Subcutaneous every 30 days 1.5 mL 3     ibuprofen (ADVIL/MOTRIN) 600 MG tablet every 4 hours as needed        insulin glargine (LANTUS PEN) 100 UNIT/ML pen Inject 24 Units Subcutaneous At Bedtime        insulin  UNIT/ML injection 24 units  To be taken with prednisone 60 mg, hold it if not taking steroids 15 mL 1     insulin pen needle (ULTICARE MICRO) 32G X 4 MM miscellaneous Use 1 pen needles daily or as directed. 100 each 1     Melatonin 10 MG CAPS Take 1 capsule by mouth at bedtime as needed, may repeat once (insomnia) 30 capsule 1     metFORMIN (GLUCOPHAGE XR) 500 MG 24 hr tablet TAKE 2 TABLETS(1000 MG) BY MOUTH TWICE DAILY WITH MEALS 360 tablet 3     methocarbamol (ROBAXIN) 500 MG tablet every evening        multivitamin (ONE-DAILY) tablet Take 1 tablet by mouth daily 100 tablet 3     ondansetron (ZOFRAN) 4 MG tablet every 6 hours as needed        rizatriptan (MAXALT) 10 MG tablet Take 1 tablet (10 mg) by mouth at onset of headache for migraine May repeat in 2 hours. 18 tablet 3      tolterodine ER (DETROL LA) 4 MG 24 hr capsule Take 1 capsule (4 mg) by mouth daily 30 capsule 3     topiramate (TOPAMAX) 100 MG tablet Take 1 tablet (100 mg) by mouth 2 times daily 180 tablet 1     verapamil ER (VERELAN) 120 MG 24 hr capsule TAKE 1 CAPSULE BY MOUTH EVERY NIGHT AT BEDTIME 30 capsule 1     vitamin D3 (CHOLECALCIFEROL) 125 MCG (5000 UT) tablet Take 1 tablet (125 mcg) by mouth every morning 90 tablet 3     gabapentin (NEURONTIN) 300 MG capsule Take 2 capsules (600 mg) by mouth 3 times daily 540 capsule 1       SOCIAL HISTORY:  Social History     Socioeconomic History     Marital status: Single     Spouse name: Not on file     Number of children: Not on file     Years of education: Not on file     Highest education level: Not on file   Occupational History     Not on file   Tobacco Use     Smoking status: Never     Smokeless tobacco: Never   Vaping Use     Vaping Use: Never used   Substance and Sexual Activity     Alcohol use: No     Alcohol/week: 0.0 standard drinks     Drug use: No     Sexual activity: Not on file   Other Topics Concern     Not on file   Social History Narrative     Not on file     Social Determinants of Health     Financial Resource Strain: Not on file   Food Insecurity: Not on file   Transportation Needs: Not on file   Physical Activity: Not on file   Stress: Not on file   Social Connections: Not on file   Intimate Partner Violence: Not At Risk     Fear of Current or Ex-Partner: No     Emotionally Abused: No     Physically Abused: No     Sexually Abused: No   Housing Stability: Not on file       FAMILY HISTORY:    Family History   Adopted: Yes   Problem Relation Age of Onset     Prostate Cancer Father      Cancer Father         prostate     Alcoholism Sister      Alcoholism Sister      Alcoholism Brother      Alcoholism Brother      Diabetes Paternal Grandmother      Other Cancer Paternal Grandmother      Alcoholism Daughter      Coronary Artery Disease No family hx of       Urolithiasis No family hx of      Clotting Disorder No family hx of      Gout No family hx of      Heart Disease No family hx of      Anesthesia Reaction No family hx of        PHYSICAL EXAMINATION:  Vitals reviewed  Wt 127 kg (280 lb)   LMP 05/23/2016   BMI 43.85 kg/m    Video physical exam  General: Patient appears well in no acute distress.   Skin: No visualized rash or lesions on visualized skin  Eyes: EOMI, no erythema, sclera icterus or discharge noted  Resp: Appears to be breathing comfortably without accessory muscle usage, speaking in full sentences, no cough  MSK: Appears to have normal range of motion based on visualized movements  Neurologic: No apparent tremors, facial movements symmetric  Psych: affect normal, alert and oriented    The rest of a comprehensive physical examination is deferred due to PHE (public health emergency) video restrictions      PERTINENT STUDIES Reviewed in EMR    ASSESSMENT/PLAN:    # Chronic nausea  # Chronic vomiting, currently resolved  # Irregular bowel habits, with sensation of incomplete evacuation and straining  History of chronic nausea and vomiting, with work-up notable for unremarkable EGD with the exception of H pylori on gastric biopsies, unremarkable colonoscopy, and normal gastric emptying study. Symptoms improved following treatment of H pylori and she did will for several months, however now has symptom worsening with postprandial nausea associated with early satiety, lasting 2-3 hours. The vomiting has not returned. She has not yet started mirtazapine.    We discussed a broad differential for nausea. Historically, her symptoms have been most consistent with functional dyspepsia, however given recent spontaneous worsening, will re-test for H pylori via stool antigen. She was instructed to hold her PPI for at leaat 2 weeks prior to submitting a stool sample. In this time period, we discussed monitoring her symptoms off of PPI. If unchanged with PPI  discontinuation, we can consider holding this medication, however if any of her GI symptoms worsen off of it, will plan to resume PPI 40 daily once a stool sample is submitted. If the H pylori test is positive, will plan to treat with quadruple therapy; if negative, will plan to start mirtazapine as previously recommended, with EKG prior.    We did also discuss that she has symptoms that could be consistent with pelvic floor dysfunction, as evidenced by sensation of incomplete evacuation, straining with soft stool consistencies, and bladder incontinence. This can result in a stool burden, which can contribute to nausea and vomiting, as well as delayed gastric emptying. We discussed a trial of a soluble fiber supplementation as well. She does not have any abdominal pain that would be characteristic of a biliary process, though a RUQ US and/or HIDA could be considered in the future pending her symptoms and the above testing.     1. Hold pantoprazole for at least 2 weeks, then bring in a stool sample to any [facility] lab. You can  the stool kit at any time.  2. While holding the pantoprazole, pay attention to your symptoms. If the GI symptoms are unchanged off of the medication, you can try stopping it. If any of the GI symptoms are worsened while off of the medication, please resume this at prior dosing once you have submitted the stool sample.  3. If the test is positive for H pylori, we will plan to repeat the treatment with antibiotics, bismuth, and pantoprazole.  4. If the test is negative for H pylori, plan to obtain the EKG and start mirtazapine as previously discussed. Further details will be provided if we do start the medication.  5. In the meantime, recommend starting a powdeered fiber supplement. When used on a daily basis, this can help regulate the consistency of your stools, and can help with the feeling of incomplete evacuation. Metamucil (psyllium), Ciitrucel, and Benefiber are preferred  examples. You can start with 1-2 teaspoons per day, with goal to gradually increase to 1 tablespoon daily. You can increase up to 1 tablespoon three times daily if needed. It is important to stay well-hydrated with use of fiber supplementation and to make sure that the fiber powder is well mixed with water as directed on the label. You may experience some bloating with initiation of fiber, which will improve over the first few weeks. A good trial to evaluate the effect is 3-6 months. Of note, many of the fiber products contain artificial sweeteners, which can cause bloating, gas, and diarrhea in those who may be sensitive to artificial sweeteners. If this is the case, recommend trying Benefiber, or Metamucil Premium Blend (with Stevia), Metamucil 4-in-1 without Added Sweeteners, or Bellway (sweetened with Monk fruit extract).    RTC 3 months    Thank you for this consultation. It was a pleasure to participate in the care of this patient; please contact us with any further questions.      57 minutes spent on the date of the encounter doing chart review, review of test results, patient visit and documentation          Again, thank you for allowing me to participate in the care of your patient.      Sincerely,    Krystle Vogt PA-C

## 2023-02-15 ENCOUNTER — MYC MEDICAL ADVICE (OUTPATIENT)
Dept: NEUROLOGY | Facility: CLINIC | Age: 38
End: 2023-02-15
Payer: COMMERCIAL

## 2023-02-17 ENCOUNTER — OFFICE VISIT (OUTPATIENT)
Dept: NEUROLOGY | Facility: CLINIC | Age: 38
End: 2023-02-17
Payer: COMMERCIAL

## 2023-02-17 VITALS
HEART RATE: 89 BPM | BODY MASS INDEX: 42.76 KG/M2 | DIASTOLIC BLOOD PRESSURE: 86 MMHG | WEIGHT: 273 LBS | SYSTOLIC BLOOD PRESSURE: 125 MMHG

## 2023-02-17 DIAGNOSIS — G43.719 INTRACTABLE CHRONIC MIGRAINE WITHOUT AURA AND WITHOUT STATUS MIGRAINOSUS: Primary | ICD-10-CM

## 2023-02-17 DIAGNOSIS — F51.01 PRIMARY INSOMNIA: ICD-10-CM

## 2023-02-17 PROCEDURE — 99214 OFFICE O/P EST MOD 30 MIN: CPT | Performed by: PSYCHIATRY & NEUROLOGY

## 2023-02-17 RX ORDER — PROTRIPTYLINE HYDROCHLORIDE 10 MG/1
10 TABLET, FILM COATED ORAL DAILY
Qty: 90 TABLET | Refills: 1 | Status: SHIPPED | OUTPATIENT
Start: 2023-02-17 | End: 2023-08-23

## 2023-02-17 NOTE — NURSING NOTE
"Ruth Vanegas is a 37 year old female who presents for:  Chief Complaint   Patient presents with     Headache        Initial Vitals:  /86   Pulse 89   Wt 123.8 kg (273 lb)   LMP 05/23/2016   BMI 42.76 kg/m   Estimated body mass index is 42.76 kg/m  as calculated from the following:    Height as of 5/31/22: 1.702 m (5' 7\").    Weight as of this encounter: 123.8 kg (273 lb).. Body surface area is 2.42 meters squared. BP completed using cuff size: wrist cuff    Terence Cobos  "

## 2023-02-17 NOTE — PROGRESS NOTES
NEUROLOGY OUTPATIENT PROGRESS NOTE   Feb 17, 2023     CHIEF COMPLAINT/REASON FOR VISIT/REASON FOR CONSULT  Patient presents with:  Headache    REASON FOR CONSULTATION- Headaches    REFERRAL SOURCE  Dr. Rosangela Haynes  CC Dr. Rosangela Haynes    HISTORY OF PRESENT ILLNESS  Ruth Vanegas is a 37 year old female seen today for evaluation of headaches.  She reports that she has had headaches for years these would come and go.  Over the last few months these have become more constant and lasting longer.  She reports that she has 15 headache days a month.  Both temples are involved.  Headaches are throbbing in nature with photophobia and phonophobia.  She denies any triggers for her headaches.    She is tried sumatriptan in the past which made things worse.  She has tried Excedrin but she has to catch the headaches soon enough to make it work.  She is currently on nortriptyline 50 mg at night which is not helping.  The nortriptyline was also being used as a sleep aid.  She is also on Topamax which is not helping.  She is taking 50 mg in the evening and 25 mg in the morning.  She further complains of vertigo which is sometimes with the headache.  She does have issues with uncontrolled sugars and feels that the Topamax might be helpful.  Denies any visual auras.    12/21/21  Patient returns today.  She reports that the headaches have become more frequent and she is still having them every day.  Is taking 100 mg twice daily of the Topamax with no side effects.  50 mg of nortriptyline at night and is not sleeping well with this dose.  Is interested in trying a higher medication.  Is taking Maxalt which is working though has to use it almost every day.  Is working on losing weight.  Reports that her diabetes has been under good control.  Has not done the blood work that had ordered.    8/15/22  Patient returns today.  Headaches have become much more frequent and she is still having them every day.  Her Topamax was increased to  100 mg twice a day endocrinology and she feels no side effects with no benefit as well.  Remains on 50 mg of nortriptyline at night for sleep.  She is also taking melatonin for sleep.  Verapamil was added previously which did not really help.  Diabetes has been under good control.  Maxalt does work occasionally but not all the time.  Blood work has been done which was negative.  Reports no other new symptoms.    9/20/22  Patient returns today.  She reports that the headaches have gotten worse since she was last seen.  She is having more severe headaches almost every day.  Remains on Topamax and verapamil.  She was on nortriptyline and this has been stopped.  She reports that this was stopped several months ago when she was not on it in August.  Maxalt does work but the benefit does not last the full time.  She did start the Ajovy which might have made the headaches worse.  Was discussed through phone messages about starting her on Botox and she wants to do that today.  For the insomnia she is seeing her GI doctor who is prescribing some medication that would help with her GI symptoms as well as with the insomnia.  She has not really started this medication at this point.  No other new issues/concerns.  No other new triggers    12/20/22  Patient returns today.  With the Botox she did have worsening headaches the first 2 weeks.  Headaches then improved for 2 months in the last 2 weeks she is been having headaches again.  Headaches unchanged in nature.  Remains on Topamax and verapamil.  Maxalt does help with abortive therapy.  Remains on melatonin for insomnia.  No other new issues.  Does complain of some eyebrow side effects with the Botox.  No other new concerns.    2/17/23  Patient returns today.  She had Botox about 2 months ago.  The first Botox had provided significant benefit though she feels no benefit from this Botox.  Headaches are there almost every day.  Still has some raising of the eyebrows.  Reports no  other provoking factors.  Headaches are more in the frontal region.  Is getting good sleep at night.  Is only working 3 nights a week.  Gaudencio does work for abortive therapy if she can catch the headache in time.  Symptoms she wakes up with a headache.  Has not really tried Excedrin Migraine.    Discussed Emgality and is afraid of trying that because of Ajovy causing side effects.  Does not want to do Depakote because of weight gain.  Nortriptyline is no longer be considered through GI.    Previous history is reviewed and this is unchanged.    PAST MEDICAL/SURGICAL HISTORY  Past Medical History:   Diagnosis Date     Anemia     told to take iron pills when pregnant     Depression      Depressive disorder      Diabetes mellitus (H)      Diabetes mellitus, type 2 (H) 07/15/2021     Dysthymic disorder      Endometriosis      Herpes genitalia      Hyperlipidemia      Kidney stone      Menorrhagia      Migraines      Neuropathy      Other abnormal Papanicolaou smear of cervix and cervical HPV(795.09) 01/02/2013     PCOS (polycystic ovarian syndrome)      Post traumatic stress disorder (PTSD)      Patient Active Problem List   Diagnosis     Obesity     Other abnormal Papanicolaou smear of cervix and cervical HPV(795.09)     Chronic nausea     Chronic vomiting     Diabetes mellitus, type 2 (H)     Unintentional weight loss     Rectal bleeding     Flatulence, eructation and gas pain     Morbid obesity (H)     Hyperglycemia     Major depressive disorder, single episode, severe without psychotic features (H)     Esophageal dysphagia     Encounter prior to initiation of medication   Significant for diabetes, migraines, depression, insomnia, difficulty keeping food down, constipation    FAMILY HISTORY  Family History   Adopted: Yes   Problem Relation Age of Onset     Prostate Cancer Father      Cancer Father         prostate     Alcoholism Sister      Alcoholism Sister      Alcoholism Brother      Alcoholism Brother       Diabetes Paternal Grandmother      Other Cancer Paternal Grandmother      Alcoholism Daughter      Coronary Artery Disease No family hx of      Urolithiasis No family hx of      Clotting Disorder No family hx of      Gout No family hx of      Heart Disease No family hx of      Anesthesia Reaction No family hx of    Family history positive for migraines in her brother.  Also family history of diabetes.    SOCIAL HISTORY  Social History     Tobacco Use     Smoking status: Never     Smokeless tobacco: Never   Vaping Use     Vaping Use: Never used   Substance Use Topics     Alcohol use: No     Alcohol/week: 0.0 standard drinks     Drug use: No       SYSTEMS REVIEW  Twelve-system ROS was done and other than the HPI this was negative except for neck pain, back pain, arm and leg pain, joint pain, numbness and tingling, weakness paralysis, difficulty walking, falling, balance coordination problems, dizziness, ringing in the ears, sleeping problems, headaches, anxiety, depression, bloating, stomach pain, weight gain, appetite problems  No new symptoms    MEDICATIONS  acetaminophen (TYLENOL) 500 MG tablet, Take 500-1,000 mg by mouth every 4 hours as needed   atorvastatin (LIPITOR) 10 MG tablet, atorvastatin 10 mg tablet (Patient taking differently: every evening atorvastatin 10 mg tablet)  canagliflozin (INVOKANA) 300 MG tablet, Take 1 tablet (300 mg) by mouth every morning (before breakfast)  Continuous Blood Gluc  (FREESTYLE CINDY 14 DAY READER) JAUN, 1 Application  Continuous Blood Gluc Sensor (FREESTYLE CINDY 14 DAY SENSOR) MISC, 1 Application every 14 days  Continuous Blood Gluc Sensor (FREESTYLE CINDY 14 DAY SENSOR) MISC,   Continuous Blood Gluc Sensor (FREESTYLE CINDY 2 SENSOR) MISC, 1 each See Admin Instructions Change every 14 days.  diazepam (VALIUM) 5 MG tablet, once as needed Only for Dentist  fluconazole (DIFLUCAN) 150 MG tablet, once as needed   Fremanezumab-vfrm (AJOVY) 225 MG/1.5ML SOAJ, Inject 225 mg  Subcutaneous every 30 days  ibuprofen (ADVIL/MOTRIN) 600 MG tablet, every 4 hours as needed   insulin glargine (LANTUS PEN) 100 UNIT/ML pen, Inject 24 Units Subcutaneous At Bedtime   insulin  UNIT/ML injection, 24 units  To be taken with prednisone 60 mg, hold it if not taking steroids  insulin pen needle (ULTICARE MICRO) 32G X 4 MM miscellaneous, Use 1 pen needles daily or as directed.  Melatonin 10 MG CAPS, Take 1 capsule by mouth at bedtime as needed, may repeat once (insomnia)  metFORMIN (GLUCOPHAGE XR) 500 MG 24 hr tablet, TAKE 2 TABLETS(1000 MG) BY MOUTH TWICE DAILY WITH MEALS  methocarbamol (ROBAXIN) 500 MG tablet, every evening   multivitamin (ONE-DAILY) tablet, Take 1 tablet by mouth daily  ondansetron (ZOFRAN) 4 MG tablet, every 6 hours as needed   rizatriptan (MAXALT) 10 MG tablet, Take 1 tablet (10 mg) by mouth at onset of headache for migraine May repeat in 2 hours.  tolterodine ER (DETROL LA) 4 MG 24 hr capsule, Take 1 capsule (4 mg) by mouth daily  topiramate (TOPAMAX) 100 MG tablet, Take 1 tablet (100 mg) by mouth 2 times daily  verapamil ER (VERELAN) 120 MG 24 hr capsule, TAKE 1 CAPSULE BY MOUTH EVERY NIGHT AT BEDTIME  vitamin D3 (CHOLECALCIFEROL) 125 MCG (5000 UT) tablet, Take 1 tablet (125 mcg) by mouth every morning  gabapentin (NEURONTIN) 300 MG capsule, Take 2 capsules (600 mg) by mouth 3 times daily    Botulinum Toxin Type A (BOTOX) 200 units injection 155 Units       PHYSICAL EXAMINATION  VITALS: /86   Pulse 89   Wt 123.8 kg (273 lb)   LMP 05/23/2016   BMI 42.76 kg/m    GENERAL: Healthy appearing, alert, no acute distress, normal habitus.  CARDIOVASCULAR: Extremities warm and well perfused. Pulses present.   NEUROLOGICAL:  Patient is awake and oriented to self, place and time.  Attention span is normal.  Memory is grossly intact.  Language is fluent and follows commands appropriately.  Appropriate fund of knowledge. Cranial nerves 2-12 are intact. There is no pronator drift.   Motor exam shows 5/5 strength in all extremities.  Tone is symmetric bilaterally in upper and lower extremities.  (Previously reflexes are symmetric and 2+ in upper extremities and lower extremities. Sensory exam is grossly intact to light touch, pin prick and vibration.)  Finger to nose and heel to shin is without dysmetria.  Romberg is negative.  Gait is normal and the patient is able to do tandem walk and walk on toes and heels.  No change in exam.    DIAGNOSTICS  CT head-images reviewed.  No major structural lesions noted.  IMPRESSION:  1.  No acute intracranial process.    CT 2020  HEAD CT:   1.  No acute intracranial process.     CERVICAL SPINE CT:   1.  No acute cervical spine fracture.    OUTSIDE RECORDS  Outside referral notes and chart notes were reviewed and pertinent information has been summarized (in addition to the HPI):-Patient seen for headaches.  Was seen in endocrinology for diabetes.  Was referred to neurology.  Was also having some ankle and feet swelling.  Also has nausea related to headaches.  Has been seen in ENT for benign positional paroxysmal vertigo.  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  Was recommended to see occupational therapy.    LABS  Component      Latest Ref Rng & Units 2/28/2022   Vitamin B12      193 - 986 pg/mL 437   Ferritin      12 - 150 ng/mL 93   TSH      0.40 - 4.00 mU/L 1.71       IMPRESSION/REPORT/PLAN  Intractable chronic migraine without aura and without status migrainosus  Primary insomnia  History of diabetes    This is a 37 year old female with chronic headache suggestive of chronic migraines and insomnia.  Her head CT has been negative for structural lesions.  Blood work has been noncontributory.  We will check an MRI due to worsening headaches.    For prevention of headaches:-Higher doses of Topamax have not helped with the headaches.  Propanolol cannot be used because of history of diabetes.  Verapamil has not helped with the headaches as  well.  Ajovy was prescribed which actually made the headaches worse.  She is also tried nortriptyline in the past for insomnia which did not help with the headaches.  Botox provide a lot of benefit with the first cycle but she has not had a lot of benefit with the second cycle.  We will try a few more times.  We will add protriptyline for prevention of headaches.  She currently is on Topamax verapamil and protriptyline (started today).  Could consider getting her off the verapamil in the future.  Could consider Emgality if the Botox is not effective.    For abortive therapy:- Sumatriptan has caused headaches to get worse.  Maxalt is more helpful as abortive therapy and will continue that.  She can use the Maxalt with over-the-counter medications to see if it becomes more effective.  She does have some issues with catching the headaches on time.  Continue to try Excedrin Migraine with the Maxalt.    She is using melatonin for insomnia.    Topamax might be helping with the diabetes as well.  Could consider leaving her on this.    She will keep a log of her headaches.  We will meet back in 3 months.    She potentially had some eyebrow side effects.  Will monitor closely.  I did not see much on my exam today.    -     rizatriptan (MAXALT) 10 MG tablet; Take 1 tablet (10 mg) by mouth at onset of headache for migraine May repeat in 2 hours.  -     topiramate (TOPAMAX) 50 MG tablet; Take 1 tablet (50 mg) by mouth 2 times daily  -     verapamil ER (VERELAN) 120 MG 24 hr capsule; Take 1 capsule (120 mg) by mouth At Bedtime  -     Botox injections  -     protriptyline (VIVACTIL) 10 MG tablet; Take 1 tablet (10 mg) by mouth daily  -     MR Brain w/o Contrast; Future  -     Botulinum Toxin Type A (BOTOX) 200 units injection 200 Units    Return in about 3 months (around 5/17/2023) for In-Clinic Visit (must).    Over 32 minutes were spent coordinating the care for the patient on the day of the encounter.  This includes previsit,  during visit and post visit activities as documented above.  Counseling patient.  Prescription management.  Multiple medications managed.  Testing ordered.  (Activities include but not inclusive of reviewing chart, reviewing outside records, reviewing labs and imaging study results as well as the images, patient visit time including getting history and exam,  use if applicable, review of test results with the patient and coming up with a plan in a shared model, counseling patient and family, education and answering patient questions, EMR , EMR diagnosis entry and problem list management, medication reconciliation and prescription management if applicable, paperwork if applicable, printing documents and documentation of the visit activities.)      Roberto Bolanos MD  Neurologist  [facility]  Tel:- [phone]    This note was dictated using voice recognition software.  Any grammatical or context distortions are unintentional and inherent to the software.

## 2023-02-17 NOTE — LETTER
2/17/2023         RE: Ruth Vanegas  [address]  [place] [address]        Dear Colleague,    Thank you for referring your patient, Ruth Vanegas, to the [facility] [place]. Please see a copy of my visit note below.    NEUROLOGY OUTPATIENT PROGRESS NOTE   Feb 17, 2023     CHIEF COMPLAINT/REASON FOR VISIT/REASON FOR CONSULT  Patient presents with:  Headache    REASON FOR CONSULTATION- Headaches    REFERRAL SOURCE  Dr. Rosangela Haynes  CC Dr. Rosangela Haynes    HISTORY OF PRESENT ILLNESS  Ruth Vanegas is a 37 year old female seen today for evaluation of headaches.  She reports that she has had headaches for years these would come and go.  Over the last few months these have become more constant and lasting longer.  She reports that she has 15 headache days a month.  Both temples are involved.  Headaches are throbbing in nature with photophobia and phonophobia.  She denies any triggers for her headaches.    She is tried sumatriptan in the past which made things worse.  She has tried Excedrin but she has to catch the headaches soon enough to make it work.  She is currently on nortriptyline 50 mg at night which is not helping.  The nortriptyline was also being used as a sleep aid.  She is also on Topamax which is not helping.  She is taking 50 mg in the evening and 25 mg in the morning.  She further complains of vertigo which is sometimes with the headache.  She does have issues with uncontrolled sugars and feels that the Topamax might be helpful.  Denies any visual auras.    12/21/21  Patient returns today.  She reports that the headaches have become more frequent and she is still having them every day.  Is taking 100 mg twice daily of the Topamax with no side effects.  50 mg of nortriptyline at night and is not sleeping well with this dose.  Is interested in trying a higher medication.  Is taking Maxalt which is working though has to use it almost every day.  Is  working on losing weight.  Reports that her diabetes has been under good control.  Has not done the blood work that had ordered.    8/15/22  Patient returns today.  Headaches have become much more frequent and she is still having them every day.  Her Topamax was increased to 100 mg twice a day endocrinology and she feels no side effects with no benefit as well.  Remains on 50 mg of nortriptyline at night for sleep.  She is also taking melatonin for sleep.  Verapamil was added previously which did not really help.  Diabetes has been under good control.  Maxalt does work occasionally but not all the time.  Blood work has been done which was negative.  Reports no other new symptoms.    9/20/22  Patient returns today.  She reports that the headaches have gotten worse since she was last seen.  She is having more severe headaches almost every day.  Remains on Topamax and verapamil.  She was on nortriptyline and this has been stopped.  She reports that this was stopped several months ago when she was not on it in August.  Maxalt does work but the benefit does not last the full time.  She did start the Ajovy which might have made the headaches worse.  Was discussed through phone messages about starting her on Botox and she wants to do that today.  For the insomnia she is seeing her GI doctor who is prescribing some medication that would help with her GI symptoms as well as with the insomnia.  She has not really started this medication at this point.  No other new issues/concerns.  No other new triggers    12/20/22  Patient returns today.  With the Botox she did have worsening headaches the first 2 weeks.  Headaches then improved for 2 months in the last 2 weeks she is been having headaches again.  Headaches unchanged in nature.  Remains on Topamax and verapamil.  Maxalt does help with abortive therapy.  Remains on melatonin for insomnia.  No other new issues.  Does complain of some eyebrow side effects with the Botox.  No  other new concerns.    2/17/23  Patient returns today.  She had Botox about 2 months ago.  The first Botox had provided significant benefit though she feels no benefit from this Botox.  Headaches are there almost every day.  Still has some raising of the eyebrows.  Reports no other provoking factors.  Headaches are more in the frontal region.  Is getting good sleep at night.  Is only working 3 nights a week.  Gaudencio does work for abortive therapy if she can catch the headache in time.  Symptoms she wakes up with a headache.  Has not really tried Excedrin Migraine.    Discussed Emgality and is afraid of trying that because of Ajovy causing side effects.  Does not want to do Depakote because of weight gain.  Nortriptyline is no longer be considered through GI.    Previous history is reviewed and this is unchanged.    PAST MEDICAL/SURGICAL HISTORY  Past Medical History:   Diagnosis Date     Anemia     told to take iron pills when pregnant     Depression      Depressive disorder      Diabetes mellitus (H)      Diabetes mellitus, type 2 (H) 07/15/2021     Dysthymic disorder      Endometriosis      Herpes genitalia      Hyperlipidemia      Kidney stone      Menorrhagia      Migraines      Neuropathy      Other abnormal Papanicolaou smear of cervix and cervical HPV(795.09) 01/02/2013     PCOS (polycystic ovarian syndrome)      Post traumatic stress disorder (PTSD)      Patient Active Problem List   Diagnosis     Obesity     Other abnormal Papanicolaou smear of cervix and cervical HPV(795.09)     Chronic nausea     Chronic vomiting     Diabetes mellitus, type 2 (H)     Unintentional weight loss     Rectal bleeding     Flatulence, eructation and gas pain     Morbid obesity (H)     Hyperglycemia     Major depressive disorder, single episode, severe without psychotic features (H)     Esophageal dysphagia     Encounter prior to initiation of medication   Significant for diabetes, migraines, depression, insomnia, difficulty  keeping food down, constipation    FAMILY HISTORY  Family History   Adopted: Yes   Problem Relation Age of Onset     Prostate Cancer Father      Cancer Father         prostate     Alcoholism Sister      Alcoholism Sister      Alcoholism Brother      Alcoholism Brother      Diabetes Paternal Grandmother      Other Cancer Paternal Grandmother      Alcoholism Daughter      Coronary Artery Disease No family hx of      Urolithiasis No family hx of      Clotting Disorder No family hx of      Gout No family hx of      Heart Disease No family hx of      Anesthesia Reaction No family hx of    Family history positive for migraines in her brother.  Also family history of diabetes.    SOCIAL HISTORY  Social History     Tobacco Use     Smoking status: Never     Smokeless tobacco: Never   Vaping Use     Vaping Use: Never used   Substance Use Topics     Alcohol use: No     Alcohol/week: 0.0 standard drinks     Drug use: No       SYSTEMS REVIEW  Twelve-system ROS was done and other than the HPI this was negative except for neck pain, back pain, arm and leg pain, joint pain, numbness and tingling, weakness paralysis, difficulty walking, falling, balance coordination problems, dizziness, ringing in the ears, sleeping problems, headaches, anxiety, depression, bloating, stomach pain, weight gain, appetite problems  No new symptoms    MEDICATIONS  acetaminophen (TYLENOL) 500 MG tablet, Take 500-1,000 mg by mouth every 4 hours as needed   atorvastatin (LIPITOR) 10 MG tablet, atorvastatin 10 mg tablet (Patient taking differently: every evening atorvastatin 10 mg tablet)  canagliflozin (INVOKANA) 300 MG tablet, Take 1 tablet (300 mg) by mouth every morning (before breakfast)  Continuous Blood Gluc  (FREESTYLE CINDY 14 DAY READER) JAUN, 1 Application  Continuous Blood Gluc Sensor (FREESTYLE CINDY 14 DAY SENSOR) MISC, 1 Application every 14 days  Continuous Blood Gluc Sensor (FREESTYLE CINDY 14 DAY SENSOR) MISC,   Continuous Blood  Gluc Sensor (FREESTYLE CINDY 2 SENSOR) [facility], 1 each See Admin Instructions Change every 14 days.  diazepam (VALIUM) 5 MG tablet, once as needed Only for Dentist  fluconazole (DIFLUCAN) 150 MG tablet, once as needed   Fremanezumab-vfrm (AJOVY) 225 MG/1.5ML SOAJ, Inject 225 mg Subcutaneous every 30 days  ibuprofen (ADVIL/MOTRIN) 600 MG tablet, every 4 hours as needed   insulin glargine (LANTUS PEN) 100 UNIT/ML pen, Inject 24 Units Subcutaneous At Bedtime   insulin  UNIT/ML injection, 24 units  To be taken with prednisone 60 mg, hold it if not taking steroids  insulin pen needle (ULTICARE MICRO) 32G X 4 MM miscellaneous, Use 1 pen needles daily or as directed.  Melatonin 10 MG CAPS, Take 1 capsule by mouth at bedtime as needed, may repeat once (insomnia)  metFORMIN (GLUCOPHAGE XR) 500 MG 24 hr tablet, TAKE 2 TABLETS(1000 MG) BY MOUTH TWICE DAILY WITH MEALS  methocarbamol (ROBAXIN) 500 MG tablet, every evening   multivitamin (ONE-DAILY) tablet, Take 1 tablet by mouth daily  ondansetron (ZOFRAN) 4 MG tablet, every 6 hours as needed   rizatriptan (MAXALT) 10 MG tablet, Take 1 tablet (10 mg) by mouth at onset of headache for migraine May repeat in 2 hours.  tolterodine ER (DETROL LA) 4 MG 24 hr capsule, Take 1 capsule (4 mg) by mouth daily  topiramate (TOPAMAX) 100 MG tablet, Take 1 tablet (100 mg) by mouth 2 times daily  verapamil ER (VERELAN) 120 MG 24 hr capsule, TAKE 1 CAPSULE BY MOUTH EVERY NIGHT AT BEDTIME  vitamin D3 (CHOLECALCIFEROL) 125 MCG (5000 UT) tablet, Take 1 tablet (125 mcg) by mouth every morning  gabapentin (NEURONTIN) 300 MG capsule, Take 2 capsules (600 mg) by mouth 3 times daily    Botulinum Toxin Type A (BOTOX) 200 units injection 155 Units       PHYSICAL EXAMINATION  VITALS: /86   Pulse 89   Wt 123.8 kg (273 lb)   LMP 05/23/2016   BMI 42.76 kg/m    GENERAL: Healthy appearing, alert, no acute distress, normal habitus.  CARDIOVASCULAR: Extremities warm and well perfused. Pulses  present.   NEUROLOGICAL:  Patient is awake and oriented to self, place and time.  Attention span is normal.  Memory is grossly intact.  Language is fluent and follows commands appropriately.  Appropriate fund of knowledge. Cranial nerves 2-12 are intact. There is no pronator drift.  Motor exam shows 5/5 strength in all extremities.  Tone is symmetric bilaterally in upper and lower extremities.  (Previously reflexes are symmetric and 2+ in upper extremities and lower extremities. Sensory exam is grossly intact to light touch, pin prick and vibration.)  Finger to nose and heel to shin is without dysmetria.  Romberg is negative.  Gait is normal and the patient is able to do tandem walk and walk on toes and heels.  No change in exam.    DIAGNOSTICS  CT head-images reviewed.  No major structural lesions noted.  IMPRESSION:  1.  No acute intracranial process.    CT 2020  HEAD CT:   1.  No acute intracranial process.     CERVICAL SPINE CT:   1.  No acute cervical spine fracture.    OUTSIDE RECORDS  Outside referral notes and chart notes were reviewed and pertinent information has been summarized (in addition to the HPI):-Patient seen for headaches.  Was seen in endocrinology for diabetes.  Was referred to neurology.  Was also having some ankle and feet swelling.  Also has nausea related to headaches.  Has been seen in ENT for benign positional paroxysmal vertigo.  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  Was recommended to see occupational therapy.    LABS  Component      Latest Ref Rng & Units 2/28/2022   Vitamin B12      193 - 986 pg/mL 437   Ferritin      12 - 150 ng/mL 93   TSH      0.40 - 4.00 mU/L 1.71       IMPRESSION/REPORT/PLAN  Intractable chronic migraine without aura and without status migrainosus  Primary insomnia  History of diabetes    This is a 37 year old female with chronic headache suggestive of chronic migraines and insomnia.  Her head CT has been negative for structural lesions.   Blood work has been noncontributory.  We will check an MRI due to worsening headaches.    For prevention of headaches:-Higher doses of Topamax have not helped with the headaches.  Propanolol cannot be used because of history of diabetes.  Verapamil has not helped with the headaches as well.  Ajovy was prescribed which actually made the headaches worse.  She is also tried nortriptyline in the past for insomnia which did not help with the headaches.  Botox provide a lot of benefit with the first cycle but she has not had a lot of benefit with the second cycle.  We will try a few more times.  We will add protriptyline for prevention of headaches.  She currently is on Topamax verapamil and protriptyline (started today).  Could consider getting her off the verapamil in the future.  Could consider Emgality if the Botox is not effective.    For abortive therapy:- Sumatriptan has caused headaches to get worse.  Maxalt is more helpful as abortive therapy and will continue that.  She can use the Maxalt with over-the-counter medications to see if it becomes more effective.  She does have some issues with catching the headaches on time.  Continue to try Excedrin Migraine with the Maxalt.    She is using melatonin for insomnia.    Topamax might be helping with the diabetes as well.  Could consider leaving her on this.    She will keep a log of her headaches.  We will meet back in 3 months.    She potentially had some eyebrow side effects.  Will monitor closely.  I did not see much on my exam today.    -     rizatriptan (MAXALT) 10 MG tablet; Take 1 tablet (10 mg) by mouth at onset of headache for migraine May repeat in 2 hours.  -     topiramate (TOPAMAX) 50 MG tablet; Take 1 tablet (50 mg) by mouth 2 times daily  -     verapamil ER (VERELAN) 120 MG 24 hr capsule; Take 1 capsule (120 mg) by mouth At Bedtime  -     Botox injections  -     protriptyline (VIVACTIL) 10 MG tablet; Take 1 tablet (10 mg) by mouth daily  -     MR Brain  w/o Contrast; Future  -     Botulinum Toxin Type A (BOTOX) 200 units injection 200 Units    Return in about 3 months (around 5/17/2023) for In-Clinic Visit (must).    Over 32 minutes were spent coordinating the care for the patient on the day of the encounter.  This includes previsit, during visit and post visit activities as documented above.  Counseling patient.  Prescription management.  Multiple medications managed.  Testing ordered.  (Activities include but not inclusive of reviewing chart, reviewing outside records, reviewing labs and imaging study results as well as the images, patient visit time including getting history and exam,  use if applicable, review of test results with the patient and coming up with a plan in a shared model, counseling patient and family, education and answering patient questions, EMR , EMR diagnosis entry and problem list management, medication reconciliation and prescription management if applicable, paperwork if applicable, printing documents and documentation of the visit activities.)      Roberto Bolanos MD  Neurologist  [facility]  Tel:- [phone]    This note was dictated using voice recognition software.  Any grammatical or context distortions are unintentional and inherent to the software.        Again, thank you for allowing me to participate in the care of your patient.        Sincerely,        Roberto Bolanos MD

## 2023-02-21 NOTE — TELEPHONE ENCOUNTER
"Refill Approved    Rx renewed per Medication Renewal Policy. Medication was last renewed on 2/14/18.    Lakeisha Levin, [facility] Triage/Med Refill 3/1/2019     Requested Prescriptions   Pending Prescriptions Disp Refills     pen needle, diabetic (BD ULTRA-FINE RAMON PEN NEEDLE) 32 gauge x 5/32\" Ndle [Pharmacy Med Name: B-D PEN NDL RAMON [id number](5/32) GRN] 100 each 0     Sig: USE WITH VICTOZA DAILY    Diabetic Supplies Refill Protocol Passed - 3/1/2019  8:58 PM       Passed - Visit with PCP or prescribing provider visit in last 6 months    Last office visit with prescriber/PCP: 11/14/2018 Milton Banks MD OR same dept: 11/14/2018 Milton Banks MD OR same specialty: 11/14/2018 Milton Banks MD  Last physical: 2/20/2019 Last MTM visit: Visit date not found   Next visit within 3 mo: Visit date not found  Next physical within 3 mo: Visit date not found  Prescriber OR PCP: Milton Banks MD  Last diagnosis associated with med order: 1. Diabetes mellitus with neuropathy (H)  - BD ULTRA-FINE RAMON PEN NEEDLE 32 gauge x 5/32\" Ndle [Pharmacy Med Name: B-D PEN NDL RAMON [id number](5/32) GRN]; USE WITH VICTOZA DAILY  Dispense: 100 each; Refill: 0    If protocol passes may refill for 12 months if within 3 months of last provider visit (or a total of 15 months).            Passed - A1C in last 6 months    Hemoglobin A1c   Date Value Ref Range Status   02/20/2019 7.5 (H) 3.5 - 6.0 % Final                           " CARDIOLOGY PROGRESS NOTE       HISTORY     Paige Díaz is a 57 year old female with a history of paroxysmal atrial fibrillation, liver cirrhosis, acute hypoxic respiratory failure, ascites, hypoalbuminemia, severe protein malnutrition, head and neck cancer status post radiation and chemotherapy, gastroesophageal reflux disease, G-tube placed who presents for a hospital follow up. The patient was admitted to the hospital from 01/30-02/06/2023 for generalized edema, decompensation of cirrhosis of liver, pneumonia of both lungs due to infectious organism, and acute hypoxemic respiratory failure. ***    Hospital Course:    Paige Díaz is a 57 year old female who was admited on 1/30/2023 with hx of head and neck ca s/p resection, recently diagnosed liver cirrhosis of unknown etiology, nicotine dependence, admitted for acute hypoxemic respiratory failure.  Patient presented with worsening shortness of breath. Initially presented on RA but presented after her home PT noted a low SpO2 and sent her to ED.  Pt continues to smoke and is currently on Trelegy for COPD. In the ED she had high oxygen demands, therefore required ICU admission initially.      Patient was treated in the ICU with a lasix drip and antibiotic for fluid overload +/- pneumonia. She also underwent IR right paracentesis on 1/31 with interval improvement in oxygen requirements and eventually weaned off of oxygen. She was transferred out of the ICU on 1/31. After transferring to the medical floor, she had runs of SVT and then went into atrial fibrillation with RVR-transferred back to the ICU for a day and then back to the medical floor once heart rate controlled. She was started on metoprolol and eventually converted back into NSR. While admitted, she was also worked up for GIB with concurrent anemia. She received a blood transfusion with interval improvement in her Hgb. Also started on iron. EGD showed Pyloric channel clean base ulcer, white plaque  in the esophagus (plaque vs mucus). No active bleeding found. Recommendation by GI team to avoid NSAIDS and aspirin and continue PPI. Hgb remained stable following blood transfusion. She will be discharged to home in stable condition. Recommend she follow up with GI for repeat EGD in 2-3 months, cardiology and PCP for transitional care. Home care orders will be entered as well including RN, wound care, PT/OT and home health aide.    Echo limited 02/01/2023: LVEF 69%  Normal left ventricular chamber size and systolic function.  Moderate tricuspid valve regurgitation.  Right ventricular systolic pressure; 45 mmHg.  Aortic valve sclerosis.    CT chest PE imaging 01/30/2023:  IMPRESSION:   No convincing evidence of pulmonary embolism up to the level of proximal  segmental branches.     Findings concerning for multifocal pneumonia with bibasilar predominant  consolidative opacities and multifocal additional areas of patchy  consolidative and groundglass/nodular opacities. Recommend follow-up  imaging posttreatment to ensure resolution.     Suspected findings of mild interstitial pulmonary edema with small to  moderate left and small right pleural effusions with small pericardial  effusion and at least small amount of ascites raising concern for  underlying fluid overload state. Subtle nodularity along the anterior  aspect of the bilateral pleural effusions is indeterminate. Attention on  follow-up imaging.     Probably reactive mediastinal/hilar adenopathy. This can be reassessed on  subsequent imaging.     Possible minimally nodular liver contour. Consider correlation with  dedicated abdominal imaging.    CT abdomen, pelvis 01/06/2023:  IMPRESSION:    There is a G-tube in place the balloon of the G-tube is in the  second portion of the duodenal sweep and the balloon should be retracted  for ideal positioning  2. Small amount of ascites  3. Advanced diverticulosis without evidence of diverticulitis  4. The mild colonic  wall thickening described on the previous exam is again  noted again cannot exclude a mild colitis    CT abdomen, pelvis 11/22/2022:  IMPRESSION:  1. Subtle micronodularity of the liver contour raises the possibility of  early cirrhosis.  Too small to accurately characterize less than 7 mm  low-density lesions in the liver likely reflect simple cysts, however are  indeterminate   2. Hyperemia of the transverse colon with mild wall thickening and  contraction could reflect active peristalsis or be related to colitis in  the proper clinical setting      CT chest 08/13/2021:  IMPRESSION:  A few small scattered pulmonary nodules. Some are not deftly seen on  previous exams, the majority of the priors are PET/CTs with increased slice  thickness which may be attributed to poor visualization of these nodules.  Given clinical history, a 3 month follow-up is recommended to ensure  stability.     Please see report for dedicated CT of the neck performed the same day for  base of neck findings.    Echo M-Mode 10/21/2020: LVEF 67%  Normal left ventricular size and systolic function. Left ventricular ejection fraction, 67 %.  No regional wall motion abnormalities. Normal left ventricular filling pressure.  Normal right ventricular size and systolic function, RVSP 34.7 mmHg.  Moderately increased left atrial volume 42.9 ml/m².  Mild mitral valve regurgitation. Mild tricuspid valve regurgitation.  No pericardial effusion.  There is no prior study available for direct comparison.    CT angiogram chest PE imaging 10/19/2020:  IMPRESSION:  1.  No evidence of pulmonary embolism.  2.  Findings compatible with mild interstitial pulmonary edema.  3.  Moderate right and trace left pleural effusions, with associated  atelectasis.  4.  Partially visualized postoperative fluid collection deep to the right  sternocleidomastoid muscle with CHASE drain, related to recent radical neck  dissection.    US lower extremity venous duplex right  11/09/2015:  No right leg deep venous thrombosis.     Stress test 11/04/2013:  Electrocardiogram portion of Lexiscan Cardiolite nuclear stress   test suggestive of ischemia by EKG criteria.  No clinical   symptoms of ischemia.  Scintigraphic images to be interpreted   separately.   Nuclear portion:  Nuclear medicine myocardial perfusion scan shows ejection fraction of 80%,   normal wall motion, no area of ischemia or infarction.      MEDICAL HISTORY     Past Medical History:   Diagnosis Date   • Acute medial meniscal tear - right knee 8/28/2015    Had surgery with some residual pain - resolved fully after cortisone injection.    • Acute renal failure (ARF) (CMS/HCC)    • Alcohol dependence (CMS/HCC)    • Anemia of acute infection 03/15/2017    hospitalized with C difficile; Normal EGD, normal iron studies, elevated ferritin   • Arthritis    • Blood glucose elevated 11/29/2017    Last A1c 5.2 (07/29/2020), this is trending down compared to  5.6 (10/06/2014) Sporadic mild elevation of glucose on labs over time.    Subsequent glucose and HgA1c have been normal.    • Clostridial gastroenteritis 03/15/2017    briefly hospitalized at [facility]   • Clostridium difficile diarrhea 3/17/2017   • Depression 8/29/2014    Previously on maintenance citalopram.  Tapered off in 2018 without incident. She uses trazodone at bedtime on an as-needed basis for insomnia.      • Elevated liver enzymes    • Essential hypertension, benign 8/25/2012    Off meds from early 2019 with normal readings.   Previously on metoprolol.   Prior triamterene/HCTZ stopped due to recurrent hypokalemia Spring 2017.   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 with Dr Tomas   • Hand finger surgery unlisted Left 1993    thumb reconstruction   • Ir gastrostomy tube  07/12/2022   • Ir paracentesis Right 01/31/2023    20 mL diagnosistc, right upper quadrant   • Knee arthroscopy w/ partial medial meniscectomy Right 09/10/2015   • Throat surgery      cancer, with lymph node removal   • [place] tooth extraction         SOCIAL HISTORY     Social History     Tobacco Use   • Smoking status: Every Day     Packs/day: 0.50     Years: 41.00     Pack years: 20.50     Types: Cigarettes     Start date: 1981   • Smokeless tobacco: Never   Vaping Use   • Vaping Use: never used   Substance Use Topics   • Alcohol use: Yes     Alcohol/week: 3.0 - 4.0 standard drinks     Types: 3 - 4 Glasses of wine per week     Comment: occ   • Drug use: No       FAMILY HISTORY     Family History   Problem Relation Age of Onset   • Hypertension Mother    • Brain Aneurysm Mother    • Hypertension Father    • Heart disease Father    • Diabetes Maternal Grandmother    • Cancer Maternal Grandmother         throat   • Patient is unaware of any medical problems Brother        MEDICATIONS     Current Outpatient Medications   Medication Sig   • citalopram (CeleXA) 20 MG tablet 1 tablet by PEG Tube route daily. Do not start before February 7, 2023.   • pantoprazole (PROTONIX) 40 MG/20ML (compounded) suspension 20 mLs by PEG Tube route in the morning and 20 mLs in the evening.   • cholestyramine/aspartame (QUESTRAN LIGHT) 4 g packet 1 packet by PEG Tube route 2 times daily.   • ferrous sulfate 325 (65 FE) MG tablet Take 1 tablet by mouth daily (with breakfast).   • metoPROLOL tartrate (LOPRESSOR) 25 MG tablet 1 tablet by PEG Tube route every  6 hours.   • mupirocin (BACTROBAN) 2 % ointment Apply topically daily. Do not start before February 7, 2023.   • cevimeline (EVOXAC) 30 MG capsule Take 30 mg by mouth in the morning and 30 mg at noon and 30 mg in the evening.   • fluticasone-umeclidin-vilanterol (TRELEGY ELLIPTA) 100-62.5-25 MCG/ACT inhaler Inhale 1 puff into the lungs daily.   • albuterol 108 (90 Base) MCG/ACT inhaler Inhale 1 puff into the lungs every 4 hours as needed for Shortness of Breath or Wheezing.   • prochlorperazine (COMPAZINE) 10 MG tablet Take 1 tablet by mouth every 6 hours as needed for Nausea or Vomiting.   • Klor-Con 20 MEQ packet TAKE 1 PACKET BY MOUTH EVERY DAY   • traZODone (DESYREL) 50 MG tablet TAKE 1 TO 2 TABLETS BY MOUTH AT BEDTIME AS NEEDED FOR INSOMNIA   • folic acid (FOLATE) 1 MG tablet Take 1 tablet by mouth daily.     No current facility-administered medications for this visit.       ALLERGIES     ALLERGIES:   Allergen Reactions   • Hydrocodone Nausea & Vomiting   • Sulfa Antibiotics HIVES       PHYSICAL EXAM     Vitals:    Visit Vitals  LMP  (LMP Unknown)     General:  NAD, pleasant, alert and oriented x3.  Neck:  No carotid bruits.  No JVD (jugular venous distension).  Normal carotid upstroke.  Pulmonary:  No retractions or increased work of breathing.  Clear to auscultation bilaterally.  No crackles or wheezing.  Cardiovascular:  PMI (point of maximal impulse) in 5th intercostal place.  RRR.  Normal S1 and S2.  No S3 or S4 appreciated.  There are no murmurs.  Abdomen:  Bowel sounds normoactive.  Soft, nontender, nondistended.  No hepatosplenomegaly.  Extremities:  No edema or cyanosis.     Glucose (mg/dL)   Date Value   02/06/2023 84       Cholesterol (mg/dL)   Date Value   07/29/2020 229 (H)     HDL (mg/dL)   Date Value   07/29/2020 42 (L)     Cholesterol/ HDL Ratio (no units)   Date Value   07/29/2020 5.5 (H)     Triglycerides (mg/dL)   Date Value   07/29/2020 209 (H)     LDL (mg/dL)   Date Value   07/29/2020 145  (H)       Creatinine (mg/dL)   Date Value   02/06/2023 0.39 (L)     BUN (mg/dL)   Date Value   02/06/2023 7     Hemoglobin A1C (%)   Date Value   07/29/2020 5.2     INR (no units)   Date Value   02/02/2023 1.0         EKG:  Results for orders placed or performed during the hospital encounter of 01/30/23   Electrocardiogram 12-Lead   Result Value Ref Range    Ventricular Rate EKG/Min (BPM) 100     Atrial Rate (BPM) 100     AL-Interval (MSEC) 140     QRS-Interval (MSEC) 60     QT-Interval (MSEC) 344     QTc 444     P Axis (Degrees) 65     R Axis (Degrees) -25     T Axis (Degrees) 137     REPORT TEXT       Normal sinus rhythm  Septal infarct  (cited on or before  01-FEB-2023)  T wave abnormality, consider lateral ischemia  Abnormal ECG  When compared with ECG of  01-FEB-2023 07:43,  Sinus rhythm  has replaced  Atrial fibrillation  Vent. rate  has decreased  BY  75 BPM  Confirmed by JERMAIN ORELLANA, ASHER FUNG ([id number]) on 2/5/2023 1:24:16 PM         CHADS VASc Score   Congestive Heart Failure: 0 (YES: 1, NO: 0)   Hypertension: 0 (YES: 1, NO: 0)   Age: 0 (<65: 0, 65-74: 1, >75: 2)   Diabetes Mellitus: 0 (YES: 1, NO: 0)   Stroke/TIA (transient ischemic attack)/Thromboembolism:  0(YES: 2, NO: 0)   Vascular Disease: 0 (YES: 1, NO: 0)   Sex: 1 (Male: 0, Female: 1)   Total Score: 1  CHADSVASC clinical risk estimation. Adapted from Earlene et al.     [id number]   SCORE  ADJUSTED STROKE RATE (% year)    0  0%    1  1,3%    2  2,2%    3  3,2%    4  4,0%    5  6,7%    6  9,8%    7  9,6%    8  6,7%    9  15,2%          ASSESSMENT     1. Paroxysmal atrial fibrillation now in atrial fibrillation ***  2. Liver cirrhosis  3. Acute hypoxic respiratory failure improved   4. Ascites  5. Hypoalbuminemia  6. Severe protein malnutrition  7. Head and neck cancer status post radiation and chemotherapy  8. Gastroesophageal reflux disease  9. G-tube placed     PLAN     Paige Díaz is a 57 year old female with a history of paroxysmal atrial fibrillation,  liver cirrhosis, acute hypoxic respiratory failure, ascites, hypoalbuminemia, severe protein malnutrition, head and neck cancer status post radiation and chemotherapy, gastroesophageal reflux disease, G-tube placed who *** The patient was admitted to the hospital from 01/30-02/06/2023 for generalized edema, decompensation of cirrhosis of liver, pneumonia of both lungs due to infectious organism, and acute hypoxemic respiratory failure. *** Her blood pressure ***Echo limited 02/01/2023 demonstrated an LVEF of 69%. Echo M-Mode 10/21/2020 revealed an LVEF of 67%. I will up with her in ***Thank you for allowing me to participate in the care of this patient.    ***    Sincerely,    Ramos Ivan MD.-PhD, [facility]

## 2023-03-08 ENCOUNTER — HOSPITAL ENCOUNTER (OUTPATIENT)
Dept: MRI IMAGING | Facility: HOSPITAL | Age: 38
Discharge: HOME | End: 2023-03-08
Attending: PSYCHIATRY & NEUROLOGY | Admitting: PSYCHIATRY & NEUROLOGY
Payer: COMMERCIAL

## 2023-03-08 DIAGNOSIS — G43.719 INTRACTABLE CHRONIC MIGRAINE WITHOUT AURA AND WITHOUT STATUS MIGRAINOSUS: ICD-10-CM

## 2023-03-08 PROCEDURE — 70551 MRI BRAIN STEM W/O DYE: CPT

## 2023-03-12 ENCOUNTER — TRANSFERRED RECORDS (OUTPATIENT)
Dept: MULTI SPECIALTY CLINIC | Facility: CLINIC | Age: 38
End: 2023-03-12

## 2023-03-12 LAB — RETINOPATHY: NORMAL

## 2023-03-21 ENCOUNTER — OFFICE VISIT (OUTPATIENT)
Dept: NEUROLOGY | Facility: CLINIC | Age: 38
End: 2023-03-21
Payer: COMMERCIAL

## 2023-03-21 VITALS
SYSTOLIC BLOOD PRESSURE: 102 MMHG | DIASTOLIC BLOOD PRESSURE: 72 MMHG | HEART RATE: 96 BPM | WEIGHT: 273 LBS | BODY MASS INDEX: 42.76 KG/M2 | RESPIRATION RATE: 14 BRPM

## 2023-03-21 DIAGNOSIS — G43.719 INTRACTABLE CHRONIC MIGRAINE WITHOUT AURA AND WITHOUT STATUS MIGRAINOSUS: Primary | ICD-10-CM

## 2023-03-21 PROCEDURE — 64615 CHEMODENERV MUSC MIGRAINE: CPT | Performed by: PSYCHIATRY & NEUROLOGY

## 2023-03-21 NOTE — PROCEDURES
NEUROLOGY PROCEDURE NOTE  Mar 21, 2023      Chronic migraine Botox injection    CONSENT: The procedure was explained to the patient. The risks and benefits of the procedure and the alternatives were discussed with the patient. The patient was given an opportunity to ask any questions she had about the procedure. A verbal consent was obtained from the patient. A written consent is available in the chart.    PRE-PROCEDURE  Diagnosis: Chronic migraine  Headache frequency before the use of Botox:- 30 headache days per month  Headache frequency with Botox use:-0 Headaches/month except more headaches in last 2 weeks    EXAM  GENERAL: Healthy appearing, alert, no acute distress, normal habitus.  NEUROLOGICAL:  Patient is alert with fluent language.  Extraocular movements are intact.  Facial movements are present and symmetric.  No arm drift.    PROCEDURE SUMMARY:   The following muscles were identified after palpating for landmarks and the overlying skin was cleansed with alcohol. These muscles were then injected using a 30 guage- half  inch needle with the following doses of onabotulinumtoxin A. A 5 unit/ 0.1 ml dilution was used for the injections. A 2 x 100 unit vial was used.    1. Corrugators 5 units each side.  2. Procerus 5 units.  3. Frontalis 10 units each side.  4. Temporalis 20 units each side.  5. Occipitalis 15 units each side.  6. Paraspinals 10 units each side.  7. Trapezius 15 units each side.    Units Injected: 155 Units  Units Discarded: 45 Units  Lot Number and Expiration: [id number]; 5/2025    The patient tolerated the procedure without any immediate complaints and will call the Neurology clinic if she has any problems or side effects from the medication. The patient will follow up in the Neurology clinic as previously decided.      Roberto Bolanos MD  Neurologist  [facility]  [phone]

## 2023-03-21 NOTE — LETTER
3/21/2023         RE: Ruth Vanegas  [address]  [place] [address]        Dear Colleague,    Thank you for referring your patient, Ruth Vanegas, to the [facility] [place]. Please see a copy of my visit note below.    See procedure note.       Again, thank you for allowing me to participate in the care of your patient.        Sincerely,        Roberto Bolanos MD

## 2023-04-16 ENCOUNTER — HEALTH MAINTENANCE LETTER (OUTPATIENT)
Age: 38
End: 2023-04-16

## 2023-04-22 ENCOUNTER — MYC MEDICAL ADVICE (OUTPATIENT)
Dept: NEUROLOGY | Facility: CLINIC | Age: 38
End: 2023-04-22
Payer: COMMERCIAL

## 2023-04-22 DIAGNOSIS — G43.719 INTRACTABLE CHRONIC MIGRAINE WITHOUT AURA AND WITHOUT STATUS MIGRAINOSUS: Primary | ICD-10-CM

## 2023-04-24 RX ORDER — ONDANSETRON 4 MG/1
4 TABLET, FILM COATED ORAL EVERY 6 HOURS PRN
Qty: 60 TABLET | Refills: 1 | Status: SHIPPED | OUTPATIENT
Start: 2023-04-24 | End: 2023-11-16

## 2023-04-24 NOTE — TELEPHONE ENCOUNTER
Dr. Bolanos-  Pt requesting rx for ondansetron to help with nausea.    Kirstie Ramírez, LPN on 4/24/2023 at 8:28 AM

## 2023-05-11 ENCOUNTER — OFFICE VISIT (OUTPATIENT)
Dept: FAMILY MEDICINE | Facility: CLINIC | Age: 38
End: 2023-05-11
Payer: COMMERCIAL

## 2023-05-11 VITALS
HEIGHT: 66 IN | RESPIRATION RATE: 14 BRPM | BODY MASS INDEX: 46.12 KG/M2 | TEMPERATURE: 97.3 F | OXYGEN SATURATION: 98 % | SYSTOLIC BLOOD PRESSURE: 131 MMHG | HEART RATE: 90 BPM | WEIGHT: 287 LBS | DIASTOLIC BLOOD PRESSURE: 90 MMHG

## 2023-05-11 DIAGNOSIS — E78.5 HYPERLIPIDEMIA, UNSPECIFIED HYPERLIPIDEMIA TYPE: ICD-10-CM

## 2023-05-11 DIAGNOSIS — Z79.4 TYPE 2 DIABETES MELLITUS WITH DIABETIC POLYNEUROPATHY, WITH LONG-TERM CURRENT USE OF INSULIN (H): ICD-10-CM

## 2023-05-11 DIAGNOSIS — R03.0 ELEVATED BLOOD PRESSURE READING: ICD-10-CM

## 2023-05-11 DIAGNOSIS — Z00.00 ROUTINE GENERAL MEDICAL EXAMINATION AT A HEALTH CARE FACILITY: Primary | ICD-10-CM

## 2023-05-11 DIAGNOSIS — E11.42 DIABETIC POLYNEUROPATHY ASSOCIATED WITH TYPE 2 DIABETES MELLITUS (H): ICD-10-CM

## 2023-05-11 DIAGNOSIS — E11.42 TYPE 2 DIABETES MELLITUS WITH DIABETIC POLYNEUROPATHY, WITH LONG-TERM CURRENT USE OF INSULIN (H): ICD-10-CM

## 2023-05-11 LAB
ALBUMIN SERPL BCG-MCNC: 3.9 G/DL (ref 3.5–5.2)
ALP SERPL-CCNC: 73 U/L (ref 35–104)
ALT SERPL W P-5'-P-CCNC: 14 U/L (ref 10–35)
ANION GAP SERPL CALCULATED.3IONS-SCNC: 14 MMOL/L (ref 7–15)
AST SERPL W P-5'-P-CCNC: 14 U/L (ref 10–35)
BILIRUB SERPL-MCNC: 0.3 MG/DL
BUN SERPL-MCNC: 6.3 MG/DL (ref 6–20)
CALCIUM SERPL-MCNC: 9.1 MG/DL (ref 8.6–10)
CHLORIDE SERPL-SCNC: 104 MMOL/L (ref 98–107)
CHOLEST SERPL-MCNC: 228 MG/DL
CREAT SERPL-MCNC: 0.44 MG/DL (ref 0.51–0.95)
DEPRECATED HCO3 PLAS-SCNC: 22 MMOL/L (ref 22–29)
GFR SERPL CREATININE-BSD FRML MDRD: >90 ML/MIN/1.73M2
GLUCOSE SERPL-MCNC: 183 MG/DL (ref 70–99)
HBA1C MFR BLD: 8.3 % (ref 0–5.6)
HDLC SERPL-MCNC: 51 MG/DL
LDLC SERPL CALC-MCNC: 160 MG/DL
NONHDLC SERPL-MCNC: 177 MG/DL
POTASSIUM SERPL-SCNC: 4.1 MMOL/L (ref 3.4–5.3)
PROT SERPL-MCNC: 6.7 G/DL (ref 6.4–8.3)
SODIUM SERPL-SCNC: 140 MMOL/L (ref 136–145)
TRIGL SERPL-MCNC: 84 MG/DL

## 2023-05-11 PROCEDURE — 80061 LIPID PANEL: CPT | Performed by: FAMILY MEDICINE

## 2023-05-11 PROCEDURE — 36415 COLL VENOUS BLD VENIPUNCTURE: CPT | Performed by: FAMILY MEDICINE

## 2023-05-11 PROCEDURE — 83036 HEMOGLOBIN GLYCOSYLATED A1C: CPT | Performed by: FAMILY MEDICINE

## 2023-05-11 PROCEDURE — 99207 PR FOOT EXAM NO CHARGE: CPT | Mod: 25 | Performed by: FAMILY MEDICINE

## 2023-05-11 PROCEDURE — 99395 PREV VISIT EST AGE 18-39: CPT | Performed by: FAMILY MEDICINE

## 2023-05-11 PROCEDURE — 82570 ASSAY OF URINE CREATININE: CPT | Performed by: FAMILY MEDICINE

## 2023-05-11 PROCEDURE — 80053 COMPREHEN METABOLIC PANEL: CPT | Performed by: FAMILY MEDICINE

## 2023-05-11 PROCEDURE — 82043 UR ALBUMIN QUANTITATIVE: CPT | Performed by: FAMILY MEDICINE

## 2023-05-11 PROCEDURE — 99214 OFFICE O/P EST MOD 30 MIN: CPT | Mod: 25 | Performed by: FAMILY MEDICINE

## 2023-05-11 RX ORDER — METFORMIN HCL 500 MG
TABLET, EXTENDED RELEASE 24 HR ORAL
Qty: 360 TABLET | Refills: 0 | Status: SHIPPED | OUTPATIENT
Start: 2023-05-11 | End: 2023-08-23

## 2023-05-11 RX ORDER — ATORVASTATIN CALCIUM 10 MG/1
20 TABLET, FILM COATED ORAL DAILY
Qty: 60 TABLET | Refills: 11 | Status: SHIPPED | OUTPATIENT
Start: 2023-05-11 | End: 2024-03-07

## 2023-05-11 RX ORDER — GABAPENTIN 300 MG/1
600 CAPSULE ORAL 3 TIMES DAILY
Qty: 540 CAPSULE | Refills: 3 | Status: SHIPPED | OUTPATIENT
Start: 2023-05-11 | End: 2023-11-09

## 2023-05-11 ASSESSMENT — ENCOUNTER SYMPTOMS
NERVOUS/ANXIOUS: 1
HEMATURIA: 0
NAUSEA: 1
BREAST MASS: 0
COUGH: 0
PALPITATIONS: 0
SHORTNESS OF BREATH: 0
PARESTHESIAS: 0
ARTHRALGIAS: 1
DYSURIA: 0
FEVER: 0
CONSTIPATION: 0
HEARTBURN: 0
SORE THROAT: 0
DIARRHEA: 0
DIZZINESS: 1
MYALGIAS: 1
EYE PAIN: 0
WEAKNESS: 0
FREQUENCY: 0
CHILLS: 0
HEADACHES: 1
HEMATOCHEZIA: 0
JOINT SWELLING: 1

## 2023-05-11 ASSESSMENT — PATIENT HEALTH QUESTIONNAIRE - PHQ9
10. IF YOU CHECKED OFF ANY PROBLEMS, HOW DIFFICULT HAVE THESE PROBLEMS MADE IT FOR YOU TO DO YOUR WORK, TAKE CARE OF THINGS AT HOME, OR GET ALONG WITH OTHER PEOPLE: NOT DIFFICULT AT ALL
SUM OF ALL RESPONSES TO PHQ QUESTIONS 1-9: 10
SUM OF ALL RESPONSES TO PHQ QUESTIONS 1-9: 10

## 2023-05-11 NOTE — PROGRESS NOTES
SUBJECTIVE:   CC: Ruth is an 37 year old who presents for preventive health visit.       5/11/2023    12:07 PM   Additional Questions   Roomed by Tia   Accompanied by Spouse   Patient has been advised of split billing requirements and indicates understanding: Yes       [age] year old female here for annual physical and diabetes check.   Is feeling well though she notes she stopped all her diabetes medications. She has not taken any in several months. She started feeling nauseated again. She has chronic headaches, working with neurology closely and these are better controlled. Last seen by Endocrinology, who was managing her diabetes, in February. Was told to continue her then medication management.   Here with significant other. They are trying to work out, but it is hard.   She is not sure why her blood pressure is elevated, never had high blood pressure before.   She is taking her cholesterol medication as previously directed, told to take 2, but prescription for one daily.   Started a new job as .   Lives at home with family.     Healthy Habits:     Getting at least 3 servings of Calcium per day:  NO    Bi-annual eye exam:  Yes    Dental care twice a year:  Yes    Sleep apnea or symptoms of sleep apnea:  Daytime drowsiness and Excessive snoring    Diet:  Regular (no restrictions) and Diabetic    Frequency of exercise:  None    Taking medications regularly:  Yes    Medication side effects:  Other    PHQ-2 Total Score: 1    Additional concerns today:  No      Today's PHQ-2 Score:       5/11/2023    11:37 AM   PHQ-2 ( 1999 [employer])   Q1: Little interest or pleasure in doing things 0   Q2: Feeling down, depressed or hopeless 1   PHQ-2 Score 1   Q1: Little interest or pleasure in doing things Not at all   Q2: Feeling down, depressed or hopeless Several days   PHQ-2 Score 1       Have you ever done Advance Care Planning? (For example, a Health Directive, POLST, or a discussion with a medical  provider or your loved ones about your wishes): No, advance care planning information given to patient to review.  Patient declined advance care planning discussion at this time.    Social History     Tobacco Use     Smoking status: Never     Smokeless tobacco: Never   Vaping Use     Vaping status: Never Used   Substance Use Topics     Alcohol use: No     Alcohol/week: 0.0 standard drinks of alcohol             5/11/2023    11:37 AM   Alcohol Use   Prescreen: >3 drinks/day or >7 drinks/week? Not Applicable     Reviewed orders with patient.  Reviewed health maintenance and updated orders accordingly - Yes  Lab work is in process    Breast Cancer Screening:    FHS-7:       5/11/2023    11:40 AM   Breast CA Risk Assessment (FHS-7)   Did any of your first-degree relatives have breast or ovarian cancer? Yes   Did any of your relatives have bilateral breast cancer? No   Did any man in your family have breast cancer? Unknown   Did any woman in your family have breast and ovarian cancer? Yes   Did any woman in your family have breast cancer before age 50 y? Unknown   Do you have 2 or more relatives with breast and/or ovarian cancer? Yes   Do you have 2 or more relatives with breast and/or bowel cancer? Unknown       Patient under 40 years of age: Routine Mammogram Screening not recommended.   Pertinent mammograms are reviewed under the imaging tab.    History of abnormal Pap smear: Status post benign hysterectomy. Health Maintenance and Surgical History updated.      1/22/2019    12:00 AM   PAP / HPV   HPV_EXT - HISTORICAL See Scanned Report       Reviewed and updated as needed this visit by clinical staff   Tobacco  Allergies  Meds              Reviewed and updated as needed this visit by Provider                 Past Medical History:   Diagnosis Date     Anemia     told to take iron pills when pregnant     Depression      Depressive disorder      Diabetes mellitus (H)      Diabetes mellitus, type 2 (H) 07/15/2021      Dysthymic disorder      Endometriosis      Herpes genitalia      Hyperlipidemia      Kidney stone      Menorrhagia      Migraines      Neuropathy      Other abnormal Papanicolaou smear of cervix and cervical HPV(795.09) 2013     PCOS (polycystic ovarian syndrome)      Post traumatic stress disorder (PTSD)       Past Surgical History:   Procedure Laterality Date     BLADDER REPAIR W/  SECTION      X2     C/SECTION, CLASSICAL      x2     COLONOSCOPY N/A 2021    Procedure: COLONOSCOPY;  Surgeon: Rene Lehman DO;  Location:  GI     DILATION AND CURETTAGE  2015     DILATION AND CURETTAGE  2015     DILATION AND CURETTAGE, OPERATIVE HYSTEROSCOPY, COMBINED N/A 2015    Procedure: Dilation and Curettage with Hysteroscopy;  Surgeon: Zia Farmer MD;  Location: [facility] [place];  Service:      DILATION AND CURETTAGE, OPERATIVE HYSTEROSCOPY, COMBINED N/A 2016    Procedure: DILATION AND CURETTAGE WITH HYSTEROSCOPY INSERT MIRENA;  Surgeon: Suzanne Major MD;  Location: [facility] [place];  Service:      ENT SURGERY       ESOPHAGOSCOPY, GASTROSCOPY, DUODENOSCOPY (EGD), COMBINED N/A 2021    Procedure: ESOPHAGOGASTRODUODENOSCOPY, WITH BIOPSY;  Surgeon: Rene Lehman DO;  Location:  GI     GYN SURGERY  10/19/15    laproscopic lysis of adhesions     HYSTEROSCOPY, ABLATE ENDOMETRIUM HYDROTHERMAL, COMBINED N/A 3/2/2018    Procedure: HYSTEROSCOPY, DILATION AND CURETTAGE, ENDOMETRIAL ABLATION;  Surgeon: Kristy Israel DO;  Location: [facility] OR;  Service: Gynecology     LAPAROSCOPIC HYSTERECTOMY TOTAL N/A 3/4/2019    Procedure: ROBOTIC TOTAL LAPAROSCOPIC HYSTERECTOMY, BILATERAL SALPINGECTOMY, LEFT OVARIAN CYSTOTOMY. CYSTOSCOPY;  Surgeon: Zia Farmer MD;  Location: [facility] [place];  Service: Gynecology     LAPAROSCOPY DIAGNOSTIC (GENERAL) N/A 10/19/2015    Procedure:  LAPAROSCOPY,LYSIS OF ADHESIONS;  Surgeon: Zia Farmer MD;  Location: [facility] [place];   "Service:      CO LAP,TUBAL CAUTERY Bilateral 3/2/2018    Procedure: LAPAROSCOPIC BILATERAL TUBAL LIGATION;  Surgeon: Kristy Israel DO;  [place] [facility] [place]: [place]necology     TONSILLECTOMY       OB History   No obstetric history on file.       Review of Systems   Constitutional: Negative for chills and fever.   HENT: Negative for congestion, ear pain, hearing loss and sore throat.    Eyes: Negative for pain and visual disturbance.   Respiratory: Negative for cough and shortness of breath.    Cardiovascular: Positive for peripheral edema. Negative for chest pain and palpitations.   Gastrointestinal: Positive for nausea. Negative for constipation, diarrhea, heartburn and hematochezia.   Breasts:  Positive for tenderness. Negative for breast mass and discharge.   Genitourinary: Positive for pelvic pain. Negative for dysuria, frequency, genital sores, hematuria, urgency, vaginal bleeding and vaginal discharge.   Musculoskeletal: Positive for arthralgias, joint swelling and myalgias.   Skin: Negative for rash.   Neurological: Positive for dizziness and headaches. Negative for weakness and paresthesias.   Psychiatric/Behavioral: Negative for mood changes. The patient is nervous/anxious.           OBJECTIVE:   BP (!) 126/90 (BP Location: Left arm, Patient Position: Sitting, Cuff Size: Adult Large)   Pulse 90   Temp 97.3  F (36.3  C) (Temporal)   Resp 14   Ht 1.68 m (5' 6.14\")   Wt 130.2 kg (287 lb)   LMP 05/23/2016   SpO2 98%   BMI 46.12 kg/m         BP Readings from Last 6 Encounters:   05/11/23 (!) 131/90   03/21/23 102/72   02/17/23 125/86   12/20/22 113/86   09/20/22 121/83   08/15/22 127/88       Physical Exam  GENERAL: healthy, alert and no distress  NECK: no adenopathy, no asymmetry, masses, or scars and thyroid normal to palpation  RESP: lungs clear to auscultation - no rales, rhonchi or wheezes  CV: regular rate and rhythm, normal S1 S2, no S3 or S4, no murmur, click or rub, no " peripheral edema and peripheral pulses strong  ABDOMEN: soft, nontender, no hepatosplenomegaly, no masses and bowel sounds normal  BREST: no masses or skin changes   (female): deferred  MS: no gross musculoskeletal defects noted, no edema  NEURO: Normal strength and tone, mentation intact and speech normal  PSYCH: mentation appears normal, affect normal/bright  Diabetic foot exam: normal DP and PT pulses, no trophic changes or ulcerative lesions and normal sensory exam    Diagnostic Test Results:  Labs reviewed in Epic  Results for orders placed or performed in visit on 05/11/23   Lipid panel reflex to direct LDL Fasting     Status: Abnormal   Result Value Ref Range    Cholesterol 228 (H) <200 mg/dL    Triglycerides 84 <150 mg/dL    Direct Measure HDL 51 >=50 mg/dL    LDL Cholesterol Calculated 160 (H) <=100 mg/dL    Non HDL Cholesterol 177 (H) <130 mg/dL    Narrative    Cholesterol  Desirable:  <200 mg/dL    Triglycerides  Normal:  Less than 150 mg/dL  Borderline High:  150-199 mg/dL  High:  200-499 mg/dL  Very High:  Greater than or equal to 500 mg/dL    Direct Measure HDL  Female:  Greater than or equal to 50 mg/dL   Male:  Greater than or equal to 40 mg/dL    LDL Cholesterol  Desirable:  <100mg/dL  Above Desirable:  100-129 mg/dL   Borderline High:  130-159 mg/dL   High:  160-189 mg/dL   Very High:  >= 190 mg/dL    Non HDL Cholesterol  Desirable:  130 mg/dL  Above Desirable:  130-159 mg/dL  Borderline High:  160-189 mg/dL  High:  190-219 mg/dL  Very High:  Greater than or equal to 220 mg/dL   **Hemoglobin A1c FUTURE 3mo     Status: Abnormal   Result Value Ref Range    Hemoglobin A1C 8.3 (H) 0.0 - 5.6 %   Comprehensive metabolic panel (BMP + Alb, Alk Phos, ALT, AST, Total. Bili, TP)     Status: Abnormal   Result Value Ref Range    Sodium 140 136 - 145 mmol/L    Potassium 4.1 3.4 - 5.3 mmol/L    Chloride 104 98 - 107 mmol/L    Carbon Dioxide (CO2) 22 22 - 29 mmol/L    Anion Gap 14 7 - 15 mmol/L    Urea Nitrogen  6.3 6.0 - 20.0 mg/dL    Creatinine 0.44 (L) 0.51 - 0.95 mg/dL    Calcium 9.1 8.6 - 10.0 mg/dL    Glucose 183 (H) 70 - 99 mg/dL    Alkaline Phosphatase 73 35 - 104 U/L    AST 14 10 - 35 U/L    ALT 14 10 - 35 U/L    Protein Total 6.7 6.4 - 8.3 g/dL    Albumin 3.9 3.5 - 5.2 g/dL    Bilirubin Total 0.3 <=1.2 mg/dL    GFR Estimate >90 >60 mL/min/1.73m2   Albumin Random Urine Quantitative with Creat Ratio     Status: None   Result Value Ref Range    Creatinine Urine mg/dL 96.5 mg/dL    Albumin Urine mg/L <12.0 mg/L    Albumin Urine mg/g Cr         ASSESSMENT/PLAN:   (Z00.00) Routine general medical examination at a health care facility  (primary encounter diagnosis)  Plan: REVIEW OF HEALTH MAINTENANCE PROTOCOL ORDERS,         Lipid panel reflex to direct LDL Fasting  (E11.42,  Z79.4) Type 2 diabetes mellitus with diabetic polyneuropathy, with long-term current use of insulin (H)  Plan: **Hemoglobin A1c FUTURE 3mo, Comprehensive         metabolic panel (BMP + Alb, Alk Phos, ALT, AST,        Total. Bili, TP), Albumin Random Urine         Quantitative with Creat Ratio, FOOT EXAM, Adult        Eye  Referral, canagliflozin         (INVOKANA) 300 MG tablet, insulin glargine         (LANTUS PEN) 100 UNIT/ML pen, atorvastatin         (LIPITOR) 10 MG tablet  (E11.42) Diabetic polyneuropathy associated with type 2 diabetes mellitus (H)  (E78.5) Hyperlipidemia, unspecified hyperlipidemia type  Plan: vitamin D3 (CHOLECALCIFEROL) 125 MCG (5000 UT)         tablet  (R03.0) Elevated blood pressure reading  EHR reviewed.   Past medical history, problem list, past surgical history, family history, social history, medications reviewed, updated, reconciled.   Reviewed cancer screening. Family history of ovarian cancer though she has had complete hysterectomy. She declines genetics referral for now.   Last seen by Endocrinology in February. Lost to follow up. Diabetes is uncontrolled. Reviewed need to restart medications, minimal  "side affects when taking regularly. Will recheck in three months, if still above goal referred back to endocrinology. All medications refilled, continue lantus 24 units, metformin, invokana.  She notes eye exam in the last six months. Lipids not at goal, continue statin, recheck at the next visit, possible improvement after improvement in diabetes control? Foot exam normal. We discussed elevated blood pressure. Will recheck in one month. If still elevated will start lisinopril.   She declines covid booster.     Patient has been advised of split billing requirements and indicates understanding: Yes      COUNSELING:  Reviewed preventive health counseling, as reflected in patient instructions       Regular exercise       Healthy diet/nutrition       Vision screening       Osteoporosis prevention/bone health       Advance Care Planning      BMI:   Estimated body mass index is 46.12 kg/m  as calculated from the following:    Height as of this encounter: 1.68 m (5' 6.14\").    Weight as of this encounter: 130.2 kg (287 lb).   Weight management plan: Discussed healthy diet and exercise guidelines      She reports that she has never smoked. She has never used smokeless tobacco.          Prior to immunization administration, verified patients identity using patient s name and date of birth. Please see Immunization Activity for additional information.     Prior to immunization administration, verified patients identity using patient s name and date of birth. Please see Immunization Activity for additional information.     Screening Questionnaire for Adult Immunization    Are you sick today?   No   Do you have allergies to medications, food, a vaccine component or latex?   Yes   Have you ever had a serious reaction after receiving a vaccination?   No   Do you have a long-term health problem with heart, lung, kidney, or metabolic disease (e.g., diabetes), asthma, a blood disorder, no spleen, complement component deficiency, a " cochlear implant, or a spinal fluid leak?  Are you on long-term aspirin therapy?   Yes   Do you have cancer, leukemia, HIV/AIDS, or any other immune system problem?   No   Do you have a parent, brother, or sister with an immune system problem?   No   In the past 3 months, have you taken medications that affect  your immune system, such as prednisone, other steroids, or anticancer drugs; drugs for the treatment of rheumatoid arthritis, Crohn s disease, or psoriasis; or have you had radiation treatments?   No   Have you had a seizure, or a brain or other nervous system problem?   No   During the past year, have you received a transfusion of blood or blood    products, or been given immune (gamma) globulin or antiviral drug?   No   For women: Are you pregnant or is there a chance you could become       pregnant during the next month?   No   Have you received any vaccinations in the past 4 weeks?   No     Immunization questionnaire was positive for at least one answer.  Notified .          Screening performed by Blanca Paiz CMA on 5/11/2023 at 12:12 PM.          Milton Banks MD  [facility]  Answers for HPI/ROS submitted by the patient on 5/11/2023  If you checked off any problems, how difficult have these problems made it for you to do your work, take care of things at home, or get along with other people?: Not difficult at all  PHQ9 TOTAL SCORE: 10

## 2023-05-12 LAB
CREAT UR-MCNC: 96.5 MG/DL
MICROALBUMIN UR-MCNC: <12 MG/L
MICROALBUMIN/CREAT UR: NORMAL MG/G{CREAT}

## 2023-07-16 ENCOUNTER — E-VISIT (OUTPATIENT)
Dept: URGENT CARE | Facility: CLINIC | Age: 38
End: 2023-07-16
Payer: COMMERCIAL

## 2023-07-16 DIAGNOSIS — B37.31 CANDIDAL VULVOVAGINITIS: Primary | ICD-10-CM

## 2023-07-16 PROCEDURE — 99421 OL DIG E/M SVC 5-10 MIN: CPT | Performed by: PHYSICIAN ASSISTANT

## 2023-07-16 RX ORDER — FLUCONAZOLE 150 MG/1
150 TABLET ORAL ONCE
Qty: 1 TABLET | Refills: 0 | Status: SHIPPED | OUTPATIENT
Start: 2023-07-16 | End: 2023-07-16

## 2023-07-16 NOTE — PATIENT INSTRUCTIONS
Thank you for choosing us for your care. I have placed an order for a prescription so that you can start treatment. View your full visit summary for details by clicking on the link below. Your pharmacist will able to address any questions you may have about the medication.     If you re not feeling better within 2-3 days, please schedule an appointment.  You can schedule an appointment right here in [facility], or call [phone]  If the visit is for the same symptoms as your eVisit, we ll refund the cost of your eVisit if seen within seven days.      Yeast Infection (Candida Vaginal Infection)    You have a Candida vaginal infection. This is also known as a yeast infection. It's most often caused by a type of yeast (fungus) called Candida. Candida are normally found in the vagina. But if they increase in number, this can lead to infection and cause symptoms.   Symptoms of a yeast infection can include:     Clumpy or thin, white discharge, which may look like cottage cheese    Itching or burning    Burning with urination  Certain factors can make a yeast infection more likely. These can include:     Taking certain medicines, such as antibiotics or birth control pills    Pregnancy    Diabetes    Weak immune system  A yeast infection is most often treated with antifungal medicine. This may be given as a vaginal cream or pills you take by mouth. Treatment may last for about 1 to 7 days. Women with severe or recurrent infections may need longer courses of treatment.   Home care    If you re prescribed medicine, be sure to use it as directed. Finish all of the medicine, even if your symptoms go away. Don t try to treat yourself using over-the-counter products without talking with your provider first. They will let you know if this is a good option for you.    Ask your provider what steps you can take to help reduce your risk of having a yeast infection in the future.    Follow-up care  Follow up with your healthcare  provider, or as directed.   When to seek medical advice  Call your healthcare provider right away if:     You have a fever of 100.4 F (38 C) or higher, or as directed by your provider.    Your symptoms worsen, or they don t go away within a few days of starting treatment.    You have new pain in the lower belly or pelvic region.    You have side effects that bother you or a reaction to the cream or pills you re prescribed.    You or any partners you have sex with have new symptoms, such as a rash, joint pain, or sores.  [employer] last reviewed this educational content on 7/1/2020 2000-2022 [employer]. All rights reserved. This information is not intended as a substitute for professional medical care. Always follow your healthcare professional's instructions.

## 2023-08-11 ENCOUNTER — OFFICE VISIT (OUTPATIENT)
Dept: FAMILY MEDICINE | Facility: CLINIC | Age: 38
End: 2023-08-11
Attending: FAMILY MEDICINE
Payer: COMMERCIAL

## 2023-08-11 VITALS
TEMPERATURE: 97.7 F | WEIGHT: 293 LBS | HEART RATE: 97 BPM | OXYGEN SATURATION: 97 % | DIASTOLIC BLOOD PRESSURE: 81 MMHG | BODY MASS INDEX: 47.09 KG/M2 | SYSTOLIC BLOOD PRESSURE: 118 MMHG | RESPIRATION RATE: 14 BRPM

## 2023-08-11 DIAGNOSIS — J02.9 SORE THROAT: ICD-10-CM

## 2023-08-11 DIAGNOSIS — E78.5 HYPERLIPIDEMIA, UNSPECIFIED HYPERLIPIDEMIA TYPE: ICD-10-CM

## 2023-08-11 DIAGNOSIS — Z79.4 TYPE 2 DIABETES MELLITUS WITH DIABETIC POLYNEUROPATHY, WITH LONG-TERM CURRENT USE OF INSULIN (H): Primary | ICD-10-CM

## 2023-08-11 DIAGNOSIS — E11.42 TYPE 2 DIABETES MELLITUS WITH DIABETIC POLYNEUROPATHY, WITH LONG-TERM CURRENT USE OF INSULIN (H): Primary | ICD-10-CM

## 2023-08-11 DIAGNOSIS — R03.0 ELEVATED BLOOD PRESSURE READING: ICD-10-CM

## 2023-08-11 DIAGNOSIS — E11.42 DIABETIC POLYNEUROPATHY ASSOCIATED WITH TYPE 2 DIABETES MELLITUS (H): ICD-10-CM

## 2023-08-11 DIAGNOSIS — M72.2 PLANTAR FASCIITIS: ICD-10-CM

## 2023-08-11 LAB
ALBUMIN SERPL BCG-MCNC: 4 G/DL (ref 3.5–5.2)
ALP SERPL-CCNC: 87 U/L (ref 35–104)
ALT SERPL W P-5'-P-CCNC: 13 U/L (ref 0–50)
ANION GAP SERPL CALCULATED.3IONS-SCNC: 11 MMOL/L (ref 7–15)
AST SERPL W P-5'-P-CCNC: 20 U/L (ref 0–45)
BILIRUB SERPL-MCNC: 0.2 MG/DL
BUN SERPL-MCNC: 6.6 MG/DL (ref 6–20)
CALCIUM SERPL-MCNC: 8.9 MG/DL (ref 8.6–10)
CHLORIDE SERPL-SCNC: 103 MMOL/L (ref 98–107)
CREAT SERPL-MCNC: 0.42 MG/DL (ref 0.51–0.95)
DEPRECATED HCO3 PLAS-SCNC: 24 MMOL/L (ref 22–29)
DEPRECATED S PYO AG THROAT QL EIA: NEGATIVE
GFR SERPL CREATININE-BSD FRML MDRD: >90 ML/MIN/1.73M2
GLUCOSE SERPL-MCNC: 344 MG/DL (ref 70–99)
GROUP A STREP BY PCR: NOT DETECTED
HBA1C MFR BLD: 9.5 % (ref 0–5.6)
POTASSIUM SERPL-SCNC: 4 MMOL/L (ref 3.4–5.3)
PROT SERPL-MCNC: 6.8 G/DL (ref 6.4–8.3)
SODIUM SERPL-SCNC: 138 MMOL/L (ref 136–145)

## 2023-08-11 PROCEDURE — 36415 COLL VENOUS BLD VENIPUNCTURE: CPT | Performed by: FAMILY MEDICINE

## 2023-08-11 PROCEDURE — 99215 OFFICE O/P EST HI 40 MIN: CPT | Performed by: FAMILY MEDICINE

## 2023-08-11 PROCEDURE — 80053 COMPREHEN METABOLIC PANEL: CPT | Performed by: FAMILY MEDICINE

## 2023-08-11 PROCEDURE — 87651 STREP A DNA AMP PROBE: CPT | Performed by: FAMILY MEDICINE

## 2023-08-11 PROCEDURE — 83036 HEMOGLOBIN GLYCOSYLATED A1C: CPT | Performed by: FAMILY MEDICINE

## 2023-08-11 ASSESSMENT — PATIENT HEALTH QUESTIONNAIRE - PHQ9
SUM OF ALL RESPONSES TO PHQ QUESTIONS 1-9: 4
SUM OF ALL RESPONSES TO PHQ QUESTIONS 1-9: 4
10. IF YOU CHECKED OFF ANY PROBLEMS, HOW DIFFICULT HAVE THESE PROBLEMS MADE IT FOR YOU TO DO YOUR WORK, TAKE CARE OF THINGS AT HOME, OR GET ALONG WITH OTHER PEOPLE: NOT DIFFICULT AT ALL

## 2023-08-11 NOTE — LETTER
August 11, 2023      Ruth Vanegas  [address]   [place] [address]        To Whom It May Concern:    Ruth Vanegas  was seen on today.  Please excuse her 8/10/23 due to illness.        Sincerely,        Milton Banks MD

## 2023-08-11 NOTE — PROGRESS NOTES
Assessment & Plan     Type 2 diabetes mellitus with diabetic polyneuropathy, with long-term current use of insulin (H)  - **Hemoglobin A1c FUTURE 3mo  - Comprehensive metabolic panel (BMP + Alb, Alk Phos, ALT, AST, Total. Bili, TP)  Diabetic polyneuropathy associated with type 2 diabetes mellitus (H)  Hyperlipidemia, unspecified hyperlipidemia type  Elevated blood pressure reading  Plantar fasciitis  - Orthopedic  Referral; Future  Sore throat  - Streptococcus A Rapid Screen w/Reflex to PCR - Clinic Collect  - Group A Streptococcus PCR Throat Swab  EHR reviewed.   Past medical history, problem list, past surgical history, family history, social history, medications reviewed, updated, reconciled.   Diabetes is worsening and uncontrolled. Last seen by endocrinology on 10/25/22. This was reviewed. We discussed options of better controlled. Urged to consider regular exercise, focus on diabetic diet. She wants to retry ozempic. She failed use of other injectable medications like victoza and bydurean. A PA appeal to be started as ozempic was denied. If no improvement at the next check, will be referred to endocrinology again. LDL not at goal. On statin. Blood pressure is better controlled today, will continue to monitor. Reminded about annual eye exams. She is on gabapentin. Concern for plantar fasciitis or other regarding her foot pain. Referral to podiatry, will follow recommendations. Certainly could increase dose of gapapentin if needed, will consider this at the next visit.   Rapid strep collected and culture negative. Reviewed supportive care.   Follow up on diabetes in three months.   Urged to consider update of covid vaccine, she will think about it.     Total time >40 minutes spent obtaining history from patient, reviewing EHR, counseling the above issues, coordinating care.       Milton Banks MD  [facility]    Gilmer Miranda is a 37 year old, presenting for the  following health issues:  Follow Up (Diabetes )        8/11/2023    12:19 PM   Additional Questions   Roomed by Tia       History of Present Illness       Diabetes:   She presents for follow up of diabetes.  She is checking home blood glucose three times daily.   She checks blood glucose before and after meals.  Blood glucose is sometimes over 200 and never under 70. She is aware of hypoglycemia symptoms including dizziness, weakness and lethargy.   She is concerned about blood sugar frequently over 200.   She is having numbness in feet, excessive thirst and blurry vision.  The patient has had a diabetic eye exam in the last 12 months. Eye exam performed on [id number]. Location of last eye exam [facility].            Hyperlipidemia Follow-Up    Are you regularly taking any medication or supplement to lower your cholesterol?   Yes- atorvastatin  Are you having muscle aches or other side effects that you think could be caused by your cholesterol lowering medication?  No    Hypertension Follow-up    Do you check your blood pressure regularly outside of the clinic? No   Are you following a low salt diet? Yes  Are your blood pressures ever more than 140 on the top number (systolic) OR more   than 90 on the bottom number (diastolic), for example 140/90? No    [age] old female with history of diabetes, hyperlipidemia, migraines, PTSD, here for follow up.   Recently seen for diabetes. She was working with endocrinology at the . She stopped care a few months ago, her diabetes was well controlled. Currently her glucose is all over the place. She notes she is eating well or sometimes not eating her glucose is so high. She is taking her medications as directed. Wants to start the shot. She notes her nausea and stomach issues are much better. Headaches are better working with neurology, but has much pain in her feet. There is still tingling, this is not new, but lately has a lot of aching and pain in the bottom of her  feet. Can not stand up in the morning due to pain. Walking does make this worse. Has not used any PT or other medicines for this. Her feet always look swollen.   Her throat is so sore. Feels tired. Is worried she might have strep throat again and doesn't want to get sick like the last time.           Objective    /81 (BP Location: Left arm, Patient Position: Sitting, Cuff Size: Adult Regular)   Pulse 97   Temp 97.7  F (36.5  C) (Temporal)   Resp 14   Wt 132.9 kg (293 lb)   LMP 05/23/2016   SpO2 97%   BMI 47.09 kg/m    Body mass index is 47.09 kg/m .  Physical Exam   GENERAL: healthy, alert and no distress  EYES: Eyes grossly normal to inspection, PERRL and conjunctivae and sclerae normal  HENT: ear canals and TM's normal, nose and mouth without ulcers or lesions  NECK: no adenopathy, no asymmetry, masses, or scars and thyroid normal to palpation  RESP: lungs clear to auscultation - no rales, rhonchi or wheezes  CV: regular rate and rhythm, normal S1 S2, no S3 or S4, no murmur, click or rub, no peripheral edema and peripheral pulses strong  MS: no gross musculoskeletal defects noted, no pitting edema, pulse bilaterally are normal, she has normal extension and flexion at the ankles, plantar surfaces without any deformity or lesions, but is tender at the plantar fascia   SKIN: no suspicious lesions or rashes  PSYCH: mentation appears normal, affect normal/bright    Results for orders placed or performed in visit on 08/11/23   **Hemoglobin A1c FUTURE 3mo     Status: Abnormal   Result Value Ref Range    Hemoglobin A1C 9.5 (H) 0.0 - 5.6 %   Comprehensive metabolic panel (BMP + Alb, Alk Phos, ALT, AST, Total. Bili, TP)     Status: Abnormal   Result Value Ref Range    Sodium 138 136 - 145 mmol/L    Potassium 4.0 3.4 - 5.3 mmol/L    Chloride 103 98 - 107 mmol/L    Carbon Dioxide (CO2) 24 22 - 29 mmol/L    Anion Gap 11 7 - 15 mmol/L    Urea Nitrogen 6.6 6.0 - 20.0 mg/dL    Creatinine 0.42 (L) 0.51 - 0.95 mg/dL     Calcium 8.9 8.6 - 10.0 mg/dL    Glucose 344 (H) 70 - 99 mg/dL    Alkaline Phosphatase 87 35 - 104 U/L    AST 20 0 - 45 U/L    ALT 13 0 - 50 U/L    Protein Total 6.8 6.4 - 8.3 g/dL    Albumin 4.0 3.5 - 5.2 g/dL    Bilirubin Total 0.2 <=1.2 mg/dL    GFR Estimate >90 >60 mL/min/1.73m2   Streptococcus A Rapid Screen w/Reflex to PCR - Clinic Collect     Status: Normal    Specimen: Throat; Swab   Result Value Ref Range    Group A Strep antigen Negative Negative   Group A Streptococcus PCR Throat Swab     Status: Normal    Specimen: Throat; Swab   Result Value Ref Range    Group A strep by PCR Not Detected Not Detected    Narrative    The Xpert Xpress Strep A test, performed on the [employer], is a rapid, qualitative in vitro diagnostic test for the detection of Streptococcus pyogenes (Group A ß-hemolytic Streptococcus, Strep A) in throat swab specimens from patients with signs and symptoms of pharyngitis. The Xpert Xpress Strep A test can be used as an aid in the diagnosis of Group A Streptococcal pharyngitis. The assay is not intended to monitor treatment for Group A Streptococcus infections. The Xpert Xpress Strep A test utilizes an automated real-time polymerase chain reaction (PCR) to detect Streptococcus pyogenes DNA.

## 2023-08-17 ENCOUNTER — OFFICE VISIT (OUTPATIENT)
Dept: PODIATRY | Facility: CLINIC | Age: 38
End: 2023-08-17
Attending: FAMILY MEDICINE
Payer: COMMERCIAL

## 2023-08-17 VITALS
BODY MASS INDEX: 47.09 KG/M2 | WEIGHT: 293 LBS | HEIGHT: 66 IN | SYSTOLIC BLOOD PRESSURE: 130 MMHG | DIASTOLIC BLOOD PRESSURE: 86 MMHG

## 2023-08-17 DIAGNOSIS — M76.62 ACHILLES TENDINITIS OF BOTH LOWER EXTREMITIES: Primary | ICD-10-CM

## 2023-08-17 DIAGNOSIS — M72.2 PLANTAR FASCIITIS: ICD-10-CM

## 2023-08-17 DIAGNOSIS — M76.61 ACHILLES TENDINITIS OF BOTH LOWER EXTREMITIES: Primary | ICD-10-CM

## 2023-08-17 PROCEDURE — 99244 OFF/OP CNSLTJ NEW/EST MOD 40: CPT | Performed by: PODIATRIST

## 2023-08-17 RX ORDER — MELOXICAM 7.5 MG/1
7.5 TABLET ORAL DAILY
Qty: 14 TABLET | Refills: 0 | Status: SHIPPED | OUTPATIENT
Start: 2023-08-17 | End: 2024-01-22

## 2023-08-17 ASSESSMENT — PAIN SCALES - GENERAL: PAINLEVEL: SEVERE PAIN (7)

## 2023-08-17 NOTE — PROGRESS NOTES
"Foot & Ankle Surgery  2023    CC: \"Pain in feet\"    I was asked to see Ruth Vanegas regarding the chief complaint by:  Dr. Banks    HPI:  Pt is a 37 year old female who presents with above complaint.  Bilateral lower extremity issues for 3 to 4 weeks.  She describes throbbing, tingling and shooting pain.  Pain 9 out of 10, comes and goes, worse with weightbearing and rest.  \"It all depends\".  She describes morning pain and post static dyskinesia in the heels, both plantarly and posteriorly, as well as mild arch pain.  The patient is diabetic and does have \"numbness, tingling in feet\".  The symptoms are \"constant\".  \"Toes for sure\" her symptoms are present, both dorsally and plantarly, but she states that when her toes feel numb, her feet feel numb as well.  \"I get lower back pain\" but states this is not as bad as it has been in the past.  She also complains about bilateral lower extremity swelling.  She states she has been on gabapentin    ROS:   Pos for CC.  The patient denies current nausea, vomiting, chills, fevers, belly pain, calf pain, chest pain or SOB.  Complete remainder of ROS is otherwise neg.    VITALS:    Vitals:    23 0736   BP: 130/86   Weight: 133.4 kg (294 lb)   Height: 1.676 m (5' 6\")       PMH:    Past Medical History:   Diagnosis Date    Anemia     told to take iron pills when pregnant    Depression     Depressive disorder     Diabetes mellitus (H)     Diabetes mellitus, type 2 (H) 07/15/2021    Dysthymic disorder     Endometriosis     Herpes genitalia     Hyperlipidemia     Kidney stone     Menorrhagia     Migraines     Neuropathy     Other abnormal Papanicolaou smear of cervix and cervical HPV(795.09) 2013    PCOS (polycystic ovarian syndrome)     Post traumatic stress disorder (PTSD)        SXHX:    Past Surgical History:   Procedure Laterality Date    BLADDER REPAIR W/  SECTION      X2    C/SECTION, CLASSICAL      x2    COLONOSCOPY N/A 2021    " Procedure: COLONOSCOPY;  Surgeon: Rene Lehman DO;  Location:  GI    DILATION AND CURETTAGE  7/2015    DILATION AND CURETTAGE  05/06/2015    DILATION AND CURETTAGE, OPERATIVE HYSTEROSCOPY, COMBINED N/A 5/7/2015    Procedure: Dilation and Curettage with Hysteroscopy;  Surgeon: Zia Farmer MD;  Location: [facility] OR;  Service:     DILATION AND CURETTAGE, OPERATIVE HYSTEROSCOPY, COMBINED N/A 9/29/2016    Procedure: DILATION AND CURETTAGE WITH HYSTEROSCOPY INSERT MIRENA;  Surgeon: Suzanne Major MD;  Location: [facility] OR;  Service:     ENT SURGERY      ESOPHAGOSCOPY, GASTROSCOPY, DUODENOSCOPY (EGD), COMBINED N/A 12/14/2021    Procedure: ESOPHAGOGASTRODUODENOSCOPY, WITH BIOPSY;  Surgeon: Rene Lehman DO;  Location:  GI    GYN SURGERY  10/19/15    laproscopic lysis of adhesions    HYSTEROSCOPY, ABLATE ENDOMETRIUM HYDROTHERMAL, COMBINED N/A 3/2/2018    Procedure: HYSTEROSCOPY, DILATION AND CURETTAGE, ENDOMETRIAL ABLATION;  Surgeon: Kristy Israel DO;  Location: [facility];  Service: Gynecology    LAPAROSCOPIC HYSTERECTOMY TOTAL N/A 3/4/2019    Procedure: ROBOTIC TOTAL LAPAROSCOPIC HYSTERECTOMY, BILATERAL SALPINGECTOMY, LEFT OVARIAN CYSTOTOMY. CYSTOSCOPY;  Surgeon: Zia Farmer MD;  Location: [facility];  Service: Gynecology    LAPAROSCOPY DIAGNOSTIC (GENERAL) N/A 10/19/2015    Procedure:  LAPAROSCOPY,LYSIS OF ADHESIONS;  Surgeon: Zia Farmer MD;  Location: [facility] OR;  Service:     [place] LAP,TUBAL CAUTERY Bilateral 3/2/2018    Procedure: LAPAROSCOPIC BILATERAL TUBAL LIGATION;  Surgeon: Kristy Israel DO;  Location: [facility] OR;  Service: Gynecology    TONSILLECTOMY          MEDS:    Current Outpatient Medications   Medication    acetaminophen (TYLENOL) 500 MG tablet    atorvastatin (LIPITOR) 10 MG tablet    canagliflozin (INVOKANA) 300 MG tablet    Continuous Blood Gluc Sensor (FREESTYLE CINDY 14 DAY SENSOR) MISC    Continuous Blood Gluc Sensor  (FREESTYLE CINDY 2 SENSOR) MISC    diazepam (VALIUM) 5 MG tablet    fluconazole (DIFLUCAN) 150 MG tablet    gabapentin (NEURONTIN) 300 MG capsule    ibuprofen (ADVIL/MOTRIN) 600 MG tablet    insulin glargine (LANTUS PEN) 100 UNIT/ML pen    insulin  UNIT/ML injection    insulin pen needle (ULTICARE MICRO) 32G X 4 MM miscellaneous    Melatonin 10 MG CAPS    metFORMIN (GLUCOPHAGE XR) 500 MG 24 hr tablet    multivitamin (ONE-DAILY) tablet    ondansetron (ZOFRAN) 4 MG tablet    protriptyline (VIVACTIL) 10 MG tablet    rizatriptan (MAXALT) 10 MG tablet    tolterodine ER (DETROL LA) 4 MG 24 hr capsule    topiramate (TOPAMAX) 100 MG tablet    verapamil ER (VERELAN) 120 MG 24 hr capsule    vitamin D3 (CHOLECALCIFEROL) 125 MCG (5000 UT) tablet     Current Facility-Administered Medications   Medication    Botulinum Toxin Type A (BOTOX) 200 units injection 155 Units    Botulinum Toxin Type A (BOTOX) 200 units injection 200 Units       ALL:     Allergies   Allergen Reactions    Hydrocodone Other (See Comments)     Headache per pt and hallucinations  Headache per pt and hallucinations      Reglan [Metoclopramide]      Anxiety    Vicodin [Hydrocodone-Acetaminophen] Other (See Comments)     Hallucinations    Silver Nitrate Itching and Rash     Only issues if in for a long time  Only issues if in for a long time  Only issues if in for a long time      Tegaderm Transparent Dressing (Informational Only) Rash       FMH:    Family History   Adopted: Yes   Problem Relation Age of Onset    Chronic Obstructive Pulmonary Disease Mother     Prostate Cancer Father     Cancer Father         prostate    Alcoholism Sister     Alcoholism Sister     Alcoholism Brother     Alcoholism Brother     Diabetes Paternal Grandmother     Other Cancer Paternal Grandmother     Alcoholism Daughter     Coronary Artery Disease No family hx of     Urolithiasis No family hx of     Clotting Disorder No family hx of     Gout No family hx of     Heart Disease  No family hx of     Anesthesia Reaction No family hx of        SocHx:    Social History     Socioeconomic History    Marital status: Single     Spouse name: Not on file    Number of children: Not on file    Years of education: Not on file    Highest education level: Not on file   Occupational History    Not on file   Tobacco Use    Smoking status: Never    Smokeless tobacco: Never   Vaping Use    Vaping Use: Never used   Substance and Sexual Activity    Alcohol use: No     Alcohol/week: 0.0 standard drinks of alcohol    Drug use: No    Sexual activity: Not on file   Other Topics Concern    Not on file   Social History Narrative    Not on file     Social Determinants of Health     Financial Resource Strain: Not on file   Food Insecurity: Not on file   Transportation Needs: Not on file   Physical Activity: Not on file   Stress: Not on file   Social Connections: Not on file   Intimate Partner Violence: Not At Risk (5/31/2022)    Humiliation, Afraid, Rape, and Kick questionnaire     Fear of Current or Ex-Partner: No     Emotionally Abused: No     Physically Abused: No     Sexually Abused: No   Housing Stability: Not on file           EXAMINATION:  Gen:   No apparent distress  Neuro:   A&Ox3, no deficits  Psych:    Answering questions appropriately for age and situation with normal affect  Head:    NCAT  Eye:    Visual scanning without deficit  Ear:    Response to auditory stimuli wnl  Lung:    Non-labored breathing on RA noted  Abd:    NTND per patient report  Lymph:    Mild bilateral lower extremity edema  Vasc:    Pulses palpable, CFT minimally delayed  Neuro:    Light touch sensation diminished with paresthesias bilateral lower extremity, most notably at the toes  Derm:    Neg for nodules, lesions or ulcerations  MSK:    Left lower extremity -she is painful at the plantar medial and plantar heel, and demonstrates pain along Baxters nerve and the Achilles insertion.  Right lower extremity -pain at the plantar medial  and plantar heel.  She also has pain along the central band of the plantar fascia and mild discomfort at the Achilles insertion.  Triple compression stress test and tibial nerve/branch assessment negative for pain or paresthesias.  Pes planus bilateral  Calf:    Neg for redness, swelling or tenderness    Assessment:  37 year old female with bilateral heel pain including plantar fasciitis, Achilles tendinitis and left Baxters neuritis; bilateral lower extremity numbness/paresthesias in setting of uncontrolled diabetes mellitus and lumbosacral spine pain; lower extremity swelling      Medical Decision Making/Plan:  Discussed etiologies, anatomy and options  1.  Bilateral heel pain including plantar fasciitis, Achilles tendinitis and left Baxters neuritis  -I personally reviewed and interpreted the patient's lower extremity history pertinent to today's visit, including imaging/labs, in preparation for initiating a treatment program.  -Regarding the heel pain, the Plantar Fasciitis handout was dispensed and discussed.  We talked about stretching, resting/activity modification, icing, NSAID/tylenol use as tolerated, inserts, supportive/comfortable shoes and minimizing shoeless walking.    -discussed Achilles, plantar fascial and hamstring stretches  -OTC insert information dispensed and discussed   -She was given a prescription for Mobic for severe pain    2.  Bilateral lower extremity numbness/paresthesias in setting of uncontrolled diabetes mellitus and lumbosacral spine pain  -I see no evidence of lower extremity nerve entrapment to explain the patient's symptoms  -We discussed uncontrolled diabetes mellitus and lumbosacral spine pathology as potential causes of pain  -We discussed the importance of tight glycemic control  -We discussed the options for spine and neurology referrals.  She will consider her options and call/MyChart for referrals    3.  Bilateral lower extremity swelling  -I advised knee-high 18 mmHg  compression stockings  -I advised elevating feet at/above hip level 30 minutes 3 times daily  -Consider custom compression stockings, lymphedema clinic referral    Follow up:  2 weeks for heel pain or sooner with acute issues      Patient's medical history was reviewed today      Lorenzo Carpenter DPM FACFAS FACFAOM  Podiatric Foot & Ankle Surgeon  [facility]  [phone]    Disclaimer: This note consists of symbols derived from keyboarding, dictation and/or voice recognition software. As a result, there may be errors in the script that have gone undetected. Please consider this when interpreting information found in this chart.

## 2023-08-17 NOTE — PATIENT INSTRUCTIONS
Thank you for choosing [facility] / [facility] Ankle Surgery!    DR. STATON'S CLINIC LOCATIONS:     [facility] (Friday) TRIAGE LINE: [phone] [address] [facility]  APPOINTMENTS: [phone]   [place] [address] RADIOLOGY: [phone]    PHYSICAL THERAPY: [phone]    SET UP SURGERY: [phone]   [place] (Mon-Tues AM-Thurs) BILLING QUESTIONS: [phone]   [address] [address] FAX: [phone]   [place] [address] [facility]: [phone]     You are seen today for multiple issues including bilateral lower extremity heel pain, lower extremity swelling and lower extremity numbness.     Bilateral heel pain: Your exams are consistent with plantar fasciitis bilateral, bilateral insertional Achilles tendinitis, and a left heel nerve impingement issue.  See below for treatment recommendations.  You are also given a prescription for Mobic, a once daily anti-inflammatory.  Follow-up in 2 weeks for recheck  2.  Lower extremity numbness and tingling: While this certainly could be attributed to your uncontrolled diabetes, your low back issues may be contributing as well.  Consider neurology and/or lumbar spine assessment.    3.  Lower extremity swelling: I recommend knee-high 18 mmHg compression stockings and elevating her feet at/above hip level 30 minutes 3 times daily.  We will consider lymphedema clinic referral if necessary.        PLANTAR FASCIITIS   What is plantar fasciitis?   Plantar fasciitis is often referred to as heel spurs or heel pain. Plantar fasciitis is a very common problem that affects people of all foot shapes, age, weight and activity level. Pain may be in the arch or on the weight-bearing surface of the heel. The pain maycome on without injury or identifiable cause. Pain is generally present when first getting out of bed in the morning or up from a seated break.   What causes plantar fasciitis?   The plantar fascia is a dense fibrous band of tissue  that stretches across the bottom surface of the foot. The fascia helps support the foot muscles and arch. Plantar fasciitis is thought to be caused by mechanical strain or overload. Frequent walking without shoes or wearing unsupportive shoes is thought to cause structural overload and ultimately inflammation of the plantar fascia. Some people have heel spurs that can be seen on x-ray. The heel spur is actually a minor component of plantar fascitis and is largely ignored.   How long will this last?   Plantar fasciitis can last from one day to a lifetime. Some people get intermittent fascitis that is very short-lived. Others suffer daily for years. Excessive body weight, frequent bare foot walking, long hours on the feet, inadequate shoes, predisposing foot structures and excessive activity such as running are all potential issues that lead to chronic and/or recurring plantar fascitis. Having plantar fasciitis means that you are forever prone to this problem and will require modification of some of the above factors. Most people seek treatment within one to four months. Healing usually requires a similar one to four month time frame. Healing time is relative to the amount of effort spent treating the problem.   What can I do?   The easiest solution is to stop walking around your home without shoes. Plantar fasciitis is largely a shoe problem. Shoes are either not being worn often enough or your current shoes are inadequate for your weight, foot structure or activity level. The majority of shoes on the market today are not sufficient to resist development of plantar fasciitis or to promote healing. Assume that your current shoes are inadequate and will need to be replaced. Even high quality shoes wear out with 6 months to one year of frequent use. Weightloss is another option. Losing ten pounds in the next two months may be enough to resolve the problem. Ice applied to the area of pain two to three times per day for  ten minutes each session can be very helpful. This should continue until the problem resolves. Achilles tendon stretching is essential. Stretch multiple times daily to promote healing and to prevent recurrence in the future.     What if this does not help?   Medical treatments often include custom arch supports, cortisone injections, physical therapy, splints to be worn in bed, prescription medications and surgery. The home treatments listed above will be necessary regardless of these advanced medical treatments. Surgery is rarely needed but is very helpful in selected cases.     Heel pain in my future?   Plantar fasciitis is highly recurrent. Risk factors often continue, including return to bare foot walking, inadequate shoes, excessive body weight, excessive activities, etc. Your life style and foot structure may predispose you to recurrent plantar fasciitis. A daily prevention regimen can be very helpful. Ongoing use of shoe inserts, careful attention to appropriate shoes, daily Achilles stretching, etc. may prevent recurrence. Prompt attention at the earliest warning signs of heel pain can resolve the problem in as short as a few days.   Below are some exercises for Plantar fasciitis:  Stair exercise  You can step on the stairs with the ball of your foot and hold your position for at least 15 seconds, then slowly step down with the heels of your foot. You can do this daily and as often as you want. Plantar fasciitis exercise equipment and handout materials are useful in relieving pain.  Picking the towel  Plantar fasciitis exercise equipment and handout teach you how to exercise by picking the towel. You can sit comfortably and then pick the towel with your toes. Do this at least 10 to 20 times regularly. You can use any object other than a towel as long as the material can be soft and you can pick it up with your toes.  Rolling the bottle or ball  You can get a small ball or bottle and then roll it with your  foot. Do this daily for at least 15 to 20 times. Plantar fasciitis exercise equipment and handout are very useful in treating the symptoms of the foot condition.  Stretching the calf  You could lie supine, raise one foot, and then point your toes towards the floor. Do this daily for at least 15 to 20 times. The calf is connected to the heel and the balls of the foot, so you should also exercise this also. Plantar fasciitis exercise equipment and handout usually mentions the importance of exercising the calf also.  Flex the toes  Sit comfortably and then flex your toes by pointing it towards the floor or towards your body. This will relax and flex your foot and exercise your plantar fascia, the calf, and the Achilles tendon. The inability of the foot to stretch often causes the bunching up of the plantar fascia area leading to the pain.  Massaging the calf and the plantar fascia also helps a lot in alleviating the pain and preventing its recurrence. If you prefer standard plantar fasciitis exercise equipment and handout, then you can avail of this from legitimate sources. You can use the standard stretching device, the wheel, and the belt. These are all significant devices in treating the pain in the plantar fascia.    Therapies covered by Dr Carpenter today:    1.  Supportive shoes, minimizing barefoot ambulation - helps to provide cushion, padding and support to the ligament that is inflamed.  Socks, flip flops, flats and some slippers are not typically sufficient to provide support.  Shoes should be worn even in-doors  2.  Insert/orthotics - inserts/orthotics that have an arch support built in to them provide further stress relief for the ligament.  3. Icing - using a frozen water bottle or orange, and rolling it along the bottom of the arch/heel can help to alleviate discomfort, and can act as a tissue massage to the painful, inflamed ligament.  There is evidence that shows icing at least three times daily can  "be beneficial  4.  Anti-inflammatory(NSAIDS)/Tylenol - anti-inflammatories, such as ibuprofen or Aleve, as well as Tylenol can be used to help decrease symptoms and improve pain levels.  If you have high blood pressure, heart disease, stomach or kidney problems, use anti-inflammatories sparingly.  Tylenol should not be used if you have liver problems.  If you can safely taken them, you can use NSAIDS and tylenol in combination for pain relief  5. Activity modifications - if there are certain things that you do, whether it's going barefoot or certain shoes/activities, you should try to minimize those activities as much as possible until your symptoms are sufficiently resolved.  Certainly, some activities, such as running on the treadmill, are easier to take a break from versus others, such as work or chores at home.  \"If there are certain activities that hurt your heel, and you keep doing those activities that hurt your heel, your heel will keep hurting\".    6.  Stretching - Stretching your Achilles and hamstring can help to decrease stress on the plantar fascia.                   Hold each stretch for 10 seconds.  Stretch 10 times per set, three sets per day.  Morning, afternoon and evening.  If your heel pain is very severe in the morning, consider doing the first set of stretches before you get out of bed.            If these initial therapies are insufficient, we have our tier 2 therapies that can more aggressively work to improve your symptoms and get you back to the activities that you enjoy.      Over the Counter Inserts      Super Feet are the most common and easiest to find.  Locations include any [employer], [employer] in [place] on [address] and in [place] on [address], [facility] in [facility] on [address], [facility] Running Room in [facility], [facility] Running Room in [facility], [employer] in [place] on [address] and [employer] in [facility] on [address] and in [place] on [address].    Spenco can be found online and at [employer] in [facility] on [address], [employer] in [facility] on [address], [employer] in [place] on [address], [employer] in [place] on [address] and [employer] in [place] on [address].    Power Step can be a little harder to find.  Locations include [facility] on [address] in [place], [employer] in [place] on [address], [employer] in [facility], [employer] in [place] on [address] and online    **  A good high quality over the counter insert can cost around $30-$40.

## 2023-08-17 NOTE — Clinical Note
"    8/17/2023         RE: Ruth Vanegas  [address]  [place] [address]        Dear Colleague,    Thank you for referring your patient, Ruth Vanegas, to the [facility]. Please see a copy of my visit note below.    Foot & Ankle Surgery  August 17, 2023    CC: \"Pain in feet\"    I was asked to see Ruth GYPSY Romina regarding the chief complaint by:  Dr. Banks    HPI:  Pt is a 37 year old female who presents with above complaint.  Bilateral lower extremity issues for 3 to 4 weeks.  She describes throbbing, tingling and shooting pain.  Pain 9 out of 10, comes and goes, worse with weightbearing and rest.  \"It all depends\".  She describes morning pain and post static dyskinesia in the heels, both plantarly and posteriorly, as well as mild arch pain.  The patient is diabetic and does have \"numbness, tingling in feet\".  The symptoms are \"constant\".  \"Toes for sure\" her symptoms are present, both dorsally and plantarly, but she states that when her toes feel numb, her feet feel numb as well.  \"I get lower back pain\" but states this is not as bad as it has been in the past.  She also complains about bilateral lower extremity swelling.  She states she has been on gabapentin    ROS:   Pos for CC.  The patient denies current nausea, vomiting, chills, fevers, belly pain, calf pain, chest pain or SOB.  Complete remainder of ROS is otherwise neg.    VITALS:    Vitals:    08/17/23 0736   BP: 130/86   Weight: 133.4 kg (294 lb)   Height: 1.676 m (5' 6\")       PMH:    Past Medical History:   Diagnosis Date    Anemia     told to take iron pills when pregnant    Depression     Depressive disorder     Diabetes mellitus (H)     Diabetes mellitus, type 2 (H) 07/15/2021    Dysthymic disorder     Endometriosis     Herpes genitalia     Hyperlipidemia     Kidney stone     Menorrhagia     Migraines     Neuropathy     Other abnormal Papanicolaou smear of cervix and cervical HPV(795.09) " 2013    PCOS (polycystic ovarian syndrome)     Post traumatic stress disorder (PTSD)        SXHX:    Past Surgical History:   Procedure Laterality Date    BLADDER REPAIR W/  SECTION      X2    C/SECTION, CLASSICAL      x2    COLONOSCOPY N/A 2021    Procedure: COLONOSCOPY;  Surgeon: Rene Lehman DO;  Location:  GI    DILATION AND CURETTAGE  2015    DILATION AND CURETTAGE  2015    DILATION AND CURETTAGE, OPERATIVE HYSTEROSCOPY, COMBINED N/A 2015    Procedure: Dilation and Curettage with Hysteroscopy;  Surgeon: Zia Farmer MD;  Location: [facility];  Service:     DILATION AND CURETTAGE, OPERATIVE HYSTEROSCOPY, COMBINED N/A 2016    Procedure: DILATION AND CURETTAGE WITH HYSTEROSCOPY INSERT MIRENA;  Surgeon: Suzanne Major MD;  Location: [facility];  Service:     ENT SURGERY      ESOPHAGOSCOPY, GASTROSCOPY, DUODENOSCOPY (EGD), COMBINED N/A 2021    Procedure: ESOPHAGOGASTRODUODENOSCOPY, WITH BIOPSY;  Surgeon: Rene Lehman DO;  Location:  GI    GYN SURGERY  10/19/15    laproscopic lysis of adhesions    HYSTEROSCOPY, ABLATE ENDOMETRIUM HYDROTHERMAL, COMBINED N/A 3/2/2018    Procedure: HYSTEROSCOPY, DILATION AND CURETTAGE, ENDOMETRIAL ABLATION;  Surgeon: Kristy Israel DO;  Location: [facility];  Service: Gynecology    LAPAROSCOPIC HYSTERECTOMY TOTAL N/A 3/4/2019    Procedure: ROBOTIC TOTAL LAPAROSCOPIC HYSTERECTOMY, BILATERAL SALPINGECTOMY, LEFT OVARIAN CYSTOTOMY. CYSTOSCOPY;  Surgeon: Zia Farmer MD;  Location: [facility] OR;  Service: Gynecology    LAPAROSCOPY DIAGNOSTIC (GENERAL) N/A 10/19/2015    Procedure:  LAPAROSCOPY,LYSIS OF ADHESIONS;  Surgeon: Zia Farmer MD;  Location: [facility] OR;  Service:     IN LAP,TUBAL CAUTERY Bilateral 3/2/2018    Procedure: LAPAROSCOPIC BILATERAL TUBAL LIGATION;  Surgeon: Kristy Israel DO;  Location: [facility];  Service: Gynecology    TONSILLECTOMY          MEDS:     Current Outpatient Medications   Medication    acetaminophen (TYLENOL) 500 MG tablet    atorvastatin (LIPITOR) 10 MG tablet    canagliflozin (INVOKANA) 300 MG tablet    Continuous Blood Gluc Sensor (FREESTYLE CINDY 14 DAY SENSOR) MISC    Continuous Blood Gluc Sensor (FREESTYLE CINDY 2 SENSOR) MISC    diazepam (VALIUM) 5 MG tablet    fluconazole (DIFLUCAN) 150 MG tablet    gabapentin (NEURONTIN) 300 MG capsule    ibuprofen (ADVIL/MOTRIN) 600 MG tablet    insulin glargine (LANTUS PEN) 100 UNIT/ML pen    insulin  UNIT/ML injection    insulin pen needle (ULTICARE MICRO) 32G X 4 MM miscellaneous    Melatonin 10 MG CAPS    metFORMIN (GLUCOPHAGE XR) 500 MG 24 hr tablet    multivitamin (ONE-DAILY) tablet    ondansetron (ZOFRAN) 4 MG tablet    protriptyline (VIVACTIL) 10 MG tablet    rizatriptan (MAXALT) 10 MG tablet    tolterodine ER (DETROL LA) 4 MG 24 hr capsule    topiramate (TOPAMAX) 100 MG tablet    verapamil ER (VERELAN) 120 MG 24 hr capsule    vitamin D3 (CHOLECALCIFEROL) 125 MCG (5000 UT) tablet     Current Facility-Administered Medications   Medication    Botulinum Toxin Type A (BOTOX) 200 units injection 155 Units    Botulinum Toxin Type A (BOTOX) 200 units injection 200 Units       ALL:     Allergies   Allergen Reactions    Hydrocodone Other (See Comments)     Headache per pt and hallucinations  Headache per pt and hallucinations      Reglan [Metoclopramide]      Anxiety    Vicodin [Hydrocodone-Acetaminophen] Other (See Comments)     Hallucinations    Silver Nitrate Itching and Rash     Only issues if in for a long time  Only issues if in for a long time  Only issues if in for a long time      Tegaderm Transparent Dressing (Informational Only) Rash       FMH:    Family History   Adopted: Yes   Problem Relation Age of Onset    Chronic Obstructive Pulmonary Disease Mother     Prostate Cancer Father     Cancer Father         prostate    Alcoholism Sister     Alcoholism Sister     Alcoholism Brother      Alcoholism Brother     Diabetes Paternal Grandmother     Other Cancer Paternal Grandmother     Alcoholism Daughter     Coronary Artery Disease No family hx of     Urolithiasis No family hx of     Clotting Disorder No family hx of     Gout No family hx of     Heart Disease No family hx of     Anesthesia Reaction No family hx of        SocHx:    Social History     Socioeconomic History    Marital status: Single     Spouse name: Not on file    Number of children: Not on file    Years of education: Not on file    Highest education level: Not on file   Occupational History    Not on file   Tobacco Use    Smoking status: Never    Smokeless tobacco: Never   Vaping Use    Vaping Use: Never used   Substance and Sexual Activity    Alcohol use: No     Alcohol/week: 0.0 standard drinks of alcohol    Drug use: No    Sexual activity: Not on file   Other Topics Concern    Not on file   Social History Narrative    Not on file     Social Determinants of Health     Financial Resource Strain: Not on file   Food Insecurity: Not on file   Transportation Needs: Not on file   Physical Activity: Not on file   Stress: Not on file   Social Connections: Not on file   Intimate Partner Violence: Not At Risk (5/31/2022)    Humiliation, Afraid, Rape, and Kick questionnaire     Fear of Current or Ex-Partner: No     Emotionally Abused: No     Physically Abused: No     Sexually Abused: No   Housing Stability: Not on file           EXAMINATION:  Gen:   No apparent distress  Neuro:   A&Ox3, no deficits  Psych:    Answering questions appropriately for age and situation with normal affect  Head:    NCAT  Eye:    Visual scanning without deficit  Ear:    Response to auditory stimuli wnl  Lung:    Non-labored breathing on RA noted  Abd:    NTND per patient report  Lymph:    Mild bilateral lower extremity edema  Vasc:    Pulses palpable, CFT minimally delayed  Neuro:    Light touch sensation diminished with paresthesias bilateral lower extremity, most  notably at the toes  Derm:    Neg for nodules, lesions or ulcerations  MSK:    Left lower extremity -she is painful at the plantar medial and plantar heel, and demonstrates pain along Baxters nerve and the Achilles insertion.  Right lower extremity -pain at the plantar medial and plantar heel.  She also has pain along the central band of the plantar fascia and mild discomfort at the Achilles insertion.  Triple compression stress test and tibial nerve/branch assessment negative for pain or paresthesias.  Pes planus bilateral  Calf:    Neg for redness, swelling or tenderness    Assessment:  37 year old female with bilateral heel pain including plantar fasciitis, Achilles tendinitis and left Baxters neuritis; bilateral lower extremity numbness/paresthesias in setting of uncontrolled diabetes mellitus and lumbosacral spine pain; lower extremity swelling      Medical Decision Making/Plan:  Discussed etiologies, anatomy and options  1.  Bilateral heel pain including plantar fasciitis, Achilles tendinitis and left Baxters neuritis  -I personally reviewed and interpreted the patient's lower extremity history pertinent to today's visit, including imaging/labs, in preparation for initiating a treatment program.  -Regarding the heel pain, the Plantar Fasciitis handout was dispensed and discussed.  We talked about stretching, resting/activity modification, icing, NSAID/tylenol use as tolerated, inserts, supportive/comfortable shoes and minimizing shoeless walking.    -discussed Achilles, plantar fascial and hamstring stretches  -OTC insert information dispensed and discussed   -She was given a prescription for Mobic for severe pain    2.  Bilateral lower extremity numbness/paresthesias in setting of uncontrolled diabetes mellitus and lumbosacral spine pain  -I see no evidence of lower extremity nerve entrapment to explain the patient's symptoms  -We discussed uncontrolled diabetes mellitus and lumbosacral spine pathology as  potential causes of pain  -We discussed the importance of tight glycemic control  -We discussed the options for spine and neurology referrals.  She will consider her options and call/MyChart for referrals    3.  Bilateral lower extremity swelling  -I advised knee-high 18 mmHg compression stockings  -I advised elevating feet at/above hip level 30 minutes 3 times daily  -Consider custom compression stockings, lymphedema clinic referral    Follow up:  2 weeks for heel pain or sooner with acute issues      Patient's medical history was reviewed today      Lorenzo Carpenter DPM FACFAS FACFAOM  Podiatric Foot & Ankle Surgeon  [facility]  [phone]    Disclaimer: This note consists of symbols derived from keyboarding, dictation and/or voice recognition software. As a result, there may be errors in the script that have gone undetected. Please consider this when interpreting information found in this chart.            Again, thank you for allowing me to participate in the care of your patient.        Sincerely,        Lorenzo Carpenter DPM, RENA

## 2023-08-18 ENCOUNTER — MYC MEDICAL ADVICE (OUTPATIENT)
Dept: FAMILY MEDICINE | Facility: CLINIC | Age: 38
End: 2023-08-18
Payer: COMMERCIAL

## 2023-08-18 ENCOUNTER — TELEPHONE (OUTPATIENT)
Dept: FAMILY MEDICINE | Facility: CLINIC | Age: 38
End: 2023-08-18

## 2023-08-18 NOTE — LETTER
August 25, 2023      Ruth Vanegas  [address]   [place] [address]        To Whom It May Concern:    Ruth Vanegas was seen in our clinic.   This is regarding an appeal for use of Semaglutide subcutaneous pen for the treatment of uncontrolled Type II Diabetes.   She has tried multiple medications including Bydureon and Victoza. Side affects included nausea, vomiting and headache. It would be beneficial for Ruth to have a trial of the use of Semaglutide.     Sincerely,        Milton Banks MD

## 2023-08-18 NOTE — LETTER
8/18/2023        RE: Ruth Vanegas  [address]  [place] [address]      August 25, 2023      Ruth Vanegas  [address]   [place] [address]        To Whom It May Concern:    Ruth Vanegas was seen in our clinic.   This is regarding an appeal for use of Semaglutide subcutaneous pen for the treatment of uncontrolled Type II Diabetes.   She has tried multiple medications including Bydureon and Victoza. Side affects included nausea, vomiting and headache. It would be beneficial for Ruth to have a trial of the use of Semaglutide.     Sincerely,      Sincerely,        Milton Banks MD

## 2023-08-18 NOTE — TELEPHONE ENCOUNTER
PRIOR AUTHORIZATION DENIED    Medication: SEMAGLUTIDE(0.25 OR 0.5MG/DOS) 2 [facility]  Insurance Company: [employer] - Phone [phone] Fax [phone]  Denial Date: 8/17/2023  Denial Rational:     Appeal Information:     Patient Notified: No

## 2023-08-23 DIAGNOSIS — G43.719 INTRACTABLE CHRONIC MIGRAINE WITHOUT AURA AND WITHOUT STATUS MIGRAINOSUS: ICD-10-CM

## 2023-08-23 DIAGNOSIS — E11.9 DIABETES MELLITUS, TYPE 2 (H): Primary | ICD-10-CM

## 2023-08-23 RX ORDER — PROTRIPTYLINE HYDROCHLORIDE 10 MG/1
10 TABLET, FILM COATED ORAL DAILY
Qty: 90 TABLET | Refills: 1 | Status: SHIPPED | OUTPATIENT
Start: 2023-08-23 | End: 2023-10-04

## 2023-08-23 RX ORDER — METFORMIN HCL 500 MG
TABLET, EXTENDED RELEASE 24 HR ORAL
Qty: 360 TABLET | Refills: 0 | Status: SHIPPED | OUTPATIENT
Start: 2023-08-23 | End: 2024-04-11

## 2023-08-23 NOTE — TELEPHONE ENCOUNTER
"Last Written Prescription Date:  5/11/23  Last Fill Quantity: 360,  # refills: 0   Last office visit provider:  8/11/23 w/ Dr Banks     Requested Prescriptions   Pending Prescriptions Disp Refills    metFORMIN (GLUCOPHAGE XR) 500 MG 24 hr tablet [Pharmacy Med Name: METFORMIN ER 500MG 24HR TABS] 360 tablet 0     Sig: TAKE 2 TABLETS(1000 MG) BY MOUTH TWICE DAILY WITH MEALS       Biguanide Agents Passed - 8/23/2023  5:36 AM        Passed - Patient is age 10 or older        Passed - Patient has documented A1c within the specified period of time.     If HgbA1C is 8 or greater, it needs to be on file within the past 3 months.  If less than 8, must be on file within the past 6 months.     Recent Labs   Lab Test 08/11/23  1251 02/28/22  1720 10/28/21  0900   A1C 9.5*   < >  --    HEMOGLOBINA1  --   --  7.9    < > = values in this interval not displayed.             Passed - Patient's CR is NOT>1.4 OR Patient's EGFR is NOT<45 within past 12 mos.     Recent Labs   Lab Test 08/11/23  1251 02/28/22  1720 05/05/21  0745   GFRESTIMATED >90   < > >60   GFRESTBLACK  --   --  >60    < > = values in this interval not displayed.       Recent Labs   Lab Test 08/11/23  1251   CR 0.42*             Passed - Patient does NOT have a diagnosis of CHF.        Passed - Medication is active on med list        Passed - Patient is not pregnant        Passed - Patient has not had a positive pregnancy test within the past 12 mos.         Passed - Recent (6 mo) or future (30 days) visit within the authorizing provider's specialty     Patient had office visit in the last 6 months or has a visit in the next 30 days with authorizing provider or within the authorizing provider's specialty.  See \"Patient Info\" tab in inbasket, or \"Choose Columns\" in Meds & Orders section of the refill encounter.                 Silvia Andre RN 08/23/23 12:33 PM  "

## 2023-08-23 NOTE — TELEPHONE ENCOUNTER
Refill request for: Protriptyline    Directions: Take 1 tablet daily     LOV: 3/21/23  NOV: 10/4/23    90 day supply with 1 refills Medication T'd for review and signature

## 2023-08-23 NOTE — TELEPHONE ENCOUNTER
Do you think we can appeal this? Has tried several other medications with adverse side affects if you recall.

## 2023-08-25 NOTE — TELEPHONE ENCOUNTER
Message pended  / postponed till next week per Dr Banks.    Danisha Feliz RN  [facility]      Dr Banks  Appeal letter drafted and sent to [facility]. Can we check on this next week?       Randall MAHMOOD    I don't think previous trials and failures were included in the PA.    From chart review, she has tried and failed both Bydureon and Victoza due to adverse effects. An appeal letter stating this should be enough to get it approved.

## 2023-08-25 NOTE — TELEPHONE ENCOUNTER
Appeal letter drafted and sent to [facility]. Can we check on this next week?   hysterectomy--2012/not applicable

## 2023-08-31 NOTE — TELEPHONE ENCOUNTER
Appeals letter done on 8/25/23 by Dr Banks.  Will route  back to prior authorization pool in TE  dated 8/18/23.  Will await response. Left message on patient's voicemail of plan.      Danisha Feliz RN  [facility]

## 2023-08-31 NOTE — TELEPHONE ENCOUNTER
Appeals letter done on 8/25/23 by Dr Banks.  Will route  back to prior authorization pool in TE  dated 8/18/23.  Will await response. Left message on patient's voicemail of plan.      Danisha Feliz RN  [facility]

## 2023-08-31 NOTE — TELEPHONE ENCOUNTER
Medication Appeal Initiation    We have initiated an appeal for the requested medication:  Medication: SEMAGLUTIDE(0.25 OR 0.5MG/DOS) 2 MG/3ML SC SOPN  Appeal Start Date:  8/31/2023  Insurance Company: [employer] - Phone [phone] Fax [phone]   Insurance Phone:   Insurance Fax: [phone]  Comments:

## 2023-09-11 ENCOUNTER — OFFICE VISIT (OUTPATIENT)
Dept: PODIATRY | Facility: CLINIC | Age: 38
End: 2023-09-11
Payer: COMMERCIAL

## 2023-09-11 VITALS
HEIGHT: 66 IN | SYSTOLIC BLOOD PRESSURE: 130 MMHG | DIASTOLIC BLOOD PRESSURE: 86 MMHG | BODY MASS INDEX: 47.09 KG/M2 | WEIGHT: 293 LBS

## 2023-09-11 DIAGNOSIS — M72.2 PLANTAR FASCIITIS, RIGHT: ICD-10-CM

## 2023-09-11 DIAGNOSIS — M76.62 TENDONITIS, ACHILLES, LEFT: Primary | ICD-10-CM

## 2023-09-11 PROCEDURE — 99213 OFFICE O/P EST LOW 20 MIN: CPT | Performed by: PODIATRIST

## 2023-09-11 RX ORDER — DEXAMETHASONE SODIUM PHOSPHATE 4 MG/ML
INJECTION, SOLUTION INTRA-ARTICULAR; INTRALESIONAL; INTRAMUSCULAR; INTRAVENOUS; SOFT TISSUE
Qty: 30 ML | Refills: 0 | Status: SHIPPED | OUTPATIENT
Start: 2023-09-11

## 2023-09-11 ASSESSMENT — PAIN SCALES - GENERAL: PAINLEVEL: SEVERE PAIN (7)

## 2023-09-11 NOTE — PROGRESS NOTES
"Foot & Ankle Surgery   September 11, 2023    S:  Pt is seen today for evaluation of bilateral lower extremity pain.  She was previously diagnosed with left heel plantar fasciitis, Baxters neuritis and Achilles tendinitis and right foot plantar fasciitis.  Home therapies have been attempted but the patient has failed to show sufficient improvement.    Vitals:    09/11/23 1124   BP: 130/86   Weight: 133.4 kg (294 lb)   Height: 1.676 m (5' 6\")   '      ROS - Pos for CC.  Patient denies current nausea, vomiting, chills, fevers, belly pain, calf pain, chest pain or SOB.  Complete remainder of ROS it otherwise neg.      PE:  Gen:   No apparent distress  Eye:    Visual scanning without deficit  Ear:    Response to auditory stimuli wnl  Lung:    Non-labored breathing on RA noted  Abd:    NTND per patient report  Lymph:    Neg for pitting/non-pitting edema BLE  Vasc:    Pulses palpable, CFT minimally delayed  Neuro:    Light touch sensation intact to all sensory nerve distributions without paresthesias  Derm:    Neg for nodules, lesions or ulcerations  MSK:   Left lower extremity -she is tender at the Achilles insertion as well as along Baxters nerve but no plantar medial or plantar heel pain is seen.  Right lower extremity -she is tender at the plantar medial and plantar heel.  Baxters nerve  Calf:    Neg for redness, swelling or tenderness      Assessment:  37 year old female with bilateral heel pain including left Achilles tendinitis and Baxters neuritis and right plantar fasciitis      Medical Decision Making/Plan:  Discussed etiologies, anatomy and options  1.  Bilateral heel pain including left Achilles tendinitis and Baxters neuritis and right plantar fasciitis  -Continue all home therapies including shoes, inserts, stretching and RICE/NSAID versus Tylenol  -LLOYD PT referral for musculoskeletal functional rehab  -Consider orthotics, boot, injections, advanced imaging    Follow up: After PT or sooner with acute " issues           Lorenzo Carpenter DPM FACFAS FACFAOM  Podiatric Foot & Ankle Surgeon  [facility] ([place], [place])  [phone]    Disclaimer: This note consists of symbols derived from keyboarding, dictation and/or voice recognition software. As a result, there may be errors in the script that have gone undetected. Please consider this when interpreting information found in this chart.

## 2023-09-11 NOTE — LETTER
"    9/11/2023         RE: Ruth Vanegas  [address]  [place] [address]        Dear Colleague,    Thank you for referring your patient, Ruth Vanegas, to the [facility]. Please see a copy of my visit note below.    Foot & Ankle Surgery   September 11, 2023    S:  Pt is seen today for evaluation of bilateral lower extremity pain.  She was previously diagnosed with left heel plantar fasciitis, Baxters neuritis and Achilles tendinitis and right foot plantar fasciitis.  Home therapies have been attempted but the patient has failed to show sufficient improvement.    Vitals:    09/11/23 1124   BP: 130/86   Weight: 133.4 kg (294 lb)   Height: 1.676 m (5' 6\")   '      ROS - Pos for CC.  Patient denies current nausea, vomiting, chills, fevers, belly pain, calf pain, chest pain or SOB.  Complete remainder of ROS it otherwise neg.      PE:  Gen:   No apparent distress  Eye:    Visual scanning without deficit  Ear:    Response to auditory stimuli wnl  Lung:    Non-labored breathing on RA noted  Abd:    NTND per patient report  Lymph:    Neg for pitting/non-pitting edema BLE  Vasc:    Pulses palpable, CFT minimally delayed  Neuro:    Light touch sensation intact to all sensory nerve distributions without paresthesias  Derm:    Neg for nodules, lesions or ulcerations  MSK:   Left lower extremity -she is tender at the Achilles insertion as well as along Baxters nerve but no plantar medial or plantar heel pain is seen.  Right lower extremity -she is tender at the plantar medial and plantar heel.  Baxters nerve  Calf:    Neg for redness, swelling or tenderness      Assessment:  37 year old female with bilateral heel pain including left Achilles tendinitis and Baxters neuritis and right plantar fasciitis      Medical Decision Making/Plan:  Discussed etiologies, anatomy and options  1.  Bilateral heel pain including left Achilles tendinitis and Baxters neuritis and right plantar " fasciitis  -Continue all home therapies including shoes, inserts, stretching and RICE/NSAID versus Tylenol  -LLOYD PT referral for musculoskeletal functional rehab  -Consider orthotics, boot, injections, advanced imaging    Follow up: After PT or sooner with acute issues           Lorenzo Carpenter DPM FACFAS FACFAOM  Podiatric Foot & Ankle Surgeon  [facility]  [phone]    Disclaimer: This note consists of symbols derived from keyboarding, dictation and/or voice recognition software. As a result, there may be errors in the script that have gone undetected. Please consider this when interpreting information found in this chart.        Again, thank you for allowing me to participate in the care of your patient.        Sincerely,        Lorenzo Carpenter DPM, RENA

## 2023-10-04 ENCOUNTER — OFFICE VISIT (OUTPATIENT)
Dept: NEUROLOGY | Facility: CLINIC | Age: 38
End: 2023-10-04
Payer: COMMERCIAL

## 2023-10-04 VITALS
RESPIRATION RATE: 18 BRPM | BODY MASS INDEX: 47.45 KG/M2 | HEART RATE: 88 BPM | DIASTOLIC BLOOD PRESSURE: 92 MMHG | WEIGHT: 293 LBS | SYSTOLIC BLOOD PRESSURE: 134 MMHG

## 2023-10-04 DIAGNOSIS — F43.10 PTSD (POST-TRAUMATIC STRESS DISORDER): ICD-10-CM

## 2023-10-04 DIAGNOSIS — F41.9 ANXIETY: ICD-10-CM

## 2023-10-04 DIAGNOSIS — E66.813 CLASS 3 SEVERE OBESITY DUE TO EXCESS CALORIES WITH SERIOUS COMORBIDITY AND BODY MASS INDEX (BMI) OF 45.0 TO 49.9 IN ADULT (H): ICD-10-CM

## 2023-10-04 DIAGNOSIS — F51.01 PRIMARY INSOMNIA: ICD-10-CM

## 2023-10-04 DIAGNOSIS — E66.01 CLASS 3 SEVERE OBESITY DUE TO EXCESS CALORIES WITH SERIOUS COMORBIDITY AND BODY MASS INDEX (BMI) OF 45.0 TO 49.9 IN ADULT (H): ICD-10-CM

## 2023-10-04 DIAGNOSIS — G43.719 INTRACTABLE CHRONIC MIGRAINE WITHOUT AURA AND WITHOUT STATUS MIGRAINOSUS: Primary | ICD-10-CM

## 2023-10-04 DIAGNOSIS — Z76.0 ENCOUNTER FOR MEDICATION REFILL: ICD-10-CM

## 2023-10-04 DIAGNOSIS — G47.00 INSOMNIA, UNSPECIFIED TYPE: ICD-10-CM

## 2023-10-04 DIAGNOSIS — E11.9 DIABETES MELLITUS, TYPE 2 (H): ICD-10-CM

## 2023-10-04 PROCEDURE — 99214 OFFICE O/P EST MOD 30 MIN: CPT | Performed by: PSYCHIATRY & NEUROLOGY

## 2023-10-04 RX ORDER — RIZATRIPTAN BENZOATE 10 MG/1
10 TABLET ORAL
Qty: 18 TABLET | Refills: 4 | Status: SHIPPED | OUTPATIENT
Start: 2023-10-04 | End: 2024-02-05

## 2023-10-04 RX ORDER — VERAPAMIL HYDROCHLORIDE 120 MG/1
120 CAPSULE, EXTENDED RELEASE ORAL AT BEDTIME
Qty: 90 CAPSULE | Refills: 1 | Status: SHIPPED | OUTPATIENT
Start: 2023-10-04 | End: 2024-02-05

## 2023-10-04 RX ORDER — TOPIRAMATE 100 MG/1
100 TABLET, FILM COATED ORAL 2 TIMES DAILY
Qty: 180 TABLET | Refills: 1 | Status: SHIPPED | OUTPATIENT
Start: 2023-10-04 | End: 2024-02-05

## 2023-10-04 RX ORDER — FLASH GLUCOSE SENSOR
KIT MISCELLANEOUS
OUTPATIENT
Start: 2023-10-04

## 2023-10-04 NOTE — LETTER
10/4/2023         RE: Ruth Vanegas  [address]  [place] [address]        Dear Colleague,    Thank you for referring your patient, Ruth Vanegas, to the [facility] [place]. Please see a copy of my visit note below.    NEUROLOGY OUTPATIENT PROGRESS NOTE   Oct 4, 2023     CHIEF COMPLAINT/REASON FOR VISIT/REASON FOR CONSULT  Patient presents with:  Headache    REASON FOR CONSULTATION- Headaches    REFERRAL SOURCE  Dr. Rosangela Haynes  CC Dr. Rosangela Haynes    HISTORY OF PRESENT ILLNESS  Ruth Vanegas is a 37 year old female seen today for evaluation of headaches.  She reports that she has had headaches for years these would come and go.  Over the last few months these have become more constant and lasting longer.  She reports that she has 15 headache days a month.  Both temples are involved.  Headaches are throbbing in nature with photophobia and phonophobia.  She denies any triggers for her headaches.    She is tried sumatriptan in the past which made things worse.  She has tried Excedrin but she has to catch the headaches soon enough to make it work.  She is currently on nortriptyline 50 mg at night which is not helping.  The nortriptyline was also being used as a sleep aid.  She is also on Topamax which is not helping.  She is taking 50 mg in the evening and 25 mg in the morning.  She further complains of vertigo which is sometimes with the headache.  She does have issues with uncontrolled sugars and feels that the Topamax might be helpful.  Denies any visual auras.    12/21/21  Patient returns today.  She reports that the headaches have become more frequent and she is still having them every day.  Is taking 100 mg twice daily of the Topamax with no side effects.  50 mg of nortriptyline at night and is not sleeping well with this dose.  Is interested in trying a higher medication.  Is taking Maxalt which is working though has to use it almost every day.  Is  working on losing weight.  Reports that her diabetes has been under good control.  Has not done the blood work that had ordered.    8/15/22  Patient returns today.  Headaches have become much more frequent and she is still having them every day.  Her Topamax was increased to 100 mg twice a day endocrinology and she feels no side effects with no benefit as well.  Remains on 50 mg of nortriptyline at night for sleep.  She is also taking melatonin for sleep.  Verapamil was added previously which did not really help.  Diabetes has been under good control.  Maxalt does work occasionally but not all the time.  Blood work has been done which was negative.  Reports no other new symptoms.    9/20/22  Patient returns today.  She reports that the headaches have gotten worse since she was last seen.  She is having more severe headaches almost every day.  Remains on Topamax and verapamil.  She was on nortriptyline and this has been stopped.  She reports that this was stopped several months ago when she was not on it in August.  Maxalt does work but the benefit does not last the full time.  She did start the Ajovy which might have made the headaches worse.  Was discussed through phone messages about starting her on Botox and she wants to do that today.  For the insomnia she is seeing her GI doctor who is prescribing some medication that would help with her GI symptoms as well as with the insomnia.  She has not really started this medication at this point.  No other new issues/concerns.  No other new triggers    12/20/22  Patient returns today.  With the Botox she did have worsening headaches the first 2 weeks.  Headaches then improved for 2 months in the last 2 weeks she is been having headaches again.  Headaches unchanged in nature.  Remains on Topamax and verapamil.  Maxalt does help with abortive therapy.  Remains on melatonin for insomnia.  No other new issues.  Does complain of some eyebrow side effects with the Botox.  No  other new concerns.    2/17/23  Patient returns today.  She had Botox about 2 months ago.  The first Botox had provided significant benefit though she feels no benefit from this Botox.  Headaches are there almost every day.  Still has some raising of the eyebrows.  Reports no other provoking factors.  Headaches are more in the frontal region.  Is getting good sleep at night.  Is only working 3 nights a week.  Gaudencio does work for abortive therapy if she can catch the headache in time.  Symptoms she wakes up with a headache.  Has not really tried Excedrin Migraine.    Discussed Emgality and is afraid of trying that because of Ajovy causing side effects.  Does not want to do Depakote because of weight gain.  Nortriptyline is no longer be considered through GI.    10/4/23  Patient returns today.  Previously she was getting Botox which was working really well for her though she stopped the Botox because of cosmetic side effects of her eyebrows being raised.  Headaches have worsened again.  She has them every day.  They can be severe.  Rizatriptan does seem to be helping though occasionally she will wake up with headaches and these are not very effective.  Did not tolerate the protriptyline.  Remains on the Topamax and verapamil and is finding benefit.  No other new concerns.  2.  Is getting good sleep though not always.  Has not been using the melatonin though wants to use it again.    Previous history is reviewed and this is unchanged.    PAST MEDICAL/SURGICAL HISTORY  Past Medical History:   Diagnosis Date     Anemia     told to take iron pills when pregnant     Depression      Depressive disorder      Diabetes mellitus (H)      Diabetes mellitus, type 2 (H) 07/15/2021     Dysthymic disorder      Endometriosis      Herpes genitalia      Hyperlipidemia      Kidney stone      Menorrhagia      Migraines      Neuropathy      Other abnormal Papanicolaou smear of cervix and cervical HPV(795.09) 01/02/2013     PCOS  (polycystic ovarian syndrome)      Post traumatic stress disorder (PTSD)      Patient Active Problem List   Diagnosis     Obesity     Other abnormal Papanicolaou smear of cervix and cervical HPV(795.09)     Chronic nausea     Chronic vomiting     Diabetes mellitus, type 2 (H)     Unintentional weight loss     Rectal bleeding     Flatulence, eructation and gas pain     Morbid obesity (H)     Hyperglycemia     Major depressive disorder, single episode, severe without psychotic features (H)     Esophageal dysphagia     Encounter prior to initiation of medication   Significant for diabetes, migraines, depression, insomnia, difficulty keeping food down, constipation    FAMILY HISTORY  Family History   Adopted: Yes   Problem Relation Age of Onset     Chronic Obstructive Pulmonary Disease Mother      Prostate Cancer Father      Cancer Father         prostate     Alcoholism Sister      Alcoholism Sister      Alcoholism Brother      Alcoholism Brother      Diabetes Paternal Grandmother      Other Cancer Paternal Grandmother      Alcoholism Daughter      Coronary Artery Disease No family hx of      Urolithiasis No family hx of      Clotting Disorder No family hx of      Gout No family hx of      Heart Disease No family hx of      Anesthesia Reaction No family hx of    Family history positive for migraines in her brother.  Also family history of diabetes.    SOCIAL HISTORY  Social History     Tobacco Use     Smoking status: Never     Smokeless tobacco: Never   Vaping Use     Vaping Use: Never used   Substance Use Topics     Alcohol use: Yes     Comment: Socially     Drug use: No       SYSTEMS REVIEW  Twelve-system ROS was done and other than the HPI this was negative except for neck pain, back pain, arm and leg pain, joint pain, numbness and tingling, weakness paralysis, difficulty walking, falling, balance coordination problems, dizziness, ringing in the ears, sleeping problems, headaches, anxiety, depression, bloating,  stomach pain, weight gain, appetite problems  No new symptoms/issues.    MEDICATIONS  acetaminophen (TYLENOL) 500 MG tablet, Take 500-1,000 mg by mouth every 4 hours as needed   canagliflozin (INVOKANA) 300 MG tablet, Take 1 tablet (300 mg) by mouth every morning (before breakfast)  Continuous Blood Gluc Sensor (FREESTYLE CINDY 14 DAY SENSOR) MISC, 1 Application every 14 days  Continuous Blood Gluc Sensor (FREESTYLE CINDY 2 SENSOR) MISC, 1 each See Admin Instructions Change every 14 days.  dexAMETHasone (DECADRON) 4 MG/ML injection, To be used topically by therapist during PT sessions.  diazepam (VALIUM) 5 MG tablet, once as needed Only for Dentist  ibuprofen (ADVIL/MOTRIN) 600 MG tablet, every 4 hours as needed   insulin  UNIT/ML injection, 24 units  To be taken with prednisone 60 mg, hold it if not taking steroids  insulin pen needle (ULTICARE MICRO) 32G X 4 MM miscellaneous, Use 1 pen needles daily or as directed.  Melatonin 10 MG CAPS, Take 1 capsule by mouth at bedtime as needed, may repeat once (insomnia)  meloxicam (MOBIC) 7.5 MG tablet, Take 1 tablet (7.5 mg) by mouth daily  metFORMIN (GLUCOPHAGE XR) 500 MG 24 hr tablet, TAKE 2 TABLETS(1000 MG) BY MOUTH TWICE DAILY WITH MEALS  multivitamin (ONE-DAILY) tablet, Take 1 tablet by mouth daily  ondansetron (ZOFRAN) 4 MG tablet, Take 1 tablet (4 mg) by mouth every 6 hours as needed for nausea  tolterodine ER (DETROL LA) 4 MG 24 hr capsule, Take 1 capsule (4 mg) by mouth daily  vitamin D3 (CHOLECALCIFEROL) 125 MCG (5000 UT) tablet, Take 1 tablet (125 mcg) by mouth every morning  atorvastatin (LIPITOR) 10 MG tablet, Take 2 tablets (20 mg) by mouth daily for 30 days atorvastatin 10 mg tablet  gabapentin (NEURONTIN) 300 MG capsule, Take 2 capsules (600 mg) by mouth 3 times daily for 90 days  insulin glargine (LANTUS PEN) 100 UNIT/ML pen, Inject 24 Units Subcutaneous At Bedtime for 90 days    Botulinum Toxin Type A (BOTOX) 200 units injection 200 Units        PHYSICAL EXAMINATION  VITALS: BP (!) 134/92   Pulse 88   Resp 18   Wt 133.4 kg (294 lb)   LMP 05/23/2016   BMI 47.45 kg/m    GENERAL: Healthy appearing, alert, no acute distress, normal habitus.  CARDIOVASCULAR: Extremities warm and well perfused. Pulses present.   NEUROLOGICAL:  Patient is awake and oriented to self, place and time.  Attention span is normal.  Memory is grossly intact.  Language is fluent and follows commands appropriately.  Appropriate fund of knowledge. Cranial nerves 2-12 are intact. There is no pronator drift.  Motor exam shows 5/5 strength in all extremities.  Tone is symmetric bilaterally in upper and lower extremities.  (Previously reflexes are symmetric and 2+ in upper extremities and lower extremities. Sensory exam is grossly intact to light touch, pin prick and vibration.)  Finger to nose and heel to shin is without dysmetria.  Romberg is negative.  Gait is normal and the patient is able to do tandem walk and walk on toes and heels.  Exam stable.    DIAGNOSTICS  CT head-images reviewed.  No major structural lesions noted.  IMPRESSION:  1.  No acute intracranial process.      CT 2020  HEAD CT:   1.  No acute intracranial process.     CERVICAL SPINE CT:   1.  No acute cervical spine fracture.    OUTSIDE RECORDS  Outside referral notes and chart notes were reviewed and pertinent information has been summarized (in addition to the HPI):-Patient seen for headaches.  Was seen in endocrinology for diabetes.  Was referred to neurology.  Was also having some ankle and feet swelling.  Also has nausea related to headaches.  Has been seen in ENT for benign positional paroxysmal vertigo.  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  Was recommended to see occupational therapy.    LABS  Component      Latest Ref Rng & Units 2/28/2022   Vitamin B12      193 - 986 pg/mL 437   Ferritin      12 - 150 ng/mL 93   TSH      0.40 - 4.00 mU/L 1.71     MRI  IMPRESSION:  1.  No acute/subacute  infarcts, mass lesions, hydrocephalus or MRI evidence of intracranial hemorrhage.      IMPRESSION/REPORT/PLAN  Intractable chronic migraine without aura and without status migrainosus  Primary insomnia  History of diabetes    This is a 37 year old female with chronic headache suggestive of chronic migraines and insomnia.  Her head CT has been negative for structural lesions.  Blood work has been noncontributory.  Brain negative for structural lesions.    For prevention of headaches:-Higher doses of Topamax have not helped with the headaches.  Propanolol cannot be used because of history of diabetes.  Verapamil has not helped with the headaches as well.  Ajovy was prescribed which actually made the headaches worse.  She is also tried nortriptyline in the past for insomnia which did not help with the headaches.  Protriptyline caused side effects.  We will continue to provide benefit that she is concerned about the side effects.  Wants to hold off on Botox for right now.  Continue Topamax and verapamil with goal of weaning her off the verapamil.  We will add Emgality to see if it works better.  Could always go back to the Botox.    For abortive therapy:- Sumatriptan has caused headaches to get worse.  Maxalt is more helpful as abortive therapy and will continue that.  She can use the Maxalt with over-the-counter medications to see if it becomes more effective.  She does have some issues with catching the headaches on time.  Continue to try Excedrin Migraine with the Maxalt.  Continue.    She is using melatonin for insomnia.  Encouraged her to use this though she is not using the melatonin right    Topamax might be helping with the diabetes as well.  Could consider leaving her on this.    She will keep a log of her headaches.  We will meet back in 3-4 months.      -     galcanezumab-gnlm (EMGALITY) 120 MG/ML injection; Inject 1 mL (120 mg) Subcutaneous every 28 days  -     galcanezumab-gnlm (EMGALITY) 120 MG/ML  injection; Inject 2 mLs (240 mg) Subcutaneous every 28 days Loading dose.  -     rizatriptan (MAXALT) 10 MG tablet; Take 1 tablet (10 mg) by mouth at onset of headache for migraine May repeat in 2 hours.  -     verapamil ER (VERELAN) 120 MG 24 hr capsule; Take 1 capsule (120 mg) by mouth At Bedtime  -     topiramate (TOPAMAX) 100 MG tablet; Take 1 tablet (100 mg) by mouth 2 times daily  -Melatonin.  Prescribed through psychiatry.    Return in about 4 months (around 2/4/2024) for In-Clinic Visit (must).    Over 30 minutes were spent coordinating the care for the patient on the day of the encounter.  This includes previsit, during visit and post visit activities as documented above.  Counseling patient.  Refractory problem.  Multiple problems managed.  Prescription management.  (Activities include but not inclusive of reviewing chart, reviewing outside records, reviewing labs and imaging study results as well as the images, patient visit time including getting history and exam,  use if applicable, review of test results with the patient and coming up with a plan in a shared model, counseling patient and family, education and answering patient questions, EMR , EMR diagnosis entry and problem list management, medication reconciliation and prescription management if applicable, paperwork if applicable, printing documents and documentation of the visit activities.)      Roberto Bolanos MD  Neurologist  [facility]  Tel:- [phone]    This note was dictated using voice recognition software.  Any grammatical or context distortions are unintentional and inherent to the software.      Again, thank you for allowing me to participate in the care of your patient.        Sincerely,        Roberto Bolanos MD

## 2023-10-04 NOTE — PROGRESS NOTES
NEUROLOGY OUTPATIENT PROGRESS NOTE   Oct 4, 2023     CHIEF COMPLAINT/REASON FOR VISIT/REASON FOR CONSULT  Patient presents with:  Headache    REASON FOR CONSULTATION- Headaches    REFERRAL SOURCE  Dr. Rosangela Haynes  CC Dr. Rosangela Haynes    HISTORY OF PRESENT ILLNESS  Ruth Vanegas is a 37 year old female seen today for evaluation of headaches.  She reports that she has had headaches for years these would come and go.  Over the last few months these have become more constant and lasting longer.  She reports that she has 15 headache days a month.  Both temples are involved.  Headaches are throbbing in nature with photophobia and phonophobia.  She denies any triggers for her headaches.    She is tried sumatriptan in the past which made things worse.  She has tried Excedrin but she has to catch the headaches soon enough to make it work.  She is currently on nortriptyline 50 mg at night which is not helping.  The nortriptyline was also being used as a sleep aid.  She is also on Topamax which is not helping.  She is taking 50 mg in the evening and 25 mg in the morning.  She further complains of vertigo which is sometimes with the headache.  She does have issues with uncontrolled sugars and feels that the Topamax might be helpful.  Denies any visual auras.    12/21/21  Patient returns today.  She reports that the headaches have become more frequent and she is still having them every day.  Is taking 100 mg twice daily of the Topamax with no side effects.  50 mg of nortriptyline at night and is not sleeping well with this dose.  Is interested in trying a higher medication.  Is taking Maxalt which is working though has to use it almost every day.  Is working on losing weight.  Reports that her diabetes has been under good control.  Has not done the blood work that had ordered.    8/15/22  Patient returns today.  Headaches have become much more frequent and she is still having them every day.  Her Topamax was increased to  100 mg twice a day endocrinology and she feels no side effects with no benefit as well.  Remains on 50 mg of nortriptyline at night for sleep.  She is also taking melatonin for sleep.  Verapamil was added previously which did not really help.  Diabetes has been under good control.  Maxalt does work occasionally but not all the time.  Blood work has been done which was negative.  Reports no other new symptoms.    9/20/22  Patient returns today.  She reports that the headaches have gotten worse since she was last seen.  She is having more severe headaches almost every day.  Remains on Topamax and verapamil.  She was on nortriptyline and this has been stopped.  She reports that this was stopped several months ago when she was not on it in August.  Maxalt does work but the benefit does not last the full time.  She did start the Ajovy which might have made the headaches worse.  Was discussed through phone messages about starting her on Botox and she wants to do that today.  For the insomnia she is seeing her GI doctor who is prescribing some medication that would help with her GI symptoms as well as with the insomnia.  She has not really started this medication at this point.  No other new issues/concerns.  No other new triggers    12/20/22  Patient returns today.  With the Botox she did have worsening headaches the first 2 weeks.  Headaches then improved for 2 months in the last 2 weeks she is been having headaches again.  Headaches unchanged in nature.  Remains on Topamax and verapamil.  Maxalt does help with abortive therapy.  Remains on melatonin for insomnia.  No other new issues.  Does complain of some eyebrow side effects with the Botox.  No other new concerns.    2/17/23  Patient returns today.  She had Botox about 2 months ago.  The first Botox had provided significant benefit though she feels no benefit from this Botox.  Headaches are there almost every day.  Still has some raising of the eyebrows.  Reports no  other provoking factors.  Headaches are more in the frontal region.  Is getting good sleep at night.  Is only working 3 nights a week.  Gaudencio does work for abortive therapy if she can catch the headache in time.  Symptoms she wakes up with a headache.  Has not really tried Excedrin Migraine.    Discussed Emgality and is afraid of trying that because of Ajovy causing side effects.  Does not want to do Depakote because of weight gain.  Nortriptyline is no longer be considered through GI.    10/4/23  Patient returns today.  Previously she was getting Botox which was working really well for her though she stopped the Botox because of cosmetic side effects of her eyebrows being raised.  Headaches have worsened again.  She has them every day.  They can be severe.  Rizatriptan does seem to be helping though occasionally she will wake up with headaches and these are not very effective.  Did not tolerate the protriptyline.  Remains on the Topamax and verapamil and is finding benefit.  No other new concerns.  2.  Is getting good sleep though not always.  Has not been using the melatonin though wants to use it again.    Previous history is reviewed and this is unchanged.    PAST MEDICAL/SURGICAL HISTORY  Past Medical History:   Diagnosis Date    Anemia     told to take iron pills when pregnant    Depression     Depressive disorder     Diabetes mellitus (H)     Diabetes mellitus, type 2 (H) 07/15/2021    Dysthymic disorder     Endometriosis     Herpes genitalia     Hyperlipidemia     Kidney stone     Menorrhagia     Migraines     Neuropathy     Other abnormal Papanicolaou smear of cervix and cervical HPV(795.09) 01/02/2013    PCOS (polycystic ovarian syndrome)     Post traumatic stress disorder (PTSD)      Patient Active Problem List   Diagnosis    Obesity    Other abnormal Papanicolaou smear of cervix and cervical HPV(795.09)    Chronic nausea    Chronic vomiting    Diabetes mellitus, type 2 (H)    Unintentional weight loss     Rectal bleeding    Flatulence, eructation and gas pain    Morbid obesity (H)    Hyperglycemia    Major depressive disorder, single episode, severe without psychotic features (H)    Esophageal dysphagia    Encounter prior to initiation of medication   Significant for diabetes, migraines, depression, insomnia, difficulty keeping food down, constipation    FAMILY HISTORY  Family History   Adopted: Yes   Problem Relation Age of Onset    Chronic Obstructive Pulmonary Disease Mother     Prostate Cancer Father     Cancer Father         prostate    Alcoholism Sister     Alcoholism Sister     Alcoholism Brother     Alcoholism Brother     Diabetes Paternal Grandmother     Other Cancer Paternal Grandmother     Alcoholism Daughter     Coronary Artery Disease No family hx of     Urolithiasis No family hx of     Clotting Disorder No family hx of     Gout No family hx of     Heart Disease No family hx of     Anesthesia Reaction No family hx of    Family history positive for migraines in her brother.  Also family history of diabetes.    SOCIAL HISTORY  Social History     Tobacco Use    Smoking status: Never    Smokeless tobacco: Never   Vaping Use    Vaping Use: Never used   Substance Use Topics    Alcohol use: Yes     Comment: Socially    Drug use: No       SYSTEMS REVIEW  Twelve-system ROS was done and other than the HPI this was negative except for neck pain, back pain, arm and leg pain, joint pain, numbness and tingling, weakness paralysis, difficulty walking, falling, balance coordination problems, dizziness, ringing in the ears, sleeping problems, headaches, anxiety, depression, bloating, stomach pain, weight gain, appetite problems  No new symptoms/issues.    MEDICATIONS  acetaminophen (TYLENOL) 500 MG tablet, Take 500-1,000 mg by mouth every 4 hours as needed   canagliflozin (INVOKANA) 300 MG tablet, Take 1 tablet (300 mg) by mouth every morning (before breakfast)  Continuous Blood Gluc Sensor (FREESTYLE CINDY 14 DAY  SENSOR) MISC, 1 Application every 14 days  Continuous Blood Gluc Sensor (FREESTYLE CINDY 2 SENSOR) MISC, 1 each See Admin Instructions Change every 14 days.  dexAMETHasone (DECADRON) 4 MG/ML injection, To be used topically by therapist during PT sessions.  diazepam (VALIUM) 5 MG tablet, once as needed Only for Dentist  ibuprofen (ADVIL/MOTRIN) 600 MG tablet, every 4 hours as needed   insulin  UNIT/ML injection, 24 units  To be taken with prednisone 60 mg, hold it if not taking steroids  insulin pen needle (ULTICARE MICRO) 32G X 4 MM miscellaneous, Use 1 pen needles daily or as directed.  Melatonin 10 MG CAPS, Take 1 capsule by mouth at bedtime as needed, may repeat once (insomnia)  meloxicam (MOBIC) 7.5 MG tablet, Take 1 tablet (7.5 mg) by mouth daily  metFORMIN (GLUCOPHAGE XR) 500 MG 24 hr tablet, TAKE 2 TABLETS(1000 MG) BY MOUTH TWICE DAILY WITH MEALS  multivitamin (ONE-DAILY) tablet, Take 1 tablet by mouth daily  ondansetron (ZOFRAN) 4 MG tablet, Take 1 tablet (4 mg) by mouth every 6 hours as needed for nausea  tolterodine ER (DETROL LA) 4 MG 24 hr capsule, Take 1 capsule (4 mg) by mouth daily  vitamin D3 (CHOLECALCIFEROL) 125 MCG (5000 UT) tablet, Take 1 tablet (125 mcg) by mouth every morning  atorvastatin (LIPITOR) 10 MG tablet, Take 2 tablets (20 mg) by mouth daily for 30 days atorvastatin 10 mg tablet  gabapentin (NEURONTIN) 300 MG capsule, Take 2 capsules (600 mg) by mouth 3 times daily for 90 days  insulin glargine (LANTUS PEN) 100 UNIT/ML pen, Inject 24 Units Subcutaneous At Bedtime for 90 days    Botulinum Toxin Type A (BOTOX) 200 units injection 200 Units       PHYSICAL EXAMINATION  VITALS: BP (!) 134/92   Pulse 88   Resp 18   Wt 133.4 kg (294 lb)   LMP 05/23/2016   BMI 47.45 kg/m    GENERAL: Healthy appearing, alert, no acute distress, normal habitus.  CARDIOVASCULAR: Extremities warm and well perfused. Pulses present.   NEUROLOGICAL:  Patient is awake and oriented to self, place and time.   Attention span is normal.  Memory is grossly intact.  Language is fluent and follows commands appropriately.  Appropriate fund of knowledge. Cranial nerves 2-12 are intact. There is no pronator drift.  Motor exam shows 5/5 strength in all extremities.  Tone is symmetric bilaterally in upper and lower extremities.  (Previously reflexes are symmetric and 2+ in upper extremities and lower extremities. Sensory exam is grossly intact to light touch, pin prick and vibration.)  Finger to nose and heel to shin is without dysmetria.  Romberg is negative.  Gait is normal and the patient is able to do tandem walk and walk on toes and heels.  Exam stable.    DIAGNOSTICS  CT head-images reviewed.  No major structural lesions noted.  IMPRESSION:  1.  No acute intracranial process.      CT 2020  HEAD CT:   1.  No acute intracranial process.     CERVICAL SPINE CT:   1.  No acute cervical spine fracture.    OUTSIDE RECORDS  Outside referral notes and chart notes were reviewed and pertinent information has been summarized (in addition to the HPI):-Patient seen for headaches.  Was seen in endocrinology for diabetes.  Was referred to neurology.  Was also having some ankle and feet swelling.  Also has nausea related to headaches.  Has been seen in ENT for benign positional paroxysmal vertigo.  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  Was recommended to see occupational therapy.    LABS  Component      Latest Ref Rng & Units 2/28/2022   Vitamin B12      193 - 986 pg/mL 437   Ferritin      12 - 150 ng/mL 93   TSH      0.40 - 4.00 mU/L 1.71     MRI  IMPRESSION:  1.  No acute/subacute infarcts, mass lesions, hydrocephalus or MRI evidence of intracranial hemorrhage.      IMPRESSION/REPORT/PLAN  Intractable chronic migraine without aura and without status migrainosus  Primary insomnia  History of diabetes    This is a 37 year old female with chronic headache suggestive of chronic migraines and insomnia.  Her head CT has  been negative for structural lesions.  Blood work has been noncontributory.  Brain negative for structural lesions.    For prevention of headaches:-Higher doses of Topamax have not helped with the headaches.  Propanolol cannot be used because of history of diabetes.  Verapamil has not helped with the headaches as well.  Ajovy was prescribed which actually made the headaches worse.  She is also tried nortriptyline in the past for insomnia which did not help with the headaches.  Protriptyline caused side effects.  We will continue to provide benefit that she is concerned about the side effects.  Wants to hold off on Botox for right now.  Continue Topamax and verapamil with goal of weaning her off the verapamil.  We will add Emgality to see if it works better.  Could always go back to the Botox.    For abortive therapy:- Sumatriptan has caused headaches to get worse.  Maxalt is more helpful as abortive therapy and will continue that.  She can use the Maxalt with over-the-counter medications to see if it becomes more effective.  She does have some issues with catching the headaches on time.  Continue to try Excedrin Migraine with the Maxalt.  Continue.    She is using melatonin for insomnia.  Encouraged her to use this though she is not using the melatonin right    Topamax might be helping with the diabetes as well.  Could consider leaving her on this.    She will keep a log of her headaches.  We will meet back in 3-4 months.      -     galcanezumab-gnlm (EMGALITY) 120 MG/ML injection; Inject 1 mL (120 mg) Subcutaneous every 28 days  -     galcanezumab-gnlm (EMGALITY) 120 MG/ML injection; Inject 2 mLs (240 mg) Subcutaneous every 28 days Loading dose.  -     rizatriptan (MAXALT) 10 MG tablet; Take 1 tablet (10 mg) by mouth at onset of headache for migraine May repeat in 2 hours.  -     verapamil ER (VERELAN) 120 MG 24 hr capsule; Take 1 capsule (120 mg) by mouth At Bedtime  -     topiramate (TOPAMAX) 100 MG tablet; Take  1 tablet (100 mg) by mouth 2 times daily  -Melatonin.  Prescribed through psychiatry.    Return in about 4 months (around 2/4/2024) for In-Clinic Visit (must).    Over 30 minutes were spent coordinating the care for the patient on the day of the encounter.  This includes previsit, during visit and post visit activities as documented above.  Counseling patient.  Refractory problem.  Multiple problems managed.  Prescription management.  (Activities include but not inclusive of reviewing chart, reviewing outside records, reviewing labs and imaging study results as well as the images, patient visit time including getting history and exam,  use if applicable, review of test results with the patient and coming up with a plan in a shared model, counseling patient and family, education and answering patient questions, EMR , EMR diagnosis entry and problem list management, medication reconciliation and prescription management if applicable, paperwork if applicable, printing documents and documentation of the visit activities.)      Roberto Bolanos MD  Neurologist  [facility]  Tel:- [phone]    This note was dictated using voice recognition software.  Any grammatical or context distortions are unintentional and inherent to the software.

## 2023-10-17 NOTE — TELEPHONE ENCOUNTER
MEDICATION APPEAL APPROVED    Medication: SEMAGLUTIDE(0.25 OR 0.5MG/DOS) 2 MG/3ML SC SOPN  Authorization Effective Date: 8/20/2023  Authorization Expiration Date: 9/20/2024  Approved Dose/Quantity:   Reference #:     [employer]: [employer] - Phone [phone] Fax [phone]   Expected CoPay: $       CoPay Card Available:    Financial Assistance Needed:   [id number] - [place], [place] - [address] AT [facility]  Patient Notified: Yes  Comments:

## 2023-10-23 ENCOUNTER — OFFICE VISIT (OUTPATIENT)
Dept: PODIATRY | Facility: CLINIC | Age: 38
End: 2023-10-23
Payer: COMMERCIAL

## 2023-10-23 VITALS
WEIGHT: 281 LBS | DIASTOLIC BLOOD PRESSURE: 78 MMHG | BODY MASS INDEX: 45.16 KG/M2 | HEART RATE: 87 BPM | HEIGHT: 66 IN | SYSTOLIC BLOOD PRESSURE: 113 MMHG

## 2023-10-23 DIAGNOSIS — G57.92 NEURITIS OF HEEL, LEFT: Primary | ICD-10-CM

## 2023-10-23 DIAGNOSIS — G57.91 NEURITIS OF RIGHT HEEL: ICD-10-CM

## 2023-10-23 PROCEDURE — 64455 NJX AA&/STRD PLTR COM DG NRV: CPT | Mod: 50 | Performed by: PODIATRIST

## 2023-10-23 PROCEDURE — 99213 OFFICE O/P EST LOW 20 MIN: CPT | Mod: 25 | Performed by: PODIATRIST

## 2023-10-23 RX ORDER — TRIAMCINOLONE ACETONIDE 40 MG/ML
40 INJECTION, SUSPENSION INTRA-ARTICULAR; INTRAMUSCULAR
Status: SHIPPED | OUTPATIENT
Start: 2023-10-23

## 2023-10-23 RX ORDER — BUPIVACAINE HYDROCHLORIDE 5 MG/ML
0.5 INJECTION, SOLUTION PERINEURAL
Status: SHIPPED | OUTPATIENT
Start: 2023-10-23

## 2023-10-23 RX ADMIN — TRIAMCINOLONE ACETONIDE 40 MG: 40 INJECTION, SUSPENSION INTRA-ARTICULAR; INTRAMUSCULAR at 09:15

## 2023-10-23 RX ADMIN — BUPIVACAINE HYDROCHLORIDE 0.5 ML: 5 INJECTION, SOLUTION PERINEURAL at 09:15

## 2023-10-23 NOTE — LETTER
10/23/2023         RE: Ruth Vanegas  [address]  [place] [address]        Dear Colleague,    Thank you for referring your patient, Ruth Vanegas, to the [facility]. Please see a copy of my visit note below.    Foot & Ankle Surgery   October 23, 2023    S:  Pt is seen today for evaluation of bilateral heel pain.  I last saw her on 9/11/2023 for left Achilles tendinitis and Baxters neuritis and right plantar fasciitis.  She was to continue all home therapies and she was given a physical therapy referral.  She is in today for continued bilateral heel pain.  Because of her busy work schedule, she was not able to get into physical therapy.  She does have a session scheduled in the near future.    There were no vitals filed for this visit.'      ROS - Pos for CC.  Patient denies current nausea, vomiting, chills, fevers, belly pain, calf pain, chest pain or SOB.  Complete remainder of ROS it otherwise neg.      PE:  Gen:   No apparent distress  Eye:    Visual scanning without deficit  Ear:    Response to auditory stimuli wnl  Lung:    Non-labored breathing on RA noted  Abd:    NTND per patient report  Lymph:    Neg for pitting/non-pitting edema BLE  Vasc:    Pulses palpable, CFT minimally delayed  Neuro:    Light touch sensation intact to all sensory nerve distributions without paresthesias  Derm:    Neg for nodules, lesions or ulcerations  MSK:    Bilateral lower extremity -she is tender on the left Achilles insertion.  The main pain is along Baxters nerve bilateral with some plantar right heel pain.  Minimal plantar left heel pain today  Calf:    Neg for redness, swelling or tenderness    Assessment:  37 year old female with bilateral lower extremity pain including bilateral Baxters neuritis, left Achilles tendinitis      Medical Decision Making/Plan:  Discussed etiologies, anatomy and options  1.  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  -Continue all home therapies including shoes, inserts, stretching, RICE/NSAID versus Tylenol as needed based on pain  -I encouraged her to do physical therapy for bilateral heel and left Achilles insertion pain when she is able  -Our injection result handout was dispensed.  Further therapies based on injection results    Bilateral Marley's nerve diagnostic/therapeutic steroid injection    Date/Time: 10/23/2023 9:15 AM    Performed by: Lorenzo Carpenter DPM  Authorized by: Lorenzo Carpenter DPM    Location:  Ankle  Laterality:  Bilateral  Site:  Bilateral ankle  Medications (Right):  40 mg triamcinolone 40 MG/ML; 0.5 mL BUPivacaine 0.5 %  Medications (Left):  40 mg triamcinolone 40 MG/ML; 0.5 mL BUPivacaine 0.5 %   After obtaining written consent, the skin was prepped with alcohol.  The needle was advance to the underlying bilateral Marley's nerve, aspiration was done with position change.  1 1/2cc mixture of 2:1 kenalog 40:0.25% marcaine plain was injected.  The patient tolerated the procedure without complication.  Risks that were discussed included possible joint/soft tissue damage, neuritis/numbness, infection, pigment change, steroid flare.             Follow up: As needed based on injection results or sooner with acute issues           Lorenzo Carpenter DPM [facility] FACFAOM  Podiatric Foot & Ankle Surgeon  [facility]  [phone]    Disclaimer: This note consists of symbols derived from keyboarding, dictation and/or voice recognition software. As a result, there may be errors in the script that have gone undetected. Please consider this when interpreting information found in this chart.        Again, thank you for allowing me to participate in the care of your patient.        Sincerely,        Lorenzo Carpenter DPM, RENA

## 2023-10-23 NOTE — PROGRESS NOTES
Foot & Ankle Surgery   October 23, 2023    S:  Pt is seen today for evaluation of bilateral heel pain.  I last saw her on 9/11/2023 for left Achilles tendinitis and Baxters neuritis and right plantar fasciitis.  She was to continue all home therapies and she was given a physical therapy referral.  She is in today for continued bilateral heel pain.  Because of her busy work schedule, she was not able to get into physical therapy.  She does have a session scheduled in the near future.    There were no vitals filed for this visit.'      ROS - Pos for CC.  Patient denies current nausea, vomiting, chills, fevers, belly pain, calf pain, chest pain or SOB.  Complete remainder of ROS it otherwise neg.      PE:  Gen:   No apparent distress  Eye:    Visual scanning without deficit  Ear:    Response to auditory stimuli wnl  Lung:    Non-labored breathing on RA noted  Abd:    NTND per patient report  Lymph:    Neg for pitting/non-pitting edema BLE  Vasc:    Pulses palpable, CFT minimally delayed  Neuro:    Light touch sensation intact to all sensory nerve distributions without paresthesias  Derm:    Neg for nodules, lesions or ulcerations  MSK:    Bilateral lower extremity -she is tender on the left Achilles insertion.  The main pain is along Baxters nerve bilateral with some plantar right heel pain.  Minimal plantar left heel pain today  Calf:    Neg for redness, swelling or tenderness    Assessment:  37 year old female with bilateral lower extremity pain including bilateral Baxters neuritis, left Achilles tendinitis      Medical Decision Making/Plan:  Discussed etiologies, anatomy and options  1.  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  -Continue all home therapies including shoes, inserts, stretching, RICE/NSAID versus Tylenol as needed based on pain  -I encouraged her to do physical therapy for  bilateral heel and left Achilles insertion pain when she is able  -Our injection result handout was dispensed.  Further therapies based on injection results    Bilateral Marley's nerve diagnostic/therapeutic steroid injection    Date/Time: 10/23/2023 9:15 AM    Performed by: Lorenzo Carpenter DPM  Authorized by: Lorenzo Carpenter DPM    Location:  Ankle  Laterality:  Bilateral  Site:  Bilateral ankle  Medications (Right):  40 mg triamcinolone 40 MG/ML; 0.5 mL BUPivacaine 0.5 %  Medications (Left):  40 mg triamcinolone 40 MG/ML; 0.5 mL BUPivacaine 0.5 %   After obtaining written consent, the skin was prepped with alcohol.  The needle was advance to the underlying bilateral Marley's nerve, aspiration was done with position change.  1 1/2cc mixture of 2:1 kenalog 40:0.25% marcaine plain was injected.  The patient tolerated the procedure without complication.  Risks that were discussed included possible joint/soft tissue damage, neuritis/numbness, infection, pigment change, steroid flare.             Follow up: As needed based on injection results or sooner with acute issues           Lorenzo Carpenter DPM FACFAS FACFAOM  Podiatric Foot & Ankle Surgeon  [facility]  [phone]    Disclaimer: This note consists of symbols derived from keyboarding, dictation and/or voice recognition software. As a result, there may be errors in the script that have gone undetected. Please consider this when interpreting information found in this chart.

## 2023-10-23 NOTE — PATIENT INSTRUCTIONS
Thank you for choosing [facility]!    DR. STATON'S CLINIC LOCATIONS:     [facility] (Friday) TRIAGE LINE: [phone] [address] [facility]  APPOINTMENTS: [phone]   SHAUN Salazar [address] RADIOLOGY: [phone]    PHYSICAL THERAPY: [phone]    SET UP SURGERY: [phone]   [place] (Mon-Tues AM-Thurs) BILLING QUESTIONS: [phone]   [id number] FAX: [phone]   SHAUN Cates [address] [facility]: [phone]      Today your received a diagnostic/therapeutic steroid injection.    We would like you to monitor 3 factors after the injection to determine what future treatment modalities would be most appropriate:     Percentage improvement - while the injection will wear off and your symptoms may return, what was the highest level of improvement.  Location - where did you notice improvement, and conversely, where did you not see improvement?  Duration - how long did the improvement last for.    You do not need to schedule a follow-up visit.  When the injection wears off, and if they symptoms return to the point of needing further clinic follow up, whether a month or year has passed, schedule an appointment and we'll decide what the next step is.

## 2023-11-05 ENCOUNTER — MYC MEDICAL ADVICE (OUTPATIENT)
Dept: FAMILY MEDICINE | Facility: CLINIC | Age: 38
End: 2023-11-05
Payer: COMMERCIAL

## 2023-11-06 NOTE — TELEPHONE ENCOUNTER
S-(situation):   Hello can you please send me something for a yeast infection I have a really bad one right now! Thank you.    B-(background):   Patient was last seen on 8/11/23 by PCP, Dr Banks and patient was prescribed fluconazole    A-(assessment):   Declined regular triage  White chunky vaginal discharge  Mild vaginal burning  Severe itching  No fever or urinary symptoms  Patient's BG has been elevated 300 - 400's and appointment scheduled on 11/9/23 for elevated BG  Pharmacy verified    R-(recommendations):   PCP, Dr Banks out of clinic today.  Message sent to Dr Martin LAZARO for possible yeast treatment    Danisha Feliz RN  [facility]    fluconazole (DIFLUCAN) 150 MG tablet 3 tablet 0 8/17/2023 8/20/2023 No   Sig - Route: Take 1 tablet (150 mg) by mouth daily for 3 days - Oral     Hello can you please send me something for a yeast infection I have a really bad one right now! Thank you.

## 2023-11-06 NOTE — TELEPHONE ENCOUNTER
Please help schedule a virtual or E-visit for yeast infection management.  Thank you     Called patient to offer these appointment options as recommended from Dr. Salazar above.  Patient would like a virtual appt with a provider at their next availability. However, patient is also going to call her OB provider if a prescription can be sent.  If not, will keep appt.  If so, patient will cancel the appointment via MyChart.    SADE Gutierrez, RN  [facility]

## 2023-11-07 ENCOUNTER — VIRTUAL VISIT (OUTPATIENT)
Dept: FAMILY MEDICINE | Facility: CLINIC | Age: 38
End: 2023-11-07
Payer: COMMERCIAL

## 2023-11-07 DIAGNOSIS — B37.31 YEAST INFECTION OF THE VAGINA: ICD-10-CM

## 2023-11-07 PROCEDURE — 99442 PR PHYSICIAN TELEPHONE EVALUATION 11-20 MIN: CPT | Mod: 95 | Performed by: FAMILY MEDICINE

## 2023-11-07 RX ORDER — FLUCONAZOLE 150 MG/1
150 TABLET ORAL DAILY
Qty: 3 TABLET | Refills: 0 | Status: SHIPPED | OUTPATIENT
Start: 2023-11-07 | End: 2024-01-19

## 2023-11-07 ASSESSMENT — PATIENT HEALTH QUESTIONNAIRE - PHQ9
10. IF YOU CHECKED OFF ANY PROBLEMS, HOW DIFFICULT HAVE THESE PROBLEMS MADE IT FOR YOU TO DO YOUR WORK, TAKE CARE OF THINGS AT HOME, OR GET ALONG WITH OTHER PEOPLE: NOT DIFFICULT AT ALL
SUM OF ALL RESPONSES TO PHQ QUESTIONS 1-9: 0
SUM OF ALL RESPONSES TO PHQ QUESTIONS 1-9: 0

## 2023-11-07 NOTE — PROGRESS NOTES
"Ruth is a 37 year old who is being evaluated via a billable telephone visit.      What phone number would you like to be contacted at? [phone]   How would you like to obtain your AVS? MyChart    Distant Location (provider location):  Off-site    Assessment & Plan     Yeast infection of the vagina  Discussed prevention with  tight control of blood sugar, management of current symptoms discussed, fluconazole was prescribed, possible side effect reviewed.  Follow-up with Dr. Banks  - fluconazole (DIFLUCAN) 150 MG tablet; Take 1 tablet (150 mg) by mouth daily    Review of external notes as documented elsewhere in note  12 minutes spent by me on the date of the encounter doing chart review, review of outside records, review of test results, interpretation of tests, patient visit, and documentation        BMI:   Estimated body mass index is 45.35 kg/m  as calculated from the following:    Height as of 10/23/23: 1.676 m (5' 6\").    Weight as of 10/23/23: 127.5 kg (281 lb).   Weight management plan: Discussed healthy diet and exercise guidelines        Amrit Salazar MD  [facility]    Subjective   Ruth is a 37 year old, presenting for the following health issues:  Vaginal Problem (X 1 week. Discharge,discomfort, itchy, and burning.)      11/7/2023     9:51 AM   Additional Questions   Roomed by Reginald hammond       Vaginal Problem     History of Present Illness       Reason for visit:  Yeast infection    She eats 4 or more servings of fruits and vegetables daily.She consumes 3 sweetened beverage(s) daily.She exercises with enough effort to increase her heart rate 9 or less minutes per day.  She exercises with enough effort to increase her heart rate 3 or less days per week.   She is taking medications regularly.     \"I have a yeast infection\",she has diabetes type 2 that is not adequately controlled, last A1c was 9% about a couple of months ago, she is scheduled to follow with Dr. Banks for better " "management of her diabetes. she is having recurrent yeast infection, has been treated in the past with fluconazole; for about a week now she been experiencing white clumpy vaginal discharge, itching and irritation, denies any rash in that area.  No fever no chills.      Review of Systems   Genitourinary:  Positive for vaginal discharge.      Constitutional, HEENT, cardiovascular, pulmonary, gi and gu systems are negative, except as otherwise noted.      Objective    Vitals - Patient Reported  Weight (Patient Reported): 127 kg (280 lb)  Height (Patient Reported): 170.2 cm (5' 7\")  BMI (Based on Pt Reported Ht/Wt): 43.85        Physical Exam   healthy, alert, and no distress  PSYCH: Alert and oriented times 3; coherent speech, normal   rate and volume, able to articulate logical thoughts, able   to abstract reason, no tangential thoughts, no hallucinations   or delusions  Her affect is normal  RESP: No cough, no audible wheezing, able to talk in full sentences  Remainder of exam unable to be completed due to telephone visits                Phone call duration: 12 minutes      "

## 2023-11-09 ENCOUNTER — OFFICE VISIT (OUTPATIENT)
Dept: FAMILY MEDICINE | Facility: CLINIC | Age: 38
End: 2023-11-09
Payer: COMMERCIAL

## 2023-11-09 VITALS
TEMPERATURE: 97.7 F | DIASTOLIC BLOOD PRESSURE: 73 MMHG | BODY MASS INDEX: 46.28 KG/M2 | HEART RATE: 77 BPM | RESPIRATION RATE: 18 BRPM | OXYGEN SATURATION: 97 % | WEIGHT: 288 LBS | SYSTOLIC BLOOD PRESSURE: 109 MMHG | HEIGHT: 66 IN

## 2023-11-09 DIAGNOSIS — E11.65 TYPE 2 DIABETES MELLITUS WITH HYPERGLYCEMIA, WITH LONG-TERM CURRENT USE OF INSULIN (H): Primary | ICD-10-CM

## 2023-11-09 DIAGNOSIS — N39.0 URINARY TRACT INFECTION WITHOUT HEMATURIA, SITE UNSPECIFIED: ICD-10-CM

## 2023-11-09 DIAGNOSIS — E08.42 DIABETIC POLYNEUROPATHY ASSOCIATED WITH DIABETES MELLITUS DUE TO UNDERLYING CONDITION (H): ICD-10-CM

## 2023-11-09 DIAGNOSIS — E78.5 HYPERLIPIDEMIA, UNSPECIFIED HYPERLIPIDEMIA TYPE: ICD-10-CM

## 2023-11-09 DIAGNOSIS — R82.90 ABNORMAL URINE ODOR: ICD-10-CM

## 2023-11-09 DIAGNOSIS — Z79.4 TYPE 2 DIABETES MELLITUS WITH HYPERGLYCEMIA, WITH LONG-TERM CURRENT USE OF INSULIN (H): Primary | ICD-10-CM

## 2023-11-09 LAB
ALBUMIN UR-MCNC: NEGATIVE MG/DL
APPEARANCE UR: CLEAR
BACTERIA #/AREA URNS HPF: ABNORMAL /HPF
BILIRUB UR QL STRIP: NEGATIVE
COLOR UR AUTO: YELLOW
GLUCOSE UR STRIP-MCNC: 250 MG/DL
HBA1C MFR BLD: 10.2 % (ref 0–5.6)
HGB UR QL STRIP: NEGATIVE
KETONES UR STRIP-MCNC: NEGATIVE MG/DL
LEUKOCYTE ESTERASE UR QL STRIP: NEGATIVE
MUCOUS THREADS #/AREA URNS LPF: PRESENT /LPF
NITRATE UR QL: NEGATIVE
PH UR STRIP: 6 [PH] (ref 5–8)
RBC #/AREA URNS AUTO: ABNORMAL /HPF
SP GR UR STRIP: 1.02 (ref 1–1.03)
SQUAMOUS #/AREA URNS AUTO: ABNORMAL /LPF
UROBILINOGEN UR STRIP-ACNC: 0.2 E.U./DL
WBC #/AREA URNS AUTO: ABNORMAL /HPF

## 2023-11-09 PROCEDURE — 87186 SC STD MICRODIL/AGAR DIL: CPT | Performed by: FAMILY MEDICINE

## 2023-11-09 PROCEDURE — 36415 COLL VENOUS BLD VENIPUNCTURE: CPT | Performed by: FAMILY MEDICINE

## 2023-11-09 PROCEDURE — 81001 URINALYSIS AUTO W/SCOPE: CPT | Performed by: FAMILY MEDICINE

## 2023-11-09 PROCEDURE — 83036 HEMOGLOBIN GLYCOSYLATED A1C: CPT | Performed by: FAMILY MEDICINE

## 2023-11-09 PROCEDURE — 80061 LIPID PANEL: CPT | Performed by: FAMILY MEDICINE

## 2023-11-09 PROCEDURE — 80048 BASIC METABOLIC PNL TOTAL CA: CPT | Performed by: FAMILY MEDICINE

## 2023-11-09 PROCEDURE — 99214 OFFICE O/P EST MOD 30 MIN: CPT | Performed by: FAMILY MEDICINE

## 2023-11-09 PROCEDURE — 87088 URINE BACTERIA CULTURE: CPT | Performed by: FAMILY MEDICINE

## 2023-11-09 PROCEDURE — 87086 URINE CULTURE/COLONY COUNT: CPT | Performed by: FAMILY MEDICINE

## 2023-11-09 RX ORDER — GABAPENTIN 300 MG/1
600 CAPSULE ORAL 3 TIMES DAILY
Qty: 540 CAPSULE | Refills: 3 | Status: SHIPPED | OUTPATIENT
Start: 2023-11-09

## 2023-11-09 ASSESSMENT — PATIENT HEALTH QUESTIONNAIRE - PHQ9
10. IF YOU CHECKED OFF ANY PROBLEMS, HOW DIFFICULT HAVE THESE PROBLEMS MADE IT FOR YOU TO DO YOUR WORK, TAKE CARE OF THINGS AT HOME, OR GET ALONG WITH OTHER PEOPLE: SOMEWHAT DIFFICULT
SUM OF ALL RESPONSES TO PHQ QUESTIONS 1-9: 10
SUM OF ALL RESPONSES TO PHQ QUESTIONS 1-9: 10

## 2023-11-09 NOTE — PROGRESS NOTES
Assessment & Plan     Type 2 diabetes mellitus with hyperglycemia, with long-term current use of insulin (H)  - **Hemoglobin A1c FUTURE 3mo  - Basic metabolic panel  (Ca, Cl, CO2, Creat, Gluc, K, Na, BUN)  - gabapentin (NEURONTIN) 300 MG capsule; Take 2 capsules (600 mg) by mouth 3 times daily  - semaglutide (OZEMPIC) 2 MG/3ML pen; Inject 0.25 mg Subcutaneous every 7 days for 30 days  - semaglutide (OZEMPIC) 2 MG/3ML pen; Inject 0.5 mg Subcutaneous every 7 days for 30 days  - canagliflozin (INVOKANA) 300 MG tablet; Take 1 tablet (300 mg) by mouth every morning (before breakfast)  Hyperlipidemia, unspecified hyperlipidemia type  - Lipid Profile (Chol, Trig, HDL, LDL calc)  Diabetic polyneuropathy associated with diabetes mellitus due to underlying condition (H)  Abnormal urine odor  - UA with Microscopic - lab collect  - Urine Culture Aerobic Bacterial - lab collect  - Urine Microscopic Exam  - nitroFURantoin macrocrystal-monohydrate (MACROBID) 100 MG capsule; Take 1 capsule (100 mg) by mouth 2 times daily for 7 days  Urinary tract infection without hematuria, site unspecified  - nitroFURantoin macrocrystal-monohydrate (MACROBID) 100 MG capsule; Take 1 capsule (100 mg) by mouth 2 times daily for 7 days  EHR reviewed.   Past medical history, problem list, past surgical history, family history, social history, medications reviewed, updated, reconciled.   Diabetes is poorly controlled. She notes frustrating glucose monitoring, not sure why they are so elevated, feeling poor when glucose in the 100s. Previously working with endocrinology. Last seen in October, well controlled then on metformin, invokana, lantus. She stopped her medications by the following May and was seen her. All medications were restarted. She wanted to retry ozempic at that time. PA was denied. Patient notes much difficulty obtaining medications in a timely manner, she notes requests are made and the prior [facility] location took several weeks to  fill. \She has changed her pharmacy now and hopes it will be different. She was already assured by [facility] pharmacy that Ozempic will be covered. She tried this before and had some side affects but wants to try this again. She does not recall ever receiving invokana lately. Refills provided. Reviewed possibility of PA requirements. She had tried and failed use of victoza and bydureon. She was strongly urged to call if medications are not obtained. Will continue focus on diet and exercise. Lipids collected and LDL not at goal. Continued statin dose for now, hope for better control when glucose control improves, if not will consider medication changes.   Urinary symptoms reviewed. Check on urinalysis. Obvious UTI. Start antibiotics. She thinks recent yeast infection is better.             Milton Banks MD  [facility]    Gilmer Miranda is a 37 year old, presenting for the following health issues:  Diabetes (Having high blood surgars )      11/9/2023     1:05 PM   Additional Questions   Roomed by Tia       History of Present Illness       Reason for visit:  Yeast infection    She eats 4 or more servings of fruits and vegetables daily.She consumes 3 sweetened beverage(s) daily.She exercises with enough effort to increase her heart rate 9 or less minutes per day.  She exercises with enough effort to increase her heart rate 3 or less days per week.   She is taking medications regularly.       Diabetes Follow-up    How often are you checking your blood sugar? Continuous glucose monitor  What time of day are you checking your blood sugars (select all that apply)?  Not applicable  Have you had any blood sugars above 200?  Yes   Have you had any blood sugars below 70?  No  What symptoms do you notice when your blood sugar is low?  Shaky, Dizzy, and Weak  What concerns do you have today about your diabetes? Getting infections often and Blood sugar is often over 200   Do you have any of these  "symptoms? (Select all that apply)  Numbness in feet and Burning in feet      BP Readings from Last 2 Encounters:   11/09/23 109/73   10/23/23 113/78     Hemoglobin A1C (%)   Date Value   11/09/2023 10.2 (H)   08/11/2023 9.5 (H)   09/28/2016 6.8 (H)   08/14/2013 6.1 (H)     LDL Cholesterol Calculated (mg/dL)   Date Value   11/09/2023 183 (H)   05/11/2023 160 (H)   08/14/2013 156 (H)     LDL Cholesterol Direct   Date Value   02/20/2019 192 mg/dl (H)   08/24/2017 203 mg/dl (H)   06/07/2011 141.0 mg/dL (H)             Hyperlipidemia Follow-Up    Are you regularly taking any medication or supplement to lower your cholesterol?   Yes-    Are you having muscle aches or other side effects that you think could be caused by your cholesterol lowering medication?  No    Genitourinary - Female  Onset/Duration: a few weeks abnormal odor, looking cloudy  Description:   Painful urination (Dysuria): No           Frequency: YES  Blood in urine (Hematuria): No  Delay in urine (Hesitency): No  Intensity: mild  Progression of Symptoms:  worsening  Accompanying Signs & Symptoms:  Fever/chills: No  Flank pain: No  Nausea and vomiting: No  Vaginal symptoms: itching  Abdominal/Pelvic Pain: No  History:   History of frequent UTI s: No  History of kidney stones: No  Sexually Active: YES  Possibility of pregnancy: No  Precipitating or alleviating factors: None  Therapies tried and outcome: none          Objective    /73 (BP Location: Right arm, Patient Position: Sitting, Cuff Size: Adult Large)   Pulse 77   Temp 97.7  F (36.5  C) (Temporal)   Resp 18   Ht 1.676 m (5' 6\")   Wt 130.6 kg (288 lb)   LMP 05/23/2016   SpO2 97%   BMI 46.48 kg/m    Body mass index is 46.48 kg/m .  Physical Exam   GENERAL: healthy, alert and no distress  EYES: Eyes grossly normal to inspection, PERRL and conjunctivae and sclerae normal  NECK: no adenopathy, no asymmetry, masses, or scars and thyroid normal to palpation  RESP: lungs clear to auscultation - " no rales, rhonchi or wheezes  CV: regular rate and rhythm, normal S1 S2, no S3 or S4, no murmur, click or rub, no peripheral edema and peripheral pulses strong  MS: no gross musculoskeletal defects noted, no edema  NEURO: Normal strength and tone, mentation intact and speech normal  PSYCH: mentation appears normal, affect normal/bright    Results for orders placed or performed in visit on 11/09/23   **Hemoglobin A1c FUTURE 3mo     Status: Abnormal   Result Value Ref Range    Hemoglobin A1C 10.2 (H) 0.0 - 5.6 %   Basic metabolic panel  (Ca, Cl, CO2, Creat, Gluc, K, Na, BUN)     Status: Abnormal   Result Value Ref Range    Sodium 137 135 - 145 mmol/L    Potassium 4.0 3.4 - 5.3 mmol/L    Chloride 101 98 - 107 mmol/L    Carbon Dioxide (CO2) 26 22 - 29 mmol/L    Anion Gap 10 7 - 15 mmol/L    Urea Nitrogen 8.6 6.0 - 20.0 mg/dL    Creatinine 0.44 (L) 0.51 - 0.95 mg/dL    GFR Estimate >90 >60 mL/min/1.73m2    Calcium 9.1 8.6 - 10.0 mg/dL    Glucose 226 (H) 70 - 99 mg/dL   Lipid Profile (Chol, Trig, HDL, LDL calc)     Status: Abnormal   Result Value Ref Range    Cholesterol 259 (H) <200 mg/dL    Triglycerides 88 <150 mg/dL    Direct Measure HDL 58 >=50 mg/dL    LDL Cholesterol Calculated 183 (H) <=100 mg/dL    Non HDL Cholesterol 201 (H) <130 mg/dL    Narrative    Cholesterol  Desirable:  <200 mg/dL    Triglycerides  Normal:  Less than 150 mg/dL  Borderline High:  150-199 mg/dL  High:  200-499 mg/dL  Very High:  Greater than or equal to 500 mg/dL    Direct Measure HDL  Female:  Greater than or equal to 50 mg/dL   Male:  Greater than or equal to 40 mg/dL    LDL Cholesterol  Desirable:  <100mg/dL  Above Desirable:  100-129 mg/dL   Borderline High:  130-159 mg/dL   High:  160-189 mg/dL   Very High:  >= 190 mg/dL    Non HDL Cholesterol  Desirable:  130 mg/dL  Above Desirable:  130-159 mg/dL  Borderline High:  160-189 mg/dL  High:  190-219 mg/dL  Very High:  Greater than or equal to 220 mg/dL   UA with Microscopic - lab collect      Status: Abnormal   Result Value Ref Range    Color Urine Yellow Colorless, Straw, Light Yellow, Yellow    Appearance Urine Clear Clear    Glucose Urine 250 (A) Negative mg/dL    Bilirubin Urine Negative Negative    Ketones Urine Negative Negative mg/dL    Specific Gravity Urine 1.025 1.005 - 1.030    Blood Urine Negative Negative    pH Urine 6.0 5.0 - 8.0    Protein Albumin Urine Negative Negative mg/dL    Urobilinogen Urine 0.2 0.2, 1.0 E.U./dL    Nitrite Urine Negative Negative    Leukocyte Esterase Urine Negative Negative   Urine Microscopic Exam     Status: Abnormal   Result Value Ref Range    Bacteria Urine Few (A) None Seen /HPF    RBC Urine 0-2 0-2 /HPF /HPF    WBC Urine 0-5 0-5 /HPF /HPF    Squamous Epithelials Urine Few (A) None Seen /LPF    Mucus Urine Present (A) None Seen /LPF   Urine Culture Aerobic Bacterial - lab collect     Status: Abnormal    Specimen: Urine, Midstream   Result Value Ref Range    Culture >100,000 CFU/mL Escherichia coli (A)     Culture (A)      10,000-50,000 CFU/mL Streptococcus agalactiae (Group B Streptococcus)       Susceptibility    Escherichia coli - ORESTES     Ampicillin >=32 Resistant ug/mL     Ampicillin/ Sulbactam >=32 Resistant ug/mL     Piperacillin/Tazobactam 64 Resistant ug/mL     Cefazolin* 8 Susceptible ug/mL      * Cefazolin ORESTES breakpoints are for the treatment of uncomplicated urinary tract infections. For the treatment of systemic infections, please contact the laboratory for additional testing.     Cefoxitin <=4 Susceptible ug/mL     Ceftazidime <=1 Susceptible ug/mL     Ceftriaxone <=1 Susceptible ug/mL     Cefepime <=1 Susceptible ug/mL     Gentamicin <=1 Susceptible ug/mL     Tobramycin <=1 Susceptible ug/mL     Ciprofloxacin <=0.25 Susceptible ug/mL     Levofloxacin <=0.12 Susceptible ug/mL     Nitrofurantoin <=16 Susceptible ug/mL     Trimethoprim/Sulfamethoxazole <=1/19 Susceptible ug/mL         Prior to immunization administration, verified patients  identity using patient s name and date of birth. Please see Immunization Activity for additional information.     Screening Questionnaire for Adult Immunization    Are you sick today?   No   Do you have allergies to medications, food, a vaccine component or latex?   Yes   Have you ever had a serious reaction after receiving a vaccination?   No   Do you have a long-term health problem with heart, lung, kidney, or metabolic disease (e.g., diabetes), asthma, a blood disorder, no spleen, complement component deficiency, a cochlear implant, or a spinal fluid leak?  Are you on long-term aspirin therapy?   Yes   Do you have cancer, leukemia, HIV/AIDS, or any other immune system problem?   No   Do you have a parent, brother, or sister with an immune system problem?   No   In the past 3 months, have you taken medications that affect  your immune system, such as prednisone, other steroids, or anticancer drugs; drugs for the treatment of rheumatoid arthritis, Crohn s disease, or psoriasis; or have you had radiation treatments?   No   Have you had a seizure, or a brain or other nervous system problem?   No   During the past year, have you received a transfusion of blood or blood    products, or been given immune (gamma) globulin or antiviral drug?   No   For women: Are you pregnant or is there a chance you could become       pregnant during the next month?   No   Have you received any vaccinations in the past 4 weeks?   No     Immunization questionnaire was positive for at least one answer.  Notified .      Patient instructed to remain in clinic for 15 minutes afterwards, and to report any adverse reactions.     Screening performed by Blanca Paiz CMA on 11/9/2023 at 1:08 PM.

## 2023-11-10 LAB
ANION GAP SERPL CALCULATED.3IONS-SCNC: 10 MMOL/L (ref 7–15)
BUN SERPL-MCNC: 8.6 MG/DL (ref 6–20)
CALCIUM SERPL-MCNC: 9.1 MG/DL (ref 8.6–10)
CHLORIDE SERPL-SCNC: 101 MMOL/L (ref 98–107)
CHOLEST SERPL-MCNC: 259 MG/DL
CREAT SERPL-MCNC: 0.44 MG/DL (ref 0.51–0.95)
DEPRECATED HCO3 PLAS-SCNC: 26 MMOL/L (ref 22–29)
EGFRCR SERPLBLD CKD-EPI 2021: >90 ML/MIN/1.73M2
GLUCOSE SERPL-MCNC: 226 MG/DL (ref 70–99)
HDLC SERPL-MCNC: 58 MG/DL
LDLC SERPL CALC-MCNC: 183 MG/DL
NONHDLC SERPL-MCNC: 201 MG/DL
POTASSIUM SERPL-SCNC: 4 MMOL/L (ref 3.4–5.3)
SODIUM SERPL-SCNC: 137 MMOL/L (ref 135–145)
TRIGL SERPL-MCNC: 88 MG/DL

## 2023-11-12 LAB
BACTERIA UR CULT: ABNORMAL
BACTERIA UR CULT: ABNORMAL

## 2023-11-13 RX ORDER — NITROFURANTOIN 25; 75 MG/1; MG/1
100 CAPSULE ORAL 2 TIMES DAILY
Qty: 14 CAPSULE | Refills: 0 | Status: SHIPPED | OUTPATIENT
Start: 2023-11-13 | End: 2023-11-20

## 2023-11-16 ENCOUNTER — MYC MEDICAL ADVICE (OUTPATIENT)
Dept: FAMILY MEDICINE | Facility: CLINIC | Age: 38
End: 2023-11-16
Payer: COMMERCIAL

## 2023-11-16 NOTE — TELEPHONE ENCOUNTER
Already addressed and sent to PCP, Dr Banks. See previous My Chart message.    Danisha Feliz RN  [facility]

## 2023-11-18 ENCOUNTER — NURSE TRIAGE (OUTPATIENT)
Dept: NURSING | Facility: CLINIC | Age: 38
End: 2023-11-18
Payer: COMMERCIAL

## 2023-11-19 NOTE — TELEPHONE ENCOUNTER
Nurse Triage SBAR    Is this a 2nd Level Triage? YES, LICENSED PRACTITIONER REVIEW IS REQUIRED    Situation: N/V    Background: Ozempic on Tuesday, started projectile vomiting Wednesday, has been having emesis since then. Has only been drinking fluids and small amounts of bananas/applesauce. Has been utilizing Zofran but the symptoms are not improving. Patient is type 2 diabetic. Continuing to drink fluids. Reports trying to take zofran less as she thinks it is constipating her, hasn't had a stool in 4 days. History of chronic migraines.     Assessment: 5 occurrences of emesis today; vomiting only liquid, yellow-tinged. Denies abdominal pain. Reports heart-burn and regurgitation. Last urinated 20 minutes ago (light yellow); no recent blood sugar reading as patient ran out of supplies for her simón monitoring system (last reading was 11/16 at 0230, reading was 95)     Protocol Recommended Disposition:   Go to ED Now (Or PCP Triage)    2327 page to Dr. Irvin Goins  3405 2nd page  5950 page to Dr. Irvin Goins through answering service. Dr. Goins recommends patient be seen in the ED.     Recommendation: Shared provider recommendation with patient; patient will go to the ED at [facility], patient's sibling will drive. No further questions or concerns at this time.     Paged to provider    Does the patient meet one of the following criteria for ADS visit consideration? 16+ years old, with an [facility] PCP     TIP  Providers, please consider if this condition is appropriate for management at one of our Acute and Diagnostic Services sites.     If patient is a good candidate, please use dotphrase <dot>triageresponse and select Refer to ADS to document.    Reason for Disposition   High-risk adult (e.g., diabetes mellitus, brain tumor, V-P shunt, hernia)    Additional Information   Negative: Shock suspected (e.g., cold/pale/clammy skin, too weak to stand, low BP, rapid pulse)   Negative: Difficult to awaken or acting confused  "(e.g., disoriented, slurred speech)   Negative: Sounds like a life-threatening emergency to the triager   Negative: Vomiting occurs only while coughing   Negative: [1] Pregnant < 20 Weeks AND [2] nausea/vomiting began in early pregnancy (i.e., 4-8 weeks pregnant)   Negative: Chest pain   Negative: Headache is main symptom   Negative: Vomiting (or Nausea) in a cancer patient who is currently (or recently) receiving chemotherapy or radiation therapy, or cancer patient who has metastatic or end-stage cancer and is receiving palliative care   Negative: [1] Vomiting AND [2] contains red blood or black (\"coffee ground\") material  (Exception: Few red streaks in vomit that only happened once.)   Negative: Severe pain in one eye   Negative: Recent head injury (within last 3 days)   Negative: Recent abdominal injury (within last 3 days)   Negative: [1] Insulin-dependent diabetes (Type I) AND [2] glucose > 400 mg/dl (22 mmol/l)   Negative: [1] Vomiting AND [2] hernia is more painful or swollen than usual   Negative: [1] SEVERE vomiting (e.g., 6 or more times/day) AND [2] present > 8 hours (Exception: Patient sounds well, is drinking liquids, does not sound dehydrated, and vomiting has lasted less than 24 hours.)   Negative: [1] MODERATE vomiting (e.g., 3 - 5 times/day) AND [2] age > 60 years   Negative: Severe headache (e.g., excruciating)  (Exception: Similar to previous migraines.)    Protocols used: Vomiting-A-AH    "

## 2023-11-20 ENCOUNTER — MYC MEDICAL ADVICE (OUTPATIENT)
Dept: FAMILY MEDICINE | Facility: CLINIC | Age: 38
End: 2023-11-20
Payer: COMMERCIAL

## 2023-11-20 DIAGNOSIS — E11.9 TYPE 2 DIABETES MELLITUS WITHOUT COMPLICATION, UNSPECIFIED WHETHER LONG TERM INSULIN USE (H): ICD-10-CM

## 2023-11-20 DIAGNOSIS — Z76.0 ENCOUNTER FOR MEDICATION REFILL: ICD-10-CM

## 2023-11-22 RX ORDER — FLASH GLUCOSE SENSOR
1 KIT MISCELLANEOUS
Qty: 2 EACH | Refills: 11 | Status: SHIPPED | OUTPATIENT
Start: 2023-11-22 | End: 2024-08-19

## 2023-11-30 ENCOUNTER — TELEPHONE (OUTPATIENT)
Dept: FAMILY MEDICINE | Facility: CLINIC | Age: 38
End: 2023-11-30
Payer: COMMERCIAL

## 2023-11-30 NOTE — TELEPHONE ENCOUNTER
General Call    Contacts         Type Contact Phone/Fax    11/30/2023 08:22 AM CST Phone (Incoming) Ruth Vanegas (Self) [phone] (M)          Reason for Call:  FMLA paperwork    What are your questions or concerns:      Pt calling clinic to see if Dr. Banks is able to fill out some FMLA paperwork for her regarding a recent illness a while ago. Per pt, she has Type 2 Diabetes and was prescribed an injection which made her really sick couple weeks ago.     Pt is asking if Dr. Banks is able to back date the FMLA paperwork to the 15th of November (when pt was sick with Ozempic)? Pt does plan to fax the paperwork to [facility] [phone] today.    Pt does have a telephone visit with Dr. Banks tomorrow as well. Writer encouraged pt to discuss this with provider during visit tomorrow.    Will route message to Dr. Banks and Care Team to look out for fax.    Date of last appointment with provider: 11/09/2023    Could we send this information to you in [facility] or would you prefer to receive a phone call?:   Patient would prefer a phone call   Okay to leave a detailed message?: Yes at Cell number on file:    Telephone Information:   Mobile [phone]     SADE Giles, RN   [facility]

## 2023-12-05 ENCOUNTER — VIRTUAL VISIT (OUTPATIENT)
Dept: FAMILY MEDICINE | Facility: CLINIC | Age: 38
End: 2023-12-05
Payer: COMMERCIAL

## 2023-12-05 DIAGNOSIS — Z79.4 TYPE 2 DIABETES MELLITUS WITH HYPERGLYCEMIA, WITH LONG-TERM CURRENT USE OF INSULIN (H): Primary | ICD-10-CM

## 2023-12-05 DIAGNOSIS — E11.65 TYPE 2 DIABETES MELLITUS WITH HYPERGLYCEMIA, WITH LONG-TERM CURRENT USE OF INSULIN (H): Primary | ICD-10-CM

## 2023-12-05 DIAGNOSIS — R11.2 NAUSEA AND VOMITING, UNSPECIFIED VOMITING TYPE: ICD-10-CM

## 2023-12-05 PROCEDURE — 99214 OFFICE O/P EST MOD 30 MIN: CPT | Mod: VID | Performed by: FAMILY MEDICINE

## 2023-12-05 RX ORDER — PROMETHAZINE HYDROCHLORIDE 25 MG/1
25 TABLET ORAL EVERY 6 HOURS PRN
COMMUNITY
Start: 2023-11-19

## 2023-12-05 ASSESSMENT — PATIENT HEALTH QUESTIONNAIRE - PHQ9
SUM OF ALL RESPONSES TO PHQ QUESTIONS 1-9: 7
10. IF YOU CHECKED OFF ANY PROBLEMS, HOW DIFFICULT HAVE THESE PROBLEMS MADE IT FOR YOU TO DO YOUR WORK, TAKE CARE OF THINGS AT HOME, OR GET ALONG WITH OTHER PEOPLE: NOT DIFFICULT AT ALL
SUM OF ALL RESPONSES TO PHQ QUESTIONS 1-9: 7

## 2023-12-05 NOTE — LETTER
December 15, 2023      Ruth BETANCUR Romina  [address]   [place] [address]        To Whom It May Concern:    Ruth Vanegas  reports illness. Please excuse her today due to illness.        Sincerely,        Milton Banks MD

## 2023-12-05 NOTE — PROGRESS NOTES
Ruth is a 37 year old who is being evaluated via a billable video visit.      How would you like to obtain your AVS? MyChart  If the video visit is dropped, the invitation should be resent by: Text to cell phone: [phone]  Will anyone else be joining your video visit? No          Assessment & Plan     Type 2 diabetes mellitus with hyperglycemia, with long-term current use of insulin (H)  - Adult Endocrinology  Referral; Future  Nausea and vomiting, unspecified vomiting type  EHR reviewed.   Past medical history, problem list, past surgical history, family history, social history, medications reviewed, updated, reconciled.   Last office visit plan reviewed with patient.   Last ED visit reviewed with patient.   She certainly has the possibility of medication intolerance. Will not go back on ozempic.   Reviewed her trends regarding HbA1C since last February. Our treatment has not been therapeutic. Referred back to endocrinology for worsening and uncontrolled diabetes. She agrees with the plan.   FMLA discussed. Dating starting from 11/15/23. We reviewed expectations and possible absences from work. She will have FMLA completed for 3 months. At that time she would be returned to work as usual but if there is ongoing concerns or needs can revisit her FMLA plan. Currently will anticipate two to four days absent monthly for the next three months. See scanned completed document.   Encouraged to follow up for other chronic medical problems and keep up with routine health maintenance.     Milton Banks MD  [facility]   Ruth is a 37 year old, presenting for the following health issues:  Diabetes and Follow Up        12/5/2023    10:20 AM   Additional Questions   Roomed by hser   Accompanied by self       HPI     [age] year old female with history of diabetes, hyperlipidemia, migraines, obesity seen for follow up, medication concerns, completion of FMLA.   She  was working with endocrinology last year. Her diabetes was well controlled on lantus, invokana, metformin. She was seen here again for diabetes care.   There was interruption of her medication use. This was restarted. She still  had worsening control. She requested another try of ozempic. Since starting that medication, though not sure if it was a coincidence, she starting to have severe nausea and vomiting. She was seen in the ED on 11/19/23. She was very dehydrated, had elevated beta hydroxybutyrate, normal glucose, low sodium and potassium. It was thought to medication side affects. She has not taken ozempic since that time. Has been taking other medications as directed. She missed a lot of work. She is worried about her job. She notes it could be a while before her medications and diabetes is working and suiting her. She requests FMLA from 11/15/23 going forward.           Objective    Vitals - Patient Reported  Systolic (Patient Reported):  (unable to)  Diastolic (Patient Reported):  (unable to)  Weight (Patient Reported):  (unable to)  Height (Patient Reported):  (unabled  to)  Temperature (Patient Reported):  (unable to)      Vitals:  No vitals were obtained today due to virtual visit.    Physical Exam   GENERAL: Healthy, alert and no distress  EYES: Eyes grossly normal to inspection.  No discharge or erythema, or obvious scleral/conjunctival abnormalities.  RESP: No audible wheeze, cough, or visible cyanosis.  No visible retractions or increased work of breathing.    SKIN: Visible skin clear. No significant rash, abnormal pigmentation or lesions.  NEURO: Cranial nerves grossly intact.  Mentation and speech appropriate for age.  PSYCH: Mentation appears normal, affect normal/bright, judgement and insight intact, normal speech and appearance well-groomed.            Video-Visit Details    Type of service:  Video Visit   Video Start Time:  10:55  Video End Time:11:14 AM    Originating Location (pt. Location):  Home    Distant Location (provider location):  On-site  Platform used for Video Visit: IQ Elite    Prior to immunization administration, verified patients identity using patient s name and date of birth. Please see Immunization Activity for additional information.     Screening Questionnaire for Adult Immunization    Are you sick today?   No   Do you have allergies to medications, food, a vaccine component or latex?   Yes   Have you ever had a serious reaction after receiving a vaccination?   No   Do you have a long-term health problem with heart, lung, kidney, or metabolic disease (e.g., diabetes), asthma, a blood disorder, no spleen, complement component deficiency, a cochlear implant, or a spinal fluid leak?  Are you on long-term aspirin therapy?   Yes   Do you have cancer, leukemia, HIV/AIDS, or any other immune system problem?   No   Do you have a parent, brother, or sister with an immune system problem?   No   In the past 3 months, have you taken medications that affect  your immune system, such as prednisone, other steroids, or anticancer drugs; drugs for the treatment of rheumatoid arthritis, Crohn s disease, or psoriasis; or have you had radiation treatments?   No   Have you had a seizure, or a brain or other nervous system problem?   No   During the past year, have you received a transfusion of blood or blood    products, or been given immune (gamma) globulin or antiviral drug?   No   For women: Are you pregnant or is there a chance you could become       pregnant during the next month?   No   Have you received any vaccinations in the past 4 weeks?   No     Immunization questionnaire was positive for at least one answer.  Notified provider.      Patient instructed to remain in clinic for 15 minutes afterwards, and to report any adverse reactions.     Screening performed by Karla Diaz MA on 12/5/2023 at 10:40 AM.

## 2023-12-11 ENCOUNTER — OFFICE VISIT (OUTPATIENT)
Dept: ENDOCRINOLOGY | Facility: CLINIC | Age: 38
End: 2023-12-11
Payer: COMMERCIAL

## 2023-12-11 VITALS
SYSTOLIC BLOOD PRESSURE: 116 MMHG | DIASTOLIC BLOOD PRESSURE: 80 MMHG | HEART RATE: 73 BPM | WEIGHT: 285 LBS | OXYGEN SATURATION: 97 % | BODY MASS INDEX: 46 KG/M2

## 2023-12-11 DIAGNOSIS — E11.40 TYPE 2 DIABETES MELLITUS WITH DIABETIC NEUROPATHY, WITH LONG-TERM CURRENT USE OF INSULIN (H): ICD-10-CM

## 2023-12-11 DIAGNOSIS — Z79.4 TYPE 2 DIABETES MELLITUS WITH DIABETIC NEUROPATHY, WITH LONG-TERM CURRENT USE OF INSULIN (H): ICD-10-CM

## 2023-12-11 DIAGNOSIS — E11.9 TYPE 2 DIABETES MELLITUS WITHOUT COMPLICATION, UNSPECIFIED WHETHER LONG TERM INSULIN USE (H): Primary | ICD-10-CM

## 2023-12-11 DIAGNOSIS — B37.31 CANDIDIASIS OF VAGINA: ICD-10-CM

## 2023-12-11 PROCEDURE — 99215 OFFICE O/P EST HI 40 MIN: CPT | Performed by: PHYSICIAN ASSISTANT

## 2023-12-11 RX ORDER — INSULIN ASPART 100 [IU]/ML
INJECTION, SOLUTION INTRAVENOUS; SUBCUTANEOUS
Qty: 15 ML | Refills: 3 | Status: SHIPPED | OUTPATIENT
Start: 2023-12-11

## 2023-12-11 RX ORDER — PEN NEEDLE, DIABETIC 32GX 5/32"
NEEDLE, DISPOSABLE MISCELLANEOUS
Qty: 100 EACH | Refills: 11 | Status: SHIPPED | OUTPATIENT
Start: 2023-12-11 | End: 2023-12-14

## 2023-12-11 RX ORDER — FLUCONAZOLE 150 MG/1
150 TABLET ORAL ONCE
Qty: 1 TABLET | Refills: 0 | Status: SHIPPED | OUTPATIENT
Start: 2023-12-11 | End: 2023-12-11

## 2023-12-11 ASSESSMENT — PAIN SCALES - GENERAL: PAINLEVEL: NO PAIN (0)

## 2023-12-11 NOTE — PROGRESS NOTES
HPI:   Ruth is a pleasant 36 yo woman here for follow up of type 2 diabetes (PREETHI negative, C-peptide positive) since 2016.  She also has a history of Anemia, Depression, Depressive disorder, Dysthymic disorder, Endometriosis, Herpes genitalia, Hyperlipidemia, Kidney stone, Menorrhagia, Migraines, Neuropathy, PCOS (polycystic ovarian syndrome), and Post traumatic stress disorder (PTSD).  She previously saw Maris Leary, last visit 10/22, when she was graduated back to primary care, as glucose was well controlled.  Today is the first time I am meeting her.      Since she was last seen, she has had a lot of trouble with her glucose management.  Her glucose started to climb and her PCP put her on Ozempic.  She became violently ill and needed IV fluids in the ER. She remains on Invokana, Metformin and Lantus.   She has had yeast infections a lot- 2-3 times a month.  She did not realize that invokana can increase her risk for yeast infections.     Ruth reports she was first diagnosed with diabetes in 2016 on routine blood work prior to a procedure.  She initially was started on metformin, then insulin was added years later.     Current treatment:   Invokana 300 mg daily  Metformin 1000 mg bid.  Lantus- 24 units daily.     Previous treatment:   Victoza- stopped it in 2020 when she started insulin.     Typical day:   Not hungry in the morning.  Wakes up   Daughter wakes her up before she leaves for school.  Drinks water.    Nauseated if she tries to eat when she wakes up.  Unsure why.   Does not eat very much.   Weight fluctuates.Recent loss.   Works as a  overnights.   2020- got really sick.  H. Pylori. Lots of GI issues since then.   Sensitive stomach.  Eats small portions.   She has been seen by  GI in the past.     Previous diabetes treatments:               Diabetes Control:   Lab Results   Component Value Date    A1C 10.2 11/09/2023    A1C 9.5 08/11/2023    A1C 8.3 05/11/2023    A1C 7.2 02/28/2022     A1C 8.5 2021    A1C 6.8 2016    A1C 6.1 2013    A1C 5.9 2011       Past Medical History:   Diagnosis Date    Anemia     told to take iron pills when pregnant    Depression     Depressive disorder     Diabetes mellitus (H)     Diabetes mellitus, type 2 (H) 07/15/2021    Dysthymic disorder     Endometriosis     Herpes genitalia     Hyperlipidemia     Kidney stone     Menorrhagia     Migraines     Neuropathy     Other abnormal Papanicolaou smear of cervix and cervical HPV(795.09) 2013    PCOS (polycystic ovarian syndrome)     Post traumatic stress disorder (PTSD)        Past Surgical History:   Procedure Laterality Date    BLADDER REPAIR W/  SECTION      X2    C/SECTION, CLASSICAL      x2    COLONOSCOPY N/A 2021    Procedure: COLONOSCOPY;  Surgeon: Rene Lehman DO;  Location: [facility]    DILATION AND CURETTAGE  2015    DILATION AND CURETTAGE  2015    DILATION AND CURETTAGE, OPERATIVE HYSTEROSCOPY, COMBINED N/A 2015    Procedure: Dilation and Curettage with Hysteroscopy;  Surgeon: Zia Farmer MD;  [place] [facility] [place]:     DILATION AND CURETTAGE, OPERATIVE HYSTEROSCOPY, COMBINED N/A 2016    Procedure: DILATION AND CURETTAGE WITH HYSTEROSCOPY INSERT MIRENA;  Surgeon: Suzanne Major MD;  [place] [facility] [place]  Service:     [place] SURGERY      ESOPHAGOSCOPY, GASTROSCOPY, DUODENOSCOPY (EGD), COMBINED N/A 2021    Procedure: ESOPHAGOGASTRODUODENOSCOPY, WITH BIOPSY;  Surgeon: Rene Lehamn DO;  Location:  GI    GYN SURGERY  10/19/15    laproscopic lysis of adhesions    HYSTEROSCOPY, ABLATE ENDOMETRIUM HYDROTHERMAL, COMBINED N/A 3/2/2018    Procedure: HYSTEROSCOPY, DILATION AND CURETTAGE, ENDOMETRIAL ABLATION;  Surgeon: Kristy Israel DO;  [place] [facility] [place]  Service: [place]necology    LAPAROSCOPIC HYSTERECTOMY TOTAL N/A 3/4/2019    Procedure: ROBOTIC TOTAL LAPAROSCOPIC HYSTERECTOMY, BILATERAL SALPINGECTOMY, LEFT  OVARIAN CYSTOTOMY. CYSTOSCOPY;  Surgeon: Zia Farmer MD;  Location: [facility] [place];  Service: Gynecology    LAPAROSCOPY DIAGNOSTIC (GENERAL) N/A 10/19/2015    Procedure:  LAPAROSCOPY,LYSIS OF ADHESIONS;  Surgeon: Zia Farmer MD;  Location: [facility] OR;  Service:     NH LAP,TUBAL CAUTERY Bilateral 3/2/2018    Procedure: LAPAROSCOPIC BILATERAL TUBAL LIGATION;  Surgeon: Kristy Israel DO;  Location: [facility] OR;  Service: Gynecology    TONSILLECTOMY         Family History   Adopted: Yes   Problem Relation Age of Onset    Chronic Obstructive Pulmonary Disease Mother     Prostate Cancer Father     Cancer Father         prostate    Alcoholism Sister     Alcoholism Sister     Alcoholism Brother     Alcoholism Brother     Diabetes Paternal Grandmother     Other Cancer Paternal Grandmother     Alcoholism Daughter     Coronary Artery Disease No family hx of     Urolithiasis No family hx of     Clotting Disorder No family hx of     Gout No family hx of     Heart Disease No family hx of     Anesthesia Reaction No family hx of        Social History   Unknown. Adopted.  Daughter- precocious puberty 3-4 years old, ?PCOS.   Socioeconomic History    Marital status: Single   Tobacco Use    Smoking status: Never    Smokeless tobacco: Never   Vaping Use    Vaping Use: Never used   Substance and Sexual Activity    Alcohol use: Yes     Comment: Socially    Drug use: No     Social Determinants of Health     Financial Resource Strain: Low Risk  (11/30/2023)    Financial Resource Strain     Within the past 12 months, have you or your family members you live with been unable to get utilities (heat, electricity) when it was really needed?: No   Food Insecurity: Low Risk  (11/30/2023)    Food Insecurity     Within the past 12 months, did you worry that your food would run out before you got money to buy more?: No     Within the past 12 months, did the food you bought just not last and you didn t have money to get  more?: No   Transportation Needs: Low Risk  (11/30/2023)    Transportation Needs     Within the past 12 months, has lack of transportation kept you from medical appointments, getting your medicines, non-medical meetings or appointments, work, or from getting things that you need?: No   Interpersonal Safety: Low Risk  (11/9/2023)    Interpersonal Safety     Do you feel physically and emotionally safe where you currently live?: Yes     Within the past 12 months, have you been hit, slapped, kicked or otherwise physically hurt by someone?: No     Within the past 12 months, have you been humiliated or emotionally abused in other ways by your partner or ex-partner?: No   Housing Stability: Low Risk  (11/30/2023)    Housing Stability     Do you have housing? : Yes     Are you worried about losing your housing?: No       Current Outpatient Medications   Medication    acetaminophen (TYLENOL) 500 MG tablet    atorvastatin (LIPITOR) 10 MG tablet    canagliflozin (INVOKANA) 300 MG tablet    Continuous Blood Gluc Sensor (FREESTYLE CINDY 14 DAY SENSOR) MISC    Continuous Blood Gluc Sensor (FREESTYLE CINDY 2 SENSOR) MISC    dexAMETHasone (DECADRON) 4 MG/ML injection    diazepam (VALIUM) 5 MG tablet    fluconazole (DIFLUCAN) 150 MG tablet    gabapentin (NEURONTIN) 300 MG capsule    galcanezumab-gnlm (EMGALITY) 120 MG/ML injection    galcanezumab-gnlm (EMGALITY) 120 MG/ML injection    ibuprofen (ADVIL/MOTRIN) 600 MG tablet    insulin glargine (LANTUS PEN) 100 UNIT/ML pen    insulin  UNIT/ML injection    insulin pen needle (ULTICARE MICRO) 32G X 4 MM miscellaneous    Melatonin 10 MG CAPS    meloxicam (MOBIC) 7.5 MG tablet    metFORMIN (GLUCOPHAGE XR) 500 MG 24 hr tablet    multivitamin (ONE-DAILY) tablet    ondansetron (ZOFRAN) 4 MG tablet    promethazine (PHENERGAN) 25 MG tablet    rizatriptan (MAXALT) 10 MG tablet    tolterodine ER (DETROL LA) 4 MG 24 hr capsule    topiramate (TOPAMAX) 100 MG tablet    verapamil ER (VERELAN)  120 MG 24 hr capsule    vitamin D3 (CHOLECALCIFEROL) 125 MCG (5000 UT) tablet     Current Facility-Administered Medications   Medication    0.5 mL bupivacaine (MARCAINE) 0.5% injection (50 mL vial)    0.5 mL bupivacaine (MARCAINE) 0.5% injection (50 mL vial)    Botulinum Toxin Type A (BOTOX) 200 units injection 200 Units    triamcinolone (KENALOG-40) injection 40 mg    triamcinolone (KENALOG-40) injection 40 mg          Allergies   Allergen Reactions    Hydrocodone Other (See Comments)     Headache per pt and hallucinations  Headache per pt and hallucinations      Reglan [Metoclopramide]      Anxiety    Vicodin [Hydrocodone-Acetaminophen] Other (See Comments)     Hallucinations    Silver Nitrate Itching and Rash     Only issues if in for a long time  Only issues if in for a long time  Only issues if in for a long time      Tegaderm Transparent Dressing (Informational Only) Rash       Physical Exam  /80   Pulse 73   Wt 129.3 kg (285 lb)   LMP  (LMP Unknown)   SpO2 97%   BMI 46.00 kg/m    GENERAL:  Alert and oriented X3, NAD, well dressed, answering questions appropriately, appears stated age.  HEENT: OP clear, no lymphadenopathy, no thyromegaly, non-tender, no exophthalmus, no proptosis, EOMI, no lid lag, no retraction  EXTREMITIES: no edema, +pulses, no rashes, no lesions.  Missing toenail.   NEUROLOGY: + monofilament, +vibratory sensation, + DTR upper and lower extremity    RESULTS  Lab Results   Component Value Date    A1C 10.2 (H) 11/09/2023    A1C 9.5 (H) 08/11/2023    A1C 8.3 (H) 05/11/2023    A1C 7.2 (H) 02/28/2022    A1C 8.5 (H) 05/05/2021    A1C 6.8 (H) 09/28/2016    A1C 6.1 (H) 08/14/2013    A1C 5.9 (H) 06/07/2011    HEMOGLOBINA1 7.9 10/28/2021       TSH   Date Value Ref Range Status   02/28/2022 1.71 0.40 - 4.00 mU/L Final   04/21/2020 0.87 0.30 - 5.00 uIU/mL Final       ALT   Date Value Ref Range Status   08/11/2023 13 0 - 50 U/L Final     Comment:     Reference intervals for this test were  updated on 6/12/2023 to more accurately reflect our healthy population. There may be differences in the flagging of prior results with similar values performed with this method. Interpretation of those prior results can be made in the context of the updated reference intervals.     05/11/2023 14 10 - 35 U/L Final   06/07/2011 17.0 0.0 - 50.0 u/l Final   ]    Recent Labs   Lab Test 11/09/23  1351 05/11/23  1321   CHOL 259* 228*   HDL 58 51   * 160*   TRIG 88 84       Lab Results   Component Value Date     11/09/2023    .2 09/28/2016      Lab Results   Component Value Date    POTASSIUM 4.0 11/09/2023    POTASSIUM 3.9 02/28/2022    POTASSIUM 3.4 09/28/2016     Lab Results   Component Value Date    CHLORIDE 101 11/09/2023    CHLORIDE 106 02/28/2022    CHLORIDE 99.9 09/28/2016     Lab Results   Component Value Date    RADHA 9.1 11/09/2023    RADHA 8.9 09/28/2016     Lab Results   Component Value Date    CO2 26 11/09/2023    CO2 26 02/28/2022    CO2 26.9 09/28/2016     Lab Results   Component Value Date    BUN 8.6 11/09/2023    BUN 9 02/28/2022    BUN 8.7 09/28/2016     Lab Results   Component Value Date    CR 0.44 11/09/2023    CR 0.5 09/28/2016       GFR Estimate   Date Value Ref Range Status   11/09/2023 >90 >60 mL/min/1.73m2 Final   08/11/2023 >90 >60 mL/min/1.73m2 Final   05/11/2023 >90 >60 mL/min/1.73m2 Final     Comment:     eGFR calculated using 2021 CKD-EPI equation.   05/05/2021 >60 >60 mL/min/1.73m2 Final   02/17/2021 >60 >60 mL/min/1.73m2 Final   11/04/2020 >60 >60 mL/min/1.73m2 Final   09/28/2016 154.0 mL/min/1.7 m2 Final   08/14/2013 > 60 >60 ml/min/1.73m2 Final     GFR Estimate If Black   Date Value Ref Range Status   05/05/2021 >60 >60 mL/min/1.73m2 Final   02/17/2021 >60 >60 mL/min/1.73m2 Final   11/04/2020 >60 >60 mL/min/1.73m2 Final   09/28/2016 186.3 mL/min/1.7 m2 Final   08/14/2013 > 60 >60 ml/min/1.73m2 Final       Lab Results   Component Value Date    MICROL <12.0 05/11/2023    MICROL  "15 02/28/2022     No results found for: \"MICROALBUMIN\"  Lab Results   Component Value Date    CPEPT 5.1 02/28/2022    GADAB <5.0 02/28/2022       Vitamin B12   Date Value Ref Range Status   02/28/2022 437 193 - 986 pg/mL Final   06/07/2011 443 223 - 1132 pg/ml Final       Most recent eye exam date: : Not Found     Glutamic Acid Decarboxylase Antibody   Date Value Ref Range Status   02/28/2022 <5.0 0.0 - 5.0 IU/mL Final     Comment:     INTERPRETIVE INFORMATION:  Glutamic Acid Decarboxylase   Antibody    A value greater than 5.0 IU/mL is considered positive for   Glutamic Acid Decarboxylase Antibody (PREETHI Ab). This assay   is intended for the semi-quantitative determination of the   PREETHI Ab in human serum. Results should be interpreted within   the context of clinical symptoms.  Performed By: [facility]  [address] [place] [id number]  : Evita Oliva MD     C Peptide   Date Value Ref Range Status   02/28/2022 5.1 0.9 - 6.9 ng/mL Final     Assessment/Plan:     1.  Type 2 diabetes- Ruth has had worsening of her glucose control in the past year.  A1c was up to 10.2%.  Patient has had significant side effects from GLP_1 agonists and seems to have symptoms suggestive of gastroparesis.  She is also dealing with recurrent yeast infections, so I would recommend temporarily stopping invokana until these have cleared and glucose is lower.  Discussed the best option for now is to add short acting insulin and she is in agreement.  As post-prandial hyperglycemia improves, may need to lower lantus to prevent hypoglycemia.  Discussed referral to weight management clinic to explore options, including bariatric surgery in the future.  We made the following plan today (instructions given to patient):      Stop Invokana.     Take diflucan 150 mg x 1.     Start Novolog 5 units (max 10 units) 15 minutes before your largest meal.     We may need to lower your lantus insulin if you start going low. "     If you are comfortable, start taking this before lunch, too.     Meet with diabetes educator to review your doses.     Please let me know if you are having low blood sugars less than 70 or over 250 mg/dL.      If you have concerns, please send me a healthfinch message M-Th, call the clinic at [phone], or call [phone] after hours/weekends and ask to speak with the endocrinologist on call.        2.  Risk factors-     Retinopathy:  No.  Had eye exam 2022.  Will schedule  Nephropathy:  BP well controlled. No microalbuminuria.  Creatinine stable.   Neuropathy: Yes - on gabapentin.  Normal b12.   Lipids:  LDL at target.  Patient taking statin  Thyroid screening: Will check TSH.   Celiac screening: will screen antibodies.     3.  F/U in 3 mos with me, in 1 month with DM education, sooner with concerns.     I spent 42  minutes with this patient face to face and explained the conditions and plans (more than 50% of time was counseling/coordination of care, diabetes management, follow up plan for worsening hyper and hypoglycemia) . The patient understood and is satisfied with today's visit.      Yanet Christie PA-C, MPAS   [facility]  Department of Medicine  Division of Endocrinology and Diabetes

## 2023-12-11 NOTE — NURSING NOTE
"Chief Complaint   Patient presents with    Diabetes     Vital signs:      BP: 116/80 Pulse: 73     SpO2: 97 %       Weight: 120.2 kg (265 lb)  Estimated body mass index is 42.77 kg/m  as calculated from the following:    Height as of 11/9/23: 1.676 m (5' 6\").    Weight as of this encounter: 120.2 kg (265 lb).        "

## 2023-12-11 NOTE — LETTER
12/11/2023       RE: Ruth Vanegas  [address]  [place] [address]     Dear Colleague,    Thank you for referring your patient, Ruth Vanegas, to the [facility] ENDOCRINOLOGY [facility]INIC [place] [facility]. Please see a copy of my visit note below.    HPI:   Ruth is a pleasant 38 yo woman here for follow up of type 2 diabetes (PREETHI negative, C-peptide positive) since 2016.  She also has a history of Anemia, Depression, Depressive disorder, Dysthymic disorder, Endometriosis, Herpes genitalia, Hyperlipidemia, Kidney stone, Menorrhagia, Migraines, Neuropathy, PCOS (polycystic ovarian syndrome), and Post traumatic stress disorder (PTSD).  She previously saw Maris Leary, last visit 10/22, when she was graduated back to primary care, as glucose was well controlled.  Today is the first time I am meeting her.      Since she was last seen, she has had a lot of trouble with her glucose management.  Her glucose started to climb and her PCP put her on Ozempic.  She became violently ill and needed IV fluids in the ER. She remains on Invokana, Metformin and Lantus.   She has had yeast infections a lot- 2-3 times a month.  She did not realize that invokana can increase her risk for yeast infections.     Ruth reports she was first diagnosed with diabetes in 2016 on routine blood work prior to a procedure.  She initially was started on metformin, then insulin was added years later.     Current treatment:   Invokana 300 mg daily  Metformin 1000 mg bid.  Lantus- 24 units daily.     Previous treatment:   Victoza- stopped it in 2020 when she started insulin.     Typical day:   Not hungry in the morning.  Wakes up   Daughter wakes her up before she leaves for school.  Drinks water.    Nauseated if she tries to eat when she wakes up.  Unsure why.   Does not eat very much.   Weight fluctuates.Recent loss.   Works as a  overnights.   2020-  got really sick.  H. Pylori. Lots of GI issues since then.   Sensitive stomach.  Eats small portions.   She has been seen by  GI in the past.     Previous diabetes treatments:               Diabetes Control:   Lab Results   Component Value Date    A1C 10.2 2023    A1C 9.5 2023    A1C 8.3 2023    A1C 7.2 2022    A1C 8.5 2021    A1C 6.8 2016    A1C 6.1 2013    A1C 5.9 2011       Past Medical History:   Diagnosis Date    Anemia     told to take iron pills when pregnant    Depression     Depressive disorder     Diabetes mellitus (H)     Diabetes mellitus, type 2 (H) 07/15/2021    Dysthymic disorder     Endometriosis     Herpes genitalia     Hyperlipidemia     Kidney stone     Menorrhagia     Migraines     Neuropathy     Other abnormal Papanicolaou smear of cervix and cervical HPV(795.09) 2013    PCOS (polycystic ovarian syndrome)     Post traumatic stress disorder (PTSD)        Past Surgical History:   Procedure Laterality Date    BLADDER REPAIR W/  SECTION      X2    C/SECTION, CLASSICAL      x2    COLONOSCOPY N/A 2021    Procedure: COLONOSCOPY;  Surgeon: Rene Lehman DO;  Location:  GI    DILATION AND CURETTAGE  2015    DILATION AND CURETTAGE  2015    DILATION AND CURETTAGE, OPERATIVE HYSTEROSCOPY, COMBINED N/A 2015    Procedure: Dilation and Curettage with Hysteroscopy;  Surgeon: Zia Farmer MD;  Location: [facility];  Service:     DILATION AND CURETTAGE, OPERATIVE HYSTEROSCOPY, COMBINED N/A 2016    Procedure: DILATION AND CURETTAGE WITH HYSTEROSCOPY INSERT MIRENA;  Surgeon: Suzanne Major MD;  Location: [facility];  Service:     ENT SURGERY      ESOPHAGOSCOPY, GASTROSCOPY, DUODENOSCOPY (EGD), COMBINED N/A 2021    Procedure: ESOPHAGOGASTRODUODENOSCOPY, WITH BIOPSY;  Surgeon: Rene Lehman DO;  Location:  GI    GYN SURGERY  10/19/15    laproscopic lysis of adhesions    HYSTEROSCOPY, ABLATE  ENDOMETRIUM HYDROTHERMAL, COMBINED N/A 3/2/2018    Procedure: HYSTEROSCOPY, DILATION AND CURETTAGE, ENDOMETRIAL ABLATION;  Surgeon: Kristy Israel DO;  Location: [facility] OR;  Service: Gynecology    LAPAROSCOPIC HYSTERECTOMY TOTAL N/A 3/4/2019    Procedure: ROBOTIC TOTAL LAPAROSCOPIC HYSTERECTOMY, BILATERAL SALPINGECTOMY, LEFT OVARIAN CYSTOTOMY. CYSTOSCOPY;  Surgeon: Zia Farmer MD;  Location: [facility] OR;  Service: Gynecology    LAPAROSCOPY DIAGNOSTIC (GENERAL) N/A 10/19/2015    Procedure:  LAPAROSCOPY,LYSIS OF ADHESIONS;  Surgeon: Zia Farmer MD;  Location: [facility] OR;  Service:     TN LAP,TUBAL CAUTERY Bilateral 3/2/2018    Procedure: LAPAROSCOPIC BILATERAL TUBAL LIGATION;  Surgeon: Kristy Israel DO;  Location: [facility] OR;  Service: Gynecology    TONSILLECTOMY         Family History   Adopted: Yes   Problem Relation Age of Onset    Chronic Obstructive Pulmonary Disease Mother     Prostate Cancer Father     Cancer Father         prostate    Alcoholism Sister     Alcoholism Sister     Alcoholism Brother     Alcoholism Brother     Diabetes Paternal Grandmother     Other Cancer Paternal Grandmother     Alcoholism Daughter     Coronary Artery Disease No family hx of     Urolithiasis No family hx of     Clotting Disorder No family hx of     Gout No family hx of     Heart Disease No family hx of     Anesthesia Reaction No family hx of        Social History   Unknown. Adopted.  Daughter- precocious puberty 3-4 years old, ?PCOS.   Socioeconomic History    Marital status: Single   Tobacco Use    Smoking status: Never    Smokeless tobacco: Never   Vaping Use    Vaping Use: Never used   Substance and Sexual Activity    Alcohol use: Yes     Comment: Socially    Drug use: No     Social Determinants of Health     Financial Resource Strain: Low Risk  (11/30/2023)    Financial Resource Strain     Within the past 12 months, have you or your family members you live with been unable to get  utilities (heat, electricity) when it was really needed?: No   Food Insecurity: Low Risk  (11/30/2023)    Food Insecurity     Within the past 12 months, did you worry that your food would run out before you got money to buy more?: No     Within the past 12 months, did the food you bought just not last and you didn t have money to get more?: No   Transportation Needs: Low Risk  (11/30/2023)    Transportation Needs     Within the past 12 months, has lack of transportation kept you from medical appointments, getting your medicines, non-medical meetings or appointments, work, or from getting things that you need?: No   Interpersonal Safety: Low Risk  (11/9/2023)    Interpersonal Safety     Do you feel physically and emotionally safe where you currently live?: Yes     Within the past 12 months, have you been hit, slapped, kicked or otherwise physically hurt by someone?: No     Within the past 12 months, have you been humiliated or emotionally abused in other ways by your partner or ex-partner?: No   Housing Stability: Low Risk  (11/30/2023)    Housing Stability     Do you have housing? : Yes     Are you worried about losing your housing?: No       Current Outpatient Medications   Medication    acetaminophen (TYLENOL) 500 MG tablet    atorvastatin (LIPITOR) 10 MG tablet    canagliflozin (INVOKANA) 300 MG tablet    Continuous Blood Gluc Sensor (FREESTYLE CINDY 14 DAY SENSOR) MISC    Continuous Blood Gluc Sensor (FREESTYLE CINDY 2 SENSOR) MISC    dexAMETHasone (DECADRON) 4 MG/ML injection    diazepam (VALIUM) 5 MG tablet    fluconazole (DIFLUCAN) 150 MG tablet    gabapentin (NEURONTIN) 300 MG capsule    galcanezumab-gnlm (EMGALITY) 120 MG/ML injection    galcanezumab-gnlm (EMGALITY) 120 MG/ML injection    ibuprofen (ADVIL/MOTRIN) 600 MG tablet    insulin glargine (LANTUS PEN) 100 UNIT/ML pen    insulin  UNIT/ML injection    insulin pen needle (ULTICARE MICRO) 32G X 4 MM miscellaneous    Melatonin 10 MG CAPS     meloxicam (MOBIC) 7.5 MG tablet    metFORMIN (GLUCOPHAGE XR) 500 MG 24 hr tablet    multivitamin (ONE-DAILY) tablet    ondansetron (ZOFRAN) 4 MG tablet    promethazine (PHENERGAN) 25 MG tablet    rizatriptan (MAXALT) 10 MG tablet    tolterodine ER (DETROL LA) 4 MG 24 hr capsule    topiramate (TOPAMAX) 100 MG tablet    verapamil ER (VERELAN) 120 MG 24 hr capsule    vitamin D3 (CHOLECALCIFEROL) 125 MCG (5000 UT) tablet     Current Facility-Administered Medications   Medication    0.5 mL bupivacaine (MARCAINE) 0.5% injection (50 mL vial)    0.5 mL bupivacaine (MARCAINE) 0.5% injection (50 mL vial)    Botulinum Toxin Type A (BOTOX) 200 units injection 200 Units    triamcinolone (KENALOG-40) injection 40 mg    triamcinolone (KENALOG-40) injection 40 mg          Allergies   Allergen Reactions    Hydrocodone Other (See Comments)     Headache per pt and hallucinations  Headache per pt and hallucinations      Reglan [Metoclopramide]      Anxiety    Vicodin [Hydrocodone-Acetaminophen] Other (See Comments)     Hallucinations    Silver Nitrate Itching and Rash     Only issues if in for a long time  Only issues if in for a long time  Only issues if in for a long time      Tegaderm Transparent Dressing (Informational Only) Rash       Physical Exam  /80   Pulse 73   Wt 129.3 kg (285 lb)   LMP  (LMP Unknown)   SpO2 97%   BMI 46.00 kg/m    GENERAL:  Alert and oriented X3, NAD, well dressed, answering questions appropriately, appears stated age.  HEENT: OP clear, no lymphadenopathy, no thyromegaly, non-tender, no exophthalmus, no proptosis, EOMI, no lid lag, no retraction  EXTREMITIES: no edema, +pulses, no rashes, no lesions.  Missing toenail.   NEUROLOGY: + monofilament, +vibratory sensation, + DTR upper and lower extremity    RESULTS  Lab Results   Component Value Date    A1C 10.2 (H) 11/09/2023    A1C 9.5 (H) 08/11/2023    A1C 8.3 (H) 05/11/2023    A1C 7.2 (H) 02/28/2022    A1C 8.5 (H) 05/05/2021    A1C 6.8 (H)  09/28/2016    A1C 6.1 (H) 08/14/2013    A1C 5.9 (H) 06/07/2011    HEMOGLOBINA1 7.9 10/28/2021       TSH   Date Value Ref Range Status   02/28/2022 1.71 0.40 - 4.00 mU/L Final   04/21/2020 0.87 0.30 - 5.00 uIU/mL Final       ALT   Date Value Ref Range Status   08/11/2023 13 0 - 50 U/L Final     Comment:     Reference intervals for this test were updated on 6/12/2023 to more accurately reflect our healthy population. There may be differences in the flagging of prior results with similar values performed with this method. Interpretation of those prior results can be made in the context of the updated reference intervals.     05/11/2023 14 10 - 35 U/L Final   06/07/2011 17.0 0.0 - 50.0 u/l Final   ]    Recent Labs   Lab Test 11/09/23  1351 05/11/23  1321   CHOL 259* 228*   HDL 58 51   * 160*   TRIG 88 84       Lab Results   Component Value Date     11/09/2023    .2 09/28/2016      Lab Results   Component Value Date    POTASSIUM 4.0 11/09/2023    POTASSIUM 3.9 02/28/2022    POTASSIUM 3.4 09/28/2016     Lab Results   Component Value Date    CHLORIDE 101 11/09/2023    CHLORIDE 106 02/28/2022    CHLORIDE 99.9 09/28/2016     Lab Results   Component Value Date    RADHA 9.1 11/09/2023    RADHA 8.9 09/28/2016     Lab Results   Component Value Date    CO2 26 11/09/2023    CO2 26 02/28/2022    CO2 26.9 09/28/2016     Lab Results   Component Value Date    BUN 8.6 11/09/2023    BUN 9 02/28/2022    BUN 8.7 09/28/2016     Lab Results   Component Value Date    CR 0.44 11/09/2023    CR 0.5 09/28/2016       GFR Estimate   Date Value Ref Range Status   11/09/2023 >90 >60 mL/min/1.73m2 Final   08/11/2023 >90 >60 mL/min/1.73m2 Final   05/11/2023 >90 >60 mL/min/1.73m2 Final     Comment:     eGFR calculated using 2021 CKD-EPI equation.   05/05/2021 >60 >60 mL/min/1.73m2 Final   02/17/2021 >60 >60 mL/min/1.73m2 Final   11/04/2020 >60 >60 mL/min/1.73m2 Final   09/28/2016 154.0 mL/min/1.7 m2 Final   08/14/2013 > 60 >60  "ml/min/1.73m2 Final     GFR Estimate If Black   Date Value Ref Range Status   05/05/2021 >60 >60 mL/min/1.73m2 Final   02/17/2021 >60 >60 mL/min/1.73m2 Final   11/04/2020 >60 >60 mL/min/1.73m2 Final   09/28/2016 186.3 mL/min/1.7 m2 Final   08/14/2013 > 60 >60 ml/min/1.73m2 Final       Lab Results   Component Value Date    MICROL <12.0 05/11/2023    MICROL 15 02/28/2022     No results found for: \"MICROALBUMIN\"  Lab Results   Component Value Date    CPEPT 5.1 02/28/2022    GADAB <5.0 02/28/2022       Vitamin B12   Date Value Ref Range Status   02/28/2022 437 193 - 986 pg/mL Final   06/07/2011 443 223 - 1132 pg/ml Final       Most recent eye exam date: : Not Found     Glutamic Acid Decarboxylase Antibody   Date Value Ref Range Status   02/28/2022 <5.0 0.0 - 5.0 IU/mL Final     Comment:     INTERPRETIVE INFORMATION:  Glutamic Acid Decarboxylase   Antibody    A value greater than 5.0 IU/mL is considered positive for   Glutamic Acid Decarboxylase Antibody (PREETHI Ab). This assay   is intended for the semi-quantitative determination of the   PREETHI Ab in human serum. Results should be interpreted within   the context of clinical symptoms.  Performed By: [facility]  [address] [place] [id number]  : Evita Oliva MD     C Peptide   Date Value Ref Range Status   02/28/2022 5.1 0.9 - 6.9 ng/mL Final     Assessment/Plan:     1.  Type 2 diabetes- Ruth has had worsening of her glucose control in the past year.  A1c was up to 10.2%.  Patient has had significant side effects from GLP_1 agonists and seems to have symptoms suggestive of gastroparesis.  She is also dealing with recurrent yeast infections, so I would recommend temporarily stopping invokana until these have cleared and glucose is lower.  Discussed the best option for now is to add short acting insulin and she is in agreement.  As post-prandial hyperglycemia improves, may need to lower lantus to prevent hypoglycemia.  Discussed " referral to weight management clinic to explore options, including bariatric surgery in the future.  We made the following plan today (instructions given to patient):      Stop Invokana.     Take diflucan 150 mg x 1.     Start Novolog 5 units (max 10 units) 15 minutes before your largest meal.     We may need to lower your lantus insulin if you start going low.     If you are comfortable, start taking this before lunch, too.     Meet with diabetes educator to review your doses.     Please let me know if you are having low blood sugars less than 70 or over 250 mg/dL.      If you have concerns, please send me a [employer] Week[employer] message M-Th, call the clinic at [phone], or call [phone] after hours/weekends and ask to speak with the endocrinologist on call.        2.  Risk factors-     Retinopathy:  No.  Had eye exam 2022.  Will schedule  Nephropathy:  BP well controlled. No microalbuminuria.  Creatinine stable.   Neuropathy: Yes - on gabapentin.  Normal b12.   Lipids:  LDL at target.  Patient taking statin  Thyroid screening: Will check TSH.   Celiac screening: will screen antibodies.     3.  F/U in 3 mos with me, in 1 month with DM education, sooner with concerns.     I spent 42  minutes with this patient face to face and explained the conditions and plans (more than 50% of time was counseling/coordination of care, diabetes management, follow up plan for worsening hyper and hypoglycemia) . The patient understood and is satisfied with today's visit.      Yanet Christie PA-C, MPAS   [facility]  Department of Medicine  Division of Endocrinology and Diabetes

## 2023-12-14 RX ORDER — PEN NEEDLE, DIABETIC 32GX 5/32"
NEEDLE, DISPOSABLE MISCELLANEOUS
Qty: 100 EACH | Refills: 3 | Status: SHIPPED | OUTPATIENT
Start: 2023-12-14

## 2023-12-14 NOTE — TELEPHONE ENCOUNTER
BD PEN NEEDLE RAMON 2ND GEN 32G X 4 MM miscellaneous   Pharmacy comment: Alternative Requested:WE DON'T STOCK ULTICARE. IS OK TO DISPENSE BD ULTRA PEN NEEDLES?

## 2024-01-04 ENCOUNTER — OFFICE VISIT (OUTPATIENT)
Dept: PODIATRY | Facility: CLINIC | Age: 39
End: 2024-01-04
Payer: COMMERCIAL

## 2024-01-04 ENCOUNTER — ANCILLARY PROCEDURE (OUTPATIENT)
Dept: GENERAL RADIOLOGY | Facility: CLINIC | Age: 39
End: 2024-01-04
Attending: PODIATRIST
Payer: COMMERCIAL

## 2024-01-04 VITALS — DIASTOLIC BLOOD PRESSURE: 84 MMHG | WEIGHT: 285 LBS | BODY MASS INDEX: 46 KG/M2 | SYSTOLIC BLOOD PRESSURE: 122 MMHG

## 2024-01-04 DIAGNOSIS — G57.92 NEURITIS OF HEEL, LEFT: Primary | ICD-10-CM

## 2024-01-04 DIAGNOSIS — G57.92 NEURITIS OF HEEL, LEFT: ICD-10-CM

## 2024-01-04 DIAGNOSIS — G57.91 NEURITIS OF RIGHT HEEL: ICD-10-CM

## 2024-01-04 PROCEDURE — 99213 OFFICE O/P EST LOW 20 MIN: CPT | Mod: 25 | Performed by: PODIATRIST

## 2024-01-04 PROCEDURE — 73650 X-RAY EXAM OF HEEL: CPT | Mod: TC | Performed by: RADIOLOGY

## 2024-01-04 PROCEDURE — 64455 NJX AA&/STRD PLTR COM DG NRV: CPT | Mod: 50 | Performed by: PODIATRIST

## 2024-01-04 RX ORDER — BUPIVACAINE HYDROCHLORIDE 5 MG/ML
0.5 INJECTION, SOLUTION PERINEURAL
Status: SHIPPED | OUTPATIENT
Start: 2024-01-04

## 2024-01-04 RX ORDER — TRIAMCINOLONE ACETONIDE 40 MG/ML
40 INJECTION, SUSPENSION INTRA-ARTICULAR; INTRAMUSCULAR
Status: SHIPPED | OUTPATIENT
Start: 2024-01-04

## 2024-01-04 RX ADMIN — BUPIVACAINE HYDROCHLORIDE 0.5 ML: 5 INJECTION, SOLUTION PERINEURAL at 11:14

## 2024-01-04 RX ADMIN — TRIAMCINOLONE ACETONIDE 40 MG: 40 INJECTION, SUSPENSION INTRA-ARTICULAR; INTRAMUSCULAR at 11:14

## 2024-01-04 NOTE — PROGRESS NOTES
Foot & Ankle Surgery   2024    S:  Pt is seen today for evaluation of bilateral heel pain.  She was last seen on 10/23/2023 where she had multiple issues of pain bilateral lower extremity including the left Achilles and the plantar right heel.  Her main areas of pain were along Baxters nerve bilateral so she received diagnostic/therapeutic Baxters nerve injections.  She states that until about 4 weeks ago, she had near resolution of all of her heel pain.  The symptoms are now quite severe.  She never did physical therapy for the left Achilles tendinitis, but indicates this was feeling better with the Baxters nerve injections    Vitals:    24 1047   BP: 122/84   Weight: 129.3 kg (285 lb)   '      ROS - Pos for CC.  Patient denies current nausea, vomiting, chills, fevers, belly pain, calf pain, chest pain or SOB.  Complete remainder of ROS it otherwise neg.      PE:  Gen:   No apparent distress  Eye:    Visual scanning without deficit  Ear:    Response to auditory stimuli wnl  Lung:    Non-labored breathing on RA noted  Abd:    NTND per patient report  Lymph:    Neg for pitting/non-pitting edema BLE  Vasc:    Pulses palpable, CFT minimally delayed  Neuro:    Light touch sensation intact to all sensory nerve distributions without paresthesias  Derm:    Neg for nodules, lesions or ulcerations  MSK:    Bilateral lower extremity -she is very sore along Baxters nerve at the medial heel.  She has plantar heel pain bilateral left more pronounced than right.  There is some mild left Achilles insertion pain  Calf:    Neg for redness, swelling or tenderness      Imagin views weightbearing bilateral heel -prominent plantar and to a lesser extent posterior heel spurring.  Mild midfoot arthritis.  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  -Repeat diagnostic/therapeutic Baxters nerve injection, see procedure note for details  -Continue home therapies for heel pain  -We discussed that the first injection generally provides most meaningful relief of symptoms.  Oftentimes a second injection provides comparable results.  Thereafter, we usually see diminished relief for shorter periods of time.  If the above injection fails to provide any meaningful improvement, she will call/MyChart for bilateral ankle MRIs to assess for underlying pathology for possible surgical planning for the patient is diabetic, her most recent hemoglobin A1c was 10.2.  This would need to be significantly improved upon for elective surgery.  She states that her regimen was just changed for her diabetes including adding insulin.    Bilateral Baxters nerve injection    Date/Time: 1/4/2024 11:14 AM    Performed by: Lorenzo Carpenter DPM  Authorized by: Lorenzo Carpenter DPM    Needle Size:  25 G  Approach:  Medial  Location:  Ankle  Laterality:  Bilateral  Site:  Bilateral ankle  Medications (Right):  40 mg triamcinolone 40 MG/ML; 0.5 mL BUPivacaine 0.5 %  Medications (Left):  40 mg triamcinolone 40 MG/ML; 0.5 mL BUPivacaine 0.5 %   After obtaining written consent, the skin was prepped with alcohol.  The needle was advance to the underlying Baxters nerve at the medial heel bilateral, aspiration was done with position change.  1 1/2cc mixture of 2:1 kenalog 40:0.25% marcaine plain was injected.  The patient tolerated the procedure without complication.  Risks that were discussed included possible joint/soft tissue damage, neuritis/numbness, infection, pigment change, steroid flare.           Follow up: As needed based on  injection results or sooner with acute issues           Lorenzo Carpenter DPM [facility]  Podiatric Foot & Ankle Surgeon  [facility]  [phone]    Disclaimer: This note consists of symbols derived from keyboarding, dictation and/or voice recognition software. As a result, there may be errors in the script that have gone undetected. Please consider this when interpreting information found in this chart.

## 2024-01-04 NOTE — LETTER
2024         RE: Ruth Vanegas  [address]  [place] [address]        Dear Colleague,    Thank you for referring your patient, Ruth Vanegas, to the [facility]. Please see a copy of my visit note below.    Foot & Ankle Surgery   2024    S:  Pt is seen today for evaluation of bilateral heel pain.  She was last seen on 10/23/2023 where she had multiple issues of pain bilateral lower extremity including the left Achilles and the plantar right heel.  Her main areas of pain were along Baxters nerve bilateral so she received diagnostic/therapeutic Baxters nerve injections.  She states that until about 4 weeks ago, she had near resolution of all of her heel pain.  The symptoms are now quite severe.  She never did physical therapy for the left Achilles tendinitis, but indicates this was feeling better with the Baxters nerve injections    Vitals:    24 1047   BP: 122/84   Weight: 129.3 kg (285 lb)   '      ROS - Pos for CC.  Patient denies current nausea, vomiting, chills, fevers, belly pain, calf pain, chest pain or SOB.  Complete remainder of ROS it otherwise neg.      PE:  Gen:   No apparent distress  Eye:    Visual scanning without deficit  Ear:    Response to auditory stimuli wnl  Lung:    Non-labored breathing on RA noted  Abd:    NTND per patient report  Lymph:    Neg for pitting/non-pitting edema BLE  Vasc:    Pulses palpable, CFT minimally delayed  Neuro:    Light touch sensation intact to all sensory nerve distributions without paresthesias  Derm:    Neg for nodules, lesions or ulcerations  MSK:    Bilateral lower extremity -she is very sore along Baxters nerve at the medial heel.  She has plantar heel pain bilateral left more pronounced than right.  There is some mild left Achilles insertion pain  Calf:    Neg for redness, swelling or tenderness      Imagin views weightbearing bilateral heel -prominent plantar and to a lesser  extent posterior heel spurring.  Mild midfoot arthritis.  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  -Repeat diagnostic/therapeutic Baxters nerve injection, see procedure note for details  -Continue home therapies for heel pain  -We discussed that the first injection generally provides most meaningful relief of symptoms.  Oftentimes a second injection provides comparable results.  Thereafter, we usually see diminished relief for shorter periods of time.  If the above injection fails to provide any meaningful improvement, she will call/MyChart for bilateral ankle MRIs to assess for underlying pathology for possible surgical planning for the patient is diabetic, her most recent hemoglobin A1c was 10.2.  This would need to be significantly improved upon for elective surgery.  She states that her regimen was just changed for her diabetes including adding insulin.    Bilateral Baxters nerve injection    Date/Time: 1/4/2024 11:14 AM    Performed by: Lorenzo Carpenter DPM  Authorized by: Lorenzo Carpenter DPM    Needle Size:  25 G  Approach:  Medial  Location:  Ankle  Laterality:  Bilateral  Site:  Bilateral ankle  Medications (Right):  40 mg triamcinolone 40 MG/ML; 0.5 mL BUPivacaine 0.5 %  Medications (Left):  40 mg triamcinolone 40 MG/ML; 0.5 mL BUPivacaine 0.5 %   After obtaining written consent, the skin was prepped with alcohol.  The needle was advance to the underlying Baxters nerve at the medial heel bilateral, aspiration was done with position change.  1 1/2cc mixture of 2:1 kenalog  40:0.25% marcaine plain was injected.  The patient tolerated the procedure without complication.  Risks that were discussed included possible joint/soft tissue damage, neuritis/numbness, infection, pigment change, steroid flare.           Follow up: As needed based on injection results or sooner with acute issues           Lorenzo Carpenter DPM [facility]  Podiatric Foot & Ankle Surgeon  [facility]  [phone]    Disclaimer: This note consists of symbols derived from keyboarding, dictation and/or voice recognition software. As a result, there may be errors in the script that have gone undetected. Please consider this when interpreting information found in this chart.        Again, thank you for allowing me to participate in the care of your patient.        Sincerely,        Lorenzo Carpenter DPM, RENA

## 2024-01-19 DIAGNOSIS — B37.31 YEAST INFECTION OF THE VAGINA: ICD-10-CM

## 2024-01-19 RX ORDER — FLUCONAZOLE 150 MG/1
150 TABLET ORAL DAILY
Qty: 2 TABLET | Refills: 0 | Status: SHIPPED | OUTPATIENT
Start: 2024-01-19 | End: 2024-03-07

## 2024-01-22 ENCOUNTER — OFFICE VISIT (OUTPATIENT)
Dept: ENDOCRINOLOGY | Facility: CLINIC | Age: 39
End: 2024-01-22
Attending: PHYSICIAN ASSISTANT
Payer: COMMERCIAL

## 2024-01-22 ENCOUNTER — LAB (OUTPATIENT)
Dept: LAB | Facility: CLINIC | Age: 39
End: 2024-01-22
Payer: COMMERCIAL

## 2024-01-22 VITALS
OXYGEN SATURATION: 96 % | HEIGHT: 66 IN | WEIGHT: 279 LBS | SYSTOLIC BLOOD PRESSURE: 113 MMHG | HEART RATE: 83 BPM | RESPIRATION RATE: 16 BRPM | DIASTOLIC BLOOD PRESSURE: 80 MMHG | BODY MASS INDEX: 44.84 KG/M2

## 2024-01-22 DIAGNOSIS — E66.01 CLASS 3 SEVERE OBESITY WITH SERIOUS COMORBIDITY AND BODY MASS INDEX (BMI) OF 40.0 TO 44.9 IN ADULT, UNSPECIFIED OBESITY TYPE (H): ICD-10-CM

## 2024-01-22 DIAGNOSIS — Z79.4 TYPE 2 DIABETES MELLITUS WITH DIABETIC NEUROPATHY, WITH LONG-TERM CURRENT USE OF INSULIN (H): ICD-10-CM

## 2024-01-22 DIAGNOSIS — E11.40 TYPE 2 DIABETES MELLITUS WITH DIABETIC NEUROPATHY, WITH LONG-TERM CURRENT USE OF INSULIN (H): ICD-10-CM

## 2024-01-22 DIAGNOSIS — E66.813 CLASS 3 SEVERE OBESITY WITH SERIOUS COMORBIDITY AND BODY MASS INDEX (BMI) OF 40.0 TO 44.9 IN ADULT, UNSPECIFIED OBESITY TYPE (H): ICD-10-CM

## 2024-01-22 DIAGNOSIS — E66.01 CLASS 3 SEVERE OBESITY WITH SERIOUS COMORBIDITY AND BODY MASS INDEX (BMI) OF 45.0 TO 49.9 IN ADULT, UNSPECIFIED OBESITY TYPE (H): ICD-10-CM

## 2024-01-22 DIAGNOSIS — E66.813 CLASS 3 SEVERE OBESITY WITH SERIOUS COMORBIDITY AND BODY MASS INDEX (BMI) OF 45.0 TO 49.9 IN ADULT, UNSPECIFIED OBESITY TYPE (H): ICD-10-CM

## 2024-01-22 DIAGNOSIS — E11.9 TYPE 2 DIABETES MELLITUS WITHOUT COMPLICATION, UNSPECIFIED WHETHER LONG TERM INSULIN USE (H): ICD-10-CM

## 2024-01-22 PROBLEM — Z87.410 HISTORY OF CERVICAL DYSPLASIA: Status: ACTIVE | Noted: 2024-01-22

## 2024-01-22 PROBLEM — E78.5 HYPERLIPIDEMIA: Status: ACTIVE | Noted: 2018-02-28

## 2024-01-22 PROBLEM — Z90.710 S/P LAPAROSCOPIC HYSTERECTOMY: Status: ACTIVE | Noted: 2019-03-05

## 2024-01-22 PROBLEM — H81.10 BENIGN PAROXYSMAL POSITIONAL VERTIGO, UNSPECIFIED LATERALITY: Status: RESOLVED | Noted: 2021-02-26 | Resolved: 2024-01-22

## 2024-01-22 PROBLEM — K62.5 RECTAL BLEEDING: Status: RESOLVED | Noted: 2021-09-11 | Resolved: 2024-01-22

## 2024-01-22 LAB
ALBUMIN SERPL BCG-MCNC: 4.1 G/DL (ref 3.5–5.2)
ALP SERPL-CCNC: 86 U/L (ref 40–150)
ALT SERPL W P-5'-P-CCNC: 15 U/L (ref 0–50)
ANION GAP SERPL CALCULATED.3IONS-SCNC: 10 MMOL/L (ref 7–15)
AST SERPL W P-5'-P-CCNC: 10 U/L (ref 0–45)
BILIRUB SERPL-MCNC: 0.4 MG/DL
BUN SERPL-MCNC: 8.6 MG/DL (ref 6–20)
C PEPTIDE SERPL-MCNC: 2.3 NG/ML (ref 0.9–6.9)
CALCIUM SERPL-MCNC: 9.1 MG/DL (ref 8.6–10)
CHLORIDE SERPL-SCNC: 104 MMOL/L (ref 98–107)
CHOLEST SERPL-MCNC: 286 MG/DL
CREAT SERPL-MCNC: 0.41 MG/DL (ref 0.51–0.95)
DEPRECATED HCO3 PLAS-SCNC: 22 MMOL/L (ref 22–29)
EGFRCR SERPLBLD CKD-EPI 2021: >90 ML/MIN/1.73M2
ERYTHROCYTE [DISTWIDTH] IN BLOOD BY AUTOMATED COUNT: 13.6 % (ref 10–15)
FASTING STATUS PATIENT QL REPORTED: ABNORMAL
GLUCOSE SERPL-MCNC: 243 MG/DL (ref 70–99)
HCT VFR BLD AUTO: 38.2 % (ref 35–47)
HDLC SERPL-MCNC: 62 MG/DL
HGB BLD-MCNC: 13.1 G/DL (ref 11.7–15.7)
HOLD SPECIMEN: NORMAL
LDLC SERPL CALC-MCNC: 205 MG/DL
MCH RBC QN AUTO: 29.1 PG (ref 26.5–33)
MCHC RBC AUTO-ENTMCNC: 34.3 G/DL (ref 31.5–36.5)
MCV RBC AUTO: 85 FL (ref 78–100)
NONHDLC SERPL-MCNC: 224 MG/DL
PLATELET # BLD AUTO: 272 10E3/UL (ref 150–450)
POTASSIUM SERPL-SCNC: 3.8 MMOL/L (ref 3.4–5.3)
PROT SERPL-MCNC: 7.3 G/DL (ref 6.4–8.3)
PTH-INTACT SERPL-MCNC: 44 PG/ML (ref 15–65)
RBC # BLD AUTO: 4.5 10E6/UL (ref 3.8–5.2)
SODIUM SERPL-SCNC: 136 MMOL/L (ref 135–145)
TRIGL SERPL-MCNC: 93 MG/DL
TSH SERPL DL<=0.005 MIU/L-ACNC: 1.84 UIU/ML (ref 0.3–4.2)
VIT B12 SERPL-MCNC: 399 PG/ML (ref 232–1245)
VIT D+METAB SERPL-MCNC: 19 NG/ML (ref 20–50)
WBC # BLD AUTO: 10.7 10E3/UL (ref 4–11)

## 2024-01-22 PROCEDURE — 99000 SPECIMEN HANDLING OFFICE-LAB: CPT | Performed by: PATHOLOGY

## 2024-01-22 PROCEDURE — 85027 COMPLETE CBC AUTOMATED: CPT | Performed by: PATHOLOGY

## 2024-01-22 PROCEDURE — 86364 TISS TRNSGLTMNASE EA IG CLAS: CPT | Performed by: PHYSICIAN ASSISTANT

## 2024-01-22 PROCEDURE — 80053 COMPREHEN METABOLIC PANEL: CPT | Performed by: PATHOLOGY

## 2024-01-22 PROCEDURE — 84590 ASSAY OF VITAMIN A: CPT | Mod: 90 | Performed by: PATHOLOGY

## 2024-01-22 PROCEDURE — 99417 PROLNG OP E/M EACH 15 MIN: CPT

## 2024-01-22 PROCEDURE — 84681 ASSAY OF C-PEPTIDE: CPT | Performed by: PHYSICIAN ASSISTANT

## 2024-01-22 PROCEDURE — 82306 VITAMIN D 25 HYDROXY: CPT

## 2024-01-22 PROCEDURE — 99205 OFFICE O/P NEW HI 60 MIN: CPT

## 2024-01-22 PROCEDURE — 36415 COLL VENOUS BLD VENIPUNCTURE: CPT | Performed by: PATHOLOGY

## 2024-01-22 PROCEDURE — 80061 LIPID PANEL: CPT | Performed by: PATHOLOGY

## 2024-01-22 PROCEDURE — 84443 ASSAY THYROID STIM HORMONE: CPT | Performed by: PATHOLOGY

## 2024-01-22 PROCEDURE — 82607 VITAMIN B-12: CPT

## 2024-01-22 PROCEDURE — 83970 ASSAY OF PARATHORMONE: CPT | Performed by: PATHOLOGY

## 2024-01-22 ASSESSMENT — PAIN SCALES - GENERAL: PAINLEVEL: NO PAIN (0)

## 2024-01-22 NOTE — PROGRESS NOTES
"80 minutes spent by me on the date of the encounter doing chart review, history and exam, documentation and further activities per the note    New Bariatric Surgery Consultation Note    2024    RE: Ruth Vanegas  MR#: [id number]  : 1985      Referring provider:       2024    11:42 AM   --   Who referred you My endocrine dr rothman       Chief Complaint/Reason for visit: evaluation for possible weight loss surgery    Dear Milton Banks MD (General),    I had the pleasure of seeing your patient, Ruth Vanegas, to evaluate her obesity and consider her for possible weight loss surgery. As you know, Ruth Vanegas is 38 year old.  She has a height of 5' 6\", a weight of 279 lbs 0 oz, and calculated Body mass index is 45.03 kg/m .    Assessment & Plan   Problem List Items Addressed This Visit       Class 3 severe obesity with serious comorbidity and body mass index (BMI) of 40.0 to 44.9 in adult (H)     Overweight onset in middle school. Weight increase influenced by starting depo birth control and PPD after second birth. Lost 100lb through diet and exercise in . Highest weight in life of 260lbs in 2016. Had GI symptoms in 2020 leading to 80lb weight loss. Weight stable at 280lbs over the past year. Wants to lose weight to control type II diabetes. Considering bariatric surgery as a tool to help with weight.     Comorbidities include Type II diabetes, HLD, and depression.     In 2020 started to have chronic nausea, vomiting, and dysphagia. Extensive work up by GI. Esophogram and swallow study was negative, but positive for H Pylori. Treatment was helpful with symptoms. Currently still has symptoms of daily nausea and dysphagia off and on. Will follow up with GI prior to surgery for any further work up and clearance prior to surgery.     Discussed AOMs to help with weight loss. She states that has no appetite and will forget to eat most days. With this I am hesitant to start an AOM " today. Previously trialed many GLP-1s with side effects of vomiting, will not want to retrial. Currently taking metformin (diabetes) and topiramate (migraines).          Relevant Orders    Med Therapy Management Referral    CBC with platelets (Completed)    Comprehensive metabolic panel (Completed)    Lipid panel reflex to direct LDL Fasting (Completed)    Parathyroid Hormone Intact (Completed)    Vitamin A (Completed)    Vitamin B12 (Completed)    Vitamin D Deficiency (Completed)      Bariatric labs ordered   Schedule Psych eval   Letters of support/Clearance: PCP, Endocrine, GI  Dietitian visits 3x, then monthly until surgery   [facility] pharmacist visit in 2 months for polypharmacy pre surgery   Dr. Lorenzo in 2 months. To discuss history of chronic nausea and dysphagia  Rohini Payton in 3 months   A1C <8 needed prior to surgery   Cannabis cessation 3 months prior to surgery, once 1 month cessation will order THC urine.       HISTORY OF PRESENT ILLNESS:      1/18/2024    11:42 AM   Weight Loss History Reviewed with Patient   How long have you been overweight? Since early childhood   What is the most that you have ever weighed 360   What is the most weight you have lost? 97   I have tried the following methods to lose weight Watching portions or calories    Exercise    Prescription Medications   I have tried the following weight loss medications? (Check all that apply) Topamax/Topiramate    Victoza/liraglutide    Metformin     Overweight onset in middle school. Weight increase with the start of depo in 2003 after first pregnancy, gaining 40lbs. Has had 2 full term pregnancies, and always lost weight with them. Second daughter was born in 2006 and had PPD, leading to weight gain after pregnancy. In 2010 lost around 100lbs through going to the gym 5xweek and portion control. Started to having weight regain, but unsure why. Had max weight of 360lbs in 2016. Was very sick in 2020 due to uncontrolled diabetes and h pylori, leading  to 80lb weight loss. Appetite has not been the same since. Has been weight stable at 280lbs for the past year. Does not feel overweight at this weight, feels very proportional. Wants to lose weight to help with diabetes control. Considering bariatric surgery as an option to help with weight loss     Eating 1 meal a day, with snacks throughout the day. Portion sizes are very small, most likely due to gastroparesis. No increase hunger or thoughts of food. Gets full easily, typically stays full. Drinks water, regar/diet soda (1-2 can daily), juice (1 cup daily), milk (1 cup daily), tea, coffee rarely.     Activity - has been limited due to feet pain, is followed by ortho. Is getting injections.     AOMs:   - Victoza - was on it from 2016 to 2020. Discontinued due to BS no longer being controlled  - Ozempic - 0.25mg had side effects of N/V for a weeks and was hospitalized due to these side effects.   - Bydureon - side effects of N/V   - Metformin - currently taking for diabetes  - Topiramate - currently taking for migraines     CO-MORBIDITIES OF OBESITY INCLUDE:      1/18/2024    11:42 AM   --   I have the following health issues associated with obesity Type II Diabetes    Polycystic Ovarian Syndrome     Type II diabetes - Taking insulin - Lantus (24units) and Novolog (10units) and metformin. Has martine, but has a hard time getting it filled. A1C 10.2 on 11/9/93. Followed by endocrine.     Dysphagia - 1-2xmonth. Has improved. Random. Has nausea daily. Followed by GI.     HLD - on statin     Kidney stone - only had 1 in 2016. Passed on own.     Migraines - stopped emgality     History of bleeding or clotting disorder? No    Is patient on biologics or immunomodulators? No    PAST MEDICAL HISTORY:  Past Medical History:   Diagnosis Date    Anemia     told to take iron pills when pregnant    Benign paroxysmal positional vertigo, unspecified laterality 02/26/2021    Depression     Depressive disorder     Diabetes mellitus (H)      Diabetes mellitus, type 2 (H) 07/15/2021    Dysthymic disorder     Endometriosis     Herpes genitalia     Hyperlipidemia     Kidney stone     Menorrhagia     Migraines     Neuropathy     Other abnormal Papanicolaou smear of cervix and cervical HPV(795.09) 2013    PCOS (polycystic ovarian syndrome)     Post traumatic stress disorder (PTSD)        PAST SURGICAL HISTORY:  Past Surgical History:   Procedure Laterality Date    BLADDER REPAIR W/  SECTION      X2    C/SECTION, CLASSICAL      x2    COLONOSCOPY N/A 2021    Procedure: COLONOSCOPY;  Surgeon: Rene Lehman DO;  Location:  GI    DILATION AND CURETTAGE  2015    DILATION AND CURETTAGE  2015    DILATION AND CURETTAGE, OPERATIVE HYSTEROSCOPY, COMBINED N/A 2015    Procedure: Dilation and Curettage with Hysteroscopy;  Surgeon: Zia Farmer MD;  Location: [facility];  Service:     DILATION AND CURETTAGE, OPERATIVE HYSTEROSCOPY, COMBINED N/A 2016    Procedure: DILATION AND CURETTAGE WITH HYSTEROSCOPY INSERT MIRENA;  Surgeon: Suzanne Major MD;  Location: [facility];  Service:     ENT SURGERY      ESOPHAGOSCOPY, GASTROSCOPY, DUODENOSCOPY (EGD), COMBINED N/A 2021    Procedure: ESOPHAGOGASTRODUODENOSCOPY, WITH BIOPSY;  Surgeon: Rene Lehman DO;  Location:  GI    GYN SURGERY  10/19/15    laproscopic lysis of adhesions    HYSTEROSCOPY, ABLATE ENDOMETRIUM HYDROTHERMAL, COMBINED N/A 3/2/2018    Procedure: HYSTEROSCOPY, DILATION AND CURETTAGE, ENDOMETRIAL ABLATION;  Surgeon: Kristy Israel DO;  Location: [facility];  Service: Gynecology    LAPAROSCOPIC HYSTERECTOMY TOTAL N/A 3/4/2019    Procedure: ROBOTIC TOTAL LAPAROSCOPIC HYSTERECTOMY, BILATERAL SALPINGECTOMY, LEFT OVARIAN CYSTOTOMY. CYSTOSCOPY;  Surgeon: Zia Farmer MD;  Location: [facility];  Service: Gynecology    LAPAROSCOPY DIAGNOSTIC (GENERAL) N/A 10/19/2015    Procedure:  LAPAROSCOPY,LYSIS OF ADHESIONS;  Surgeon: Zia  PARISH Farmer MD;  Location: [facility] OR;  Service:     NM LAP,TUBAL CAUTERY Bilateral 3/2/2018    Procedure: LAPAROSCOPIC BILATERAL TUBAL LIGATION;  Surgeon: Kristy Israel DO;  Location: [facility] OR;  Service: Gynecology    TONSILLECTOMY         FAMILY HISTORY:   Family History   Adopted: Yes   Problem Relation Age of Onset    Chronic Obstructive Pulmonary Disease Mother     Prostate Cancer Father     Cancer Father         prostate    Alcoholism Sister     Alcoholism Sister     Alcoholism Brother     Alcoholism Brother     Diabetes Paternal Grandmother     Other Cancer Paternal Grandmother     Alcoholism Daughter     Coronary Artery Disease No family hx of     Urolithiasis No family hx of     Clotting Disorder No family hx of     Gout No family hx of     Heart Disease No family hx of     Anesthesia Reaction No family hx of        SOCIAL HISTORY:       1/18/2024    11:42 AM   Social History Questions Reviewed With Patient   Which best describes your employment status (select all that apply) I work full-time   If you work, what is your occupation? I am a    Which best describes your marital status in a relationship   Who do you have in your support network that can be available to help you for the first 2 weeks after surgery? Yes   Who can you count on for support throughout your weight loss surgery journey? 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 Supportive partner. 2 daughters - 20 and 16yo.     HABITS:      1/18/2024    11:42 AM   --   How often do you drink alcohol? Monthly or less   If you do drink alcohol, how many drinks might you have in a day? (one drink = 5 oz. wine, 1 can/bottle of beer, 1 shot liquor) 1 or 2   Do you currently use any of the following Nicotine products? No   Have you ever used any of the following nicotine products? No   Have you or are you currently using street drugs or prescription strength medication for which you do not  have a prescription for? No   Do you have a history of chemical dependency (alcohol or drug abuse)? No   Cannabis - smoking daily to help with appetite - to make her hungry and nausea    Currently taking narcotic/opioids No    PSYCHOLOGICAL HISTORY:       1/18/2024    11:42 AM   Psychological History Reviewed With Patient   Have you ever attempted suicide? More than 10 years ago.   Have you had thoughts of suicide in the past year? No   Have you ever been hospitalized for mental illness or a suicide attempt? Never.   Do you have a history of chronic pain? No   Have you ever been diagnosed with fibromyalgia? No   Are you currently being treated for any of the following? (select all that apply) Depression    Post traumatic stress disorder   Are you currently seeing a therapist or counselor? No   Are you currently seeing a psychiatrist? No     Depression - stable without medications. Followed by therapist.     ROS:      1/18/2024    11:42 AM   --   Skin None of the above   HEENT Headaches    None of these   Musculoskeletal Joint Pain    Swelling of legs   Cardiovascular None of the above   Pulmonary Experience morning headaches   Gastrointestinal None of the above   Genitourinary Stress incontinence (losing urine when coughing, sneezing, etc.)    Kidney stones   Hematological None of the above   Neurological Migraine headaches   Female only Polycystic ovarian syndrome (PCOS)       EATING BEHAVIORS:      1/18/2024    11:42 AM   --   Have you or anyone else thought that you had an eating disorder? No   Do you currently binge eat (eat a large amount of food in a short time)? No   Are you an emotional eater? Yes   Do you get up to eat after falling asleep? No   Can you afford 3 meals a day? Yes   Can you afford 50-60 dollars a month for vitamins? Yes       EXERCISE:      1/18/2024    11:42 AM   --   How often do you exercise? Never   What keeps you from being more active? Pain       MEDICATIONS:  Current Outpatient  Medications   Medication Sig Dispense Refill    acetaminophen (TYLENOL) 500 MG tablet Take 500-1,000 mg by mouth every 4 hours as needed       atorvastatin (LIPITOR) 10 MG tablet Take 2 tablets (20 mg) by mouth daily for 30 days atorvastatin 10 mg tablet 60 tablet 11    Continuous Blood Gluc Sensor (FREESTYLE CINDY 14 DAY SENSOR) [facility] 1 Application. every 14 days 2 each 11    fluconazole (DIFLUCAN) 150 MG tablet Take 1 tablet (150 mg) by mouth daily 2 tablet 0    gabapentin (NEURONTIN) 300 MG capsule Take 2 capsules (600 mg) by mouth 3 times daily 540 capsule 3    ibuprofen (ADVIL/MOTRIN) 600 MG tablet every 4 hours as needed       insulin glargine (LANTUS PEN) 100 UNIT/ML pen Inject 24 Units Subcutaneous At Bedtime for 90 days 21.6 mL 0    metFORMIN (GLUCOPHAGE XR) 500 MG 24 hr tablet TAKE 2 TABLETS(1000 MG) BY MOUTH TWICE DAILY WITH MEALS 360 tablet 0    multivitamin (ONE-DAILY) tablet Take 1 tablet by mouth daily 100 tablet 3    NOVOLOG FLEXPEN 100 UNIT/ML soln Take 5- 10 units before each meal. 15 mL 3    ondansetron (ZOFRAN) 4 MG tablet Take 1 tablet (4 mg) by mouth every 6 hours as needed for nausea 60 tablet 1    rizatriptan (MAXALT) 10 MG tablet Take 1 tablet (10 mg) by mouth at onset of headache for migraine May repeat in 2 hours. 18 tablet 4    topiramate (TOPAMAX) 100 MG tablet Take 1 tablet (100 mg) by mouth 2 times daily 180 tablet 1    BD PEN NEEDLE RAMON 2ND GEN 32G X 4 MM miscellaneous USE 4 PEN NEEDLES DAILY OR AS DIRECTED. 100 each 3    Continuous Blood Gluc Sensor (FREESTYLE CINDY 2 SENSOR) [facility] 1 each See Admin Instructions Change every 14 days. 6 each 1    dexAMETHasone (DECADRON) 4 MG/ML injection To be used topically by therapist during PT sessions. 30 mL 0    diazepam (VALIUM) 5 MG tablet once as needed Only for Dentist      insulin  UNIT/ML injection 24 units  To be taken with prednisone 60 mg, hold it if not taking steroids 15 mL 1    promethazine (PHENERGAN) 25 MG tablet Take 25 mg  "by mouth every 6 hours as needed      tolterodine ER (DETROL LA) 4 MG 24 hr capsule Take 1 capsule (4 mg) by mouth daily 30 capsule 3    verapamil ER (VERELAN) 120 MG 24 hr capsule Take 1 capsule (120 mg) by mouth At Bedtime 90 capsule 1    vitamin D3 (CHOLECALCIFEROL) 125 MCG (5000 UT) tablet Take 1 tablet (125 mcg) by mouth every morning 90 tablet 3       ALLERGIES:  Allergies   Allergen Reactions    Hydrocodone Other (See Comments)     Headache per pt and hallucinations  Headache per pt and hallucinations      Reglan [Metoclopramide]      Anxiety    Vicodin [Hydrocodone-Acetaminophen] Other (See Comments)     Hallucinations    Silver Nitrate Itching and Rash     Only issues if in for a long time  Only issues if in for a long time  Only issues if in for a long time      Tegaderm Transparent Dressing (Informational Only) Rash       Computed FIB-4 Calculation unavailable. One or more values for this score either were not found within the given timeframe or did not fit some other criterion.    Fib-4 < 1.3: No further evaluation at this point, unless other concerns    - If the Fib-4 is >2.67  Fibroscan and elective liver clinic referral    - Intermediate Fib-4 scores: Get a Fibroscan, consider repeating this in 1-2 years.    Anti-obesity medication ROS:    HEENT  Hx of glaucoma: No    Cardiovascular  CAD:No  HTN:No    Gastrointestinal  GERD:No  Constipation:No  Liver Dz:No  H/O Pancreatitis:No    Psychiatric  Bipolar: No  Anxiety:Yes  Depression:Yes  History of alcohol/drug abuse: No  Hx of eating disorder:No    Endocrine  Personal or family hx of MTC or MEN2:No  Diabetes/prediabetes: Yes    Neurologic:  Hx of seizures: No  Hx of migraines: Yes  Memory Impairment: No      History of kidney stones: Yes  Kidney disease: No  Current birth control:  hysterectomy        Objective    /80 (BP Location: Left arm, Patient Position: Sitting, Cuff Size: Adult Large)   Pulse 83   Resp 16   Ht 1.676 m (5' 6\")   Wt " 126.6 kg (279 lb)   LMP  (LMP Unknown)   SpO2 96%   BMI 45.03 kg/m      Physical Exam   GENERAL: alert and no distress  EYES: Eyes grossly normal to inspection.  No discharge or erythema, or obvious scleral/conjunctival abnormalities.  RESP: No audible wheeze, cough, or visible cyanosis.    SKIN: Visible skin clear. No significant rash, abnormal pigmentation or lesions.  NEURO: Cranial nerves grossly intact.  Mentation and speech appropriate for age.  PSYCH: Appropriate affect, tone, and pace of words        In summary, Ruth Vanegas has Class III obesity with a body mass index of Body mass index is 45.03 kg/m . kg/m2 and the comorbidities stated above. She completed an informational seminar and is a possible candidate for the laparoscopic gastric sleeve.  She will have to complete the following pre-requisites:    Received weight loss goal of 10 lb prior to surgery.  Achieve clearance from dietitian to see surgeon.  Have preoperative laboratory tests drawn.  Psychological Evaluation with MMPI and clearance for weight loss surgery.  Letter of clearance from the following PCP, Endocrine, GI.    Today in the office we discussed gastric sleeve surgery. Preoperative, perioperative, and postoperative processes, management, and follow up were addressed.  Risks and benefits were outlined including the risk of death, staple line leak (1-2%), PE, DVT, ulcer, worsening GERD, N/V, stricture, hernia, wound infection, weight regain, and vitamin deficiencies. I emphasized exercise and activity along with appropriate food choice as the main foundation for weight loss with surgery providing surgical reinforcement of this.  All questions were answered.  A goal sheet and support group handout were given to the patient.        If you have not already watched our online seminar please go to [email or web address]    Weight loss requirement: 10lbs prior to surgery. Goal Weight: 268. Will have final weight check 2-3 weeks  prior to surgery at anesthesia or nurse pre-op teaching visit.    -Need current weight confirmation at primary clinic or weight management clinic No    Bariatric labs ordered, call for a lab only appointment at any [facility] lab. To find a lab location near you, please call [phone]. Please let us know if orders need to be faxed to a non [facility] lab.    Schedule bariatric psych eval as soon as possible.  List of psychologists will be sent to you via [facility] or given to you in clinic.     Call Timothy Grewal at [phone] to discuss insurance coverage for bariatric surgery.  Please check with your insurance regarding bariatric surgery coverage also. Timothy can also help you with scheduling psych eval if you are having difficulties.    The following clearance letters are needed: Letter templates will be sent to you via [facility] or given to you in clinic. Providers can submit through electronic medical record or fax to [phone].  - Primary care provider.   - Endocrine   - GI     Cannabis cessation and THC test needed: Yes    A1C goal <8    Birth control after surgery discussed. Patient instructed that 2 forms of birth control required after surgery and to avoid pregnancy for at least 18 months after surgery: hysterectomy    NEXT VISITS: A  should reach out to you to schedule the following appointments.  If they do not reach you please call [phone] to schedule the following appointments:    -See dietitian in 1 month and monthly for 3 months    -See Lauren Bloch [facility] pharmacist in 2 month for polypharmacy pre-surgery    -See Rohini Payton in 3 months to follow up on pre-op weight loss and weight loss medications    -See Dr Lorenzo in 2 month for bariatric surgeon visit. Discuss bariatric surgery.      Once the patient has completed the requirements in their task list and there are no further recommendations, the pt will be allowed to see the surgeon of their choice for consultation on the  laparoscopic gastric sleeve surgery. Patient verbalizes understanding of the process to surgery and expectations for the postoperative period including the need for lifelong lifestyle changes, vitamin supplementation, and laboratory monitoring.    Medications that may contribute to the patient's obesity, such as antipsychotic medications, have been identified. Correctable endocrine disorders and other medical conditions have been ruled out, or are under successful treatment. Identified personal barriers to making and continuing needed life changes. Identified behavior changes/strategies to address personal barriers.    Sincerely,     Rohini Wong PA-C

## 2024-01-22 NOTE — NURSING NOTE
"Chief Complaint   Patient presents with    New Patient     New [facility]       Vitals:    01/22/24 0904   BP: 113/80   BP Location: Left arm   Patient Position: Sitting   Cuff Size: Adult Large   Pulse: 83   Resp: 16   SpO2: 96%   Weight: 126.6 kg (279 lb)   Height: 1.676 m (5' 6\")       Body mass index is 45.03 kg/m .                          Pascual Aquino NRP  "

## 2024-01-22 NOTE — LETTER
"2024       RE: Ruth Vanegas  [address]  [place] [address]     Dear Colleague,    Thank you for referring your patient, Ruth Vanegas, to the [facility] [place] at [facility]. Please see a copy of my visit note below.    80 minutes spent by me on the date of the encounter doing chart review, history and exam, documentation and further activities per the note    New Bariatric Surgery Consultation Note    2024    RE: Ruth Vanegas  MR#: [id number]  : 1985      Referring provider:       2024    11:42 AM   --   Who referred you My endocrine dr rothman       Chief Complaint/Reason for visit: evaluation for possible weight loss surgery    Dear Milton Banks MD (General),    I had the pleasure of seeing your patient, Ruth Vanegas, to evaluate her obesity and consider her for possible weight loss surgery. As you know, Ruth Vanegas is 38 year old.  She has a height of 5' 6\", a weight of 279 lbs 0 oz, and calculated Body mass index is 45.03 kg/m .    Assessment & Plan  Problem List Items Addressed This Visit       Class 3 severe obesity with serious comorbidity and body mass index (BMI) of 40.0 to 44.9 in adult (H)     Overweight onset in middle school. Weight increase influenced by starting depo birth control and PPD after second birth. Lost 100lb through diet and exercise in . Highest weight in life of 260lbs in 2016. Had GI symptoms in 2020 leading to 80lb weight loss. Weight stable at 280lbs over the past year. Wants to lose weight to control type II diabetes. Considering bariatric surgery as a tool to help with weight.     Comorbidities include Type II diabetes, HLD, and depression.     In 2020 started to have chronic nausea, vomiting, and dysphagia. Extensive work up by GI. Esophogram and swallow study was negative, but positive for H Pylori. Treatment was helpful " with symptoms. Currently still has symptoms of daily nausea and dysphagia off and on. Will follow up with GI prior to surgery for any further work up and clearance prior to surgery.     Discussed AOMs to help with weight loss. She states that has no appetite and will forget to eat most days. With this I am hesitant to start an AOM today. Previously trialed many GLP-1s with side effects of vomiting, will not want to retrial. Currently taking metformin (diabetes) and topiramate (migraines).          Relevant Orders    Med Therapy Management Referral    CBC with platelets (Completed)    Comprehensive metabolic panel (Completed)    Lipid panel reflex to direct LDL Fasting (Completed)    Parathyroid Hormone Intact (Completed)    Vitamin A (Completed)    Vitamin B12 (Completed)    Vitamin D Deficiency (Completed)      Bariatric labs ordered   Schedule Psych eval   Letters of support/Clearance: PCP, Endocrine, GI  Dietitian visits 3x, then monthly until surgery   MTM pharmacist visit in 2 months for polypharmacy pre surgery   Dr. Lorenzo in 2 months. To discuss history of chronic nausea and dysphagia  Rohini Payton in 3 months   A1C <8 needed prior to surgery   Cannabis cessation 3 months prior to surgery, once 1 month cessation will order THC urine.       HISTORY OF PRESENT ILLNESS:      1/18/2024    11:42 AM   Weight Loss History Reviewed with Patient   How long have you been overweight? Since early childhood   What is the most that you have ever weighed 360   What is the most weight you have lost? 97   I have tried the following methods to lose weight Watching portions or calories    Exercise    Prescription Medications   I have tried the following weight loss medications? (Check all that apply) Topamax/Topiramate    Victoza/liraglutide    Metformin     Overweight onset in middle school. Weight increase with the start of depo in 2003 after first pregnancy, gaining 40lbs. Has had 2 full term pregnancies, and always lost weight  with them. Second daughter was born in 2006 and had PPD, leading to weight gain after pregnancy. In 2010 lost around 100lbs through going to the gym 5xweek and portion control. Started to having weight regain, but unsure why. Had max weight of 360lbs in 2016. Was very sick in 2020 due to uncontrolled diabetes and h pylori, leading to 80lb weight loss. Appetite has not been the same since. Has been weight stable at 280lbs for the past year. Does not feel overweight at this weight, feels very proportional. Wants to lose weight to help with diabetes control. Considering bariatric surgery as an option to help with weight loss     Eating 1 meal a day, with snacks throughout the day. Portion sizes are very small, most likely due to gastroparesis. No increase hunger or thoughts of food. Gets full easily, typically stays full. Drinks water, regar/diet soda (1-2 can daily), juice (1 cup daily), milk (1 cup daily), tea, coffee rarely.     Activity - has been limited due to feet pain, is followed by ortho. Is getting injections.     AOMs:   - Victoza - was on it from 2016 to 2020. Discontinued due to BS no longer being controlled  - Ozempic - 0.25mg had side effects of N/V for a weeks and was hospitalized due to these side effects.   - Bydureon - side effects of N/V   - Metformin - currently taking for diabetes  - Topiramate - currently taking for migraines     CO-MORBIDITIES OF OBESITY INCLUDE:      1/18/2024    11:42 AM   --   I have the following health issues associated with obesity Type II Diabetes    Polycystic Ovarian Syndrome     Type II diabetes - Taking insulin - Lantus (24units) and Novolog (10units) and metformin. Has martine, but has a hard time getting it filled. A1C 10.2 on 11/9/93. Followed by endocrine.     Dysphagia - 1-2xmonth. Has improved. Random. Has nausea daily. Followed by GI.     HLD - on statin     Kidney stone - only had 1 in 2016. Passed on own.     Migraines - stopped emgality     History of  bleeding or clotting disorder? No    Is patient on biologics or immunomodulators? No    PAST MEDICAL HISTORY:  Past Medical History:   Diagnosis Date    Anemia     told to take iron pills when pregnant    Benign paroxysmal positional vertigo, unspecified laterality 2021    Depression     Depressive disorder     Diabetes mellitus (H)     Diabetes mellitus, type 2 (H) 07/15/2021    Dysthymic disorder     Endometriosis     Herpes genitalia     Hyperlipidemia     Kidney stone     Menorrhagia     Migraines     Neuropathy     Other abnormal Papanicolaou smear of cervix and cervical HPV(795.09) 2013    PCOS (polycystic ovarian syndrome)     Post traumatic stress disorder (PTSD)        PAST SURGICAL HISTORY:  Past Surgical History:   Procedure Laterality Date    BLADDER REPAIR W/  SECTION      X2    C/SECTION, CLASSICAL      x2    COLONOSCOPY N/A 2021    Procedure: COLONOSCOPY;  Surgeon: Rene Lehman DO;  Location: [facility]    DILATION AND CURETTAGE  2015    DILATION AND CURETTAGE  2015    DILATION AND CURETTAGE, OPERATIVE HYSTEROSCOPY, COMBINED N/A 2015    Procedure: Dilation and Curettage with Hysteroscopy;  Surgeon: Zia Farmer MD;  [place] [facility] [place]:     DILATION AND CURETTAGE, OPERATIVE HYSTEROSCOPY, COMBINED N/A 2016    Procedure: DILATION AND CURETTAGE WITH HYSTEROSCOPY INSERT MIRENA;  Surgeon: Suzanne Major MD;  [place] [facility] [place]  Service:     [place] SURGERY      ESOPHAGOSCOPY, GASTROSCOPY, DUODENOSCOPY (EGD), COMBINED N/A 2021    Procedure: ESOPHAGOGASTRODUODENOSCOPY, WITH BIOPSY;  Surgeon: Rene Lehman DO;  Location:  GI    GYN SURGERY  10/19/15    laproscopic lysis of adhesions    HYSTEROSCOPY, ABLATE ENDOMETRIUM HYDROTHERMAL, COMBINED N/A 3/2/2018    Procedure: HYSTEROSCOPY, DILATION AND CURETTAGE, ENDOMETRIAL ABLATION;  Surgeon: Kristy Israel DO;  [place] [facility] [place]  Service: Gynecology    LAPAROSCOPIC  HYSTERECTOMY TOTAL N/A 3/4/2019    Procedure: ROBOTIC TOTAL LAPAROSCOPIC HYSTERECTOMY, BILATERAL SALPINGECTOMY, LEFT OVARIAN CYSTOTOMY. CYSTOSCOPY;  Surgeon: Zia Farmer MD;  Location: [facility];  Service: Gynecology    LAPAROSCOPY DIAGNOSTIC (GENERAL) N/A 10/19/2015    Procedure:  LAPAROSCOPY,LYSIS OF ADHESIONS;  Surgeon: Zia Farmer MD;  Location: [facility] OR;  Service:     [place] LAP,TUBAL CAUTERY Bilateral 3/2/2018    Procedure: LAPAROSCOPIC BILATERAL TUBAL LIGATION;  Surgeon: Kristy Israel DO;  Location: [facility];  Service: Gynecology    TONSILLECTOMY         FAMILY HISTORY:   Family History   Adopted: Yes   Problem Relation Age of Onset    Chronic Obstructive Pulmonary Disease Mother     Prostate Cancer Father     Cancer Father         prostate    Alcoholism Sister     Alcoholism Sister     Alcoholism Brother     Alcoholism Brother     Diabetes Paternal Grandmother     Other Cancer Paternal Grandmother     Alcoholism Daughter     Coronary Artery Disease No family hx of     Urolithiasis No family hx of     Clotting Disorder No family hx of     Gout No family hx of     Heart Disease No family hx of     Anesthesia Reaction No family hx of        SOCIAL HISTORY:       1/18/2024    11:42 AM   Social History Questions Reviewed With Patient   Which best describes your employment status (select all that apply) I work full-time   If you work, what is your occupation? I am a    Which best describes your marital status in a relationship   Who do you have in your support network that can be available to help you for the first 2 weeks after surgery? Yes   Who can you count on for support throughout your weight loss surgery journey? 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 Supportive partner. 2 daughters - 20 and 18yo.     HABITS:      1/18/2024    11:42 AM   --   How often do you drink alcohol? Monthly or less   If you do drink alcohol,  how many drinks might you have in a day? (one drink = 5 oz. wine, 1 can/bottle of beer, 1 shot liquor) 1 or 2   Do you currently use any of the following Nicotine products? No   Have you ever used any of the following nicotine products? No   Have you or are you currently using street drugs or prescription strength medication for which you do not have a prescription for? No   Do you have a history of chemical dependency (alcohol or drug abuse)? No   Cannabis - smoking daily to help with appetite - to make her hungry and nausea    Currently taking narcotic/opioids No    PSYCHOLOGICAL HISTORY:       1/18/2024    11:42 AM   Psychological History Reviewed With Patient   Have you ever attempted suicide? More than 10 years ago.   Have you had thoughts of suicide in the past year? No   Have you ever been hospitalized for mental illness or a suicide attempt? Never.   Do you have a history of chronic pain? No   Have you ever been diagnosed with fibromyalgia? No   Are you currently being treated for any of the following? (select all that apply) Depression    Post traumatic stress disorder   Are you currently seeing a therapist or counselor? No   Are you currently seeing a psychiatrist? No     Depression - stable without medications. Followed by therapist.     ROS:      1/18/2024    11:42 AM   --   Skin None of the above   HEENT Headaches    None of these   Musculoskeletal Joint Pain    Swelling of legs   Cardiovascular None of the above   Pulmonary Experience morning headaches   Gastrointestinal None of the above   Genitourinary Stress incontinence (losing urine when coughing, sneezing, etc.)    Kidney stones   Hematological None of the above   Neurological Migraine headaches   Female only Polycystic ovarian syndrome (PCOS)       EATING BEHAVIORS:      1/18/2024    11:42 AM   --   Have you or anyone else thought that you had an eating disorder? No   Do you currently binge eat (eat a large amount of food in a short time)? No    Are you an emotional eater? Yes   Do you get up to eat after falling asleep? No   Can you afford 3 meals a day? Yes   Can you afford 50-60 dollars a month for vitamins? Yes       EXERCISE:      1/18/2024    11:42 AM   --   How often do you exercise? Never   What keeps you from being more active? Pain       MEDICATIONS:  Current Outpatient Medications   Medication Sig Dispense Refill    acetaminophen (TYLENOL) 500 MG tablet Take 500-1,000 mg by mouth every 4 hours as needed       atorvastatin (LIPITOR) 10 MG tablet Take 2 tablets (20 mg) by mouth daily for 30 days atorvastatin 10 mg tablet 60 tablet 11    Continuous Blood Gluc Sensor (FREESTYLE CINDY 14 DAY SENSOR) [facility] 1 Application. every 14 days 2 each 11    fluconazole (DIFLUCAN) 150 MG tablet Take 1 tablet (150 mg) by mouth daily 2 tablet 0    gabapentin (NEURONTIN) 300 MG capsule Take 2 capsules (600 mg) by mouth 3 times daily 540 capsule 3    ibuprofen (ADVIL/MOTRIN) 600 MG tablet every 4 hours as needed       insulin glargine (LANTUS PEN) 100 UNIT/ML pen Inject 24 Units Subcutaneous At Bedtime for 90 days 21.6 mL 0    metFORMIN (GLUCOPHAGE XR) 500 MG 24 hr tablet TAKE 2 TABLETS(1000 MG) BY MOUTH TWICE DAILY WITH MEALS 360 tablet 0    multivitamin (ONE-DAILY) tablet Take 1 tablet by mouth daily 100 tablet 3    NOVOLOG FLEXPEN 100 UNIT/ML soln Take 5- 10 units before each meal. 15 mL 3    ondansetron (ZOFRAN) 4 MG tablet Take 1 tablet (4 mg) by mouth every 6 hours as needed for nausea 60 tablet 1    rizatriptan (MAXALT) 10 MG tablet Take 1 tablet (10 mg) by mouth at onset of headache for migraine May repeat in 2 hours. 18 tablet 4    topiramate (TOPAMAX) 100 MG tablet Take 1 tablet (100 mg) by mouth 2 times daily 180 tablet 1    BD PEN NEEDLE RAMON 2ND GEN 32G X 4 MM miscellaneous USE 4 PEN NEEDLES DAILY OR AS DIRECTED. 100 each 3    Continuous Blood Gluc Sensor (FREESTYLE CINDY 2 SENSOR) [facility] 1 each See Admin Instructions Change every 14 days. 6 each 1     dexAMETHasone (DECADRON) 4 MG/ML injection To be used topically by therapist during PT sessions. 30 mL 0    diazepam (VALIUM) 5 MG tablet once as needed Only for Dentist      insulin  UNIT/ML injection 24 units  To be taken with prednisone 60 mg, hold it if not taking steroids 15 mL 1    promethazine (PHENERGAN) 25 MG tablet Take 25 mg by mouth every 6 hours as needed      tolterodine ER (DETROL LA) 4 MG 24 hr capsule Take 1 capsule (4 mg) by mouth daily 30 capsule 3    verapamil ER (VERELAN) 120 MG 24 hr capsule Take 1 capsule (120 mg) by mouth At Bedtime 90 capsule 1    vitamin D3 (CHOLECALCIFEROL) 125 MCG (5000 UT) tablet Take 1 tablet (125 mcg) by mouth every morning 90 tablet 3       ALLERGIES:  Allergies   Allergen Reactions    Hydrocodone Other (See Comments)     Headache per pt and hallucinations  Headache per pt and hallucinations      Reglan [Metoclopramide]      Anxiety    Vicodin [Hydrocodone-Acetaminophen] Other (See Comments)     Hallucinations    Silver Nitrate Itching and Rash     Only issues if in for a long time  Only issues if in for a long time  Only issues if in for a long time      Tegaderm Transparent Dressing (Informational Only) Rash       Computed FIB-4 Calculation unavailable. One or more values for this score either were not found within the given timeframe or did not fit some other criterion.    Fib-4 < 1.3: No further evaluation at this point, unless other concerns    - If the Fib-4 is >2.67  Fibroscan and elective liver clinic referral    - Intermediate Fib-4 scores: Get a Fibroscan, consider repeating this in 1-2 years.    Anti-obesity medication ROS:    HEENT  Hx of glaucoma: No    Cardiovascular  CAD:No  HTN:No    Gastrointestinal  GERD:No  Constipation:No  Liver Dz:No  H/O Pancreatitis:No    Psychiatric  Bipolar: No  Anxiety:Yes  Depression:Yes  History of alcohol/drug abuse: No  Hx of eating disorder:No    Endocrine  Personal or family hx of MTC or  "MEN2:No  Diabetes/prediabetes: Yes    Neurologic:  Hx of seizures: No  Hx of migraines: Yes  Memory Impairment: No      History of kidney stones: Yes  Kidney disease: No  Current birth control:  hysterectomy        Objective   /80 (BP Location: Left arm, Patient Position: Sitting, Cuff Size: Adult Large)   Pulse 83   Resp 16   Ht 1.676 m (5' 6\")   Wt 126.6 kg (279 lb)   LMP  (LMP Unknown)   SpO2 96%   BMI 45.03 kg/m      Physical Exam   GENERAL: alert and no distress  EYES: Eyes grossly normal to inspection.  No discharge or erythema, or obvious scleral/conjunctival abnormalities.  RESP: No audible wheeze, cough, or visible cyanosis.    SKIN: Visible skin clear. No significant rash, abnormal pigmentation or lesions.  NEURO: Cranial nerves grossly intact.  Mentation and speech appropriate for age.  PSYCH: Appropriate affect, tone, and pace of words        In summary, Ruth Vanegas has Class III obesity with a body mass index of Body mass index is 45.03 kg/m . kg/m2 and the comorbidities stated above. She completed an informational seminar and is a possible candidate for the laparoscopic gastric sleeve.  She will have to complete the following pre-requisites:    Received weight loss goal of 10 lb prior to surgery.  Achieve clearance from dietitian to see surgeon.  Have preoperative laboratory tests drawn.  Psychological Evaluation with MMPI and clearance for weight loss surgery.  Letter of clearance from the following PCP, Endocrine, GI.    Today in the office we discussed gastric sleeve surgery. Preoperative, perioperative, and postoperative processes, management, and follow up were addressed.  Risks and benefits were outlined including the risk of death, staple line leak (1-2%), PE, DVT, ulcer, worsening GERD, N/V, stricture, hernia, wound infection, weight regain, and vitamin deficiencies. I emphasized exercise and activity along with appropriate food choice as the main foundation for weight loss " with surgery providing surgical reinforcement of this.  All questions were answered.  A goal sheet and support group handout were given to the patient.        If you have not already watched our online seminar please go to [email or web address]    Weight loss requirement: 10lbs prior to surgery. Goal Weight: 268. Will have final weight check 2-3 weeks prior to surgery at anesthesia or nurse pre-op teaching visit.    -Need current weight confirmation at primary clinic or weight management clinic No    Bariatric labs ordered, call for a lab only appointment at any [facility] lab. To find a lab location near you, please call [phone]. Please let us know if orders need to be faxed to a non [facility] lab.    Schedule bariatric psych eval as soon as possible.  List of psychologists will be sent to you via "[employer]" or given to you in clinic.     Call Timothy Grewal at [phone] to discuss insurance coverage for bariatric surgery.  Please check with your insurance regarding bariatric surgery coverage also. Timothy can also help you with scheduling psych eval if you are having difficulties.    The following clearance letters are needed: Letter templates will be sent to you via "[employer]" or given to you in clinic. Providers can submit through electronic medical record or fax to [phone].  - Primary care provider.   - Endocrine   - GI     Cannabis cessation and THC test needed: Yes    A1C goal <8    Birth control after surgery discussed. Patient instructed that 2 forms of birth control required after surgery and to avoid pregnancy for at least 18 months after surgery: hysterectomy    NEXT VISITS: A  should reach out to you to schedule the following appointments.  If they do not reach you please call [phone] to schedule the following appointments:    -See dietitian in 1 month and monthly for 3 months    -See Lauren Bloch MTM pharmacist in 2 month for polypharmacy pre-surgery    -See Rohini  Tata in 3 months to follow up on pre-op weight loss and weight loss medications    -See Dr Lorenzo in 2 month for bariatric surgeon visit. Discuss bariatric surgery.      Once the patient has completed the requirements in their task list and there are no further recommendations, the pt will be allowed to see the surgeon of their choice for consultation on the laparoscopic gastric sleeve surgery. Patient verbalizes understanding of the process to surgery and expectations for the postoperative period including the need for lifelong lifestyle changes, vitamin supplementation, and laboratory monitoring.    Medications that may contribute to the patient's obesity, such as antipsychotic medications, have been identified. Correctable endocrine disorders and other medical conditions have been ruled out, or are under successful treatment. Identified personal barriers to making and continuing needed life changes. Identified behavior changes/strategies to address personal barriers.    Sincerely,     Rohini Wong PA-C

## 2024-01-23 ENCOUNTER — LAB REQUISITION (OUTPATIENT)
Dept: LAB | Facility: CLINIC | Age: 39
End: 2024-01-23
Payer: COMMERCIAL

## 2024-01-23 DIAGNOSIS — Z11.3 ENCOUNTER FOR SCREENING FOR INFECTIONS WITH A PREDOMINANTLY SEXUAL MODE OF TRANSMISSION: ICD-10-CM

## 2024-01-23 LAB
HBV SURFACE AG SERPL QL IA: NONREACTIVE
HCV AB SERPL QL IA: NONREACTIVE
HIV 1+2 AB+HIV1 P24 AG SERPL QL IA: NONREACTIVE
HSV1 IGG SERPL QL IA: >62.2 INDEX
HSV1 IGG SERPL QL IA: ABNORMAL
HSV2 IGG SERPL QL IA: 0.37 INDEX
HSV2 IGG SERPL QL IA: ABNORMAL
T PALLIDUM AB SER QL: NONREACTIVE
TTG IGA SER-ACNC: 1 U/ML
TTG IGG SER-ACNC: <0.6 U/ML

## 2024-01-23 PROCEDURE — 86780 TREPONEMA PALLIDUM: CPT | Mod: ORL | Performed by: OBSTETRICS & GYNECOLOGY

## 2024-01-23 PROCEDURE — 86695 HERPES SIMPLEX TYPE 1 TEST: CPT | Mod: ORL | Performed by: OBSTETRICS & GYNECOLOGY

## 2024-01-23 PROCEDURE — 87389 HIV-1 AG W/HIV-1&-2 AB AG IA: CPT | Mod: ORL | Performed by: OBSTETRICS & GYNECOLOGY

## 2024-01-23 PROCEDURE — 86803 HEPATITIS C AB TEST: CPT | Mod: ORL | Performed by: OBSTETRICS & GYNECOLOGY

## 2024-01-23 PROCEDURE — 87491 CHLMYD TRACH DNA AMP PROBE: CPT | Mod: ORL | Performed by: OBSTETRICS & GYNECOLOGY

## 2024-01-23 PROCEDURE — 87340 HEPATITIS B SURFACE AG IA: CPT | Mod: ORL | Performed by: OBSTETRICS & GYNECOLOGY

## 2024-01-24 ENCOUNTER — TELEPHONE (OUTPATIENT)
Dept: ENDOCRINOLOGY | Facility: CLINIC | Age: 39
End: 2024-01-24

## 2024-01-24 ENCOUNTER — VIRTUAL VISIT (OUTPATIENT)
Dept: ENDOCRINOLOGY | Facility: CLINIC | Age: 39
End: 2024-01-24
Payer: COMMERCIAL

## 2024-01-24 VITALS — BODY MASS INDEX: 44.68 KG/M2 | WEIGHT: 278 LBS | HEIGHT: 66 IN

## 2024-01-24 DIAGNOSIS — E11.9 TYPE 2 DIABETES MELLITUS WITHOUT COMPLICATION, UNSPECIFIED WHETHER LONG TERM INSULIN USE (H): ICD-10-CM

## 2024-01-24 DIAGNOSIS — E66.813 CLASS 3 SEVERE OBESITY WITH SERIOUS COMORBIDITY AND BODY MASS INDEX (BMI) OF 45.0 TO 49.9 IN ADULT, UNSPECIFIED OBESITY TYPE (H): ICD-10-CM

## 2024-01-24 DIAGNOSIS — Z71.3 NUTRITIONAL COUNSELING: Primary | ICD-10-CM

## 2024-01-24 DIAGNOSIS — E66.01 CLASS 3 SEVERE OBESITY WITH SERIOUS COMORBIDITY AND BODY MASS INDEX (BMI) OF 45.0 TO 49.9 IN ADULT, UNSPECIFIED OBESITY TYPE (H): ICD-10-CM

## 2024-01-24 LAB
ANNOTATION COMMENT IMP: NORMAL
C TRACH DNA SPEC QL PROBE+SIG AMP: NEGATIVE
N GONORRHOEA DNA SPEC QL NAA+PROBE: NEGATIVE
RETINYL PALMITATE SERPL-MCNC: 0.03 MG/L
VIT A SERPL-MCNC: 0.6 MG/L

## 2024-01-24 PROCEDURE — 97804 MEDICAL NUTRITION GROUP: CPT | Mod: 95

## 2024-01-24 PROCEDURE — 99207 PR NO CHARGE LOS: CPT | Mod: 95

## 2024-01-24 NOTE — PROGRESS NOTES
"Video-Visit Details    Type of service:  Video Visit    Video Start Time: 3:00 PM   Video End Time: 3:54 PM     Originating Location (pt. Location): Home    Distant Location (provider location):  Offsite (providers home) [facility] [place]     Platform used for Video Visit: Other: Zoom    New Bariatric Nutrition Class Note    Reason For Visit: Nutrition Class     Ruth Vanegas is a 38 year old presenting today for virtual bariatric nutrition class and consultation.  Pt is interested in weight loss surgery.     Pt referred by SNEHA Rojas on January 22, 2024.    CO-MORBIDITIES OF OBESITY INCLUDE:        1/18/2024    11:42 AM   --   I have the following health issues associated with obesity Type II Diabetes    Polycystic Ovarian Syndrome       SUPPORT:      1/18/2024    11:42 AM   Support System Reviewed With Patient   Who do you have in your support network that can be available to help you for the first 2 weeks after surgery? Yes   Who can you count on for support throughout your weight loss surgery journey? My spouse       ANTHROPOMETRICS:  Estimated body mass index is 45.03 kg/m  as calculated from the following:    Height as of 1/22/24: 1.676 m (5' 6\").    Weight as of 1/22/24: 126.6 kg (279 lb).        1/18/2024    11:42 AM   --   I have tried the following methods to lose weight Watching portions or calories    Exercise    Prescription Medications           1/18/2024    11:42 AM   Weight Loss Questions Reviewed With Patient   How long have you been overweight? Since early childhood         NUTRITION HISTORY:    Patient attended virtual WLS nutrition class today via Zoom.         1/18/2024    11:42 AM   Recall Diet Questions Reviewed With Patient   Describe what you typically consume for breakfast (typical or most recent) Depends usually I skip breakfast   Describe what you typically consume for lunch (typical or most recent) [place] or leftovers   Describe what " you typically consume for supper (typical or most recent) Some type of meat, strach, and veggie most times   Describe what you typically consume as snacks (typical or most recent) Depends mostly chips or candy   How many ounces of water, or other low calorie drinks, do you drink daily (8 oz=1 glass)? 32 oz   How many ounces of caffeine (coffee, tea, pop) do you drink daily (8 oz=1 glass)? 8 oz   How many ounces of carbonated (pop, beer, sparkling water) drinks do you drinky daily (8 oz=1 glass)? 24 oz   How many ounces of juice, pop, sweet tea, sports drinks, protein drinks, other sweetened drinks, do you drink daily (8 oz=1 glass)? 16 oz   How many ounces of milk do you drink daily (8 oz=1 glass) 8 oz   Please indicate the type of milk 1%   How often do you drink alcohol? Monthly or less   If you do drink alcohol, how many drinks might you have in a day? (one drink = 5 oz. wine, 1 can/bottle of beer, 1 shot liquor) 1 or 2           1/18/2024    11:42 AM   Eating Habits   What foods do you crave? Steak and carbs most           1/18/2024    11:42 AM   Dining Out History Reviewed With Patient   How often do you dine out? Around once a week.   Where do you dine out? (select all that apply) sit-down restaurants    fast food chains    take out   What types of food do you order when you dine out? Depends on how il feelings           1/18/2024    11:42 AM   Physical Activity Reviewed With Patient   How often do you exercise? Never   What keeps you from being more active? Pain       EXERCISE:        1/18/2024    11:42 AM   --   How often do you exercise? Never   What keeps you from being more active? Pain       NUTRITION DIAGNOSIS:  Obesity r/t long history of positive energy balance aeb BMI >30 kg/m2.    INTERVENTION:  Patient attended [facility] Nutrition Class today.    Intervention Provided/Education Provided on post-op diet guidelines, vitamins/minerals essential post-operatively, GI anatomy of bariatric surgeries, ways  to help prepare for post-op diet guidelines pre-operatively, portion/calorie-control, mindful eating and sources of protein. Provided pt with list of goals and handouts below via [employer]. Presentation slides provided via email.          1/18/2024    11:42 AM   Questions Reviewed With Patient   How ready are you to make changes regarding your weight? Number 1 = Not ready at all to make changes up to 10 = very ready. 6   How confident are you that you can change? 1 = Not confident that you will be successful making changes up to 10 = very confident. 5       Expected Engagement: good    GOALS:  Relating To Eating:  - Eat slowly (20-30 minutes per meal), chewing foods well (25 chews per bite/applesauce consistency)  - Focus on eating smaller portion sizes at meals and snacks    Relating to beverages:  - Reduce caffeine/carbonation/calorie containing beverages  - Separate fluids from meals by 30 minutes before, during, and after eating  - Drink 48-64 ounces of fluid per day. Small, frequent sips between meals.    Relating to activity:  Increase activity as able      Protein Sources for Weight Loss  [email or web address]     Carbohydrates  [email or web address]     Mindful Eating  [email or web address]     Post-op Diet Handouts:  Diet Guidelines after Weight-loss Surgery  [email or web address]     Your Stage 1 Diet: Clear Liquids  [email or web address]     Your Stage 2 Diet: Low-fat Full Liquids  [email or web address]     Your Stage 3 Diet: Pureed Foods  [email or web address]     Pureed Pleasures  [email or web address]://[email or web address]    Your Stage 4 Diet: Soft Foods  [email or web address]    Your Stage 5 Diet: Regular Foods  [email or web address]    Supplements after Sleeve Gastrectomy, Gastric Bypass or Single Anastomosis Duodenal Switch  [email or web address]    Keeping Track of Fluids  [email or web address]      Follow up: Monthly  until surgery recommended     Time spent with patient:  54 minutes.  Janiya Noguera RD, LD

## 2024-01-24 NOTE — TELEPHONE ENCOUNTER
Left Voicemail (1st Attempt) for the patient to call back and schedule the following:    Appointment type: TED AMIN  Provider: Rohini Wong PA-C  Return date: 04/22/2024 ( 3 mos )   Specialty phone number: [phone]  Additional appointment(s) needed: yes  Additonal Notes:     Dr. Lorenzo in 2 months for bariatric meet and lucia  Dietitian in 1 month after group visit

## 2024-01-24 NOTE — PATIENT INSTRUCTIONS
Dick Miranda,     Thank you for attending the weight loss surgery nutrition class.     Follow-up with RD in 1 month. Please call to schedule appointment.     Thank you,    Janiya Noguera, RD, LD  If you would like to schedule or reschedule an appointment with the RD, please call [phone]    Nutrition Goals  Relating To Eating:  - Eat slowly (20-30 minutes per meal), chewing foods well (25 chews per bite/applesauce consistency)  - Focus on eating smaller portion sizes at meals and snacks    Relating to beverages:  - Reduce caffeine/carbonation/calorie containing beverages  - Separate fluids from meals by 30 minutes before, during, and after eating  - Drink 48-64 ounces of fluid per day. Small, frequent sips between meals.    Relating to activity:  Increase activity as able      Protein Sources for Weight Loss  [email or web address]     Carbohydrates  [email or web address]     Mindful Eating  [email or web address]     Post-op Diet Handouts:  Diet Guidelines after Weight-loss Surgery  [email or web address]     Your Stage 1 Diet: Clear Liquids  [email or web address]     Your Stage 2 Diet: Low-fat Full Liquids  [email or web address]     Your Stage 3 Diet: Pureed Foods  [email or web address]     Pureed Pleasures  [email or web address]://[email or web address]/[email or web address]    Your Stage 4 Diet: Soft Foods  [email or web address]    Your Stage 5 Diet: Regular Foods  [email or web address]    Supplements after Sleeve Gastrectomy, Gastric Bypass or Single Anastomosis Duodenal Switch  [email or web address]    Keeping Track of Fluids  [email or web address]    COMPREHENSIVE WEIGHT MANAGEMENT PROGRAM  VIRTUAL SUPPORT GROUPS    At [facility], our Comprehensive Weight Management program offers on-line support groups for patients who are working on weight loss and considering, preparing for, or have had weight loss surgery.     There is no cost for this  opportunity.  You are invited to attend the?Virtual Support Groups?provided by any of the following locations:    [facility] via Microsoft Teams with Radha Zavala RN  2.   [place] via Juv AcessÃ³rios with Estevan Vela, PhD, LP  3.   [place] via Juv AcessÃ³rios with Rose Gonsales RN  4.   [facility] via a Zoom Meeting with Rose Mcgee [facility]-    The following Support Group information can also be found on our website:  [email or web address]      [facility] Weight Loss Surgery Support Group  The support group is a patient-lead forum that meets monthly to share experiences, encouragement and education. It is open to those who have had weight loss surgery, are scheduled for surgery, or are considering surgery.   WHEN: This group meets on the 3rd Wednesday of each month from 5:00PM - 6:00PM virtually using Microsoft Teams.   FACILITATOR: Led by Radha Cordova RD, LD, RN, the program's Clinical Coordinator.   TO REGISTER: Please contact the clinic via [employer] or call the nurse line directly at [phone] to inform our staff that you would like an invite sent to you and to let us know the email you would like the invite sent to. Prior to the meeting, a link with directions on how to join the meeting will be sent to you.    2024 Meetings   January 17  February 21  March 20  April 17  May 15  Haley 19      [facility] and [facility] Bariatric Care Support Group?  This is open to all pre- and post- operative bariatric surgery patients as well as their support system.   WHEN: This group meets the 3rd Tuesday of each month from 6:30 PM - 8:00 PM virtually using Microsoft Teams.   FACILITATOR: Led by Estevan Vela, Ph.D who is a Licensed Psychologist with the [facility] Comprehensive Weight Management Program.   TO REGISTER: Please send an email to Estevan Vela, Ph.D.,  "LP [email or web address]?if you would like an invitation to the group. Prior to the meeting, a link with directions on how to join the meeting will be sent to you.    2024 Meetings January 16: \"Medication Management and Bariatric Surgery\", Dyan Prather, PharmD, Pharmacy Resident at [facility]  February 20: \"A Bariatric Surgery Patient's Perspective\", ANJALI John, [facility], Behavioral Health Clinician at [facility]  March 19  April 16  May 21  Haley 18: \"Nutritional Labeling\", Dietitian speaker to be announced.  November 19: \"Holiday Eating\", Dietitian speaker to be announced.    [facility] Post-Operative Bariatric Surgery Support Group  This is a support group for [facility] bariatric patients (and those external to [facility]) who have had bariatric surgery and are at least 3 months post-surgery.  WHEN: This support group meets the 4th Wednesday of the month from 11:00 AM - 12:00 PM virtually using Microsoft Teams.   FACILITATOR: Led by Certified Bariatric Nurse, Rose Gonsales RN.   TO REGISTER: Please send an email to Rose at [email or web address] if you would like an invitation to the group.  Prior to the meeting, a link with directions on how to join the meeting will be sent to you.    2024 Meetings  January 24  February 28  March 27  April 24  May 22  Haley 26    [facility] Healthy Lifestyle Group?  This is a 60 minute virtual coaching group for those who want to lead a healthier lifestyle. Come together to set goals and overcome barriers in a supportive group environment. We will address the four pillars of health: nutrition, exercise, sleep and emotional well-being.  This group is highly recommended for those who are participating in the 24 week Healthy Lifestyle Plan and our Health Coaching sessions.  WHEN: This group meets the 1st " "Friday of the month, 12:30 PM - 1:30 PM online, via a zoom meeting.    FACILITATOR: Led by National Board Certified Health and , Rose Mcgee, [facility].   TO REGISTER: Please call the Call Center at [phone] to register. You will get an appointment to attend in [facility]. Fifteen minutes prior to the meeting, complete the e-check in and you will get the link to join the meeting.    There is no charge to attend this group and space is limited.     2024 Meetings  January 5: \"New Years Vision: Manifest your Best 2024!\" (guided imagery,  journaling and discussion)  February 2: \"Let's Talk\"  March 1: \"10 Percent Happier\" by Ilan Lema (Book Bites - a guided discussion on the nuggets of wisdom from favorite wellness books, no need to read the book but highly encouraged)  April 5: \"Let's Talk\"  May 3: \"Essentialism: The Disciplined Pursuit of Less\" by Joey Lindsay (Book Bites discussion)  June 7: \"Let's Talk\"  July 5: NO MEETING, off for the 4th of July Holiday  August 2: \"The Blue Zones, Secrets for Living a Longer Life\" by Ilan Higgins (Book Bites discussion)        "

## 2024-01-26 NOTE — ASSESSMENT & PLAN NOTE
Overweight onset in middle school. Weight increase influenced by starting depo birth control and PPD after second birth. Lost 100lb through diet and exercise in 2010. Highest weight in life of 260lbs in 2016. Had GI symptoms in 2020 leading to 80lb weight loss. Weight stable at 280lbs over the past year. Wants to lose weight to control type II diabetes. Considering bariatric surgery as a tool to help with weight.     Comorbidities include Type II diabetes, HLD, and depression.     In 2020 started to have chronic nausea, vomiting, and dysphagia. Extensive work up by GI. Esophogram and swallow study was negative, but positive for H Pylori. Treatment was helpful with symptoms. Currently still has symptoms of daily nausea and dysphagia off and on. Will follow up with GI prior to surgery for any further work up and clearance prior to surgery.     Discussed AOMs to help with weight loss. She states that has no appetite and will forget to eat most days. With this I am hesitant to start an AOM today. Previously trialed many GLP-1s with side effects of vomiting, will not want to retrial. Currently taking metformin (diabetes) and topiramate (migraines).

## 2024-01-26 NOTE — PATIENT INSTRUCTIONS
"Dick Miranda, it was nice to meet you today!  Thank you for allowing us the privilege of caring for you. We hope we provided you with the excellent service you deserve.   Please let us know if there is anything else we can do for you so that we can be sure you are completely satisfied with your care experience.    To ensure the quality of our services you may be receiving a patient satisfaction survey from an independent patient satisfaction monitoring company.    The greatest compliment you can give is a \"Likely to Recommend\"    Your visit was with Rohini Wong PA-C today.    Instructions per today's visit:     If you have not already watched our online seminar please go to [email or web address]    Weight loss requirement: 10lbs prior to surgery. Goal Weight: 268. Will have final weight check 2-3 weeks prior to surgery at anesthesia or nurse pre-op teaching visit.    -Need current weight confirmation at primary clinic or weight management clinic No    Bariatric labs ordered, call for a lab only appointment at any [facility] lab. To find a lab location near you, please call [phone]. Please let us know if orders need to be faxed to a non [facility] lab.    Schedule bariatric psych eval as soon as possible.  List of psychologists will be sent to you via [employer] or given to you in clinic.     Call Timothy Grewal at [phone] to discuss insurance coverage for bariatric surgery.  Please check with your insurance regarding bariatric surgery coverage also. Timothy can also help you with scheduling psych eval if you are having difficulties.    The following clearance letters are needed: Letter templates will be sent to you via [employer] or given to you in clinic. Providers can submit through electronic medical record or fax to [phone].  - Primary care provider.   - Endocrine   - GI     Cannabis cessation and THC test needed: Yes    A1C goal <8    Birth control after surgery discussed. Patient " "instructed that 2 forms of birth control required after surgery and to avoid pregnancy for at least 18 months after surgery: hysterectomy    NEXT VISITS: A  should reach out to you to schedule the following appointments.  If they do not reach you please call [phone] to schedule the following appointments:    -See dietitian in 1 month and monthly for 3 months    -See Lauren Bloch MTM pharmacist in 2 month for polypharmacy pre-surgery    -See Rohini Payton in 3 months to follow up on pre-op weight loss and weight loss medications    -See Dr Lorenzo in 2 month for bariatric surgeon visit. Discuss bariatric surgery.    ___________________________________________________________________________  Important contact and scheduling information:  Please call our contact center at [phone] to schedule your next appointments.  For any nursing questions or concerns call Evita Rodriguez LPN at [phone] or Beverly Lezama RN at [phone]  Please call during clinic hours Monday through Friday 8:00a - 4:00p if you have questions or you can contact us via [employer] at anytime and we will reply during clinic hours.    Lab results will be communicated through My Chart or letter (if My Chart not used). Please call the clinic if you have not received communication after 1 week or if you have any questions.?  Clinic Fax: [phone]  __________________________________________________________________________    If labs were ordered today:    Please make an appointment to have them drawn at your convenience.     To schedule the Lab Appointment using [employer]:  Select \"Schedule an Appointment\"  Select \"Lab Only\"  For \"A couple of questions\", select \"Other\"  For \"Which locations work for you?, select the location and set up the appointment    To schedule by phone call [phone] to schedule a lab only appointment at [facility].  ___________________________________________________________________________  Work with " A Health !  Virtual Sessions are Available through [facility] Weight Management Clinics    To learn more, call to schedule a free, Health  Q&A appointment: [phone]     What is Health Coaching?  Do you know what you are supposed to do, but you just aren't doing it?  Then, HEALTH COACHING may help you!   Get unstuck and move forward with the support of a professionally trained [employer]-HWC (National Board-Certified Health and ) who uses evidence-based approaches to help you move forward with healthy lifestyle changes in the areas of weight loss, stress management and overall well-being.    Health Coaches help you identify goals that will work best for you. Health Coaches provide support and encouragement with overcoming barriers and help you to find inspiration and motivation to lead a healthy lifestyle.    Option one:  Health Coaching 3-Pack; Three, 30-minute Health Coaching Visits, for $99  Visits are done virtually (phone or video)  This is a self pay service; we do not accept insurance for geni coaching.    Option two:   The 24 week Plan; 11 Health Coaching Visits, and a 7 months subscription to [employer]-- on-demand fitness, nutrition and mindfulness classes, for $499 (employee discounts may be available). Participants will also meet regularly with a weight management Medical Provider and a Registered/Licensed Dietician.  This is a self-pay service; we do not accept insurance for health coaching.    To Schedule a free Health  Q&A appointment to learn more,  call [phone].  ____________________________________________________________________  [facility]  Healthy Lifestyle Group    Healthy Lifestyle Group  This is a 60 minute virtual coaching group for those who want to lead a healthier lifestyle. Come together to set goals and overcome barriers in a supportive group environment. We will address the four pillars of  "health--nutrition, exercise, sleep and emotional well-being.  This group is highly recommended for those who are participating in the 24 week Healthy Lifestyle Plan and our Health Coaching sessions.    WHEN: This group meets the first Friday of the month, 12:30 PM - 1:30 PM online, via a zoom meeting.      FACILITATOR: Led by National Board Certified Health and , Rose Mcgee, [facility].    TO REGISTER: Please call the Call Center at [phone] to register. You will get an appointment to attend in [facility]. Fifteen minutes prior to the meeting, complete the e-check in and you will get the link to join the meeting.  There is no charge to attend this group and space is limited.      2023 and 2024 Meeting Topics and Dates:    November 3: Introduction to Mindfulness (Learn simple and effective mindfulness practices and how it can benefit you)    December 8: Let's Talk (guided discussion on our wins and challenges)    January 5: New Years Vision: Manifest your Best 2024! (Guided imagery,  journaling and discussion)    February 2: Let's Talk    March 1: 10 Percent Happier by Ilan Lema (Book Bites; a guided discussion on the nuggets of wisdom from favorite wellness books; no need to read the book but highly encouraged)    April 5: Let's Talk    May 3: \"Essentialism; The Disciplined Pursuit of Less by Joey Forrester (book bites discussion)    June 7: Let's Talk    July 5: NO MEETING, off for the 4th of July Holiday    August 2: The Blue Zones, Secrets for Living a Longer Life by Ilan Higgins (book bites discussion)      If you would like bariatric surgery specific support group info please let your care team know.         Thank you,   [facility] Comprehensive Weight Management Team                          "

## 2024-01-31 DIAGNOSIS — E55.9 HYPOVITAMINOSIS D: Primary | ICD-10-CM

## 2024-02-05 ENCOUNTER — OFFICE VISIT (OUTPATIENT)
Dept: NEUROLOGY | Facility: CLINIC | Age: 39
End: 2024-02-05
Payer: COMMERCIAL

## 2024-02-05 VITALS
BODY MASS INDEX: 44.06 KG/M2 | RESPIRATION RATE: 16 BRPM | HEART RATE: 78 BPM | WEIGHT: 273 LBS | SYSTOLIC BLOOD PRESSURE: 123 MMHG | DIASTOLIC BLOOD PRESSURE: 78 MMHG

## 2024-02-05 DIAGNOSIS — G43.719 INTRACTABLE CHRONIC MIGRAINE WITHOUT AURA AND WITHOUT STATUS MIGRAINOSUS: ICD-10-CM

## 2024-02-05 DIAGNOSIS — E66.01 CLASS 3 SEVERE OBESITY DUE TO EXCESS CALORIES WITH SERIOUS COMORBIDITY AND BODY MASS INDEX (BMI) OF 45.0 TO 49.9 IN ADULT (H): ICD-10-CM

## 2024-02-05 DIAGNOSIS — G47.00 INSOMNIA, UNSPECIFIED TYPE: Primary | ICD-10-CM

## 2024-02-05 DIAGNOSIS — E66.813 CLASS 3 SEVERE OBESITY DUE TO EXCESS CALORIES WITH SERIOUS COMORBIDITY AND BODY MASS INDEX (BMI) OF 45.0 TO 49.9 IN ADULT (H): ICD-10-CM

## 2024-02-05 PROCEDURE — 99214 OFFICE O/P EST MOD 30 MIN: CPT | Performed by: PSYCHIATRY & NEUROLOGY

## 2024-02-05 RX ORDER — TOPIRAMATE 100 MG/1
100 TABLET, FILM COATED ORAL 2 TIMES DAILY
Qty: 180 TABLET | Refills: 1 | Status: SHIPPED | OUTPATIENT
Start: 2024-02-05

## 2024-02-05 RX ORDER — RIZATRIPTAN BENZOATE 10 MG/1
10 TABLET ORAL
Qty: 18 TABLET | Refills: 4 | Status: SHIPPED | OUTPATIENT
Start: 2024-02-05

## 2024-02-05 RX ORDER — VERAPAMIL HYDROCHLORIDE 120 MG/1
120 CAPSULE, EXTENDED RELEASE ORAL AT BEDTIME
Qty: 90 CAPSULE | Refills: 1 | Status: SHIPPED | OUTPATIENT
Start: 2024-02-05

## 2024-02-05 NOTE — PROGRESS NOTES
NEUROLOGY OUTPATIENT PROGRESS NOTE   Feb 5, 2024     CHIEF COMPLAINT/REASON FOR VISIT/REASON FOR CONSULT  Patient presents with:  Follow Up    REASON FOR CONSULTATION- Headaches    REFERRAL SOURCE  Dr. Rosangela Haynes  CC Dr. Rosangela Haynes    HISTORY OF PRESENT ILLNESS  Ruth Vanegas is a 38 year old female seen today for evaluation of headaches.  She reports that she has had headaches for years these would come and go.  Over the last few months these have become more constant and lasting longer.  She reports that she has 15 headache days a month.  Both temples are involved.  Headaches are throbbing in nature with photophobia and phonophobia.  She denies any triggers for her headaches.    She is tried sumatriptan in the past which made things worse.  She has tried Excedrin but she has to catch the headaches soon enough to make it work.  She is currently on nortriptyline 50 mg at night which is not helping.  The nortriptyline was also being used as a sleep aid.  She is also on Topamax which is not helping.  She is taking 50 mg in the evening and 25 mg in the morning.  She further complains of vertigo which is sometimes with the headache.  She does have issues with uncontrolled sugars and feels that the Topamax might be helpful.  Denies any visual auras.    12/21/21  Patient returns today.  She reports that the headaches have become more frequent and she is still having them every day.  Is taking 100 mg twice daily of the Topamax with no side effects.  50 mg of nortriptyline at night and is not sleeping well with this dose.  Is interested in trying a higher medication.  Is taking Maxalt which is working though has to use it almost every day.  Is working on losing weight.  Reports that her diabetes has been under good control.  Has not done the blood work that had ordered.    8/15/22  Patient returns today.  Headaches have become much more frequent and she is still having them every day.  Her Topamax was increased to  100 mg twice a day endocrinology and she feels no side effects with no benefit as well.  Remains on 50 mg of nortriptyline at night for sleep.  She is also taking melatonin for sleep.  Verapamil was added previously which did not really help.  Diabetes has been under good control.  Maxalt does work occasionally but not all the time.  Blood work has been done which was negative.  Reports no other new symptoms.    9/20/22  Patient returns today.  She reports that the headaches have gotten worse since she was last seen.  She is having more severe headaches almost every day.  Remains on Topamax and verapamil.  She was on nortriptyline and this has been stopped.  She reports that this was stopped several months ago when she was not on it in August.  Maxalt does work but the benefit does not last the full time.  She did start the Ajovy which might have made the headaches worse.  Was discussed through phone messages about starting her on Botox and she wants to do that today.  For the insomnia she is seeing her GI doctor who is prescribing some medication that would help with her GI symptoms as well as with the insomnia.  She has not really started this medication at this point.  No other new issues/concerns.  No other new triggers    12/20/22  Patient returns today.  With the Botox she did have worsening headaches the first 2 weeks.  Headaches then improved for 2 months in the last 2 weeks she is been having headaches again.  Headaches unchanged in nature.  Remains on Topamax and verapamil.  Maxalt does help with abortive therapy.  Remains on melatonin for insomnia.  No other new issues.  Does complain of some eyebrow side effects with the Botox.  No other new concerns.    2/17/23  Patient returns today.  She had Botox about 2 months ago.  The first Botox had provided significant benefit though she feels no benefit from this Botox.  Headaches are there almost every day.  Still has some raising of the eyebrows.  Reports no  other provoking factors.  Headaches are more in the frontal region.  Is getting good sleep at night.  Is only working 3 nights a week.  Gaudencio does work for abortive therapy if she can catch the headache in time.  Symptoms she wakes up with a headache.  Has not really tried Excedrin Migraine.    Discussed Emgality and is afraid of trying that because of Ajovy causing side effects.  Does not want to do Depakote because of weight gain.  Nortriptyline is no longer be considered through GI.    10/4/23  Patient returns today.  Previously she was getting Botox which was working really well for her though she stopped the Botox because of cosmetic side effects of her eyebrows being raised.  Headaches have worsened again.  She has them every day.  They can be severe.  Rizatriptan does seem to be helping though occasionally she will wake up with headaches and these are not very effective.  Did not tolerate the protriptyline.  Remains on the Topamax and verapamil and is finding benefit.  No other new concerns.  2.  Is getting good sleep though not always.  Has not been using the melatonin though wants to use it again.    2/5/24  Patient returns today.  Headaches have gotten worse.  She is having multiple headaches a week.  Cannot tell me exactly why headaches are worse.  There is slightly increased stress.  Headaches are unchanged in nature.  Rizatriptan is not effective.  In reviewing her medication list she is still on the Topamax though she stopped the verapamil and never got the Emgality.  She stopped the verapamil because she did not get a refill.  It is unclear why the pharmacy did not give her these medications.    She is still having difficulty falling asleep at night.  Is now using gabapentin instead of melatonin.  No other new symptoms.    Previous history is reviewed and this is unchanged.    PAST MEDICAL/SURGICAL HISTORY  Past Medical History:   Diagnosis Date    Anemia     told to take iron pills when pregnant     Benign paroxysmal positional vertigo, unspecified laterality 02/26/2021    Depression     Depressive disorder     Diabetes mellitus (H)     Diabetes mellitus, type 2 (H) 07/15/2021    Dysthymic disorder     Endometriosis     Herpes genitalia     Hyperlipidemia     Kidney stone     Menorrhagia     Migraines     Neuropathy     Other abnormal Papanicolaou smear of cervix and cervical HPV(795.09) 01/02/2013    PCOS (polycystic ovarian syndrome)     Post traumatic stress disorder (PTSD)      Patient Active Problem List   Diagnosis    Class 3 severe obesity with serious comorbidity and body mass index (BMI) of 40.0 to 44.9 in adult (H)    Other abnormal Papanicolaou smear of cervix and cervical HPV(795.09)    Chronic nausea    Chronic vomiting    Diabetes mellitus, type 2 (H)    Unintentional weight loss    Flatulence, eructation and gas pain    Morbid obesity (H)    Hyperglycemia    Major depressive disorder, single episode, severe without psychotic features (H)    Esophageal dysphagia    Encounter prior to initiation of medication    S/P laparoscopic hysterectomy    Hyperlipidemia    History of cervical dysplasia   Significant for diabetes, migraines, depression, insomnia, difficulty keeping food down, constipation    FAMILY HISTORY  Family History   Adopted: Yes   Problem Relation Age of Onset    Chronic Obstructive Pulmonary Disease Mother     Prostate Cancer Father     Cancer Father         prostate    Alcoholism Sister     Alcoholism Sister     Alcoholism Brother     Alcoholism Brother     Diabetes Paternal Grandmother     Other Cancer Paternal Grandmother     Alcoholism Daughter     Coronary Artery Disease No family hx of     Urolithiasis No family hx of     Clotting Disorder No family hx of     Gout No family hx of     Heart Disease No family hx of     Anesthesia Reaction No family hx of    Family history positive for migraines in her brother.  Also family history of diabetes.    SOCIAL HISTORY  Social History      Tobacco Use    Smoking status: Never    Smokeless tobacco: Never   Vaping Use    Vaping Use: Never used   Substance Use Topics    Alcohol use: Yes     Comment: Socially    Drug use: Yes     Types: Marijuana       SYSTEMS REVIEW  Twelve-system ROS was done and other than the HPI this was negative except for neck pain, back pain, arm and leg pain, joint pain, numbness and tingling, weakness paralysis, difficulty walking, falling, balance coordination problems, dizziness, ringing in the ears, sleeping problems, headaches, anxiety, depression, bloating, stomach pain, weight gain, appetite problems  No new concerns/issues.    MEDICATIONS  acetaminophen (TYLENOL) 500 MG tablet, Take 500-1,000 mg by mouth every 4 hours as needed   BD PEN NEEDLE RAMON 2ND GEN 32G X 4 MM miscellaneous, USE 4 PEN NEEDLES DAILY OR AS DIRECTED.  Continuous Blood Gluc Sensor (FREESTYLE CINDY 14 DAY SENSOR) MISC, 1 Application. every 14 days  Continuous Blood Gluc Sensor (FREESTYLE CINDY 2 SENSOR) MISC, 1 each See Admin Instructions Change every 14 days.  dexAMETHasone (DECADRON) 4 MG/ML injection, To be used topically by therapist during PT sessions.  diazepam (VALIUM) 5 MG tablet, once as needed Only for Dentist  fluconazole (DIFLUCAN) 150 MG tablet, Take 1 tablet (150 mg) by mouth daily  gabapentin (NEURONTIN) 300 MG capsule, Take 2 capsules (600 mg) by mouth 3 times daily  ibuprofen (ADVIL/MOTRIN) 600 MG tablet, every 4 hours as needed   insulin  UNIT/ML injection, 24 units  To be taken with prednisone 60 mg, hold it if not taking steroids  metFORMIN (GLUCOPHAGE XR) 500 MG 24 hr tablet, TAKE 2 TABLETS(1000 MG) BY MOUTH TWICE DAILY WITH MEALS  multivitamin (ONE-DAILY) tablet, Take 1 tablet by mouth daily  NOVOLOG FLEXPEN 100 UNIT/ML soln, Take 5- 10 units before each meal.  ondansetron (ZOFRAN) 4 MG tablet, Take 1 tablet (4 mg) by mouth every 6 hours as needed for nausea  promethazine (PHENERGAN) 25 MG tablet, Take 25 mg by mouth  every 6 hours as needed  vitamin D3 (CHOLECALCIFEROL) 125 MCG (5000 UT) tablet, Take 1 tablet (125 mcg) by mouth every morning  atorvastatin (LIPITOR) 10 MG tablet, Take 2 tablets (20 mg) by mouth daily for 30 days atorvastatin 10 mg tablet  insulin glargine (LANTUS PEN) 100 UNIT/ML pen, Inject 24 Units Subcutaneous At Bedtime for 90 days  tolterodine ER (DETROL LA) 4 MG 24 hr capsule, Take 1 capsule (4 mg) by mouth daily    0.5 mL bupivacaine (MARCAINE) 0.5% injection (50 mL vial)  0.5 mL bupivacaine (MARCAINE) 0.5% injection (50 mL vial)  0.5 mL bupivacaine (MARCAINE) 0.5% injection (50 mL vial)  0.5 mL bupivacaine (MARCAINE) 0.5% injection (50 mL vial)  Botulinum Toxin Type A (BOTOX) 200 units injection 200 Units  triamcinolone (KENALOG-40) injection 40 mg  triamcinolone (KENALOG-40) injection 40 mg  triamcinolone (KENALOG-40) injection 40 mg  triamcinolone (KENALOG-40) injection 40 mg       PHYSICAL EXAMINATION  VITALS: /78   Pulse 78   Resp 16   Wt 123.8 kg (273 lb)   LMP  (LMP Unknown)   BMI 44.06 kg/m    GENERAL: Healthy appearing, alert, no acute distress, normal habitus.  CARDIOVASCULAR: Extremities warm and well perfused. Pulses present.   NEUROLOGICAL:  Patient is awake and oriented to self, place and time.  Attention span is normal.  Memory is grossly intact.  Language is fluent and follows commands appropriately.  Appropriate fund of knowledge. Cranial nerves 2-12 are intact. There is no pronator drift.  Motor exam shows 5/5 strength in all extremities.  Tone is symmetric bilaterally in upper and lower extremities.  (Previously reflexes are symmetric and 2+ in upper extremities and lower extremities. Sensory exam is grossly intact to light touch, pin prick and vibration.)  Finger to nose and heel to shin is without dysmetria.  Romberg is negative.  Gait is normal and the patient is able to do tandem walk and walk on toes and heels.  No change in exam.    DIAGNOSTICS  CT head-images reviewed.   No major structural lesions noted.  IMPRESSION:  1.  No acute intracranial process.      CT 2020  HEAD CT:   1.  No acute intracranial process.     CERVICAL SPINE CT:   1.  No acute cervical spine fracture.    OUTSIDE RECORDS  Outside referral notes and chart notes were reviewed and pertinent information has been summarized (in addition to the HPI):-Patient seen for headaches.  Was seen in endocrinology for diabetes.  Was referred to neurology.  Was also having some ankle and feet swelling.  Also has nausea related to headaches.  Has been seen in ENT for benign positional paroxysmal vertigo.  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  Was recommended to see occupational therapy.    LABS  Component      Latest Ref Rng & Units 2/28/2022   Vitamin B12      193 - 986 pg/mL 437   Ferritin      12 - 150 ng/mL 93   TSH      0.40 - 4.00 mU/L 1.71     MRI  IMPRESSION:  1.  No acute/subacute infarcts, mass lesions, hydrocephalus or MRI evidence of intracranial hemorrhage.      IMPRESSION/REPORT/PLAN  Intractable chronic migraine without aura and without status migrainosus  Primary insomnia  History of diabetes    This is a 38 year old female with chronic headache suggestive of chronic migraines and insomnia.  Her head CT has been negative for structural lesions.  Blood work has been noncontributory.  Brain negative for structural lesions.    For prevention of headaches:-Higher doses of Topamax have not helped with the headaches.  Propanolol cannot be used because of history of diabetes.  Verapamil has not helped with the headaches as well.  Ajovy was prescribed which actually made the headaches worse.  She is also tried nortriptyline in the past for insomnia which did not help with the headaches.  Protriptyline caused side effects.  We will continue to provide benefit that she is concerned about the side effects.  Wants to hold off on Botox for right now.  Continue Topamax and verapamil with goal of weaning  her off the verapamil.  We will add Emgality to see if it works better.  Could always go back to the Botox.    Headaches have worsened with stopping the verapamil and Emgality.  Will represcribe.  Will continue Topamax, verapamil and Emgality.    For abortive therapy:- Sumatriptan has caused headaches to get worse.  Maxalt is more helpful as abortive therapy and will continue that.  She can use the Maxalt with over-the-counter medications to see if it becomes more effective.  She does have some issues with catching the headaches on time.  Continue to try Excedrin Migraine with the Maxalt.  Continue.    Continue Maxalt for right now.  It become effective again with use of preventive medication.    She previously was on melatonin for insomnia and now has been switched to gabapentin.  Does have difficulty falling asleep though no difficulty with maintaining sleep.    Topamax might be helping with the diabetes as well.  Could consider leaving her on this.    She will keep a log of her headaches.  We will meet back in 3-4 months.    -Gabapentin.  Prescribed through psychiatry.  -     verapamil ER (VERELAN) 120 MG 24 hr capsule; Take 1 capsule (120 mg) by mouth at bedtime  -     rizatriptan (MAXALT) 10 MG tablet; Take 1 tablet (10 mg) by mouth at onset of headache for migraine May repeat in 2 hours.  -     topiramate (TOPAMAX) 100 MG tablet; Take 1 tablet (100 mg) by mouth 2 times daily  -     galcanezumab-gnlm (EMGALITY) 120 MG/ML injection; Inject 1 mL (120 mg) Subcutaneous every 28 days  -     galcanezumab-gnlm (EMGALITY) 120 MG/ML injection; Inject 2 mLs (240 mg) Subcutaneous every 28 days Loading dose.      Return in about 4 months (around 6/5/2024) for In-Clinic Visit (must).    Over 31 minutes were spent coordinating the care for the patient on the day of the encounter.  This includes previsit, during visit and post visit activities as documented above.  Counseling patient.  Refractory problem.  Multiple  medications managed.  Social issues.  Prescription management.  (Activities include but not inclusive of reviewing chart, reviewing outside records, reviewing labs and imaging study results as well as the images, patient visit time including getting history and exam,  use if applicable, review of test results with the patient and coming up with a plan in a shared model, counseling patient and family, education and answering patient questions, EMR , EMR diagnosis entry and problem list management, medication reconciliation and prescription management if applicable, paperwork if applicable, printing documents and documentation of the visit activities.)      Roberto Bolanos MD  Neurologist  [facility]  Tel:- [phone]    This note was dictated using voice recognition software.  Any grammatical or context distortions are unintentional and inherent to the software.

## 2024-02-23 NOTE — PATIENT INSTRUCTIONS
Stop Invokana.     Take diflucan 150 mg x 1.     Start Novolog 5 units (max 10 units) 15 minutes before your largest meal.     We may need to lower your lantus insulin if you start going low.     If you are comfortable, start taking this before lunch, too.     Meet with diabetes educator to review your doses.     Please let me know if you are having low blood sugars less than 70 or over 250 mg/dL.      If you have concerns, please send me a [employer] message M-Th, call the clinic at [phone], or call [phone] after hours/weekends and ask to speak with the endocrinologist on call.           Detail Level: Detailed Quality 130: Documentation Of Current Medications In The Medical Record: Current Medications Documented Quality 431: Preventive Care And Screening: Unhealthy Alcohol Use - Screening: Patient not identified as an unhealthy alcohol user when screened for unhealthy alcohol use using a systematic screening method Quality 226: Preventive Care And Screening: Tobacco Use: Screening And Cessation Intervention: Patient screened for tobacco use and is an ex/non-smoker

## 2024-03-01 NOTE — PROGRESS NOTES
.3/1/2024  PATIENT LAB/IMAGING STATUS : No pending lab orders   Outcome for 03/01/24 9:40 AM: Data obtained via [email or web address] website  Dyan Devries CMA    HPI:   Ruth is a pleasant 39 yo woman here with her significant other for follow up of type 2 diabetes (PREETHI negative, C-peptide positive) since 2016.  She also has a history of Anemia, Depression, Depressive disorder, Dysthymic disorder, Endometriosis, Herpes genitalia, Hyperlipidemia, Kidney stone, Menorrhagia, Migraines, Neuropathy, PCOS (polycystic ovarian syndrome), and Post traumatic stress disorder (PTSD).  At her last visit, she was having a lot of trouble with her glucose management.  Her glucose started to climb and her PCP put her on Ozempic.  She became violently ill and needed IV fluids in the ER.  She had also been having yeast infections on invokana, so I took her off of it and put her on short acting insulin instead.  She has also met with weight management clinic and Lauren Bloch, [facility] pharmacist.  She feels like things are going better.  She forgets to take Novolog often- hard to have it with her.  She was feeling overwhelmed after meeting with weight management.  She felt like it was a very big lifestyle change and was not ready to move forward so quickly.  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  She feels she needs more time to think it over.     Ruth reports she was first diagnosed with diabetes in 2016 on routine blood work prior to a procedure.  She initially was started on metformin, then insulin was added years later.     Current treatment:   Metformin 1000 mg bid.  Lantus- 26 units daily- now at 10pm.   Novolog- 10 units once daily with dinner. Hard to remember.     Previous treatment:   Victoza- stopped it in 2020 when she started insulin.   Invokana 300 mg daily- recurrent yeast infections- stopped  .    Typical day:   Not hungry in the morning.  Wakes up   Daughter wakes her up before she leaves for school.  Drinks water.    Nauseated if she tries to eat when she wakes up.  Unsure why.   Does not eat very much.   Weight fluctuates.Recent loss.   Works as a  overnights.   - got really sick.  H. Pylori. Lots of GI issues since then.   Sensitive stomach.  Eats small portions.   She has been seen by  GI in the past.     Recent glucose:                         Diabetes Control:   Lab Results   Component Value Date    A1C 10.2 2023    A1C 9.5 2023    A1C 8.3 2023    A1C 7.2 2022    A1C 8.5 2021    A1C 6.8 2016    A1C 6.1 2013    A1C 5.9 2011       Past Medical History:   Diagnosis Date    Anemia     told to take iron pills when pregnant    Benign paroxysmal positional vertigo, unspecified laterality 2021    Depression     Depressive disorder     Diabetes mellitus (H)     Diabetes mellitus, type 2 (H) 07/15/2021    Dysthymic disorder     Endometriosis     Herpes genitalia     Hyperlipidemia     Kidney stone     Menorrhagia     Migraines     Neuropathy     Other abnormal Papanicolaou smear of cervix and cervical HPV(795.09) 2013    PCOS (polycystic ovarian syndrome)     Post traumatic stress disorder (PTSD)        Past Surgical History:   Procedure Laterality Date    BLADDER REPAIR W/  SECTION      X2    C/SECTION, CLASSICAL      x2    COLONOSCOPY N/A 2021    Procedure: COLONOSCOPY;  Surgeon: Rene Lehman DO;  Location: [facility] HYSTERECTOMY TOTAL, SALPINGECTOMY BILATERAL      Dr. Farmer    DILATION AND CURETTAGE  2015    DILATION AND CURETTAGE  2015    DILATION AND CURETTAGE, OPERATIVE HYSTEROSCOPY, COMBINED N/A 2015    Procedure: Dilation and Curettage with Hysteroscopy;  Surgeon: Zai Farmer MD;  Location: [facility] [place];  Service:     DILATION AND CURETTAGE, OPERATIVE HYSTEROSCOPY,  COMBINED N/A 09/29/2016    Procedure: DILATION AND CURETTAGE WITH HYSTEROSCOPY INSERT MIRENA;  Surgeon: Suzanne Major MD;  Location: [facility] OR;  Service:     ENT SURGERY      ESOPHAGOSCOPY, GASTROSCOPY, DUODENOSCOPY (EGD), COMBINED N/A 12/14/2021    Procedure: ESOPHAGOGASTRODUODENOSCOPY, WITH BIOPSY;  Surgeon: Rene Lehman DO;  Location:  GI    GYN SURGERY  10/19/2015    laproscopic lysis of adhesions    HYSTERECTOMY, PAP NO LONGER INDICATED      HYSTEROSCOPY, ABLATE ENDOMETRIUM HYDROTHERMAL, COMBINED N/A 03/02/2018    Procedure: HYSTEROSCOPY, DILATION AND CURETTAGE, ENDOMETRIAL ABLATION;  Surgeon: Kristy Israel DO;  Location: [facility] OR;  Service: Gynecology    LAPAROSCOPIC HYSTERECTOMY TOTAL N/A 03/04/2019    Procedure: ROBOTIC TOTAL LAPAROSCOPIC HYSTERECTOMY, BILATERAL SALPINGECTOMY, LEFT OVARIAN CYSTOTOMY. CYSTOSCOPY;  Surgeon: Zia Farmer MD;  Location: [facility] OR;  Service: Gynecology    LAPAROSCOPY DIAGNOSTIC (GENERAL) N/A 10/19/2015    Procedure:  LAPAROSCOPY,LYSIS OF ADHESIONS;  Surgeon: Zia Farmer MD;  Location: [facility] OR;  Service:     RI LAP,TUBAL CAUTERY Bilateral 03/02/2018    Procedure: LAPAROSCOPIC BILATERAL TUBAL LIGATION;  Surgeon: Kristy Israel DO;  Location: [facility] OR;  Service: Gynecology    TONSILLECTOMY         Family History   Adopted: Yes   Problem Relation Age of Onset    Chronic Obstructive Pulmonary Disease Mother     Prostate Cancer Father     Cancer Father         prostate    Alcoholism Sister     Alcoholism Sister     Alcoholism Brother     Alcoholism Brother     Diabetes Paternal Grandmother     Other Cancer Paternal Grandmother     Alcoholism Daughter     Coronary Artery Disease No family hx of     Urolithiasis No family hx of     Clotting Disorder No family hx of     Gout No family hx of     Heart Disease No family hx of     Anesthesia Reaction No family hx of        Social History   Unknown. Adopted.  Daughter-  precocious puberty 3-4 years old, ?PCOS.   Socioeconomic History    Marital status: Single   Tobacco Use    Smoking status: Never    Smokeless tobacco: Never   Vaping Use    Vaping Use: Never used   Substance and Sexual Activity    Alcohol use: Yes     Comment: Socially    Drug use: No     Social Determinants of Health     Financial Resource Strain: Low Risk  (11/30/2023)    Financial Resource Strain     Within the past 12 months, have you or your family members you live with been unable to get utilities (heat, electricity) when it was really needed?: No   Food Insecurity: Low Risk  (11/30/2023)    Food Insecurity     Within the past 12 months, did you worry that your food would run out before you got money to buy more?: No     Within the past 12 months, did the food you bought just not last and you didn t have money to get more?: No   Transportation Needs: Low Risk  (11/30/2023)    Transportation Needs     Within the past 12 months, has lack of transportation kept you from medical appointments, getting your medicines, non-medical meetings or appointments, work, or from getting things that you need?: No   Interpersonal Safety: Low Risk  (11/9/2023)    Interpersonal Safety     Do you feel physically and emotionally safe where you currently live?: Yes     Within the past 12 months, have you been hit, slapped, kicked or otherwise physically hurt by someone?: No     Within the past 12 months, have you been humiliated or emotionally abused in other ways by your partner or ex-partner?: No   Housing Stability: Low Risk  (11/30/2023)    Housing Stability     Do you have housing? : Yes     Are you worried about losing your housing?: No       Current Outpatient Medications   Medication    acetaminophen (TYLENOL) 500 MG tablet    BD PEN NEEDLE RAMON 2ND GEN 32G X 4 MM miscellaneous    Continuous Blood Gluc Sensor (FREESTYLE CINDY 14 DAY SENSOR) MISC    Continuous Blood Gluc Sensor (FREESTYLE CINDY 2 SENSOR) MISC    Continuous  Blood Gluc Sensor (FREESTYLE CINDY 3 SENSOR) MISC    dexAMETHasone (DECADRON) 4 MG/ML injection    gabapentin (NEURONTIN) 300 MG capsule    galcanezumab-gnlm (EMGALITY) 120 MG/ML injection    galcanezumab-gnlm (EMGALITY) 120 MG/ML injection    ibuprofen (ADVIL/MOTRIN) 600 MG tablet    insulin glargine (LANTUS PEN) 100 UNIT/ML pen    insulin  UNIT/ML injection    metFORMIN (GLUCOPHAGE XR) 500 MG 24 hr tablet    multivitamin (ONE-DAILY) tablet    NOVOLOG FLEXPEN 100 UNIT/ML soln    ondansetron (ZOFRAN) 4 MG tablet    promethazine (PHENERGAN) 25 MG tablet    rizatriptan (MAXALT) 10 MG tablet    rosuvastatin (CRESTOR) 20 MG tablet    topiramate (TOPAMAX) 100 MG tablet    verapamil ER (VERELAN) 120 MG 24 hr capsule    vitamin D2 (ERGOCALCIFEROL) 35666 units (1250 mcg) capsule     Current Facility-Administered Medications   Medication    0.5 mL bupivacaine (MARCAINE) 0.5% injection (50 mL vial)    0.5 mL bupivacaine (MARCAINE) 0.5% injection (50 mL vial)    0.5 mL bupivacaine (MARCAINE) 0.5% injection (50 mL vial)    0.5 mL bupivacaine (MARCAINE) 0.5% injection (50 mL vial)    triamcinolone (KENALOG-40) injection 40 mg    triamcinolone (KENALOG-40) injection 40 mg    triamcinolone (KENALOG-40) injection 40 mg    triamcinolone (KENALOG-40) injection 40 mg          Allergies   Allergen Reactions    Hydrocodone Other (See Comments)     Headache per pt and hallucinations  Headache per pt and hallucinations      Reglan [Metoclopramide]      Anxiety    Vicodin [Hydrocodone-Acetaminophen] Other (See Comments)     Hallucinations    Silver Nitrate Itching and Rash     Only issues if in for a long time  Only issues if in for a long time  Only issues if in for a long time      Tegaderm Transparent Dressing (Informational Only) Rash       Physical Exam  /82 (BP Location: Right arm, Patient Position: Sitting, Cuff Size: Adult Regular)   Pulse 81   Wt 116.6 kg (257 lb)   LMP  (LMP Unknown)   BMI 41.48 kg/m    GENERAL:   Alert and oriented X3, NAD, well dressed, answering questions appropriately, appears stated age.  HEENT: OP clear, no lymphadenopathy, no thyromegaly, non-tender, no exophthalmus, no proptosis, EOMI, no lid lag, no retraction  EXTREMITIES: no edema, +pulses, no rashes, no lesions.  Missing toenail.   NEUROLOGY: + monofilament, +vibratory sensation, + DTR upper and lower extremity    RESULTS  Lab Results   Component Value Date    A1C 10.2 (H) 11/09/2023    A1C 9.5 (H) 08/11/2023    A1C 8.3 (H) 05/11/2023    A1C 7.2 (H) 02/28/2022    A1C 8.5 (H) 05/05/2021    A1C 6.8 (H) 09/28/2016    A1C 6.1 (H) 08/14/2013    A1C 5.9 (H) 06/07/2011    HEMOGLOBINA1 7.9 10/28/2021       TSH   Date Value Ref Range Status   01/22/2024 1.84 0.30 - 4.20 uIU/mL Final   02/28/2022 1.71 0.40 - 4.00 mU/L Final   04/21/2020 0.87 0.30 - 5.00 uIU/mL Final       ALT   Date Value Ref Range Status   01/22/2024 15 0 - 50 U/L Final     Comment:     Reference intervals for this test were updated on 6/12/2023 to more accurately reflect our healthy population. There may be differences in the flagging of prior results with similar values performed with this method. Interpretation of those prior results can be made in the context of the updated reference intervals.     08/11/2023 13 0 - 50 U/L Final     Comment:     Reference intervals for this test were updated on 6/12/2023 to more accurately reflect our healthy population. There may be differences in the flagging of prior results with similar values performed with this method. Interpretation of those prior results can be made in the context of the updated reference intervals.     06/07/2011 17.0 0.0 - 50.0 u/l Final   ]    Recent Labs   Lab Test 11/09/23  1351 05/11/23  1321   CHOL 259* 228*   HDL 58 51   * 160*   TRIG 88 84       Lab Results   Component Value Date     11/09/2023    .2 09/28/2016      Lab Results   Component Value Date    POTASSIUM 4.0 11/09/2023    POTASSIUM 3.9  "02/28/2022    POTASSIUM 3.4 09/28/2016     Lab Results   Component Value Date    CHLORIDE 101 11/09/2023    CHLORIDE 106 02/28/2022    CHLORIDE 99.9 09/28/2016     Lab Results   Component Value Date    RADHA 9.1 11/09/2023    RADHA 8.9 09/28/2016     Lab Results   Component Value Date    CO2 26 11/09/2023    CO2 26 02/28/2022    CO2 26.9 09/28/2016     Lab Results   Component Value Date    BUN 8.6 11/09/2023    BUN 9 02/28/2022    BUN 8.7 09/28/2016     Lab Results   Component Value Date    CR 0.44 11/09/2023    CR 0.5 09/28/2016       GFR Estimate   Date Value Ref Range Status   01/22/2024 >90 >60 mL/min/1.73m2 Final   11/09/2023 >90 >60 mL/min/1.73m2 Final   08/11/2023 >90 >60 mL/min/1.73m2 Final   05/05/2021 >60 >60 mL/min/1.73m2 Final   02/17/2021 >60 >60 mL/min/1.73m2 Final   11/04/2020 >60 >60 mL/min/1.73m2 Final   09/28/2016 154.0 mL/min/1.7 m2 Final   08/14/2013 > 60 >60 ml/min/1.73m2 Final     GFR Estimate If Black   Date Value Ref Range Status   05/05/2021 >60 >60 mL/min/1.73m2 Final   02/17/2021 >60 >60 mL/min/1.73m2 Final   11/04/2020 >60 >60 mL/min/1.73m2 Final   09/28/2016 186.3 mL/min/1.7 m2 Final   08/14/2013 > 60 >60 ml/min/1.73m2 Final       Lab Results   Component Value Date    MICROL <12.0 05/11/2023    MICROL 15 02/28/2022     No results found for: \"MICROALBUMIN\"  Lab Results   Component Value Date    CPEPT 2.3 01/22/2024    GADAB <5.0 02/28/2022       Vitamin B12   Date Value Ref Range Status   01/22/2024 399 232 - 1,245 pg/mL Final   02/28/2022 437 193 - 986 pg/mL Final   06/07/2011 443 223 - 1132 pg/ml Final       Most recent eye exam date: : Not Found     Glutamic Acid Decarboxylase Antibody   Date Value Ref Range Status   02/28/2022 <5.0 0.0 - 5.0 IU/mL Final     Comment:     INTERPRETIVE INFORMATION:  Glutamic Acid Decarboxylase   Antibody    A value greater than 5.0 IU/mL is considered positive for   Glutamic Acid Decarboxylase Antibody (PREETHI Ab). This assay   is intended for the " semi-quantitative determination of the   PREETHI Ab in human serum. Results should be interpreted within   the context of clinical symptoms.  Performed By: [facility]  [address] [place] [address]  : Evita Oliva MD     C Peptide   Date Value Ref Range Status   01/22/2024 2.3 0.9 - 6.9 ng/mL Final   02/28/2022 5.1 0.9 - 6.9 ng/mL Final     Assessment/Plan:     1.  Type 2 diabetes- Ruth's glucose control has improved.  A1c down to 8.8%.  She is having much lower glucose and variability.  With being unable to tolerate medications other than metformin for her diabetes, we discussed shifting more of her insulin to short acting.  She is doing pretty well fasting, but post-meals, particularly in the evening, are too high.  She is not consistently taking Novolog, as it is hard to remember.  Discussed Cequr patch pump and she is quite interested.  Also discussed hybrid closed loop pumps, which may be a good choice for her in the future.  She really wants to work on dietary and lifestyle changes and would like to meet with a dietitian.  I will arrange this for her. We made the following plan today (instructions given to patient):      Meet with diabetes educator about starting the Cequr patch pump.     Bring this with you to your appointment.     Discuss diet with the educator.     Increase lantus to 28 units daily and we will try to switch to Tresiba, if covered.     Increase Novolog to 15 units before dinner.  20 if a large (or carb heavy) meal.      Please let me know if you are having low blood sugars less than 70 or over 250 mg/dL.      If you have concerns, please send me a [employer] message M-Th, call the clinic at [phone], or call [phone] after hours/weekends and ask to speak with the endocrinologist on call.      2.  Risk factors-     Retinopathy:  No.  Had eye exam 2022.  Will schedule  Nephropathy:  BP well controlled. No microalbuminuria.  Creatinine stable.    Neuropathy: Yes - on gabapentin.  Normal b12.   Lipids:  LDL at target.  Patient taking statin  Thyroid screening: Normal TSH 2924  Celiac screening: negative antibodies 2024    3.  F/U in 3 mos with me, in 1 month with DM education, sooner with concerns.     I spent 42  minutes with this patient face to face and explained the conditions and plans (more than 50% of time was counseling/coordination of care, diabetes management, follow up plan for worsening hyper and hypoglycemia) . The patient understood and is satisfied with today's visit.      Yanet Christie PA-C, MPAS   [facility]  Department of Medicine  Division of Endocrinology and Diabetes

## 2024-03-07 ENCOUNTER — TELEPHONE (OUTPATIENT)
Dept: ENDOCRINOLOGY | Facility: CLINIC | Age: 39
End: 2024-03-07
Payer: COMMERCIAL

## 2024-03-07 ENCOUNTER — VIRTUAL VISIT (OUTPATIENT)
Dept: CARDIOLOGY | Facility: CLINIC | Age: 39
End: 2024-03-07
Payer: COMMERCIAL

## 2024-03-07 VITALS — BODY MASS INDEX: 44.52 KG/M2 | HEIGHT: 66 IN | WEIGHT: 277 LBS

## 2024-03-07 DIAGNOSIS — Z79.4 TYPE 2 DIABETES MELLITUS WITH DIABETIC POLYNEUROPATHY, WITH LONG-TERM CURRENT USE OF INSULIN (H): Primary | ICD-10-CM

## 2024-03-07 DIAGNOSIS — E78.5 HYPERLIPIDEMIA, UNSPECIFIED HYPERLIPIDEMIA TYPE: ICD-10-CM

## 2024-03-07 DIAGNOSIS — E66.01 MORBID OBESITY (H): ICD-10-CM

## 2024-03-07 DIAGNOSIS — G62.9 NEUROPATHY: ICD-10-CM

## 2024-03-07 DIAGNOSIS — E11.42 TYPE 2 DIABETES MELLITUS WITH DIABETIC POLYNEUROPATHY, WITH LONG-TERM CURRENT USE OF INSULIN (H): Primary | ICD-10-CM

## 2024-03-07 DIAGNOSIS — E55.9 VITAMIN D DEFICIENCY: ICD-10-CM

## 2024-03-07 DIAGNOSIS — G43.E09 CHRONIC MIGRAINE WITH AURA WITHOUT STATUS MIGRAINOSUS, NOT INTRACTABLE: ICD-10-CM

## 2024-03-07 RX ORDER — ERGOCALCIFEROL 1.25 MG/1
50000 CAPSULE, LIQUID FILLED ORAL WEEKLY
Qty: 8 CAPSULE | Refills: 0 | Status: SHIPPED | OUTPATIENT
Start: 2024-03-07 | End: 2024-05-02

## 2024-03-07 RX ORDER — BLOOD-GLUCOSE SENSOR
1 EACH MISCELLANEOUS
Qty: 2 EACH | Refills: 11 | Status: SHIPPED | OUTPATIENT
Start: 2024-03-07

## 2024-03-07 RX ORDER — ROSUVASTATIN CALCIUM 20 MG/1
20 TABLET, COATED ORAL DAILY
Qty: 90 TABLET | Refills: 1 | Status: SHIPPED | OUTPATIENT
Start: 2024-03-07

## 2024-03-07 ASSESSMENT — PAIN SCALES - GENERAL: PAINLEVEL: NO PAIN (0)

## 2024-03-07 NOTE — PROGRESS NOTES
Medication Therapy Management (MTM) Encounter    ASSESSMENT:                            Medication Adherence/Access: No issues identified    Weight Management:   Can consider addition of weight loss medication(s) to regimen. GLP1 agonist not advantageous as not tolerated before. Already on topiramate for migraines. Could consider low dose phentermine. Due to historical issues with sleep concerned to start Contrave as PM dose may affect sleep. Will reach out to Rohini Wong PA-C to see her thoughts.     Diabetes   A1c not at goal < 7%. Improvements with addition of Novolog to regimen. Would benefit from increase Lantus to 26 units daily. Would also encourage Lantus taken same time of day every day. Ensure getting novolog with each meal.     Migraine:   Unimproved, to monitor with new Emgality addition to regimen in hopes of improving migraines.     Hyperlipidemia   LDL not at goal < 100. Would benefit from transition from atorvastatin to rosuvastatin as the dose necessary for atorvastatin could cause greater issues due to atorvastatin dose dependent drug - drug interaction with verapamil. Therefore, would benefit from transitioning to rosuvastatin 20 mg daily with repeat cholesterol lab and ALT in 3 months.     Neuropathy:   Stable.     Vitamin D Deficiency:  Vitamin D < 30. Would benefit from starting vitamin D 50,000 international unit(s) weekly for 12 weeks then recheck vitamin D.     PLAN:                            Increase Lantus to 26 units bedtime, do injection every 24 hours.   Will try switching Armida to Armida 3   Start vitamin D 50,000 international unit(s) weekly for 12 weeks, then repeat vitamin D   Stop atorvastatin. Start rosuvastatin 20 mg daily  Get cholesterol labs, fasting, completed 3 months after starting   Pharmacist will discuss next steps for weight loss medication(s) with Rohini Wong PA-C - phentermine?    Follow-up: Return in about 8 weeks (around 5/2/2024) for Medication Therapy  "Management Pharmacist Visit, Call [phone] to schedule.    SUBJECTIVE/OBJECTIVE:                          Ruth Vanegas is a 38 year old female called for an initial visit. She was referred to me from Rohini Wong PA-C.      Reason for visit: comprehensive review of medications, diabetes and potential weight loss medication(s) phentermine start.    Allergies/ADRs: Reviewed in chart  Past Medical History: Reviewed in chart  Tobacco: She reports that she has been smoking other. She has never used smokeless tobacco.Nicotine/Tobacco Cessation Plan  Information offered: Patient not interested at this time    Alcohol: not currently using    Medication Adherence/Access: Patient takes medications directly from bottles.    Weight Management:   No medications    Followed by Rohini Wong PA-C, seen for  [facility]. Wanting to lose weight to improve her diabetes. Tried multiple GLP1 agonists. Did not tolerate. Since 2020 chronic nausea, vomiting, dysphagia. As of now dealing with day to day nausea, limited vomiting, dysphagia on and off. Continues to work with Gastroenterology. Describes that she eats 2 meals daily but snacks throughout the day. Large portions make her nauseous.     Current weight today: 277 lbs 0 oz    Wt Readings from Last 4 Encounters:   03/07/24 125.6 kg (277 lb)   02/05/24 123.8 kg (273 lb)   01/24/24 126.1 kg (278 lb)   01/22/24 126.6 kg (279 lb)     Estimated body mass index is 44.71 kg/m  as calculated from the following:    Height as of this encounter: 1.676 m (5' 6\").    Weight as of this encounter: 125.6 kg (277 lb).    Diabetes   Lantus 24 units bedtime   Novolog 10 units with meals   Metformin ER 1000 mg twice daily     NPH - 24 units to be taken with prednisone 60 mg     Patient is not experiencing side effects. Reports that at times she forgets to scan her martine. Never been on the martine 2 or 3 before. Reports yeast infections resolved with stopping Invokana. " "Reports that times of day she takes lantus can change due to her working nights. She can miss novolog doses at times, but generally doesn't miss Lantus dosing.   Medications Tried/Failed: Ozempic: nausea/vomiting.; Invokana: yeast infection.   Blood sugar monitoring: Continuous Glucose Monitor - Armida 14 Day system.    Current diabetes symptoms: polyuria and polydipsia, lethargic, headache  Diet/Exercise: Getting in 2 meals per day.      Eye exam in the last 12 months? No    Foot exam is up to date  Urine Albumin:   Lab Results   Component Value Date    UMALCR  05/11/2023      Comment:      Unable to calculate, urine albumin and/or urine creatinine is outside detectable limits.  Microalbuminuria is defined as an albumin:creatinine ratio of 17 to 299 for males and 25 to 299 for females. A ratio of albumin:creatinine of 300 or higher is indicative of overt proteinuria.  Due to biologic variability, positive results should be confirmed by a second, first-morning random or 24-hour timed urine specimen. If there is discrepancy, a third specimen is recommended. When 2 out of 3 results are in the microalbuminuria range, this is evidence for incipient nephropathy and warrants increased efforts at glucose control, blood pressure control, and institution of therapy with an angiotensin-converting-enzyme (ACE) inhibitor (if the patient can tolerate it).        Lab Results   Component Value Date    A1C 10.2 (H) 11/09/2023     Migraine:   Preventive medications  Emgality 240 mg loading dose and now 120 mg monthly  Verapamil 120 mg daily  Topiramate 100 mg twice daily     Acute medications  Rizatriptan 10 mg   Prochlorperazine 25 mg  Ondansetron 4 mg   Triggers include: Stress  Associated symptoms include: Vomiting, Photophobia, and Phonophobia  Patient reports having ? \"Multiple\" headache days per month  Reports that migraines were bad this month. Reports more frequent migraines and more severe migraines. Rizatriptan helpful if " "she can catch the migraine in time. Will also take excedrin. Was just started on Emgality in February, no issues with injection.     Hyperlipidemia   Atorvastatin 10mg daily    Reports she has been consistently taking atorvastatin. She is taking 1 pill daily. RX written for atorvastatin 10 mg tablet: 2 tablets (20 mg) once daily.   Patient reports no significant myalgias or other side effects.     Recent Labs   Lab Test 01/22/24  1044 11/09/23  1351   CHOL 286* 259*   HDL 62 58   * 183*   TRIG 93 88     Lab Results   Component Value Date    ALT 15 01/22/2024    ALT 17.0 06/07/2011     Neuropathy:   Gabapentin 600 mg twice daily, prescribed three times daily     Feels like neuropathy is okay, no longer in hands like it used to be, mainly in feet if anything, manageable.     Vitamin D Deficiency:  Vitamin D 5000 international unit(s) daily - not taking     She reports that she did not  the RX from pharmacy, reports they didn't have it. Got it over-the-counter.   Vitamin D Deficiency Screening Results:  Lab Results   Component Value Date    VITDT 19 (L) 01/22/2024     Today's Vitals: Ht 1.676 m (5' 6\")   Wt 125.6 kg (277 lb)   LMP  (LMP Unknown)   BMI 44.71 kg/m    ----------------      I spent 45 minutes with this patient today. All changes were made via collaborative practice agreement with Rohini Wong PA-C and LIMA Bartholomew. A copy of the visit note was provided to the patient's provider(s).    A summary of these recommendations was sent via [employer].    Lauren Bloch, PharmD, BCACP   Medication Therapy Management Pharmacist   [facility]    Telemedicine Visit Details  Type of service:  Telephone visit  Start Time:  1:10 PM  End Time:  1:55 PM     Medication Therapy Recommendations  Class 3 severe obesity with serious comorbidity and body mass index (BMI) of 40.0 to 44.9 in adult (H)    Rationale: Untreated condition - Needs additional medication " therapy - Indication   Recommendation: Start Medication - phentermine 8 MG tablet   Status: Contact Provider - Awaiting Response         Diabetes mellitus, type 2 (H)    Current Medication: Continuous Blood Gluc Sensor (FREESTYLE CINDY 2 SENSOR) MISC   Rationale: Patient forgets to take - Adherence - Adherence   Recommendation: Change Medication - FreeStyle Cindy 3 Sensor Misc   Status: Accepted per CPA          Current Medication: insulin glargine (LANTUS PEN) 100 UNIT/ML pen ()   Rationale: Dose too low - Dosage too low - Effectiveness   Recommendation: Increase Dose   Status: Accepted per CPA         Hyperlipidemia    Current Medication: atorvastatin (LIPITOR) 10 MG tablet (Discontinued)   Rationale: More effective medication available - Ineffective medication - Effectiveness   Recommendation: Change Medication - rosuvastatin 20 MG tablet   Status: Accepted per CPA         Vitamin D deficiency    Rationale: Untreated condition - Needs additional medication therapy - Indication   Recommendation: Start Medication - Vitamin D (Ergocalciferol) 86028 units Caps   Status: Accepted per CPA

## 2024-03-07 NOTE — Clinical Note
We made a lot of changes to her meds in general today... unsure where to go from here with WLMs - could consider phentermine as we previously discussed and her and I did discuss this. Just odd with her current nausea, dysphagia and her current status with that. She is trying to get into her gastro team.

## 2024-03-07 NOTE — PROGRESS NOTES
"Virtual Visit Details    Type of service:  Telephone Visit   Phone call duration: *** minutes   Originating Location (pt. Location): {patient location:014837::\"Home\"}  {PROVIDER LOCATION On-site should be selected for visits conducted from your clinic location or adjoining [facility] hospital, academic office, or other nearby [facility] building. Off-site should be selected for all other provider locations, including home:[id number]}  Distant Location (provider location):  {virtual location provider:[id number]}  "

## 2024-03-07 NOTE — TELEPHONE ENCOUNTER
"Prior Authorization Retail Medication Request    Medication/Dose: Armida 3 sensors  Diagnosis and ICD code (if different than what is on RX):    New/renewal/insurance change PA/secondary ins. PA:  Previously Tried and Failed:  standard glucometer, Armida 14 day system.   Rationale:  Ruth Vanegas is a 38 year old female with a diagnosis of Type 2 Diabetes  currently on basal and bolus insulin. Ruth is using Lantus 24 units daily and Novolog 10 units three times daily with meals. She is requiring continuous glucose monitor Armida 3 for improved blood sugar monitoring.     Estimated body mass index is 44.71 kg/m  as calculated from the following:    Height as of an earlier encounter on 3/7/24: 1.676 m (5' 6\").    Weight as of an earlier encounter on 3/7/24: 125.6 kg (277 lb).    Insurance   Primary:   Insurance ID:      Secondary (if applicable):  Insurance ID:      Pharmacy Information (if different than what is on RX)  Name:    Phone:    Fax:    Lauren Bloch, PharmD, BCACP   Medication Therapy Management Pharmacist   [facility]        "

## 2024-03-07 NOTE — LETTER
3/7/2024      RE: Ruth Vanegas  [address]  [place] [address]       Dear Colleague,    Thank you for the opportunity to participate in the care of your patient, Ruth Vanegas, at the [facility] [place] [facility]. Please see a copy of my visit note below.    Medication Therapy Management (MTM) Encounter    ASSESSMENT:                            Medication Adherence/Access: No issues identified    Weight Management:   Can consider addition of weight loss medication(s) to regimen. GLP1 agonist not advantageous as not tolerated before. Already on topiramate for migraines. Could consider low dose phentermine. Due to historical issues with sleep concerned to start Contrave as PM dose may affect sleep. Will reach out to Rohini Wong PA-C to see her thoughts.     Diabetes   A1c not at goal < 7%. Improvements with addition of Novolog to regimen. Would benefit from increase Lantus to 26 units daily. Would also encourage Lantus taken same time of day every day. Ensure getting novolog with each meal.     Migraine:   Unimproved, to monitor with new Emgality addition to regimen in hopes of improving migraines.     Hyperlipidemia   LDL not at goal < 100. Would benefit from transition from atorvastatin to rosuvastatin as the dose necessary for atorvastatin could cause greater issues due to atorvastatin dose dependent drug - drug interaction with verapamil. Therefore, would benefit from transitioning to rosuvastatin 20 mg daily with repeat cholesterol lab and ALT in 3 months.     Neuropathy:   Stable.     Vitamin D Deficiency:  Vitamin D < 30. Would benefit from starting vitamin D 50,000 international unit(s) weekly for 12 weeks then recheck vitamin D.     PLAN:                            Increase Lantus to 26 units bedtime, do injection every 24 hours.   Will try switching Armida to Armida 3   Start vitamin D 50,000 international  "unit(s) weekly for 12 weeks, then repeat vitamin D   Stop atorvastatin. Start rosuvastatin 20 mg daily  Get cholesterol labs, fasting, completed 3 months after starting   Pharmacist will discuss next steps for weight loss medication(s) with Rohini Wong PA-C - phentermine?    Follow-up: Return in about 8 weeks (around 5/2/2024) for Medication Therapy Management Pharmacist Visit, Call [phone] to schedule.    SUBJECTIVE/OBJECTIVE:                          Ruth Vanegas is a 38 year old female called for an initial visit. She was referred to me from Rohini Wong PA-C.      Reason for visit: comprehensive review of medications, diabetes and potential weight loss medication(s) phentermine start.    Allergies/ADRs: Reviewed in chart  Past Medical History: Reviewed in chart  Tobacco: She reports that she has been smoking other. She has never used smokeless tobacco.Nicotine/Tobacco Cessation Plan  Information offered: Patient not interested at this time    Alcohol: not currently using    Medication Adherence/Access: Patient takes medications directly from bottles.    Weight Management:   No medications    Followed by Rohini Wong PA-C, seen for  [facility]. Wanting to lose weight to improve her diabetes. Tried multiple GLP1 agonists. Did not tolerate. Since 2020 chronic nausea, vomiting, dysphagia. As of now dealing with day to day nausea, limited vomiting, dysphagia on and off. Continues to work with Gastroenterology. Describes that she eats 2 meals daily but snacks throughout the day. Large portions make her nauseous.     Current weight today: 277 lbs 0 oz    Wt Readings from Last 4 Encounters:   03/07/24 125.6 kg (277 lb)   02/05/24 123.8 kg (273 lb)   01/24/24 126.1 kg (278 lb)   01/22/24 126.6 kg (279 lb)     Estimated body mass index is 44.71 kg/m  as calculated from the following:    Height as of this encounter: 1.676 m (5' 6\").    Weight as of this encounter: 125.6 kg (277 " lb).    Diabetes  Lantus 24 units bedtime   Novolog 10 units with meals   Metformin ER 1000 mg twice daily     NPH - 24 units to be taken with prednisone 60 mg     Patient is not experiencing side effects. Reports that at times she forgets to scan her armida. Never been on the armida 2 or 3 before. Reports yeast infections resolved with stopping Invokana. Reports that times of day she takes lantus can change due to her working nights. She can miss novolog doses at times, but generally doesn't miss Lantus dosing.   Medications Tried/Failed: Ozempic: nausea/vomiting.; Invokana: yeast infection.   Blood sugar monitoring: Continuous Glucose Monitor - Armida 14 Day system.    Current diabetes symptoms: polyuria and polydipsia, lethargic, headache  Diet/Exercise: Getting in 2 meals per day.      Eye exam in the last 12 months? No    Foot exam is up to date  Urine Albumin:   Lab Results   Component Value Date    UMALCR  05/11/2023      Comment:      Unable to calculate, urine albumin and/or urine creatinine is outside detectable limits.  Microalbuminuria is defined as an albumin:creatinine ratio of 17 to 299 for males and 25 to 299 for females. A ratio of albumin:creatinine of 300 or higher is indicative of overt proteinuria.  Due to biologic variability, positive results should be confirmed by a second, first-morning random or 24-hour timed urine specimen. If there is discrepancy, a third specimen is recommended. When 2 out of 3 results are in the microalbuminuria range, this is evidence for incipient nephropathy and warrants increased efforts at glucose control, blood pressure control, and institution of therapy with an angiotensin-converting-enzyme (ACE) inhibitor (if the patient can tolerate it).        Lab Results   Component Value Date    A1C 10.2 (H) 11/09/2023     Migraine:   Preventive medications  Emgality 240 mg loading dose and now 120 mg monthly  Verapamil 120 mg daily  Topiramate 100 mg twice daily     Acute  "medications  Rizatriptan 10 mg   Prochlorperazine 25 mg  Ondansetron 4 mg   Triggers include: Stress  Associated symptoms include: Vomiting, Photophobia, and Phonophobia  Patient reports having ? \"Multiple\" headache days per month  Reports that migraines were bad this month. Reports more frequent migraines and more severe migraines. Rizatriptan helpful if she can catch the migraine in time. Will also take excedrin. Was just started on Emgality in February, no issues with injection.     Hyperlipidemia  Atorvastatin 10mg daily    Reports she has been consistently taking atorvastatin. She is taking 1 pill daily. RX written for atorvastatin 10 mg tablet: 2 tablets (20 mg) once daily.   Patient reports no significant myalgias or other side effects.     Recent Labs   Lab Test 01/22/24  1044 11/09/23  1351   CHOL 286* 259*   HDL 62 58   * 183*   TRIG 93 88     Lab Results   Component Value Date    ALT 15 01/22/2024    ALT 17.0 06/07/2011     Neuropathy:   Gabapentin 600 mg twice daily, prescribed three times daily     Feels like neuropathy is okay, no longer in hands like it used to be, mainly in feet if anything, manageable.     Vitamin D Deficiency:  Vitamin D 5000 international unit(s) daily - not taking     She reports that she did not  the RX from pharmacy, reports they didn't have it. Got it over-the-counter.   Vitamin D Deficiency Screening Results:  Lab Results   Component Value Date    VITDT 19 (L) 01/22/2024     Today's Vitals: Ht 1.676 m (5' 6\")   Wt 125.6 kg (277 lb)   LMP  (LMP Unknown)   BMI 44.71 kg/m    ----------------      I spent 45 minutes with this patient today. All changes were made via collaborative practice agreement with Rohini Wong PA-C and LIMA Bartholomew. A copy of the visit note was provided to the patient's provider(s).    A summary of these recommendations was sent via [employer].    Lauren Bloch, PharmD, BCACP   Medication Therapy Management Pharmacist   [facility] [place] [facility]    Telemedicine Visit Details  Type of service:  Telephone visit  Start Time:  1:10 PM  End Time:  1:55 PM     Medication Therapy Recommendations  Class 3 severe obesity with serious comorbidity and body mass index (BMI) of 40.0 to 44.9 in adult (H)    Rationale: Untreated condition - Needs additional medication therapy - Indication   Recommendation: Start Medication - phentermine 8 MG tablet   Status: Contact Provider - Awaiting Response         Diabetes mellitus, type 2 (H)    Current Medication: Continuous Blood Gluc Sensor (FREESTYLE CINDY 2 SENSOR) MISC   Rationale: Patient forgets to take - Adherence - Adherence   Recommendation: Change Medication - FreeStyle Cindy 3 Sensor Misc   Status: Accepted per CPA          Current Medication: insulin glargine (LANTUS PEN) 100 UNIT/ML pen ()   Rationale: Dose too low - Dosage too low - Effectiveness   Recommendation: Increase Dose   Status: Accepted per CPA         Hyperlipidemia    Current Medication: atorvastatin (LIPITOR) 10 MG tablet (Discontinued)   Rationale: More effective medication available - Ineffective medication - Effectiveness   Recommendation: Change Medication - rosuvastatin 20 MG tablet   Status: Accepted per CPA         Vitamin D deficiency    Rationale: Untreated condition - Needs additional medication therapy - Indication   Recommendation: Start Medication - Vitamin D (Ergocalciferol) 30238 units Caps   Status: Accepted per CPA                Please do not hesitate to contact me if you have any questions/concerns.     Sincerely,     Lauren T. Bloch, RPH

## 2024-03-07 NOTE — NURSING NOTE
Is the patient currently in the state of [place]? YES    Visit mode:TELEPHONE    If the visit is dropped, the patient can be reconnected by: TELEPHONE VISIT: Phone number: [phone]    Will anyone else be joining the visit? NO  (If patient encounters technical issues they should call [phone])    How would you like to obtain your AVS? MyChart    Are changes needed to the allergy or medication list? Yes    Please remove any meds marked not taking and any flagged for removal.    Reason for visit: Consult    Wt/ht other than 24 hrs:    Pain more than one location:  no  Macrina DE SOUZA

## 2024-03-07 NOTE — Clinical Note
Jaclyn Holt - had initial MT visit with Ruth, we are going to try to switch her over to martine 3. Basal seemed a little high so made minor adjustment. Addition of Novolog has been helpful for sugars. She is forgetting at times, but always doing 10 units when she does take and covering meal appropriately. Sicne she works nights her lantus dosings were sometimes 18+ hours apart so we discussed taking Lantus every 24 hours, not necessarily at her bedtime. Thanks, Ginger [facility]

## 2024-03-08 NOTE — PATIENT INSTRUCTIONS
"Recommendations from today's MTM visit:                                                       Increase Lantus to 26 units bedtime, do injection every 24 hours.   Will try switching Armida to Armida 3. RX sent into pharmacy.   Start vitamin D 50,000 international unit(s) weekly for 12 weeks, then repeat vitamin D. RX sent into pharmacy.   Stop atorvastatin. Start rosuvastatin 20 mg daily RX sent into pharmacy.   Get cholesterol labs, fasting, completed 3 months after starting   Pharmacist will discuss next steps for weight loss medication(s) with Rohini Wong PA-C - phentermine?    Follow-up: Return in about 8 weeks (around 5/2/2024) for Medication Therapy Management Pharmacist Visit, Call [phone] to schedule.    It was great speaking with you today.  I value your experience and would be very thankful for your time in providing feedback in our clinic survey. In the next few days, you may receive an email or text message from [employer] with a link to a survey related to your  clinical pharmacist.\"     To schedule another MTM appointment, please call the clinic directly or you may call the MTM scheduling line at [phone] or toll-free at [phone].     My Clinical Pharmacist's contact information:                                                      Please feel free to contact me with any questions or concerns you have.      Lauren Bloch, PharmD, BCACP   Medication Therapy Management Pharmacist   [facility]       "

## 2024-03-11 ENCOUNTER — TELEPHONE (OUTPATIENT)
Dept: ENDOCRINOLOGY | Facility: CLINIC | Age: 39
End: 2024-03-11

## 2024-03-11 ENCOUNTER — OFFICE VISIT (OUTPATIENT)
Dept: ENDOCRINOLOGY | Facility: CLINIC | Age: 39
End: 2024-03-11
Payer: COMMERCIAL

## 2024-03-11 VITALS
WEIGHT: 257 LBS | SYSTOLIC BLOOD PRESSURE: 118 MMHG | HEART RATE: 81 BPM | DIASTOLIC BLOOD PRESSURE: 82 MMHG | BODY MASS INDEX: 41.48 KG/M2

## 2024-03-11 DIAGNOSIS — E11.9 TYPE 2 DIABETES MELLITUS WITHOUT COMPLICATION, UNSPECIFIED WHETHER LONG TERM INSULIN USE (H): Primary | ICD-10-CM

## 2024-03-11 LAB — HBA1C MFR BLD: 8.8 %

## 2024-03-11 PROCEDURE — 83036 HEMOGLOBIN GLYCOSYLATED A1C: CPT | Performed by: PATHOLOGY

## 2024-03-11 PROCEDURE — 99215 OFFICE O/P EST HI 40 MIN: CPT | Performed by: PHYSICIAN ASSISTANT

## 2024-03-11 RX ORDER — BOLUS INSULIN PUMP, 200 UNIT 2 UNIT
1 EACH MISCELLANEOUS
Qty: 10 EACH | Refills: 11 | Status: SHIPPED | OUTPATIENT
Start: 2024-03-11 | End: 2024-07-01

## 2024-03-11 RX ORDER — INSULIN DEGLUDEC 200 U/ML
INJECTION, SOLUTION SUBCUTANEOUS
Qty: 30 ML | Refills: 3 | Status: SHIPPED | OUTPATIENT
Start: 2024-03-11 | End: 2024-04-11

## 2024-03-11 NOTE — TELEPHONE ENCOUNTER
PA Initiation    Medication: FREESTYLE CINDY 3 SENSOR [facility] [place] [employer] - Phone [phone] Fax [phone]  Pharmacy Filling the Rx: [id number] - [place], [place] - [facility]  Filling Pharmacy Phone:    Filling Pharmacy Fax:    Start Date: 3/11/2024    Key: [id number]

## 2024-03-11 NOTE — LETTER
3/11/2024      RE: Ruth Vanegas  [address]  [place] [address]       To Whom it May Concern:    I am writing to formally request a prior authorization of coverage for my patient, Ruth Vanegas, for treatment using the CeQur Simplicity 3-Day Patch. This patient has a diagnosis of type 2 diabetes and is insulin-dependent.    Please see the rationale below for why the CeQur Simplicity patch is the best treatment option for our patient.    Patient has tried and failed insulin pens due to the following:  Patient forgets pens at home and in car resulting in skipped doses  Patient refuses to dose in public. Patient needs discrete dosing option  Patient needs a wearable insulin patch to mitigate embarrassment of public dosing, inconvenience carrying supplies, and forgetting pens  Patient admits to missing doses to avoid the pain felt with every injection  Patient does not take correction doses to all points listed above  Patient cannot be on a pump due to following:  Patient cannot change pod everyday due to lifestyle  Patient is not good candidate for pump due to complexity  Patient does not carb-count  CeQur Simplicity 3-Day Patch has positive outcomes reported in the literature:  Cequr only requires 1 needle insertion every 3 days versus 3 injections every day with insulin pen/syringe. That adds up to 1,095 injections a year with pens and syringes versus 122 insertions a year with the Simplicity.  In an experience evaluation, 88% of patients followed their insulin regimen better with Simplicity versus pen/syringe.  Simplicity minimizes the burden of carrying a large amount of medical supplies. This allows patients to travel with more ease and less interference of daily activities. The Simplicity patch reduces burdens that can cause a considerable increase in hyperglycemia with frequently missed doses.    Other formulary options  (Omnipod 5) are NOT appropriate for this patient for this patient.      I would request this submission be evaluated on an individual basis by an endocrinologist or weight  who is current in the practice and standards of care. I firmly believe that this therapy is clinically appropriate and that Ruth Vanegas would benefit from improved clinical outcomes and quality of life if allowed the opportunity to receive this treatment.  I urge you to follow the aforementioned evidence presented to keep the best interest of our patient in mind.      Sincerely,      Yanet Christie PA-C

## 2024-03-11 NOTE — TELEPHONE ENCOUNTER
PA Initiation    Medication: CEQUR SIMPLICITY 2U JAUN  Insurance Company: [employer] - Phone [phone] Fax [phone]  Pharmacy Filling the Rx: [id number] - [place], [place] - [facility]  Filling Pharmacy Phone:    Filling Pharmacy Fax:    Start Date: 3/11/2024    Key: [id number]

## 2024-03-11 NOTE — LETTER
3/11/2024       RE: Ruth Vanegas  [address]  [place] [address]     Dear Colleague,    Thank you for referring your patient, Ruth Vanegas, to the [facility]OCR[facility] [place] [facility]. Please see a copy of my visit note below.    .3/1/2024  PATIENT LAB/IMAGING STATUS : No pending lab orders   Outcome for 03/01/24 9:40 AM: Data obtained via [employer] website  Dyan Devries CMA    HPI:   Ruth is a pleasant 37 yo woman here with her significant other for follow up of type 2 diabetes (PREETHI negative, C-peptide positive) since 2016.  She also has a history of Anemia, Depression, Depressive disorder, Dysthymic disorder, Endometriosis, Herpes genitalia, Hyperlipidemia, Kidney stone, Menorrhagia, Migraines, Neuropathy, PCOS (polycystic ovarian syndrome), and Post traumatic stress disorder (PTSD).  At her last visit, she was having a lot of trouble with her glucose management.  Her glucose started to climb and her PCP put her on Ozempic.  She became violently ill and needed IV fluids in the ER.  She had also been having yeast infections on invokana, so I took her off of it and put her on short acting insulin instead.  She has also met with weight management clinic and Lauren Bloch, [facility] pharmacist.  She feels like things are going better.  She forgets to take Novolog often- hard to have it with her.  She was feeling overwhelmed after meeting with weight management.  She felt like it was a very big lifestyle change and was not ready to move forward so quickly.  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  She feels she needs more time to think it over.     Ruth reports she was first diagnosed with diabetes in 2016 on routine blood work prior to a procedure.  She initially was started on metformin, then  insulin was added years later.     Current treatment:   Metformin 1000 mg bid.  Lantus- 26 units daily- now at 10pm.   Novolog- 10 units once daily with dinner. Hard to remember.     Previous treatment:   Victoza- stopped it in  when she started insulin.   Invokana 300 mg daily- recurrent yeast infections- stopped .    Typical day:   Not hungry in the morning.  Wakes up   Daughter wakes her up before she leaves for school.  Drinks water.    Nauseated if she tries to eat when she wakes up.  Unsure why.   Does not eat very much.   Weight fluctuates.Recent loss.   Works as a  overnights.   - got really sick.  H. Pylori. Lots of GI issues since then.   Sensitive stomach.  Eats small portions.   She has been seen by  GI in the past.     Recent glucose:                         Diabetes Control:   Lab Results   Component Value Date    A1C 10.2 2023    A1C 9.5 2023    A1C 8.3 2023    A1C 7.2 2022    A1C 8.5 2021    A1C 6.8 2016    A1C 6.1 2013    A1C 5.9 2011       Past Medical History:   Diagnosis Date     Anemia     told to take iron pills when pregnant     Benign paroxysmal positional vertigo, unspecified laterality 2021     Depression      Depressive disorder      Diabetes mellitus (H)      Diabetes mellitus, type 2 (H) 07/15/2021     Dysthymic disorder      Endometriosis      Herpes genitalia      Hyperlipidemia      Kidney stone      Menorrhagia      Migraines      Neuropathy      Other abnormal Papanicolaou smear of cervix and cervical HPV(795.09) 2013     PCOS (polycystic ovarian syndrome)      Post traumatic stress disorder (PTSD)        Past Surgical History:   Procedure Laterality Date     BLADDER REPAIR W/  SECTION      X2     C/SECTION, CLASSICAL      x2     COLONOSCOPY N/A 2021    Procedure: COLONOSCOPY;  Surgeon: Rene Lehman DO;  Location: [facility]     DAVINCI HYSTERECTOMY TOTAL, SALPINGECTOMY BILATERAL       Dr. Farmer     DILATION AND CURETTAGE  07/01/2015     DILATION AND CURETTAGE  05/06/2015     DILATION AND CURETTAGE, OPERATIVE HYSTEROSCOPY, COMBINED N/A 05/07/2015    Procedure: Dilation and Curettage with Hysteroscopy;  Surgeon: Zia Farmer MD;  Location: [facility];  Service:      DILATION AND CURETTAGE, OPERATIVE HYSTEROSCOPY, COMBINED N/A 09/29/2016    Procedure: DILATION AND CURETTAGE WITH HYSTEROSCOPY INSERT MIRENA;  Surgeon: Suzanne Major MD;  Location: [facility] [place];  Service:      ENT SURGERY       ESOPHAGOSCOPY, GASTROSCOPY, DUODENOSCOPY (EGD), COMBINED N/A 12/14/2021    Procedure: ESOPHAGOGASTRODUODENOSCOPY, WITH BIOPSY;  Surgeon: Rene Lehman DO;  Location:  GI     GYN SURGERY  10/19/2015    laproscopic lysis of adhesions     HYSTERECTOMY, PAP NO LONGER INDICATED       HYSTEROSCOPY, ABLATE ENDOMETRIUM HYDROTHERMAL, COMBINED N/A 03/02/2018    Procedure: HYSTEROSCOPY, DILATION AND CURETTAGE, ENDOMETRIAL ABLATION;  Surgeon: Kristy Israel DO;  Location: [facility] [place];  Service: Gynecology     LAPAROSCOPIC HYSTERECTOMY TOTAL N/A 03/04/2019    Procedure: ROBOTIC TOTAL LAPAROSCOPIC HYSTERECTOMY, BILATERAL SALPINGECTOMY, LEFT OVARIAN CYSTOTOMY. CYSTOSCOPY;  Surgeon: Zia Farmer MD;  Location: [facility] [place];  Service: Gynecology     LAPAROSCOPY DIAGNOSTIC (GENERAL) N/A 10/19/2015    Procedure:  LAPAROSCOPY,LYSIS OF ADHESIONS;  Surgeon: Zia Farmer MD;  Location: [facility] [place];  Service:      IA LAP,TUBAL CAUTERY Bilateral 03/02/2018    Procedure: LAPAROSCOPIC BILATERAL TUBAL LIGATION;  Surgeon: Kristy Israel DO;  Location: [facility] [place];  Service: Gynecology     TONSILLECTOMY         Family History   Adopted: Yes   Problem Relation Age of Onset     Chronic Obstructive Pulmonary Disease Mother      Prostate Cancer Father      Cancer Father         prostate     Alcoholism Sister      Alcoholism Sister      Alcoholism Brother      Alcoholism Brother       Diabetes Paternal Grandmother      Other Cancer Paternal Grandmother      Alcoholism Daughter      Coronary Artery Disease No family hx of      Urolithiasis No family hx of      Clotting Disorder No family hx of      Gout No family hx of      Heart Disease No family hx of      Anesthesia Reaction No family hx of        Social History   Unknown. Adopted.  Daughter- precocious puberty 3-4 years old, ?PCOS.   Socioeconomic History     Marital status: Single   Tobacco Use     Smoking status: Never     Smokeless tobacco: Never   Vaping Use     Vaping Use: Never used   Substance and Sexual Activity     Alcohol use: Yes     Comment: Socially     Drug use: No     Social Determinants of Health     Financial Resource Strain: Low Risk  (11/30/2023)    Financial Resource Strain      Within the past 12 months, have you or your family members you live with been unable to get utilities (heat, electricity) when it was really needed?: No   Food Insecurity: Low Risk  (11/30/2023)    Food Insecurity      Within the past 12 months, did you worry that your food would run out before you got money to buy more?: No      Within the past 12 months, did the food you bought just not last and you didn t have money to get more?: No   Transportation Needs: Low Risk  (11/30/2023)    Transportation Needs      Within the past 12 months, has lack of transportation kept you from medical appointments, getting your medicines, non-medical meetings or appointments, work, or from getting things that you need?: No   Interpersonal Safety: Low Risk  (11/9/2023)    Interpersonal Safety      Do you feel physically and emotionally safe where you currently live?: Yes      Within the past 12 months, have you been hit, slapped, kicked or otherwise physically hurt by someone?: No      Within the past 12 months, have you been humiliated or emotionally abused in other ways by your partner or ex-partner?: No   Housing Stability: Low Risk  (11/30/2023)    Housing  Stability      Do you have housing? : Yes      Are you worried about losing your housing?: No       Current Outpatient Medications   Medication     acetaminophen (TYLENOL) 500 MG tablet     BD PEN NEEDLE RAMON 2ND GEN 32G X 4 MM miscellaneous     Continuous Blood Gluc Sensor (FREESTYLE CINDY 14 DAY SENSOR) MISC     Continuous Blood Gluc Sensor (FREESTYLE CINDY 2 SENSOR) MISC     Continuous Blood Gluc Sensor (FREESTYLE CINDY 3 SENSOR) MISC     dexAMETHasone (DECADRON) 4 MG/ML injection     gabapentin (NEURONTIN) 300 MG capsule     galcanezumab-gnlm (EMGALITY) 120 MG/ML injection     galcanezumab-gnlm (EMGALITY) 120 MG/ML injection     ibuprofen (ADVIL/MOTRIN) 600 MG tablet     insulin glargine (LANTUS PEN) 100 UNIT/ML pen     insulin  UNIT/ML injection     metFORMIN (GLUCOPHAGE XR) 500 MG 24 hr tablet     multivitamin (ONE-DAILY) tablet     NOVOLOG FLEXPEN 100 UNIT/ML soln     ondansetron (ZOFRAN) 4 MG tablet     promethazine (PHENERGAN) 25 MG tablet     rizatriptan (MAXALT) 10 MG tablet     rosuvastatin (CRESTOR) 20 MG tablet     topiramate (TOPAMAX) 100 MG tablet     verapamil ER (VERELAN) 120 MG 24 hr capsule     vitamin D2 (ERGOCALCIFEROL) 08984 units (1250 mcg) capsule     Current Facility-Administered Medications   Medication     0.5 mL bupivacaine (MARCAINE) 0.5% injection (50 mL vial)     0.5 mL bupivacaine (MARCAINE) 0.5% injection (50 mL vial)     0.5 mL bupivacaine (MARCAINE) 0.5% injection (50 mL vial)     0.5 mL bupivacaine (MARCAINE) 0.5% injection (50 mL vial)     triamcinolone (KENALOG-40) injection 40 mg     triamcinolone (KENALOG-40) injection 40 mg     triamcinolone (KENALOG-40) injection 40 mg     triamcinolone (KENALOG-40) injection 40 mg          Allergies   Allergen Reactions     Hydrocodone Other (See Comments)     Headache per pt and hallucinations  Headache per pt and hallucinations       Reglan [Metoclopramide]      Anxiety     Vicodin [Hydrocodone-Acetaminophen] Other (See Comments)      Hallucinations     Silver Nitrate Itching and Rash     Only issues if in for a long time  Only issues if in for a long time  Only issues if in for a long time       Tegaderm Transparent Dressing (Informational Only) Rash       Physical Exam  /82 (BP Location: Right arm, Patient Position: Sitting, Cuff Size: Adult Regular)   Pulse 81   Wt 116.6 kg (257 lb)   LMP  (LMP Unknown)   BMI 41.48 kg/m    GENERAL:  Alert and oriented X3, NAD, well dressed, answering questions appropriately, appears stated age.  HEENT: OP clear, no lymphadenopathy, no thyromegaly, non-tender, no exophthalmus, no proptosis, EOMI, no lid lag, no retraction  EXTREMITIES: no edema, +pulses, no rashes, no lesions.  Missing toenail.   NEUROLOGY: + monofilament, +vibratory sensation, + DTR upper and lower extremity    RESULTS  Lab Results   Component Value Date    A1C 10.2 (H) 11/09/2023    A1C 9.5 (H) 08/11/2023    A1C 8.3 (H) 05/11/2023    A1C 7.2 (H) 02/28/2022    A1C 8.5 (H) 05/05/2021    A1C 6.8 (H) 09/28/2016    A1C 6.1 (H) 08/14/2013    A1C 5.9 (H) 06/07/2011    HEMOGLOBINA1 7.9 10/28/2021       TSH   Date Value Ref Range Status   01/22/2024 1.84 0.30 - 4.20 uIU/mL Final   02/28/2022 1.71 0.40 - 4.00 mU/L Final   04/21/2020 0.87 0.30 - 5.00 uIU/mL Final       ALT   Date Value Ref Range Status   01/22/2024 15 0 - 50 U/L Final     Comment:     Reference intervals for this test were updated on 6/12/2023 to more accurately reflect our healthy population. There may be differences in the flagging of prior results with similar values performed with this method. Interpretation of those prior results can be made in the context of the updated reference intervals.     08/11/2023 13 0 - 50 U/L Final     Comment:     Reference intervals for this test were updated on 6/12/2023 to more accurately reflect our healthy population. There may be differences in the flagging of prior results with similar values performed with this method.  "Interpretation of those prior results can be made in the context of the updated reference intervals.     06/07/2011 17.0 0.0 - 50.0 u/l Final   ]    Recent Labs   Lab Test 11/09/23  1351 05/11/23  1321   CHOL 259* 228*   HDL 58 51   * 160*   TRIG 88 84       Lab Results   Component Value Date     11/09/2023    .2 09/28/2016      Lab Results   Component Value Date    POTASSIUM 4.0 11/09/2023    POTASSIUM 3.9 02/28/2022    POTASSIUM 3.4 09/28/2016     Lab Results   Component Value Date    CHLORIDE 101 11/09/2023    CHLORIDE 106 02/28/2022    CHLORIDE 99.9 09/28/2016     Lab Results   Component Value Date    RADHA 9.1 11/09/2023    RADHA 8.9 09/28/2016     Lab Results   Component Value Date    CO2 26 11/09/2023    CO2 26 02/28/2022    CO2 26.9 09/28/2016     Lab Results   Component Value Date    BUN 8.6 11/09/2023    BUN 9 02/28/2022    BUN 8.7 09/28/2016     Lab Results   Component Value Date    CR 0.44 11/09/2023    CR 0.5 09/28/2016       GFR Estimate   Date Value Ref Range Status   01/22/2024 >90 >60 mL/min/1.73m2 Final   11/09/2023 >90 >60 mL/min/1.73m2 Final   08/11/2023 >90 >60 mL/min/1.73m2 Final   05/05/2021 >60 >60 mL/min/1.73m2 Final   02/17/2021 >60 >60 mL/min/1.73m2 Final   11/04/2020 >60 >60 mL/min/1.73m2 Final   09/28/2016 154.0 mL/min/1.7 m2 Final   08/14/2013 > 60 >60 ml/min/1.73m2 Final     GFR Estimate If Black   Date Value Ref Range Status   05/05/2021 >60 >60 mL/min/1.73m2 Final   02/17/2021 >60 >60 mL/min/1.73m2 Final   11/04/2020 >60 >60 mL/min/1.73m2 Final   09/28/2016 186.3 mL/min/1.7 m2 Final   08/14/2013 > 60 >60 ml/min/1.73m2 Final       Lab Results   Component Value Date    MICROL <12.0 05/11/2023    MICROL 15 02/28/2022     No results found for: \"MICROALBUMIN\"  Lab Results   Component Value Date    CPEPT 2.3 01/22/2024    GADAB <5.0 02/28/2022       Vitamin B12   Date Value Ref Range Status   01/22/2024 399 232 - 1,245 pg/mL Final   02/28/2022 437 193 - 986 pg/mL Final "   06/07/2011 443 223 - 1132 pg/ml Final       Most recent eye exam date: : Not Found     Glutamic Acid Decarboxylase Antibody   Date Value Ref Range Status   02/28/2022 <5.0 0.0 - 5.0 IU/mL Final     Comment:     INTERPRETIVE INFORMATION:  Glutamic Acid Decarboxylase   Antibody    A value greater than 5.0 IU/mL is considered positive for   Glutamic Acid Decarboxylase Antibody (PREETHI Ab). This assay   is intended for the semi-quantitative determination of the   PREETHI Ab in human serum. Results should be interpreted within   the context of clinical symptoms.  Performed By: [facility]  [address] [place] [id number]  : Evita Oliva MD     C Peptide   Date Value Ref Range Status   01/22/2024 2.3 0.9 - 6.9 ng/mL Final   02/28/2022 5.1 0.9 - 6.9 ng/mL Final     Assessment/Plan:     1.  Type 2 diabetes- Ruth's glucose control has improved.  A1c down to 8.8%.  She is having much lower glucose and variability.  With being unable to tolerate medications other than metformin for her diabetes, we discussed shifting more of her insulin to short acting.  She is doing pretty well fasting, but post-meals, particularly in the evening, are too high.  She is not consistently taking Novolog, as it is hard to remember.  Discussed Cequr patch pump and she is quite interested.  Also discussed hybrid closed loop pumps, which may be a good choice for her in the future.  She really wants to work on dietary and lifestyle changes and would like to meet with a dietitian.  I will arrange this for her. We made the following plan today (instructions given to patient):      Meet with diabetes educator about starting the Cequr patch pump.     Bring this with you to your appointment.     Discuss diet with the educator.     Increase lantus to 28 units daily and we will try to switch to Tresiba, if covered.     Increase Novolog to 15 units before dinner.  20 if a large (or carb heavy) meal.      Please let me  know if you are having low blood sugars less than 70 or over 250 mg/dL.      If you have concerns, please send me a Quarter[employer] message M-Th, call the clinic at [phone], or call [phone] after hours/weekends and ask to speak with the endocrinologist on call.      2.  Risk factors-     Retinopathy:  No.  Had eye exam 2022.  Will schedule  Nephropathy:  BP well controlled. No microalbuminuria.  Creatinine stable.   Neuropathy: Yes - on gabapentin.  Normal b12.   Lipids:  LDL at target.  Patient taking statin  Thyroid screening: Normal TSH 2924  Celiac screening: negative antibodies 2024    3.  F/U in 3 mos with me, in 1 month with DM education, sooner with concerns.     I spent 42  minutes with this patient face to face and explained the conditions and plans (more than 50% of time was counseling/coordination of care, diabetes management, follow up plan for worsening hyper and hypoglycemia) . The patient understood and is satisfied with today's visit.      Yanet Christie PA-C, MPAS   [facility]  Department of Medicine  Division of Endocrinology and Diabetes

## 2024-03-11 NOTE — PATIENT INSTRUCTIONS
Meet with diabetes educator about starting the Cequr patch pump.     Bring this with you to your appointment.     Discuss diet with the educator.     Increase lantus to 28 units daily and we will try to switch to Tresiba, if covered.     Increase Novolog to 15 units before dinner.  20 if a large (or carb heavy) meal.      Please let me know if you are having low blood sugars less than 70 or over 250 mg/dL.      If you have concerns, please send me a [employer] message M-Th, call the clinic at [phone], or call [phone] after hours/weekends and ask to speak with the endocrinologist on call.

## 2024-03-12 NOTE — TELEPHONE ENCOUNTER
Prior Authorization Not Needed per Insurance    Medication: FREESTYLE CINDY 3 SENSOR [facility]  Insurance Company: [employer] - Phone [phone] Fax [phone]  Expected CoPay: $    Pharmacy Filling the Rx: [id number] - [place], [place] - [address] [place] [address]  Pharmacy Notified: yes  Patient Notified:

## 2024-03-12 NOTE — TELEPHONE ENCOUNTER
PRIOR AUTHORIZATION DENIED    Medication: JIGNESH RAMAN 2U JAUN  Insurance Company: [facility] - Phone [phone] Fax [phone]  Denial Date: 3/12/2024  Denial Reason(s): Needs to try/fail Omnipod  Appeal Information:  [phone] fax [phone]  Patient Notified:     Would you like to switch or appeal?

## 2024-03-14 ENCOUNTER — TELEPHONE (OUTPATIENT)
Dept: GASTROENTEROLOGY | Facility: CLINIC | Age: 39
End: 2024-03-14
Payer: COMMERCIAL

## 2024-03-14 NOTE — TELEPHONE ENCOUNTER
Medication Appeal Initiation    Medication: CEQUR SIMPLICITY 2U JAUN  Appeal Start Date:  3/14/2024  Insurance Company: [employer]  Insurance Phone: [phone]  Insurance Fax: [phone]  Comments:

## 2024-03-14 NOTE — TELEPHONE ENCOUNTER
Yanet  this was denied do you want to do an appeal letter ? Jerrica Alcaraz RN on 3/14/2024 at 9:35 AM

## 2024-03-14 NOTE — TELEPHONE ENCOUNTER
Left Voicemail (1st Attempt) for the patient to call back and schedule the following:    Appointment type: [place]  Provider: Dr. Lehman  Return date: next available  Specialty phone number: [phone]  Additional appointment(s) needed:   Additonal Notes:

## 2024-03-20 NOTE — TELEPHONE ENCOUNTER
Lab Results   Component Value Date    HGBA1C 8.5 (H) 03/19/2024       Recent Labs     03/19/24  2104 03/20/24  0633 03/20/24  1132 03/20/24  1326   POCGLU 136 134 119 129         Blood Sugar Average: Last 72 hrs:  (P) 120.8  Lantus 30 units with breakfast.  Will reduce to 15 units in AM.  Humalog 10 units 3 times daily with meals.  Sliding scale insulin  Fortunately blood sugar appears stable currently.  Metformin on hold while inpatient.  
Order was placed. Would like her to follow up with her current Endo please.   
Patient called requesting a call back from Sandra. She is having lows in the 50's and would like to talk to Sandra.    Please call her @ [phone]  
Spoke to Ruth,    Not eating as much - feeling fatigued    10/8: 59 mg/dL fasting today  10/7: 84 mg/dL fasting. Ate a bagel then 84 mg/dL. 11 am: before lunch: 62 units  10/6: 70 mg/dL  10/5: 99 mg/dL    Even when eating BG do not go up more. Ate cookie and juice and went up to 125 mg/dL.    Typically fasting BG are around 160-170's.    Current Meds:  Currently Taking 24 units of Lantus  Off glipizide BID  Metformin 1,000 mg BID  Invokana 100 mg    Plan:  1. Decrease Lantus to 20 units and if continuing to have low BG for 2-3 days decrease to 18 units.  2. Scheduled on the 28th with Endocrinologist.  3. New Referral for Diabetes Education will be requested.  
Resident
sensation is normal and strength is normal.

## 2024-03-26 DIAGNOSIS — E11.9 TYPE 2 DIABETES MELLITUS WITHOUT COMPLICATION, UNSPECIFIED WHETHER LONG TERM INSULIN USE (H): Primary | ICD-10-CM

## 2024-03-26 DIAGNOSIS — E66.813 CLASS 3 SEVERE OBESITY WITH SERIOUS COMORBIDITY AND BODY MASS INDEX (BMI) OF 45.0 TO 49.9 IN ADULT, UNSPECIFIED OBESITY TYPE (H): ICD-10-CM

## 2024-03-26 DIAGNOSIS — E66.01 CLASS 3 SEVERE OBESITY WITH SERIOUS COMORBIDITY AND BODY MASS INDEX (BMI) OF 45.0 TO 49.9 IN ADULT, UNSPECIFIED OBESITY TYPE (H): ICD-10-CM

## 2024-03-26 RX ORDER — PHENTERMINE HYDROCHLORIDE 15 MG/1
15 CAPSULE ORAL EVERY MORNING
Qty: 30 CAPSULE | Refills: 2 | Status: SHIPPED | OUTPATIENT
Start: 2024-03-26 | End: 2024-03-26

## 2024-03-26 RX ORDER — PHENTERMINE HYDROCHLORIDE 15 MG/1
15 CAPSULE ORAL EVERY MORNING
Qty: 30 CAPSULE | Refills: 2 | Status: SHIPPED | OUTPATIENT
Start: 2024-03-26

## 2024-03-26 NOTE — TELEPHONE ENCOUNTER
03/26/24 called [facility] to check status of Appeal their system is down call back tomorrow

## 2024-04-01 NOTE — TELEPHONE ENCOUNTER
04/01/2024 called [facility] to check status of Cequr appeal, rep stating it is still pending could take until 04/15/2024

## 2024-04-03 ENCOUNTER — TELEPHONE (OUTPATIENT)
Dept: EDUCATION SERVICES | Facility: CLINIC | Age: 39
End: 2024-04-03
Payer: COMMERCIAL

## 2024-04-03 NOTE — TELEPHONE ENCOUNTER
Patient confirmed scheduled appointment:  Date: 4/29   Time: 2 pm   Visit type: Diabetes ed in person  Provider: Arturo   Location: [facility]  Testing/imaging: NA   Additional notes: Spoke to pt and re-bijan missed ambrose Shields on 4/1 to next avail.    Sharla Peguero on 4/3/2024 at 11:07 AM

## 2024-04-11 ENCOUNTER — PATIENT OUTREACH (OUTPATIENT)
Dept: CARE COORDINATION | Facility: CLINIC | Age: 39
End: 2024-04-11
Payer: COMMERCIAL

## 2024-04-11 ENCOUNTER — TELEPHONE (OUTPATIENT)
Dept: ENDOCRINOLOGY | Facility: CLINIC | Age: 39
End: 2024-04-11
Payer: COMMERCIAL

## 2024-04-11 DIAGNOSIS — E11.9 DIABETES MELLITUS, TYPE 2 (H): ICD-10-CM

## 2024-04-11 DIAGNOSIS — E11.9 TYPE 2 DIABETES MELLITUS WITHOUT COMPLICATION, UNSPECIFIED WHETHER LONG TERM INSULIN USE (H): ICD-10-CM

## 2024-04-11 RX ORDER — METFORMIN HCL 500 MG
TABLET, EXTENDED RELEASE 24 HR ORAL
Qty: 180 TABLET | Refills: 0 | OUTPATIENT
Start: 2024-04-11 | End: 2024-04-11

## 2024-04-11 RX ORDER — METFORMIN HCL 500 MG
TABLET, EXTENDED RELEASE 24 HR ORAL
Status: SHIPPED
Start: 2024-04-11 | End: 2024-05-20

## 2024-04-11 RX ORDER — INSULIN DEGLUDEC 200 U/ML
INJECTION, SOLUTION SUBCUTANEOUS
Status: SHIPPED
Start: 2024-04-11 | End: 2024-07-01

## 2024-04-11 NOTE — TELEPHONE ENCOUNTER
Call patient.  Patient started Tresiba 4 days ago.  Currently on metformin 500 mg twice daily, Tresiba 30, and Humalog 15 to 20 units with meals.  Patient just started having hypoglycemia today.  This may be a Tresiba effect.  Will have the patient reduce to Tresiba 25 units daily, and if she still having problems with blood sugars in the 60s, she can reduce Tresiba to 20 units daily.  Patient verbalized understanding.

## 2024-04-16 ENCOUNTER — TELEPHONE (OUTPATIENT)
Dept: GASTROENTEROLOGY | Facility: CLINIC | Age: 39
End: 2024-04-16

## 2024-04-16 NOTE — TELEPHONE ENCOUNTER
MEDICATION APPEAL APPROVED    Medication: CEQUR SIMPLICITY 2U JAUN  Authorization Effective Date: 4/16/2024  Authorization Expiration Date: 12/31/2024  Approved Dose/Quantity: 10  Reference #: Key: [id number]   Appeal Insurance Company: [employer]  Expected CoPay: $       CoPay Card Available:    Financial Assistance Needed:    Filling Pharmacy: [facility] #[id number] - [place], [place] - [address]  Patient Notified:    Comments:   Appeal Approved, I don't have time frame yet, Ran test claim and approved, once letter is received I will add here

## 2024-04-16 NOTE — TELEPHONE ENCOUNTER
[facility]hman out sick - rescheduled to next available      Date: 11/05  Time: 1:40pm  Visit type: Return Esophageal  Provider: Dr Lehman  Location: [facility]  Testing/imaging: N/A  Additional notes: N/A

## 2024-04-24 ENCOUNTER — VIRTUAL VISIT (OUTPATIENT)
Dept: CARDIOLOGY | Facility: CLINIC | Age: 39
End: 2024-04-24
Payer: COMMERCIAL

## 2024-04-24 VITALS — HEIGHT: 66 IN | WEIGHT: 273 LBS | BODY MASS INDEX: 43.87 KG/M2

## 2024-04-24 DIAGNOSIS — E55.9 VITAMIN D DEFICIENCY: ICD-10-CM

## 2024-04-24 DIAGNOSIS — E78.5 HYPERLIPIDEMIA, UNSPECIFIED HYPERLIPIDEMIA TYPE: ICD-10-CM

## 2024-04-24 DIAGNOSIS — Z79.4 TYPE 2 DIABETES MELLITUS WITH DIABETIC POLYNEUROPATHY, WITH LONG-TERM CURRENT USE OF INSULIN (H): Primary | ICD-10-CM

## 2024-04-24 DIAGNOSIS — E11.42 TYPE 2 DIABETES MELLITUS WITH DIABETIC POLYNEUROPATHY, WITH LONG-TERM CURRENT USE OF INSULIN (H): Primary | ICD-10-CM

## 2024-04-24 DIAGNOSIS — G43.E09 CHRONIC MIGRAINE WITH AURA WITHOUT STATUS MIGRAINOSUS, NOT INTRACTABLE: ICD-10-CM

## 2024-04-24 ASSESSMENT — PAIN SCALES - GENERAL: PAINLEVEL: NO PAIN (0)

## 2024-04-24 NOTE — LETTER
4/24/2024      RE: Ruth Vanegas  [address]  [place] [address]       Dear Colleague,    Thank you for the opportunity to participate in the care of your patient, Ruth Vanegas, at the [facility] [place] [facility]. Please see a copy of my visit note below.    Medication Therapy Management (MTM) Encounter    ASSESSMENT:                            Medication Adherence/Access: No issues identified    Diabetes   Would benefit from decreasing Tresiba back down to 25 units daily. If continues to have persistent lows, would benefit from reducing further to 20 units daily. Follow up with CDE as planned for education on Cequr patch start as planned.     Vitamin D Deficiency:  Would benefit from starting vitamin D 5000 international unit(s) daily.     Hyperlipidemia:   LDL not at goal < 100. To repeat Lipid since starting statin.     Migraine:   Unimproved, to continue to monitor with new Emgality start.     PLAN:                            Decrease Tresiba to 25 units daily.   Start vitamin D 5,000 international unit(s) daily   Follow up with CDE as planned.     Follow-up: Return in about 8 weeks (around 6/19/2024) for Medication Therapy Management Pharmacist Visit.    SUBJECTIVE/OBJECTIVE:                          Ruth Vanegas is a 38 year old female called for a follow-up visit.       Reason for visit: comprehensive review of medications.    Allergies/ADRs: Reviewed in chart  Past Medical History: Reviewed in chart  Tobacco: She reports that she has been smoking other. She has never used smokeless tobacco.Nicotine/Tobacco Cessation Plan  Information offered: Patient not interested at this time  Alcohol: not currently using    Medication Adherence/Access: no issues reported    Diabetes  Tresiba 30 units daily    Novolog to 15 units before dinner. 20 if a large (or carb heavy) meal  Metformin ER 1000 mg twice daily     NPH - 24  units to be taken with prednisone 60 mg     Transitioned from Lantus to Tresiba. Now finding that she is dealing with lows. Reports decreased Tresiba to 25 units daily, did this for 1 week and improved sugars so then went back to 30 units daily. Lows started happening again.   Medications Tried/Failed: Ozempic: nausea/vomiting.; Invokana: yeast infection.   Blood sugar monitoring: Continuous Glucose Monitor - Armida 14 Day system.  Reports stating phentermine for weight loss, hasn't noticed benefit so wishes to stop or increase. Wants to discuss sugars as of today and discuss phentermine at follow up.   Current diabetes symptoms: polyuria and polydipsia, lethargic, headache  Diet/Exercise: Getting in 2 meals per day.      Eye exam in the last 12 months? No    Foot exam: due  Urine Albumin:   Lab Results   Component Value Date    UMALCR  05/11/2023      Comment:      Unable to calculate, urine albumin and/or urine creatinine is outside detectable limits.  Microalbuminuria is defined as an albumin:creatinine ratio of 17 to 299 for males and 25 to 299 for females. A ratio of albumin:creatinine of 300 or higher is indicative of overt proteinuria.  Due to biologic variability, positive results should be confirmed by a second, first-morning random or 24-hour timed urine specimen. If there is discrepancy, a third specimen is recommended. When 2 out of 3 results are in the microalbuminuria range, this is evidence for incipient nephropathy and warrants increased efforts at glucose control, blood pressure control, and institution of therapy with an angiotensin-converting-enzyme (ACE) inhibitor (if the patient can tolerate it).        Lab Results   Component Value Date    A1C 8.8 (H) 03/11/2024     Vitamin D Deficiency:  No medication(s)    Reports did not start vitamin D.   Vitamin D Deficiency Screening Results:  Lab Results   Component Value Date    VITDT 19 (L) 01/22/2024     Hyperlipidemia:   rosuvastatin 20mg  "daily  Patient reports no current medication side effects.    Migraine:   Preventive medications  Emgality 240 mg loading dose and now 120 mg monthly  Verapamil 120 mg daily  Topiramate 100 mg twice daily     Acute medications  Rizatriptan 10 mg   Prochlorperazine 25 mg  Ondansetron 4 mg   Triggers include: Stress  Associated symptoms include: Vomiting, Photophobia, and Phonophobia  Patient reports having ? \"Multiple\" headache days per month  Reports that migraines were bad this month. Reports more frequent migraines and more severe migraines. Rizatriptan helpful if she can catch the migraine in time. Will also take excedrin. Was just started on Emgality in February, no issues with injection.     Today's Vitals: Ht 1.676 m (5' 6\")   Wt 123.8 kg (273 lb)   LMP  (LMP Unknown)   BMI 44.06 kg/m    ----------------      I spent 20 minutes with this patient today. All changes were made via collaborative practice agreement with Rohini Wong PA-C. A copy of the visit note was provided to the patient's provider(s).    A summary of these recommendations was sent via [employer].    Lauren Bloch, PharmD, BCACP   Medication Therapy Management Pharmacist   [facility]        Telemedicine Visit Details  Type of service:  Video Conference via [employer]  Start Time:  4:28 PM  End Time:  4:48 PM     Medication Therapy Recommendations  Class 3 severe obesity with serious comorbidity and body mass index (BMI) of 40.0 to 44.9 in adult (H)    Rationale: Untreated condition - Needs additional medication therapy - Indication   Recommendation: Start Medication - phentermine 8 MG tablet   Status: Accepted per Provider         Diabetes mellitus, type 2 (H)    Current Medication: insulin degludec (TRESIBA FLEXTOUCH) 200 UNIT/ML pen   Rationale: Dose too high - Dosage too high - Safety   Recommendation: Decrease Dose - Tresiba 100 UNIT/ML Soln   Status: Accepted per CPA         Vitamin D deficiency "    Rationale: Untreated condition - Needs additional medication therapy - Indication   Recommendation: Start Medication - Vitamin D 125 MCG (5000 UT) capsule   Status: Accepted per CPA                Please do not hesitate to contact me if you have any questions/concerns.     Sincerely,     Lauren T. Bloch, RP

## 2024-04-24 NOTE — PROGRESS NOTES
Medication Therapy Management (MTM) Encounter    ASSESSMENT:                            Medication Adherence/Access: No issues identified    Diabetes   Would benefit from decreasing Tresiba back down to 25 units daily. If continues to have persistent lows, would benefit from reducing further to 20 units daily. Follow up with CDE as planned for education on Cequr patch start as planned.     Vitamin D Deficiency:  Would benefit from starting vitamin D 5000 international unit(s) daily.     Hyperlipidemia:   LDL not at goal < 100. To repeat Lipid since starting statin.     Migraine:   Unimproved, to continue to monitor with new Emgality start.     PLAN:                            Decrease Tresiba to 25 units daily.   Start vitamin D 5,000 international unit(s) daily   Follow up with CDE as planned.     Follow-up: Return in about 8 weeks (around 6/19/2024) for Medication Therapy Management Pharmacist Visit.    SUBJECTIVE/OBJECTIVE:                          Ruth Vanegas is a 38 year old female called for a follow-up visit.       Reason for visit: comprehensive review of medications.    Allergies/ADRs: Reviewed in chart  Past Medical History: Reviewed in chart  Tobacco: She reports that she has been smoking other. She has never used smokeless tobacco.Nicotine/Tobacco Cessation Plan  Information offered: Patient not interested at this time  Alcohol: not currently using    Medication Adherence/Access: no issues reported    Diabetes   Tresiba 30 units daily    Novolog to 15 units before dinner. 20 if a large (or carb heavy) meal  Metformin ER 1000 mg twice daily     NPH - 24 units to be taken with prednisone 60 mg     Transitioned from Lantus to Tresiba. Now finding that she is dealing with lows. Reports decreased Tresiba to 25 units daily, did this for 1 week and improved sugars so then went back to 30 units daily. Lows started happening again.   Medications Tried/Failed: Ozempic: nausea/vomiting.; Invokana: yeast  infection.   Blood sugar monitoring: Continuous Glucose Monitor - Armida 14 Day system.  Reports stating phentermine for weight loss, hasn't noticed benefit so wishes to stop or increase. Wants to discuss sugars as of today and discuss phentermine at follow up.   Current diabetes symptoms: polyuria and polydipsia, lethargic, headache  Diet/Exercise: Getting in 2 meals per day.      Eye exam in the last 12 months? No    Foot exam: due  Urine Albumin:   Lab Results   Component Value Date    UMALCR  05/11/2023      Comment:      Unable to calculate, urine albumin and/or urine creatinine is outside detectable limits.  Microalbuminuria is defined as an albumin:creatinine ratio of 17 to 299 for males and 25 to 299 for females. A ratio of albumin:creatinine of 300 or higher is indicative of overt proteinuria.  Due to biologic variability, positive results should be confirmed by a second, first-morning random or 24-hour timed urine specimen. If there is discrepancy, a third specimen is recommended. When 2 out of 3 results are in the microalbuminuria range, this is evidence for incipient nephropathy and warrants increased efforts at glucose control, blood pressure control, and institution of therapy with an angiotensin-converting-enzyme (ACE) inhibitor (if the patient can tolerate it).        Lab Results   Component Value Date    A1C 8.8 (H) 03/11/2024     Vitamin D Deficiency:  No medication(s)    Reports did not start vitamin D.   Vitamin D Deficiency Screening Results:  Lab Results   Component Value Date    VITDT 19 (L) 01/22/2024     Hyperlipidemia:   rosuvastatin 20mg daily  Patient reports no current medication side effects.    Migraine:   Preventive medications  Emgality 240 mg loading dose and now 120 mg monthly  Verapamil 120 mg daily  Topiramate 100 mg twice daily     Acute medications  Rizatriptan 10 mg   Prochlorperazine 25 mg  Ondansetron 4 mg   Triggers include: Stress  Associated symptoms include: Vomiting,  "Photophobia, and Phonophobia  Patient reports having ? \"Multiple\" headache days per month  Reports that migraines were bad this month. Reports more frequent migraines and more severe migraines. Rizatriptan helpful if she can catch the migraine in time. Will also take excedrin. Was just started on Emgality in February, no issues with injection.     Today's Vitals: Ht 1.676 m (5' 6\")   Wt 123.8 kg (273 lb)   LMP  (LMP Unknown)   BMI 44.06 kg/m    ----------------      I spent 20 minutes with this patient today. All changes were made via collaborative practice agreement with Rohini Wong PA-C. A copy of the visit note was provided to the patient's provider(s).    A summary of these recommendations was sent via [employer].    Lauren Bloch, PharmD, BCACP   Medication Therapy Management Pharmacist   [facility]        Telemedicine Visit Details  Type of service:  Video Conference via [employer]  Start Time:  4:28 PM  End Time:  4:48 PM     Medication Therapy Recommendations  Class 3 severe obesity with serious comorbidity and body mass index (BMI) of 40.0 to 44.9 in adult (H)    Rationale: Untreated condition - Needs additional medication therapy - Indication   Recommendation: Start Medication - phentermine 8 MG tablet   Status: Accepted per Provider         Diabetes mellitus, type 2 (H)    Current Medication: insulin degludec (TRESIBA FLEXTOUCH) 200 UNIT/ML pen   Rationale: Dose too high - Dosage too high - Safety   Recommendation: Decrease Dose - Tresiba 100 UNIT/ML Soln   Status: Accepted per CPA         Vitamin D deficiency    Rationale: Untreated condition - Needs additional medication therapy - Indication   Recommendation: Start Medication - Vitamin D 125 MCG (5000 UT) capsule   Status: Accepted per CPA            "

## 2024-04-24 NOTE — NURSING NOTE
Is the patient currently in the state of [place]? YES    Visit mode:TELEPHONE    If the visit is dropped, the patient can be reconnected by: TELEPHONE VISIT: Phone number:   Telephone Information:   Mobile [phone]       Will anyone else be joining the visit? NO  (If patient encounters technical issues they should call [phone] :[phone])    How would you like to obtain your AVS? MyChart    Are changes needed to the allergy or medication list? No    Are refills needed on medications prescribed by this physician? NO    Reason for visit: RECHWENDY DE SOUZA

## 2024-04-25 ENCOUNTER — PATIENT OUTREACH (OUTPATIENT)
Dept: CARE COORDINATION | Facility: CLINIC | Age: 39
End: 2024-04-25
Payer: COMMERCIAL

## 2024-04-29 ENCOUNTER — ALLIED HEALTH/NURSE VISIT (OUTPATIENT)
Dept: EDUCATION SERVICES | Facility: CLINIC | Age: 39
End: 2024-04-29
Attending: PHYSICIAN ASSISTANT
Payer: COMMERCIAL

## 2024-04-29 DIAGNOSIS — E11.9 TYPE 2 DIABETES MELLITUS WITHOUT COMPLICATION, UNSPECIFIED WHETHER LONG TERM INSULIN USE (H): ICD-10-CM

## 2024-04-29 PROCEDURE — G0108 DIAB MANAGE TRN  PER INDIV: HCPCS | Performed by: DIETITIAN, REGISTERED

## 2024-04-29 RX ORDER — INSULIN ASPART 100 [IU]/ML
INJECTION, SOLUTION INTRAVENOUS; SUBCUTANEOUS
Qty: 30 ML | Refills: 4 | Status: SHIPPED | OUTPATIENT
Start: 2024-04-29

## 2024-04-29 NOTE — PATIENT INSTRUCTIONS
Dick Miranda,    It was a pleasure meeting with you today!    Here is a summary of our visit:    Training Video for the CeQur patch if you need a refresher:  https://youtu.be/[email or web address]    I sent a prescription for the Novolog insulin vial to your pharmacy.  10-15 minutes before your evening meal, take 14 units (7 clicks) of Novolog or 20 units (10 clicks) for a higher carb meal.  Replace CeQur every 3 days.    If you have questions about the CeQur patch, contact CeQur customer support.  If you have questions or concerns about your blood sugar, contact your diabetes provider team.    FOLLOW UP:  in 3 weeks to discuss nutrition and weight loss.      Alyson Morris RDN, CHERRI, [facility]  Dietitian and Diabetes Education - Endocrinology  [facility] [place] [address]  Phone: [phone]  Email: [email or web address]    Contact information:   To schedule a diabetes education appointment:[phone].  For questions or concerns, please send a [employer] message or call the clinic at [phone].    For more urgent concerns that do not require 824, please call [phone] after hours/weekends and ask to speak with the Endocrinologist on call.       Please let us know if you are having low blood sugars less than 70 or over 300 mg/dL.  Do not wait until your next appointment if this is happening.

## 2024-04-29 NOTE — PROGRESS NOTES
Diabetes Self-Management Education & Support Virtual Visit    Ruth GYPSY Vanegas contacted today for education related to Type 2 diabetes and Training on the CeQur Insulin Patch  Patient is being treated with:  Oral Agents and Insulin (less than 3 injections daily)    Year of Diagnosis: 2016  Referring Provider: Yanet Christie PA-C  Living Situation:  is present today.  Has a 20 year old daughter.  Employment: .  Used to be an MA    Purpose of today's visit:  Ruth brings a box of 10 Cequr patches to her visit today.        Subjective/Objective:  Recently Lantus changed to Tresiba U200 and having low blood sugars.  Recently met with MTM pharmacist and Tresiba U200 was reduced from 30 units to 26 units.  Prescribed Novolog, 15 units (20 units for high carb meal) before dinner but doesn't always take it.    Assessment/Plan:  Instructed on Cequr insulin patch today.     Reviewed:  Patch expires in 3 days. Filling patch with insulin, removing air bubble, priming, loading patch in , insertion sites, site prep, inserting patch, removing needle. Delivering pre-meal bolus with 1 click = 2 units.  Removing a patch.    Practiced using a demo patch successfully.    Provided patient with CeQur starter kit that includes the .  Prescription for insulin vial sent to pharmacy.    Novolog insulin vial prescription sent to [facility].    Follow-up:  In 3 weeks for nutrition and weight loss in the setting of Type 2 Diabetes.    Alyson Morris RDN, SEUN, [facility]  Dietitian and Diabetes  - Endocrinology  [facility] [place]        Time spent in this visit: 60 minutes     Any diabetes medication dose changes were made via the CDE Protocol and Collaborative Practice Agreement. A copy of this encounter was provided to the patient's referring provider.

## 2024-05-06 NOTE — PATIENT INSTRUCTIONS
"Recommendations from today's MTM visit:                                                       Decrease Tresiba to 25 units daily.   Start vitamin D 5,000 international unit(s) daily     Follow-up: Return in about 8 weeks (around 6/19/2024) for Medication Therapy Management Pharmacist Visit.    It was great speaking with you today.  I value your experience and would be very thankful for your time in providing feedback in our clinic survey. In the next few days, you may receive an email or text message from [facility] [employer] with a link to a survey related to your  clinical pharmacist.\"     To schedule another MTM appointment, please call the clinic directly or you may call the MTM scheduling line at [phone] or toll-free at [phone].     My Clinical Pharmacist's contact information:                                                      Please feel free to contact me with any questions or concerns you have.      Lauren Bloch, PharmD, BCACP   Medication Therapy Management Pharmacist   [facility]       "

## 2024-05-16 DIAGNOSIS — G43.719 INTRACTABLE CHRONIC MIGRAINE WITHOUT AURA AND WITHOUT STATUS MIGRAINOSUS: ICD-10-CM

## 2024-05-16 RX ORDER — ONDANSETRON 4 MG/1
4 TABLET, FILM COATED ORAL EVERY 6 HOURS PRN
Qty: 60 TABLET | Refills: 1 | Status: SHIPPED | OUTPATIENT
Start: 2024-05-16 | End: 2024-08-20

## 2024-05-20 ENCOUNTER — MYC REFILL (OUTPATIENT)
Dept: ENDOCRINOLOGY | Facility: CLINIC | Age: 39
End: 2024-05-20
Payer: COMMERCIAL

## 2024-05-20 DIAGNOSIS — E11.9 DIABETES MELLITUS, TYPE 2 (H): ICD-10-CM

## 2024-05-21 RX ORDER — METFORMIN HCL 500 MG
TABLET, EXTENDED RELEASE 24 HR ORAL
Qty: 180 TABLET | Refills: 3 | Status: SHIPPED | OUTPATIENT
Start: 2024-05-21 | End: 2024-07-01

## 2024-06-22 ENCOUNTER — HEALTH MAINTENANCE LETTER (OUTPATIENT)
Age: 39
End: 2024-06-22

## 2024-07-01 ENCOUNTER — MYC REFILL (OUTPATIENT)
Dept: ENDOCRINOLOGY | Facility: CLINIC | Age: 39
End: 2024-07-01
Payer: COMMERCIAL

## 2024-07-01 DIAGNOSIS — E11.9 DIABETES MELLITUS, TYPE 2 (H): ICD-10-CM

## 2024-07-01 DIAGNOSIS — E11.9 TYPE 2 DIABETES MELLITUS WITHOUT COMPLICATION, UNSPECIFIED WHETHER LONG TERM INSULIN USE (H): ICD-10-CM

## 2024-07-01 RX ORDER — INSULIN DEGLUDEC 200 U/ML
INJECTION, SOLUTION SUBCUTANEOUS
Qty: 15 ML | Refills: 0 | Status: SHIPPED | OUTPATIENT
Start: 2024-07-01 | End: 2024-07-30

## 2024-07-01 RX ORDER — BOLUS INSULIN PUMP, 200 UNIT 2 UNIT
1 EACH MISCELLANEOUS
Qty: 10 EACH | Refills: 11 | Status: SHIPPED | OUTPATIENT
Start: 2024-07-01 | End: 2024-07-09

## 2024-07-01 RX ORDER — METFORMIN HCL 500 MG
TABLET, EXTENDED RELEASE 24 HR ORAL
Qty: 180 TABLET | Refills: 0 | Status: SHIPPED | OUTPATIENT
Start: 2024-07-01 | End: 2024-10-07

## 2024-07-02 ENCOUNTER — TELEPHONE (OUTPATIENT)
Dept: ENDOCRINOLOGY | Facility: CLINIC | Age: 39
End: 2024-07-02
Payer: COMMERCIAL

## 2024-07-02 NOTE — TELEPHONE ENCOUNTER
Patient confirmed scheduled appointment:  Date: 12/9   Time: 12 pm   Visit type: return diabetes   Provider: Pratik   [place] [facility]  Testing/imaging: NA   Additional notes: Spoke to pt and scheduled per below message.   Please help schedule follow up visits thank you. Per Irma Peguero on 7/2/2024 at 10:57 AM

## 2024-07-09 RX ORDER — BOLUS INSULIN PUMP, 200 UNIT 2 UNIT
EACH MISCELLANEOUS
Qty: 10 EACH | Refills: 11 | Status: SHIPPED | OUTPATIENT
Start: 2024-07-09 | End: 2024-07-11

## 2024-07-10 DIAGNOSIS — E11.9 TYPE 2 DIABETES MELLITUS WITHOUT COMPLICATION, UNSPECIFIED WHETHER LONG TERM INSULIN USE (H): ICD-10-CM

## 2024-07-11 RX ORDER — BOLUS INSULIN PUMP, 200 UNIT 2 UNIT
EACH MISCELLANEOUS
Qty: 10 EACH | Refills: 11 | Status: SHIPPED | OUTPATIENT
Start: 2024-07-11

## 2024-07-11 NOTE — TELEPHONE ENCOUNTER
Pharmacy comment: Alternative Requested:THIS IS NOT COVERED UNDER HER NEW BLUE CROSS BLUE SHIELD PLAN. PLEASE START A PRIOR AUTH OR CHANGE TO SOMETHING ELSE.     CEQUR SIMPLICITY 2U JAUN 10 each 11 7/9/2024     Routing to clinic for review    Vaishnavi Bazan RN  [facility] Red Flag Triage/MRT

## 2024-07-16 ENCOUNTER — VIRTUAL VISIT (OUTPATIENT)
Dept: GASTROENTEROLOGY | Facility: CLINIC | Age: 39
End: 2024-07-16
Payer: COMMERCIAL

## 2024-07-16 DIAGNOSIS — R11.0 CHRONIC NAUSEA: Primary | ICD-10-CM

## 2024-07-16 DIAGNOSIS — R73.9 HYPERGLYCEMIA: ICD-10-CM

## 2024-07-16 DIAGNOSIS — R11.10 CHRONIC VOMITING: ICD-10-CM

## 2024-07-16 PROCEDURE — 99215 OFFICE O/P EST HI 40 MIN: CPT | Mod: 95 | Performed by: INTERNAL MEDICINE

## 2024-07-16 RX ORDER — SCOLOPAMINE TRANSDERMAL SYSTEM 1 MG/1
1 PATCH, EXTENDED RELEASE TRANSDERMAL
Qty: 10 PATCH | Refills: 0 | Status: SHIPPED | OUTPATIENT
Start: 2024-07-16 | End: 2024-08-15

## 2024-07-16 NOTE — PATIENT INSTRUCTIONS
It was a pleasure taking care of you today.  I've included a brief summary of our discussion and care plan from today's visit below.  Please review this information with your primary care provider.      Please schedule endoscopy with wireless pH testing to evaluate for abnormal reflux  Please monitor symptoms as you shift from night work to daytime hours.   When beginning daytime hours consider starting scopolamine patch.      Please call my nurse care coordinator Malik ([phone]) or Irene ([phone]), with any questions or concerns.    If you feel you received exceptional care and are interested in supporting the clinical and research goals of Dr. Lehman or the Division of Gastroenterology, Hepatology, and Nutrition please contact him directly through [employer]  to discuss opportunities to donate.    Sincerely,    Rene Lheman DO   of Medicine  Director, Esophageal Disorders Program  Division of Gastroenterology, Hepatology, and Nutrition  [facility]      Please see below for any additional questions and scheduling guidelines.    Sign up for Personally: Personally patient portal serves as a secure platform for accessing your medical records from the [facility]. Additionally, Personally facilitates easy, timely, and secure messaging with your care team. If you have not signed up, you may do so by using the provided code or calling [phone].    Coordinating your care after your visit:  There are multiple options for scheduling your follow-up care based on your provider's recommendation.    How do I schedule a follow-up clinic appointment:   After your appointment, you may receive scheduling assistance with the Clinic Coordinators by having a seat in the waiting room and a Clinic Coordinator will call you up to schedule.  Virtual visits or after you leave the clinic:  Your provider has placed a follow-up order in the Personally portal for scheduling your return appointment. A  member of the scheduling team will contact you to schedule.  [facility] Scheduling: Timely scheduling through [employer] is advised to ensure appointment availability.   Call to schedule: You may schedule your follow-up appointment(s) by calling [phone], option 1.    How do I schedule my endoscopy or colonoscopy procedure:  If a procedure, such as a colonoscopy or upper endoscopy was ordered by your provider, the scheduling team will contact you to schedule this procedure. Or you may choose to call to schedule at   [phone], option 2.  Please allow 20-30 minutes when scheduling a procedure.    How do I get my blood work done? To get your blood work done, you need to schedule a lab appointment at an [facility]. There are multiple ways to schedule:   At the clinic: The Clinic Coordinator you meet after your visit can help you schedule a lab appointment.   [facility] scheduling: [facility] offers online lab scheduling at all [facility] laboratory locations.   Call to schedule: You can call [phone] to schedule your lab appointment.    How do I schedule my imaging study: To schedule imaging studies, such as CT scans, ultrasounds, MRIs, or X-rays, contact Imaging Services at [phone].    How do I schedule a referral to another doctor: If your provider recommended a referral to another specialist(s), the referral order was placed by your provider. You will receive a phone call to schedule this referral, or you may choose to call the number attached to the referral to self-schedule.    For Post-Visit Question(s):  For any inquiries following today's visit:  Please utilize [employer] messaging and allow 48 hours for reply or contact the Call Center during normal business hours at [phone], option 3.  For Emergent After-hours questions, contact the On-Call GI Fellow through the [facility] at [phone].  In addition, you may contact your Nurse directly using the provided  contact information.    Test Results:  Test results will be accessible via [employer] in compliance with the 21st Century Cures Act. This means that your results will be available to you at the same time as your provider. Often you may see your results before your provider does. Results are reviewed by staff within two weeks with communication follow-up. Results may be released in the patient portal prior to your care team review.    Prescription Refill(s):  Medication prescribed by your provider will be addressed during your visit. For future refills, please coordinate with your pharmacy. If you have not had a recent clinic visit or routine labs, for your safety, your provider may not be able to refill your prescription.

## 2024-07-16 NOTE — NURSING NOTE
Current patient location: [address]  [place] [address]    Is the patient currently in the state of [place]? YES    Visit mode:VIDEO    If the visit is dropped, the patient can be reconnected by: VIDEO VISIT: Text to cell phone:   Telephone Information:   Mobile [phone]       Will anyone else be joining the visit? NO  (If patient encounters technical issues they should call [phone]    How would you like to obtain your AVS? MyChart    Are changes needed to the allergy or medication list? No    Are refills needed on medications prescribed by this physician? NO    Reason for visit: RECHECK    Shalom DE SOUZA

## 2024-07-16 NOTE — PROGRESS NOTES
"Virtual Visit Details    Type of service:  Video Visit     Originating Location (pt. Location): Home    Distant Location (provider location):  Off-site  Platform used for Video Visit: April Miranda is a 38 year old who is being evaluated via a billable video visit.      How would you like to obtain your AVS? MyChart  If the video visit is dropped, the invitation should be resent by: Text to cell phone: [phone]  Will anyone else be joining your video visit? No    GI CLINIC VISIT    CC/REFERRING PROVIDER: No ref. provider found    HPI: 38 year old female with PMH of class III obesity, HLD, DM2, anxiety, frequent yeast infections, migraine headaches presenting to GI clinic for follow-up of nausea and vomiting.    The patient states that she feels that she doesn't have as many issues with swallowing but she is still having issues with nausea and is not eating because of the nausea. She is eating once or twice a day and that is only if she is \"in the mood for food\". She doesn't know if this is related to a fear of food but always feels that she is nauseated and needing to take zofran. Reports significant regurgitation and feels like she has increased nausea when she eats or has to eat. Currently only on metformin, gabapentin, lovastatin, topamax, verapamil. Not currently on emgality due to insurance coverage issues. Novolog at meal and tresiba (insulin) at night. Not on phentermine due to restlessness. Had tried mirtazapine but is uncertain how much benefit she had. Reports most recent A1C was 8.8 (March 2024). No constipation unless taking consecutive doses of zofran.     Patient works at night and symptoms of nausea are worse in the overnight period. Reports that symptoms are bad in the morning and wax and wanes throughout the day. Tried to eat pizza and that increased her nausea.     Symptoms were worse since working nights compared to day shift. Works as a .     Has tried pantoprazole and " omeprazole without change in symptoms. Has tried nortriptyline and mirtazapine without change in symptoms. Uncertain if emgality helped symptoms.    See updated BEDQ and Eckardt score below: These patient reported outcomes are pertinent to the HPI and have personally been reviewed.    -------------------------------  1/12/23  Briefly, Ruth initially met with Dr. Lehman in consultation for nausea and vomiting in September 2021. At that time, she reported a one year history of nausea and vomiting with constant sensation of nausea, with episodic vomiting without clear trigging. Diabetes medication, including bydureon and Ozempic had worsened the symptoms. At that time, fasting glucose values were >200. EGD was endoscopically unremarkable, however gastric biopsies revealed H pylori. She was given treatment for this, with some improvement with treatment, and with confirmation of eradication May 2022. Colnoscopy was unrevealing. NM GES was normal.     At most recent follow-up in September, she noted improved symptoms, with nausea approximately 3-4 times per week, and vomiting 1-2 times per week. She had stopped nortriptyline without any change in GI symptoms. She was started on mirtazpine with a slow taper to a max dose of 45 mg and was recommended to discontinue marijuana use.    Interval history:  Ruth states that she did well following last visit, however had spontaneous return of nausea in the last few weeks. The nausea is now occurring daily after every meal, lasting 2-3 hours, associated with early satiety and belching. She denies abdominal pain. The dysphagia she had noted on previous visit is improved, not typically occurring daily now. It is unclear by history of her symptoms represent true dysphagia; she notes that if she eats too much, it will just feel like things are not moving well, but it is not clear if there is an obstructed bolus sensation. She notes regurgitation of bland undigested postprandially  most days. She denies heartburn. Glucose has been improved, with fasting sugars in the 140s. There were no medication or other changes prior to symptom worsening. She did not start mirtazepine and has not had the EKG yet.    GI ROS notable for intermittent constipation, with hard/dry stools requiring straining. Most of the time, she notes she has soft stool with sense of incomplete evacuation and with intermittent straining, 2-3 BM daily.  S/p caesarian deliveries x 2 with bladder incontinence.    Diet recall  B - oatmeal/toast or eggs/camacho or cereal  L - does not eat lunch  D - sushi bake  Sn - varies; fruit or candy or chips  Azul - apple juice, water      9/6/22  The patient states she is not getting as sick as she was before. She is eating more than before. Nausea occurs approximately 3-4 times a week instead of every day, vomiting 1-2 times a week. Appetite is low. She states she is eating 1-2 times a day. Taking pantoprazole in the morning. Feels a lot better but still feels mediocre.  Has been off of nortriptyline for over a month. Has not noticed a change in mood or negative change in GI symptoms since stopping the medication. She has tried mirtazapine before but only at the 15mg dose and not for her GI symptoms. Feels better since the manometry. Smokes marijuana at night to help with sleep. Began smoking marijuana for a little over a year. A couple puffs of a blunt. 3-4 days to go through a full blunt.      -----------------------------  4/29/22  Patient ended up having knee procedure and unable to get stool antigen testing for H pylori. She continues to have issues with nausea and vomiting and reports continued acid reflux and regurgitation and feels as if she needs to vomit following meals. She just started physical therapy for her knee. She is going to submit the stool antigen testing. Stopped taking pantoprazole for almost 2 months. At first she wasn't taking it because she was going to leave a stool  "sample. After treating her  H pylori she feels that her symptoms are improved but still present. Patient unable to state if the reflux and regurgitation is tied to nausea/vomiting. Denies heartburn. Whenever she eats she feels as if she has to force it down. And when she eats she feels it is going to come back up.  Feels food is stuck in her esophagus. She feels as if food is potentially building up (normal barium). Currently feels like she is eating once a day because she doesn't like the feeling that she is forcing food down.  When her glucose was extremely uncontrolled in the past she had more symptoms.      A1C is currently 7.2 (2/28/22)  -----------------------------------------------------------  1/14/22  Just finished treatment for h pylori on Wednesday. She was \"ok\" throughout the treatment. Had increased nausea while undergoing treatment. Kids got COVID end of December/beginning of January. With COVID the patient was asymptomatic.      Dark stools while on treatment with burning. She had blood when wiping but not in the stool or in toilet bowl. She denies straining. Denies medication such as preparation H.     Continues to have some nausea but it is improved since completing treatment for h pylor. Feels that she is able to eat more. Nausea is worse in the morning and at night. Before breakfast and at night after dinner.      Unsure if pantoprazole has made any change to symptoms.       Hyperglycemia in the AM- high 200s. Normal midday. Occasionally low in the evening. Sees endocrinologist soon.   ---------------------------------------------------------------  9/10/21  Ruth Vanegas states began getting sick about one year ago. She felt this was related to her diabetes. She was on an injection (bydureon) that was making her sick. She continued to feel sick: nauseated constantly. She has vomiting that can happen at any point. She is unable to determine what triggers vomiting. She always feels nauseated " "or like she will vomit. About 4-5 months ago she was feeling like she was forcing food down. It was \"hurting\" to eat. She had been on ozempic which also worsened her symptoms of nausea back in June. She had to discontinue the medication because it worsened her nausea and vomiting. Currently taking zofran 3-4 times a week. Doesn't like taking it because she gets constipated with it. After lunch she will get \"sick\". She feels that she has a lot of reflux associated and regurgitation. Occasionally she will regurgitate in order to feel better. Symptoms can be worse after a meal. Her symptoms also occur in the morning before waking. When she vomits in the morning she will not vomit food from the night before. She typically completes her dinner around 6:30pm. Has not yet had an endoscopy or gastric emptying study. When she was taking omeprazole 20mg she did not notice a benefit in her symptoms. She felt that omeprazole worsened her headaches. +belching and regurgitation, no heartburn.      If the patient is constipated she has abdominal pain. When constipated has occasional streaks of blood on the toilet paper. Never mixed with stool. +unintentional weight loss.      Was previously on mirtazapine. She was only on the medication leading up to therapy.  Nortriptyline 50mg at bedtime- taking for depression and insomnia.      Last hemoglobin A1c- She has an appointment with endocrinology on October 28th. Her glucose sensor ranges from 140-290. When she wakes and hasn't eaten her glucose is >200.   Esophageal Questionnaire(s)    BEDQ Questionnaire      5/31/2022     2:12 PM 7/16/2024    11:59 AM   BEDQ Questionnaire: How Often Have You Had the Following?   Trouble eating solid food (meat, bread, vegetables) 5 3   Trouble eating soft foods (yogurt, jello, pudding) 0 0   Trouble swallowing liquids 0 0   Pain while swallowing 3 2   Coughing or choking while swallowing foods or liquids 2 0   Total Score: 10 5         5/31/2022     " 2:12 PM 7/16/2024    11:59 AM   BEDQ Questionnaire: Discomfort/Pain Ratings   Eating solid food (meat, bread, vegetables) 3 2   Eating soft foods (yogurt, jello, pudding) 0 0   Drinking liquid 0 0   Total Score: 3 2       Eckardt Questionnaire      5/31/2022     2:12 PM 7/16/2024    12:02 PM   Eckardt Questionnaire   Dysphagia 3 1   Regurgitation 2 2   Retrosternal Pain 0 1   Weight Loss (kg) 2 0   Total Score:  7 4       Promis 10 Questionnaire      5/31/2022     2:12 PM 7/16/2024    12:04 PM   PROMIS 10 FLOWSHEET DATA   In general, would you say your health is: 2 2   In general, would you say your quality of life is: 3 3   In general, how would you rate your physical health? 3 2   In general, how would you rate your mental health, including your mood and your ability to think? 3 3   In general, how would you rate your satisfaction with your social activities and relationships? 3 4   In general, please rate how well you carry out your usual social activities and roles. (This includes activities at home, at work and in your community, and responsibilities as a parent, child, spouse, employee, friend, etc.) 3 4   To what extent are you able to carry out your everyday physical activities such as walking, climbing stairs, carrying groceries, or moving a chair? 5 4   In the past 7 days, how often have you been bothered by emotional problems such as feeling anxious, depressed, or irritable? 3 3   In the past 7 days, how would you rate your fatigue on average? 3 4   In the past 7 days, how would you rate your pain on average, where 0 means no pain, and 10 means worst imaginable pain? 5 3   Mental health question re-calculation - no clinical value 3 3   Physical health question re-calculation - no clinical value 3 2   Pain question re-calculation - no clinical value 3 4   Global Mental Health Score 12 13   Global Physical Health Score 14 12   PROMIS TOTAL - SUBSCORES 26 25           SCORE: 4    ROS: 10pt ROS performed and  otherwise negative.    PAST MEDICAL HISTORY:  Past Medical History:   Diagnosis Date    Anemia     told to take iron pills when pregnant    Benign paroxysmal positional vertigo, unspecified laterality 2021    Depression     Depressive disorder     Diabetes mellitus (H)     Diabetes mellitus, type 2 (H) 07/15/2021    Dysthymic disorder     Endometriosis     Herpes genitalia     Hyperlipidemia     Kidney stone     Menorrhagia     Migraines     Neuropathy     Other abnormal Papanicolaou smear of cervix and cervical HPV(795.09) 2013    PCOS (polycystic ovarian syndrome)     Post traumatic stress disorder (PTSD)        PREVIOUS ABDOMINAL/GYNECOLOGIC SURGERIES:  Past Surgical History:   Procedure Laterality Date    BLADDER REPAIR W/  SECTION      X2    C/SECTION, CLASSICAL      x2    COLONOSCOPY N/A 2021    Procedure: COLONOSCOPY;  Surgeon: Rene Lehman DO;  [facility] HYSTERECTOMY TOTAL, SALPINGECTOMY BILATERAL      Dr. Farmer    DILATION AND CURETTAGE  2015    DILATION AND CURETTAGE  2015    DILATION AND CURETTAGE, OPERATIVE HYSTEROSCOPY, COMBINED N/A 2015    Procedure: Dilation and Curettage with Hysteroscopy;  Surgeon: Zia Farmer MD;  Location: [facility] [place];  Service:     DILATION AND CURETTAGE, OPERATIVE HYSTEROSCOPY, COMBINED N/A 2016    Procedure: DILATION AND CURETTAGE WITH HYSTEROSCOPY INSERT MIRENA;  Surgeon: Suzanne Major MD;  Location: [facility] [place];  Service:     ENT SURGERY      ESOPHAGOSCOPY, GASTROSCOPY, DUODENOSCOPY (EGD), COMBINED N/A 2021    Procedure: ESOPHAGOGASTRODUODENOSCOPY, WITH BIOPSY;  Surgeon: Rene Lehman DO;  Location: [facility]    GYN SURGERY  10/19/2015    laproscopic lysis of adhesions    HYSTERECTOMY, PAP NO LONGER INDICATED      HYSTEROSCOPY, ABLATE ENDOMETRIUM HYDROTHERMAL, COMBINED N/A 2018    Procedure: HYSTEROSCOPY, DILATION AND CURETTAGE, ENDOMETRIAL ABLATION;  Surgeon: Kristy SIMMONS  DO Jhonatan;  Location: [facility] OR;  Service: Gynecology    LAPAROSCOPIC HYSTERECTOMY TOTAL N/A 03/04/2019    Procedure: ROBOTIC TOTAL LAPAROSCOPIC HYSTERECTOMY, BILATERAL SALPINGECTOMY, LEFT OVARIAN CYSTOTOMY. CYSTOSCOPY;  Surgeon: Zia Farmer MD;  Location: [facility] OR;  Service: Gynecology    LAPAROSCOPY DIAGNOSTIC (GENERAL) N/A 10/19/2015    Procedure:  LAPAROSCOPY,LYSIS OF ADHESIONS;  Surgeon: Zia Farmer MD;  Location: [facility] OR;  Service:     HI LAP,TUBAL CAUTERY Bilateral 03/02/2018    Procedure: LAPAROSCOPIC BILATERAL TUBAL LIGATION;  Surgeon: Kristy Israel DO;  Location: [facility] OR;  Service: Gynecology    TONSILLECTOMY           PERTINENT MEDICATIONS:  Current Outpatient Medications   Medication Sig Dispense Refill    scopolamine (TRANSDERM) 1 MG/3DAYS 72 hr patch Place 1 patch onto the skin every 72 hours for 30 days 10 patch 0    acetaminophen (TYLENOL) 500 MG tablet Take 500-1,000 mg by mouth every 4 hours as needed       BD PEN NEEDLE RAMON 2ND GEN 32G X 4 MM miscellaneous USE 4 PEN NEEDLES DAILY OR AS DIRECTED. 100 each 3    CEQUR SIMPLICITY 2U JAUN APPLY 1 PATCH EVERY 3 DAYS AS DIRECTED 10 each 11    Continuous Blood Gluc Sensor (FREESTYLE CINDY 14 DAY SENSOR) MISC 1 Application. every 14 days 2 each 11    Continuous Blood Gluc Sensor (FREESTYLE CINDY 2 SENSOR) MISC 1 each See Admin Instructions Change every 14 days. 6 each 1    Continuous Blood Gluc Sensor (FREESTYLE CINDY 3 SENSOR) MISC 1 Application every 14 days 2 each 11    dexAMETHasone (DECADRON) 4 MG/ML injection To be used topically by therapist during PT sessions. 30 mL 0    gabapentin (NEURONTIN) 300 MG capsule Take 2 capsules (600 mg) by mouth 3 times daily 540 capsule 3    galcanezumab-gnlm (EMGALITY) 120 MG/ML injection Inject 1 mL (120 mg) Subcutaneous every 28 days 1 mL 4    galcanezumab-gnlm (EMGALITY) 120 MG/ML injection Inject 2 mLs (240 mg) Subcutaneous every 28 days Loading dose. 2 mL 0     ibuprofen (ADVIL/MOTRIN) 600 MG tablet every 4 hours as needed       insulin aspart (NOVOLOG VIAL) 100 UNITS/ML vial 0 units before breakfast, 0-20 units before lunch, 14-20 units before dinner.  Minimum 100 units every day.  For CeQur Patch 30 mL 4    insulin degludec (TRESIBA FLEXTOUCH) 200 UNIT/ML pen Use as directed up to 28 units daily. Follow up visit needed. Call  to schedule. 15 mL 0    insulin  UNIT/ML injection 24 units  To be taken with prednisone 60 mg, hold it if not taking steroids (Patient not taking: Reported on 4/24/2024) 15 mL 1    metFORMIN (GLUCOPHAGE XR) 500 MG 24 hr tablet TAKE 1 tablet (500 mg) BY MOUTH TWICE DAILY WITH MEALS. Follow up visit needed. Call  to schedule. 180 tablet 0    methylPREDNISolone (MEDROL DOSEPAK) 4 MG tablet therapy pack Follow Package Directions 21 tablet 0    multivitamin (ONE-DAILY) tablet Take 1 tablet by mouth daily 100 tablet 3    NOVOLOG FLEXPEN 100 UNIT/ML soln Take 5- 10 units before each meal. 15 mL 3    ondansetron (ZOFRAN) 4 MG tablet TAKE 1 TABLET BY MOUTH EVERY 6 HOURS AS NEEDED FOR NAUSEA 60 tablet 1    phentermine 15 MG capsule Take 1 capsule (15 mg) by mouth every morning 30 capsule 2    promethazine (PHENERGAN) 25 MG tablet Take 25 mg by mouth every 6 hours as needed      rizatriptan (MAXALT) 10 MG tablet Take 1 tablet (10 mg) by mouth at onset of headache for migraine May repeat in 2 hours. 18 tablet 4    rosuvastatin (CRESTOR) 20 MG tablet Take 1 tablet (20 mg) by mouth daily 90 tablet 1    topiramate (TOPAMAX) 100 MG tablet Take 1 tablet (100 mg) by mouth 2 times daily 180 tablet 1    verapamil ER (VERELAN) 120 MG 24 hr capsule Take 1 capsule (120 mg) by mouth at bedtime 90 capsule 1       SOCIAL HISTORY:  Social History     Socioeconomic History    Marital status: Single     Spouse name: Not on file    Number of children: Not on file    Years of education: Not on file    Highest education level: Not on file    Occupational History    Not on file   Tobacco Use    Smoking status: Some Days     Types: Other    Smokeless tobacco: Never    Tobacco comments:     Marijuana   Vaping Use    Vaping status: Never Used   Substance and Sexual Activity    Alcohol use: Yes     Comment: Socially    Drug use: Yes     Types: Marijuana    Sexual activity: Not on file   Other Topics Concern    Not on file   Social History Narrative    Not on file     Social Determinants of Health     Financial Resource Strain: Low Risk  (11/30/2023)    Financial Resource Strain     Within the past 12 months, have you or your family members you live with been unable to get utilities (heat, electricity) when it was really needed?: No   Food Insecurity: Low Risk  (11/30/2023)    Food Insecurity     Within the past 12 months, did you worry that your food would run out before you got money to buy more?: No     Within the past 12 months, did the food you bought just not last and you didn t have money to get more?: No   Transportation Needs: Low Risk  (11/30/2023)    Transportation Needs     Within the past 12 months, has lack of transportation kept you from medical appointments, getting your medicines, non-medical meetings or appointments, work, or from getting things that you need?: No   Physical Activity: Not on file   Stress: Not on file   Social Connections: Not on file   Interpersonal Safety: Low Risk  (11/9/2023)    Interpersonal Safety     Do you feel physically and emotionally safe where you currently live?: Yes     Within the past 12 months, have you been hit, slapped, kicked or otherwise physically hurt by someone?: No     Within the past 12 months, have you been humiliated or emotionally abused in other ways by your partner or ex-partner?: No   Housing Stability: Low Risk  (11/30/2023)    Housing Stability     Do you have housing? : Yes     Are you worried about losing your housing?: No       FAMILY HISTORY:    Family History   Adopted: Yes   Problem  Relation Age of Onset    Chronic Obstructive Pulmonary Disease Mother     Prostate Cancer Father     Cancer Father         prostate    Alcoholism Sister     Alcoholism Sister     Alcoholism Brother     Alcoholism Brother     Diabetes Paternal Grandmother     Other Cancer Paternal Grandmother     Alcoholism Daughter     Coronary Artery Disease No family hx of     Urolithiasis No family hx of     Clotting Disorder No family hx of     Gout No family hx of     Heart Disease No family hx of     Anesthesia Reaction No family hx of      HRM  5/31/22  The baseline tone of the lower esophageal sphincter was normotensive. The lower esophageal sphincter was able to relax appropriately as measured by the IRP (10.7). The EGJ morphology was type 2. There was peristalsis visible. The DCI, DL, and contractile pattern were within normal limits. All 10/10 swallows were normal. There was one swallow with a reflux event after clearing the bolus. There were 0/10 swallows with elevated intrabolus pressure and compartmentalized pressurization. Rapid water swallows were performed with normal augmentation. Rapid Drink Challenge does not indicate an elevated IRP. Supine liquid swallows were performed. Upright liquid swallows were performed with no change in manometric pattern and a median upright IRP of 4.0.  Bolus transit as measured by impedance was complete in 9/10 swallows. This is most consistent with normal esophageal motility and a small hiatal hernia.       Wording of procedure description and interpretation was adapted from [facility] [employer] Weekly Motility Conference (2018).      Impressions   Based on the most recent Korbel Classification v4.0, the findings are most consistent with normal esophageal motility and a small hiatal hernia.       PHYSICAL EXAMINATION:  Vitals reviewed  LMP  (LMP Unknown)   Video physical exam  General: Patient appears well in no acute distress.   Skin: No visualized rash or  lesions on visualized skin  Eyes: EOMI, no erythema, sclera icterus or discharge noted  Resp: Appears to be breathing comfortably without accessory muscle usage, speaking in full sentences, no cough  MSK: Appears to have normal range of motion based on visualized movements  Neurologic: No apparent tremors, facial movements symmetric  Psych: affect normal, alert and oriented    The rest of a comprehensive physical examination is deferred due to PHE (public health emergency) video restrictions      PERTINENT STUDIES Reviewed in EMR    ASSESSMENT/PLAN:  38 year old female with PMH of class III obesity, HLD, DM2, anxiety, frequent yeast infections, migraine headaches presenting to GI clinic for follow-up of nausea and vomiting.    The patient has been dealing with chronic nausea and vomiting. To date all testing has been negative as an etiology. Neuromodulatory medications have not improved her symptoms. Symptoms are not consistent with CVS or CHS as there is no cyclical nature to them. It is plausible that reflux could be a component however in the absence of response to PPI previously I am unsure if that is the etiology. In order to objectively assess we will proceed with endoscopy and 96hr wireless pH testing off therapy. She denies any allergy to nickel.     We discussed scopolamine patch as an option to trial for her nausea. We discussed the anticholinergic side effects and I counseled her to begin taking this 1) when she is on dayshift and 2) on the first day she is off from her work (she works three 12 hr shifts in a row before having several days off). This will help mitigate any anticholinergic side effects impacting her work.    Please schedule endoscopy with wireless pH testing to evaluate for abnormal reflux  Please monitor symptoms as you shift from night work to daytime hours.   When beginning daytime hours consider starting scopolamine patch.    Return to clinic 6 months and as needed.    The risks and  benefits of my recommendations, as well as other treatment options were discussed with the patient and any available family today. All questions were answered.     Follow up: As planned above. Today, I personally spent 30 minutes in direct face to face time with the patient, of which greater than 50% of the time was spent in patient education and counseling as described above. Approximately 13 minutes were spent on indirect care associated with the patient's consultation including but not limited to review of: patient medical records to date, clinic visits, hospital records, lab results, imaging studies, procedural documentation, and coordinating care with other providers. The findings from this review are summarized in the above note. All of the above accounted for a cumulative time of 43 minutes and was performed on the date of service.     The patient verbalized understanding of the plan and was appreciative for the time spent and information provided during the office visit.     Author:   Rene Lehman DO   of Medicine  Director, Esophageal Disorders Program  Division of Gastroenterology, Hepatology, and Nutrition  [facility]    Dr. Lheman speaks for [facility] regarding dupilumab and has participated in advisory boards for the medication. He receives income for these activities. When discussing the medication, patients were informed of Dr. Lehman's role and potential conflict of interest. All questions and/or concerns were answered during the encounter.     Documentation assisted by voice recognition and documentation system.

## 2024-07-30 DIAGNOSIS — E11.9 TYPE 2 DIABETES MELLITUS WITHOUT COMPLICATION, UNSPECIFIED WHETHER LONG TERM INSULIN USE (H): ICD-10-CM

## 2024-07-30 RX ORDER — INSULIN DEGLUDEC 200 U/ML
INJECTION, SOLUTION SUBCUTANEOUS
Qty: 30 ML | Refills: 3 | Status: SHIPPED | OUTPATIENT
Start: 2024-07-30

## 2024-08-19 ENCOUNTER — NURSE TRIAGE (OUTPATIENT)
Dept: NURSING | Facility: CLINIC | Age: 39
End: 2024-08-19
Payer: COMMERCIAL

## 2024-08-19 DIAGNOSIS — E55.9 VITAMIN D DEFICIENCY: Primary | ICD-10-CM

## 2024-08-19 DIAGNOSIS — E11.9 TYPE 2 DIABETES MELLITUS WITHOUT COMPLICATION, UNSPECIFIED WHETHER LONG TERM INSULIN USE (H): ICD-10-CM

## 2024-08-19 NOTE — PROGRESS NOTES
Spoke with patient. She was at urgent care.  Unclear why she is having persistent hypoglycemia.  Last dose of Novolog was at 9am and she is still low at 2:20pm despite multiple treatments.  No changes in diet/physical activity/new medications.    Suggested she lower her tresiba from 26 units to 16 units tonight and advised that she have her partner listen for alarms and check on her in the night.  May still be at increased risk for hypoglycemia due to long duration of action of tresiba.    Asked her to check kidney function to be sure there is no concern for reduced insulin clearance.    Yanet Christie PA-C

## 2024-08-19 NOTE — TELEPHONE ENCOUNTER
Endo Team notified to please upload/collect recent glucose results and current DM Rx dosing for provider review.   CDE notified.   Provider notified.   Danika Andre RN on 8/19/2024 at 1:38 PM           RE      Nurse Triage SBAR    Is this a 2nd Level Triage? YES, LICENSED PRACTITIONER REVIEW IS REQUIRED    Situation: Patient took 20 units of Novolog at mealtime for breakfast, which was pancakes, sausage with syrup and butter.  Now she is unable to keep her blood sugars up.  They are 67 at start of conversation to 62 at end of conversation.    Background:   Type 2 Diabetic  Current treatment:   Metformin 1000 mg bid.  Lantus- 26 units daily- now at 10pm.   Novolog- 10 units once daily with dinner. Hard to remember.   Previous treatment:   Victoza- stopped it in 2020 when she started insulin.   Invokana 300 mg daily- recurrent yeast infections- stopped 2023.    Assessment:   Patient has been trying for 3 hours to keep her blood sugar up.  Novolog at mealtime breakfast which was pancakes and sausage with syrup and butter. At 0930 breakfast.  67,62 blood sugar after eating [employer] candy, pop.    She does have a Armida 3, but is unable to search out history or tell me what her blood sugar was 2 hours after breakfast.  Her monitor keep buzzing lowe blood sugar, at 69 it dings.  She is currently at work and not alone  She is shaky, tired and groggy.  She denies fever or chills  Denies SOB or difficulty breathing  Denies vomiting, but is super nauseated  She denies frequent urination, and states that she does not know if she voided today much.  She states that she has a raging headache  Her S/O is waiting to take her to the Urgency room, since her treatments have not worked in the last 3 hours.      Protocol Recommended Disposition:   Go to ED    Recommendation:   Go to the ED,  she will go to the Urgency Room at [place] or [place].  Her S/O is here to take her there  RN will route to Endo for FYI      Routed to  "provider    Reason for Disposition   Low blood sugar symptoms persist > 30 minutes AND using low blood sugar Care Advice    Answer Assessment - Initial Assessment Questions  1. SYMPTOMS: \"What symptoms are you concerned about?\"      Low blood sugar, \"I can not keep my blood sugars up\"  last time I checked it was 67, eating M&M's and candy and pop and can't get it up  Shaky, tired, groggy raging headache,  2. ONSET:  \"When did the symptoms start?\"      After breakfast  3. BLOOD GLUCOSE: \"What is your blood glucose level?\"       62, was 67  4. USUAL RANGE: \"What is your blood glucose level usually?\" (e.g., usual fasting morning value, usual evening value)      100's unable to tell me for sure  5. TYPE 1 or 2:  \"Do you know what type of diabetes you have?\"  (e.g., Type 1,type 2  6. INSULIN: \"Do you take insulin?\" \"What type of insulin(s) do you use? What is the mode of delivery? (syringe, pen; injection or pump) \"When did you last give yourself an insulin dose?\" (i.e., time or hours/minutes ago) \"How much did you give?\" (i.e., how many units)      Novolog 20 units at mealtime was given for breakfast.    7. DIABETES PILLS: \"Do you take any pills for your diabetes?\" If Yes, ask: \"What is the name of the medicine(s) that you take for high blood sugar?\"      Metformin   8. OTHER SYMPTOMS: \"Do you have any symptoms?\" (e.g., fever, frequent urination, difficulty breathing, vomiting)      No fevers, no difficluty breathing, no vomiting   she has had nausea,  frequent urination denies.    9. LOW BLOOD GLUCOSE TREATMENT: \"What have you done so far to treat the low blood glucose level?\"      Candy and sweets  10. FOOD: \"When did you last eat or drink?\"        Last   candy and M&M's  pop    breakfast with pancackes and suagasge with syrup and butter.  Not sure what Blood sugar was 2 hours after.  She does not know how to operate her moniter for past history  11. ALONE: \"Are you alone right now or is someone with you?\"         At " "work  12. PREGNANCY: \"Is there any chance you are pregnant?\" \"When was your last menstrual period?\"        no    Protocols used: Diabetes - Low Blood Sugar-A-OH    "

## 2024-08-20 DIAGNOSIS — G43.719 INTRACTABLE CHRONIC MIGRAINE WITHOUT AURA AND WITHOUT STATUS MIGRAINOSUS: ICD-10-CM

## 2024-08-20 RX ORDER — ONDANSETRON 4 MG/1
4 TABLET, FILM COATED ORAL EVERY 6 HOURS PRN
Qty: 60 TABLET | Refills: 1 | Status: SHIPPED | OUTPATIENT
Start: 2024-08-20

## 2024-09-06 ENCOUNTER — TELEPHONE (OUTPATIENT)
Dept: GASTROENTEROLOGY | Facility: CLINIC | Age: 39
End: 2024-09-06
Payer: COMMERCIAL

## 2024-09-06 NOTE — TELEPHONE ENCOUNTER
Patient request to speak with [facility] for out of pocket estimate and will call back to schedule.

## 2024-10-02 ENCOUNTER — MYC MEDICAL ADVICE (OUTPATIENT)
Dept: ENDOCRINOLOGY | Facility: CLINIC | Age: 39
End: 2024-10-02
Payer: COMMERCIAL

## 2024-10-02 NOTE — TELEPHONE ENCOUNTER
Diabetes Educator Note:     Ruth calls because of ongoing hypoglycemia.    She was having this problem back in August, and was instructed to reduce her dose of Tresiba from 26 units daily to 16 units daily, which she did.  She states that she started feeling better, so she resumed what she had been doing previously.  She is not taking 28 units of Tresiba at bedtime and has been instructed to take 20 units of Novolog with each meal.  She states that she tried this last Sunday (see report below) with lunch because she had a particularly heavy lunch and states that she had a severe episode of hypoglycemia which required assistance while she was at work.  She is looking for assistance to prevent hypoglycemia.                  Discussed that when we reduce her Tresiba dose, she will have less background insulin, so her post meal glucoses will be higher and she'll need to take some insulin with meals.  She verbalized understanding of this.  I instructed her to reduce her Tresiba dose from 28 to 20 units, and to reduce the amount of novolog she takes with lunch (her largest meal) from 20 units to 10 units.  It sounds as if Novolog is usually only taken when she feels that she needs it based on the amount of food she's eating.      I will be out of the clinic next week, so will be unable to follow up.   A copy of this note being sent to Yanet Christie PA-C.      EV BryantN, RN, [facility]  Certified Diabetes Care and   [facility] Endocrinology and Diabetes  [facility]  Phone [phone]  Hours:  Tuesday:       In Clinic and Virtual Visits  8am to 4pm              Wednesday:  In Clinic and Virtual Visits  8am to 4pm               Thursday:     Virtural  Visits only             8am to 4pm

## 2024-10-05 DIAGNOSIS — E11.9 DIABETES MELLITUS, TYPE 2 (H): ICD-10-CM

## 2024-10-07 RX ORDER — METFORMIN HYDROCHLORIDE 500 MG/1
TABLET, EXTENDED RELEASE ORAL
Qty: 180 TABLET | Refills: 0 | Status: SHIPPED | OUTPATIENT
Start: 2024-10-07 | End: 2024-10-21

## 2024-10-18 NOTE — PROGRESS NOTES
"  Patient is showing 3/5 MNCM met. A1c not in range and Current tobacco use   10/18/24 10:38 AM : Appointment reminder phone call made to patient.Pt verbalized understanding    HPI:   Ruth is a pleasant 37 yo woman here for follow up of type 2 diabetes (PREETHI negative, C-peptide positive) since 2016.  She also has a history of Anemia, Depression, Depressive disorder, Dysthymic disorder, Endometriosis, Herpes genitalia, Hyperlipidemia, Kidney stone, Menorrhagia, Migraines, Neuropathy, PCOS (polycystic ovarian syndrome), and Post traumatic stress disorder (PTSD).  Since her last visit, her glucose has been going really low.  She cut back her insulin, but she is still dropping.     shweta reports she was first diagnosed with diabetes in 2016 on routine blood work prior to a procedure.  She initially was started on metformin, then insulin was added years later.     Current treatment:   Metformin 1000 mg bid.  Lantus- 20 units daily- now at 10pm.   Novolog- 10 units once daily with lunch (biggest meal).     Previous treatment:   Victoza- stopped it in 2020 when she started insulin.   Ozempic- violently ill.   Invokana 300 mg daily- recurrent yeast infections- stopped 2023. Glucose was really high when she started it.     Typical day:   Not hungry in the morning.  Wakes up   Daughter wakes her up before she leaves for school.  Drinks water.    Nauseated if she tries to eat when she wakes up.  Unsure why. Seeing GI.   Does not eat very much.   Weight fluctuates.Recent loss.   Works as a  overnights.   2020- got really sick.  H. Pylori. Lots of GI issues since then.   Sensitive stomach.  Eats small portions.  Started working out.   Work schedule changed [place]/Mon/Tues and every other Sat. 6:30am-6:30pm. Switched to days.   No breakfast   Coffee in AM  Tries to meal prep- snack boxes- cheese, meat (summer sausage), fruit, tuna or cookies.   Leftovers for lunch.   Dinner-   Days off- \"starving\"     Works out " Wed/Thurs/Fri  In the AM- really nauseated.      Wants her to have an upper GI.     Recent glucose:                   Diabetes Control:   Lab Results   Component Value Date    A1C 10.2 2023    A1C 9.5 2023    A1C 8.3 2023    A1C 7.2 2022    A1C 8.5 2021    A1C 6.8 2016    A1C 6.1 2013    A1C 5.9 2011       Past Medical History:   Diagnosis Date    Anemia     told to take iron pills when pregnant    Benign paroxysmal positional vertigo, unspecified laterality 2021    Depression     Depressive disorder     Diabetes mellitus (H)     Diabetes mellitus, type 2 (H) 07/15/2021    Dysthymic disorder     Endometriosis     Herpes genitalia     Hyperlipidemia     Kidney stone     Menorrhagia     Migraines     Neuropathy     Other abnormal Papanicolaou smear of cervix and cervical HPV(795.09) 2013    PCOS (polycystic ovarian syndrome)     Post traumatic stress disorder (PTSD)        Past Surgical History:   Procedure Laterality Date    BLADDER REPAIR W/  SECTION      X2    C/SECTION, CLASSICAL      x2    COLONOSCOPY N/A 2021    Procedure: COLONOSCOPY;  Surgeon: Rene Lehman DO;  Location: [facility] HYSTERECTOMY TOTAL, SALPINGECTOMY BILATERAL      Dr. Farmer    DILATION AND CURETTAGE  2015    DILATION AND CURETTAGE  2015    DILATION AND CURETTAGE, OPERATIVE HYSTEROSCOPY, COMBINED N/A 2015    Procedure: Dilation and Curettage with Hysteroscopy;  Surgeon: Zia Farmer MD;  Location: [facility];  Service:     DILATION AND CURETTAGE, OPERATIVE HYSTEROSCOPY, COMBINED N/A 2016    Procedure: DILATION AND CURETTAGE WITH HYSTEROSCOPY INSERT MIRENA;  Surgeon: Suzanne Major MD;  Location: [facility];  Service:     ENT SURGERY      ESOPHAGOSCOPY, GASTROSCOPY, DUODENOSCOPY (EGD), COMBINED N/A 2021    Procedure: ESOPHAGOGASTRODUODENOSCOPY, WITH BIOPSY;  Surgeon: Rene Lehman DO;  Location: [facility]    GYN  SURGERY  10/19/2015    laproscopic lysis of adhesions    HYSTERECTOMY, PAP NO LONGER INDICATED      HYSTEROSCOPY, ABLATE ENDOMETRIUM HYDROTHERMAL, COMBINED N/A 03/02/2018    Procedure: HYSTEROSCOPY, DILATION AND CURETTAGE, ENDOMETRIAL ABLATION;  Surgeon: Kristy Israel DO;  Location: [facility] OR;  Service: Gynecology    LAPAROSCOPIC HYSTERECTOMY TOTAL N/A 03/04/2019    Procedure: ROBOTIC TOTAL LAPAROSCOPIC HYSTERECTOMY, BILATERAL SALPINGECTOMY, LEFT OVARIAN CYSTOTOMY. CYSTOSCOPY;  Surgeon: Zia Farmer MD;  Location: [facility] OR;  Service: Gynecology    LAPAROSCOPY DIAGNOSTIC (GENERAL) N/A 10/19/2015    Procedure:  LAPAROSCOPY,LYSIS OF ADHESIONS;  Surgeon: Zia Farmer MD;  Location: [facility] OR;  Service:     [place] LAP,TUBAL CAUTERY Bilateral 03/02/2018    Procedure: LAPAROSCOPIC BILATERAL TUBAL LIGATION;  Surgeon: Kristy Israel DO;  Location: [facility] OR;  Service: Gynecology    TONSILLECTOMY         Family History   Adopted: Yes   Problem Relation Age of Onset    Chronic Obstructive Pulmonary Disease Mother     Prostate Cancer Father     Cancer Father         prostate    Alcoholism Sister     Alcoholism Sister     Alcoholism Brother     Alcoholism Brother     Diabetes Paternal Grandmother     Other Cancer Paternal Grandmother     Alcoholism Daughter     Coronary Artery Disease No family hx of     Urolithiasis No family hx of     Clotting Disorder No family hx of     Gout No family hx of     Heart Disease No family hx of     Anesthesia Reaction No family hx of        Social History   Unknown. Adopted.  Biological mother has COPD. Daughter- precocious puberty 3-4 years old, ?PCOS.   Socioeconomic History    Marital status: Single   Tobacco Use    Smoking status: Never    Smokeless tobacco: Never   Vaping Use    Vaping Use: Never used   Substance and Sexual Activity    Alcohol use: Yes     Comment: Socially    Drug use: No     Social Determinants of Health     Financial Resource  Strain: Low Risk  (11/30/2023)    Financial Resource Strain     Within the past 12 months, have you or your family members you live with been unable to get utilities (heat, electricity) when it was really needed?: No   Food Insecurity: Low Risk  (11/30/2023)    Food Insecurity     Within the past 12 months, did you worry that your food would run out before you got money to buy more?: No     Within the past 12 months, did the food you bought just not last and you didn t have money to get more?: No   Transportation Needs: Low Risk  (11/30/2023)    Transportation Needs     Within the past 12 months, has lack of transportation kept you from medical appointments, getting your medicines, non-medical meetings or appointments, work, or from getting things that you need?: No   Interpersonal Safety: Low Risk  (11/9/2023)    Interpersonal Safety     Do you feel physically and emotionally safe where you currently live?: Yes     Within the past 12 months, have you been hit, slapped, kicked or otherwise physically hurt by someone?: No     Within the past 12 months, have you been humiliated or emotionally abused in other ways by your partner or ex-partner?: No   Housing Stability: Low Risk  (11/30/2023)    Housing Stability     Do you have housing? : Yes     Are you worried about losing your housing?: No       Current Outpatient Medications   Medication Sig Dispense Refill    acetaminophen (TYLENOL) 500 MG tablet Take 500-1,000 mg by mouth every 4 hours as needed       BD PEN NEEDLE RAMON 2ND GEN 32G X 4 MM miscellaneous USE 4 PEN NEEDLES DAILY OR AS DIRECTED. 100 each 3    CEQUR SIMPLICITY 2U JAUN APPLY 1 PATCH EVERY 3 DAYS AS DIRECTED 10 each 11    Continuous Blood Gluc Sensor (FREESTYLE CINDY 3 SENSOR) MISC 1 Application every 14 days 2 each 11    dexAMETHasone (DECADRON) 4 MG/ML injection To be used topically by therapist during PT sessions. 30 mL 0    gabapentin (NEURONTIN) 300 MG capsule Take 2 capsules (600 mg) by mouth 3  times daily 540 capsule 3    galcanezumab-gnlm (EMGALITY) 120 MG/ML injection Inject 1 mL (120 mg) Subcutaneous every 28 days 1 mL 4    galcanezumab-gnlm (EMGALITY) 120 MG/ML injection Inject 2 mLs (240 mg) Subcutaneous every 28 days Loading dose. 2 mL 0    ibuprofen (ADVIL/MOTRIN) 600 MG tablet every 4 hours as needed       insulin aspart (NOVOLOG VIAL) 100 UNITS/ML vial 0 units before breakfast, 0-20 units before lunch, 14-20 units before dinner.  Minimum 100 units every day.  For CeQur Patch 30 mL 4    insulin degludec (TRESIBA FLEXTOUCH) 200 UNIT/ML pen Use as directed up to 28 units daily. 30 mL 3    metFORMIN (GLUCOPHAGE XR) 500 MG 24 hr tablet TAKE 1 TABLET (500 MG) BY MOUTH TWICE DAILY WITH MEALS. FOLLOW UP VISIT NEEDED. CALL  180 tablet 0    multivitamin (ONE-DAILY) tablet Take 1 tablet by mouth daily 100 tablet 3    NOVOLOG FLEXPEN 100 UNIT/ML soln Take 5- 10 units before each meal. 15 mL 3    ondansetron (ZOFRAN) 4 MG tablet TAKE 1 TABLET BY MOUTH EVERY 6 HOURS AS NEEDED FOR NAUSEA 60 tablet 1    phentermine 15 MG capsule Take 1 capsule (15 mg) by mouth every morning 30 capsule 2    promethazine (PHENERGAN) 25 MG tablet Take 25 mg by mouth every 6 hours as needed      rizatriptan (MAXALT) 10 MG tablet Take 1 tablet (10 mg) by mouth at onset of headache for migraine May repeat in 2 hours. 18 tablet 4    rosuvastatin (CRESTOR) 20 MG tablet Take 1 tablet (20 mg) by mouth daily 90 tablet 1    topiramate (TOPAMAX) 100 MG tablet Take 1 tablet (100 mg) by mouth 2 times daily 180 tablet 1    verapamil ER (VERELAN) 120 MG 24 hr capsule Take 1 capsule (120 mg) by mouth at bedtime 90 capsule 1     Current Facility-Administered Medications   Medication Dose Route Frequency Provider Last Rate Last Admin    0.5 mL bupivacaine (MARCAINE) 0.5% injection (50 mL vial)  0.5 mL   Lorenzo Carpenter DPM   0.5 mL at 01/04/24 1114    0.5 mL bupivacaine (MARCAINE) 0.5% injection (50 mL vial)  0.5 mL   Pauline  Lorenzo, DPM   0.5 mL at 01/04/24 1114    0.5 mL bupivacaine (MARCAINE) 0.5% injection (50 mL vial)  0.5 mL   Fleischmann, Lorenzo, DPM   0.5 mL at 10/23/23 0915    0.5 mL bupivacaine (MARCAINE) 0.5% injection (50 mL vial)  0.5 mL   Fleischmann, Lorenzo, DPM   0.5 mL at 10/23/23 0915    triamcinolone (KENALOG-40) injection 40 mg  40 mg   Fleischmann, Lorenzo, DPM   40 mg at 01/04/24 1114    triamcinolone (KENALOG-40) injection 40 mg  40 mg   Fleischmann, Lorenzo, DPM   40 mg at 01/04/24 1114    triamcinolone (KENALOG-40) injection 40 mg  40 mg   Fleischmann, Lorenzo, DPM   40 mg at 10/23/23 0915    triamcinolone (KENALOG-40) injection 40 mg  40 mg   Fleischmann, Lorenzo, DPM   40 mg at 10/23/23 0915          Allergies   Allergen Reactions    Hydrocodone Other (See Comments)     Headache per pt and hallucinations  Headache per pt and hallucinations      Reglan [Metoclopramide]      Anxiety    Vicodin [Hydrocodone-Acetaminophen] Other (See Comments)     Hallucinations    Silver Nitrate Itching and Rash     Only issues if in for a long time  Only issues if in for a long time  Only issues if in for a long time      Tegaderm Transparent Dressing (Informational Only) Rash       Physical Exam  /75   Pulse 79   Wt 130.6 kg (288 lb)   LMP  (LMP Unknown)   SpO2 96%   BMI 46.48 kg/m    GENERAL:  Alert and oriented X3, NAD, well dressed, answering questions appropriately, appears stated age.  HEENT: OP clear, no lymphadenopathy, no thyromegaly, non-tender, no exophthalmus, no proptosis, EOMI, no lid lag, no retraction  EXTREMITIES: no edema, +pulses, no rashes, no lesions.  Missing toenail.     RESULTS  Lab Results   Component Value Date    A1C 7.1 (H) 10/21/2024    A1C 8.8 (H) 03/11/2024    A1C 10.2 (H) 11/09/2023    A1C 9.5 (H) 08/11/2023    A1C 8.3 (H) 05/11/2023    A1C 7.2 (H) 02/28/2022    A1C 8.5 (H) 05/05/2021    A1C 6.8 (H) 09/28/2016    A1C 6.1 (H) 08/14/2013    A1C 5.9 (H) 06/07/2011    HEMOGLOBINA1 7.9 10/28/2021        TSH   Date Value Ref Range Status   01/22/2024 1.84 0.30 - 4.20 uIU/mL Final   02/28/2022 1.71 0.40 - 4.00 mU/L Final   04/21/2020 0.87 0.30 - 5.00 uIU/mL Final       ALT   Date Value Ref Range Status   01/22/2024 15 0 - 50 U/L Final     Comment:     Reference intervals for this test were updated on 6/12/2023 to more accurately reflect our healthy population. There may be differences in the flagging of prior results with similar values performed with this method. Interpretation of those prior results can be made in the context of the updated reference intervals.     08/11/2023 13 0 - 50 U/L Final     Comment:     Reference intervals for this test were updated on 6/12/2023 to more accurately reflect our healthy population. There may be differences in the flagging of prior results with similar values performed with this method. Interpretation of those prior results can be made in the context of the updated reference intervals.     06/07/2011 17.0 0.0 - 50.0 u/l Final   ]    Recent Labs   Lab Test 01/22/24  1044 11/09/23  1351   CHOL 286* 259*   HDL 62 58   * 183*   TRIG 93 88       Lab Results   Component Value Date     01/22/2024    .2 09/28/2016      Lab Results   Component Value Date    POTASSIUM 3.8 01/22/2024    POTASSIUM 3.9 02/28/2022    POTASSIUM 3.4 09/28/2016     Lab Results   Component Value Date    CHLORIDE 104 01/22/2024    CHLORIDE 106 02/28/2022    CHLORIDE 99.9 09/28/2016     Lab Results   Component Value Date    RADHA 9.1 01/22/2024    RADHA 8.9 09/28/2016     Lab Results   Component Value Date    CO2 22 01/22/2024    CO2 26 02/28/2022    CO2 26.9 09/28/2016     Lab Results   Component Value Date    BUN 8.6 01/22/2024    BUN 9 02/28/2022    BUN 8.7 09/28/2016     Lab Results   Component Value Date    CR 0.41 01/22/2024    CR 0.5 09/28/2016       GFR Estimate   Date Value Ref Range Status   01/22/2024 >90 >60 mL/min/1.73m2 Final   11/09/2023 >90 >60 mL/min/1.73m2 Final   08/11/2023  ">90 >60 mL/min/1.73m2 Final   05/05/2021 >60 >60 mL/min/1.73m2 Final   02/17/2021 >60 >60 mL/min/1.73m2 Final   11/04/2020 >60 >60 mL/min/1.73m2 Final   09/28/2016 154.0 mL/min/1.7 m2 Final   08/14/2013 > 60 >60 ml/min/1.73m2 Final     GFR Estimate If Black   Date Value Ref Range Status   05/05/2021 >60 >60 mL/min/1.73m2 Final   02/17/2021 >60 >60 mL/min/1.73m2 Final   11/04/2020 >60 >60 mL/min/1.73m2 Final   09/28/2016 186.3 mL/min/1.7 m2 Final   08/14/2013 > 60 >60 ml/min/1.73m2 Final       Lab Results   Component Value Date    MICROL <12.0 05/11/2023    MICROL 15 02/28/2022     No results found for: \"MICROALBUMIN\"  Lab Results   Component Value Date    CPEPT 2.3 01/22/2024    GADAB <5.0 02/28/2022       Vitamin B12   Date Value Ref Range Status   01/22/2024 399 232 - 1,245 pg/mL Final   02/28/2022 437 193 - 986 pg/mL Final   06/07/2011 443 223 - 1132 pg/ml Final   ]    Most recent eye exam date: : Not Found     FIB-4 Calculation: 0.36 at 1/22/2024 10:44 AM  Calculated from:  SGOT/AST: 10 U/L at 1/22/2024 10:44 AM  SGPT/ALT: 15 U/L at 1/22/2024 10:44 AM  Platelets: 272 10e3/uL at 1/22/2024 10:44 AM  Age: 38 years  A value of FIB-4 below 1.30 is considered as low risk for advanced fibrosis; a value of FIB-4 over 2.67 is considered as high risk for advanced fibrosis; and FIB-4 values between 1.30 and 2.67 are considered as intermediate risk of advanced fibrosis.    Assessment/Plan:     1.  Type 2 diabetes- Ruth's glucose control has improved.  A1c down to 7.1%.  Having too much hypoglycemia.  Discussed a re-trial on jardiance, as her glucose is under much better control now, and her risk of yeast infection is lower.  She agreed.  We made the following plan today (instructions given to patient):      Start jardiance 10 mg daily.  Stop this medication during times of illness or dehydration.  Drink plenty of water.  If you are tolerating it well, we will plan to increase the dose in 1 month.     Lower Tresiba to 10 " units daily when you start jardiance.     STOP tresiba if you have glucose readings less than 70.     Goal is to eventually stop insulin.    Check in with me next week.     Schedule with Alyson Morris in about 3-4 weeks to review diet and adjustment in jardiance.     Please let me know if you are having low blood sugars less than 70 or over 250 mg/dL.      If you have concerns, please send me a [facility] message M-Th, call the clinic at [phone], or call [phone] after hours/weekends and ask to speak with the endocrinologist on call.      2.  Risk factors-     Retinopathy:  No.  Had eye exam 4/2024.    Nephropathy:  BP well controlled. No microalbuminuria.  Creatinine stable.   Neuropathy: Yes - on gabapentin.  Normal b12.   Lipids:  LDL at target.  Patient taking rosuvastatin 20 mg daily.  Will recheck today. May need to increase dose if LDL >70. Will schedule with Alyson Morris to review diet it DM and dyslipidemia.   Thyroid screening: Normal TSH 2024  Celiac screening: negative antibodies 2024  Smoking: marijuana- encouraged her to cut back.     3.  F/U in 4 mos with me, in 1 month with DM education, sooner with concerns.     I spent 42  minutes with this patient face to face and explained the conditions and plans (more than 50% of time was counseling/coordination of care, diabetes management, follow up plan for worsening hyper and hypoglycemia) . The patient understood and is satisfied with today's visit.      Yanet Christie PA-C, MPAS   [facility]  Department of Medicine  Division of Endocrinology and Diabetes

## 2024-10-21 ENCOUNTER — OFFICE VISIT (OUTPATIENT)
Dept: ENDOCRINOLOGY | Facility: CLINIC | Age: 39
End: 2024-10-21
Payer: COMMERCIAL

## 2024-10-21 VITALS
WEIGHT: 288 LBS | HEART RATE: 79 BPM | DIASTOLIC BLOOD PRESSURE: 75 MMHG | SYSTOLIC BLOOD PRESSURE: 108 MMHG | OXYGEN SATURATION: 96 % | BODY MASS INDEX: 46.48 KG/M2

## 2024-10-21 DIAGNOSIS — E11.9 TYPE 2 DIABETES MELLITUS WITHOUT COMPLICATION, UNSPECIFIED WHETHER LONG TERM INSULIN USE (H): ICD-10-CM

## 2024-10-21 LAB
EST. AVERAGE GLUCOSE BLD GHB EST-MCNC: 157 MG/DL
HBA1C MFR BLD: 7.1 %

## 2024-10-21 PROCEDURE — 99215 OFFICE O/P EST HI 40 MIN: CPT | Performed by: PHYSICIAN ASSISTANT

## 2024-10-21 PROCEDURE — 83036 HEMOGLOBIN GLYCOSYLATED A1C: CPT | Performed by: PATHOLOGY

## 2024-10-21 RX ORDER — INSULIN DEGLUDEC 200 U/ML
INJECTION, SOLUTION SUBCUTANEOUS
Qty: 30 ML | Refills: 3 | Status: SHIPPED | OUTPATIENT
Start: 2024-10-21

## 2024-10-21 RX ORDER — METFORMIN HYDROCHLORIDE 500 MG/1
TABLET, EXTENDED RELEASE ORAL
Qty: 180 TABLET | Refills: 3 | Status: SHIPPED | OUTPATIENT
Start: 2024-10-21

## 2024-10-21 NOTE — LETTER
10/21/2024       RE: Ruth Vanegas  [address]  [place] [address]     Dear Colleague,    Thank you for referring your patient, Ruth Vanegas, to the [facility] [place] [facility]. Please see a copy of my visit note below.      Patient is showing 3/5 MNCM met. A1c not in range and Current tobacco use   10/18/24 10:38 AM : Appointment reminder phone call made to patient.Pt verbalized understanding    HPI:   Ruth is a pleasant 39 yo woman here for follow up of type 2 diabetes (PREETHI negative, C-peptide positive) since 2016.  She also has a history of Anemia, Depression, Depressive disorder, Dysthymic disorder, Endometriosis, Herpes genitalia, Hyperlipidemia, Kidney stone, Menorrhagia, Migraines, Neuropathy, PCOS (polycystic ovarian syndrome), and Post traumatic stress disorder (PTSD).  Since her last visit, her glucose has been going really low.  She cut back her insulin, but she is still dropping.     shweta reports she was first diagnosed with diabetes in 2016 on routine blood work prior to a procedure.  She initially was started on metformin, then insulin was added years later.     Current treatment:   Metformin 1000 mg bid.  Lantus- 20 units daily- now at 10pm.   Novolog- 10 units once daily with lunch (biggest meal).     Previous treatment:   Victoza- stopped it in 2020 when she started insulin.   Ozempic- violently ill.   Invokana 300 mg daily- recurrent yeast infections- stopped 2023. Glucose was really high when she started it.     Typical day:   Not hungry in the morning.  Wakes up   Daughter wakes her up before she leaves for school.  Drinks water.    Nauseated if she tries to eat when she wakes up.  Unsure why. Seeing GI.   Does not eat very much.   Weight fluctuates.Recent loss.   Works as a  overnights.   2020- got really sick.  H. Pylori. Lots of GI issues since then.   Sensitive  "stomach.  Eats small portions.  Started working out.   Work schedule changed [place]/Mon/Tues and every other Sat. 6:30am-6:30pm. Switched to days.   No breakfast   Coffee in AM  Tries to meal prep- snack boxes- cheese, meat (summer sausage), fruit, tuna or cookies.   Leftovers for lunch.   Dinner-   Days off- \"starving\"     Works out Wed/Thurs/Fri  In the AM- really nauseated.      Wants her to have an upper GI.     Recent glucose:                   Diabetes Control:   Lab Results   Component Value Date    A1C 10.2 2023    A1C 9.5 2023    A1C 8.3 2023    A1C 7.2 2022    A1C 8.5 2021    A1C 6.8 2016    A1C 6.1 2013    A1C 5.9 2011       Past Medical History:   Diagnosis Date     Anemia     told to take iron pills when pregnant     Benign paroxysmal positional vertigo, unspecified laterality 2021     Depression      Depressive disorder      Diabetes mellitus (H)      Diabetes mellitus, type 2 (H) 07/15/2021     Dysthymic disorder      Endometriosis      Herpes genitalia      Hyperlipidemia      Kidney stone      Menorrhagia      Migraines      Neuropathy      Other abnormal Papanicolaou smear of cervix and cervical HPV(795.09) 2013     PCOS (polycystic ovarian syndrome)      Post traumatic stress disorder (PTSD)        Past Surgical History:   Procedure Laterality Date     BLADDER REPAIR W/  SECTION      X2     C/SECTION, CLASSICAL      x2     COLONOSCOPY N/A 2021    Procedure: COLONOSCOPY;  Surgeon: Rene Lehman DO;  Location: [facility] HYSTERECTOMY TOTAL, SALPINGECTOMY BILATERAL      Dr. Farmer     DILATION AND CURETTAGE  2015     DILATION AND CURETTAGE  2015     DILATION AND CURETTAGE, OPERATIVE HYSTEROSCOPY, COMBINED N/A 2015    Procedure: Dilation and Curettage with Hysteroscopy;  Surgeon: Zia Farmer MD;  [place] [facility] [place]  Service:      DILATION AND CURETTAGE, OPERATIVE HYSTEROSCOPY, COMBINED N/A " 09/29/2016    Procedure: DILATION AND CURETTAGE WITH HYSTEROSCOPY INSERT MIRENA;  Surgeon: Suzanne Major MD;  Location: [facility] OR;  Service:      ENT SURGERY       ESOPHAGOSCOPY, GASTROSCOPY, DUODENOSCOPY (EGD), COMBINED N/A 12/14/2021    Procedure: ESOPHAGOGASTRODUODENOSCOPY, WITH BIOPSY;  Surgeon: Rene Lehman DO;  Location:  GI     GYN SURGERY  10/19/2015    laproscopic lysis of adhesions     HYSTERECTOMY, PAP NO LONGER INDICATED       HYSTEROSCOPY, ABLATE ENDOMETRIUM HYDROTHERMAL, COMBINED N/A 03/02/2018    Procedure: HYSTEROSCOPY, DILATION AND CURETTAGE, ENDOMETRIAL ABLATION;  Surgeon: Kristy Israel DO;  Location: [facility] OR;  Service: Gynecology     LAPAROSCOPIC HYSTERECTOMY TOTAL N/A 03/04/2019    Procedure: ROBOTIC TOTAL LAPAROSCOPIC HYSTERECTOMY, BILATERAL SALPINGECTOMY, LEFT OVARIAN CYSTOTOMY. CYSTOSCOPY;  Surgeon: Zia Farmer MD;  Location: [facility] OR;  Service: Gynecology     LAPAROSCOPY DIAGNOSTIC (GENERAL) N/A 10/19/2015    Procedure:  LAPAROSCOPY,LYSIS OF ADHESIONS;  Surgeon: Zia Farmer MD;  Location: [facility] OR;  Service:      MI LAP,TUBAL CAUTERY Bilateral 03/02/2018    Procedure: LAPAROSCOPIC BILATERAL TUBAL LIGATION;  Surgeon: Kristy Israel DO;  [place] [facility] [place]     TONSILLECTOMY         Family History   Adopted: Yes   Problem Relation Age of Onset     Chronic Obstructive Pulmonary Disease Mother      Prostate Cancer Father      Cancer Father         prostate     Alcoholism Sister      Alcoholism Sister      Alcoholism Brother      Alcoholism Brother      Diabetes Paternal Grandmother      Other Cancer Paternal Grandmother      Alcoholism Daughter      Coronary Artery Disease No family hx of      Urolithiasis No family hx of      Clotting Disorder No family hx of      Gout No family hx of      Heart Disease No family hx of      Anesthesia Reaction No family hx of        Social History   Unknown. Adopted.   Biological mother has COPD. Daughter- precocious puberty 3-4 years old, ?PCOS.   Socioeconomic History     Marital status: Single   Tobacco Use     Smoking status: Never     Smokeless tobacco: Never   Vaping Use     Vaping Use: Never used   Substance and Sexual Activity     Alcohol use: Yes     Comment: Socially     Drug use: No     Social Determinants of Health     Financial Resource Strain: Low Risk  (11/30/2023)    Financial Resource Strain      Within the past 12 months, have you or your family members you live with been unable to get utilities (heat, electricity) when it was really needed?: No   Food Insecurity: Low Risk  (11/30/2023)    Food Insecurity      Within the past 12 months, did you worry that your food would run out before you got money to buy more?: No      Within the past 12 months, did the food you bought just not last and you didn t have money to get more?: No   Transportation Needs: Low Risk  (11/30/2023)    Transportation Needs      Within the past 12 months, has lack of transportation kept you from medical appointments, getting your medicines, non-medical meetings or appointments, work, or from getting things that you need?: No   Interpersonal Safety: Low Risk  (11/9/2023)    Interpersonal Safety      Do you feel physically and emotionally safe where you currently live?: Yes      Within the past 12 months, have you been hit, slapped, kicked or otherwise physically hurt by someone?: No      Within the past 12 months, have you been humiliated or emotionally abused in other ways by your partner or ex-partner?: No   Housing Stability: Low Risk  (11/30/2023)    Housing Stability      Do you have housing? : Yes      Are you worried about losing your housing?: No       Current Outpatient Medications   Medication Sig Dispense Refill     acetaminophen (TYLENOL) 500 MG tablet Take 500-1,000 mg by mouth every 4 hours as needed        BD PEN NEEDLE RAMON 2ND GEN 32G X 4 MM miscellaneous USE 4 PEN NEEDLES  DAILY OR AS DIRECTED. 100 each 3     CEQUR SIMPLICITY 2U JAUN APPLY 1 PATCH EVERY 3 DAYS AS DIRECTED 10 each 11     Continuous Blood Gluc Sensor (FREESTYLE CNIDY 3 SENSOR) MISC 1 Application every 14 days 2 each 11     dexAMETHasone (DECADRON) 4 MG/ML injection To be used topically by therapist during PT sessions. 30 mL 0     gabapentin (NEURONTIN) 300 MG capsule Take 2 capsules (600 mg) by mouth 3 times daily 540 capsule 3     galcanezumab-gnlm (EMGALITY) 120 MG/ML injection Inject 1 mL (120 mg) Subcutaneous every 28 days 1 mL 4     galcanezumab-gnlm (EMGALITY) 120 MG/ML injection Inject 2 mLs (240 mg) Subcutaneous every 28 days Loading dose. 2 mL 0     ibuprofen (ADVIL/MOTRIN) 600 MG tablet every 4 hours as needed        insulin aspart (NOVOLOG VIAL) 100 UNITS/ML vial 0 units before breakfast, 0-20 units before lunch, 14-20 units before dinner.  Minimum 100 units every day.  For CeQur Patch 30 mL 4     insulin degludec (TRESIBA FLEXTOUCH) 200 UNIT/ML pen Use as directed up to 28 units daily. 30 mL 3     metFORMIN (GLUCOPHAGE XR) 500 MG 24 hr tablet TAKE 1 TABLET (500 MG) BY MOUTH TWICE DAILY WITH MEALS. FOLLOW UP VISIT NEEDED. CALL  180 tablet 0     multivitamin (ONE-DAILY) tablet Take 1 tablet by mouth daily 100 tablet 3     NOVOLOG FLEXPEN 100 UNIT/ML soln Take 5- 10 units before each meal. 15 mL 3     ondansetron (ZOFRAN) 4 MG tablet TAKE 1 TABLET BY MOUTH EVERY 6 HOURS AS NEEDED FOR NAUSEA 60 tablet 1     phentermine 15 MG capsule Take 1 capsule (15 mg) by mouth every morning 30 capsule 2     promethazine (PHENERGAN) 25 MG tablet Take 25 mg by mouth every 6 hours as needed       rizatriptan (MAXALT) 10 MG tablet Take 1 tablet (10 mg) by mouth at onset of headache for migraine May repeat in 2 hours. 18 tablet 4     rosuvastatin (CRESTOR) 20 MG tablet Take 1 tablet (20 mg) by mouth daily 90 tablet 1     topiramate (TOPAMAX) 100 MG tablet Take 1 tablet (100 mg) by mouth 2 times daily 180 tablet 1      verapamil ER (VERELAN) 120 MG 24 hr capsule Take 1 capsule (120 mg) by mouth at bedtime 90 capsule 1     Current Facility-Administered Medications   Medication Dose Route Frequency Provider Last Rate Last Admin     0.5 mL bupivacaine (MARCAINE) 0.5% injection (50 mL vial)  0.5 mL   Fleischmann, Lorenzo, DPM   0.5 mL at 01/04/24 1114     0.5 mL bupivacaine (MARCAINE) 0.5% injection (50 mL vial)  0.5 mL   Fleischmann, Lorenzo, DPM   0.5 mL at 01/04/24 1114     0.5 mL bupivacaine (MARCAINE) 0.5% injection (50 mL vial)  0.5 mL   Fleischmann, Lorenzo, DPM   0.5 mL at 10/23/23 0915     0.5 mL bupivacaine (MARCAINE) 0.5% injection (50 mL vial)  0.5 mL   Fleischmann, Lorenzo, DPM   0.5 mL at 10/23/23 0915     triamcinolone (KENALOG-40) injection 40 mg  40 mg   Fleischmann, Lorenzo, DPM   40 mg at 01/04/24 1114     triamcinolone (KENALOG-40) injection 40 mg  40 mg   Fleischmann, Lorenzo, DPM   40 mg at 01/04/24 1114     triamcinolone (KENALOG-40) injection 40 mg  40 mg   Fleischmann, Lorenzo, DPM   40 mg at 10/23/23 0915     triamcinolone (KENALOG-40) injection 40 mg  40 mg   Fleischmann, Lorenzo, DPM   40 mg at 10/23/23 0915          Allergies   Allergen Reactions     Hydrocodone Other (See Comments)     Headache per pt and hallucinations  Headache per pt and hallucinations       Reglan [Metoclopramide]      Anxiety     Vicodin [Hydrocodone-Acetaminophen] Other (See Comments)     Hallucinations     Silver Nitrate Itching and Rash     Only issues if in for a long time  Only issues if in for a long time  Only issues if in for a long time       Tegaderm Transparent Dressing (Informational Only) Rash       Physical Exam  /75   Pulse 79   Wt 130.6 kg (288 lb)   LMP  (LMP Unknown)   SpO2 96%   BMI 46.48 kg/m    GENERAL:  Alert and oriented X3, NAD, well dressed, answering questions appropriately, appears stated age.  HEENT: OP clear, no lymphadenopathy, no thyromegaly, non-tender, no exophthalmus, no proptosis, EOMI, no lid  lag, no retraction  EXTREMITIES: no edema, +pulses, no rashes, no lesions.  Missing toenail.     RESULTS  Lab Results   Component Value Date    A1C 7.1 (H) 10/21/2024    A1C 8.8 (H) 03/11/2024    A1C 10.2 (H) 11/09/2023    A1C 9.5 (H) 08/11/2023    A1C 8.3 (H) 05/11/2023    A1C 7.2 (H) 02/28/2022    A1C 8.5 (H) 05/05/2021    A1C 6.8 (H) 09/28/2016    A1C 6.1 (H) 08/14/2013    A1C 5.9 (H) 06/07/2011    HEMOGLOBINA1 7.9 10/28/2021       TSH   Date Value Ref Range Status   01/22/2024 1.84 0.30 - 4.20 uIU/mL Final   02/28/2022 1.71 0.40 - 4.00 mU/L Final   04/21/2020 0.87 0.30 - 5.00 uIU/mL Final       ALT   Date Value Ref Range Status   01/22/2024 15 0 - 50 U/L Final     Comment:     Reference intervals for this test were updated on 6/12/2023 to more accurately reflect our healthy population. There may be differences in the flagging of prior results with similar values performed with this method. Interpretation of those prior results can be made in the context of the updated reference intervals.     08/11/2023 13 0 - 50 U/L Final     Comment:     Reference intervals for this test were updated on 6/12/2023 to more accurately reflect our healthy population. There may be differences in the flagging of prior results with similar values performed with this method. Interpretation of those prior results can be made in the context of the updated reference intervals.     06/07/2011 17.0 0.0 - 50.0 u/l Final   ]    Recent Labs   Lab Test 01/22/24  1044 11/09/23  1351   CHOL 286* 259*   HDL 62 58   * 183*   TRIG 93 88       Lab Results   Component Value Date     01/22/2024    .2 09/28/2016      Lab Results   Component Value Date    POTASSIUM 3.8 01/22/2024    POTASSIUM 3.9 02/28/2022    POTASSIUM 3.4 09/28/2016     Lab Results   Component Value Date    CHLORIDE 104 01/22/2024    CHLORIDE 106 02/28/2022    CHLORIDE 99.9 09/28/2016     Lab Results   Component Value Date    RADHA 9.1 01/22/2024    RADHA 8.9 09/28/2016  "    Lab Results   Component Value Date    CO2 22 01/22/2024    CO2 26 02/28/2022    CO2 26.9 09/28/2016     Lab Results   Component Value Date    BUN 8.6 01/22/2024    BUN 9 02/28/2022    BUN 8.7 09/28/2016     Lab Results   Component Value Date    CR 0.41 01/22/2024    CR 0.5 09/28/2016       GFR Estimate   Date Value Ref Range Status   01/22/2024 >90 >60 mL/min/1.73m2 Final   11/09/2023 >90 >60 mL/min/1.73m2 Final   08/11/2023 >90 >60 mL/min/1.73m2 Final   05/05/2021 >60 >60 mL/min/1.73m2 Final   02/17/2021 >60 >60 mL/min/1.73m2 Final   11/04/2020 >60 >60 mL/min/1.73m2 Final   09/28/2016 154.0 mL/min/1.7 m2 Final   08/14/2013 > 60 >60 ml/min/1.73m2 Final     GFR Estimate If Black   Date Value Ref Range Status   05/05/2021 >60 >60 mL/min/1.73m2 Final   02/17/2021 >60 >60 mL/min/1.73m2 Final   11/04/2020 >60 >60 mL/min/1.73m2 Final   09/28/2016 186.3 mL/min/1.7 m2 Final   08/14/2013 > 60 >60 ml/min/1.73m2 Final       Lab Results   Component Value Date    MICROL <12.0 05/11/2023    MICROL 15 02/28/2022     No results found for: \"MICROALBUMIN\"  Lab Results   Component Value Date    CPEPT 2.3 01/22/2024    GADAB <5.0 02/28/2022       Vitamin B12   Date Value Ref Range Status   01/22/2024 399 232 - 1,245 pg/mL Final   02/28/2022 437 193 - 986 pg/mL Final   06/07/2011 443 223 - 1132 pg/ml Final   ]    Most recent eye exam date: : Not Found     FIB-4 Calculation: 0.36 at 1/22/2024 10:44 AM  Calculated from:  SGOT/AST: 10 U/L at 1/22/2024 10:44 AM  SGPT/ALT: 15 U/L at 1/22/2024 10:44 AM  Platelets: 272 10e3/uL at 1/22/2024 10:44 AM  Age: 38 years  A value of FIB-4 below 1.30 is considered as low risk for advanced fibrosis; a value of FIB-4 over 2.67 is considered as high risk for advanced fibrosis; and FIB-4 values between 1.30 and 2.67 are considered as intermediate risk of advanced fibrosis.    Assessment/Plan:     1.  Type 2 diabetes- Ruth's glucose control has improved.  A1c down to 7.1%.  Having too much " hypoglycemia.  Discussed a re-trial on jardiance, as her glucose is under much better control now, and her risk of yeast infection is lower.  She agreed.  We made the following plan today (instructions given to patient):      Start jardiance 10 mg daily.  Stop this medication during times of illness or dehydration.  Drink plenty of water.  If you are tolerating it well, we will plan to increase the dose in 1 month.     Lower Tresiba to 10 units daily when you start jardiance.     STOP tresiba if you have glucose readings less than 70.     Goal is to eventually stop insulin.    Check in with me next week.     Schedule with Alyson Morris in about 3-4 weeks to review diet and adjustment in jardiance.     Please let me know if you are having low blood sugars less than 70 or over 250 mg/dL.      If you have concerns, please send me a [employer] message M-Th, call the clinic at [phone], or call [phone] after hours/weekends and ask to speak with the endocrinologist on call.      2.  Risk factors-     Retinopathy:  No.  Had eye exam 4/2024.    Nephropathy:  BP well controlled. No microalbuminuria.  Creatinine stable.   Neuropathy: Yes - on gabapentin.  Normal b12.   Lipids:  LDL at target.  Patient taking rosuvastatin 20 mg daily.  Will recheck today. May need to increase dose if LDL >70. Will schedule with Alyson Morris to review diet it DM and dyslipidemia.   Thyroid screening: Normal TSH 2024  Celiac screening: negative antibodies 2024  Smoking: marijuana- encouraged her to cut back.     3.  F/U in 4 mos with me, in 1 month with DM education, sooner with concerns.     I spent 42  minutes with this patient face to face and explained the conditions and plans (more than 50% of time was counseling/coordination of care, diabetes management, follow up plan for worsening hyper and hypoglycemia) . The patient understood and is satisfied with today's visit.      Yanet Christie PA-C, MPAS   [facility]  Department  of Medicine  Division of Endocrinology and Diabetes                    Again, thank you for allowing me to participate in the care of your patient.      Sincerely,    Yanet Christie PA-C

## 2024-10-21 NOTE — PATIENT INSTRUCTIONS
Start jardiance 10 mg daily.  Stop this medication during times of illness or dehydration.  Drink plenty of water.  If you are tolerating it well, we will plan to increase the dose in 1 month.     Lower Tresiba to 10 units daily when you start jardiance.     STOP tresiba if you have glucose readings less than 70.     Goal is to eventually stop insulin.    Check in with me next week.     Schedule with Alyson Morris in about 3-4 weeks to review diet and adjustment in jardiance.       Please let me know if you are having low blood sugars less than 70 or over 250 mg/dL.      If you have concerns, please send me a [employer] message M-Th, call the clinic at [phone], or call [phone] after hours/weekends and ask to speak with the endocrinologist on call.                                            Heart Healthy Diet and Lifestyle    - Eat your veggies and fruits -- and switch to whole grains. An abundance and variety of plant foods should make up the majority of your meals. Strive for seven to 10 servings a day of veggies and fruits. Add in beans and lentils.  Switch to whole-grain bread and cereal, and begin to eat more whole-grain rice and pasta products.    - Go nuts. Keep almonds, cashews, pistachios and walnuts on hand for a quick snack. Choose natural peanut butter or almond butter, rather than the kind with hydrogenated fat added. Try hummus as a dip or spread for bread.    - Pass on the butter. Try olive or canola oil as a healthy replacement for butter or margarine. Use it in cooking. Dip bread in flavored olive oil or lightly spread it on whole-grain bread for a tasty alternative to butter.     - Spice it up. Herbs and spices make food tasty and are also rich in health-promoting substances. Season your meals with herbs and spices rather than salt.    - Go fish. Eat fish twice a week. Fresh or water-packed tuna, salmon, trout, mackerel and herring are healthy choices. Grilled fish tastes good and requires  little cleanup. Avoid fried fish, unless it's sauteed in a small amount of canola oil.    - Rein in the red meat. Substitute fish and poultry for red meat. When eaten, make sure it's lean and keep portions small (about the size of a deck of cards). Try to limit sausage, camacho and other high-fat or processed meats.    - Avoid being sedentary.  Decrease the amount of time spent in daily sedentary behavior.  Prolonged sitting should be interrupted every 30 min for blood glucose benefits.     - Be active.  30 minutes of moderate-to-vigorous intensity aerobic exercise at least 5 days a week or a total of 150 minutes spread over 3 days per week.  2-3 sessions/week of resistance exercise on nonconsecutive days. Flexibility training and balance training 2-3 times/week for older adults with diabetes.     Make an appointment with a dietitian for a personalized meal plan/nutrition review.

## 2024-10-23 ENCOUNTER — LAB (OUTPATIENT)
Dept: LAB | Facility: CLINIC | Age: 39
End: 2024-10-23
Payer: COMMERCIAL

## 2024-10-23 DIAGNOSIS — E78.5 HYPERLIPIDEMIA, UNSPECIFIED HYPERLIPIDEMIA TYPE: ICD-10-CM

## 2024-10-23 DIAGNOSIS — E11.9 TYPE 2 DIABETES MELLITUS WITHOUT COMPLICATION, UNSPECIFIED WHETHER LONG TERM INSULIN USE (H): ICD-10-CM

## 2024-10-23 DIAGNOSIS — E55.9 HYPOVITAMINOSIS D: ICD-10-CM

## 2024-10-23 DIAGNOSIS — E55.9 VITAMIN D DEFICIENCY: ICD-10-CM

## 2024-10-23 LAB
ALT SERPL W P-5'-P-CCNC: 14 U/L (ref 0–50)
CHOLEST SERPL-MCNC: 173 MG/DL
CREAT SERPL-MCNC: 0.58 MG/DL (ref 0.51–0.95)
CREAT UR-MCNC: 105 MG/DL
EGFRCR SERPLBLD CKD-EPI 2021: >90 ML/MIN/1.73M2
EST. AVERAGE GLUCOSE BLD GHB EST-MCNC: 180 MG/DL
FASTING STATUS PATIENT QL REPORTED: YES
HBA1C MFR BLD: 7.9 %
HDLC SERPL-MCNC: 50 MG/DL
IRON BINDING CAPACITY (ROCHE): 257 UG/DL (ref 240–430)
IRON SATN MFR SERPL: 19 % (ref 15–46)
IRON SERPL-MCNC: 49 UG/DL (ref 37–145)
LDLC SERPL CALC-MCNC: 111 MG/DL
MICROALBUMIN UR-MCNC: <12 MG/L
MICROALBUMIN/CREAT UR: NORMAL MG/G{CREAT}
NONHDLC SERPL-MCNC: 123 MG/DL
TRIGL SERPL-MCNC: 62 MG/DL
VIT D+METAB SERPL-MCNC: 16 NG/ML (ref 20–50)

## 2024-10-23 PROCEDURE — 80061 LIPID PANEL: CPT | Performed by: PATHOLOGY

## 2024-10-23 PROCEDURE — 83550 IRON BINDING TEST: CPT | Performed by: PATHOLOGY

## 2024-10-23 PROCEDURE — 82043 UR ALBUMIN QUANTITATIVE: CPT | Performed by: PHYSICIAN ASSISTANT

## 2024-10-23 PROCEDURE — 82306 VITAMIN D 25 HYDROXY: CPT | Performed by: PHYSICIAN ASSISTANT

## 2024-10-23 PROCEDURE — 99000 SPECIMEN HANDLING OFFICE-LAB: CPT | Performed by: PATHOLOGY

## 2024-10-23 PROCEDURE — 83036 HEMOGLOBIN GLYCOSYLATED A1C: CPT | Performed by: PHYSICIAN ASSISTANT

## 2024-10-23 PROCEDURE — 84460 ALANINE AMINO (ALT) (SGPT): CPT | Performed by: PATHOLOGY

## 2024-10-23 PROCEDURE — 82306 VITAMIN D 25 HYDROXY: CPT

## 2024-10-23 PROCEDURE — 83540 ASSAY OF IRON: CPT | Performed by: PATHOLOGY

## 2024-10-23 PROCEDURE — 36415 COLL VENOUS BLD VENIPUNCTURE: CPT | Performed by: PATHOLOGY

## 2024-10-23 PROCEDURE — 82565 ASSAY OF CREATININE: CPT | Performed by: PATHOLOGY

## 2024-10-26 DIAGNOSIS — G43.719 INTRACTABLE CHRONIC MIGRAINE WITHOUT AURA AND WITHOUT STATUS MIGRAINOSUS: ICD-10-CM

## 2024-10-26 DIAGNOSIS — E78.5 HYPERLIPIDEMIA, UNSPECIFIED HYPERLIPIDEMIA TYPE: ICD-10-CM

## 2024-10-26 LAB
DEPRECATED CALCIDIOL+CALCIFEROL SERPL-MC: 21 UG/L (ref 20–75)
VITAMIN D2 SERPL-MCNC: 10 UG/L
VITAMIN D3 SERPL-MCNC: 11 UG/L

## 2024-10-28 NOTE — TELEPHONE ENCOUNTER
Refill request for: verapamil ER (VERELAN) 120 MG 24 hr capsule    Directions: Take 1 capsule (120 mg) by mouth at bedtime     LOV: 2/5/24  NOV: Not scheduled- due for follow up. [employer] message sent to patient    30 day supply with 0 refills Medication T'd for review and signature  Chelo Sanchez CMA on 10/28/2024 at 4:01 PM  [facility]

## 2024-10-29 RX ORDER — VERAPAMIL HYDROCHLORIDE 120 MG/1
120 CAPSULE, EXTENDED RELEASE ORAL AT BEDTIME
Qty: 30 CAPSULE | Refills: 0 | Status: SHIPPED | OUTPATIENT
Start: 2024-10-29

## 2024-10-29 RX ORDER — ROSUVASTATIN CALCIUM 20 MG/1
20 TABLET, COATED ORAL DAILY
Qty: 90 TABLET | Refills: 1 | Status: SHIPPED | OUTPATIENT
Start: 2024-10-29

## 2024-10-31 ENCOUNTER — MYC MEDICAL ADVICE (OUTPATIENT)
Dept: ENDOCRINOLOGY | Facility: CLINIC | Age: 39
End: 2024-10-31
Payer: COMMERCIAL

## 2024-10-31 DIAGNOSIS — E11.9 TYPE 2 DIABETES MELLITUS WITHOUT COMPLICATION, UNSPECIFIED WHETHER LONG TERM INSULIN USE (H): Primary | ICD-10-CM

## 2024-11-04 ENCOUNTER — MYC MEDICAL ADVICE (OUTPATIENT)
Dept: FAMILY MEDICINE | Facility: CLINIC | Age: 39
End: 2024-11-04
Payer: COMMERCIAL

## 2024-11-04 DIAGNOSIS — G43.719 INTRACTABLE CHRONIC MIGRAINE WITHOUT AURA AND WITHOUT STATUS MIGRAINOSUS: ICD-10-CM

## 2024-11-04 DIAGNOSIS — Z79.4 TYPE 2 DIABETES MELLITUS WITH HYPERGLYCEMIA, WITH LONG-TERM CURRENT USE OF INSULIN (H): ICD-10-CM

## 2024-11-04 DIAGNOSIS — E11.65 TYPE 2 DIABETES MELLITUS WITH HYPERGLYCEMIA, WITH LONG-TERM CURRENT USE OF INSULIN (H): ICD-10-CM

## 2024-11-05 RX ORDER — ONDANSETRON 4 MG/1
4 TABLET, FILM COATED ORAL EVERY 6 HOURS PRN
Qty: 60 TABLET | Refills: 1 | Status: SHIPPED | OUTPATIENT
Start: 2024-11-05

## 2024-11-05 RX ORDER — GABAPENTIN 300 MG/1
600 CAPSULE ORAL 3 TIMES DAILY
Qty: 540 CAPSULE | Refills: 3 | Status: SHIPPED | OUTPATIENT
Start: 2024-11-05

## 2024-11-06 RX ORDER — HYDROCHLOROTHIAZIDE 12.5 MG/1
CAPSULE ORAL
Qty: 6 EACH | Refills: 1 | Status: SHIPPED | OUTPATIENT
Start: 2024-11-06

## 2024-11-07 ENCOUNTER — PATIENT OUTREACH (OUTPATIENT)
Dept: CARE COORDINATION | Facility: CLINIC | Age: 39
End: 2024-11-07
Payer: COMMERCIAL

## 2024-11-13 RX ORDER — URINE ACETONE TEST STRIPS
STRIP MISCELLANEOUS
Qty: 50 STRIP | Refills: 11 | Status: SHIPPED | OUTPATIENT
Start: 2024-11-13

## 2024-11-13 NOTE — TELEPHONE ENCOUNTER
Nauseated, was vomiting yesterday.      Not sure on blood sugars.      Will get sensor today.     Please have your lab tests done.  Please contact us to schedule at any of our [place] lab locations  Call [phone] ([phone]), select option 1 or schedule via [employer].     Emergency issues: Here are some concerns you should contact us about.  -Vomiting: more than twice.  Please check ketones.  If positive, go to ER. Monitor glucose hourly.   -High glucose (over 300 mg/dL twice in a row): Please check ketones.  If ketones are negative, take an insulin correction and recheck glucose in 1 hour.  If glucose is not coming down, please call the clinic. If ketones are moderate or large, drink lots of water, take an insulin correction 1.5 times your usual correction, and recheck ketones in 1 hour.  If ketones are still present (or you are vomiting), go to the ER.  -Hypoglycemia (low glucose):   If glucose is less than 70 mg/dL, treat with 15g carb (4 glucose tablets), recheck glucose in 15 minutes.  If low again, repeat.   If glucose is less than 54 mg/dL, treat with 30g carb, recheck glucose in 15 minutes.  If low again, repeat.  Keep glucagon in your home in case of severe hypoglycemia and train someone how to use this.    Emergency kit (please ensure you always have these with you):   Glucose tablets  Glucagon  Insulin  Syringes/needles   Extra infusion set (if on a pump)  Ketone strips    Contact information:   If you have concerns, please send me a [employer] message or call the clinic at [phone].  For more urgent concerns, please call [phone] after hours/weekends and ask to speak with the endocrinologist on call.      Please let me know if you are having low blood sugars less than 70 or over 350 mg/dL.  Do not wait until your next appointment if this is happening.

## 2024-11-15 ENCOUNTER — VIRTUAL VISIT (OUTPATIENT)
Dept: EDUCATION SERVICES | Facility: CLINIC | Age: 39
End: 2024-11-15
Payer: COMMERCIAL

## 2024-11-15 DIAGNOSIS — Z79.4 TYPE 2 DIABETES MELLITUS WITH DIABETIC POLYNEUROPATHY, WITH LONG-TERM CURRENT USE OF INSULIN (H): Primary | ICD-10-CM

## 2024-11-15 DIAGNOSIS — E11.42 TYPE 2 DIABETES MELLITUS WITH DIABETIC POLYNEUROPATHY, WITH LONG-TERM CURRENT USE OF INSULIN (H): Primary | ICD-10-CM

## 2024-11-15 NOTE — PATIENT INSTRUCTIONS
Dick Miranda,    It was a pleasure meeting with you today!    Here is a summary of our visit:    For nausea:  *Try peppermint oil, peppermint or ginger tea.  *Try saltine crackers, ritz crackers, plain lays potato chips - 5-6 at a time.    *Try eating a few crackers right away when you wake in the morning.  Eat something small, preferable a grain like crackers or brown rice every 3 - 4 hours  *Avoid fatty/greasy foods such as pizza, chik-amador-a  *Choose lean meats - chicken breast, ham, fish - baked or steamed.  *Avoid eating 2 hours before bed    Foods recommended for nausea: Applesauce, Baked chicken, Banana, Clear broths, Crackers, Dry toast, Egg cooked without fat, Juices, Potato, Pretzels, Rice, Yogurt    Foods Not Recommended  When you are nauseous or vomiting, avoid:    High-fat or fried foods  Foods with strong odors  High-fiber foods  Foods that give you gas    For your diabetes:  *Continue Lantus, 20 units.  *For mealtime insulin (Novolog) - take 3 units with a snack (crackers), 5 units with a medium sized meal and 10 units with a regular/larger meal.  *We can re-visit Jardiance and your goal of getting off insulin after we get the nausea under control.      Follow Up Plan:    Virtual Visit in 2 weeks    Thank you,    Alyson Morris RDN, SEUNN, KAITY  Dietitian and Diabetes Education - Endocrinology  [facility]  [address] [place] [address]  Phone: [phone]  Email: [email or web address]    Contact information:   To schedule a diabetes education appointment:[phone].  For questions or concerns, please send a Fanmindert message or call the clinic at [phone].    For more urgent concerns that do not require 911, please call [phone] after hours/weekends and ask to speak with the Endocrinologist on call.       Please let us know if you are having low blood sugars less than 70 or over 300 mg/dL.  Do not wait until your next appointment if this is  happening.

## 2024-11-15 NOTE — Clinical Note
Dick Holt -  I met with Ruth today and encouraged her to stop Jardiance.  We focused on dietary changes and elissa/peppermint to help with her baseline nausea.  Maybe if we can improve her nausea, she can revisit Jardiance, or even better, lose a little bit of weight and go off of insulin.  I'll see her back in 2 weeks.  Alyson

## 2024-11-15 NOTE — NURSING NOTE
Current patient location: [address]  [place] [address]    Is the patient currently in the state of [place]? YES    Visit mode:VIDEO    If the visit is dropped, the patient can be reconnected by:VIDEO VISIT: Text to cell phone:   Telephone Information:   Mobile [phone]       Will anyone else be joining the visit? NO  (If patient encounters technical issues they should call [phone]    Are changes needed to the allergy or medication list? No and Pt stated no med changes    Are refills needed on medications prescribed by this physician? NO    Rooming Documentation:  Not applicable    Reason for visit: Diabetes Education    Kassie DE SOUZA

## 2024-11-15 NOTE — PROGRESS NOTES
Virtual Visit Details    Type of service:  Video Visit     Originating Location (pt. Location): Home    Distant Location (provider location):  Off-site  Platform used for Video Visit: AmWell    12 hr shifts as , 6:30am - 630 pm    DIABETES SELF-MANAGEMENT EDUCATION & SUPPORT    Presents for: Individual review    Referred by: Yanet Christie PA-C      REPORTS:    Resumed Armida 3 plus one day ago - was told Armida 3 was recalled.        Pt verbalized understanding of concepts discussed and recommendations provided today.       Continue education with the following diabetes management concepts: Healthy Eating, Taking Medication, and Problem Solving    ASSESSMENT:    Unable to assess glucoses as Ruth just started her glucose sensor.  She reports her sugars have been doing fine.  She discontinued Jardiance, 10 mg, 2 days ago after 2 weeks of nausea and vomiting.  Ruth reports 'being nauseated is my baseline' but Jardiance seemed to heighten it where she had to leave work.  Will continue current insulin plan.    Ruth has a variable diet because she struggles with nausea.  Today's focus was on managing nausea through improving her food choices (less greasy/fast food), small frequent meals versus eating a large meal once a day and trying peppermint and elissa.  If she can normalize her eating, she may be able lose weight and manage her sugars with less diabetes medication, which is her goal.    PLAN    For nausea:  *Try peppermint oil, peppermint or elissa tea.  *Try saltine crackers, ritz crackers, plain lays potato chips - 5-6 at a time.    *Try eating a few crackers right away when you wake in the morning.  Eat something small, preferable a grain like crackers or brown rice every 3 - 4 hours  *Avoid fatty/greasy foods such as pizza, chik-amador-a  *Choose lean meats - chicken breast, ham, fish - baked or steamed.  *Avoid eating 2 hours before bed      For your diabetes:  *Continue Lantus, 20 units.  *For  mealtime insulin (Novolog) - take 3 units with a snack (crackers), 5 units with a medium sized meal and 10 units with a regular/larger meal.  *We can re-visit Jardiance and your goal of getting off insulin after we get the nausea under control.    Follow-up: in 2 weeks    INTERVENTION    Healthy Eating: Balanced meals, Portion control, and Frequency of Eating and types of foods to overcome nausea and Taking Medication: Side effects of prescribed medication(s)    Education Materials Provided:  Nutrition for Nausea and Vomiting      SUBJECTIVE/OBJECTIVE:  Presents for: Individual review  Accompanied by: Self  Diabetes type: Type 2  Date of diagnosis: 2016  Diabetes management related comments/concerns: Desires to go off of insulin.  Started Jardiance, 10 mg 2 weeks.  Intensified nausea (always nauseated as a baseline since 2020).  Stopped Jardiance 2 days ago.  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  Fluctuates.)       Patient Problem List and Family Medical History reviewed for relevant medical history, current medical status, and diabetes risk factors.    Labs:  Lab Results   Component Value Date    A1C 7.9 10/23/2024    A1C 6.8 09/28/2016    HEMOGLOBINA1 7.9 10/28/2021     Lab Results   Component Value Date     01/22/2024     02/28/2022    .2 09/28/2016       Healthy Eating:  Meals include:  (Eats 1-2 meals a day, depends on nausea.)  Lunch: Leftovers or Chick-amador-a:  chicken biscuit.  Dinner: Spaghetti or Take Out (pizza or sandwich)  Snacks: Cheese, summer sausage, fruit, tuna or egg salad.  Other: Tends not to eat much at work  Beverages: Other (see Comments), Water  Please elaborate:: Gatorade Zero    Being Active:  Exercise:: Yes  Days per week of moderate to strenuous exercise (like a brisk walk): 3  On average, minutes per day of exercise at  this level: 120 (walking)  Exercise Minutes per Week: 360    Monitoring:     Freestyle Armida 3 plus.  Connected to our healthcare account    Taking Medications:  Diabetes Medication(s)       Biguanides       metFORMIN (GLUCOPHAGE XR) 500 MG 24 hr tablet TAKE 1 TABLET (500 MG) BY MOUTH TWICE DAILY WITH MEALS.       Insulin       insulin aspart (NOVOLOG VIAL) 100 UNITS/ML vial 0 units before breakfast, 0-20 units before lunch, 14-20 units before dinner.  Minimum 100 units every day.  For CeQur Patch     insulin degludec (TRESIBA FLEXTOUCH) 200 UNIT/ML pen Take 10 units daily.     NOVOLOG FLEXPEN 100 UNIT/ML soln Take 5- 10 units before each meal.       Sodium-Glucose Co-Transporter 2 (SGLT2) Inhibitors       empagliflozin (JARDIANCE) 10 MG TABS tablet Take 1 tablet (10 mg) by mouth daily.     Patient not taking: Reported on 11/15/2024            Current Treatments: Insulin Injections (Lantus, 20 units.  Novolog, 10 units before a meal that is higher carb.)    Problem Solving:     No low sugars reported.    Healthy Coping:     Not assessed      Alyson Morris RDN, LD, [facility]  Dietitian and Diabetes  - Endocrinology  [facility]      Time Spent: 60 minutes  Encounter Type: Individual    Any diabetes medication dose changes were made via the CDE Protocol per the patient's endocrinology provider. A copy of this encounter was shared with the provider.

## 2024-11-29 DIAGNOSIS — G43.719 INTRACTABLE CHRONIC MIGRAINE WITHOUT AURA AND WITHOUT STATUS MIGRAINOSUS: ICD-10-CM

## 2024-11-29 NOTE — TELEPHONE ENCOUNTER
Refill request for: verapamil ER (VERELAN) 120 MG 24 hr capsule              Directions: Take 1 capsule (120 mg) by mouth at bedtime      LOV: 2/5/24  NOV: Not scheduled- due for follow up. Lamppost message sent to patient and was read but no follow up made     14 day supply with 0 refills Medication T'd for review and signature    Chelo LEAVITT CMA on 11/29/2024 at 11:41 AM  [facility]

## 2024-12-02 RX ORDER — VERAPAMIL HYDROCHLORIDE 120 MG/1
120 CAPSULE, EXTENDED RELEASE ORAL AT BEDTIME
Qty: 14 CAPSULE | Refills: 0 | Status: SHIPPED | OUTPATIENT
Start: 2024-12-02

## 2024-12-04 ENCOUNTER — VIRTUAL VISIT (OUTPATIENT)
Dept: EDUCATION SERVICES | Facility: CLINIC | Age: 39
End: 2024-12-04
Payer: COMMERCIAL

## 2024-12-04 VITALS — WEIGHT: 273 LBS | HEIGHT: 67 IN | BODY MASS INDEX: 42.85 KG/M2

## 2024-12-04 DIAGNOSIS — E11.42 TYPE 2 DIABETES MELLITUS WITH DIABETIC POLYNEUROPATHY, WITH LONG-TERM CURRENT USE OF INSULIN (H): Primary | ICD-10-CM

## 2024-12-04 DIAGNOSIS — Z79.4 TYPE 2 DIABETES MELLITUS WITH DIABETIC POLYNEUROPATHY, WITH LONG-TERM CURRENT USE OF INSULIN (H): Primary | ICD-10-CM

## 2024-12-04 ASSESSMENT — PAIN SCALES - GENERAL: PAINLEVEL_OUTOF10: NO PAIN (0)

## 2024-12-04 NOTE — PATIENT INSTRUCTIONS
Dick Miranda,    It was a pleasure meeting with you today!    For nausea, continue these remedies:  *Try peppermint oil, peppermint or ginger tea.  *Try saltine crackers, ritz crackers, oyster crackers, banana, eggs in small portions throughout the day  *Eat a few crackers or a banana or toast right away when you wake in the morning.  Eat something small, preferable a grain like crackers or brown rice every 3 - 4 hours  *Avoid fatty/greasy foods such as pizza, chik-amador-a  *Continue cooking at home and choosing lean lean meats - chicken breast, ham, fish - baked or steamed.  *Avoid eating 2 hours before bed      For your diabetes:  *Decrease Tresiba from 20 to 18 units  *For mealtime insulin (Novolog) - you may need 2-3 units before eating a carbohydrate food now that we reduced your Tresiba  *We can re-visit Jardiance and your goal of getting off insulin after we get the nausea under control.    Follow-up: in 2 weeks    Thank you,    Alyson Morris RDN, SEUNN, HUYEN  Dietitian and Diabetes Education - Endocrinology  [facility]  [address] [place] [address]  Phone: [phone]  Email: [email or web address]    Contact information:   To schedule a diabetes education appointment:[phone].  For questions or concerns, please send a BeiBeit message or call the clinic at [phone].    For more urgent concerns that do not require 872, please call [phone] after hours/weekends and ask to speak with the Endocrinologist on call.       Please let us know if you are having low blood sugars less than 70 or over 300 mg/dL.  Do not wait until your next appointment if this is happening.

## 2024-12-04 NOTE — NURSING NOTE
Current patient location: [address]  [place] [address]    Is the patient currently in the state of [place]? YES    Visit mode:VIDEO    If the visit is dropped, the patient can be reconnected by:VIDEO VISIT: Text to cell phone:   Telephone Information:   Mobile [phone]       Will anyone else be joining the visit? NO  (If patient encounters technical issues they should call [phone])    Are changes needed to the allergy or medication list? No    Are refills needed on medications prescribed by this physician? NO    Rooming Documentation:  Questionnaire(s) completed    Reason for visit: Diabetes Education    Laura PIZARROF

## 2024-12-04 NOTE — PROGRESS NOTES
Virtual Visit Details    Type of service:  Video Visit     Originating Location (pt. Location): Home    Distant Location (provider location):  Off-site  Platform used for Video Visit: April      DIABETES SELF-MANAGEMENT EDUCATION & SUPPORT    Presents for: Individual review    Referred by: Yanet Christie PA-C    Nausea is better some days.  Keeping crackers and small snacks, bananas with her, which is helpful.  Cooking more versus higher fat restaurant meals.    REPORTS:    Tresiba:  20 units  Novolog:  Took some with Thanksgiving meal, but otherwise not taking because feels she is not eating enough to warrant taking insulin.                  Pt verbalized understanding of concepts discussed and recommendations provided today.       Continue education with the following diabetes management concepts: Healthy Eating, Taking Medication, and Problem Solving    ASSESSMENT:    TIR at goal of 87%  Average glucose 134 mg/dl  Glucose <70:  2%.  Tends to 'bottom out' overnight.  Will reduce Tresiba by 20% or from 20 to 18 units.  Suspect her reduced calorie intake is reducing her insulin needs.    Continues to struggle with nausea, more so on work days which are 12 hr shifts, sometimes 16 hrs.  Eating something right away in the morning and small frequent meals helps with nausea.  Portions are small and usually do not warrant mealtime insulin . Recommend continuing current eating pattern with a goal to add variety and improve overall nutrition as she finds ways to overcome the nausea.    PLAN    For nausea, continue these remedies:  *Try peppermint oil, peppermint or elissa tea.  *Try saltine crackers, ritz crackers, oyster crackers, banana, eggs in small portions throughout the day  *Eat a few crackers or a banana or toast right away when you wake in the morning.  Eat something small, preferable a grain like crackers or brown rice every 3 - 4 hours  *Avoid fatty/greasy foods such as pizza, chik-amador-a  *Continue cooking at  home and choosing lean lean meats - chicken breast, ham, fish - baked or steamed.  *Avoid eating 2 hours before bed      For your diabetes:  *Decrease Tresiba from 20 to 18 units  *For mealtime insulin (Novolog) - you may need 2-3 units before eating a carbohydrate food now that we reduced your Tresiba  *We can re-visit Jardiance and your goal of getting off insulin after we get the nausea under control.    Follow-up: in 2 weeks    INTERVENTION    Healthy Eating: Balanced meals, Portion control, and Frequency of Eating and types of foods to overcome nausea and Taking Medication: insulin dosing.    Education Materials Provided:  Nutrition for Nausea and Vomiting      SUBJECTIVE/OBJECTIVE:  Presents for: Individual review  Accompanied by: Self  Diabetes type: Type 2  Date of diagnosis:   Diabetes management related comments/concerns: Desires to go off of insulin.  Started Jardiance, 10 mg 2 weeks.  Intensified nausea (always nauseated as a baseline since ).  Stopped Jardiance 2 days ago.  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  Fluctuates.)       Patient Problem List and Family Medical History reviewed for relevant medical history, current medical status, and diabetes risk factors.    Labs:  Lab Results   Component Value Date    A1C 7.9 10/23/2024    A1C 6.8 2016    HEMOGLOBINA1 7.9 10/28/2021     Lab Results   Component Value Date     2024     2022    .2 2016       Healthy Eatin.4:  Foods she is tolerating: grilled or baked chicken.  Bowl of cereal.  A couple chicken nuggets in air fryer.  [facility] bowl:  mashed potatoes, corn, grilled chix, gravy - 6 bites.    1-2 / wk eats 3 meals a day because she is feeling better.  Otherwise eating smaller amounts of food:  toast, boiled eggs (4), cheese, oyster crackers,  gingerale.    Meals include:  (Eats 1-2 meals a day, depends on nausea.)  Lunch: Leftovers or Chick-amador-a:  chicken biscuit.  Dinner: Spaghetti or Take Out (pizza or sandwich)  Snacks: Cheese, summer sausage, fruit, tuna or egg salad.  Other: Tends not to eat much at work  Beverages: Other (see Comments), Water  Please elaborate:: Gatorade Zero    Being Active:  Exercise:: Yes  Days per week of moderate to strenuous exercise (like a brisk walk): 3  On average, minutes per day of exercise at this level: 120 (walking)  Exercise Minutes per Week: 360    Monitoring:     Freestyle Armida 3 plus.  Connected to our healthcare account    Taking Medications:  Diabetes Medication(s)       Biguanides       metFORMIN (GLUCOPHAGE XR) 500 MG 24 hr tablet TAKE 1 TABLET (500 MG) BY MOUTH TWICE DAILY WITH MEALS.       Insulin       insulin aspart (NOVOLOG VIAL) 100 UNITS/ML vial 0 units before breakfast, 0-20 units before lunch, 14-20 units before dinner.  Minimum 100 units every day.  For CeQur Patch     insulin degludec (TRESIBA FLEXTOUCH) 200 UNIT/ML pen Take 10 units daily.     NOVOLOG FLEXPEN 100 UNIT/ML soln Take 5- 10 units before each meal.       Sodium-Glucose Co-Transporter 2 (SGLT2) Inhibitors       empagliflozin (JARDIANCE) 10 MG TABS tablet Take 1 tablet (10 mg) by mouth daily.     Patient not taking: Reported on 11/15/2024            Current Treatments: Insulin Injections (Tresiba, 20 units.  Novolog, 5 - 10 units before a meal that is higher carb, but really hasn't been taking mealtime insulin because of smaller meals.)    Forgot Tresiba a couple days and sugars ran higher.      Problem Solving:     Sugars dropped low overnight.  Sensor alerted.    Healthy Coping:     Not assessed      Alyson Morris RDN, SEUN, [facility]  Dietitian and Diabetes  - Endocrinology  [facility]      Time Spent: 30 minutes  Encounter Type: Individual    Any diabetes medication dose changes were  made via the CDE Protocol per the patient's endocrinology provider. A copy of this encounter was shared with the provider.

## 2025-02-08 DIAGNOSIS — E78.5 HYPERLIPIDEMIA, UNSPECIFIED HYPERLIPIDEMIA TYPE: ICD-10-CM

## 2025-02-08 RX ORDER — ROSUVASTATIN CALCIUM 20 MG/1
20 TABLET, COATED ORAL DAILY
Qty: 90 TABLET | Refills: 1 | OUTPATIENT
Start: 2025-02-08

## 2025-02-18 ENCOUNTER — OFFICE VISIT (OUTPATIENT)
Dept: FAMILY MEDICINE | Facility: CLINIC | Age: 40
End: 2025-02-18
Payer: COMMERCIAL

## 2025-02-18 VITALS
HEIGHT: 66 IN | DIASTOLIC BLOOD PRESSURE: 70 MMHG | WEIGHT: 271 LBS | HEART RATE: 73 BPM | TEMPERATURE: 97 F | RESPIRATION RATE: 12 BRPM | BODY MASS INDEX: 43.55 KG/M2 | SYSTOLIC BLOOD PRESSURE: 116 MMHG | OXYGEN SATURATION: 99 %

## 2025-02-18 DIAGNOSIS — K52.9 GASTROENTERITIS: ICD-10-CM

## 2025-02-18 DIAGNOSIS — N89.8 VAGINAL DISCHARGE: Primary | ICD-10-CM

## 2025-02-18 LAB
ALBUMIN UR-MCNC: NEGATIVE MG/DL
APPEARANCE UR: CLEAR
BILIRUB UR QL STRIP: NEGATIVE
CLUE CELLS: ABNORMAL
COLOR UR AUTO: YELLOW
GLUCOSE UR STRIP-MCNC: NEGATIVE MG/DL
HGB UR QL STRIP: NEGATIVE
KETONES UR STRIP-MCNC: NEGATIVE MG/DL
LEUKOCYTE ESTERASE UR QL STRIP: NEGATIVE
NITRATE UR QL: NEGATIVE
PH UR STRIP: 8.5 [PH] (ref 5–8)
SP GR UR STRIP: 1.02 (ref 1–1.03)
TRICHOMONAS, WET PREP: ABNORMAL
UROBILINOGEN UR STRIP-ACNC: 0.2 E.U./DL
WBC'S/HIGH POWER FIELD, WET PREP: ABNORMAL
YEAST, WET PREP: ABNORMAL

## 2025-02-18 PROCEDURE — 87210 SMEAR WET MOUNT SALINE/INK: CPT | Performed by: FAMILY MEDICINE

## 2025-02-18 PROCEDURE — 99213 OFFICE O/P EST LOW 20 MIN: CPT | Performed by: FAMILY MEDICINE

## 2025-02-18 PROCEDURE — G2211 COMPLEX E/M VISIT ADD ON: HCPCS | Performed by: FAMILY MEDICINE

## 2025-02-18 PROCEDURE — 3078F DIAST BP <80 MM HG: CPT | Performed by: FAMILY MEDICINE

## 2025-02-18 PROCEDURE — 87491 CHLMYD TRACH DNA AMP PROBE: CPT | Performed by: FAMILY MEDICINE

## 2025-02-18 PROCEDURE — 81003 URINALYSIS AUTO W/O SCOPE: CPT | Performed by: FAMILY MEDICINE

## 2025-02-18 PROCEDURE — 3074F SYST BP LT 130 MM HG: CPT | Performed by: FAMILY MEDICINE

## 2025-02-18 PROCEDURE — 87591 N.GONORRHOEAE DNA AMP PROB: CPT | Performed by: FAMILY MEDICINE

## 2025-02-18 RX ORDER — MICONAZOLE NITRATE 20 MG/G
CREAM TOPICAL 2 TIMES DAILY
Qty: 30 G | Refills: 0 | Status: SHIPPED | OUTPATIENT
Start: 2025-02-18

## 2025-02-18 ASSESSMENT — PATIENT HEALTH QUESTIONNAIRE - PHQ9
SUM OF ALL RESPONSES TO PHQ QUESTIONS 1-9: 3
SUM OF ALL RESPONSES TO PHQ QUESTIONS 1-9: 3
10. IF YOU CHECKED OFF ANY PROBLEMS, HOW DIFFICULT HAVE THESE PROBLEMS MADE IT FOR YOU TO DO YOUR WORK, TAKE CARE OF THINGS AT HOME, OR GET ALONG WITH OTHER PEOPLE: NOT DIFFICULT AT ALL

## 2025-02-18 NOTE — PROGRESS NOTES
Prior to immunization administration, verified patients identity using patient s name and date of birth. Please see Immunization Activity for additional information.     Screening Questionnaire for Adult Immunization    Are you sick today?   No   Do you have allergies to medications, food, a vaccine component or latex?   Yes   Have you ever had a serious reaction after receiving a vaccination?   No   Do you have a long-term health problem with heart, lung, kidney, or metabolic disease (e.g., diabetes), asthma, a blood disorder, no spleen, complement component deficiency, a cochlear implant, or a spinal fluid leak?  Are you on long-term aspirin therapy?   Yes   Do you have cancer, leukemia, HIV/AIDS, or any other immune system problem?   No   Do you have a parent, brother, or sister with an immune system problem?   No   In the past 3 months, have you taken medications that affect  your immune system, such as prednisone, other steroids, or anticancer drugs; drugs for the treatment of rheumatoid arthritis, Crohn s disease, or psoriasis; or have you had radiation treatments?   Yes   Have you had a seizure, or a brain or other nervous system problem?   No   During the past year, have you received a transfusion of blood or blood    products, or been given immune (gamma) globulin or antiviral drug?   No   For women: Are you pregnant or is there a chance you could become       pregnant during the next month?   No   Have you received any vaccinations in the past 4 weeks?   No     Immunization questionnaire was positive for at least one answer.  Notified provider.      Patient instructed to remain in clinic for 15 minutes afterwards, and to report any adverse reactions.     Screening performed by Pierre Contreras MA on 2/18/2025 at 3:05 PM.       Answers submitted by the patient for this visit:  Patient Health Questionnaire (Submitted on 2/18/2025)  If you checked off any problems, how difficult have these problems made it for you  to do your work, take care of things at home, or get along with other people?: Not difficult at all  PHQ9 TOTAL SCORE: 3  General Questionnaire (Submitted on 2/18/2025)  Chief Complaint: Chronic problems general questions HPI Form  How many days per week do you miss taking your medication?: 2  What makes it hard for you to take your medication every day?: remembering to take  General Concern (Submitted on 2/18/2025)  Chief Complaint: Chronic problems general questions HPI Form  What is the reason for your visit today?: Having a really bad yeast infection  When did your symptoms begin?: 3-7 days ago  What are your symptoms?: Discharge and itching  How would you describe these symptoms?: Moderate  Are your symptoms:: Staying the same  Have you had these symptoms before?: Yes  Have you tried or received treatment for these symptoms before?: Yes  Did that treatment work? : No  Is there anything that makes you feel worse?: No  Is there anything that makes you feel better?: No  Questionnaire about: Chronic problems general questions HPI Form (Submitted on 2/18/2025)  Chief Complaint: Chronic problems general questions HPI Form

## 2025-02-18 NOTE — PROGRESS NOTES
"  {PROVIDER CHARTING PREFERENCE:844469}    Gilmer Miranda is a 39 year old, presenting for the following health issues:  Vaginal Problem (Yeast infection not going away./Noticing discharge from front and back)      2/18/2025     2:51 PM   Additional Questions   Roomed by olivia   Accompanied by partner     Vaginal Problem     History of Present Illness       Reason for visit:  Having a really bad yeast infection  Symptom onset:  3-7 days ago  Symptoms include:  Discharge and itching  Symptom intensity:  Moderate  Symptom progression:  Staying the same  Had these symptoms before:  Yes  Has tried/received treatment for these symptoms:  Yes  Previous treatment was successful:  No  What makes it worse:  No  What makes it better:  No She is missing 2 dose(s) of medications per week.  She is not taking prescribed medications regularly due to remembering to take.       {MA/LPN/RN Pre-Provider Visit Orders- hCG/UA/Strep (Optional):879761}  {SUPERLIST (Optional):767431}  {additonal problems for provider to add (Optional):923046}    {ROS Picklists (Optional):500844}      Objective    /70 (BP Location: Left arm, Patient Position: Sitting, Cuff Size: Adult Large)   Pulse 73   Temp 97  F (36.1  C) (Oral)   Resp 12   Ht 1.68 m (5' 6.14\")   Wt 122.9 kg (271 lb)   LMP  (LMP Unknown)   SpO2 99%   BMI 43.55 kg/m    Body mass index is 43.55 kg/m .  Physical Exam   {Exam List (Optional):994297}    {Diagnostic Test Results (Optional):638266}        Signed Electronically by: Milton Banks MD  {Email feedback regarding this note to [email or web address]   :466169}  " tried/received treatment for these symptoms:  Yes  Previous treatment was successful:  No  What makes it worse:  No  What makes it better:  No She is missing 2 dose(s) of medications per week.  She is not taking prescribed medications regularly due to remembering to take.       [age] year old female with history of diabetes and migraines here with concerns of ongoing vaginal concerns.   She called in a few days ago with concerns of yeast infection. Dr. Avila prescribed fluconazole. She took the one dose but usually gets more. She feels like she might have felt mildly better but then started to get much worse a day later. She has ongoing vaginal itching and there is a lot of discharge. She is is not sure but feels like there is vaginal discharge as well as from the rectum. She certainly has loose stools but has never seen discharge with this. No blood in the stool. No fever or chills. No abdominal pain. No recent travel. Of note, was in the ED on 25 and was treated for pneumonia with augmentin and azithromycin.     Past Medical History:   Diagnosis Date    Anemia     told to take iron pills when pregnant    Benign paroxysmal positional vertigo, unspecified laterality 2021    Depression     Depressive disorder     Diabetes mellitus (H)     Diabetes mellitus, type 2 (H) 07/15/2021    Dysthymic disorder     Endometriosis     Herpes genitalia     Hyperlipidemia     Kidney stone     Menorrhagia     Migraines     Neuropathy     Other abnormal Papanicolaou smear of cervix and cervical HPV(795.09) 2013    PCOS (polycystic ovarian syndrome)     Post traumatic stress disorder (PTSD)      Past Surgical History:   Procedure Laterality Date    BLADDER REPAIR W/  SECTION      X2    C/SECTION, CLASSICAL      x2    COLONOSCOPY N/A 2021    Procedure: COLONOSCOPY;  Surgeon: Rene Lehman DO;  Location: [facility]    DAVINC HYSTERECTOMY TOTAL, SALPINGECTOMY BILATERAL      Dr. Farmer    DILATION AND  "CURETTAGE  07/01/2015    DILATION AND CURETTAGE  05/06/2015    DILATION AND CURETTAGE, OPERATIVE HYSTEROSCOPY, COMBINED N/A 05/07/2015    Procedure: Dilation and Curettage with Hysteroscopy;  Surgeon: Zia Farmer MD;  Location: [facility] OR;  Service:     DILATION AND CURETTAGE, OPERATIVE HYSTEROSCOPY, COMBINED N/A 09/29/2016    Procedure: DILATION AND CURETTAGE WITH HYSTEROSCOPY INSERT MIRENA;  Surgeon: Suzanne Major MD;  Location: [facility] OR;  Service:     ENT SURGERY      ESOPHAGOSCOPY, GASTROSCOPY, DUODENOSCOPY (EGD), COMBINED N/A 12/14/2021    Procedure: ESOPHAGOGASTRODUODENOSCOPY, WITH BIOPSY;  Surgeon: Rene Lehman DO;  Location:  GI    GYN SURGERY  10/19/2015    laproscopic lysis of adhesions    HYSTERECTOMY, PAP NO LONGER INDICATED      HYSTEROSCOPY, ABLATE ENDOMETRIUM HYDROTHERMAL, COMBINED N/A 03/02/2018    Procedure: HYSTEROSCOPY, DILATION AND CURETTAGE, ENDOMETRIAL ABLATION;  Surgeon: Kristy Israel DO;  [place] [facility]  Service: Gynecology    LAPAROSCOPIC HYSTERECTOMY TOTAL N/A 03/04/2019    Procedure: ROBOTIC TOTAL LAPAROSCOPIC HYSTERECTOMY, BILATERAL SALPINGECTOMY, LEFT OVARIAN CYSTOTOMY. CYSTOSCOPY;  Surgeon: Zia Farmer MD;  [place] [facility]  Service: Gynecology    LAPAROSCOPY DIAGNOSTIC (GENERAL) N/A 10/19/2015    Procedure:  LAPAROSCOPY,LYSIS OF ADHESIONS;  Surgeon: Zia Farmer MD;  Location: [facility] OR;  Service:     WI LAP,TUBAL CAUTERY Bilateral 03/02/2018    Procedure: LAPAROSCOPIC BILATERAL TUBAL LIGATION;  Surgeon: Kristy Israel DO;  [place] [facility]  Service: Gynecology    TONSILLECTOMY             Objective    /70 (BP Location: Left arm, Patient Position: Sitting, Cuff Size: Adult Large)   Pulse 73   Temp 97  F (36.1  C) (Oral)   Resp 12   Ht 1.68 m (5' 6.14\")   Wt 122.9 kg (271 lb)   LMP  (LMP Unknown)   SpO2 99%   BMI 43.55 kg/m    Body mass index is 43.55 kg/m .  Physical Exam "   GENERAL: alert and no distress   (female): normal female external genitalia, normal urethral meatus, normal vaginal mucosa    Results for orders placed or performed in visit on 02/18/25   UA Macroscopic with reflex to Microscopic and Culture - Lab Collect     Status: Abnormal    Specimen: Urine, Clean Catch   Result Value Ref Range    Color Urine Yellow Colorless, Straw, Light Yellow, Yellow    Appearance Urine Clear Clear    Glucose Urine Negative Negative mg/dL    Bilirubin Urine Negative Negative    Ketones Urine Negative Negative mg/dL    Specific Gravity Urine 1.020 1.005 - 1.030    Blood Urine Negative Negative    pH Urine 8.5 (H) 5.0 - 8.0    Protein Albumin Urine Negative Negative mg/dL    Urobilinogen Urine 0.2 0.2, 1.0 E.U./dL    Nitrite Urine Negative Negative    Leukocyte Esterase Urine Negative Negative    Narrative    Microscopic not indicated   Wet prep - lab collect     Status: Abnormal    Specimen: Vagina; Swab   Result Value Ref Range    Trichomonas Absent Absent    Yeast Absent Absent    Clue Cells Absent Absent    WBCs/high power field 1+ (A) None   Chlamydia & Gonorrhea by PCR, GICH/Range - Clinic Collect     Status: None    Specimen: Vagina; Swab   Result Value Ref Range    Chlamydia Trachomatis Negative Negative    Neisseria gonorrhoeae Negative Negative    CTNG Specimen Source Vagina            Signed Electronically by: Milton Banks MD

## 2025-02-18 NOTE — PROGRESS NOTES
"  {PROVIDER CHARTING PREFERENCE:991047}    Gilmer Miranda is a 39 year old, presenting for the following health issues:  Vaginal Problem (Yeast infection not going away./Noticing discharge from front and back)      2/18/2025     2:51 PM   Additional Questions   Roomed by olivia   Accompanied by partner     HPI     {MA/LPN/RN Pre-Provider Visit Orders- hCG/UA/Strep (Optional):008501}  {SUPERLIST (Optional):175307}  {additonal problems for provider to add (Optional):184193}    {ROS Picklists (Optional):279360}      Objective    /70 (BP Location: Left arm, Patient Position: Sitting, Cuff Size: Adult Large)   Pulse 73   Temp 97  F (36.1  C) (Oral)   Resp 12   Ht 1.68 m (5' 6.14\")   Wt 122.9 kg (271 lb)   LMP  (LMP Unknown)   SpO2 99%   BMI 43.55 kg/m    Body mass index is 43.55 kg/m .  Physical Exam   {Exam List (Optional):654575}    {Diagnostic Test Results (Optional):340875}        Signed Electronically by: Milton Banks MD  {Email feedback regarding this note to [email or web address]   :495604}  "

## 2025-02-19 LAB
C TRACH DNA SPEC QL PROBE+SIG AMP: NEGATIVE
N GONORRHOEA DNA SPEC QL NAA+PROBE: NEGATIVE
SPECIMEN TYPE: NORMAL

## 2025-02-24 ENCOUNTER — MYC MEDICAL ADVICE (OUTPATIENT)
Dept: ENDOCRINOLOGY | Facility: CLINIC | Age: 40
End: 2025-02-24
Payer: COMMERCIAL

## 2025-03-11 ENCOUNTER — TELEPHONE (OUTPATIENT)
Dept: ENDOCRINOLOGY | Facility: CLINIC | Age: 40
End: 2025-03-11
Payer: COMMERCIAL

## 2025-03-11 DIAGNOSIS — E11.9 TYPE 2 DIABETES MELLITUS WITHOUT COMPLICATION, UNSPECIFIED WHETHER LONG TERM INSULIN USE (H): Primary | ICD-10-CM

## 2025-03-11 NOTE — TELEPHONE ENCOUNTER
Spoke w/ Pt: states [facility] came to her  place of work. Pulse 120. B/P: 200/190. Instructed to go to ER for immediate assessment.   Pt confirms understanding.   Danika Andre, RN on 3/11/2025 at 2:23 PM   .                 You  Ruth Vanegas1 minute ago (2:18 PM)     SARA Miranda,      If that is your B/P, please proceed to the ER for an immediate assessment. We do not provide emergency care.      Please let us know what the determine AFTER you have been seen in the ER.      We will update Yanet Christie.      Thank you.      The Endocrine Team        This [employer] message has not been read.     Ruth ZAYAS [facility] (supporting Yanet Christie PA-C)2 minutes ago (2:18 PM)     Can someone call me       Ruth ZAYAS [facility] (supporting Yanet Christie PA-C)9 minutes ago (2:11 PM)       Can someone call me       Ruth ZAYAS [facility] (supporting Yanet Christie PA-C)9 minutes ago (2:11 PM)       They said my BP was 200/190 my pulse was 120 and my sugar was 278

## 2025-03-11 NOTE — TELEPHONE ENCOUNTER
[facility] Call Center    Phone Message    May a detailed message be left on voicemail: yes     Reason for Call: Patient would like a plan in place for her BS when they are too high or too low. Someone from the clinic may have called her during this call. Please discuss further with the patiet. Thank you     Action Taken: Message routed to:  [facility] ENDO    Travel Screening: Not Applicable     Date of Service:

## 2025-03-12 NOTE — TELEPHONE ENCOUNTER
She needs to be seen by CDE  to go over  a plan for coverage with highs and lows with Diabetes. Referral placed for Diabetes education and number provided to schedule  both CDE and provider. Jerrica Alcaraz RN on 3/11/2025 at 8:05 PM

## 2025-03-19 ENCOUNTER — OFFICE VISIT (OUTPATIENT)
Dept: FAMILY MEDICINE | Facility: CLINIC | Age: 40
End: 2025-03-19
Payer: COMMERCIAL

## 2025-03-19 ENCOUNTER — ANCILLARY PROCEDURE (OUTPATIENT)
Dept: GENERAL RADIOLOGY | Facility: CLINIC | Age: 40
End: 2025-03-19
Attending: FAMILY MEDICINE
Payer: COMMERCIAL

## 2025-03-19 VITALS
OXYGEN SATURATION: 95 % | BODY MASS INDEX: 42.06 KG/M2 | TEMPERATURE: 97.2 F | HEIGHT: 67 IN | DIASTOLIC BLOOD PRESSURE: 68 MMHG | WEIGHT: 268 LBS | RESPIRATION RATE: 14 BRPM | SYSTOLIC BLOOD PRESSURE: 102 MMHG | HEART RATE: 80 BPM

## 2025-03-19 DIAGNOSIS — J18.9 PNEUMONIA OF LEFT LOWER LOBE DUE TO INFECTIOUS ORGANISM: ICD-10-CM

## 2025-03-19 DIAGNOSIS — J18.9 PNEUMONIA OF LEFT LOWER LOBE DUE TO INFECTIOUS ORGANISM: Primary | ICD-10-CM

## 2025-03-19 PROCEDURE — 71046 X-RAY EXAM CHEST 2 VIEWS: CPT | Mod: TC | Performed by: RADIOLOGY

## 2025-03-19 ASSESSMENT — ENCOUNTER SYMPTOMS: COUGH: 1

## 2025-03-19 NOTE — PROGRESS NOTES
Prior to immunization administration, verified patients identity using patient s name and date of birth. Please see Immunization Activity for additional information.     Screening Questionnaire for Adult Immunization    Are you sick today?   No   Do you have allergies to medications, food, a vaccine component or latex?   No   Have you ever had a serious reaction after receiving a vaccination?   No   Do you have a long-term health problem with heart, lung, kidney, or metabolic disease (e.g., diabetes), asthma, a blood disorder, no spleen, complement component deficiency, a cochlear implant, or a spinal fluid leak?  Are you on long-term aspirin therapy?   No   Do you have cancer, leukemia, HIV/AIDS, or any other immune system problem?   No   Do you have a parent, brother, or sister with an immune system problem?   No   In the past 3 months, have you taken medications that affect  your immune system, such as prednisone, other steroids, or anticancer drugs; drugs for the treatment of rheumatoid arthritis, Crohn s disease, or psoriasis; or have you had radiation treatments?   Yes   Have you had a seizure, or a brain or other nervous system problem?   No   During the past year, have you received a transfusion of blood or blood    products, or been given immune (gamma) globulin or antiviral drug?   No   For women: Are you pregnant or is there a chance you could become       pregnant during the next month?   No   Have you received any vaccinations in the past 4 weeks?   No     Immunization questionnaire was positive for at least one answer.  Notified       Patient instructed to remain in clinic for 15 minutes afterwards, and to report any adverse reactions.     Screening performed by Tika Saucedo MA on 3/19/2025 at 1:47 PM.       Answers submitted by the patient for this visit:  General Questionnaire (Submitted on 3/19/2025)  Chief Complaint: Chronic problems general questions HPI Form  What is the reason for your visit  today? : Pneumonia follow up  How many servings of fruits and vegetables do you eat daily?: 2-3  On average, how many sweetened beverages do you drink each day (Examples: soda, juice, sweet tea, etc.  Do NOT count diet or artificially sweetened beverages)?: 2  How many minutes a day do you exercise enough to make your heart beat faster?: 20 to 29  How many days a week do you exercise enough to make your heart beat faster?: 3 or less  How many days per week do you miss taking your medication?: 1  What makes it hard for you to take your medication every day?: remembering to take  Questionnaire about: Chronic problems general questions HPI Form (Submitted on 3/19/2025)  Chief Complaint: Chronic problems general questions HPI Form

## 2025-03-19 NOTE — PROGRESS NOTES
"  {PROVIDER CHARTING PREFERENCE:045278}    Gilmer Miranda is a 39 year old, presenting for the following health issues:  Cough      3/19/2025     1:43 PM   Additional Questions   Roomed by christelle patel   Accompanied by self and partner     Cough    History of Present Illness       Reason for visit:  Pneumonia follow up    She eats 2-3 servings of fruits and vegetables daily.She consumes 2 sweetened beverage(s) daily.She exercises with enough effort to increase her heart rate 20 to 29 minutes per day.  She exercises with enough effort to increase her heart rate 3 or less days per week. She is missing 1 dose(s) of medications per week.  She is not taking prescribed medications regularly due to remembering to take.        {MA/LPN/RN Pre-Provider Visit Orders- hCG/UA/Strep (Optional):590513}  {SUPERLIST (Optional):943932}  {additonal problems for provider to add (Optional):365525}    {ROS Picklists (Optional):159082}      Objective    /68 (BP Location: Right arm, Patient Position: Sitting, Cuff Size: Adult Large)   Pulse 80   Temp 97.2  F (36.2  C) (Temporal)   Resp 14   Ht 1.7 m (5' 6.93\")   Wt 121.6 kg (268 lb)   LMP  (LMP Unknown)   SpO2 95%   BMI 42.06 kg/m    Body mass index is 42.06 kg/m .  BP Readings from Last 6 Encounters:   03/19/25 102/68   02/18/25 116/70   10/21/24 108/75   03/11/24 118/82   02/05/24 123/78   01/22/24 113/80     Wt Readings from Last 3 Encounters:   03/19/25 121.6 kg (268 lb)   02/18/25 122.9 kg (271 lb)   12/04/24 123.8 kg (273 lb)         Physical Exam   {Exam List (Optional):057991}    {Diagnostic Test Results (Optional):546172}        Signed Electronically by: Milton Banks MD  {Email feedback regarding this note to [email or web address]   :714406}  "

## 2025-03-31 ENCOUNTER — VIRTUAL VISIT (OUTPATIENT)
Dept: ENDOCRINOLOGY | Facility: CLINIC | Age: 40
End: 2025-03-31
Payer: COMMERCIAL

## 2025-03-31 DIAGNOSIS — R00.0 RACING HEART BEAT: Primary | ICD-10-CM

## 2025-03-31 DIAGNOSIS — E11.649 TYPE 2 DIABETES MELLITUS WITH HYPOGLYCEMIA WITHOUT COMA, UNSPECIFIED WHETHER LONG TERM INSULIN USE (H): ICD-10-CM

## 2025-03-31 DIAGNOSIS — R25.1 TREMOR: ICD-10-CM

## 2025-03-31 PROCEDURE — 1126F AMNT PAIN NOTED NONE PRSNT: CPT | Mod: 95 | Performed by: PHYSICIAN ASSISTANT

## 2025-03-31 PROCEDURE — 98007 SYNCH AUDIO-VIDEO EST HI 40: CPT | Performed by: PHYSICIAN ASSISTANT

## 2025-03-31 RX ORDER — ROSUVASTATIN CALCIUM 40 MG/1
40 TABLET, COATED ORAL DAILY
Qty: 90 TABLET | Refills: 3 | Status: SHIPPED | OUTPATIENT
Start: 2025-03-31

## 2025-03-31 ASSESSMENT — PAIN SCALES - GENERAL: PAINLEVEL_OUTOF10: NO PAIN (0)

## 2025-03-31 NOTE — NURSING NOTE
Current patient location: [address]  [place] [address]    Is the patient currently in the state of [place]? YES    Visit mode: VIDEO    If the visit is dropped, the patient can be reconnected by:VIDEO VISIT: Text to cell phone:   Telephone Information:   Mobile [phone]       Will anyone else be joining the visit? NO  (If patient encounters technical issues they should call [phone] :[phone])    Are changes needed to the allergy or medication list? No    Are refills needed on medications prescribed by this physician? NO    Rooming Documentation:  Questionnaire(s) completed    Reason for visit: JAMES DE SOUZA

## 2025-03-31 NOTE — PROGRESS NOTES
Virtual Visit Details    Type of service:  Video Visit     Originating Location (pt. Location): Home    Distant Location (provider location):  Off-site  Platform used for Video Visit: April

## 2025-03-31 NOTE — LETTER
"3/31/2025       RE: Ruth Vanegas  [address]  [place] [address]     Dear Colleague,    Thank you for referring your patient, Ruth Vanegas, to the [facility] ENDOCRINOLOGY CLINIC [place] at [facility]. Please see a copy of my visit note below.    Outcome for 03/25/25 8:52 AM: Data uploaded on Rad  Hanane Sherman MA  Outcome for 03/27/25 2:34 PM: Data obtained via Rad website  Hanane Sherman MA    Patient is showing 4/5 MNCM met. Current tobacco use- this is not true.  She does not smoke cigarettes.    Hanane Sherman MA    Start time: 1507  End time: 1544  Provider location: off site- home  Patient location: off site- home.    HPI:   Ruth is a pleasant 40 yo woman here for follow up of type 2 diabetes (PREETHI negative, C-peptide positive) since 2016.  She also has a history of Anemia, Depression, Endometriosis, Herpes genitalia, Hyperlipidemia, Kidney stone, Menorrhagia, Migraines, Neuropathy, PCOS (polycystic ovarian syndrome), and Post traumatic stress disorder (PTSD).  Since her last visit, her glucose has been going really low.  She stopped her insulin a few days ago and it is better.     She presented to Urgent care in February feeling \"jittery\", \"pounding\" heart and headache as well. Paramedics reported pt's systolic BP was >200 mmHg and her HR was >120 bpm. Glucose was in the 200s.  Diagnosed with pneumonia.  Went to primary.  Did another chest x-ray.  Cleared her for that, but she is still having bad tremors, high blood pressure.  High pulses- 115-120.  Getting the shakes.  Sugar is either really high or low.  Shaky hands.  She was at work.  Supervisor- shaking, then a week later, same thing happened.  She doesn't know what to do when this happens. It is affecting her work and she often needs to leave. Didn't notice any of these symptoms until after she got sick.      Weight went from 278 to 261# recently.  " "    She is still having nausea most mornings.  She has seen GI.  Will be having BRAVO procedure.  GI concerned her GERD is bad.  She worries when she can't eat most of the day.      She has been having significant lows.  Hasn't been taking insulin at night.  Feels better without the insulin, but worries about glucose going too high.     Current treatment:   Metformin 1000 mg bid.  Tresiba- 16 units daily- now at 10pm. Hasn't taken it in 3 days because of lows.   Novolog- 10 units once daily with lunch (biggest meal).     Previous treatment:   Victoza- stopped it in 2020 when she started insulin.   Ozempic- violently ill.   Invokana 300 mg daily- recurrent yeast infections- stopped 2023. Glucose was really high when she started it. Retrial fall, 2024.  Could not tolerate.    Typical day:   Not hungry in the morning.  Wakes up   Daughter wakes her up before she leaves for school.  Drinks water.    Nauseated if she tries to eat when she wakes up.  Unsure why. Seeing GI.   Does not eat very much.   Weight fluctuates.Recent loss.   Works as a  overnights.   2020- got really sick.  H. Pylori. Lots of GI issues since then.   Sensitive stomach.  Eats small portions.  Started working out.   Work schedule changed M[place]/Mon/Tues and every other Sat. 6:30am-6:30pm. Switched to days.   No breakfast   Coffee in AM  Tries to meal prep- snack boxes- cheese, meat (summer sausage), fruit, tuna or cookies.   Leftovers for lunch.   Dinner-   Days off- \"starving\"     Works out Wed/Thurs/Fri  In the AM- really nauseated.        Recent glucose:                 Diabetes Control:   Lab Results   Component Value Date    A1C 10.2 11/09/2023    A1C 9.5 08/11/2023    A1C 8.3 05/11/2023    A1C 7.2 02/28/2022    A1C 8.5 05/05/2021    A1C 6.8 09/28/2016    A1C 6.1 08/14/2013    A1C 5.9 06/07/2011       Past Medical History:   Diagnosis Date     Anemia     told to take iron pills when pregnant     Benign paroxysmal positional vertigo, " unspecified laterality 2021     Depression      Depressive disorder      Diabetes mellitus (H)      Diabetes mellitus, type 2 (H) 07/15/2021     Dysthymic disorder      Endometriosis      Herpes genitalia      Hyperlipidemia      Kidney stone      Menorrhagia      Migraines      Neuropathy      Other abnormal Papanicolaou smear of cervix and cervical HPV(795.09) 2013     PCOS (polycystic ovarian syndrome)      Post traumatic stress disorder (PTSD)        Past Surgical History:   Procedure Laterality Date     BLADDER REPAIR W/  SECTION      X2     C/SECTION, CLASSICAL      x2     COLONOSCOPY N/A 2021    Procedure: COLONOSCOPY;  Surgeon: Rene Lehman DO;  Location: [facility]     DAVINC HYSTERECTOMY TOTAL, SALPINGECTOMY BILATERAL      Dr. Farmer     DILATION AND CURETTAGE  2015     DILATION AND CURETTAGE  2015     DILATION AND CURETTAGE, OPERATIVE HYSTEROSCOPY, COMBINED N/A 2015    Procedure: Dilation and Curettage with Hysteroscopy;  Surgeon: Zia Farmer MD;  Location: [facility];  Service:      DILATION AND CURETTAGE, OPERATIVE HYSTEROSCOPY, COMBINED N/A 2016    Procedure: DILATION AND CURETTAGE WITH HYSTEROSCOPY INSERT MIRENA;  Surgeon: Suzanen Major MD;  Location: [facility];  Service:      ENT SURGERY       ESOPHAGOSCOPY, GASTROSCOPY, DUODENOSCOPY (EGD), COMBINED N/A 2021    Procedure: ESOPHAGOGASTRODUODENOSCOPY, WITH BIOPSY;  Surgeon: Rene Lehman DO;  Location: [facility]     GYN SURGERY  10/19/2015    laproscopic lysis of adhesions     HYSTERECTOMY, PAP NO LONGER INDICATED       HYSTEROSCOPY, ABLATE ENDOMETRIUM HYDROTHERMAL, COMBINED N/A 2018    Procedure: HYSTEROSCOPY, DILATION AND CURETTAGE, ENDOMETRIAL ABLATION;  Surgeon: Kristy Israel DO;  Location: [facility];  Service: Gynecology     LAPAROSCOPIC HYSTERECTOMY TOTAL N/A 2019    Procedure: ROBOTIC TOTAL LAPAROSCOPIC HYSTERECTOMY, BILATERAL SALPINGECTOMY,  LEFT OVARIAN CYSTOTOMY. CYSTOSCOPY;  Surgeon: Zia Farmer MD;  Location: [facility] [place];  Service: Gynecology     LAPAROSCOPY DIAGNOSTIC (GENERAL) N/A 10/19/2015    Procedure:  LAPAROSCOPY,LYSIS OF ADHESIONS;  Surgeon: Zia Farmer MD;  Location: [facility] OR;  Service:      MS LAP,TUBAL CAUTERY Bilateral 03/02/2018    Procedure: LAPAROSCOPIC BILATERAL TUBAL LIGATION;  Surgeon: Kristy Israel DO;  Location: [facility] [place];  Service: Gynecology     TONSILLECTOMY         Family History   Adopted: Yes   Problem Relation Age of Onset     Chronic Obstructive Pulmonary Disease Mother      Prostate Cancer Father      Cancer Father         prostate     Alcoholism Sister      Alcoholism Sister      Alcoholism Brother      Alcoholism Brother      Diabetes Paternal Grandmother      Other Cancer Paternal Grandmother      Alcoholism Daughter      Coronary Artery Disease No family hx of      Urolithiasis No family hx of      Clotting Disorder No family hx of      Gout No family hx of      Heart Disease No family hx of      Anesthesia Reaction No family hx of        Social History   Unknown. Adopted.  Biological mother has COPD. Daughter- precocious puberty 3-4 years old, ?PCOS.   Socioeconomic History     Marital status: Single   Tobacco Use     Smoking status: Never     Smokeless tobacco: Never   Vaping Use     Vaping Use: Never used   Substance and Sexual Activity     Alcohol use: Yes     Comment: Socially     Drug use: No     Social Determinants of Health     Financial Resource Strain: Low Risk  (11/30/2023)    Financial Resource Strain      Within the past 12 months, have you or your family members you live with been unable to get utilities (heat, electricity) when it was really needed?: No   Food Insecurity: Low Risk  (11/30/2023)    Food Insecurity      Within the past 12 months, did you worry that your food would run out before you got money to buy more?: No      Within the past 12 months, did the  food you bought just not last and you didn t have money to get more?: No   Transportation Needs: Low Risk  (11/30/2023)    Transportation Needs      Within the past 12 months, has lack of transportation kept you from medical appointments, getting your medicines, non-medical meetings or appointments, work, or from getting things that you need?: No   Interpersonal Safety: Low Risk  (11/9/2023)    Interpersonal Safety      Do you feel physically and emotionally safe where you currently live?: Yes      Within the past 12 months, have you been hit, slapped, kicked or otherwise physically hurt by someone?: No      Within the past 12 months, have you been humiliated or emotionally abused in other ways by your partner or ex-partner?: No   Housing Stability: Low Risk  (11/30/2023)    Housing Stability      Do you have housing? : Yes      Are you worried about losing your housing?: No       Current Outpatient Medications   Medication Sig Dispense Refill     acetaminophen (TYLENOL) 500 MG tablet Take 500-1,000 mg by mouth every 4 hours as needed        BD PEN NEEDLE RAMON 2ND GEN 32G X 4 MM miscellaneous USE 4 PEN NEEDLES DAILY OR AS DIRECTED. 100 each 3     Continuous Blood Gluc Sensor (FREESTYLE CINDY 3 SENSOR) MISC 1 Application every 14 days 2 each 11     Continuous Glucose Sensor (FREESTYLE CINDY 3 PLUS SENSOR) MISC Change every 15 days. 6 each 1     dexAMETHasone (DECADRON) 4 MG/ML injection To be used topically by therapist during PT sessions. 30 mL 0     empagliflozin (JARDIANCE) 10 MG TABS tablet Take 1 tablet (10 mg) by mouth daily. 30 tablet 11     gabapentin (NEURONTIN) 300 MG capsule Take 2 capsules (600 mg) by mouth 3 times daily. 540 capsule 3     galcanezumab-gnlm (EMGALITY) 120 MG/ML injection Inject 1 mL (120 mg) Subcutaneous every 28 days 1 mL 4     galcanezumab-gnlm (EMGALITY) 120 MG/ML injection Inject 2 mLs (240 mg) Subcutaneous every 28 days Loading dose. 2 mL 0     ibuprofen (ADVIL/MOTRIN) 600 MG tablet  every 4 hours as needed        insulin aspart (NOVOLOG VIAL) 100 UNITS/ML vial 0 units before breakfast, 0-20 units before lunch, 14-20 units before dinner.  Minimum 100 units every day.  For CeQur Patch 30 mL 4     insulin degludec (TRESIBA FLEXTOUCH) 200 UNIT/ML pen Take 10 units daily. 30 mL 3     KETOSTIX test strip Use as directed in case of high glucose, vomiting or illness. 50 strip 11     metFORMIN (GLUCOPHAGE XR) 500 MG 24 hr tablet TAKE 1 TABLET (500 MG) BY MOUTH TWICE DAILY WITH MEALS. 180 tablet 3     miconazole (MICATIN) 2 % external cream Apply topically 2 times daily. 30 g 0     multivitamin (ONE-DAILY) tablet Take 1 tablet by mouth daily 100 tablet 3     NOVOLOG FLEXPEN 100 UNIT/ML soln Take 5- 10 units before each meal. 15 mL 3     ondansetron (ZOFRAN) 4 MG tablet Take 1 tablet (4 mg) by mouth every 6 hours as needed for nausea. 60 tablet 1     phentermine 15 MG capsule Take 1 capsule (15 mg) by mouth every morning 30 capsule 2     promethazine (PHENERGAN) 25 MG tablet Take 25 mg by mouth every 6 hours as needed.       rizatriptan (MAXALT) 10 MG tablet Take 1 tablet (10 mg) by mouth at onset of headache for migraine May repeat in 2 hours. 18 tablet 4     rosuvastatin (CRESTOR) 20 MG tablet TAKE 1 TABLET BY MOUTH EVERY DAY 90 tablet 1     topiramate (TOPAMAX) 100 MG tablet Take 1 tablet (100 mg) by mouth 2 times daily 180 tablet 1     verapamil ER (VERELAN) 120 MG 24 hr capsule TAKE 1 CAPSULE (120 MG) BY MOUTH AT BEDTIME. PLEASE CALL TO SCHEDULE FOLLOW UP APPT 14 capsule 0     No current facility-administered medications for this visit.          Allergies   Allergen Reactions     Hydrocodone Other (See Comments)     Headache per pt and hallucinations  Headache per pt and hallucinations       Reglan [Metoclopramide]      Anxiety     Vicodin [Hydrocodone-Acetaminophen] Other (See Comments)     Hallucinations     Silver Nitrate Itching and Rash     Only issues if in for a long time  Only issues if in  for a long time  Only issues if in for a long time       Tegaderm Transparent Dressing (Informational Only) Rash       Physical Exam  LMP  (LMP Unknown)   GENERAL:  Alert and oriented X3, NAD, well dressed, answering questions appropriately, appears stated age.  HEENT: OP clear, no lymphadenopathy, no thyromegaly, non-tender, no exophthalmus, no proptosis, EOMI, no lid lag, no retraction  EXTREMITIES: no edema, +pulses, no rashes, no lesions.  Missing toenail.     RESULTS  Lab Results   Component Value Date    A1C 7.9 (H) 10/23/2024    A1C 7.1 (H) 10/21/2024    A1C 8.8 (H) 03/11/2024    A1C 10.2 (H) 11/09/2023    A1C 9.5 (H) 08/11/2023    A1C 8.3 (H) 05/11/2023    A1C 7.2 (H) 02/28/2022    A1C 6.8 (H) 09/28/2016    A1C 6.1 (H) 08/14/2013    A1C 5.9 (H) 06/07/2011    HEMOGLOBINA1 7.9 10/28/2021       TSH   Date Value Ref Range Status   01/22/2024 1.84 0.30 - 4.20 uIU/mL Final   02/28/2022 1.71 0.40 - 4.00 mU/L Final   04/21/2020 0.87 0.30 - 5.00 uIU/mL Final       ALT   Date Value Ref Range Status   10/23/2024 14 0 - 50 U/L Final   01/22/2024 15 0 - 50 U/L Final     Comment:     Reference intervals for this test were updated on 6/12/2023 to more accurately reflect our healthy population. There may be differences in the flagging of prior results with similar values performed with this method. Interpretation of those prior results can be made in the context of the updated reference intervals.     06/07/2011 17.0 0.0 - 50.0 u/l Final   ]    Recent Labs   Lab Test 10/23/24  1034 01/22/24  1044   CHOL 173 286*   HDL 50 62   * 205*   TRIG 62 93       Lab Results   Component Value Date     01/22/2024    .2 09/28/2016      Lab Results   Component Value Date    POTASSIUM 3.8 01/22/2024    POTASSIUM 3.9 02/28/2022    POTASSIUM 3.4 09/28/2016     Lab Results   Component Value Date    CHLORIDE 104 01/22/2024    CHLORIDE 106 02/28/2022    CHLORIDE 99.9 09/28/2016     Lab Results   Component Value Date    RADHA  "9.1 01/22/2024    RADHA 8.9 09/28/2016     Lab Results   Component Value Date    CO2 22 01/22/2024    CO2 26 02/28/2022    CO2 26.9 09/28/2016     Lab Results   Component Value Date    BUN 8.6 01/22/2024    BUN 9 02/28/2022    BUN 8.7 09/28/2016     Lab Results   Component Value Date    CR 0.58 10/23/2024    CR 0.5 09/28/2016       GFR Estimate   Date Value Ref Range Status   10/23/2024 >90 >60 mL/min/1.73m2 Final     Comment:     eGFR calculated using 2021 CKD-EPI equation.   01/22/2024 >90 >60 mL/min/1.73m2 Final   11/09/2023 >90 >60 mL/min/1.73m2 Final   05/05/2021 >60 >60 mL/min/1.73m2 Final   02/17/2021 >60 >60 mL/min/1.73m2 Final   11/04/2020 >60 >60 mL/min/1.73m2 Final   09/28/2016 154.0 mL/min/1.7 m2 Final   08/14/2013 > 60 >60 ml/min/1.73m2 Final     GFR Estimate If Black   Date Value Ref Range Status   05/05/2021 >60 >60 mL/min/1.73m2 Final   02/17/2021 >60 >60 mL/min/1.73m2 Final   11/04/2020 >60 >60 mL/min/1.73m2 Final   09/28/2016 186.3 mL/min/1.7 m2 Final   08/14/2013 > 60 >60 ml/min/1.73m2 Final       Lab Results   Component Value Date    MICROL <12.0 10/23/2024    MICROL 15 02/28/2022     No results found for: \"MICROALBUMIN\"  Lab Results   Component Value Date    CPEPT 2.3 01/22/2024    GADAB <5.0 02/28/2022       Vitamin B12   Date Value Ref Range Status   01/22/2024 399 232 - 1,245 pg/mL Final   02/28/2022 437 193 - 986 pg/mL Final   06/07/2011 443 223 - 1132 pg/ml Final   ]    Tissue Transglutaminase Antibody IgA   Date Value Ref Range Status   01/22/2024 1.0 <7.0 U/mL Final     Comment:     Negative- The tTG-IgA assay has limited utility for patients with decreased levels of IgA. Screening for celiac disease should include IgA testing to rule out selective IgA deficiency and to guide selection and interpretation of serological testing. tTG-IgG testing may be positive in celiac disease patients with IgA deficiency.     Tissue Transglutaminase Antibody IgG   Date Value Ref Range Status   01/22/2024 " <0.6 <7.0 U/mL Final     Comment:     Negative         Most recent eye exam date: : Not Found     Computed FIB-4 Calculation unavailable. One or more values for this score either were not found within the given timeframe or did not fit some other criterion.  A value of FIB-4 below 1.30 is considered as low risk for advanced fibrosis; a value of FIB-4 over 2.67 is considered as high risk for advanced fibrosis; and FIB-4 values between 1.30 and 2.67 are considered as intermediate risk of advanced fibrosis.    Recent labs from urgent care reviewed:         Assessment/Plan:     1.  Type 2 diabetes- Ruth's glucose control has improved significantly, but she was having a lot of symptomatic hypoglycemia.  She stopped insulin a few days ago and the low glucose has resolved.  Glucose is still well controlled off of the insulin, so will remain on metformin alone for now.  If we need to resume insulin in the future, would start at a low dose of 6 units Tresiba daily.  Last A1c down to 7.1%.  Will check again.  Unable to tolerate Jardiance (tried twice) due to yeast infections. We made the following plan today (instructions given to patient):      Please have your lab tests done.  Please contact us to schedule at any of our [place] lab locations  Call [phone] ([phone]), select option 1 or schedule via [employer].       I would like to be sure your thyroid is not causing you to have a fast heart rate and heart racing.      Please stay OFF the insulin for now.  Your glucose control is excellent, and you were getting too many low blood sugars on the insulin.     Increase rosuvastatin to 40 mg daily.  We will recheck your cholesterol in 3 mos.     Meet with [facility] pharmacy (Randall Allison) to discuss medications/possible side effects (gabapentin/topamax potential to cause nausea) and review your glucose while off the insulin.    Please let me know if you are having low blood sugars less than 70 or over 250 mg/dL.      If  you have concerns, please send me a [employer] message M-Th, call the clinic at [phone], or call [phone] after hours/weekends and ask to speak with the endocrinologist on call.        2.  Risk factors-     Retinopathy:  No.  Had eye exam 4/2024.    Nephropathy:  BP well controlled, but recent heart racing and elevated HR at home into the 120s, at times. No microalbuminuria.  Creatinine stable.   Neuropathy: Yes- history, now not bothersome.  On gabapentin since 2018. Has been on it since 2018.  May be contributing to nausea- will meet with [facility] pharmacy to discuss potential taper off.  Topamax has also been associated with nausea (effective for her headaches).  Lipids:  LDL at target.  Patient taking rosuvastatin 20 mg daily, but LDL above goal.  Will increase to 40 mg and recheck  lipids in 3 mos.    Thyroid screening: Normal TSH 2024.  Now having palpitations, tremor, heart racing in the last few weeks.  TSH rechecked in urgent care 2/2024 WNL.   Celiac screening: negative antibodies 2024  Smoking: marijuana is occasionally, just at home    3.  F/U in 3-4 mos with me, in 1 month with DM education, sooner with concerns.     52 minutes spent on the date of the encounter doing chart review, review of test results, review of continuous glucose sensor,  patient visit and documentation, counseling/coordination of care, and discussion of follow up plan for worsening hyper and hypoglycemia.  This time is exclusive of CGM interpretation. The patient understood and is satisfied with today's visit.     Yanet Christie PA-C, MPAS   [facility]  Department of Medicine  Division of Endocrinology and Diabetes                    Virtual Visit Details    Type of service:  Video Visit     Originating Location (pt. Location): Home    Distant Location (provider location):  Off-site  Platform used for Video Visit: April      Again, thank you for allowing me to participate in the care of your patient.       Sincerely,    Yanet Christie PA-C

## 2025-03-31 NOTE — Clinical Note
Martina Rose and Randall,  I am seeing Ruth for her diabetes and I we were talking about her ongoing nausea in the mornings.  We wondered whether it might be worthwhile to taper the gabapentin down to see if this may help her nausea.  I have asked her to meet with you to discuss a plan.  Thank you! Yanet

## 2025-03-31 NOTE — PATIENT INSTRUCTIONS
Please have your lab tests done.  Please contact us to schedule at any of our [place] lab locations  Call [phone] ([phone]), select option 1 or schedule via [employer].       I would like to be sure your thyroid is not causing you to have a fast heart rate and heart racing.      Please stay OFF the insulin for now.  Your glucose control is excellent, and you were getting too many low blood sugars on the insulin.     Increase rosuvastatin to 40 mg daily.  We will recheck your cholesterol in 3 mos.     Meet with [facility] pharmacy (Randall Allison) to discuss medications/possible side effects (gabapentin/topamax potential to cause nausea) and review your glucose while off the insulin.    Please let me know if you are having low blood sugars less than 70 or over 250 mg/dL.      If you have concerns, please send me a [employer] message M-Th, call the clinic at [phone], or call [phone] after hours/weekends and ask to speak with the endocrinologist on call.

## 2025-04-03 DIAGNOSIS — E78.5 HYPERLIPIDEMIA: Primary | ICD-10-CM

## 2025-04-05 ENCOUNTER — MYC MEDICAL ADVICE (OUTPATIENT)
Dept: ENDOCRINOLOGY | Facility: CLINIC | Age: 40
End: 2025-04-05
Payer: COMMERCIAL

## 2025-04-18 DIAGNOSIS — E66.813 CLASS 3 SEVERE OBESITY DUE TO EXCESS CALORIES WITH SERIOUS COMORBIDITY AND BODY MASS INDEX (BMI) OF 45.0 TO 49.9 IN ADULT (H): ICD-10-CM

## 2025-04-18 DIAGNOSIS — G43.719 INTRACTABLE CHRONIC MIGRAINE WITHOUT AURA AND WITHOUT STATUS MIGRAINOSUS: ICD-10-CM

## 2025-04-18 NOTE — TELEPHONE ENCOUNTER
Refill request for: verapamil ER (VERELAN) 120 MG 24 hr capsule    Directions: TAKE 1 CAPSULE (120 MG) BY MOUTH AT BEDTIME     Refill request for: topiramate 100mg   Directions: Take 1 tablet (100 mg) by mouth 2 times daily     LOV: 02/05/24  NOV: No future appt scheduled. Reminder [employer] message has been sent to pt, still not scheduled    7 day supply with 0 refills Medication T'd for review and signature    Kirstie Ramírez LPN on 4/18/2025 at 3:23 PM

## 2025-04-21 DIAGNOSIS — R00.0 RACING HEART BEAT: Primary | ICD-10-CM

## 2025-04-21 DIAGNOSIS — R00.0 TACHYCARDIA: ICD-10-CM

## 2025-04-21 RX ORDER — TOPIRAMATE 100 MG/1
100 TABLET, FILM COATED ORAL 2 TIMES DAILY
Qty: 14 TABLET | Refills: 0 | Status: SHIPPED | OUTPATIENT
Start: 2025-04-21

## 2025-04-21 RX ORDER — VERAPAMIL HYDROCHLORIDE 120 MG/1
120 CAPSULE, EXTENDED RELEASE ORAL AT BEDTIME
Qty: 7 CAPSULE | Refills: 0 | Status: SHIPPED | OUTPATIENT
Start: 2025-04-21

## 2025-04-30 ENCOUNTER — OFFICE VISIT (OUTPATIENT)
Dept: CARDIOLOGY | Facility: CLINIC | Age: 40
End: 2025-04-30
Attending: PHYSICIAN ASSISTANT
Payer: COMMERCIAL

## 2025-04-30 ENCOUNTER — ORDERS ONLY (AUTO-RELEASED) (OUTPATIENT)
Dept: CARDIOLOGY | Facility: CLINIC | Age: 40
End: 2025-04-30

## 2025-04-30 VITALS — RESPIRATION RATE: 20 BRPM | BODY MASS INDEX: 41.56 KG/M2 | OXYGEN SATURATION: 96 % | WEIGHT: 264.8 LBS

## 2025-04-30 DIAGNOSIS — Z79.4 TYPE 2 DIABETES MELLITUS WITH OTHER SPECIFIED COMPLICATION, WITH LONG-TERM CURRENT USE OF INSULIN (H): ICD-10-CM

## 2025-04-30 DIAGNOSIS — R06.09 DYSPNEA ON EXERTION: ICD-10-CM

## 2025-04-30 DIAGNOSIS — R00.0 TACHYCARDIA: Primary | ICD-10-CM

## 2025-04-30 DIAGNOSIS — E78.5 HYPERLIPIDEMIA, UNSPECIFIED HYPERLIPIDEMIA TYPE: ICD-10-CM

## 2025-04-30 DIAGNOSIS — E11.69 TYPE 2 DIABETES MELLITUS WITH OTHER SPECIFIED COMPLICATION, WITH LONG-TERM CURRENT USE OF INSULIN (H): ICD-10-CM

## 2025-04-30 DIAGNOSIS — R00.0 TACHYCARDIA: ICD-10-CM

## 2025-04-30 LAB
ATRIAL RATE - MUSE: 72 BPM
DIASTOLIC BLOOD PRESSURE - MUSE: NORMAL MMHG
INTERPRETATION ECG - MUSE: NORMAL
P AXIS - MUSE: 56 DEGREES
PR INTERVAL - MUSE: 144 MS
QRS DURATION - MUSE: 88 MS
QT - MUSE: 396 MS
QTC - MUSE: 433 MS
R AXIS - MUSE: 13 DEGREES
SYSTOLIC BLOOD PRESSURE - MUSE: NORMAL MMHG
T AXIS - MUSE: 11 DEGREES
VENTRICULAR RATE- MUSE: 72 BPM

## 2025-04-30 PROCEDURE — 93000 ELECTROCARDIOGRAM COMPLETE: CPT | Performed by: INTERNAL MEDICINE

## 2025-04-30 PROCEDURE — 99244 OFF/OP CNSLTJ NEW/EST MOD 40: CPT | Performed by: GENERAL ACUTE CARE HOSPITAL

## 2025-04-30 RX ORDER — ESTRADIOL 10 UG/1
10 TABLET, FILM COATED VAGINAL
COMMUNITY
Start: 2024-01-23 | End: 2025-04-30

## 2025-04-30 RX ORDER — MIRABEGRON 25 MG/1
25 TABLET, FILM COATED, EXTENDED RELEASE ORAL DAILY
COMMUNITY
End: 2025-04-30

## 2025-04-30 NOTE — PATIENT INSTRUCTIONS
We will schedule you for an echocardiogram and mail you a heart monitor to wear for 14 days.  We will also do a 24 hour urine collection test.

## 2025-04-30 NOTE — LETTER
4/30/2025    Milton Banks MD  [address] [place] [address]    RE: Ruth Vanegas       Dear Colleague,     I had the pleasure of seeing Ruth Vanegas in the [facility].  HEART CARE ENCOUNTER NOTE      Thank you, Yanet Christie PA-C, for asking the [facility] team to see Ms. Ruth Vanegas to evaluate tachycardia.      Assessment/Recommendations   Assessment:    Paroxysmal tachycardia, palpitations and hypertension. Unclear etiology. Arrhythmia is a possibility. Although exceedingly rare, pheochromocytoma can also present this way. Anxiety is another possibility. This does not seem to correlate with her blood glucose levels.  Dyspnea on exertion. Likely due to obesity and deconditioning.  Hyperlipidemia.   Insulin-dependent diabetes mellitus type 2. Last hemoglobin A1c 7.9%.  Morbid obesity with body mass index 41.56 kg/m .    Plan:  14-day Zio patch.  Transthoracic echocardiogram.  24 hour urine catecholamine collection.  Follow-up as needed based on test results.         History of Present Illness   Ms. Ruth Vanegas is a 39 year old female with a significant past history of IDDM2, HLD and morbid obesity presenting for evaluation of tachycardia.    For the last 1-2 months, she has experienced paroxysms of her heart racing accompanied by a spike in her blood pressure where she then feels tremulous, diaphoretic and short of breath. Episodes occur unexpectedly, lasting anywhere from a couple minutes to half an hour. Episodes are occurring more frequently with no obvious trigger.    It was initially thought that perhaps hypoglycemia was triggering these episodes, but her blood glucose did not correlate with episodes. Discontinuing insulin also did not help.    In between episodes she feels fine besides getting out of breath with activity. No major issues when standing up or moving. She does note some mild swelling in her lower extremities. No chest  pain/pressure/tightness, pre-syncope, syncope, paroxysmal nocturnal dyspnea (PND), or orthopnea.    She did present to the [facility] for her symptoms 3/11/2025. She was initially hypertensive at 150/89 and tachycardic at 102 bpm on arrival. ECG showed NSR. Labs including TSH and troponin were normal.    She smokes marijuana daily which she has done for 4-5 years.    She does not have much information about her biological family's health history.     Cardiac Problems and Cardiac Diagnostics     Most Recent Cardiac testing:  ECG dated 3/11/2025 (personaly reviewed and interpreted): SR, normal ECG       Medications  Allergies   Current Outpatient Medications   Medication Sig Dispense Refill     acetaminophen (TYLENOL) 500 MG tablet Take 500-1,000 mg by mouth every 4 hours as needed        BD PEN NEEDLE RAMON 2ND GEN 32G X 4 MM miscellaneous USE 4 PEN NEEDLES DAILY OR AS DIRECTED. 100 each 3     Continuous Blood Gluc Sensor (FREESTYLE CINDY 3 SENSOR) MISC 1 Application every 14 days 2 each 11     Continuous Glucose Sensor (FREESTYLE CINDY 3 PLUS SENSOR) MISC Change every 15 days. 6 each 1     gabapentin (NEURONTIN) 300 MG capsule Take 2 capsules (600 mg) by mouth 3 times daily. 540 capsule 3     ibuprofen (ADVIL/MOTRIN) 600 MG tablet every 4 hours as needed        KETOSTIX test strip Use as directed in case of high glucose, vomiting or illness. 50 strip 11     metFORMIN (GLUCOPHAGE XR) 500 MG 24 hr tablet TAKE 1 TABLET (500 MG) BY MOUTH TWICE DAILY WITH MEALS. 180 tablet 3     NOVOLOG FLEXPEN 100 UNIT/ML soln Take 5- 10 units before each meal. 15 mL 3     ondansetron (ZOFRAN) 4 MG tablet Take 1 tablet (4 mg) by mouth every 6 hours as needed for nausea. 60 tablet 1     rizatriptan (MAXALT) 10 MG tablet Take 1 tablet (10 mg) by mouth at onset of headache for migraine May repeat in 2 hours. 18 tablet 4     rosuvastatin (CRESTOR) 40 MG tablet Take 1 tablet (40 mg) by mouth daily. 90 tablet 3     topiramate (TOPAMAX) 100  MG tablet TAKE 1 TABLET BY MOUTH TWICE A DAY 14 tablet 0     verapamil ER (VERELAN) 120 MG 24 hr capsule TAKE 1 CAPSULE (120 MG) BY MOUTH AT BEDTIME. PLEASE CALL TO SCHEDULE FOLLOW UP APPT 7 capsule 0     insulin aspart (NOVOLOG VIAL) 100 UNITS/ML vial 0 units before breakfast, 0-20 units before lunch, 14-20 units before dinner.  Minimum 100 units every day.  For CeQur Patch (Patient not taking: Reported on 4/30/2025) 30 mL 4     multivitamin (ONE-DAILY) tablet Take 1 tablet by mouth daily (Patient not taking: Reported on 4/30/2025) 100 tablet 3      Allergies   Allergen Reactions     Hydrocodone Other (See Comments)     Headache per pt and hallucinations  Headache per pt and hallucinations       Morphine And Codeine Other (See Comments)     hallucinations   Headache per pt and hallucinations   Headache per pt and hallucinations     Reglan [Metoclopramide]      Anxiety     Vicodin [Hydrocodone-Acetaminophen] Other (See Comments)     Hallucinations     Silver Nitrate Itching and Rash     Only issues if in for a long time  Only issues if in for a long time  Only issues if in for a long time       Tegaderm Transparent Dressing (Informational Only) Rash        Physical Examination Review of Systems   Resp 20   Wt 120.1 kg (264 lb 12.8 oz)   LMP  (LMP Unknown)   SpO2 96%   BMI 41.56 kg/m    Body mass index is 41.56 kg/m .  Wt Readings from Last 3 Encounters:   03/19/25 121.6 kg (268 lb)   02/18/25 122.9 kg (271 lb)   12/04/24 123.8 kg (273 lb)       4/30/2025     9:16 AM 9:21 AM 9:26 AM      Orthostatic BP -- 110/77 87/0   BP Location Left arm Left arm Left arm   Patient Position Supine Sitting Standing   Cuff Size Adult Large Adult Large Adult Large   Orthostatic Pulse 75 75 78   Weight 120.1 kg (264 lb 12.8 oz)Weight. 120.1 kg (264 lb 12.8 oz). The comment is shoes off. Taken on 4/30/25 9:16 AM -- --   Resp 20 16 20   SpO2 97 % 97 % 96 %                General Appearance:   Pleasant  female, appears  stated age. no  acute distress, obese body habitus   ENT/Mouth: membranes moist, no apparent gingival bleeding.      EYES:  no scleral icterus, normal conjunctivae   Neck: no carotid bruits. No anterior cervical lymphadenopaty   Respiratory:   lungs are clear to auscultation, no rales or wheezing,  equal chest wall expansion    Cardiovascular:   Regular rhythm, normal rate. Normal first and second heart sounds with no murmurs, rubs, or gallops; the carotid, radial and posterior tibial pulses are intact, Jugular venous pressure normal, no edema bilaterally    Abdomen/GI:  no organomegaly, masses, bruits, or tenderness; bowel sounds are present   Extremities: no cyanosis or clubbing   Skin: no xanthelasma, warm.    Heme/lymph/ Immunology No apparent bleeding noted.   Neurologic: Alert and oriented. normal gait, no tremors     Psychiatric: Pleasant, calm, appropriate affect.    A complete 10 system review of systems was performed and is negative except as mentioned in the HPI/subjective.         Past History   Past Medical History:   Past Medical History:   Diagnosis Date     Anemia     told to take iron pills when pregnant     Benign paroxysmal positional vertigo, unspecified laterality 2021     Depression      Depressive disorder      Diabetes mellitus (H)      Diabetes mellitus, type 2 (H) 07/15/2021     Dysthymic disorder      Endometriosis      Herpes genitalia      Hyperlipidemia      Kidney stone      Menorrhagia      Migraines      Neuropathy      Other abnormal Papanicolaou smear of cervix and cervical HPV(795.09) 2013     PCOS (polycystic ovarian syndrome)      Post traumatic stress disorder (PTSD)        Past Surgical History:   Past Surgical History:   Procedure Laterality Date     BLADDER REPAIR W/  SECTION      X2     C/SECTION, CLASSICAL      x2     COLONOSCOPY N/A 2021    Procedure: COLONOSCOPY;  Surgeon: Rene Lehman DO;  Location:  GI     DAVINCI HYSTERECTOMY TOTAL, SALPINGECTOMY  BILATERAL  2019    Dr. Farmer     DILATION AND CURETTAGE  07/01/2015     DILATION AND CURETTAGE  05/06/2015     DILATION AND CURETTAGE, OPERATIVE HYSTEROSCOPY, COMBINED N/A 05/07/2015    Procedure: Dilation and Curettage with Hysteroscopy;  Surgeon: Zia Farmer MD;  Location: [facility];  Service:      DILATION AND CURETTAGE, OPERATIVE HYSTEROSCOPY, COMBINED N/A 09/29/2016    Procedure: DILATION AND CURETTAGE WITH HYSTEROSCOPY INSERT MIRENA;  Surgeon: Suzanne Major MD;  Location: [facility] OR;  Service:      ENT SURGERY       ESOPHAGOSCOPY, GASTROSCOPY, DUODENOSCOPY (EGD), COMBINED N/A 12/14/2021    Procedure: ESOPHAGOGASTRODUODENOSCOPY, WITH BIOPSY;  Surgeon: Rene Lehman DO;  Location:  GI     GYN SURGERY  10/19/2015    laproscopic lysis of adhesions     HYSTERECTOMY, PAP NO LONGER INDICATED       HYSTEROSCOPY, ABLATE ENDOMETRIUM HYDROTHERMAL, COMBINED N/A 03/02/2018    Procedure: HYSTEROSCOPY, DILATION AND CURETTAGE, ENDOMETRIAL ABLATION;  Surgeon: Kristy Israel DO;  Location: [facility];  Service: Gynecology     LAPAROSCOPIC HYSTERECTOMY TOTAL N/A 03/04/2019    Procedure: ROBOTIC TOTAL LAPAROSCOPIC HYSTERECTOMY, BILATERAL SALPINGECTOMY, LEFT OVARIAN CYSTOTOMY. CYSTOSCOPY;  Surgeon: Zia Farmer MD;  Location: [facility];  Service: Gynecology     LAPAROSCOPY DIAGNOSTIC (GENERAL) N/A 10/19/2015    Procedure:  LAPAROSCOPY,LYSIS OF ADHESIONS;  Surgeon: Zia Farmer MD;  Location: [facility] OR;  Service:      MS LAP,TUBAL CAUTERY Bilateral 03/02/2018    Procedure: LAPAROSCOPIC BILATERAL TUBAL LIGATION;  Surgeon: Kristy Israel DO;  Location: [facility] OR;  Service: Gynecology     TONSILLECTOMY         Family History:   Family History   Adopted: Yes   Problem Relation Age of Onset     Chronic Obstructive Pulmonary Disease Mother      Prostate Cancer Father      Cancer Father         prostate     Alcoholism Sister      Alcoholism Sister      Alcoholism  Brother      Alcoholism Brother      Diabetes Paternal Grandmother      Other Cancer Paternal Grandmother      Alcoholism Daughter      Coronary Artery Disease No family hx of      Urolithiasis No family hx of      Clotting Disorder No family hx of      Gout No family hx of      Heart Disease No family hx of      Anesthesia Reaction No family hx of         Social History:   Social History     Socioeconomic History     Marital status: Single     Spouse name: Not on file     Number of children: Not on file     Years of education: Not on file     Highest education level: Not on file   Occupational History     Not on file   Tobacco Use     Smoking status: Some Days     Types: Other     Smokeless tobacco: Never     Tobacco comments:     Marijuana   Vaping Use     Vaping status: Never Used   Substance and Sexual Activity     Alcohol use: Yes     Comment: Socially     Drug use: Yes     Types: Marijuana     Sexual activity: Not on file   Other Topics Concern     Not on file   Social History Narrative     Not on file     Social Drivers of Health     Financial Resource Strain: Low Risk  (11/30/2023)    Financial Resource Strain      Within the past 12 months, have you or your family members you live with been unable to get utilities (heat, electricity) when it was really needed?: No   Food Insecurity: Low Risk  (11/30/2023)    Food Insecurity      Within the past 12 months, did you worry that your food would run out before you got money to buy more?: No      Within the past 12 months, did the food you bought just not last and you didn t have money to get more?: No   Transportation Needs: Low Risk  (11/30/2023)    Transportation Needs      Within the past 12 months, has lack of transportation kept you from medical appointments, getting your medicines, non-medical meetings or appointments, work, or from getting things that you need?: No   Physical Activity: Not on file   Stress: Not on file   Social Connections: Not on file    Interpersonal Safety: Low Risk  (11/9/2023)    Interpersonal Safety      Do you feel physically and emotionally safe where you currently live?: Yes      Within the past 12 months, have you been hit, slapped, kicked or otherwise physically hurt by someone?: No      Within the past 12 months, have you been humiliated or emotionally abused in other ways by your partner or ex-partner?: No   Housing Stability: Low Risk  (11/30/2023)    Housing Stability      Do you have housing? : Yes      Are you worried about losing your housing?: No              Lab Results    Chemistry/lipid CBC Cardiac Enzymes/BNP/TSH/INR   Lab Results   Component Value Date    CHOL 173 10/23/2024    HDL 50 10/23/2024     (H) 10/23/2024    TRIG 62 10/23/2024    CR 0.58 10/23/2024    BUN 8.6 01/22/2024    POTASSIUM 3.8 01/22/2024     01/22/2024    CO2 22 01/22/2024      Lab Results   Component Value Date    WBC 10.7 01/22/2024    HGB 13.1 01/22/2024    HCT 38.2 01/22/2024    MCV 85 01/22/2024     01/22/2024    A1C 7.9 (H) 10/23/2024     Lab Results   Component Value Date    A1C 7.9 (H) 10/23/2024    Lab Results   Component Value Date    TSH 1.84 01/22/2024     3/11/2024  TSH  Adults: 0.465 - 4.68 uIU/mL uIU/mL uIU/mL 1          Mickey Rebolledo MD [facility]  Non-Invasive Cardiologist  [facility]  Pager [phone]      Thank you for allowing me to participate in the care of your patient.      Sincerely,     Mickey Rebolledo MD     [facility]  cc:   Yanet Christie PA-C  [address] [facility] [address]  [place] [address]

## 2025-04-30 NOTE — PROGRESS NOTES
HEART CARE ENCOUNTER NOTE      Thank you, Yanet Christie PA-C, for asking the [facility] Heart Care team to see Ms. Ruth Vanegas to evaluate tachycardia.      Assessment/Recommendations   Assessment:    Paroxysmal tachycardia, palpitations and hypertension. Unclear etiology. Arrhythmia is a possibility. Although exceedingly rare, pheochromocytoma can also present this way. Anxiety is another possibility. This does not seem to correlate with her blood glucose levels.  Dyspnea on exertion. Likely due to obesity and deconditioning.  Hyperlipidemia.   Insulin-dependent diabetes mellitus type 2. Last hemoglobin A1c 7.9%.  Morbid obesity with body mass index 41.56 kg/m .    Plan:  14-day Zio patch.  Transthoracic echocardiogram.  24 hour urine catecholamine collection.  Follow-up as needed based on test results.         History of Present Illness   Ms. Ruth Vanegas is a 39 year old female with a significant past history of IDDM2, HLD and morbid obesity presenting for evaluation of tachycardia.    For the last 1-2 months, she has experienced paroxysms of her heart racing accompanied by a spike in her blood pressure where she then feels tremulous, diaphoretic and short of breath. Episodes occur unexpectedly, lasting anywhere from a couple minutes to half an hour. Episodes are occurring more frequently with no obvious trigger.    It was initially thought that perhaps hypoglycemia was triggering these episodes, but her blood glucose did not correlate with episodes. Discontinuing insulin also did not help.    In between episodes she feels fine besides getting out of breath with activity. No major issues when standing up or moving. She does note some mild swelling in her lower extremities. No chest pain/pressure/tightness, pre-syncope, syncope, paroxysmal nocturnal dyspnea (PND), or orthopnea.    She did present to the [facility] for her symptoms 3/11/2025. She was initially hypertensive at 150/89 and  tachycardic at 102 bpm on arrival. ECG showed NSR. Labs including TSH and troponin were normal.    She smokes marijuana daily which she has done for 4-5 years.    She does not have much information about her biological family's health history.     Cardiac Problems and Cardiac Diagnostics     Most Recent Cardiac testing:  ECG dated 3/11/2025 (personaly reviewed and interpreted): SR, normal ECG       Medications  Allergies   Current Outpatient Medications   Medication Sig Dispense Refill    acetaminophen (TYLENOL) 500 MG tablet Take 500-1,000 mg by mouth every 4 hours as needed       BD PEN NEEDLE RAMON 2ND GEN 32G X 4 MM miscellaneous USE 4 PEN NEEDLES DAILY OR AS DIRECTED. 100 each 3    Continuous Blood Gluc Sensor (FREESTYLE CINDY 3 SENSOR) MISC 1 Application every 14 days 2 each 11    Continuous Glucose Sensor (FREESTYLE CINDY 3 PLUS SENSOR) MISC Change every 15 days. 6 each 1    gabapentin (NEURONTIN) 300 MG capsule Take 2 capsules (600 mg) by mouth 3 times daily. 540 capsule 3    ibuprofen (ADVIL/MOTRIN) 600 MG tablet every 4 hours as needed       KETOSTIX test strip Use as directed in case of high glucose, vomiting or illness. 50 strip 11    metFORMIN (GLUCOPHAGE XR) 500 MG 24 hr tablet TAKE 1 TABLET (500 MG) BY MOUTH TWICE DAILY WITH MEALS. 180 tablet 3    NOVOLOG FLEXPEN 100 UNIT/ML soln Take 5- 10 units before each meal. 15 mL 3    ondansetron (ZOFRAN) 4 MG tablet Take 1 tablet (4 mg) by mouth every 6 hours as needed for nausea. 60 tablet 1    rizatriptan (MAXALT) 10 MG tablet Take 1 tablet (10 mg) by mouth at onset of headache for migraine May repeat in 2 hours. 18 tablet 4    rosuvastatin (CRESTOR) 40 MG tablet Take 1 tablet (40 mg) by mouth daily. 90 tablet 3    topiramate (TOPAMAX) 100 MG tablet TAKE 1 TABLET BY MOUTH TWICE A DAY 14 tablet 0    verapamil ER (VERELAN) 120 MG 24 hr capsule TAKE 1 CAPSULE (120 MG) BY MOUTH AT BEDTIME. PLEASE CALL TO SCHEDULE FOLLOW UP APPT 7 capsule 0    insulin aspart  (NOVOLOG VIAL) 100 UNITS/ML vial 0 units before breakfast, 0-20 units before lunch, 14-20 units before dinner.  Minimum 100 units every day.  For CeQur Patch (Patient not taking: Reported on 4/30/2025) 30 mL 4    multivitamin (ONE-DAILY) tablet Take 1 tablet by mouth daily (Patient not taking: Reported on 4/30/2025) 100 tablet 3      Allergies   Allergen Reactions    Hydrocodone Other (See Comments)     Headache per pt and hallucinations  Headache per pt and hallucinations      Morphine And Codeine Other (See Comments)     hallucinations   Headache per pt and hallucinations   Headache per pt and hallucinations    Reglan [Metoclopramide]      Anxiety    Vicodin [Hydrocodone-Acetaminophen] Other (See Comments)     Hallucinations    Silver Nitrate Itching and Rash     Only issues if in for a long time  Only issues if in for a long time  Only issues if in for a long time      Tegaderm Transparent Dressing (Informational Only) Rash        Physical Examination Review of Systems   Resp 20   Wt 120.1 kg (264 lb 12.8 oz)   LMP  (LMP Unknown)   SpO2 96%   BMI 41.56 kg/m    Body mass index is 41.56 kg/m .  Wt Readings from Last 3 Encounters:   03/19/25 121.6 kg (268 lb)   02/18/25 122.9 kg (271 lb)   12/04/24 123.8 kg (273 lb)       4/30/2025     9:16 AM 9:21 AM 9:26 AM      Orthostatic BP -- 110/77 87/0   BP Location Left arm Left arm Left arm   Patient Position Supine Sitting Standing   Cuff Size Adult Large Adult Large Adult Large   Orthostatic Pulse 75 75 78   Weight 120.1 kg (264 lb 12.8 oz)Weight. 120.1 kg (264 lb 12.8 oz). The comment is shoes off. Taken on 4/30/25 9:16 AM -- --   Resp 20 16 20   SpO2 97 % 97 % 96 %                General Appearance:   Pleasant  female, appears  stated age. no acute distress, obese body habitus   ENT/Mouth: membranes moist, no apparent gingival bleeding.      EYES:  no scleral icterus, normal conjunctivae   Neck: no carotid bruits. No anterior cervical lymphadenopaty   Respiratory:    lungs are clear to auscultation, no rales or wheezing,  equal chest wall expansion    Cardiovascular:   Regular rhythm, normal rate. Normal first and second heart sounds with no murmurs, rubs, or gallops; the carotid, radial and posterior tibial pulses are intact, Jugular venous pressure normal, no edema bilaterally    Abdomen/GI:  no organomegaly, masses, bruits, or tenderness; bowel sounds are present   Extremities: no cyanosis or clubbing   Skin: no xanthelasma, warm.    Heme/lymph/ Immunology No apparent bleeding noted.   Neurologic: Alert and oriented. normal gait, no tremors     Psychiatric: Pleasant, calm, appropriate affect.    A complete 10 system review of systems was performed and is negative except as mentioned in the HPI/subjective.         Past History   Past Medical History:   Past Medical History:   Diagnosis Date    Anemia     told to take iron pills when pregnant    Benign paroxysmal positional vertigo, unspecified laterality 2021    Depression     Depressive disorder     Diabetes mellitus (H)     Diabetes mellitus, type 2 (H) 07/15/2021    Dysthymic disorder     Endometriosis     Herpes genitalia     Hyperlipidemia     Kidney stone     Menorrhagia     Migraines     Neuropathy     Other abnormal Papanicolaou smear of cervix and cervical HPV(795.09) 2013    PCOS (polycystic ovarian syndrome)     Post traumatic stress disorder (PTSD)        Past Surgical History:   Past Surgical History:   Procedure Laterality Date    BLADDER REPAIR W/  SECTION      X2    C/SECTION, CLASSICAL      x2    COLONOSCOPY N/A 2021    Procedure: COLONOSCOPY;  Surgeon: Rene Lehman DO;  Location:  GI    DAVINC HYSTERECTOMY TOTAL, SALPINGECTOMY BILATERAL      Dr. Farmer    DILATION AND CURETTAGE  2015    DILATION AND CURETTAGE  2015    DILATION AND CURETTAGE, OPERATIVE HYSTEROSCOPY, COMBINED N/A 2015    Procedure: Dilation and Curettage with Hysteroscopy;  Surgeon: Zia LUGO  MD Marleny;  Location: [facility] OR;  Service:     DILATION AND CURETTAGE, OPERATIVE HYSTEROSCOPY, COMBINED N/A 09/29/2016    Procedure: DILATION AND CURETTAGE WITH HYSTEROSCOPY INSERT MIRENA;  Surgeon: Suzanne Major MD;  Location: [facility] OR;  Service:     ENT SURGERY      ESOPHAGOSCOPY, GASTROSCOPY, DUODENOSCOPY (EGD), COMBINED N/A 12/14/2021    Procedure: ESOPHAGOGASTRODUODENOSCOPY, WITH BIOPSY;  Surgeon: Rene Lehman DO;  Location:  GI    GYN SURGERY  10/19/2015    laproscopic lysis of adhesions    HYSTERECTOMY, PAP NO LONGER INDICATED      HYSTEROSCOPY, ABLATE ENDOMETRIUM HYDROTHERMAL, COMBINED N/A 03/02/2018    Procedure: HYSTEROSCOPY, DILATION AND CURETTAGE, ENDOMETRIAL ABLATION;  Surgeon: Kristy Israel DO;  [facility];  Service: Gynecology    LAPAROSCOPIC HYSTERECTOMY TOTAL N/A 03/04/2019    Procedure: ROBOTIC TOTAL LAPAROSCOPIC HYSTERECTOMY, BILATERAL SALPINGECTOMY, LEFT OVARIAN CYSTOTOMY. CYSTOSCOPY;  Surgeon: Zia Farmer MD;  Location[facility];  Service: Gynecology    LAPAROSCOPY DIAGNOSTIC (GENERAL) N/A 10/19/2015    Procedure:  LAPAROSCOPY,LYSIS OF ADHESIONS;  Surgeon: Zia Farmer MD;  Location[facility];  Service:     [place] LAP,TUBAL CAUTERY Bilateral 03/02/2018    Procedure: LAPAROSCOPIC BILATERAL TUBAL LIGATION;  Surgeon: Kristy Israel DO;  [facility];  Service: Gynecology    TONSILLECTOMY         Family History:   Family History   Adopted: Yes   Problem Relation Age of Onset    Chronic Obstructive Pulmonary Disease Mother     Prostate Cancer Father     Cancer Father         prostate    Alcoholism Sister     Alcoholism Sister     Alcoholism Brother     Alcoholism Brother     Diabetes Paternal Grandmother     Other Cancer Paternal Grandmother     Alcoholism Daughter     Coronary Artery Disease No family hx of     Urolithiasis No family hx of     Clotting Disorder No family hx of     Gout No family hx of      Heart Disease No family hx of     Anesthesia Reaction No family hx of         Social History:   Social History     Socioeconomic History    Marital status: Single     Spouse name: Not on file    Number of children: Not on file    Years of education: Not on file    Highest education level: Not on file   Occupational History    Not on file   Tobacco Use    Smoking status: Some Days     Types: Other    Smokeless tobacco: Never    Tobacco comments:     Marijuana   Vaping Use    Vaping status: Never Used   Substance and Sexual Activity    Alcohol use: Yes     Comment: Socially    Drug use: Yes     Types: Marijuana    Sexual activity: Not on file   Other Topics Concern    Not on file   Social History Narrative    Not on file     Social Drivers of Health     Financial Resource Strain: Low Risk  (11/30/2023)    Financial Resource Strain     Within the past 12 months, have you or your family members you live with been unable to get utilities (heat, electricity) when it was really needed?: No   Food Insecurity: Low Risk  (11/30/2023)    Food Insecurity     Within the past 12 months, did you worry that your food would run out before you got money to buy more?: No     Within the past 12 months, did the food you bought just not last and you didn t have money to get more?: No   Transportation Needs: Low Risk  (11/30/2023)    Transportation Needs     Within the past 12 months, has lack of transportation kept you from medical appointments, getting your medicines, non-medical meetings or appointments, work, or from getting things that you need?: No   Physical Activity: Not on file   Stress: Not on file   Social Connections: Not on file   Interpersonal Safety: Low Risk  (11/9/2023)    Interpersonal Safety     Do you feel physically and emotionally safe where you currently live?: Yes     Within the past 12 months, have you been hit, slapped, kicked or otherwise physically hurt by someone?: No     Within the past 12 months, have you  been humiliated or emotionally abused in other ways by your partner or ex-partner?: No   Housing Stability: Low Risk  (11/30/2023)    Housing Stability     Do you have housing? : Yes     Are you worried about losing your housing?: No              Lab Results    Chemistry/lipid CBC Cardiac Enzymes/BNP/TSH/INR   Lab Results   Component Value Date    CHOL 173 10/23/2024    HDL 50 10/23/2024     (H) 10/23/2024    TRIG 62 10/23/2024    CR 0.58 10/23/2024    BUN 8.6 01/22/2024    POTASSIUM 3.8 01/22/2024     01/22/2024    CO2 22 01/22/2024      Lab Results   Component Value Date    WBC 10.7 01/22/2024    HGB 13.1 01/22/2024    HCT 38.2 01/22/2024    MCV 85 01/22/2024     01/22/2024    A1C 7.9 (H) 10/23/2024     Lab Results   Component Value Date    A1C 7.9 (H) 10/23/2024    Lab Results   Component Value Date    TSH 1.84 01/22/2024     3/11/2024  TSH  Adults: 0.465 - 4.68 uIU/mL uIU/mL uIU/mL 1          Mickey Rebolledo MD [facility]  Non-Invasive Cardiologist  [facility]  Pager [phone]

## 2025-05-02 ENCOUNTER — LAB (OUTPATIENT)
Dept: LAB | Facility: CLINIC | Age: 40
End: 2025-05-02
Payer: COMMERCIAL

## 2025-05-02 ENCOUNTER — LAB REQUISITION (OUTPATIENT)
Dept: LAB | Facility: CLINIC | Age: 40
End: 2025-05-02

## 2025-05-02 DIAGNOSIS — R00.0 RACING HEART BEAT: ICD-10-CM

## 2025-05-02 DIAGNOSIS — R06.09 DYSPNEA ON EXERTION: ICD-10-CM

## 2025-05-02 DIAGNOSIS — N39.46 MIXED INCONTINENCE: ICD-10-CM

## 2025-05-02 DIAGNOSIS — R00.0 TACHYCARDIA: ICD-10-CM

## 2025-05-02 LAB
T3 SERPL-MCNC: 101 NG/DL (ref 85–202)
T4 FREE SERPL-MCNC: 0.94 NG/DL (ref 0.9–1.7)
TSH SERPL DL<=0.005 MIU/L-ACNC: 0.93 UIU/ML (ref 0.3–4.2)

## 2025-05-02 PROCEDURE — 84439 ASSAY OF FREE THYROXINE: CPT

## 2025-05-02 PROCEDURE — 36415 COLL VENOUS BLD VENIPUNCTURE: CPT

## 2025-05-02 PROCEDURE — 84480 ASSAY TRIIODOTHYRONINE (T3): CPT

## 2025-05-02 PROCEDURE — 84445 ASSAY OF TSI GLOBULIN: CPT

## 2025-05-02 PROCEDURE — 84443 ASSAY THYROID STIM HORMONE: CPT

## 2025-05-02 PROCEDURE — 87186 SC STD MICRODIL/AGAR DIL: CPT | Performed by: OBSTETRICS & GYNECOLOGY

## 2025-05-04 LAB — BACTERIA UR CULT: ABNORMAL

## 2025-05-05 ENCOUNTER — MYC MEDICAL ADVICE (OUTPATIENT)
Dept: NEUROLOGY | Facility: CLINIC | Age: 40
End: 2025-05-05
Payer: COMMERCIAL

## 2025-05-05 DIAGNOSIS — E66.813 CLASS 3 SEVERE OBESITY DUE TO EXCESS CALORIES WITH SERIOUS COMORBIDITY AND BODY MASS INDEX (BMI) OF 45.0 TO 49.9 IN ADULT (H): ICD-10-CM

## 2025-05-05 DIAGNOSIS — G43.719 INTRACTABLE CHRONIC MIGRAINE WITHOUT AURA AND WITHOUT STATUS MIGRAINOSUS: ICD-10-CM

## 2025-05-06 ENCOUNTER — MYC MEDICAL ADVICE (OUTPATIENT)
Dept: ENDOCRINOLOGY | Facility: CLINIC | Age: 40
End: 2025-05-06
Payer: COMMERCIAL

## 2025-05-06 DIAGNOSIS — E11.9 TYPE 2 DIABETES MELLITUS WITHOUT COMPLICATION, UNSPECIFIED WHETHER LONG TERM INSULIN USE (H): Primary | ICD-10-CM

## 2025-05-06 RX ORDER — VERAPAMIL HYDROCHLORIDE 120 MG/1
120 CAPSULE, EXTENDED RELEASE ORAL AT BEDTIME
Qty: 30 CAPSULE | Refills: 1 | Status: SHIPPED | OUTPATIENT
Start: 2025-05-06

## 2025-05-06 RX ORDER — TOPIRAMATE 100 MG/1
100 TABLET, FILM COATED ORAL 2 TIMES DAILY
Qty: 60 TABLET | Refills: 1 | Status: SHIPPED | OUTPATIENT
Start: 2025-05-06

## 2025-05-06 NOTE — TELEPHONE ENCOUNTER
Pending Prescriptions:                       Disp   Refills    topiramate (TOPAMAX) 100 MG tablet        60 tab*1            Sig: Take 1 tablet (100 mg) by mouth 2 times daily.    verapamil ER (VERELAN) 120 MG 24 hr capsu*30 cap*1            Sig: Take 1 capsule (120 mg) by mouth at bedtime.    PT has appt 6/18/25 with Roya Musa.      Pended 30 day with one refill of verapamil and topiramate.     Lili, RN, BSN  [facility] Neurology

## 2025-05-07 ENCOUNTER — VIRTUAL VISIT (OUTPATIENT)
Dept: PHARMACY | Facility: CLINIC | Age: 40
End: 2025-05-07
Attending: PHYSICIAN ASSISTANT
Payer: COMMERCIAL

## 2025-05-07 DIAGNOSIS — G62.9 NEUROPATHY: ICD-10-CM

## 2025-05-07 DIAGNOSIS — R11.2 NAUSEA AND VOMITING, UNSPECIFIED VOMITING TYPE: ICD-10-CM

## 2025-05-07 DIAGNOSIS — G43.719 INTRACTABLE CHRONIC MIGRAINE WITHOUT AURA AND WITHOUT STATUS MIGRAINOSUS: ICD-10-CM

## 2025-05-07 DIAGNOSIS — E11.9 TYPE 2 DIABETES MELLITUS WITHOUT COMPLICATION, UNSPECIFIED WHETHER LONG TERM INSULIN USE (H): Primary | ICD-10-CM

## 2025-05-07 DIAGNOSIS — E78.5 HYPERLIPIDEMIA, UNSPECIFIED HYPERLIPIDEMIA TYPE: ICD-10-CM

## 2025-05-07 DIAGNOSIS — R13.19 ESOPHAGEAL DYSPHAGIA: ICD-10-CM

## 2025-05-07 PROCEDURE — 99605 MTMS BY PHARM NP 15 MIN: CPT | Mod: 93 | Performed by: PHARMACIST

## 2025-05-07 PROCEDURE — 99607 MTMS BY PHARM ADDL 15 MIN: CPT | Mod: 93 | Performed by: PHARMACIST

## 2025-05-07 RX ORDER — RIZATRIPTAN BENZOATE 10 MG/1
10 TABLET ORAL
Qty: 18 TABLET | Refills: 0 | Status: SHIPPED | OUTPATIENT
Start: 2025-05-07

## 2025-05-07 RX ORDER — MIRABEGRON 25 MG/1
25 TABLET, FILM COATED, EXTENDED RELEASE ORAL DAILY
COMMUNITY
Start: 2025-05-02

## 2025-05-07 RX ORDER — GABAPENTIN 300 MG/1
300 CAPSULE ORAL 2 TIMES DAILY
Status: SHIPPED
Start: 2025-05-14 | End: 2025-05-21

## 2025-05-07 NOTE — PROGRESS NOTES
"Medication Therapy Management (MTM) Encounter    ASSESSMENT:                            Medication Adherence/Access: No issues identified.      Diabetes   CGM averages at goal <180. At goal >70% time in range.  Very few diet related spikes in the last two weeks. Likely okay to continue without insulin at this time.        Hyperlipidemia   LDL above goal <100. Technically does not fit into guideline based age cut offs, but nearly 40, so could aim for goal <70. Statin dose increased in March. Plan for repeat LDL at next PCP visit.         Headache   Migraine:   Worsened recently due to running out of medications. They were refill yesterday. Doesn't have Rizatriptan. Will fill one time until patient can follow-up with Neurology again.        Neuropathy:   Nausea/GERD:   Nausea could be related to migraines, uncontrolled GERD, or medications. Both Topiramate and Gabapentin can be associated with migraines. As neuropathy is fairly well controlled at this time and blood glucose control is better, will trial a taper of Gabapentin. Gabapentin can also be used for headache prevention, so monitor for changes with headaches with the taper. Given that patient is going to be restarting Topamax and Verapamil, will delay Gabapentin taper by 1 week to get closer to baseline.       Urinary Incontinence:  No medication start this week. Continue to monitor for effectiveness.     PLAN:                            Continue without insulin.   Refill Rizatriptan until you get see Neurology.   In 1 week from now, reduce Gabapentin 300 mg to 1 cap twice daily for 7 days, then stop.     Follow-up: Return in about 5 weeks (around 6/11/2025) for Medication Management Pharmacist, by phone.    SUBJECTIVE/OBJECTIVE:                          Ruth Vanegas is a 39 year old female seen for an initial visit. She was referred to me from Yanet Christie PA-C with Endocrinology.      Reason for visit: Referral for \"possible side effects of mediation- " "gabapentin/topamax possibly causing nausea, check on glucose since stopping insulin.\"        Allergies/ADRs: Reviewed in chart  Past Medical History: Reviewed in chart  Tobacco: She reports that she has been smoking other. She has never used smokeless tobacco.Nicotine/Tobacco Cessation Plan  Information offered: Patient not interested at this time    Alcohol:  reports current alcohol use. Socially        Medication Adherence/Access: no issues reported.    Diabetes   Metformin  mg twice daily    Recently stopped insulin due to frequent low blood glucose.   Has not tolerated SGLT2 in the past due to yeast infections.     Stopped Victoza when she started insulin in the past.   Last Endo notes indicate she was \"violently ill\" with Ozempic.      Does still have Novolog. Used it 2-3 weeks ago - had an issue with pharmacy refilling Metformin.                  Hyperlipidemia   Rosuvastatin 40mg daily    Recent Labs   Lab Test 10/23/24  1034 01/22/24  1044   CHOL 173 286*   HDL 50 62   * 205*   TRIG 62 93             Headache     Migraine:   Preventive medications  Topiramate 100 mg twice daily  Gabapentin 300 mg 2 caps  3 times daily - patient using twice daily AM and PM.   Verapamil 120 mg ER daily     Acute medications  Rizatriptan 10 mg as needed - hasn't had any. Last Rx was in Feb 2024.   Ondansetron 4 mg every 6 hours as needed      Has Neurology follow-up in June.     Had been out of Topamax and Verapamil for several weeks. Just got refilled yesterday.   Migraines make nausea much worse.          Neuropathy:   Gabapentin 300 mg 2 caps  3 times daily - patient using twice daily AM and PM.     Not having a lot of symptoms at this time.       Nausea/GERD:   Pretty much constant. Some days right when waking. Threw up the other day at work. Doesn't like to use Zofran very often because it causes constipation.     Does have a lot of \"regurgitation\"     Doesn't eat a lot.     Was using PPI, but had to hold " prior to H. Pylori testing. Did test postive -was treated and tested negative. Then had additional testing done, including manometry -trying to figure out what was contributing.     Dr Lehman had wanted a ANDRADE done but hasn't gotten this scheduled yet.       Urinary Incontinence:  Recently started on Myrbetriq 25 mg daily - has used for a couple days so far.        Today's Vitals: LMP  (LMP Unknown)   ----------------      I spent 25 minutes with this patient today. All changes were made via collaborative practice agreement with Milton Banks MD.     A summary of these recommendations was sent via be2.    Sariah WilsonD  Medication Therapy Management (MTM) Pharmacist  [facility]      Telemedicine Visit Details  The patient's medications can be safely assessed via a telemedicine encounter.  Type of service:  Telephone visit  Originating Location (pt. Location): Home    Distant Location (provider location):  On-site  Start Time: 3:24 PM  End Time: 3:49 PM     Medication Therapy Recommendations  Neuropathy   1 Current Medication: gabapentin (NEURONTIN) 300 MG capsule   Current Medication Sig: Take 1 capsule (300 mg) by mouth 2 times daily for 7 days. Then stop   Rationale: Undesirable effect - Adverse medication event - Safety   Recommendation: Discontinue Medication   Status: Accepted per CPA   Identified Date: 5/7/2025 Completed Date: 5/7/2025

## 2025-05-07 NOTE — PATIENT INSTRUCTIONS
"Recommendations from today's MTM visit:                                                         Continue without insulin.   Refill Rizatriptan until you get see Neurology.   In 1 week from now, reduce Gabapentin 300 mg to 1 cap twice daily for 7 days, then stop.     Follow-up: Return in about 5 weeks (around 6/11/2025) for Medication Management Pharmacist, by phone.    It was great speaking with you today.  I value your experience and would be very thankful for your time in providing feedback in our clinic survey. In the next few days, you may receive an email or text message from [employer] with a link to a survey related to your  clinical pharmacist.\"     To schedule another MTM appointment, please call the clinic directly or you may call the MTM scheduling line at [phone].    My Clinical Pharmacist's contact information:                                                      Please feel free to contact me with any questions or concerns you have.      Randall Allison, PharmD  Medication Therapy Management (MTM) Pharmacist  [facility]     "

## 2025-05-10 ENCOUNTER — HEALTH MAINTENANCE LETTER (OUTPATIENT)
Age: 40
End: 2025-05-10

## 2025-05-12 LAB — TSI SER-ACNC: <1 TSI INDEX

## 2025-05-15 ENCOUNTER — RESULTS FOLLOW-UP (OUTPATIENT)
Dept: ENDOCRINOLOGY | Facility: CLINIC | Age: 40
End: 2025-05-15

## 2025-05-22 ENCOUNTER — HOSPITAL ENCOUNTER (OUTPATIENT)
Dept: CARDIOLOGY | Facility: CLINIC | Age: 40
End: 2025-05-22
Attending: GENERAL ACUTE CARE HOSPITAL
Payer: COMMERCIAL

## 2025-05-22 DIAGNOSIS — R06.09 DYSPNEA ON EXERTION: ICD-10-CM

## 2025-05-22 DIAGNOSIS — R00.0 TACHYCARDIA: ICD-10-CM

## 2025-05-22 LAB — LVEF ECHO: NORMAL

## 2025-05-22 PROCEDURE — 93306 TTE W/DOPPLER COMPLETE: CPT

## 2025-05-29 LAB — CV ZIO PRELIM RESULTS: NORMAL

## 2025-06-16 ENCOUNTER — TELEPHONE (OUTPATIENT)
Dept: ENDOCRINOLOGY | Facility: CLINIC | Age: 40
End: 2025-06-16
Payer: COMMERCIAL

## 2025-06-16 ENCOUNTER — MYC MEDICAL ADVICE (OUTPATIENT)
Dept: ENDOCRINOLOGY | Facility: CLINIC | Age: 40
End: 2025-06-16
Payer: COMMERCIAL

## 2025-06-16 DIAGNOSIS — Z79.4 TYPE 2 DIABETES MELLITUS WITH DIABETIC POLYNEUROPATHY, WITH LONG-TERM CURRENT USE OF INSULIN (H): Primary | ICD-10-CM

## 2025-06-16 DIAGNOSIS — E11.42 TYPE 2 DIABETES MELLITUS WITH DIABETIC POLYNEUROPATHY, WITH LONG-TERM CURRENT USE OF INSULIN (H): Primary | ICD-10-CM

## 2025-06-16 DIAGNOSIS — E11.42 TYPE 2 DIABETES MELLITUS WITH DIABETIC POLYNEUROPATHY, WITH LONG-TERM CURRENT USE OF INSULIN (H): ICD-10-CM

## 2025-06-16 DIAGNOSIS — Z79.4 TYPE 2 DIABETES MELLITUS WITH DIABETIC POLYNEUROPATHY, WITH LONG-TERM CURRENT USE OF INSULIN (H): ICD-10-CM

## 2025-06-16 RX ORDER — HYDROCHLOROTHIAZIDE 12.5 MG/1
CAPSULE ORAL
Qty: 6 EACH | Refills: 3 | Status: SHIPPED | OUTPATIENT
Start: 2025-06-16

## 2025-06-16 RX ORDER — ACYCLOVIR 800 MG/1
TABLET ORAL
Qty: 2 EACH | Refills: 11 | Status: SHIPPED | OUTPATIENT
Start: 2025-06-16

## 2025-06-16 NOTE — TELEPHONE ENCOUNTER
Freestyle Armida 2 & 3 sensors are going to be discontinued this fall. We are asking providers to transition patients to Freestyle Armida 2 Plus and 3 Plus Sensors  now to mitigate interruption of receiving their sensors.  Please send a new order for Freestyle Armida 3 Plus sensors, thanks!    Rose Rebolledo, [facility]  Endo Clinic Liaison  [facility]  [email or web address]   [email or web address]  Phone: [phone]  Fax: [phone]

## 2025-06-17 NOTE — PROGRESS NOTES
__________________________________  ESTABLISHED PATIENT NEUROLOGY NOTE    DATE OF VISIT: 6/18/2025  MRN: [id number]  PATIENT NAME: Ruth Vanegas  YOB: 1985    Chief Complaint   Patient presents with    Headache     Patient states she is having headaches with nausea daily. Follow up     SUBJECTIVE:                                                      HISTORY OF PRESENT ILLNESS:  Ruth is here for follow up regarding headache    Ruth Vanegas is a 39 year old female who follows with Dr. Bolanos for headaches, last seen 2/5/2024. Per chart review,  She reports that she has had headaches for years these would come and go.  Over the last few months these have become more constant and lasting longer.  She reports that she has 15 headache days a month.  Both temples are involved.  Headaches are throbbing in nature with photophobia and phonophobia.  She denies any triggers for her headaches. She is tried sumatriptan in the past which made things worse. She has tried Excedrin but she has to catch the headaches soon enough to make it work. She is currently on nortriptyline 50 mg at night which is not helping. The nortriptyline was also being used as a sleep aid. She is also on Topamax which is not helping. She is taking 50 mg in the evening and 25 mg in the morning. She further complains of vertigo which is sometimes with the headache. She does have issues with uncontrolled sugars and feels that the Topamax might be helpful. Denies any visual auras.      Will continue Topamax, verapamil and Emgality.     06/18/25: Ruth presents to the clinic today for headache follow-up.  She states that in the last couple of months her headaches have increased to daily constant pain.  She states that her pain varies in severity from mild to severe.  No change in the characteristics of her headaches just the frequency.  She is currently using rizatriptan 3-4 times a week in addition to Excedrin.  Discussed  medication overuse headache.  She will try to limit use of abortive therapies.  Triggers could be linked to increased stress and poor sleep.  Patient typically on 3 preventatives to help with symptom management.  Her insurance switched and she was kicked off of Emgality.  She has tried Botox, Ajovy, amitriptyline, propranolol in the past.  She is currently on Topamax and verapamil.  We will reinitiate a CGRP inhibitor, Qulipta to help with symptom management.     Current Medications:   Current Outpatient Medications   Medication Sig Dispense Refill    acetaminophen (TYLENOL) 500 MG tablet Take 500-1,000 mg by mouth every 4 hours as needed       Atogepant (QULIPTA) 30 MG tablet Take 1 tablet (30 mg) by mouth daily. 30 tablet 1    BD PEN NEEDLE RAMON 2ND GEN 32G X 4 MM miscellaneous USE 4 PEN NEEDLES DAILY OR AS DIRECTED. 100 each 3    Continuous Glucose Sensor (FREESTYLE CINDY 3 PLUS SENSOR) MISC Change every 15 days. 6 each 3    Continuous Glucose Sensor (FREESTYLE CINDY 3 PLUS SENSOR) MISC Change every 15 days. 6 each 1    Continuous Glucose Sensor (FREESTYLE CINDY 3 SENSOR) MISC APPLY TOPICALLY EVERY 14 DAYS 2 each 11    KETOSTIX test strip Use as directed in case of high glucose, vomiting or illness. 50 strip 11    metFORMIN (GLUCOPHAGE XR) 500 MG 24 hr tablet TAKE 1 TABLET (500 MG) BY MOUTH TWICE DAILY WITH MEALS. 180 tablet 3    MYRBETRIQ 25 MG 24 hr tablet Take 25 mg by mouth daily.      ondansetron (ZOFRAN) 4 MG tablet Take 1 tablet (4 mg) by mouth every 6 hours as needed for nausea. 60 tablet 1    rizatriptan (MAXALT) 10 MG tablet Take 1 tablet (10 mg) by mouth at onset of headache for migraine. May repeat in 2 hours. 18 tablet 0    rosuvastatin (CRESTOR) 40 MG tablet Take 1 tablet (40 mg) by mouth daily. 90 tablet 3    topiramate (TOPAMAX) 100 MG tablet Take 1 tablet (100 mg) by mouth 2 times daily. 60 tablet 1    verapamil ER (VERELAN) 120 MG 24 hr capsule Take 1 capsule (120 mg) by mouth at bedtime. 30  capsule 1    gabapentin (NEURONTIN) 300 MG capsule Take 1 capsule (300 mg) by mouth 2 times daily for 7 days. Then stop (Patient not taking: Reported on 2025)      multivitamin (ONE-DAILY) tablet Take 1 tablet by mouth daily (Patient not taking: Reported on 2025) 100 tablet 3     No current facility-administered medications for this visit.     Past Medical History:   Patient  has a past medical history of Anemia, Benign paroxysmal positional vertigo, unspecified laterality (2021), Depression, Depressive disorder, Diabetes mellitus (H), Diabetes mellitus, type 2 (H) (07/15/2021), Dysthymic disorder, Endometriosis, Herpes genitalia, Hyperlipidemia, Kidney stone, Menorrhagia, Migraines, Neuropathy, Other abnormal Papanicolaou smear of cervix and cervical HPV(795.09) (2013), PCOS (polycystic ovarian syndrome), and Post traumatic stress disorder (PTSD).  Surgical History:  She  has a past surgical history that includes GYN surgery (10/19/2015); ENT surgery; Dilation and curettage (2015); c/section, classical; Tonsillectomy; Dilation and curettage (2015); Dilation and curettage, operative hysteroscopy, combined (N/A, 2015); Laparoscopy diagnostic (general) (N/A, 10/19/2015); Bladder Repair W/  Section; Dilation and curettage, operative hysteroscopy, combined (N/A, 2016); Pr Lap,Tubal Cautery (Bilateral, 2018); Hysteroscopy, ablate endometrium hydrothermal, combined (N/A, 2018); Laparoscopic hysterectomy total (N/A, 2019); Esophagoscopy, gastroscopy, duodenoscopy (EGD), combined (N/A, 2021); Colonoscopy (N/A, 2021); Hysterectomy, Pap No Longer Indicated; and Davinci Hysterectomy Total, Salpingectomy Bilateral ().  Family and Social History:  Reviewed, and she  reports that she has been smoking other. She has never used smokeless tobacco. She reports current alcohol use. She reports current drug use. Drug: Marijuana.  Reviewed, and  "family history includes Alcoholism in her brother, brother, daughter, sister, and sister; Cancer in her father; Chronic Obstructive Pulmonary Disease in her mother; Diabetes in her paternal grandmother; Other Cancer in her paternal grandmother; Prostate Cancer in her father. She was adopted.      RECENT DIAGNOSTIC STUDIES:     Imaging:   EXAM: MR BRAIN W/O CONTRAST  LOCATION: [facility]  DATE/TIME: 3/8/2023 3:37 PM     INDICATION: Headaches worsening  IMPRESSION:  1.  No acute/subacute infarcts, mass lesions, hydrocephalus or MRI evidence of intracranial hemorrhage.         No data to display                  REVIEW OF SYSTEMS:                                                      10-point review of systems is negative except as mentioned above in HPI.    EXAM:                                                      Physical Exam:   Vitals: /75   Pulse 69   Ht 1.676 m (5' 6\")   Wt 118.4 kg (261 lb)   LMP  (LMP Unknown)   BMI 42.13 kg/m    BMI= Body mass index is 42.13 kg/m .  GENERAL: NAD.  HEENT: NC/AT.  PULM: Non-labored breathing.   Neurologic:  MENTAL STATUS: Alert, attentive. Speech is fluent. Normal comprehension. Normal concentration. Adequate fund of knowledge.   CRANIAL NERVES:Visual fields intact to confrontation. Pupils equally, round and reactive to light. Facial sensation and movement normal. EOM full. Hearing intact to conversation. Trapezius strength intact. Palate moves symmetrically. Tongue midline.  MOTOR: 5/5 in proximal and distal muscle groups of upper and lower extremities. Tone and bulk normal.   SENSATION: Normal light touch throughout.   COORDINATION: Normal finger nose finger. Finger tapping normal. Knee heel shin normal.  STATION AND GAIT: Romberg Negative. Good postural reflexes. Casual gait normal and tandem unsteady  left hand-dominant.      ASSESSMENT and PLAN:                                                      Assessment:    ICD-10-CM    1. " Intractable chronic migraine without aura and without status migrainosus  G43.719 Atogepant (QULIPTA) 30 MG tablet          Ruth Vanegas is a 39 year old female who follows with Dr. Bolanos for headaches, last seen 2/5/2024.  Ruth reports that her headaches have significantly worsened over the last few months.  She is currently having daily headaches that range in severity from mild to severe.  She is on verapamil and Topamax for preventative therapy.  Her insurance no longer covers Emgality.  She has tried nortriptyline, propranolol, Ajovy, Botox in the past.  We will order Qulipta to help better control her headaches.  We discussed interventions outlined below and a plan to see the patient back in 3 months.  Ruth understands and agrees with this plan.      Plan:  --- Continue Preventive therapy: Verapamil and Topamax as prescribed  --- Start Qulipta as preventative therapy.  We will start with 30 mg daily to see how you tolerate the medication.  If you tolerate well we will move up to 60 mg which is recommended for chronic migraines.  Please keep me updated via [employer] in the next 3 weeks  --- Continue Abortive therapy: Rizatriptan as needed (try to limit to less than 12 times per month)  --- If migraines are not fully treated with your abortive therapy alone you can add an over the counter medication like Aleve,Tylenol or Ibuprofen. Take at the same time as the first dose of your abortive therapy. Do not take over-the-counter medications more than 14 days a month to avoid rebound headaches.   --- Try Nonpharmacological interventions like heat or cold, massage, or rest  --- Practice good headache hygiene: Avoid known triggers, get adequate sleep, manage stress levels, stay hydrated and eat nutritious meals  --- Plan on follow up in the Neurology Clinic in 3 months.  --- Please feel free to reach out if you have any further questions or concerns.  --- Seek immediate medical attention if an emergency  arises or if your health becomes progressively worse.     It was a pleasure meeting you today!     Total Time: Total time spent for face to face visit, reviewing labs/imaging studies, counseling and coordination of care was: 30 Minutes spent on the date of the encounter doing chart review, review of outside records, patient visit, and documentation     This note was dictated using voice recognition software.  Any grammatical or context distortions are unintentional and inherent to the software.    Roya Musa, DNP, APRN, CNP  [facility]

## 2025-06-18 ENCOUNTER — OFFICE VISIT (OUTPATIENT)
Dept: NEUROLOGY | Facility: CLINIC | Age: 40
End: 2025-06-18
Payer: COMMERCIAL

## 2025-06-18 VITALS
WEIGHT: 261 LBS | HEART RATE: 69 BPM | HEIGHT: 66 IN | DIASTOLIC BLOOD PRESSURE: 75 MMHG | SYSTOLIC BLOOD PRESSURE: 113 MMHG | BODY MASS INDEX: 41.95 KG/M2

## 2025-06-18 DIAGNOSIS — G43.719 INTRACTABLE CHRONIC MIGRAINE WITHOUT AURA AND WITHOUT STATUS MIGRAINOSUS: Primary | ICD-10-CM

## 2025-06-18 PROCEDURE — 3074F SYST BP LT 130 MM HG: CPT

## 2025-06-18 PROCEDURE — 3078F DIAST BP <80 MM HG: CPT

## 2025-06-18 PROCEDURE — 99214 OFFICE O/P EST MOD 30 MIN: CPT

## 2025-06-18 NOTE — PATIENT INSTRUCTIONS
Plan:  --- Continue preventive therapy: Verapamil and Topamax as prescribed  --- Start Qulipta as preventative therapy.  We will start with 30 mg daily to see how you tolerate the medication.  If you tolerate well we will move up to 60 mg which is recommended for chronic migraines.  Please keep me updated via [employer] in the next 3 weeks  --- Continue Abortive therapy: Rizatriptan as needed (try to limit to less than 12 times per month)  --- If migraines are not fully treated with your abortive therapy alone you can add an over the counter medication like Aleve,Tylenol or Ibuprofen. Take at the same time as the first dose of your abortive therapy. Do not take over-the-counter medications more than 14 days a month to avoid rebound headaches.   --- Try Nonpharmacological interventions like heat or cold, massage, or rest  --- Practice good headache hygiene: Avoid known triggers, get adequate sleep, manage stress levels, stay hydrated and eat nutritious meals  --- Plan on follow up in the Neurology Clinic in 3 months.  --- Please feel free to reach out if you have any further questions or concerns.  --- Seek immediate medical attention if an emergency arises or if your health becomes progressively worse.     It was a pleasure meeting you today!

## 2025-06-18 NOTE — NURSING NOTE
Chief Complaint   Patient presents with    Headache     Patient states she is having headaches with nausea daily. Follow up     Ita Blount on 6/18/2025 at 8:27 AM

## 2025-06-18 NOTE — LETTER
6/18/2025      Ruth Vanegas  [address]  [place] [address]      Dear Colleague,    Thank you for referring your patient, Ruth Vanegas, to the [facility]. Please see a copy of my visit note below.      __________________________________  ESTABLISHED PATIENT NEUROLOGY NOTE    DATE OF VISIT: 6/18/2025  MRN: [id number]  PATIENT NAME: Ruth Vanegas  YOB: 1985    Chief Complaint   Patient presents with     Headache     Patient states she is having headaches with nausea daily. Follow up     SUBJECTIVE:                                                      HISTORY OF PRESENT ILLNESS:  Ruth is here for follow up regarding headache    Ruth Vanegas is a 39 year old female who follows with Dr. Bolanos for headaches, last seen 2/5/2024. Per chart review,  She reports that she has had headaches for years these would come and go.  Over the last few months these have become more constant and lasting longer.  She reports that she has 15 headache days a month.  Both temples are involved.  Headaches are throbbing in nature with photophobia and phonophobia.  She denies any triggers for her headaches. She is tried sumatriptan in the past which made things worse. She has tried Excedrin but she has to catch the headaches soon enough to make it work. She is currently on nortriptyline 50 mg at night which is not helping. The nortriptyline was also being used as a sleep aid. She is also on Topamax which is not helping. She is taking 50 mg in the evening and 25 mg in the morning. She further complains of vertigo which is sometimes with the headache. She does have issues with uncontrolled sugars and feels that the Topamax might be helpful. Denies any visual auras.      Will continue Topamax, verapamil and Emgality.     06/18/25: Ruth presents to the clinic today for headache follow-up.  She states that in the last couple of months her headaches have  increased to daily constant pain.  She states that her pain varies in severity from mild to severe.  No change in the characteristics of her headaches just the frequency.  She is currently using rizatriptan 3-4 times a week in addition to Excedrin.  Discussed medication overuse headache.  She will try to limit use of abortive therapies.  Triggers could be linked to increased stress and poor sleep.  Patient typically on 3 preventatives to help with symptom management.  Her insurance switched and she was kicked off of Emgality.  She has tried Botox, Ajovy, amitriptyline, propranolol in the past.  She is currently on Topamax and verapamil.  We will reinitiate a CGRP inhibitor, Qulipta to help with symptom management.     Current Medications:   Current Outpatient Medications   Medication Sig Dispense Refill     acetaminophen (TYLENOL) 500 MG tablet Take 500-1,000 mg by mouth every 4 hours as needed        Atogepant (QULIPTA) 30 MG tablet Take 1 tablet (30 mg) by mouth daily. 30 tablet 1     BD PEN NEEDLE RAMON 2ND GEN 32G X 4 MM miscellaneous USE 4 PEN NEEDLES DAILY OR AS DIRECTED. 100 each 3     Continuous Glucose Sensor (FREESTYLE CINDY 3 PLUS SENSOR) MISC Change every 15 days. 6 each 3     Continuous Glucose Sensor (FREESTYLE CINDY 3 PLUS SENSOR) MISC Change every 15 days. 6 each 1     Continuous Glucose Sensor (FREESTYLE CINDY 3 SENSOR) MISC APPLY TOPICALLY EVERY 14 DAYS 2 each 11     KETOSTIX test strip Use as directed in case of high glucose, vomiting or illness. 50 strip 11     metFORMIN (GLUCOPHAGE XR) 500 MG 24 hr tablet TAKE 1 TABLET (500 MG) BY MOUTH TWICE DAILY WITH MEALS. 180 tablet 3     MYRBETRIQ 25 MG 24 hr tablet Take 25 mg by mouth daily.       ondansetron (ZOFRAN) 4 MG tablet Take 1 tablet (4 mg) by mouth every 6 hours as needed for nausea. 60 tablet 1     rizatriptan (MAXALT) 10 MG tablet Take 1 tablet (10 mg) by mouth at onset of headache for migraine. May repeat in 2 hours. 18 tablet 0      rosuvastatin (CRESTOR) 40 MG tablet Take 1 tablet (40 mg) by mouth daily. 90 tablet 3     topiramate (TOPAMAX) 100 MG tablet Take 1 tablet (100 mg) by mouth 2 times daily. 60 tablet 1     verapamil ER (VERELAN) 120 MG 24 hr capsule Take 1 capsule (120 mg) by mouth at bedtime. 30 capsule 1     gabapentin (NEURONTIN) 300 MG capsule Take 1 capsule (300 mg) by mouth 2 times daily for 7 days. Then stop (Patient not taking: Reported on 2025)       multivitamin (ONE-DAILY) tablet Take 1 tablet by mouth daily (Patient not taking: Reported on 2025) 100 tablet 3     No current facility-administered medications for this visit.     Past Medical History:   Patient  has a past medical history of Anemia, Benign paroxysmal positional vertigo, unspecified laterality (2021), Depression, Depressive disorder, Diabetes mellitus (H), Diabetes mellitus, type 2 (H) (07/15/2021), Dysthymic disorder, Endometriosis, Herpes genitalia, Hyperlipidemia, Kidney stone, Menorrhagia, Migraines, Neuropathy, Other abnormal Papanicolaou smear of cervix and cervical HPV(795.09) (2013), PCOS (polycystic ovarian syndrome), and Post traumatic stress disorder (PTSD).  Surgical History:  She  has a past surgical history that includes GYN surgery (10/19/2015); ENT surgery; Dilation and curettage (2015); c/section, classical; Tonsillectomy; Dilation and curettage (2015); Dilation and curettage, operative hysteroscopy, combined (N/A, 2015); Laparoscopy diagnostic (general) (N/A, 10/19/2015); Bladder Repair W/  Section; Dilation and curettage, operative hysteroscopy, combined (N/A, 2016); Pr Lap,Tubal Cautery (Bilateral, 2018); Hysteroscopy, ablate endometrium hydrothermal, combined (N/A, 2018); Laparoscopic hysterectomy total (N/A, 2019); Esophagoscopy, gastroscopy, duodenoscopy (EGD), combined (N/A, 2021); Colonoscopy (N/A, 2021); Hysterectomy, Pap No Longer Indicated; and  "Davinci Hysterectomy Total, Salpingectomy Bilateral (2019).  Family and Social History:  Reviewed, and she  reports that she has been smoking other. She has never used smokeless tobacco. She reports current alcohol use. She reports current drug use. Drug: Marijuana.  Reviewed, and family history includes Alcoholism in her brother, brother, daughter, sister, and sister; Cancer in her father; Chronic Obstructive Pulmonary Disease in her mother; Diabetes in her paternal grandmother; Other Cancer in her paternal grandmother; Prostate Cancer in her father. She was adopted.      RECENT DIAGNOSTIC STUDIES:     Imaging:   EXAM: MR BRAIN W/O CONTRAST  LOCATION: [facility]  DATE/TIME: 3/8/2023 3:37 PM     INDICATION: Headaches worsening  IMPRESSION:  1.  No acute/subacute infarcts, mass lesions, hydrocephalus or MRI evidence of intracranial hemorrhage.         No data to display                  REVIEW OF SYSTEMS:                                                      10-point review of systems is negative except as mentioned above in HPI.    EXAM:                                                      Physical Exam:   Vitals: /75   Pulse 69   Ht 1.676 m (5' 6\")   Wt 118.4 kg (261 lb)   LMP  (LMP Unknown)   BMI 42.13 kg/m    BMI= Body mass index is 42.13 kg/m .  GENERAL: NAD.  HEENT: NC/AT.  PULM: Non-labored breathing.   Neurologic:  MENTAL STATUS: Alert, attentive. Speech is fluent. Normal comprehension. Normal concentration. Adequate fund of knowledge.   CRANIAL NERVES:Visual fields intact to confrontation. Pupils equally, round and reactive to light. Facial sensation and movement normal. EOM full. Hearing intact to conversation. Trapezius strength intact. Palate moves symmetrically. Tongue midline.  MOTOR: 5/5 in proximal and distal muscle groups of upper and lower extremities. Tone and bulk normal.   SENSATION: Normal light touch throughout.   COORDINATION: Normal finger nose finger. " Finger tapping normal. Knee heel shin normal.  STATION AND GAIT: Romberg Negative. Good postural reflexes. Casual gait normal and tandem unsteady  left hand-dominant.      ASSESSMENT and PLAN:                                                      Assessment:    ICD-10-CM    1. Intractable chronic migraine without aura and without status migrainosus  G43.719 Atogepant (QULIPTA) 30 MG tablet          Ruth Vanegas is a 39 year old female who follows with Dr. Bolanos for headaches, last seen 2/5/2024.  Ruth reports that her headaches have significantly worsened over the last few months.  She is currently having daily headaches that range in severity from mild to severe.  She is on verapamil and Topamax for preventative therapy.  Her insurance no longer covers Emgality.  She has tried nortriptyline, propranolol, Ajovy, Botox in the past.  We will order Qulipta to help better control her headaches.  We discussed interventions outlined below and a plan to see the patient back in 3 months.  Ruth understands and agrees with this plan.      Plan:  --- Continue Preventive therapy: Verapamil and Topamax as prescribed  --- Start Qulipta as preventative therapy.  We will start with 30 mg daily to see how you tolerate the medication.  If you tolerate well we will move up to 60 mg which is recommended for chronic migraines.  Please keep me updated via [employer] in the next 3 weeks  --- Continue Abortive therapy: Rizatriptan as needed (try to limit to less than 12 times per month)  --- If migraines are not fully treated with your abortive therapy alone you can add an over the counter medication like Aleve,Tylenol or Ibuprofen. Take at the same time as the first dose of your abortive therapy. Do not take over-the-counter medications more than 14 days a month to avoid rebound headaches.   --- Try Nonpharmacological interventions like heat or cold, massage, or rest  --- Practice good headache hygiene: Avoid known triggers, get  adequate sleep, manage stress levels, stay hydrated and eat nutritious meals  --- Plan on follow up in the Neurology Clinic in 3 months.  --- Please feel free to reach out if you have any further questions or concerns.  --- Seek immediate medical attention if an emergency arises or if your health becomes progressively worse.     It was a pleasure meeting you today!     Total Time: Total time spent for face to face visit, reviewing labs/imaging studies, counseling and coordination of care was: 30 Minutes spent on the date of the encounter doing chart review, review of outside records, patient visit, and documentation     This note was dictated using voice recognition software.  Any grammatical or context distortions are unintentional and inherent to the software.    Roya Musa, MARILYN, APRN, CNP  [facility]         Again, thank you for allowing me to participate in the care of your patient.        Sincerely,        CARLOS North CNP    Electronically signed

## 2025-06-20 NOTE — PROGRESS NOTES
03/31/25 1:46 PM  PATIENT LAB/IMAGING STATUS : Orders completed / No further action needed   Patient is showing 4/5 MNCM met. A1c not in range   Outcome for 06/20/25 3:52 PM: Data uploaded on [employer]  Hanane Sherman MA  Outcome for 07/03/25 9:01 AM: Data obtained via [employer] website  Hanane Sherman MA    Patient is showing 4/5 MNCM met. Current tobacco use   Hanane Sherman MA      Start time: 0700  End time: 0734  Provider location: off site- home  Patient location: off site- home.    HPI:   Ruth is a pleasant 38 yo woman here for follow up of type 2 diabetes (PREETHI negative, C-peptide positive) since 2016.  She also has a history of Anemia, Depression, Endometriosis, Herpes genitalia, Hyperlipidemia, Kidney stone, Menorrhagia, Migraines, Neuropathy, PCOS (polycystic ovarian syndrome), and Post traumatic stress disorder (PTSD).  Since her last visit, she has been doing ok.  Glucose has been a bit higher since stopping insulin (stopped 3/31/25) due to hypoglycemia.      She continues to have episodes of heart pounding, dizziness, high BP, tachycardia, sometimes headaches, about once every couple of weeks.  These are very scary.  She has been to urgent care a few times. She did have a full cardiology workup.      Ended up in urgent care last week.  Tuesday- fine- at work all day.  Ate something. Ordered food around 12.  All of a sudden- started to feel dizzy.  Went to supervisor's office.  Shaky, clammy, pale, high blood pressure, palpitations, anxious.  Spells usually last 20 mins-1 hour.   Normal thyroid tests, not related to hypoglycemia.  190/101, pulse 124.      Eats 1 or 2 meals a day.  No appetite. Nausea isn't as frequent as it was previously.  Does come, but not as often.      No known family history.      Weight went from 285 to 261# in the last 18 mos or so.     Feels better without the insulin, but worries about glucose going too high.     Current treatment:   Metformin 1000 mg bid.    Previous  "treatment:   Victoza- tolerated it well- stopped it in 2020 when she started insulin.   Ozempic- violently ill.   Invokana 300 mg daily- recurrent yeast infections- stopped 2023. Glucose was really high when she started it. Retrial fall, 2024.  Could not tolerate.  Tresiba- stopped 3/31/25- had been on 16 units daily.  Novolog- 10 units once daily with lunch (biggest meal)- stopped 3/31/25.    Typical day:   Not hungry in the morning.  Wakes up   Daughter wakes her up before she leaves for school.  Drinks water.    Does not eat very much.   Weight fluctuates.Recent loss.   Works as a  overnights.   2020- got really sick.  H. Pylori. Lots of GI issues since then.   Sensitive stomach.  Eats small portions.  Started working out.   Work schedule changed [place]/Mon/Tues and every other Sat. 6:30am-6:30pm. Switched to days.   No breakfast   Coffee in AM  Tries to meal prep- snack boxes- cheese, meat (summer sausage), fruit, tuna or cookies.   Leftovers for lunch.   Dinner-   Days off- \"starving\"     Works out Wed/Thurs/Fri  In the AM- really nauseated.        Recent glucose:           Diabetes Control:   Lab Results   Component Value Date    A1C 10.2 11/09/2023    A1C 9.5 08/11/2023    A1C 8.3 05/11/2023    A1C 7.2 02/28/2022    A1C 8.5 05/05/2021    A1C 6.8 09/28/2016    A1C 6.1 08/14/2013    A1C 5.9 06/07/2011       Past Medical History:   Diagnosis Date    Anemia     told to take iron pills when pregnant    Benign paroxysmal positional vertigo, unspecified laterality 02/26/2021    Depression     Depressive disorder     Diabetes mellitus (H)     Diabetes mellitus, type 2 (H) 07/15/2021    Dysthymic disorder     Endometriosis     Herpes genitalia     Hyperlipidemia     Kidney stone     Menorrhagia     Migraines     Neuropathy     Other abnormal Papanicolaou smear of cervix and cervical HPV(795.09) 01/02/2013    PCOS (polycystic ovarian syndrome)     Post traumatic stress disorder (PTSD)        Past Surgical " History:   Procedure Laterality Date    BLADDER REPAIR W/  SECTION      X2    C/SECTION, CLASSICAL      x2    COLONOSCOPY N/A 2021    Procedure: COLONOSCOPY;  Surgeon: Rene Lehman DO;  Location: [facility]    DAVINCI HYSTERECTOMY TOTAL, SALPINGECTOMY BILATERAL      Dr. Farmer    DILATION AND CURETTAGE  2015    DILATION AND CURETTAGE  2015    DILATION AND CURETTAGE, OPERATIVE HYSTEROSCOPY, COMBINED N/A 2015    Procedure: Dilation and Curettage with Hysteroscopy;  Surgeon: Zia Farmer MD;  Location: [facility];  Service:     DILATION AND CURETTAGE, OPERATIVE HYSTEROSCOPY, COMBINED N/A 2016    Procedure: DILATION AND CURETTAGE WITH HYSTEROSCOPY INSERT MIRENA;  Surgeon: Suzanne Major MD;  Location: [facility];  Service:     ENT SURGERY      ESOPHAGOSCOPY, GASTROSCOPY, DUODENOSCOPY (EGD), COMBINED N/A 2021    Procedure: ESOPHAGOGASTRODUODENOSCOPY, WITH BIOPSY;  Surgeon: Rene Lehman DO;  Location: [facility]    GYN SURGERY  10/19/2015    laproscopic lysis of adhesions    HYSTERECTOMY, PAP NO LONGER INDICATED      HYSTEROSCOPY, ABLATE ENDOMETRIUM HYDROTHERMAL, COMBINED N/A 2018    Procedure: HYSTEROSCOPY, DILATION AND CURETTAGE, ENDOMETRIAL ABLATION;  Surgeon: Kristy Israel DO;  Location: [facility];  Service: Gynecology    LAPAROSCOPIC HYSTERECTOMY TOTAL N/A 2019    Procedure: ROBOTIC TOTAL LAPAROSCOPIC HYSTERECTOMY, BILATERAL SALPINGECTOMY, LEFT OVARIAN CYSTOTOMY. CYSTOSCOPY;  Surgeon: Zia Farmer MD;  Location: [facility];  Service: Gynecology    LAPAROSCOPY DIAGNOSTIC (GENERAL) N/A 10/19/2015    Procedure:  LAPAROSCOPY,LYSIS OF ADHESIONS;  Surgeon: Zia Farmer MD;  Location: [facility] OR;  Service:     SD LAP,TUBAL CAUTERY Bilateral 2018    Procedure: LAPAROSCOPIC BILATERAL TUBAL LIGATION;  Surgeon: Kristy Israel DO;  Location: [facility];  Service: Gynecology    TONSILLECTOMY         Family  History   Adopted: Yes   Problem Relation Age of Onset    Chronic Obstructive Pulmonary Disease Mother     Prostate Cancer Father     Cancer Father         prostate    Alcoholism Sister     Alcoholism Sister     Alcoholism Brother     Alcoholism Brother     Diabetes Paternal Grandmother     Other Cancer Paternal Grandmother     Alcoholism Daughter     Coronary Artery Disease No family hx of     Urolithiasis No family hx of     Clotting Disorder No family hx of     Gout No family hx of     Heart Disease No family hx of     Anesthesia Reaction No family hx of        Social History   Unknown. Adopted.  Biological mother has COPD. Daughter- precocious puberty 3-4 years old, ?PCOS.   Socioeconomic History    Marital status: Single   Tobacco Use    Smoking status: Never    Smokeless tobacco: Never   Vaping Use    Vaping Use: Never used   Substance and Sexual Activity    Alcohol use: Yes     Comment: Socially    Drug use: No     Social Determinants of Health     Financial Resource Strain: Low Risk  (11/30/2023)    Financial Resource Strain     Within the past 12 months, have you or your family members you live with been unable to get utilities (heat, electricity) when it was really needed?: No   Food Insecurity: Low Risk  (11/30/2023)    Food Insecurity     Within the past 12 months, did you worry that your food would run out before you got money to buy more?: No     Within the past 12 months, did the food you bought just not last and you didn t have money to get more?: No   Transportation Needs: Low Risk  (11/30/2023)    Transportation Needs     Within the past 12 months, has lack of transportation kept you from medical appointments, getting your medicines, non-medical meetings or appointments, work, or from getting things that you need?: No   Interpersonal Safety: Low Risk  (11/9/2023)    Interpersonal Safety     Do you feel physically and emotionally safe where you currently live?: Yes     Within the past 12 months,  have you been hit, slapped, kicked or otherwise physically hurt by someone?: No     Within the past 12 months, have you been humiliated or emotionally abused in other ways by your partner or ex-partner?: No   Housing Stability: Low Risk  (11/30/2023)    Housing Stability     Do you have housing? : Yes     Are you worried about losing your housing?: No       Current Outpatient Medications   Medication Sig Dispense Refill    acetaminophen (TYLENOL) 500 MG tablet Take 500-1,000 mg by mouth every 4 hours as needed       Atogepant (QULIPTA) 30 MG tablet Take 1 tablet (30 mg) by mouth daily. 30 tablet 1    BD PEN NEEDLE RAMON 2ND GEN 32G X 4 MM miscellaneous USE 4 PEN NEEDLES DAILY OR AS DIRECTED. 100 each 3    Continuous Glucose Sensor (FREESTYLE CINDY 3 PLUS SENSOR) MISC Change every 15 days. 6 each 3    Continuous Glucose Sensor (FREESTYLE CINDY 3 PLUS SENSOR) MISC Change every 15 days. 6 each 1    Continuous Glucose Sensor (FREESTYLE CINDY 3 SENSOR) MISC APPLY TOPICALLY EVERY 14 DAYS 2 each 11    gabapentin (NEURONTIN) 300 MG capsule Take 1 capsule (300 mg) by mouth 2 times daily for 7 days. Then stop (Patient not taking: Reported on 6/18/2025)      KETOSTIX test strip Use as directed in case of high glucose, vomiting or illness. 50 strip 11    metFORMIN (GLUCOPHAGE XR) 500 MG 24 hr tablet TAKE 1 TABLET (500 MG) BY MOUTH TWICE DAILY WITH MEALS. 180 tablet 3    multivitamin (ONE-DAILY) tablet Take 1 tablet by mouth daily (Patient not taking: Reported on 6/18/2025) 100 tablet 3    MYRBETRIQ 25 MG 24 hr tablet Take 25 mg by mouth daily.      ondansetron (ZOFRAN) 4 MG tablet Take 1 tablet (4 mg) by mouth every 6 hours as needed for nausea. 60 tablet 1    rizatriptan (MAXALT) 10 MG tablet Take 1 tablet (10 mg) by mouth at onset of headache for migraine. May repeat in 2 hours. 18 tablet 0    rosuvastatin (CRESTOR) 40 MG tablet Take 1 tablet (40 mg) by mouth daily. 90 tablet 3    topiramate (TOPAMAX) 100 MG tablet Take 1  tablet (100 mg) by mouth 2 times daily. 60 tablet 1    verapamil ER (VERELAN) 120 MG 24 hr capsule Take 1 capsule (120 mg) by mouth at bedtime. 30 capsule 1     No current facility-administered medications for this visit.          Allergies   Allergen Reactions    Hydrocodone Other (See Comments)     Headache per pt and hallucinations  Headache per pt and hallucinations      Morphine And Codeine Other (See Comments)     hallucinations   Headache per pt and hallucinations   Headache per pt and hallucinations    Reglan [Metoclopramide]      Anxiety    Vicodin [Hydrocodone-Acetaminophen] Other (See Comments)     Hallucinations    Silver Nitrate Itching and Rash     Only issues if in for a long time  Only issues if in for a long time  Only issues if in for a long time      Tegaderm Transparent Dressing (Informational Only) Rash       Physical Exam  LMP  (LMP Unknown)   GENERAL:  Alert and oriented X3, NAD, well dressed, answering questions appropriately, appears stated age.  HEENT: OP clear, no lymphadenopathy, no thyromegaly, non-tender, no exophthalmus, no proptosis, EOMI, no lid lag, no retraction  EXTREMITIES: no edema, +pulses, no rashes, no lesions.  Missing toenail.   RESULTS  Lab Results   Component Value Date    A1C 7.9 (H) 06/25/2025    A1C 7.9 (H) 10/23/2024    A1C 7.1 (H) 10/21/2024    A1C 8.8 (H) 03/11/2024    A1C 10.2 (H) 11/09/2023    A1C 9.5 (H) 08/11/2023    A1C 8.3 (H) 05/11/2023    A1C 6.8 (H) 09/28/2016    A1C 6.1 (H) 08/14/2013    A1C 5.9 (H) 06/07/2011    HEMOGLOBINA1 7.9 10/28/2021       TSH   Date Value Ref Range Status   05/02/2025 0.93 0.30 - 4.20 uIU/mL Final   01/22/2024 1.84 0.30 - 4.20 uIU/mL Final   02/28/2022 1.71 0.40 - 4.00 mU/L Final   04/21/2020 0.87 0.30 - 5.00 uIU/mL Final     Free T4   Date Value Ref Range Status   05/02/2025 0.94 0.90 - 1.70 ng/dL Final       ALT   Date Value Ref Range Status   06/25/2025 15 0 - 50 U/L Final   10/23/2024 14 0 - 50 U/L Final   06/07/2011 17.0 0.0 -  "50.0 u/l Final   ]    Recent Labs   Lab Test 06/25/25  1019 10/23/24  1034   CHOL 164 173   HDL 48* 50   * 111*   TRIG 76 62       Lab Results   Component Value Date     06/25/2025    .2 09/28/2016      Lab Results   Component Value Date    POTASSIUM 4.3 06/25/2025    POTASSIUM 3.9 02/28/2022    POTASSIUM 3.4 09/28/2016     Lab Results   Component Value Date    CHLORIDE 108 06/25/2025    CHLORIDE 106 02/28/2022    CHLORIDE 99.9 09/28/2016     Lab Results   Component Value Date    RADHA 9.3 06/25/2025    RADHA 8.9 09/28/2016     Lab Results   Component Value Date    CO2 22 06/25/2025    CO2 26 02/28/2022    CO2 26.9 09/28/2016     Lab Results   Component Value Date    BUN 10.1 06/25/2025    BUN 9 02/28/2022    BUN 8.7 09/28/2016     Lab Results   Component Value Date    CR 0.66 06/25/2025    CR 0.5 09/28/2016       GFR Estimate   Date Value Ref Range Status   06/25/2025 >90 >60 mL/min/1.73m2 Final     Comment:     eGFR calculated using 2021 CKD-EPI equation.   10/23/2024 >90 >60 mL/min/1.73m2 Final     Comment:     eGFR calculated using 2021 CKD-EPI equation.   01/22/2024 >90 >60 mL/min/1.73m2 Final   05/05/2021 >60 >60 mL/min/1.73m2 Final   02/17/2021 >60 >60 mL/min/1.73m2 Final   11/04/2020 >60 >60 mL/min/1.73m2 Final   09/28/2016 154.0 mL/min/1.7 m2 Final   08/14/2013 > 60 >60 ml/min/1.73m2 Final     GFR Estimate If Black   Date Value Ref Range Status   05/05/2021 >60 >60 mL/min/1.73m2 Final   02/17/2021 >60 >60 mL/min/1.73m2 Final   11/04/2020 >60 >60 mL/min/1.73m2 Final   09/28/2016 186.3 mL/min/1.7 m2 Final   08/14/2013 > 60 >60 ml/min/1.73m2 Final       Lab Results   Component Value Date    MICROL <12.0 10/23/2024    MICROL 15 02/28/2022     No results found for: \"MICROALBUMIN\"  Lab Results   Component Value Date    CPEPT 2.3 01/22/2024    GADAB <5.0 02/28/2022       Vitamin B12   Date Value Ref Range Status   01/22/2024 399 232 - 1,245 pg/mL Final   02/28/2022 437 193 - 986 pg/mL Final "   06/07/2011 443 223 - 1132 pg/ml Final   ]    Tissue Transglutaminase Antibody IgA   Date Value Ref Range Status   01/22/2024 1.0 <7.0 U/mL Final     Comment:     Negative- The tTG-IgA assay has limited utility for patients with decreased levels of IgA. Screening for celiac disease should include IgA testing to rule out selective IgA deficiency and to guide selection and interpretation of serological testing. tTG-IgG testing may be positive in celiac disease patients with IgA deficiency.     Tissue Transglutaminase Antibody IgG   Date Value Ref Range Status   01/22/2024 <0.6 <7.0 U/mL Final     Comment:     Negative         Most recent eye exam date: : Not Found     Computed FIB-4 Calculation unavailable. One or more values for this score either were not found within the given timeframe or did not fit some other criterion.  A value of FIB-4 below 1.30 is considered as low risk for advanced fibrosis; a value of FIB-4 over 2.67 is considered as high risk for advanced fibrosis; and FIB-4 values between 1.30 and 2.67 are considered as intermediate risk of advanced fibrosis.    Glutamic Acid Decarboxylase Antibody   Date Value Ref Range Status   02/28/2022 <5.0 0.0 - 5.0 IU/mL Final     Comment:     INTERPRETIVE INFORMATION:  Glutamic Acid Decarboxylase   Antibody    A value greater than 5.0 IU/mL is considered positive for   Glutamic Acid Decarboxylase Antibody (PREETHI Ab). This assay   is intended for the semi-quantitative determination of the   PREETHI Ab in human serum. Results should be interpreted within   the context of clinical symptoms.  Performed By: [facility]  [address] [place] [id number]  : Evita Oliva MD     C Peptide   Date Value Ref Range Status   01/22/2024 2.3 0.9 - 6.9 ng/mL Final   02/28/2022 5.1 0.9 - 6.9 ng/mL Final     Recent labs from urgent care reviewed:         Assessment/Plan:     1.  Type 2 diabetes- Ruth's glucose control is stable, but running a bit  higher since stopping insulin.  Discussed options, including resuming Victoza (has tolerated in the past).  She does have some GI upset, so I am concerned this may worsen, however she will let me know if she develops any symptoms.  Could consider mounjaro in the future, but will first start with Victoza.    Unable to tolerate Jardiance (tried twice) due to yeast infections. We made the following plan today (instructions given to patient):      Start Victoza 0.6 mg daily x 2 weeks.      Send me a message in 2 weeks and let me know how things are going.      Can increase dose to 1.2 mg daily after 2 weeks, if tolerating well.     Cut your portions in half.  Listen to your body.  Stop eating as soon as you feel full.   Call or send [employer] message if you have side effects, nausea, or hypoglycemia.     Schedule labs- 24 hour urine collection.      Please have your lab tests done.  Please contact us to schedule at any of our [place] lab locations  Call [phone] ([phone]), select option 1 or schedule via [employer].     Please let me know if you are having low blood sugars less than 70 or over 250 mg/dL.      If you have concerns, please send me a [employer] message M-Th, call the clinic at [phone], or call [phone] after hours/weekends and ask to speak with the endocrinologist on call.      2.  Risk factors-     Retinopathy:  No.  Had eye exam 4/2024.    Nephropathy:  BP well controlled, but recent heart racing and elevated HR at home into the 120s, at times. No microalbuminuria.  Creatinine stable.   Neuropathy: Yes- history, now not bothersome.  On gabapentin since 2018. Has been on it since 2018.  May be contributing to nausea- will meet with [facility] pharmacy to discuss potential taper off.  Topamax has also been associated with nausea (effective for her headaches).  Lipids:  LDL at target.  Patient taking rosuvastatin 20 mg daily, but LDL above goal.  Will increase to 40 mg and recheck  lipids in 3 mos.     Thyroid screening: Normal TSH 2025  Has episodic palpitations, tremor, heart racing.  Celiac screening: negative antibodies 2024  Smoking: marijuana is occasionally, just at home    3.  Palpitations- has been having episodes every 2 weeks or so with palpitations, tachycardia, hypertension, anxious, clammy, shakiness. Episodes last 20 mins-1 hour.  Normal thyroid, no hypoglycemia, had cardiology workup.  Although rare, would be concerned about possible pheochromocytoma.  Will do a 24 urine for catecholamines and schedule with one of our staff endocrinologists for further evaluation.      4.  F/U in 3-4 mos with me, next available with staff endocrinologist.      I spent a total of 40 minutes on the date of encounter reviewing medical records, evaluating the patient, coordinating care and  documenting in the EHR, as detailed above, exclusive of CGM time.      Yanet Christie PA-C, MPAS   [facility]  Department of Medicine  Division of Endocrinology and Diabetes

## 2025-06-25 ENCOUNTER — LAB (OUTPATIENT)
Dept: LAB | Facility: CLINIC | Age: 40
End: 2025-06-25
Payer: COMMERCIAL

## 2025-06-25 DIAGNOSIS — R00.0 RACING HEART BEAT: ICD-10-CM

## 2025-06-25 DIAGNOSIS — E11.649 TYPE 2 DIABETES MELLITUS WITH HYPOGLYCEMIA WITHOUT COMA, UNSPECIFIED WHETHER LONG TERM INSULIN USE (H): ICD-10-CM

## 2025-06-25 DIAGNOSIS — R25.1 TREMOR: ICD-10-CM

## 2025-06-25 DIAGNOSIS — E78.5 HYPERLIPIDEMIA: ICD-10-CM

## 2025-06-25 LAB
ALBUMIN SERPL BCG-MCNC: 4.1 G/DL (ref 3.5–5.2)
ALP SERPL-CCNC: 63 U/L (ref 40–150)
ALT SERPL W P-5'-P-CCNC: 15 U/L (ref 0–50)
ANION GAP SERPL CALCULATED.3IONS-SCNC: 11 MMOL/L (ref 7–15)
AST SERPL W P-5'-P-CCNC: 16 U/L (ref 0–45)
BILIRUB SERPL-MCNC: 0.3 MG/DL
BUN SERPL-MCNC: 10.1 MG/DL (ref 6–20)
CALCIUM SERPL-MCNC: 9.3 MG/DL (ref 8.8–10.4)
CHLORIDE SERPL-SCNC: 108 MMOL/L (ref 98–107)
CHOLEST SERPL-MCNC: 164 MG/DL
CREAT SERPL-MCNC: 0.66 MG/DL (ref 0.51–0.95)
EGFRCR SERPLBLD CKD-EPI 2021: >90 ML/MIN/1.73M2
EST. AVERAGE GLUCOSE BLD GHB EST-MCNC: 180 MG/DL
FASTING STATUS PATIENT QL REPORTED: YES
FASTING STATUS PATIENT QL REPORTED: YES
GLUCOSE SERPL-MCNC: 182 MG/DL (ref 70–99)
HBA1C MFR BLD: 7.9 % (ref 0–5.6)
HCO3 SERPL-SCNC: 22 MMOL/L (ref 22–29)
HDLC SERPL-MCNC: 48 MG/DL
LDLC SERPL CALC-MCNC: 101 MG/DL
NONHDLC SERPL-MCNC: 116 MG/DL
POTASSIUM SERPL-SCNC: 4.3 MMOL/L (ref 3.4–5.3)
PROT SERPL-MCNC: 7 G/DL (ref 6.4–8.3)
SODIUM SERPL-SCNC: 141 MMOL/L (ref 135–145)
TRIGL SERPL-MCNC: 76 MG/DL

## 2025-07-02 ENCOUNTER — RESULTS FOLLOW-UP (OUTPATIENT)
Dept: ENDOCRINOLOGY | Facility: CLINIC | Age: 40
End: 2025-07-02

## 2025-07-07 ENCOUNTER — VIRTUAL VISIT (OUTPATIENT)
Dept: ENDOCRINOLOGY | Facility: CLINIC | Age: 40
End: 2025-07-07
Payer: COMMERCIAL

## 2025-07-07 DIAGNOSIS — E11.42 TYPE 2 DIABETES MELLITUS WITH DIABETIC POLYNEUROPATHY, WITH LONG-TERM CURRENT USE OF INSULIN (H): ICD-10-CM

## 2025-07-07 DIAGNOSIS — Z79.4 TYPE 2 DIABETES MELLITUS WITH DIABETIC POLYNEUROPATHY, WITH LONG-TERM CURRENT USE OF INSULIN (H): ICD-10-CM

## 2025-07-07 DIAGNOSIS — R00.2 PALPITATIONS: Primary | ICD-10-CM

## 2025-07-07 PROCEDURE — 1126F AMNT PAIN NOTED NONE PRSNT: CPT | Mod: 95 | Performed by: PHYSICIAN ASSISTANT

## 2025-07-07 PROCEDURE — 98007 SYNCH AUDIO-VIDEO EST HI 40: CPT | Mod: 25 | Performed by: PHYSICIAN ASSISTANT

## 2025-07-07 PROCEDURE — 95251 CONT GLUC MNTR ANALYSIS I&R: CPT | Performed by: PHYSICIAN ASSISTANT

## 2025-07-07 RX ORDER — LIRAGLUTIDE 6 MG/ML
INJECTION SUBCUTANEOUS
Qty: 18 ML | Refills: 3 | OUTPATIENT
Start: 2025-07-07

## 2025-07-07 ASSESSMENT — PAIN SCALES - GENERAL: PAINLEVEL_OUTOF10: NO PAIN (0)

## 2025-07-07 NOTE — LETTER
7/7/2025       RE: Ruth Vanegas  [address]  [place] [address]     Dear Colleague,    Thank you for referring your patient, Ruth Vanegas, to the [facility] ENDOCRINOLOG[facility] [place] at [facility]. Please see a copy of my visit note below.    03/31/25 1:46 PM  PATIENT LAB/IMAGING STATUS : Orders completed / No further action needed   Patient is showing 4/5 MNCM met. A1c not in range   Outcome for 06/20/25 3:52 PM: Data uploaded on [employer]  Hanane Sherman MA  Outcome for 07/03/25 9:01 AM: Data obtained via [employer] website  Hanane Sherman MA    Patient is showing 4/5 MNCM met. Current tobacco use   Hanane Sherman MA      Start time: 0700  End time: 0734  Provider location: off site- home  Patient location: off site- home.    HPI:   Ruth is a pleasant 40 yo woman here for follow up of type 2 diabetes (PREETHI negative, C-peptide positive) since 2016.  She also has a history of Anemia, Depression, Endometriosis, Herpes genitalia, Hyperlipidemia, Kidney stone, Menorrhagia, Migraines, Neuropathy, PCOS (polycystic ovarian syndrome), and Post traumatic stress disorder (PTSD).  Since her last visit, she has been doing ok.  Glucose has been a bit higher since stopping insulin (stopped 3/31/25) due to hypoglycemia.      She continues to have episodes of heart pounding, dizziness, high BP, tachycardia, sometimes headaches, about once every couple of weeks.  These are very scary.  She has been to urgent care a few times. She did have a full cardiology workup.      Ended up in urgent care last week.  Tuesday- fine- at work all day.  Ate something. Ordered food around 12.  All of a sudden- started to feel dizzy.  Went to supervisor's office.  Shaky, clammy, pale, high blood pressure, palpitations, anxious.  Spells usually last 20 mins-1 hour.   Normal thyroid tests, not related to hypoglycemia.  190/101, pulse 124.      Eats 1 or 2 meals a  "day.  No appetite. Nausea isn't as frequent as it was previously.  Does come, but not as often.      No known family history.      Weight went from 285 to 261# in the last 18 mos or so.     Feels better without the insulin, but worries about glucose going too high.     Current treatment:   Metformin 1000 mg bid.    Previous treatment:   Victoza- tolerated it well- stopped it in 2020 when she started insulin.   Ozempic- violently ill.   Invokana 300 mg daily- recurrent yeast infections- stopped 2023. Glucose was really high when she started it. Retrial fall, 2024.  Could not tolerate.  Tresiba- stopped 3/31/25- had been on 16 units daily.  Novolog- 10 units once daily with lunch (biggest meal)- stopped 3/31/25.    Typical day:   Not hungry in the morning.  Wakes up   Daughter wakes her up before she leaves for school.  Drinks water.    Does not eat very much.   Weight fluctuates.Recent loss.   Works as a  overnights.   2020- got really sick.  H. Pylori. Lots of GI issues since then.   Sensitive stomach.  Eats small portions.  Started working out.   Work schedule changed [place]/Mon/Tues and every other Sat. 6:30am-6:30pm. Switched to days.   No breakfast   Coffee in AM  Tries to meal prep- snack boxes- cheese, meat (summer sausage), fruit, tuna or cookies.   Leftovers for lunch.   Dinner-   Days off- \"starving\"     Works out Wed/Thurs/Fri  In the AM- really nauseated.        Recent glucose:           Diabetes Control:   Lab Results   Component Value Date    A1C 10.2 11/09/2023    A1C 9.5 08/11/2023    A1C 8.3 05/11/2023    A1C 7.2 02/28/2022    A1C 8.5 05/05/2021    A1C 6.8 09/28/2016    A1C 6.1 08/14/2013    A1C 5.9 06/07/2011       Past Medical History:   Diagnosis Date     Anemia     told to take iron pills when pregnant     Benign paroxysmal positional vertigo, unspecified laterality 02/26/2021     Depression      Depressive disorder      Diabetes mellitus (H)      Diabetes mellitus, type 2 (H) " 07/15/2021     Dysthymic disorder      Endometriosis      Herpes genitalia      Hyperlipidemia      Kidney stone      Menorrhagia      Migraines      Neuropathy      Other abnormal Papanicolaou smear of cervix and cervical HPV(795.09) 2013     PCOS (polycystic ovarian syndrome)      Post traumatic stress disorder (PTSD)        Past Surgical History:   Procedure Laterality Date     BLADDER REPAIR W/  SECTION      X2     C/SECTION, CLASSICAL      x2     COLONOSCOPY N/A 2021    Procedure: COLONOSCOPY;  Surgeon: Rene Lehman DO;  Location: [facility]     DAVINCI HYSTERECTOMY TOTAL, SALPINGECTOMY BILATERAL      Dr. Farmer     DILATION AND CURETTAGE  2015     DILATION AND CURETTAGE  2015     DILATION AND CURETTAGE, OPERATIVE HYSTEROSCOPY, COMBINED N/A 2015    Procedure: Dilation and Curettage with Hysteroscopy;  Surgeon: Zia Farmer MD;  Location: [facility];  Service:      DILATION AND CURETTAGE, OPERATIVE HYSTEROSCOPY, COMBINED N/A 2016    Procedure: DILATION AND CURETTAGE WITH HYSTEROSCOPY INSERT MIRENA;  Surgeon: Suzanne Major MD;  Location: [facility];  Service:      ENT SURGERY       ESOPHAGOSCOPY, GASTROSCOPY, DUODENOSCOPY (EGD), COMBINED N/A 2021    Procedure: ESOPHAGOGASTRODUODENOSCOPY, WITH BIOPSY;  Surgeon: Rene Lehman DO;  Location: [facility]     GYN SURGERY  10/19/2015    laproscopic lysis of adhesions     HYSTERECTOMY, PAP NO LONGER INDICATED       HYSTEROSCOPY, ABLATE ENDOMETRIUM HYDROTHERMAL, COMBINED N/A 2018    Procedure: HYSTEROSCOPY, DILATION AND CURETTAGE, ENDOMETRIAL ABLATION;  Surgeon: Kristy Israel DO;  Location: [facility];  Service: Gynecology     LAPAROSCOPIC HYSTERECTOMY TOTAL N/A 2019    Procedure: ROBOTIC TOTAL LAPAROSCOPIC HYSTERECTOMY, BILATERAL SALPINGECTOMY, LEFT OVARIAN CYSTOTOMY. CYSTOSCOPY;  Surgeon: Zia Farmer MD;  Location: [facility];  Service: Gynecology     LAPAROSCOPY  DIAGNOSTIC (GENERAL) N/A 10/19/2015    Procedure:  LAPAROSCOPY,LYSIS OF ADHESIONS;  Surgeon: Zia Farmer MD;  Location: [facility] OR;  Service:      NY LAP,TUBAL CAUTERY Bilateral 03/02/2018    Procedure: LAPAROSCOPIC BILATERAL TUBAL LIGATION;  Surgeon: Kristy Israel DO;  Location: [facility] OR;  Service: Gynecology     TONSILLECTOMY         Family History   Adopted: Yes   Problem Relation Age of Onset     Chronic Obstructive Pulmonary Disease Mother      Prostate Cancer Father      Cancer Father         prostate     Alcoholism Sister      Alcoholism Sister      Alcoholism Brother      Alcoholism Brother      Diabetes Paternal Grandmother      Other Cancer Paternal Grandmother      Alcoholism Daughter      Coronary Artery Disease No family hx of      Urolithiasis No family hx of      Clotting Disorder No family hx of      Gout No family hx of      Heart Disease No family hx of      Anesthesia Reaction No family hx of        Social History   Unknown. Adopted.  Biological mother has COPD. Daughter- precocious puberty 3-4 years old, ?PCOS.   Socioeconomic History     Marital status: Single   Tobacco Use     Smoking status: Never     Smokeless tobacco: Never   Vaping Use     Vaping Use: Never used   Substance and Sexual Activity     Alcohol use: Yes     Comment: Socially     Drug use: No     Social Determinants of Health     Financial Resource Strain: Low Risk  (11/30/2023)    Financial Resource Strain      Within the past 12 months, have you or your family members you live with been unable to get utilities (heat, electricity) when it was really needed?: No   Food Insecurity: Low Risk  (11/30/2023)    Food Insecurity      Within the past 12 months, did you worry that your food would run out before you got money to buy more?: No      Within the past 12 months, did the food you bought just not last and you didn t have money to get more?: No   Transportation Needs: Low Risk  (11/30/2023)     Transportation Needs      Within the past 12 months, has lack of transportation kept you from medical appointments, getting your medicines, non-medical meetings or appointments, work, or from getting things that you need?: No   Interpersonal Safety: Low Risk  (11/9/2023)    Interpersonal Safety      Do you feel physically and emotionally safe where you currently live?: Yes      Within the past 12 months, have you been hit, slapped, kicked or otherwise physically hurt by someone?: No      Within the past 12 months, have you been humiliated or emotionally abused in other ways by your partner or ex-partner?: No   Housing Stability: Low Risk  (11/30/2023)    Housing Stability      Do you have housing? : Yes      Are you worried about losing your housing?: No       Current Outpatient Medications   Medication Sig Dispense Refill     acetaminophen (TYLENOL) 500 MG tablet Take 500-1,000 mg by mouth every 4 hours as needed        Atogepant (QULIPTA) 30 MG tablet Take 1 tablet (30 mg) by mouth daily. 30 tablet 1     BD PEN NEEDLE RAMON 2ND GEN 32G X 4 MM miscellaneous USE 4 PEN NEEDLES DAILY OR AS DIRECTED. 100 each 3     Continuous Glucose Sensor (FREESTYLE CINDY 3 PLUS SENSOR) MISC Change every 15 days. 6 each 3     Continuous Glucose Sensor (FREESTYLE CINDY 3 PLUS SENSOR) MISC Change every 15 days. 6 each 1     Continuous Glucose Sensor (FREESTYLE CINDY 3 SENSOR) MISC APPLY TOPICALLY EVERY 14 DAYS 2 each 11     gabapentin (NEURONTIN) 300 MG capsule Take 1 capsule (300 mg) by mouth 2 times daily for 7 days. Then stop (Patient not taking: Reported on 6/18/2025)       KETOSTIX test strip Use as directed in case of high glucose, vomiting or illness. 50 strip 11     metFORMIN (GLUCOPHAGE XR) 500 MG 24 hr tablet TAKE 1 TABLET (500 MG) BY MOUTH TWICE DAILY WITH MEALS. 180 tablet 3     multivitamin (ONE-DAILY) tablet Take 1 tablet by mouth daily (Patient not taking: Reported on 6/18/2025) 100 tablet 3     MYRBETRIQ 25 MG 24 hr  tablet Take 25 mg by mouth daily.       ondansetron (ZOFRAN) 4 MG tablet Take 1 tablet (4 mg) by mouth every 6 hours as needed for nausea. 60 tablet 1     rizatriptan (MAXALT) 10 MG tablet Take 1 tablet (10 mg) by mouth at onset of headache for migraine. May repeat in 2 hours. 18 tablet 0     rosuvastatin (CRESTOR) 40 MG tablet Take 1 tablet (40 mg) by mouth daily. 90 tablet 3     topiramate (TOPAMAX) 100 MG tablet Take 1 tablet (100 mg) by mouth 2 times daily. 60 tablet 1     verapamil ER (VERELAN) 120 MG 24 hr capsule Take 1 capsule (120 mg) by mouth at bedtime. 30 capsule 1     No current facility-administered medications for this visit.          Allergies   Allergen Reactions     Hydrocodone Other (See Comments)     Headache per pt and hallucinations  Headache per pt and hallucinations       Morphine And Codeine Other (See Comments)     hallucinations   Headache per pt and hallucinations   Headache per pt and hallucinations     Reglan [Metoclopramide]      Anxiety     Vicodin [Hydrocodone-Acetaminophen] Other (See Comments)     Hallucinations     Silver Nitrate Itching and Rash     Only issues if in for a long time  Only issues if in for a long time  Only issues if in for a long time       Tegaderm Transparent Dressing (Informational Only) Rash       Physical Exam  LMP  (LMP Unknown)   GENERAL:  Alert and oriented X3, NAD, well dressed, answering questions appropriately, appears stated age.  HEENT: OP clear, no lymphadenopathy, no thyromegaly, non-tender, no exophthalmus, no proptosis, EOMI, no lid lag, no retraction  EXTREMITIES: no edema, +pulses, no rashes, no lesions.  Missing toenail.   RESULTS  Lab Results   Component Value Date    A1C 7.9 (H) 06/25/2025    A1C 7.9 (H) 10/23/2024    A1C 7.1 (H) 10/21/2024    A1C 8.8 (H) 03/11/2024    A1C 10.2 (H) 11/09/2023    A1C 9.5 (H) 08/11/2023    A1C 8.3 (H) 05/11/2023    A1C 6.8 (H) 09/28/2016    A1C 6.1 (H) 08/14/2013    A1C 5.9 (H) 06/07/2011    HEMOGLOBINA1 7.9  10/28/2021       TSH   Date Value Ref Range Status   05/02/2025 0.93 0.30 - 4.20 uIU/mL Final   01/22/2024 1.84 0.30 - 4.20 uIU/mL Final   02/28/2022 1.71 0.40 - 4.00 mU/L Final   04/21/2020 0.87 0.30 - 5.00 uIU/mL Final     Free T4   Date Value Ref Range Status   05/02/2025 0.94 0.90 - 1.70 ng/dL Final       ALT   Date Value Ref Range Status   06/25/2025 15 0 - 50 U/L Final   10/23/2024 14 0 - 50 U/L Final   06/07/2011 17.0 0.0 - 50.0 u/l Final   ]    Recent Labs   Lab Test 06/25/25  1019 10/23/24  1034   CHOL 164 173   HDL 48* 50   * 111*   TRIG 76 62       Lab Results   Component Value Date     06/25/2025    .2 09/28/2016      Lab Results   Component Value Date    POTASSIUM 4.3 06/25/2025    POTASSIUM 3.9 02/28/2022    POTASSIUM 3.4 09/28/2016     Lab Results   Component Value Date    CHLORIDE 108 06/25/2025    CHLORIDE 106 02/28/2022    CHLORIDE 99.9 09/28/2016     Lab Results   Component Value Date    RADHA 9.3 06/25/2025    RADHA 8.9 09/28/2016     Lab Results   Component Value Date    CO2 22 06/25/2025    CO2 26 02/28/2022    CO2 26.9 09/28/2016     Lab Results   Component Value Date    BUN 10.1 06/25/2025    BUN 9 02/28/2022    BUN 8.7 09/28/2016     Lab Results   Component Value Date    CR 0.66 06/25/2025    CR 0.5 09/28/2016       GFR Estimate   Date Value Ref Range Status   06/25/2025 >90 >60 mL/min/1.73m2 Final     Comment:     eGFR calculated using 2021 CKD-EPI equation.   10/23/2024 >90 >60 mL/min/1.73m2 Final     Comment:     eGFR calculated using 2021 CKD-EPI equation.   01/22/2024 >90 >60 mL/min/1.73m2 Final   05/05/2021 >60 >60 mL/min/1.73m2 Final   02/17/2021 >60 >60 mL/min/1.73m2 Final   11/04/2020 >60 >60 mL/min/1.73m2 Final   09/28/2016 154.0 mL/min/1.7 m2 Final   08/14/2013 > 60 >60 ml/min/1.73m2 Final     GFR Estimate If Black   Date Value Ref Range Status   05/05/2021 >60 >60 mL/min/1.73m2 Final   02/17/2021 >60 >60 mL/min/1.73m2 Final   11/04/2020 >60 >60 mL/min/1.73m2 Final  "  09/28/2016 186.3 mL/min/1.7 m2 Final   08/14/2013 > 60 >60 ml/min/1.73m2 Final       Lab Results   Component Value Date    MICROL <12.0 10/23/2024    MICROL 15 02/28/2022     No results found for: \"MICROALBUMIN\"  Lab Results   Component Value Date    CPEPT 2.3 01/22/2024    GADAB <5.0 02/28/2022       Vitamin B12   Date Value Ref Range Status   01/22/2024 399 232 - 1,245 pg/mL Final   02/28/2022 437 193 - 986 pg/mL Final   06/07/2011 443 223 - 1132 pg/ml Final   ]    Tissue Transglutaminase Antibody IgA   Date Value Ref Range Status   01/22/2024 1.0 <7.0 U/mL Final     Comment:     Negative- The tTG-IgA assay has limited utility for patients with decreased levels of IgA. Screening for celiac disease should include IgA testing to rule out selective IgA deficiency and to guide selection and interpretation of serological testing. tTG-IgG testing may be positive in celiac disease patients with IgA deficiency.     Tissue Transglutaminase Antibody IgG   Date Value Ref Range Status   01/22/2024 <0.6 <7.0 U/mL Final     Comment:     Negative         Most recent eye exam date: : Not Found     Computed FIB-4 Calculation unavailable. One or more values for this score either were not found within the given timeframe or did not fit some other criterion.  A value of FIB-4 below 1.30 is considered as low risk for advanced fibrosis; a value of FIB-4 over 2.67 is considered as high risk for advanced fibrosis; and FIB-4 values between 1.30 and 2.67 are considered as intermediate risk of advanced fibrosis.    Glutamic Acid Decarboxylase Antibody   Date Value Ref Range Status   02/28/2022 <5.0 0.0 - 5.0 IU/mL Final     Comment:     INTERPRETIVE INFORMATION:  Glutamic Acid Decarboxylase   Antibody    A value greater than 5.0 IU/mL is considered positive for   Glutamic Acid Decarboxylase Antibody (PREETHI Ab). This assay   is intended for the semi-quantitative determination of the   PREETHI Ab in human serum. Results should be interpreted " within   the context of clinical symptoms.  Performed By: [facility]  [address] [place] [address]  : Evita Oliva MD     C Peptide   Date Value Ref Range Status   01/22/2024 2.3 0.9 - 6.9 ng/mL Final   02/28/2022 5.1 0.9 - 6.9 ng/mL Final     Recent labs from urgent care reviewed:         Assessment/Plan:     1.  Type 2 diabetes- Ruth's glucose control is stable, but running a bit higher since stopping insulin.  Discussed options, including resuming Victoza (has tolerated in the past).  She does have some GI upset, so I am concerned this may worsen, however she will let me know if she develops any symptoms.  Could consider mounjaro in the future, but will first start with Victoza.    Unable to tolerate Jardiance (tried twice) due to yeast infections. We made the following plan today (instructions given to patient):      Start Victoza 0.6 mg daily x 2 weeks.      Send me a message in 2 weeks and let me know how things are going.      Can increase dose to 1.2 mg daily after 2 weeks, if tolerating well.     Cut your portions in half.  Listen to your body.  Stop eating as soon as you feel full.   Call or send Cardio control message if you have side effects, nausea, or hypoglycemia.     Schedule labs- 24 hour urine collection.      Please have your lab tests done.  Please contact us to schedule at any of our [place] lab locations  Call [phone] ([phone]), select option 1 or schedule via [employer].     Please let me know if you are having low blood sugars less than 70 or over 250 mg/dL.      If you have concerns, please send me a Cardio control message M-Th, call the clinic at [phone], or call [phone] after hours/weekends and ask to speak with the endocrinologist on call.      2.  Risk factors-     Retinopathy:  No.  Had eye exam 4/2024.    Nephropathy:  BP well controlled, but recent heart racing and elevated HR at home into the 120s, at times. No microalbuminuria.   Creatinine stable.   Neuropathy: Yes- history, now not bothersome.  On gabapentin since 2018. Has been on it since 2018.  May be contributing to nausea- will meet with [facility] pharmacy to discuss potential taper off.  Topamax has also been associated with nausea (effective for her headaches).  Lipids:  LDL at target.  Patient taking rosuvastatin 20 mg daily, but LDL above goal.  Will increase to 40 mg and recheck  lipids in 3 mos.    Thyroid screening: Normal TSH 2025  Has episodic palpitations, tremor, heart racing.  Celiac screening: negative antibodies 2024  Smoking: marijuana is occasionally, just at home    3.  Palpitations- has been having episodes every 2 weeks or so with palpitations, tachycardia, hypertension, anxious, clammy, shakiness. Episodes last 20 mins-1 hour.  Normal thyroid, no hypoglycemia, had cardiology workup.  Although rare, would be concerned about possible pheochromocytoma.  Will do a 24 urine for catecholamines and schedule with one of our staff endocrinologists for further evaluation.      4.  F/U in 3-4 mos with me, next available with staff endocrinologist.      I spent a total of 40 minutes on the date of encounter reviewing medical records, evaluating the patient, coordinating care and  documenting in the EHR, as detailed above, exclusive of CGM time.      Yanet Christie PA-C, MPAS   [facility]  Department of Medicine  Division of Endocrinology and Diabetes                      Virtual Visit Details    Type of service:  Video Visit     Originating Location (pt. Location): Home    Distant Location (provider location):  On-site  Platform used for Video Visit: April      Again, thank you for allowing me to participate in the care of your patient.      Sincerely,    Yanet Christie PA-C

## 2025-07-07 NOTE — PROGRESS NOTES
Virtual Visit Details    Type of service:  Video Visit     Originating Location (pt. Location): Home    Distant Location (provider location):  On-site  Platform used for Video Visit: April

## 2025-07-07 NOTE — NURSING NOTE
Current patient location: [address] [facility]  [place] [address]    Is the patient currently in the state of [place]? YES    Visit mode: VIDEO    If the visit is dropped, the patient can be reconnected by:VIDEO VISIT: Text to cell phone:   Telephone Information:   Mobile [phone]       Will anyone else be joining the visit? NO  (If patient encounters technical issues they should call [phone]    Are changes needed to the allergy or medication list? No    Are refills needed on medications prescribed by this physician? NO    Rooming Documentation:  Questionnaire(s) not done per department protocol    Reason for visit: RECHECK Shelby Kocher VVF

## 2025-07-11 DIAGNOSIS — Z79.4 TYPE 2 DIABETES MELLITUS WITH DIABETIC POLYNEUROPATHY, WITH LONG-TERM CURRENT USE OF INSULIN (H): Primary | ICD-10-CM

## 2025-07-11 DIAGNOSIS — E11.42 TYPE 2 DIABETES MELLITUS WITH DIABETIC POLYNEUROPATHY, WITH LONG-TERM CURRENT USE OF INSULIN (H): Primary | ICD-10-CM

## 2025-07-11 NOTE — TELEPHONE ENCOUNTER
Pt notified.   Zhensus order pended to provider for signature.   Danika Andre RN on 7/11/2025 at 10:28 AM

## 2025-07-15 DIAGNOSIS — G43.719 INTRACTABLE CHRONIC MIGRAINE WITHOUT AURA AND WITHOUT STATUS MIGRAINOSUS: ICD-10-CM

## 2025-07-15 DIAGNOSIS — E66.813 CLASS 3 SEVERE OBESITY DUE TO EXCESS CALORIES WITH SERIOUS COMORBIDITY AND BODY MASS INDEX (BMI) OF 45.0 TO 49.9 IN ADULT (H): ICD-10-CM

## 2025-07-15 RX ORDER — TOPIRAMATE 100 MG/1
100 TABLET, FILM COATED ORAL 2 TIMES DAILY
Qty: 60 TABLET | Refills: 1 | Status: SHIPPED | OUTPATIENT
Start: 2025-07-15

## 2025-07-15 RX ORDER — VERAPAMIL HYDROCHLORIDE 120 MG/1
120 CAPSULE, EXTENDED RELEASE ORAL AT BEDTIME
Qty: 30 CAPSULE | Refills: 1 | Status: SHIPPED | OUTPATIENT
Start: 2025-07-15

## 2025-07-15 NOTE — TELEPHONE ENCOUNTER
Refill request for: topiramate (TOPAMAX) 100 MG tablet    Directions:  Route: Take 1 tablet (100 mg) by mouth 2 times daily.       Refill request for: verapamil ER (VERELAN) 120 MG 24 hr capsule    Directions: Take 1 capsule (120 mg) by mouth at bedtime     LOV: 06/18/25 with Roya  NOV: 10/1/25 with Roya    30 day supply with 1 refills Medication T'd for review and signature

## 2025-07-30 ENCOUNTER — TELEPHONE (OUTPATIENT)
Dept: ENDOCRINOLOGY | Facility: CLINIC | Age: 40
End: 2025-07-30
Payer: COMMERCIAL

## 2025-07-30 DIAGNOSIS — R00.2 PALPITATIONS: Primary | ICD-10-CM

## 2025-07-30 NOTE — TELEPHONE ENCOUNTER
Patient confirmed scheduled appointment:  Date: 8/8/25  Time: 1:30 pm  Visit type: RETURN DIABETES  Provider: TIO Goddard  Location: [facility]  Testing/imaging: N/A  Additional notes:  Spoke to patient and scheduled her with an MD for follow up per message below.    please schedule with staff endocrinologist- next available.  Received: Today  Yanet Christie PA-C  [id number]  For diabetes and palpitations. She will also continue to see me.    Thank you!  Yanet Rubin on 7/30/2025 at 1:23 PM

## 2025-08-05 ENCOUNTER — PATIENT OUTREACH (OUTPATIENT)
Dept: FAMILY MEDICINE | Facility: CLINIC | Age: 40
End: 2025-08-05
Payer: COMMERCIAL

## 2025-08-18 ENCOUNTER — LAB (OUTPATIENT)
Dept: LAB | Facility: CLINIC | Age: 40
End: 2025-08-18
Payer: COMMERCIAL

## 2025-08-18 DIAGNOSIS — E11.9 TYPE 2 DIABETES MELLITUS WITHOUT COMPLICATION, UNSPECIFIED WHETHER LONG TERM INSULIN USE (H): ICD-10-CM

## 2025-08-18 DIAGNOSIS — E11.42 TYPE 2 DIABETES MELLITUS WITH DIABETIC POLYNEUROPATHY, WITH LONG-TERM CURRENT USE OF INSULIN (H): ICD-10-CM

## 2025-08-18 DIAGNOSIS — Z79.4 TYPE 2 DIABETES MELLITUS WITH DIABETIC POLYNEUROPATHY, WITH LONG-TERM CURRENT USE OF INSULIN (H): ICD-10-CM

## 2025-08-18 DIAGNOSIS — R00.2 PALPITATIONS: ICD-10-CM

## 2025-08-18 LAB
CREAT UR-MCNC: 97.1 MG/DL
EST. AVERAGE GLUCOSE BLD GHB EST-MCNC: 166 MG/DL
HBA1C MFR BLD: 7.4 % (ref 0–5.6)
MICROALBUMIN UR-MCNC: <12 MG/L
MICROALBUMIN/CREAT UR: NORMAL MG/G{CREAT}

## 2025-08-18 PROCEDURE — 82088 ASSAY OF ALDOSTERONE: CPT

## 2025-08-18 PROCEDURE — 3051F HG A1C>EQUAL 7.0%<8.0%: CPT

## 2025-08-18 PROCEDURE — 83036 HEMOGLOBIN GLYCOSYLATED A1C: CPT

## 2025-08-18 PROCEDURE — 83835 ASSAY OF METANEPHRINES: CPT | Mod: 90

## 2025-08-18 PROCEDURE — 82043 UR ALBUMIN QUANTITATIVE: CPT

## 2025-08-18 PROCEDURE — 99000 SPECIMEN HANDLING OFFICE-LAB: CPT

## 2025-08-18 PROCEDURE — 84244 ASSAY OF RENIN: CPT | Mod: 90

## 2025-08-18 PROCEDURE — 82570 ASSAY OF URINE CREATININE: CPT

## 2025-08-18 PROCEDURE — 36415 COLL VENOUS BLD VENIPUNCTURE: CPT

## 2025-08-19 LAB — ALDOST SERPL-MCNC: 4.9 NG/DL (ref 0–31)

## 2025-08-20 LAB
ANNOTATION COMMENT IMP: NORMAL
METANEPHS SERPL-SCNC: <0.1 NMOL/L
NORMETANEPHRINE SERPL-SCNC: 0.11 NMOL/L

## 2025-08-21 LAB — RENIN PLAS-CCNC: <0.1 NG/ML/HR

## (undated) RX ORDER — PROPOFOL 10 MG/ML
INJECTION, EMULSION INTRAVENOUS
Status: DISPENSED
Start: 2021-12-14

## (undated) RX ORDER — LIDOCAINE HYDROCHLORIDE 20 MG/ML
SOLUTION OROPHARYNGEAL
Status: DISPENSED
Start: 2022-05-31